# Patient Record
Sex: FEMALE | Race: WHITE | NOT HISPANIC OR LATINO | Employment: PART TIME | URBAN - METROPOLITAN AREA
[De-identification: names, ages, dates, MRNs, and addresses within clinical notes are randomized per-mention and may not be internally consistent; named-entity substitution may affect disease eponyms.]

---

## 2017-01-08 ENCOUNTER — MYC MEDICAL ADVICE (OUTPATIENT)
Dept: NEUROLOGY | Facility: CLINIC | Age: 52
End: 2017-01-08

## 2017-01-08 DIAGNOSIS — M53.3 SI (SACROILIAC) JOINT DYSFUNCTION: ICD-10-CM

## 2017-01-08 DIAGNOSIS — F07.81 POST CONCUSSION SYNDROME: Primary | ICD-10-CM

## 2017-01-10 RX ORDER — HYDROMORPHONE HYDROCHLORIDE 2 MG/1
1-2 TABLET ORAL
Qty: 20 TABLET | Refills: 0 | Status: SHIPPED | OUTPATIENT
Start: 2017-01-10 | End: 2017-04-07

## 2017-01-10 RX ORDER — DEXTROAMPHETAMINE SACCHARATE, AMPHETAMINE ASPARTATE, DEXTROAMPHETAMINE SULFATE AND AMPHETAMINE SULFATE 5; 5; 5; 5 MG/1; MG/1; MG/1; MG/1
20 TABLET ORAL 2 TIMES DAILY
Qty: 180 TABLET | Refills: 0 | Status: SHIPPED | OUTPATIENT
Start: 2017-01-10 | End: 2017-02-09

## 2017-01-26 ENCOUNTER — TELEPHONE (OUTPATIENT)
Dept: NEUROLOGY | Facility: CLINIC | Age: 52
End: 2017-01-26

## 2017-01-26 NOTE — TELEPHONE ENCOUNTER
Faxed PA request from [facility]   Medication: Amphetamine-Dextroamphetamine 20mg tabs- take 1 tab bu mouth twice daily  Insurance/third party: [facility]   Pt ID:[id number]  Phone:[phone]    Requesting a 3 month supply, but her insurance is only allowing a 1 month supply, please submit PA for 3 month supply.    Tiesha Baltazar RN

## 2017-01-26 NOTE — TELEPHONE ENCOUNTER
[facility] Call Center    Phone Message    Name of Caller: papa    Phone Number: Other phone number:  [phone]*    Best time to return call: ANY    May a detailed message be left on voicemail: yes    Relation to patient: Pharmacy    Reason for Call: Other: Needs prior auth fo amphetamine-dextroamphetamine (ADDERALL) 20 MG per tablet [01641] (Order [id number]), sending fax  to [phone]     Action Taken: Message routed to:  Adult Clinics: Neurology p [id number]

## 2017-02-01 NOTE — TELEPHONE ENCOUNTER
Request has been submitted via [facility]. Waiting for questions to generate before completing PA.

## 2017-02-02 NOTE — TELEPHONE ENCOUNTER
[facility] Prior Authorization Team   Phone: [phone]  Fax: [phone]    PA Initiation    Medication: ADDERALL 20MG  Insurance Company: [employer] - Phone [phone] Fax [phone]  Pharmacy Filling the Rx: [id number] - [place], [place] - [address]  Filling Pharmacy Phone: [phone]  Filling Pharmacy Fax:    Start Date: 2/1/2017

## 2017-02-07 ENCOUNTER — MYC MEDICAL ADVICE (OUTPATIENT)
Dept: NURSING | Facility: CLINIC | Age: 52
End: 2017-02-07

## 2017-02-07 DIAGNOSIS — F07.81 POST CONCUSSION SYNDROME: Primary | ICD-10-CM

## 2017-02-07 NOTE — TELEPHONE ENCOUNTER
PRIOR AUTHORIZATION DENIED    Medication: ADDERALL 20MG - Denied     Denial Date: 2/7/2017    Denial Rational:   PA was denied for diagnosis criteria, additional quantities are not covered when the dx is not ADD, ADHD or narcolepsy confirmed by sleep study. An appeal is available and will require a letter of medical necessity.           Appeal Information:

## 2017-02-09 RX ORDER — DEXTROAMPHETAMINE SACCHARATE, AMPHETAMINE ASPARTATE, DEXTROAMPHETAMINE SULFATE AND AMPHETAMINE SULFATE 5; 5; 5; 5 MG/1; MG/1; MG/1; MG/1
20 TABLET ORAL 2 TIMES DAILY
Qty: 60 TABLET | Refills: 0 | COMMUNITY
Start: 2017-01-10 | End: 2017-02-14

## 2017-02-09 NOTE — TELEPHONE ENCOUNTER
[facility] Call Center    Phone Message    Name of Caller: Darlin    Phone Number: [phone]      Best time to return call: any    May a detailed message be left on voicemail: yes    Relation to patient: Other Name: [employer]  Relationship: [employer]over[employer]Emily  Is there legal documentation in chart to discuss information with this person: Yes. Legal Documentation is verified by [facility].    Reason for Call: [facility] Pharmacy needs to know status of RX: Adderall    Action Taken: Tiesha Baltazar

## 2017-02-09 NOTE — TELEPHONE ENCOUNTER
Writer called the pharmacy to let them know that she is unable to get 90 day supply. They changed the current prescription in her chart at the pharmacy to state 60 tabs were given and not 180. Medication list was updated. Once we have the ok from the provider we will mail her the prescriptions.   Tiesha Baltazar RN

## 2017-02-14 DIAGNOSIS — F07.81 POST CONCUSSION SYNDROME: ICD-10-CM

## 2017-02-14 RX ORDER — DEXTROAMPHETAMINE SACCHARATE, AMPHETAMINE ASPARTATE, DEXTROAMPHETAMINE SULFATE AND AMPHETAMINE SULFATE 5; 5; 5; 5 MG/1; MG/1; MG/1; MG/1
20 TABLET ORAL 2 TIMES DAILY
Qty: 60 TABLET | Refills: 0 | Status: SHIPPED | OUTPATIENT
Start: 2017-04-14 | End: 2017-04-07

## 2017-02-14 RX ORDER — DEXTROAMPHETAMINE SACCHARATE, AMPHETAMINE ASPARTATE, DEXTROAMPHETAMINE SULFATE AND AMPHETAMINE SULFATE 5; 5; 5; 5 MG/1; MG/1; MG/1; MG/1
20 TABLET ORAL 2 TIMES DAILY
Qty: 60 TABLET | Refills: 0 | Status: SHIPPED | OUTPATIENT
Start: 2017-03-14 | End: 2017-04-07

## 2017-02-14 RX ORDER — DEXTROAMPHETAMINE SACCHARATE, AMPHETAMINE ASPARTATE, DEXTROAMPHETAMINE SULFATE AND AMPHETAMINE SULFATE 5; 5; 5; 5 MG/1; MG/1; MG/1; MG/1
20 TABLET ORAL 2 TIMES DAILY
Qty: 60 TABLET | Refills: 0 | Status: SHIPPED | OUTPATIENT
Start: 2017-02-14 | End: 2017-04-07

## 2017-02-14 NOTE — TELEPHONE ENCOUNTER
The pt would like 3-30 day prescriptions on the Adderall. Routed to Dr Johnston to complete and sign.  Tiesha Baltazar RN

## 2017-02-23 ENCOUNTER — OFFICE VISIT (OUTPATIENT)
Dept: PHYSICAL MEDICINE AND REHAB | Facility: CLINIC | Age: 52
End: 2017-02-23

## 2017-02-23 VITALS
DIASTOLIC BLOOD PRESSURE: 75 MMHG | RESPIRATION RATE: 20 BRPM | OXYGEN SATURATION: 98 % | HEART RATE: 89 BPM | SYSTOLIC BLOOD PRESSURE: 147 MMHG | HEIGHT: 67 IN | WEIGHT: 171 LBS | BODY MASS INDEX: 26.84 KG/M2

## 2017-02-23 DIAGNOSIS — G43.719 INTRACTABLE CHRONIC MIGRAINE WITHOUT AURA AND WITHOUT STATUS MIGRAINOSUS: Primary | ICD-10-CM

## 2017-02-23 ASSESSMENT — PAIN SCALES - GENERAL: PAINLEVEL: EXTREME PAIN (8)

## 2017-02-23 NOTE — LETTER
"2/23/2017       RE: Valerie Sim  [address]  [place] [address]     Dear Colleague,    Thank you for referring your patient, Valerie Sim, to the [facility] PHYSICAL MEDICINE AND REHABILITATION at [facility]. Please see a copy of my visit note below.    BOTULINUM TOXIN PROCEDURE - HEADACHE - NOTE    Chief Complaint   Patient presents with     Headache     UMP- BOTOX-MIGRAINE       /75 (BP Location: Left arm, Patient Position: Chair, Cuff Size: Adult Large)  Pulse 89  Resp 20  Ht 1.702 m (5' 7\")  Wt 77.6 kg (171 lb)  SpO2 98%  Breastfeeding? No  BMI 26.78 kg/m2       Current Outpatient Prescriptions:      amphetamine-dextroamphetamine (ADDERALL) 20 MG per tablet, Take 1 tablet (20 mg) by mouth 2 times daily, Disp: 60 tablet, Rfl: 0     [START ON 3/14/2017] amphetamine-dextroamphetamine (ADDERALL) 20 MG per tablet, Take 1 tablet (20 mg) by mouth 2 times daily, Disp: 60 tablet, Rfl: 0     [START ON 4/14/2017] amphetamine-dextroamphetamine (ADDERALL) 20 MG per tablet, Take 1 tablet (20 mg) by mouth 2 times daily, Disp: 60 tablet, Rfl: 0     HYDROmorphone (DILAUDID) 2 MG tablet, Take 0.5-1 tablets (1-2 mg) by mouth every 3 hours as needed 20 tablets = 3 month supply., Disp: 20 tablet, Rfl: 0     METOPROLOL SUCCINATE ER PO, Take 25 mg by mouth daily, Disp: , Rfl:      OnabotulinumtoxinA (BOTOX IJ), Inject 175 Units as directed once Lot# /C3, Exp 08/2019, Disp: , Rfl:      eletriptan (RELPAX) 40 MG tablet, Take one tablet by mouth at onset of migraine. May repeat once after 2hrs. Max of 2 tabs in 24hrs and 3 days per week., Disp: 12 tablet, Rfl: 5     divalproex (DEPAKOTE) 500 MG EC tablet, Take 1 tablet (500 mg) by mouth 2 times daily as needed, Disp: 60 tablet, Rfl: 5     divalproex (DEPAKOTE ER) 250 MG 24 hr tablet, Take 1 tablet (250 mg) by mouth daily as needed, Disp: 30 tablet, Rfl: 3     OnabotulinumtoxinA (BOTOX IJ), Inject 175 Units into the muscle Lot " # /C3  Exp: 6/2019, Disp: , Rfl:      OnabotulinumtoxinA (BOTOX IJ), Inject 175 Units into the muscle Lot # /C3  Exp: 4/2019, Disp: , Rfl:      OnabotulinumtoxinA (BOTOX IJ), Inject 175 Units into the muscle Lot # /c3  Exp: 2/2019, Disp: , Rfl:      ondansetron (ZOFRAN) 4 MG tablet, Take 1 tablet (4 mg) by mouth every 8 hours as needed, Disp: 90 tablet, Rfl: 1     Azithromycin (ZITHROMAX Z-ARNULFO PO), Take 250 mg by mouth, Disp: , Rfl:      CycloSPORINE (RESTASIS OP), Apply to eye 2 times daily, Disp: , Rfl:      UNABLE TO FIND, MEDICATION NAME: voltaren gel, Disp: , Rfl:      Cholecalciferol (VITAMIN D3) 5000 UNITS TABS, Take 1 tablet by mouth daily., Disp: , Rfl:      biotin 1000 MCG TABS tablet, Take 1,000 mcg by mouth every other day., Disp: , Rfl:      Lutein 20 MG TABS, Take 1 tablet by mouth every other day., Disp: , Rfl:      aMILoride (MIDAMOR) 2.5 MG tablet, Take 2.5 mg by mouth daily., Disp: , Rfl:      LORazepam (ATIVAN) 1 MG tablet, Take 1 mg by mouth 2 times daily as needed., Disp: , Rfl:      QUEtiapine (SEROQUEL) 50 MG tablet, Take 1 tablet by mouth daily., Disp: , Rfl:      amLODIPine (NORVASC) 10 MG tablet, Take 10 mg by mouth daily., Disp: , Rfl:      budesonide (RINOCORT AQUA) 32 MCG/ACT nasal spray, Spray 1 spray into both nostrils daily., Disp: , Rfl:      Calcium Carbonate-Vit D-Min (CALCIUM 1200 PO), Take 1 tablet by mouth daily., Disp: , Rfl:      fexofenadine (ALLEGRA) 180 MG tablet, Take  by mouth daily., Disp: , Rfl:      ibuprofen (ADVIL,MOTRIN) 800 MG tablet, Take 800 mg by mouth every 8 hours as needed., Disp: , Rfl:      KRILL OIL PO, Take  by mouth., Disp: , Rfl:      estrogens, conjugated, (PREMARIN) 0.625 MG tablet, Take by mouth daily .30 mg. , Disp: , Rfl:      valACYclovir (VALTREX) 1000 mg tablet, Take 1,000 mg by mouth as needed., Disp: , Rfl:      B Complex Vitamins (VITAMIN  B COMPLEX) tablet, Take 1 tablet by mouth daily.  , Disp: , Rfl:      AMILORIDE HCL PO,  "Take 5 mg by mouth daily., Disp: , Rfl:      Allergies   Allergen Reactions     Amitriptyline Hcl Other (See Comments)     Migraine       Cymbalta Other (See Comments)     Migraine       Cyproheptadine Hcl      headaches     Desvenlafaxine      Migraine       Fluoxetine Other (See Comments)     Migraine       Imipramine Other (See Comments)     Migraine       Medroxyprogesterone Hives     Morphine Other (See Comments)     Patient reports seizure      Nortriptyline Other (See Comments)     Migraine       Phenergan Dm [Promethazine-Dm] Other (See Comments)     seizures     Venlafaxine Other (See Comments)     Migraine       Wellbutrin [Bupropion Hydrobromide] Other (See Comments)     Migraine       Compazine Anxiety     Dihydroergotamine Anxiety and Other (See Comments)     Cardiac symptoms     Droperidol Anxiety        PHYSICAL EXAM:    Pt states headache today rates 8/10 started yesterday where she rates it at a 10/10.  Did not go to ER. Took Relpax yesterday but has only taken 4 during the last 2 months.    HPI:    Patient reports the following new medical problems since last visit: minor cold, resolved.    We reviewed the recommended safety guidelines for  Botox from any vaccine injection, such as the seasonal flu vaccine, by a minimum of 10-14 days with Valerie Sim. She acknowledged understanding.    RESPONSE TO PREVIOUS TREATMENT:  Change in headache pattern following last series of injections with 175 units of  Botox on 12/14/2016.    No problems reported  positional \"band\" of pressure around head.  Lasted approx one month then resolved.    1.  Headache frequency during this injection cycle:  5 days post injections, total 20-25 headache days per month but states intensity is much reduced..  This is compared to her baseline headache frequency of daily headache days per month.     2.  Headache duration during this injection cycle: Continuous migraine during the this injection cycle with lowered " intensity.  She did not require hospitalization during this episode of sustained migraine. No ER visits.    3.  Headache intensity during this injection cycle:    A.  5/10  =  Typical pain level.  B.  10/10  =  Worst pain level.  C.  4/10  =  Lowest pain level.    4.  Change in headache medication usage during this injection cycle:  (For Example:  Able to decrease use of oral pain medications.) Used less oral pain meds, used less Relpax.    5.  ER Visits During This Injection Cycle:  0    6.  Functional Performance:  Change in ADL's, social interaction, days lost from work, etc. Patient reports 3 days lost from work in addition to the 5 days post injection that she plans, due to headache during this injection cycle    BOTULINUM NEUROTOXIN INJECTION PROCEDURES:      VERIFICATION OF PATIENT IDENTIFICATION AND PROCEDURE     Initials   Patient Name lmd   Patient  lmd   Procedure Verified by: pj     Prior to the start of the procedure and with procedural staff participation, I verbally confirmed the patient s identity using two indicators, relevant allergies, that the procedure was appropriate and matched the consent or emergent situation, and that the correct equipment/implants were available. Immediately prior to starting the procedure I conducted the Time Out with the procedural staff and re-confirmed the patient s name, procedure, and site/side. (The Joint Commission universal protocol was followed.)  Yes    Sedation (Moderate or Deep): None    Above assessments performed by:  Estela Khalil RN Care Coordinator    Juan Jose Jerry MD      INDICATIONS FOR PROCEDURES:  Valerie Sim is a 51 year old patient with chronic migraine headaches associated with cervicogenic components..      Her baseline symptoms have been recalcitrant to oral medications and conservative therapy. She is here today for an injection of Botox.      GOAL OF PROCEDURE:  The goal of this procedure is to decrease pain and enhance functional  independence.    TOTAL DOSE ADMINISTERED:  Dose Administered:  175 units  Botox (Botulinum Toxin Type A)       2:1 Dilution   Diluent Used:  Preservative Free Normal Saline  Total Volume of Diluent Used:  3.50 ml  Lot # /C3 with Expiration Date:  9/2019  NDC #: Botox 100u ([address]-04)    Medication guide was offered to patient and was declined.    CONSENT:  The risks, benefits, and treatment options were discussed with Valerie YEE Roney and she agreed to proceed.    Written consent was obtained by lmd.     EQUIPMENT USED:  Needle-37mm stimulating/recording  Needle-30 gauge  EMG/NCS Machine     SKIN PREPARATION:  Skin preparation was performed using an alcohol wipe.     AREA/MUSCLE INJECTED:  175 Units of Botox  1. FACE & SCALP: 100 units of Botox = Total dose, 2:1 Dilution  Right temporalis - 15 units of Botox in 4 site/s.  Left temporalis - 15 units of Botox in 4 site/s.     Right frontalis - 12.5 units of Botox in 4 site/s.  Left frontalis - 12.5 units of Botox in 4 site/s.     Right procerus - 3.5 units of Botox in 1 site/s.  Left procerus - 3.5 units of Botox in 1 site/s.     Right  - 4 units of Botox in 1 site/s.  Left  - 4 units of Botox in 1 site/s.     Right Occipitalis - 12.5 units of Botox at 4 site/s.   Left Occipitalis - 12.5 units of Botox at 4 site/s.     Left and Right Nasalis - 2.5 units per side, total of 5 units.     2. JAW MUSCLES: 50 units of Botox = Total Dose, 1:1 Dilution  Right Masseter - 20 units of Botox at 1 site/s.   Left Masseter - 20 units of Botox at 1 site/s.     Right temporalis - 5 units of Botox at 1 site/s.  Left temporalis - 5 units of Botox at 1 site/s.      3. SHOULDER & NECK MUSCLES: Total dose 25 units of Botox diluted 2:1     Right levator muscle - 8 units of Botox at 1 site.  Left levator muscle - 7 units of Botox at 1 site.     Right mid trap - 5 units of Botox at 1 site.  Left mid trap - 5 units of Botox at 1 site.       RESPONSE TO PROCEDURE:   Valerie Sim tolerated the procedure well and there were no immediate complications.   She was allowed to recover for an appropriate period of time and was discharged home in stable condition.    FOLLOW UP:  Valerie Sim was asked to follow up by phone in 7-14 days with Nely Jean Baptiste PT, Care Coordinator or Estela Magaña RN, Care Coordinator, to report her response to this series of injections.  Based on the patient's previous response to this therapy, Valerie Sim was rescheduled for the next series of injections in 9 weeks.    PLAN (Medication Changes, Therapy Orders, Work or Disability Issues, etc.): Patient will continue to monitor response to today's injections.     There were 25 units of Botox as unavoidable waste for this patient.      Again, thank you for allowing me to participate in the care of your patient.      Sincerely,    Juan Jose Jerry MD

## 2017-02-23 NOTE — MR AVS SNAPSHOT
After Visit Summary   2/23/2017    Valerie Sim    MRN: [id number]           Patient Information     Date Of Birth          1965        Visit Information        Provider Department      2/23/2017 2:30 PM Juan Jose Jerry MD [facility] Physical Medicine and Rehabilitation         Follow-ups after your visit        Follow-up notes from your care team     Return in about 9 weeks (around 4/27/2017) for Headache.      Your next 10 appointments already scheduled     Apr 26, 2017 10:00 AM CDT   (Arrive by 9:45 AM)   Return Botox with Juan Jose Jerry MD   [facility] Physical Medicine and Rehabilitation ([facility])    [address] [place] [id number] [phone]            May 09, 2017  9:00 AM CDT   Return Visit with Boyd Johnston MD   [facility] ([facility])    [address] [place] [id number]   [phone]            Jun 28, 2017  2:20 PM CDT   (Arrive by 2:05 PM)   Return Botox with Juan Jose Jerry MD   [facility] Physical Medicine and Rehabilitation ([facility])    [address] [place] [id number] [phone]              Who to contact     Please call your clinic at [phone] to:    Ask questions about your health    Make or cancel appointments    Discuss your medicines    Learn about your test results    Speak to your doctor   If you have compliments or concerns about an experience at your clinic, or if you wish to file a complaint, please contact [facility] Patient Relations at [phone] or email us at [email or web address]         Additional Information About Your Visit        MyChart Information     [employer] gives you secure access to your electronic health record. If you see a primary care provider, you can also send messages to your care team and make appointments. If you have  "questions, please call your primary care clinic.  If you do not have a primary care provider, please call [phone] and they will assist you.      [employer] is an electronic gateway that provides easy, online access to your medical records. With [employer], you can request a clinic appointment, read your test results, renew a prescription or communicate with your care team.     To access your existing account, please contact your [facility] or call [phone] for assistance.        Care EveryWhere ID     This is your Care EveryWhere ID. This could be used by other organizations to access your [place] medical records  [id number]        Your Vitals Were     Pulse Respirations Height Pulse Oximetry Breastfeeding? BMI (Body Mass Index)    89 20 1.702 m (5' 7\") 98% No 26.78 kg/m2       Blood Pressure from Last 3 Encounters:   02/23/17 147/75   12/14/16 126/73   11/16/16 121/84    Weight from Last 3 Encounters:   02/23/17 77.6 kg (171 lb)   12/14/16 74.1 kg (163 lb 4.8 oz)   11/16/16 70.3 kg (155 lb)              Today, you had the following     No orders found for display       Primary Care Provider Office Phone # Fax #    Meek Leary [phone]       [place] [phone]        Thank you!     Thank you for choosing [facility] PHYSICAL MEDICINE AND REHABILITATION  for your care. Our goal is always to provide you with excellent care. Hearing back from our patients is one way we can continue to improve our services. Please take a few minutes to complete the written survey that you may receive in the mail after your visit with us. Thank you!             Your Updated Medication List - Protect others around you: Learn how to safely use, store and throw away your medicines at www.disposemymeds.org.          This list is accurate as of: 2/23/17  2:57 PM.  Always use your most recent med list.                   Brand Name Dispense " Instructions for use    * AMILORIDE HCL PO      Take 5 mg by mouth daily.       * aMILoride 2.5 MG half-tab    MIDAMOR     Take 2.5 mg by mouth daily.       * amphetamine-dextroamphetamine 20 MG per tablet    ADDERALL    60 tablet    Take 1 tablet (20 mg) by mouth 2 times daily       * amphetamine-dextroamphetamine 20 MG per tablet   Start taking on:  3/14/2017    ADDERALL    60 tablet    Take 1 tablet (20 mg) by mouth 2 times daily       * amphetamine-dextroamphetamine 20 MG per tablet   Start taking on:  4/14/2017    ADDERALL    60 tablet    Take 1 tablet (20 mg) by mouth 2 times daily       ATIVAN 1 MG tablet   Generic drug:  LORazepam      Take 1 mg by mouth 2 times daily as needed.       biotin 1000 MCG Tabs tablet      Take 1,000 mcg by mouth every other day.       * BOTOX IJ      Inject 175 Units into the muscle Lot # /c3  Exp: 2/2019       * BOTOX IJ      Inject 175 Units into the muscle Lot # /C3  Exp: 4/2019       * BOTOX IJ      Inject 175 Units into the muscle Lot # /C3  Exp: 6/2019       * BOTOX IJ      Inject 175 Units as directed once Lot# /C3, Exp 08/2019       * BOTOX IJ      Inject 175 Units into the muscle Lot # /C3  Exp: 9/2019       budesonide 32 MCG/ACT spray    RINOCORT AQUA     Spray 1 spray into both nostrils daily.       CALCIUM 1200 PO      Take 1 tablet by mouth daily.       cholecalciferol 5000 UNITS Tabs tablet    vitamin D3     Take 1 tablet by mouth daily.       * divalproex 500 MG EC tablet    DEPAKOTE    60 tablet    Take 1 tablet (500 mg) by mouth 2 times daily as needed       * divalproex 250 MG 24 hr tablet    DEPAKOTE ER    30 tablet    Take 1 tablet (250 mg) by mouth daily as needed       eletriptan 40 MG tablet    RELPAX    12 tablet    Take one tablet by mouth at onset of migraine. May repeat once after 2hrs. Max of 2 tabs in 24hrs and 3 days per week.       fexofenadine 180 MG tablet    ALLEGRA     Take  by mouth daily.       HYDROmorphone 2 MG  tablet    DILAUDID    20 tablet    Take 0.5-1 tablets (1-2 mg) by mouth every 3 hours as needed 20 tablets = 3 month supply.       ibuprofen 800 MG tablet    ADVIL/MOTRIN     Take 800 mg by mouth every 8 hours as needed.       KRILL OIL PO      Take  by mouth.       Lutein 20 MG Tabs      Take 1 tablet by mouth every other day.       METOPROLOL SUCCINATE ER PO      Take 25 mg by mouth daily       NORVASC 10 MG tablet   Generic drug:  amLODIPine      Take 10 mg by mouth daily.       ondansetron 4 MG tablet    ZOFRAN    90 tablet    Take 1 tablet (4 mg) by mouth every 8 hours as needed       PREMARIN 0.625 MG tablet   Generic drug:  estrogens (conjugated)      Take by mouth daily .30 mg  .       RESTASIS OP      Apply to eye 2 times daily       SEROQUEL 50 MG tablet   Generic drug:  QUEtiapine      Take 1 tablet by mouth daily.       UNABLE TO FIND      MEDICATION NAME: voltaren gel       VALTREX 1000 mg tablet   Generic drug:  valACYclovir      Take 1,000 mg by mouth as needed.       vitamin B complex with vitamin C Tabs tablet    STRESS TAB     Take 1 tablet by mouth daily.       ZITHROMAX Z-ARNULFO PO      Take 250 mg by mouth       * Notice:  This list has 12 medication(s) that are the same as other medications prescribed for you. Read the directions carefully, and ask your doctor or other care provider to review them with you.

## 2017-02-23 NOTE — PROGRESS NOTES
"BOTULINUM TOXIN PROCEDURE - HEADACHE - NOTE    Chief Complaint   Patient presents with     Headache     [facility]- BOTOX-MIGRAINE       /75 (BP Location: Left arm, Patient Position: Chair, Cuff Size: Adult Large)  Pulse 89  Resp 20  Ht 1.702 m (5' 7\")  Wt 77.6 kg (171 lb)  SpO2 98%  Breastfeeding? No  BMI 26.78 kg/m2       Current Outpatient Prescriptions:      amphetamine-dextroamphetamine (ADDERALL) 20 MG per tablet, Take 1 tablet (20 mg) by mouth 2 times daily, Disp: 60 tablet, Rfl: 0     [START ON 3/14/2017] amphetamine-dextroamphetamine (ADDERALL) 20 MG per tablet, Take 1 tablet (20 mg) by mouth 2 times daily, Disp: 60 tablet, Rfl: 0     [START ON 4/14/2017] amphetamine-dextroamphetamine (ADDERALL) 20 MG per tablet, Take 1 tablet (20 mg) by mouth 2 times daily, Disp: 60 tablet, Rfl: 0     HYDROmorphone (DILAUDID) 2 MG tablet, Take 0.5-1 tablets (1-2 mg) by mouth every 3 hours as needed 20 tablets = 3 month supply., Disp: 20 tablet, Rfl: 0     METOPROLOL SUCCINATE ER PO, Take 25 mg by mouth daily, Disp: , Rfl:      OnabotulinumtoxinA (BOTOX IJ), Inject 175 Units as directed once Lot# /C3, Exp 08/2019, Disp: , Rfl:      eletriptan (RELPAX) 40 MG tablet, Take one tablet by mouth at onset of migraine. May repeat once after 2hrs. Max of 2 tabs in 24hrs and 3 days per week., Disp: 12 tablet, Rfl: 5     divalproex (DEPAKOTE) 500 MG EC tablet, Take 1 tablet (500 mg) by mouth 2 times daily as needed, Disp: 60 tablet, Rfl: 5     divalproex (DEPAKOTE ER) 250 MG 24 hr tablet, Take 1 tablet (250 mg) by mouth daily as needed, Disp: 30 tablet, Rfl: 3     OnabotulinumtoxinA (BOTOX IJ), Inject 175 Units into the muscle Lot # /C3  Exp: 6/2019, Disp: , Rfl:      OnabotulinumtoxinA (BOTOX IJ), Inject 175 Units into the muscle Lot # /C3  Exp: 4/2019, Disp: , Rfl:      OnabotulinumtoxinA (BOTOX IJ), Inject 175 Units into the muscle Lot # /c3  Exp: 2/2019, Disp: , Rfl:      ondansetron (ZOFRAN) 4 MG tablet, Take 1 " tablet (4 mg) by mouth every 8 hours as needed, Disp: 90 tablet, Rfl: 1     Azithromycin (ZITHROMAX Z-ARNULFO PO), Take 250 mg by mouth, Disp: , Rfl:      CycloSPORINE (RESTASIS OP), Apply to eye 2 times daily, Disp: , Rfl:      UNABLE TO FIND, MEDICATION NAME: voltaren gel, Disp: , Rfl:      Cholecalciferol (VITAMIN D3) 5000 UNITS TABS, Take 1 tablet by mouth daily., Disp: , Rfl:      biotin 1000 MCG TABS tablet, Take 1,000 mcg by mouth every other day., Disp: , Rfl:      Lutein 20 MG TABS, Take 1 tablet by mouth every other day., Disp: , Rfl:      aMILoride (MIDAMOR) 2.5 MG tablet, Take 2.5 mg by mouth daily., Disp: , Rfl:      LORazepam (ATIVAN) 1 MG tablet, Take 1 mg by mouth 2 times daily as needed., Disp: , Rfl:      QUEtiapine (SEROQUEL) 50 MG tablet, Take 1 tablet by mouth daily., Disp: , Rfl:      amLODIPine (NORVASC) 10 MG tablet, Take 10 mg by mouth daily., Disp: , Rfl:      budesonide (RINOCORT AQUA) 32 MCG/ACT nasal spray, Spray 1 spray into both nostrils daily., Disp: , Rfl:      Calcium Carbonate-Vit D-Min (CALCIUM 1200 PO), Take 1 tablet by mouth daily., Disp: , Rfl:      fexofenadine (ALLEGRA) 180 MG tablet, Take  by mouth daily., Disp: , Rfl:      ibuprofen (ADVIL,MOTRIN) 800 MG tablet, Take 800 mg by mouth every 8 hours as needed., Disp: , Rfl:      KRILL OIL PO, Take  by mouth., Disp: , Rfl:      estrogens, conjugated, (PREMARIN) 0.625 MG tablet, Take by mouth daily .30 mg. , Disp: , Rfl:      valACYclovir (VALTREX) 1000 mg tablet, Take 1,000 mg by mouth as needed., Disp: , Rfl:      B Complex Vitamins (VITAMIN  B COMPLEX) tablet, Take 1 tablet by mouth daily.  , Disp: , Rfl:      AMILORIDE HCL PO, Take 5 mg by mouth daily., Disp: , Rfl:      Allergies   Allergen Reactions     Amitriptyline Hcl Other (See Comments)     Migraine       Cymbalta Other (See Comments)     Migraine       Cyproheptadine Hcl      headaches     Desvenlafaxine      Migraine       Fluoxetine Other (See Comments)     Migraine    "    Imipramine Other (See Comments)     Migraine       Medroxyprogesterone Hives     Morphine Other (See Comments)     Patient reports seizure      Nortriptyline Other (See Comments)     Migraine       Phenergan Dm [Promethazine-Dm] Other (See Comments)     seizures     Venlafaxine Other (See Comments)     Migraine       Wellbutrin [Bupropion Hydrobromide] Other (See Comments)     Migraine       Compazine Anxiety     Dihydroergotamine Anxiety and Other (See Comments)     Cardiac symptoms     Droperidol Anxiety        PHYSICAL EXAM:    Pt states headache today rates 8/10 started yesterday where she rates it at a 10/10.  Did not go to ER. Took Relpax yesterday but has only taken 4 during the last 2 months.    HPI:    Patient reports the following new medical problems since last visit: minor cold, resolved.    We reviewed the recommended safety guidelines for  Botox from any vaccine injection, such as the seasonal flu vaccine, by a minimum of 10-14 days with Valerie Sim. She acknowledged understanding.    RESPONSE TO PREVIOUS TREATMENT:  Change in headache pattern following last series of injections with 175 units of  Botox on 12/14/2016.    No problems reported  positional \"band\" of pressure around head.  Lasted approx one month then resolved.    1.  Headache frequency during this injection cycle:  5 days post injections, total 20-25 headache days per month but states intensity is much reduced..  This is compared to her baseline headache frequency of daily headache days per month.     2.  Headache duration during this injection cycle: Continuous migraine during the this injection cycle with lowered intensity.  She did not require hospitalization during this episode of sustained migraine. No ER visits.    3.  Headache intensity during this injection cycle:    A.  5/10  =  Typical pain level.  B.  10/10  =  Worst pain level.  C.  4/10  =  Lowest pain level.    4.  Change in headache medication usage during " this injection cycle:  (For Example:  Able to decrease use of oral pain medications.) Used less oral pain meds, used less Relpax.    5.  ER Visits During This Injection Cycle:  0    6.  Functional Performance:  Change in ADL's, social interaction, days lost from work, etc. Patient reports 3 days lost from work in addition to the 5 days post injection that she plans, due to headache during this injection cycle    BOTULINUM NEUROTOXIN INJECTION PROCEDURES:      VERIFICATION OF PATIENT IDENTIFICATION AND PROCEDURE     Initials   Patient Name lmd   Patient  lmd   Procedure Verified by: pj     Prior to the start of the procedure and with procedural staff participation, I verbally confirmed the patient s identity using two indicators, relevant allergies, that the procedure was appropriate and matched the consent or emergent situation, and that the correct equipment/implants were available. Immediately prior to starting the procedure I conducted the Time Out with the procedural staff and re-confirmed the patient s name, procedure, and site/side. (The Joint Commission universal protocol was followed.)  Yes    Sedation (Moderate or Deep): None    Above assessments performed by:  Estela Khalil RN Care Coordinator    Juan Jose Jerry MD      INDICATIONS FOR PROCEDURES:  Valerie Sim is a 51 year old patient with chronic migraine headaches associated with cervicogenic components..      Her baseline symptoms have been recalcitrant to oral medications and conservative therapy. She is here today for an injection of Botox.      GOAL OF PROCEDURE:  The goal of this procedure is to decrease pain and enhance functional independence.    TOTAL DOSE ADMINISTERED:  Dose Administered:  175 units  Botox (Botulinum Toxin Type A)       2:1 Dilution   Diluent Used:  Preservative Free Normal Saline  Total Volume of Diluent Used:  3.50 ml  Lot # /C3 with Expiration Date:  2019  NDC #: Botox 100u ([address]-83)    Medication guide  was offered to patient and was declined.    CONSENT:  The risks, benefits, and treatment options were discussed with Valerie Sim and she agreed to proceed.    Written consent was obtained by lmd.     EQUIPMENT USED:  Needle-37mm stimulating/recording  Needle-30 gauge  EMG/NCS Machine     SKIN PREPARATION:  Skin preparation was performed using an alcohol wipe.     AREA/MUSCLE INJECTED:  175 Units of Botox  1. FACE & SCALP: 100 units of Botox = Total dose, 2:1 Dilution  Right temporalis - 15 units of Botox in 4 site/s.  Left temporalis - 15 units of Botox in 4 site/s.     Right frontalis - 12.5 units of Botox in 4 site/s.  Left frontalis - 12.5 units of Botox in 4 site/s.     Right procerus - 3.5 units of Botox in 1 site/s.  Left procerus - 3.5 units of Botox in 1 site/s.     Right  - 4 units of Botox in 1 site/s.  Left  - 4 units of Botox in 1 site/s.     Right Occipitalis - 12.5 units of Botox at 4 site/s.   Left Occipitalis - 12.5 units of Botox at 4 site/s.     Left and Right Nasalis - 2.5 units per side, total of 5 units.     2. JAW MUSCLES: 50 units of Botox = Total Dose, 1:1 Dilution  Right Masseter - 20 units of Botox at 1 site/s.   Left Masseter - 20 units of Botox at 1 site/s.     Right temporalis - 5 units of Botox at 1 site/s.  Left temporalis - 5 units of Botox at 1 site/s.      3. SHOULDER & NECK MUSCLES: Total dose 25 units of Botox diluted 2:1     Right levator muscle - 8 units of Botox at 1 site.  Left levator muscle - 7 units of Botox at 1 site.     Right mid trap - 5 units of Botox at 1 site.  Left mid trap - 5 units of Botox at 1 site.       RESPONSE TO PROCEDURE:  Valerie Sim tolerated the procedure well and there were no immediate complications.   She was allowed to recover for an appropriate period of time and was discharged home in stable condition.    FOLLOW UP:  Valerie Sim was asked to follow up by phone in 7-14 days with Nely Jean Baptiste PT, Care Coordinator or  Estela Magaña RN, Care Coordinator, to report her response to this series of injections.  Based on the patient's previous response to this therapy, Valerie Sim was rescheduled for the next series of injections in 9 weeks.    PLAN (Medication Changes, Therapy Orders, Work or Disability Issues, etc.): Patient will continue to monitor response to today's injections.     There were 25 units of Botox as unavoidable waste for this patient.

## 2017-04-06 ENCOUNTER — MYC MEDICAL ADVICE (OUTPATIENT)
Dept: NEUROLOGY | Facility: CLINIC | Age: 52
End: 2017-04-06

## 2017-04-06 DIAGNOSIS — F07.81 POST CONCUSSION SYNDROME: ICD-10-CM

## 2017-04-06 DIAGNOSIS — M53.3 SI (SACROILIAC) JOINT DYSFUNCTION: ICD-10-CM

## 2017-04-10 RX ORDER — DEXTROAMPHETAMINE SACCHARATE, AMPHETAMINE ASPARTATE, DEXTROAMPHETAMINE SULFATE AND AMPHETAMINE SULFATE 5; 5; 5; 5 MG/1; MG/1; MG/1; MG/1
20 TABLET ORAL 2 TIMES DAILY
Qty: 60 TABLET | Refills: 0 | Status: SHIPPED | OUTPATIENT
Start: 2017-06-07 | End: 2017-06-27

## 2017-04-10 RX ORDER — DEXTROAMPHETAMINE SACCHARATE, AMPHETAMINE ASPARTATE, DEXTROAMPHETAMINE SULFATE AND AMPHETAMINE SULFATE 5; 5; 5; 5 MG/1; MG/1; MG/1; MG/1
20 TABLET ORAL 2 TIMES DAILY
Qty: 60 TABLET | Refills: 0 | Status: SHIPPED | OUTPATIENT
Start: 2017-04-10 | End: 2017-05-09

## 2017-04-10 RX ORDER — HYDROMORPHONE HYDROCHLORIDE 2 MG/1
1-2 TABLET ORAL
Qty: 20 TABLET | Refills: 0 | Status: SHIPPED | OUTPATIENT
Start: 2017-04-10 | End: 2017-06-27

## 2017-04-10 RX ORDER — DEXTROAMPHETAMINE SACCHARATE, AMPHETAMINE ASPARTATE, DEXTROAMPHETAMINE SULFATE AND AMPHETAMINE SULFATE 5; 5; 5; 5 MG/1; MG/1; MG/1; MG/1
20 TABLET ORAL 2 TIMES DAILY
Qty: 60 TABLET | Refills: 0 | Status: SHIPPED | OUTPATIENT
Start: 2017-05-07 | End: 2017-05-09

## 2017-04-26 ENCOUNTER — OFFICE VISIT (OUTPATIENT)
Dept: PHYSICAL MEDICINE AND REHAB | Facility: CLINIC | Age: 52
End: 2017-04-26

## 2017-04-26 VITALS
WEIGHT: 174 LBS | HEART RATE: 98 BPM | TEMPERATURE: 99 F | BODY MASS INDEX: 27.31 KG/M2 | HEIGHT: 67 IN | DIASTOLIC BLOOD PRESSURE: 65 MMHG | SYSTOLIC BLOOD PRESSURE: 125 MMHG

## 2017-04-26 DIAGNOSIS — G43.719 INTRACTABLE CHRONIC MIGRAINE WITHOUT AURA AND WITHOUT STATUS MIGRAINOSUS: Primary | ICD-10-CM

## 2017-04-26 ASSESSMENT — PAIN SCALES - GENERAL: PAINLEVEL: SEVERE PAIN (7)

## 2017-04-26 NOTE — PROGRESS NOTES
"BOTULINUM TOXIN PROCEDURE - HEADACHE - NOTE    Chief Complaint   Patient presents with     RECHECK     [facility] RETURN - BOTOX       /65 (BP Location: Left arm, Patient Position: Chair, Cuff Size: Adult Regular)  Pulse 98  Temp 99  F (37.2  C) (Oral)  Ht 1.702 m (5' 7\")  Wt 78.9 kg (174 lb)  BMI 27.25 kg/m2       Current Outpatient Prescriptions:      amphetamine-dextroamphetamine (ADDERALL) 20 MG per tablet, Take 1 tablet (20 mg) by mouth 2 times daily, Disp: 60 tablet, Rfl: 0     [START ON 5/7/2017] amphetamine-dextroamphetamine (ADDERALL) 20 MG per tablet, Take 1 tablet (20 mg) by mouth 2 times daily, Disp: 60 tablet, Rfl: 0     [START ON 6/7/2017] amphetamine-dextroamphetamine (ADDERALL) 20 MG per tablet, Take 1 tablet (20 mg) by mouth 2 times daily, Disp: 60 tablet, Rfl: 0     HYDROmorphone (DILAUDID) 2 MG tablet, Take 0.5-1 tablets (1-2 mg) by mouth every 3 hours as needed 20 tablets = 3 month supply., Disp: 20 tablet, Rfl: 0     OnabotulinumtoxinA (BOTOX IJ), Inject 175 Units into the muscle Lot # /C3  Exp: 9/2019, Disp: , Rfl:      METOPROLOL SUCCINATE ER PO, Take 25 mg by mouth daily, Disp: , Rfl:      OnabotulinumtoxinA (BOTOX IJ), Inject 175 Units as directed once Lot# /C3, Exp 08/2019, Disp: , Rfl:      eletriptan (RELPAX) 40 MG tablet, Take one tablet by mouth at onset of migraine. May repeat once after 2hrs. Max of 2 tabs in 24hrs and 3 days per week., Disp: 12 tablet, Rfl: 5     divalproex (DEPAKOTE) 500 MG EC tablet, Take 1 tablet (500 mg) by mouth 2 times daily as needed, Disp: 60 tablet, Rfl: 5     divalproex (DEPAKOTE ER) 250 MG 24 hr tablet, Take 1 tablet (250 mg) by mouth daily as needed, Disp: 30 tablet, Rfl: 3     OnabotulinumtoxinA (BOTOX IJ), Inject 175 Units into the muscle Lot # /C3  Exp: 6/2019, Disp: , Rfl:      OnabotulinumtoxinA (BOTOX IJ), Inject 175 Units into the muscle Lot # /C3  Exp: 4/2019, Disp: , Rfl:      OnabotulinumtoxinA (BOTOX IJ), Inject 175 " Units into the muscle Lot # /c3  Exp: 2/2019, Disp: , Rfl:      ondansetron (ZOFRAN) 4 MG tablet, Take 1 tablet (4 mg) by mouth every 8 hours as needed, Disp: 90 tablet, Rfl: 1     Azithromycin (ZITHROMAX Z-ARNULFO PO), Take 250 mg by mouth, Disp: , Rfl:      CycloSPORINE (RESTASIS OP), Apply to eye 2 times daily, Disp: , Rfl:      UNABLE TO FIND, MEDICATION NAME: voltaren gel, Disp: , Rfl:      Cholecalciferol (VITAMIN D3) 5000 UNITS TABS, Take 1 tablet by mouth daily., Disp: , Rfl:      biotin 1000 MCG TABS tablet, Take 1,000 mcg by mouth every other day., Disp: , Rfl:      Lutein 20 MG TABS, Take 1 tablet by mouth every other day., Disp: , Rfl:      aMILoride (MIDAMOR) 2.5 MG tablet, Take 2.5 mg by mouth daily., Disp: , Rfl:      LORazepam (ATIVAN) 1 MG tablet, Take 1 mg by mouth 2 times daily as needed., Disp: , Rfl:      QUEtiapine (SEROQUEL) 50 MG tablet, Take 1 tablet by mouth daily., Disp: , Rfl:      amLODIPine (NORVASC) 10 MG tablet, Take 10 mg by mouth daily., Disp: , Rfl:      budesonide (RINOCORT AQUA) 32 MCG/ACT nasal spray, Spray 1 spray into both nostrils daily., Disp: , Rfl:      Calcium Carbonate-Vit D-Min (CALCIUM 1200 PO), Take 1 tablet by mouth daily., Disp: , Rfl:      fexofenadine (ALLEGRA) 180 MG tablet, Take  by mouth daily., Disp: , Rfl:      ibuprofen (ADVIL,MOTRIN) 800 MG tablet, Take 800 mg by mouth every 8 hours as needed., Disp: , Rfl:      KRILL OIL PO, Take  by mouth., Disp: , Rfl:      estrogens, conjugated, (PREMARIN) 0.625 MG tablet, Take by mouth daily .30 mg. , Disp: , Rfl:      valACYclovir (VALTREX) 1000 mg tablet, Take 1,000 mg by mouth as needed., Disp: , Rfl:      B Complex Vitamins (VITAMIN  B COMPLEX) tablet, Take 1 tablet by mouth daily.  , Disp: , Rfl:      AMILORIDE HCL PO, Take 5 mg by mouth daily., Disp: , Rfl:      Allergies   Allergen Reactions     Amitriptyline Hcl Other (See Comments)     Migraine       Cymbalta Other (See Comments)     Migraine        "Cyproheptadine Hcl      headaches     Desvenlafaxine      Migraine       Fluoxetine Other (See Comments)     Migraine       Imipramine Other (See Comments)     Migraine       Medroxyprogesterone Hives     Morphine Other (See Comments)     Patient reports seizure      Nortriptyline Other (See Comments)     Migraine       Phenergan Dm [Promethazine-Dm] Other (See Comments)     seizures     Venlafaxine Other (See Comments)     Migraine       Wellbutrin [Bupropion Hydrobromide] Other (See Comments)     Migraine       Compazine Anxiety     Dihydroergotamine Anxiety and Other (See Comments)     Cardiac symptoms     Droperidol Anxiety        PHYSICAL EXAM:    7/10 headache with \"hangover feeling\" given pain medications she received for the nephrolithiasis.     HPI:    Patient reports the following new medical problems since last visit: she recently had a nephrolithiasis, she was started on a phenazopyridine.    We reviewed the recommended safety guidelines for  Botox from any vaccine injection, such as the seasonal flu vaccine, by a minimum of 10-14 days with Valerie Sim. She acknowledged understanding.    RESPONSE TO PREVIOUS TREATMENT:  Change in headache pattern following last series of injections with 175 units of  Botox on 02/23/2017.    No problems reported  she commonly gets 5 days of malaise post injections.    1.  Headache frequency during this injection cycle:  15 headache days per month of more prominent headache that she needs to treat, she does have a mild baseline HA that is almost constant.  This is compared to her baseline headache frequency of daily headache.     2.  Headache duration during this injection cycle:  Mild baseline HA that can wax and wane in severity, the more prominent headaches last about 2 days each.    3.  Headache intensity during this injection cycle:    A.  4/10  =  Typical pain level.  B.  8/10  =  Worst pain level.  C.  4/10  =  Lowest pain level.    4.  Change in " headache medication usage during this injection cycle: she was using the Relpax less     5.  ER Visits During This Injection Cycle:  0    6.  Functional Performance:  Change in ADL's, social interaction, days lost from work, etc. she reports she missed about 3 social events over the last 9 weeks.    BOTULINUM NEUROTOXIN INJECTION PROCEDURES:      VERIFICATION OF PATIENT IDENTIFICATION AND PROCEDURE     Initials   Patient Name RRF   Patient  RRF   Procedure Verified by: RRF     Prior to the start of the procedure and with procedural staff participation, I verbally confirmed the patient s identity using two indicators, relevant allergies, that the procedure was appropriate and matched the consent or emergent situation, and that the correct equipment/implants were available. Immediately prior to starting the procedure I conducted the Time Out with the procedural staff and re-confirmed the patient s name, procedure, and site/side. (The Joint Commission universal protocol was followed.)  Yes    Sedation (Moderate or Deep): None    Above assessments performed by:  Resident/Fellow         Radha Howe MD          The attending provider was present for the entire procedure documented below.    Juan Jose Jerry MD    INDICATIONS FOR PROCEDURES:  Valerie Sim is a 52 year old patient with chronic migraine headaches associated with cervicogenic components..       Her baseline symptoms have been recalcitrant to oral medications and conservative therapy. She is here today for an injection of Botox.       GOAL OF PROCEDURE:  The goal of this procedure is to decrease pain and enhance functional independence.     TOTAL DOSE ADMINISTERED:  Dose Administered: 175 units Botox (Botulinum Toxin Type A)  2:1 Dilution   Diluent Used: Preservative Free Normal Saline  Total Volume of Diluent Used: 3.50 ml  Lot # /C3 with Expiration Date: 10/2019  NDC #: Botox 100u ([address]-85)     Medication guide was offered to patient and was  declined.     CONSENT:  The risks, benefits, and treatment options were discussed with Valerie Sim and she agreed to proceed.     Written consent was obtained by RRF.      EQUIPMENT USED:  Needle-37mm stimulating/recording  Needle-30 gauge  EMG/NCS Machine      SKIN PREPARATION:  Skin preparation was performed using an alcohol wipe.      AREA/MUSCLE INJECTED: 175 Units of Botox  1. FACE & SCALP: 100 units of Botox = Total dose, 2:1 Dilution  Right temporalis - 15 units of Botox in 4 site/s.  Left temporalis - 15 units of Botox in 4 site/s.      Right frontalis - 12.5 units of Botox in 4 site/s.  Left frontalis - 12.5 units of Botox in 4 site/s.      Right procerus - 3.5 units of Botox in 1 site/s.  Left procerus - 3.5 units of Botox in 1 site/s.      Right  - 4 units of Botox in 1 site/s.  Left  - 4 units of Botox in 1 site/s.      Right Occipitalis - 12.5 units of Botox at 4 site/s.   Left Occipitalis - 12.5 units of Botox at 4 site/s.      Left and Right Nasalis - 2.5 units per side, total of 5 units.      2. JAW MUSCLES: 50 units of Botox = Total Dose, 1:1 Dilution  Right Masseter - 20 units of Botox at 1 site/s.   Left Masseter - 20 units of Botox at 1 site/s.      Right temporalis - 5 units of Botox at 1 site/s.  Left temporalis - 5 units of Botox at 1 site/s.       3. SHOULDER & NECK MUSCLES: Total dose 25 units of Botox diluted 2:1      Right levator muscle - 8 units of Botox at 1 site.  Left levator muscle - 7 units of Botox at 1 site.      Right mid trap - 5 units of Botox at 1 site.  Left mid trap - 5 units of Botox at 1 site.        RESPONSE TO PROCEDURE: Valerie Sim tolerated the procedure well and there were no immediate complications. She was allowed to recover for an appropriate period of time and was discharged home in stable condition.     FOLLOW UP:  Valerie Sim was asked to follow up by phone in 7-14 days with Nely Jean Baptiste PT, Care Coordinator or Estela Magaña,  RN, Care Coordinator, to report her response to this series of injections. Based on the patient's previous response to this therapy, Valerie YEE Sadiekory was rescheduled for the next series of injections in 9 weeks.     PLAN (Medication Changes, Therapy Orders, Work or Disability Issues, etc.): Patient will continue to monitor response to today's injections.

## 2017-04-26 NOTE — LETTER
"4/26/2017       RE: Valerie Sim  [address]  [place] [address]     Dear Colleague,    Thank you for referring your patient, Valerie Sim, to the [facility] PHYSICAL MEDICINE AND REHABILITATION at [facility]. Please see a copy of my visit note below.    BOTULINUM TOXIN PROCEDURE - HEADACHE - NOTE    Chief Complaint   Patient presents with     RECHECK     UMP RETURN - BOTOX       /65 (BP Location: Left arm, Patient Position: Chair, Cuff Size: Adult Regular)  Pulse 98  Temp 99  F (37.2  C) (Oral)  Ht 1.702 m (5' 7\")  Wt 78.9 kg (174 lb)  BMI 27.25 kg/m2       Current Outpatient Prescriptions:      amphetamine-dextroamphetamine (ADDERALL) 20 MG per tablet, Take 1 tablet (20 mg) by mouth 2 times daily, Disp: 60 tablet, Rfl: 0     [START ON 5/7/2017] amphetamine-dextroamphetamine (ADDERALL) 20 MG per tablet, Take 1 tablet (20 mg) by mouth 2 times daily, Disp: 60 tablet, Rfl: 0     [START ON 6/7/2017] amphetamine-dextroamphetamine (ADDERALL) 20 MG per tablet, Take 1 tablet (20 mg) by mouth 2 times daily, Disp: 60 tablet, Rfl: 0     HYDROmorphone (DILAUDID) 2 MG tablet, Take 0.5-1 tablets (1-2 mg) by mouth every 3 hours as needed 20 tablets = 3 month supply., Disp: 20 tablet, Rfl: 0     OnabotulinumtoxinA (BOTOX IJ), Inject 175 Units into the muscle Lot # /C3  Exp: 9/2019, Disp: , Rfl:      METOPROLOL SUCCINATE ER PO, Take 25 mg by mouth daily, Disp: , Rfl:      OnabotulinumtoxinA (BOTOX IJ), Inject 175 Units as directed once Lot# /C3, Exp 08/2019, Disp: , Rfl:      eletriptan (RELPAX) 40 MG tablet, Take one tablet by mouth at onset of migraine. May repeat once after 2hrs. Max of 2 tabs in 24hrs and 3 days per week., Disp: 12 tablet, Rfl: 5     divalproex (DEPAKOTE) 500 MG EC tablet, Take 1 tablet (500 mg) by mouth 2 times daily as needed, Disp: 60 tablet, Rfl: 5     divalproex (DEPAKOTE ER) 250 MG 24 hr tablet, Take 1 tablet (250 mg) by mouth daily " as needed, Disp: 30 tablet, Rfl: 3     OnabotulinumtoxinA (BOTOX IJ), Inject 175 Units into the muscle Lot # /C3  Exp: 6/2019, Disp: , Rfl:      OnabotulinumtoxinA (BOTOX IJ), Inject 175 Units into the muscle Lot # /C3  Exp: 4/2019, Disp: , Rfl:      OnabotulinumtoxinA (BOTOX IJ), Inject 175 Units into the muscle Lot # /c3  Exp: 2/2019, Disp: , Rfl:      ondansetron (ZOFRAN) 4 MG tablet, Take 1 tablet (4 mg) by mouth every 8 hours as needed, Disp: 90 tablet, Rfl: 1     Azithromycin (ZITHROMAX Z-ARNULFO PO), Take 250 mg by mouth, Disp: , Rfl:      CycloSPORINE (RESTASIS OP), Apply to eye 2 times daily, Disp: , Rfl:      UNABLE TO FIND, MEDICATION NAME: voltaren gel, Disp: , Rfl:      Cholecalciferol (VITAMIN D3) 5000 UNITS TABS, Take 1 tablet by mouth daily., Disp: , Rfl:      biotin 1000 MCG TABS tablet, Take 1,000 mcg by mouth every other day., Disp: , Rfl:      Lutein 20 MG TABS, Take 1 tablet by mouth every other day., Disp: , Rfl:      aMILoride (MIDAMOR) 2.5 MG tablet, Take 2.5 mg by mouth daily., Disp: , Rfl:      LORazepam (ATIVAN) 1 MG tablet, Take 1 mg by mouth 2 times daily as needed., Disp: , Rfl:      QUEtiapine (SEROQUEL) 50 MG tablet, Take 1 tablet by mouth daily., Disp: , Rfl:      amLODIPine (NORVASC) 10 MG tablet, Take 10 mg by mouth daily., Disp: , Rfl:      budesonide (RINOCORT AQUA) 32 MCG/ACT nasal spray, Spray 1 spray into both nostrils daily., Disp: , Rfl:      Calcium Carbonate-Vit D-Min (CALCIUM 1200 PO), Take 1 tablet by mouth daily., Disp: , Rfl:      fexofenadine (ALLEGRA) 180 MG tablet, Take  by mouth daily., Disp: , Rfl:      ibuprofen (ADVIL,MOTRIN) 800 MG tablet, Take 800 mg by mouth every 8 hours as needed., Disp: , Rfl:      KRILL OIL PO, Take  by mouth., Disp: , Rfl:      estrogens, conjugated, (PREMARIN) 0.625 MG tablet, Take by mouth daily .30 mg. , Disp: , Rfl:      valACYclovir (VALTREX) 1000 mg tablet, Take 1,000 mg by mouth as needed., Disp: , Rfl:      B Complex  "Vitamins (VITAMIN  B COMPLEX) tablet, Take 1 tablet by mouth daily.  , Disp: , Rfl:      AMILORIDE HCL PO, Take 5 mg by mouth daily., Disp: , Rfl:      Allergies   Allergen Reactions     Amitriptyline Hcl Other (See Comments)     Migraine       Cymbalta Other (See Comments)     Migraine       Cyproheptadine Hcl      headaches     Desvenlafaxine      Migraine       Fluoxetine Other (See Comments)     Migraine       Imipramine Other (See Comments)     Migraine       Medroxyprogesterone Hives     Morphine Other (See Comments)     Patient reports seizure      Nortriptyline Other (See Comments)     Migraine       Phenergan Dm [Promethazine-Dm] Other (See Comments)     seizures     Venlafaxine Other (See Comments)     Migraine       Wellbutrin [Bupropion Hydrobromide] Other (See Comments)     Migraine       Compazine Anxiety     Dihydroergotamine Anxiety and Other (See Comments)     Cardiac symptoms     Droperidol Anxiety        PHYSICAL EXAM:    7/10 headache with \"hangover feeling\" given pain medications she received for the nephrolithiasis.     HPI:    Patient reports the following new medical problems since last visit: she recently had a nephrolithiasis, she was started on a phenazopyridine.    We reviewed the recommended safety guidelines for  Botox from any vaccine injection, such as the seasonal flu vaccine, by a minimum of 10-14 days with Valerie Sim. She acknowledged understanding.    RESPONSE TO PREVIOUS TREATMENT:  Change in headache pattern following last series of injections with 175 units of  Botox on 02/23/2017.    No problems reported  she commonly gets 5 days of malaise post injections.    1.  Headache frequency during this injection cycle:  15 headache days per month of more prominent headache that she needs to treat, she does have a mild baseline HA that is almost constant.  This is compared to her baseline headache frequency of daily headache.     2.  Headache duration during this injection " cycle:  Mild baseline HA that can wax and wane in severity, the more prominent headaches last about 2 days each.    3.  Headache intensity during this injection cycle:    A.  4/10  =  Typical pain level.  B.  8/10  =  Worst pain level.  C.  4/10  =  Lowest pain level.    4.  Change in headache medication usage during this injection cycle: she was using the Relpax less     5.  ER Visits During This Injection Cycle:  0    6.  Functional Performance:  Change in ADL's, social interaction, days lost from work, etc. she reports she missed about 3 social events over the last 9 weeks.    BOTULINUM NEUROTOXIN INJECTION PROCEDURES:      VERIFICATION OF PATIENT IDENTIFICATION AND PROCEDURE     Initials   Patient Name RRF   Patient  RRF   Procedure Verified by: RRF     Prior to the start of the procedure and with procedural staff participation, I verbally confirmed the patient s identity using two indicators, relevant allergies, that the procedure was appropriate and matched the consent or emergent situation, and that the correct equipment/implants were available. Immediately prior to starting the procedure I conducted the Time Out with the procedural staff and re-confirmed the patient s name, procedure, and site/side. (The Joint Commission universal protocol was followed.)  Yes    Sedation (Moderate or Deep): None    Above assessments performed by:  Resident/Fellow         Radha Howe MD          The attending provider was present for the entire procedure documented below.    Juan Jose Jerry MD    INDICATIONS FOR PROCEDURES:  Valerie Sim is a 52 year old patient with chronic migraine headaches associated with cervicogenic components..       Her baseline symptoms have been recalcitrant to oral medications and conservative therapy. She is here today for an injection of Botox.       GOAL OF PROCEDURE:  The goal of this procedure is to decrease pain and enhance functional independence.     TOTAL DOSE ADMINISTERED:  Dose  Administered: 175 units Botox (Botulinum Toxin Type A)  2:1 Dilution   Diluent Used: Preservative Free Normal Saline  Total Volume of Diluent Used: 3.50 ml  Lot # /C3 with Expiration Date: 10/2019  NDC #: Botox 100u ([address]-67)     Medication guide was offered to patient and was declined.     CONSENT:  The risks, benefits, and treatment options were discussed with Valerie Sim and she agreed to proceed.     Written consent was obtained by RRF.      EQUIPMENT USED:  Needle-37mm stimulating/recording  Needle-30 gauge  EMG/NCS Machine      SKIN PREPARATION:  Skin preparation was performed using an alcohol wipe.      AREA/MUSCLE INJECTED: 175 Units of Botox  1. FACE & SCALP: 100 units of Botox = Total dose, 2:1 Dilution  Right temporalis - 15 units of Botox in 4 site/s.  Left temporalis - 15 units of Botox in 4 site/s.      Right frontalis - 12.5 units of Botox in 4 site/s.  Left frontalis - 12.5 units of Botox in 4 site/s.      Right procerus - 3.5 units of Botox in 1 site/s.  Left procerus - 3.5 units of Botox in 1 site/s.      Right  - 4 units of Botox in 1 site/s.  Left  - 4 units of Botox in 1 site/s.      Right Occipitalis - 12.5 units of Botox at 4 site/s.   Left Occipitalis - 12.5 units of Botox at 4 site/s.      Left and Right Nasalis - 2.5 units per side, total of 5 units.      2. JAW MUSCLES: 50 units of Botox = Total Dose, 1:1 Dilution  Right Masseter - 20 units of Botox at 1 site/s.   Left Masseter - 20 units of Botox at 1 site/s.      Right temporalis - 5 units of Botox at 1 site/s.  Left temporalis - 5 units of Botox at 1 site/s.       3. SHOULDER & NECK MUSCLES: Total dose 25 units of Botox diluted 2:1      Right levator muscle - 8 units of Botox at 1 site.  Left levator muscle - 7 units of Botox at 1 site.      Right mid trap - 5 units of Botox at 1 site.  Left mid trap - 5 units of Botox at 1 site.        RESPONSE TO PROCEDURE: Valerie Sim tolerated the procedure well  and there were no immediate complications. She was allowed to recover for an appropriate period of time and was discharged home in stable condition.     FOLLOW UP:  Valerie Sim was asked to follow up by phone in 7-14 days with Nely Jean Baptiste PT, Care Coordinator or Estela Magaña RN, Care Coordinator, to report her response to this series of injections. Based on the patient's previous response to this therapy, Valerie Sim was rescheduled for the next series of injections in 9 weeks.     PLAN (Medication Changes, Therapy Orders, Work or Disability Issues, etc.): Patient will continue to monitor response to today's injections.     Again, thank you for allowing me to participate in the care of your patient.      Sincerely,    Juan Jose Jerry MD

## 2017-04-26 NOTE — MR AVS SNAPSHOT
After Visit Summary   4/26/2017    Valerie Sim    MRN: [id number]           Patient Information     Date Of Birth          1965        Visit Information        Provider Department      4/26/2017 10:00 AM Juan Jose Jerry MD [facility] Physical Medicine and Rehabilitation         Follow-ups after your visit        Follow-up notes from your care team     Return in about 9 weeks (around 6/28/2017) for Headache.      Your next 10 appointments already scheduled     May 09, 2017  9:00 AM CDT   Return Visit with Boyd Johnston MD   [facility] ([facility])    [address] [place] [id number]   [phone]            Jun 28, 2017  2:20 PM CDT   (Arrive by 2:05 PM)   Return Botox with Jua nJose Jerry MD   [facility] ([facility])    [address] [place] [id number] [phone]            Aug 30, 2017  1:40 PM CDT   (Arrive by 1:25 PM)   Return Botox with Juan Jose Jerry MD   [facility] ([facility])    [address] [place] [id number] [phone]              Who to contact     Please call your clinic at [phone] to:    Ask questions about your health    Make or cancel appointments    Discuss your medicines    Learn about your test results    Speak to your doctor   If you have compliments or concerns about an experience at your clinic, or if you wish to file a complaint, please contact [facility] Patient Relations at [phone] or email us at [email or web address]         Additional Information About Your Visit        MyChart Information     MyChart gives you secure access to your electronic health record. If you see a primary care provider, you can also send messages to your care team and make appointments. If you have  "questions, please call your primary care clinic.  If you do not have a primary care provider, please call [phone] and they will assist you.      [employer] is an electronic gateway that provides easy, online access to your medical records. With [employer], you can request a clinic appointment, read your test results, renew a prescription or communicate with your care team.     To access your existing account, please contact your [facility] or call [phone] for assistance.        Care EveryWhere ID     This is your Care EveryWhere ID. This could be used by other organizations to access your [facility] medical records  [id number]        Your Vitals Were     Pulse Temperature Height BMI (Body Mass Index)          98 99  F (37.2  C) (Oral) 1.702 m (5' 7\") 27.25 kg/m2         Blood Pressure from Last 3 Encounters:   04/26/17 125/65   02/23/17 147/75   12/14/16 126/73    Weight from Last 3 Encounters:   04/26/17 78.9 kg (174 lb)   02/23/17 77.6 kg (171 lb)   12/14/16 74.1 kg (163 lb 4.8 oz)              Today, you had the following     No orders found for display       Primary Care Provider Office Phone # Fax #    Meek Leary [phone]       [place] [phone]        Thank you!     Thank you for choosing [facility] PHYSICAL MEDICINE AND REHABILITATION  for your care. Our goal is always to provide you with excellent care. Hearing back from our patients is one way we can continue to improve our services. Please take a few minutes to complete the written survey that you may receive in the mail after your visit with us. Thank you!             Your Updated Medication List - Protect others around you: Learn how to safely use, store and throw away your medicines at www.disposemymeds.org.          This list is accurate as of: 4/26/17 10:34 AM.  Always use your most recent med list.                   Brand Name Dispense " Instructions for use    * AMILORIDE HCL PO      Take 5 mg by mouth daily.       * aMILoride 2.5 mg half-tab    MIDAMOR     Take 2.5 mg by mouth daily.       * amphetamine-dextroamphetamine 20 MG per tablet    ADDERALL    60 tablet    Take 1 tablet (20 mg) by mouth 2 times daily       * amphetamine-dextroamphetamine 20 MG per tablet   Start taking on:  5/7/2017    ADDERALL    60 tablet    Take 1 tablet (20 mg) by mouth 2 times daily       * amphetamine-dextroamphetamine 20 MG per tablet   Start taking on:  6/7/2017    ADDERALL    60 tablet    Take 1 tablet (20 mg) by mouth 2 times daily       ATIVAN 1 MG tablet   Generic drug:  LORazepam      Take 1 mg by mouth 2 times daily as needed.       biotin 1000 MCG Tabs tablet      Take 1,000 mcg by mouth every other day.       * BOTOX IJ      Inject 175 Units as directed once Lot # /C3 with Expiration Date: 10/2019       * BOTOX IJ      Inject 175 Units into the muscle Lot # /c3  Exp: 2/2019       * BOTOX IJ      Inject 175 Units into the muscle Lot # /C3  Exp: 4/2019       * BOTOX IJ      Inject 175 Units into the muscle Lot # /C3  Exp: 6/2019       * BOTOX IJ      Inject 175 Units as directed once Lot# /C3, Exp 08/2019       * BOTOX IJ      Inject 175 Units into the muscle Lot # /C3  Exp: 9/2019       budesonide 32 MCG/ACT spray    RINOCORT AQUA     Spray 1 spray into both nostrils daily.       CALCIUM 1200 PO      Take 1 tablet by mouth daily.       cholecalciferol 5000 UNITS Tabs tablet    vitamin D3     Take 1 tablet by mouth daily.       * divalproex 500 MG EC tablet    DEPAKOTE    60 tablet    Take 1 tablet (500 mg) by mouth 2 times daily as needed       * divalproex 250 MG 24 hr tablet    DEPAKOTE ER    30 tablet    Take 1 tablet (250 mg) by mouth daily as needed       eletriptan 40 MG tablet    RELPAX    12 tablet    Take one tablet by mouth at onset of migraine. May repeat once after 2hrs. Max of 2 tabs in 24hrs and 3 days per week.        fexofenadine 180 MG tablet    ALLEGRA     Take  by mouth daily.       HYDROmorphone 2 MG tablet    DILAUDID    20 tablet    Take 0.5-1 tablets (1-2 mg) by mouth every 3 hours as needed 20 tablets = 3 month supply.       ibuprofen 800 MG tablet    ADVIL/MOTRIN     Take 800 mg by mouth every 8 hours as needed.       KRILL OIL PO      Take  by mouth.       Lutein 20 MG Tabs      Take 1 tablet by mouth every other day.       METOPROLOL SUCCINATE ER PO      Take 25 mg by mouth daily       NORVASC 10 MG tablet   Generic drug:  amLODIPine      Take 10 mg by mouth daily.       ondansetron 4 MG tablet    ZOFRAN    90 tablet    Take 1 tablet (4 mg) by mouth every 8 hours as needed       PREMARIN 0.625 MG tablet   Generic drug:  estrogens (conjugated)      Take by mouth daily .30 mg  .       RESTASIS OP      Apply to eye 2 times daily       SEROQUEL 50 MG tablet   Generic drug:  QUEtiapine      Take 1 tablet by mouth daily.       UNABLE TO FIND      MEDICATION NAME: voltaren gel       VALTREX 1000 mg tablet   Generic drug:  valACYclovir      Take 1,000 mg by mouth as needed.       vitamin B complex with vitamin C Tabs tablet    STRESS TAB     Take 1 tablet by mouth daily.       ZITHROMAX Z-ARNULFO PO      Take 250 mg by mouth       * Notice:  This list has 13 medication(s) that are the same as other medications prescribed for you. Read the directions carefully, and ask your doctor or other care provider to review them with you.

## 2017-04-27 ENCOUNTER — TRANSFERRED RECORDS (OUTPATIENT)
Dept: HEALTH INFORMATION MANAGEMENT | Facility: CLINIC | Age: 52
End: 2017-04-27

## 2017-04-27 ENCOUNTER — CARE COORDINATION (OUTPATIENT)
Dept: PHYSICAL MEDICINE AND REHAB | Facility: CLINIC | Age: 52
End: 2017-04-27

## 2017-04-27 NOTE — PROGRESS NOTES
Sent [employer] message and called Pt to follow up on blood draw scheduled at 10am today.  Spoke directly to Pt to reconfirm her appointment today at [facility].  Pt understands she will be meeting with Kerry at 10am to  lab slips for blood draw.  Kerry's office is on the main floor in [address]  Lab is on main floor across from the pharmacy.

## 2017-05-09 ENCOUNTER — OFFICE VISIT (OUTPATIENT)
Dept: NEUROLOGY | Facility: CLINIC | Age: 52
End: 2017-05-09
Payer: COMMERCIAL

## 2017-05-09 VITALS — DIASTOLIC BLOOD PRESSURE: 84 MMHG | WEIGHT: 168 LBS | SYSTOLIC BLOOD PRESSURE: 131 MMHG | BODY MASS INDEX: 26.31 KG/M2

## 2017-05-09 DIAGNOSIS — G43.009 MIGRAINE WITHOUT AURA AND WITHOUT STATUS MIGRAINOSUS, NOT INTRACTABLE: Primary | ICD-10-CM

## 2017-05-09 PROCEDURE — 99214 OFFICE O/P EST MOD 30 MIN: CPT | Performed by: PSYCHIATRY & NEUROLOGY

## 2017-05-09 NOTE — NURSING NOTE
"Valerie Sim's goals for this visit include: No chief complaint on file.      She requests these members of her care team be copied on today's visit information: yes     PCP: Meek Leary    Referring Provider:  No referring provider defined for this encounter.    No chief complaint on file.      Initial /84  Wt 76.2 kg (168 lb)  BMI 26.31 kg/m2 Estimated body mass index is 26.31 kg/(m^2) as calculated from the following:    Height as of 4/26/17: 1.702 m (5' 7\").    Weight as of this encounter: 76.2 kg (168 lb).  Medication Reconciliation: complete    Do you need any medication refills at today's visit? Not sure       "

## 2017-05-09 NOTE — PROGRESS NOTES
HISTORY OF PRESENT ILLNESS:  Dear Meek I am writing to you in followup on Valerie Sim whom I see for complex headache disorder.  I last saw the patient a year ago.  Neurologically, she is doing fairly well.  She had been on a regimen of Depakote, but really has not been using it very much.  She does get Botox every 9 weeks.  In the last year she has had 2 visits to the ED.  She does get a cocktail of IV fluids, Zofran 4 mg, ketorolac 30 mg, lorazepam 1 mg, and Hydromorphone 2-4 mg.  It has been the inclination the emergency room to give only 0.5 mg or so of hydromorphone and that typically is not enough and she ends up requiring a higher dose.  She is on Adderall 20 mg twice a day for symptoms related to closed head injury with postconcussion syndrome.  It helps her concentration.  I have been giving her a prescription for hydromorphone 20 tablets that lasts for 3 months.  She has developed problems with abdominal pain and now has been using the hydromorphone for that on a daily or every other day basis.  She is concerned because she is going to run out of her hydromorphone too early.  She has been to the pain clinic in the past and has not found it helpful.  She is not interested in going to [facility].  The issues with the abdominal pain has to do with fatty liver.  She has gained 15 pounds.  She also has gone through a course of physical therapy for pelvic floor strengthening.      PHYSICAL EXAMINATION:   GENERAL:  The patient is cooperative and in no distress.   VITAL SIGNS:  Her blood pressure is 131/84.     NEUORLOGIC:  Visual acuity is 20/20 bilaterally.  Funduscopic exam shows sharp discs bilaterally.      ASSESSMENT:   1.  Complex headache disorder, including migraine, migraine with intractable features, post-coital headache.   2.  History of minor closed head injury with postconcussion syndrome.      DISCUSSION:  The patient is seen with the above problem list.  She is doing well from a neurologic point of  view.  I told her I would continue to give her refills on the Adderall and the hydromorphone as we have agreed.  I advised her against using the hydromorphone for bouts of abdominal pain.  This could put her at risk for medication overuse headache. She is going to be seeing you for other recommendations in that regard.  I did not issue any prescriptions today but will do so as necessary.  If you have questions about this, please contact me.      TOTAL TIME SPENT:  This was a 25-minute appointment, more than half of which was spent in counseling and coordination of care.         STEPH CRUZ MD             D: 2017 09:55   T: 2017 15:23   MT: [id number]      Name:     LAKESHA IVY   MRN:      [id number]        Account:      [id number]   :      1965           Visit Date:   2017      Document: [id number]       cc: Meek Leary MD

## 2017-05-09 NOTE — LETTER
5/9/2017      RE: Valerie Sim  [address]  [place] [address]       HISTORY OF PRESENT ILLNESS:  Dear Meek I am writing to you in followup on Valerie Sim whom I see for complex headache disorder.  I last saw the patient a year ago.  Neurologically, she is doing fairly well.  She had been on a regimen of Depakote, but really has not been using it very much.  She does get Botox every 9 weeks.  In the last year she has had 2 visits to the ED.  She does get a cocktail of IV fluids, Zofran 4 mg, ketorolac 30 mg, lorazepam 1 mg, and Hydromorphone 2-4 mg.  It has been the inclination the emergency room to give only 0.5 mg or so of hydromorphone and that typically is not enough and she ends up requiring a higher dose.  She is on Adderall 20 mg twice a day for symptoms related to closed head injury with postconcussion syndrome.  It helps her concentration.  I have been giving her a prescription for hydromorphone 20 tablets that lasts for 3 months.  She has developed problems with abdominal pain and now has been using the hydromorphone for that on a daily or every other day basis.  She is concerned because she is going to run out of her hydromorphone too early.  She has been to the pain clinic in the past and has not found it helpful.  She is not interested in going to [facility].  The issues with the abdominal pain has to do with fatty liver.  She has gained 15 pounds.  She also has gone through a course of physical therapy for pelvic floor strengthening.      PHYSICAL EXAMINATION:   GENERAL:  The patient is cooperative and in no distress.   VITAL SIGNS:  Her blood pressure is 131/84.     NEUORLOGIC:  Visual acuity is 20/20 bilaterally.  Funduscopic exam shows sharp discs bilaterally.      ASSESSMENT:   1.  Complex headache disorder, including migraine, migraine with intractable features, post-coital headache.   2.  History of minor closed head injury with postconcussion syndrome.      DISCUSSION:  The patient is  seen with the above problem list.  She is doing well from a neurologic point of view.  I told her I would continue to give her refills on the Adderall and the hydromorphone as we have agreed.  I advised her against using the hydromorphone for bouts of abdominal pain.  This could put her at risk for medication overuse headache. She is going to be seeing you for other recommendations in that regard.  I did not issue any prescriptions today but will do so as necessary.  If you have questions about this, please contact me.      TOTAL TIME SPENT:  This was a 25-minute appointment, more than half of which was spent in counseling and coordination of care.         STEPH CRUZ MD             D: 2017 09:55   T: 2017 15:23   MT: #150      Name:     LAKESHA IVY   MRN:      [id number]        Account:      [id number]   :      1965           Visit Date:   2017      Document: [id number]       cc: Meek Cruz MD

## 2017-05-09 NOTE — MR AVS SNAPSHOT
After Visit Summary   5/9/2017    Valerie Sim    MRN: [id number]           Patient Information     Date Of Birth          1965        Visit Information        Provider Department      5/9/2017 9:00 AM Boyd Johnston MD [facility]        Today's Diagnoses     Migraine without aura and without status migrainosus, not intractable    -  1       Follow-ups after your visit        Follow-up notes from your care team     Return in about 1 year (around 5/9/2018).      Your next 10 appointments already scheduled     Jun 28, 2017  2:20 PM CDT   (Arrive by 2:05 PM)   Return Botox with Juan Jose Jerry MD   [facility] ([facility])    [address] [place] [address]   [phone]            Aug 30, 2017  1:40 PM CDT   (Arrive by 1:25 PM)   Return Botox with Juan Jose Jerry MD   [facility] ([facility])    [address] [place] [id number] [phone]            May 08, 2018  8:00 AM CDT   Return Visit with Boyd Johnston MD   [facility]    [id number] [place] [id number] [phone]              Who to contact     If you have questions or need follow up information about today's clinic visit or your schedule please contact [facility] directly at [phone].  Normal or non-critical lab and imaging results will be communicated to you by MyChart, letter or phone within 4 business days after the clinic has received the results. If you do not hear from us within 7 days, please contact the clinic through MyChart or phone. If you have a critical or abnormal lab result, we will notify you by phone as soon as possible.  Submit refill requests through [employer] or call your pharmacy and they will forward the refill request  to us. Please allow 3 business days for your refill to be completed.          Additional Information About Your Visit        [employer] Information     [employer] gives you secure access to your electronic health record. If you see a primary care provider, you can also send messages to your care team and make appointments. If you have questions, please call your primary care clinic.  If you do not have a primary care provider, please call [phone] and they will assist you.      [employer] is an electronic gateway that provides easy, online access to your medical records. With [employer], you can request a clinic appointment, read your test results, renew a prescription or communicate with your care team.     To access your existing account, please contact your [facility] or call [phone] for assistance.        Care EveryWhere ID     This is your Care EveryWhere ID. This could be used by other organizations to access your [place] medical records  [id number]        Your Vitals Were     BMI (Body Mass Index)                   26.31 kg/m2            Blood Pressure from Last 3 Encounters:   05/09/17 131/84   04/26/17 125/65   02/23/17 147/75    Weight from Last 3 Encounters:   05/09/17 76.2 kg (168 lb)   04/26/17 78.9 kg (174 lb)   02/23/17 77.6 kg (171 lb)              Today, you had the following     No orders found for display       Primary Care Provider Office Phone # Fax #    Meek Leary [phone]       [address] [address]        Thank you!     Thank you for choosing [facility]  for your care. Our goal is always to provide you with excellent care. Hearing back from our patients is one way we can continue to improve our services. Please take a few minutes to complete the written survey that you may receive in the mail after your visit with us. Thank you!             Your Updated Medication List -  Protect others around you: Learn how to safely use, store and throw away your medicines at www.disposemymeds.org.          This list is accurate as of: 5/9/17 11:59 PM.  Always use your most recent med list.                   Brand Name Dispense Instructions for use    * AMILORIDE HCL PO      Take 5 mg by mouth daily Reported on 5/9/2017       * aMILoride 2.5 mg half-tab    MIDAMOR     Take 2.5 mg by mouth daily.       amphetamine-dextroamphetamine 20 MG per tablet   Start taking on:  6/7/2017    ADDERALL    60 tablet    Take 1 tablet (20 mg) by mouth 2 times daily       ATIVAN 1 MG tablet   Generic drug:  LORazepam      Take 1 mg by mouth 2 times daily as needed.       biotin 1000 MCG Tabs tablet      Take 1,000 mcg by mouth every other day Patient reports taking twice a week.       * BOTOX IJ      Inject 175 Units as directed once Lot # /C3 with Expiration Date: 10/2019       * BOTOX IJ      Inject 175 Units into the muscle Lot # /c3  Exp: 2/2019       * BOTOX IJ      Inject 175 Units into the muscle Lot # /C3  Exp: 4/2019       * BOTOX IJ      Inject 175 Units into the muscle Lot # /C3  Exp: 6/2019       * BOTOX IJ      Inject 175 Units as directed once Lot# /C3, Exp 08/2019       * BOTOX IJ      Inject 175 Units into the muscle Lot # /C3  Exp: 9/2019       budesonide 32 MCG/ACT spray    RINOCORT AQUA     Spray 1 spray into both nostrils daily.       * divalproex 500 MG EC tablet    DEPAKOTE    60 tablet    Take 1 tablet (500 mg) by mouth 2 times daily as needed       * divalproex 250 MG 24 hr tablet    DEPAKOTE ER    30 tablet    Take 1 tablet (250 mg) by mouth daily as needed       eletriptan 40 MG tablet    RELPAX    12 tablet    Take one tablet by mouth at onset of migraine. May repeat once after 2hrs. Max of 2 tabs in 24hrs and 3 days per week.       fexofenadine 180 MG tablet    ALLEGRA     Take  by mouth daily.       HYDROmorphone 2 MG tablet    DILAUDID    20 tablet    Take  0.5-1 tablets (1-2 mg) by mouth every 3 hours as needed 20 tablets = 3 month supply.       ibuprofen 800 MG tablet    ADVIL/MOTRIN     Take 800 mg by mouth every 8 hours as needed Reported on 5/9/2017       KRILL OIL PO      Take  by mouth.       Lutein 20 MG Tabs      Take 1 tablet by mouth every other day Reported on 5/9/2017       METOPROLOL SUCCINATE ER PO      Take 25 mg by mouth daily Reported on 5/9/2017       NORVASC 10 MG tablet   Generic drug:  amLODIPine      Take 10 mg by mouth daily Reported on 5/9/2017       ondansetron 4 MG tablet    ZOFRAN    90 tablet    Take 1 tablet (4 mg) by mouth every 8 hours as needed       PREMARIN 0.625 MG tablet   Generic drug:  estrogens (conjugated)      Take by mouth daily .30 mg  .       RESTASIS OP      Apply to eye 2 times daily       SEROQUEL 50 MG tablet   Generic drug:  QUEtiapine      Take 1 tablet by mouth daily.       UNABLE TO FIND      Reported on 5/9/2017       VALTREX 1000 mg tablet   Generic drug:  valACYclovir      Take 1,000 mg by mouth as needed.       vitamin B complex with vitamin C Tabs tablet    STRESS TAB     Take 1 tablet by mouth daily Reported on 5/9/2017       * Notice:  This list has 10 medication(s) that are the same as other medications prescribed for you. Read the directions carefully, and ask your doctor or other care provider to review them with you.

## 2017-05-15 ENCOUNTER — OFFICE VISIT (OUTPATIENT)
Dept: PHYSICAL MEDICINE AND REHAB | Facility: CLINIC | Age: 52
End: 2017-05-15

## 2017-05-15 VITALS — HEIGHT: 67 IN

## 2017-05-15 DIAGNOSIS — G43.719 INTRACTABLE CHRONIC MIGRAINE WITHOUT AURA AND WITHOUT STATUS MIGRAINOSUS: Primary | ICD-10-CM

## 2017-05-15 NOTE — MR AVS SNAPSHOT
After Visit Summary   5/15/2017    Valerie Sim    MRN: [id number]           Patient Information     Date Of Birth          1965        Visit Information        Provider Department      5/15/2017 1:40 PM Juan Jose Jerry MD [facility] Medicine [facility] Rehabilitation        Today's Diagnoses     Intractable chronic migraine without aura and without status migrainosus    -  1       Follow-ups after your visit        Your next 10 appointments already scheduled     Jun 28, 2017  2:20 PM CDT   (Arrive by 2:05 PM)   Return Botox with Juan Jose Jerry MD   [facility] Rehabilitation ([facility])    [address] [place] [id number]   [phone]            Aug 30, 2017  1:40 PM CDT   (Arrive by 1:25 PM)   Return Botox with Juan Jose Jerry MD   [facility] ([facility])    [address] [place] [address]   [phone]            May 08, 2018  8:00 AM CDT   Return Visit with Boyd Johnston MD   [facility] ([facility])    [address] [place] [address] [phone]              Who to contact     Please call your clinic at [phone] to:    Ask questions about your health    Make or cancel appointments    Discuss your medicines    Learn about your test results    Speak to your doctor   If you have compliments or concerns about an experience at your clinic, or if you wish to file a complaint, please contact [facility] at [phone] or email us at [email or web address]         Additional Information About Your Visit        MyChart Information     [employer] gives you secure access to your electronic health record. If you see a primary care provider, you can also send messages to your care team and make appointments. If you  "have questions, please call your primary care clinic.  If you do not have a primary care provider, please call [phone] and they will assist you.      [employer] is an electronic gateway that provides easy, online access to your medical records. With [employer], you can request a clinic appointment, read your test results, renew a prescription or communicate with your care team.     To access your existing account, please contact your [facility] or call [phone] for assistance.        Care EveryWhere ID     This is your Care EveryWhere ID. This could be used by other organizations to access your [place] medical records  [id number]        Your Vitals Were     Height                   1.702 m (5' 7\")            Blood Pressure from Last 3 Encounters:   05/09/17 131/84   04/26/17 125/65   02/23/17 147/75    Weight from Last 3 Encounters:   05/09/17 76.2 kg (168 lb)   04/26/17 78.9 kg (174 lb)   02/23/17 77.6 kg (171 lb)              We Performed the Following     Needle EMG Guide w/[employer] ([id number])        Primary Care Provider Office Phone # Fax #    Meek Leary [phone]       [place] [address]        Thank you!     Thank you for choosing [facility]IC[facility]  for your care. Our goal is always to provide you with excellent care. Hearing back from our patients is one way we can continue to improve our services. Please take a few minutes to complete the written survey that you may receive in the mail after your visit with us. Thank you!             Your Updated Medication List - Protect others around you: Learn how to safely use, store and throw away your medicines at www.disposemymeds.org.          This list is accurate as of: 5/15/17  4:15 PM.  Always use your most recent med list.                   Brand Name Dispense Instructions for use    * AMILORIDE HCL PO      Take 5 mg by " mouth daily Reported on 5/9/2017       * aMILoride 2.5 mg half-tab    MIDAMOR     Take 2.5 mg by mouth daily.       amphetamine-dextroamphetamine 20 MG per tablet   Start taking on:  6/7/2017    ADDERALL    60 tablet    Take 1 tablet (20 mg) by mouth 2 times daily       ATIVAN 1 MG tablet   Generic drug:  LORazepam      Take 1 mg by mouth 2 times daily as needed.       biotin 1000 MCG Tabs tablet      Take 1,000 mcg by mouth every other day Patient reports taking twice a week.       * BOTOX IJ      Inject 175 Units as directed once Lot # /C3 with Expiration Date: 10/2019       * BOTOX IJ      Inject 175 Units into the muscle Lot # /c3  Exp: 2/2019       * BOTOX IJ      Inject 175 Units into the muscle Lot # /C3  Exp: 4/2019       * BOTOX IJ      Inject 175 Units into the muscle Lot # /C3  Exp: 6/2019       * BOTOX IJ      Inject 175 Units as directed once Lot# /C3, Exp 08/2019       * BOTOX IJ      Inject 175 Units into the muscle Lot # /C3  Exp: 9/2019       budesonide 32 MCG/ACT spray    RINOCORT AQUA     Spray 1 spray into both nostrils daily.       * divalproex 500 MG EC tablet    DEPAKOTE    60 tablet    Take 1 tablet (500 mg) by mouth 2 times daily as needed       * divalproex 250 MG 24 hr tablet    DEPAKOTE ER    30 tablet    Take 1 tablet (250 mg) by mouth daily as needed       eletriptan 40 MG tablet    RELPAX    12 tablet    Take one tablet by mouth at onset of migraine. May repeat once after 2hrs. Max of 2 tabs in 24hrs and 3 days per week.       fexofenadine 180 MG tablet    ALLEGRA     Take  by mouth daily.       HYDROmorphone 2 MG tablet    DILAUDID    20 tablet    Take 0.5-1 tablets (1-2 mg) by mouth every 3 hours as needed 20 tablets = 3 month supply.       ibuprofen 800 MG tablet    ADVIL/MOTRIN     Take 800 mg by mouth every 8 hours as needed Reported on 5/9/2017       KRILL OIL PO      Take  by mouth.       Lutein 20 MG Tabs      Take 1 tablet by mouth every other day  Reported on 5/9/2017       METOPROLOL SUCCINATE ER PO      Take 25 mg by mouth daily Reported on 5/9/2017       NORVASC 10 MG tablet   Generic drug:  amLODIPine      Take 10 mg by mouth daily Reported on 5/9/2017       ondansetron 4 MG tablet    ZOFRAN    90 tablet    Take 1 tablet (4 mg) by mouth every 8 hours as needed       PREMARIN 0.625 MG tablet   Generic drug:  estrogens (conjugated)      Take by mouth daily .30 mg  .       RESTASIS OP      Apply to eye 2 times daily       SEROQUEL 50 MG tablet   Generic drug:  QUEtiapine      Take 1 tablet by mouth daily.       UNABLE TO FIND      Reported on 5/9/2017       VALTREX 1000 mg tablet   Generic drug:  valACYclovir      Take 1,000 mg by mouth as needed.       vitamin B complex with vitamin C Tabs tablet    STRESS TAB     Take 1 tablet by mouth daily Reported on 5/9/2017       * Notice:  This list has 10 medication(s) that are the same as other medications prescribed for you. Read the directions carefully, and ask your doctor or other care provider to review them with you.

## 2017-05-15 NOTE — LETTER
5/15/2017       RE: Valerie Sim  [address]  [place] [address]     Dear Colleague,    Thank you for referring your patient, Valerie Sim, to the [facility] PHYSICAL MEDICINE AND REHABILITATION at [facility]. Please see a copy of my visit note below.    BOTULINUM TOXIN PROCEDURE - HEADACHE - NOTE    CC: Persistent headaches, requesting bump of botox    There were no vitals taken for this visit.       Current Outpatient Prescriptions:      OnabotulinumtoxinA (BOTOX IJ), Inject 175 Units as directed once Lot # /C3 with Expiration Date: 10/2019, Disp: , Rfl:      [START ON 6/7/2017] amphetamine-dextroamphetamine (ADDERALL) 20 MG per tablet, Take 1 tablet (20 mg) by mouth 2 times daily, Disp: 60 tablet, Rfl: 0     HYDROmorphone (DILAUDID) 2 MG tablet, Take 0.5-1 tablets (1-2 mg) by mouth every 3 hours as needed 20 tablets = 3 month supply., Disp: 20 tablet, Rfl: 0     OnabotulinumtoxinA (BOTOX IJ), Inject 175 Units into the muscle Lot # /C3  Exp: 9/2019, Disp: , Rfl:      METOPROLOL SUCCINATE ER PO, Take 25 mg by mouth daily Reported on 5/9/2017, Disp: , Rfl:      OnabotulinumtoxinA (BOTOX IJ), Inject 175 Units as directed once Lot# /C3, Exp 08/2019, Disp: , Rfl:      eletriptan (RELPAX) 40 MG tablet, Take one tablet by mouth at onset of migraine. May repeat once after 2hrs. Max of 2 tabs in 24hrs and 3 days per week., Disp: 12 tablet, Rfl: 5     divalproex (DEPAKOTE) 500 MG EC tablet, Take 1 tablet (500 mg) by mouth 2 times daily as needed, Disp: 60 tablet, Rfl: 5     divalproex (DEPAKOTE ER) 250 MG 24 hr tablet, Take 1 tablet (250 mg) by mouth daily as needed, Disp: 30 tablet, Rfl: 3     OnabotulinumtoxinA (BOTOX IJ), Inject 175 Units into the muscle Lot # /C3  Exp: 6/2019, Disp: , Rfl:      OnabotulinumtoxinA (BOTOX IJ), Inject 175 Units into the muscle Lot # /C3  Exp: 4/2019, Disp: , Rfl:      OnabotulinumtoxinA (BOTOX IJ), Inject 175 Units  into the muscle Lot # /c3  Exp: 2/2019, Disp: , Rfl:      ondansetron (ZOFRAN) 4 MG tablet, Take 1 tablet (4 mg) by mouth every 8 hours as needed, Disp: 90 tablet, Rfl: 1     CycloSPORINE (RESTASIS OP), Apply to eye 2 times daily, Disp: , Rfl:      UNABLE TO FIND, Reported on 5/9/2017, Disp: , Rfl:      biotin 1000 MCG TABS tablet, Take 1,000 mcg by mouth every other day Patient reports taking twice a week., Disp: , Rfl:      Lutein 20 MG TABS, Take 1 tablet by mouth every other day Reported on 5/9/2017, Disp: , Rfl:      aMILoride (MIDAMOR) 2.5 MG tablet, Take 2.5 mg by mouth daily., Disp: , Rfl:      LORazepam (ATIVAN) 1 MG tablet, Take 1 mg by mouth 2 times daily as needed., Disp: , Rfl:      QUEtiapine (SEROQUEL) 50 MG tablet, Take 1 tablet by mouth daily., Disp: , Rfl:      amLODIPine (NORVASC) 10 MG tablet, Take 10 mg by mouth daily Reported on 5/9/2017, Disp: , Rfl:      budesonide (RINOCORT AQUA) 32 MCG/ACT nasal spray, Spray 1 spray into both nostrils daily., Disp: , Rfl:      fexofenadine (ALLEGRA) 180 MG tablet, Take  by mouth daily., Disp: , Rfl:      ibuprofen (ADVIL,MOTRIN) 800 MG tablet, Take 800 mg by mouth every 8 hours as needed Reported on 5/9/2017, Disp: , Rfl:      KRILL OIL PO, Take  by mouth., Disp: , Rfl:      estrogens, conjugated, (PREMARIN) 0.625 MG tablet, Take by mouth daily .30 mg. , Disp: , Rfl:      valACYclovir (VALTREX) 1000 mg tablet, Take 1,000 mg by mouth as needed., Disp: , Rfl:      B Complex Vitamins (VITAMIN  B COMPLEX) tablet, Take 1 tablet by mouth daily Reported on 5/9/2017, Disp: , Rfl:      AMILORIDE HCL PO, Take 5 mg by mouth daily Reported on 5/9/2017, Disp: , Rfl:      Allergies   Allergen Reactions     Amitriptyline Hcl Other (See Comments)     Migraine       Cymbalta Other (See Comments)     Migraine       Cyproheptadine Hcl      headaches     Desvenlafaxine      Migraine       Fluoxetine Other (See Comments)     Migraine       Imipramine Other (See Comments)      Migraine       Medroxyprogesterone Hives     Morphine Other (See Comments)     Patient reports seizure      Nortriptyline Other (See Comments)     Migraine       Phenergan Dm [Promethazine-Dm] Other (See Comments)     seizures     Venlafaxine Other (See Comments)     Migraine       Wellbutrin [Bupropion Hydrobromide] Other (See Comments)     Migraine       Compazine Anxiety     Dihydroergotamine Anxiety and Other (See Comments)     Cardiac symptoms     Droperidol Anxiety        PHYSICAL EXAM:    Has been having headaches since injections on . This is atypical for her as the botox usually starts working by now.  Gen: NAD, cooperative.  HEENT: NC/AT. No facial droop.  Skin: No rashes or lesions on face or neck.    HPI:  She has continued having headaches since the last series of injections on . She has pain in the left upper neck at the cranial/cervical junction and the pain radiates to her left forehead/eye area. She is hoping to have this area re-injected as she feels that it wasn't in quite the right spot last time.    Patient reports the following new medical problems since last visit: she recently had a nephrolithiasis, she was started on a phenazopyridine.    We reviewed the recommended safety guidelines for  Botox from any vaccine injection, such as the seasonal flu vaccine, by a minimum of 10-14 days with Valerie Sim. She acknowledged understanding.    RESPONSE TO PREVIOUS TREATMENT:  Change in headache pattern following last series of injections with 175 units of  Botox on 2017.    No problems reported  she commonly gets 5 days of malaise post injections.    She has had continued nearly daily headaches since the injections on . Pain in the left upper neck radiating to her forehead/eye region.      BOTULINUM NEUROTOXIN INJECTION PROCEDURES:      VERIFICATION OF PATIENT IDENTIFICATION AND PROCEDURE     Initials   Patient Name RRN   Patient  RRN   Procedure Verified by: MARLY      Prior to the start of the procedure and with procedural staff participation, I verbally confirmed the patient s identity using two indicators, relevant allergies, that the procedure was appropriate and matched the consent or emergent situation, and that the correct equipment/implants were available. Immediately prior to starting the procedure I conducted the Time Out with the procedural staff and re-confirmed the patient s name, procedure, and site/side. (The Joint Commission universal protocol was followed.)  Yes    Sedation (Moderate or Deep): None    Above assessments performed by:  Resident/Fellow         Clifford Shin,           The attending provider was present for the entire procedure documented below.    Juan Jose Jerry MD    INDICATIONS FOR PROCEDURES:  Valerie Sim is a 52 year old patient with chronic migraine headaches associated with cervicogenic components..       Her baseline symptoms have been recalcitrant to oral medications and conservative therapy. She is here today for an injection of Botox.       GOAL OF PROCEDURE:  The goal of this procedure is to decrease pain and enhance functional independence.     TOTAL DOSE ADMINISTERED:  Dose Administered: 10 units Botox (Botulinum Toxin Type A)  2:1 Dilution   Diluent Used: Preservative Free Normal Saline  Total Volume of Diluent Used: 0.1 ml  Lot # /C3 with Expiration Date: 12/2019  NDC #: Botox 100u ([address]-33)     Medication guide was offered to patient and was declined.     CONSENT:  The risks, benefits, and treatment options were discussed with Valerie Sim and she agreed to proceed.     Written consent was obtained by RRN.      EQUIPMENT USED:  Needle-37mm stimulating/recording  EMG/NCS Machine      SKIN PREPARATION:  Skin preparation was performed using an alcohol wipe.      AREA/MUSCLE INJECTED: 175 Units of Botox  1. FACE & SCALP: 10 units of Botox = Total dose, 2:1 Dilution    Left Splenius - 10 units of Botox at 1  site/s.       RESPONSE TO PROCEDURE: Valerie Sim tolerated the procedure well and there were no immediate complications. She was allowed to recover for an appropriate period of time and was discharged home in stable condition.     FOLLOW UP:  Valerie Sim was asked to follow up by phone in 7-14 days with Nely Jean Baptiste PT, Care Coordinator or Estela Magaña RN, Care Coordinator, to report her response to this series of injections. She was previously scheduled for repeat injections on 6/28 and will keep this appointment.     PLAN (Medication Changes, Therapy Orders, Work or Disability Issues, etc.): Patient will continue to monitor response to today's injections.     Again, thank you for allowing me to participate in the care of your patient.      Sincerely,    Juan Jose Jerry MD

## 2017-05-15 NOTE — PROGRESS NOTES
BOTULINUM TOXIN PROCEDURE - HEADACHE - NOTE    CC: Persistent headaches, requesting bump of botox    There were no vitals taken for this visit.       Current Outpatient Prescriptions:      OnabotulinumtoxinA (BOTOX IJ), Inject 175 Units as directed once Lot # /C3 with Expiration Date: 10/2019, Disp: , Rfl:      [START ON 6/7/2017] amphetamine-dextroamphetamine (ADDERALL) 20 MG per tablet, Take 1 tablet (20 mg) by mouth 2 times daily, Disp: 60 tablet, Rfl: 0     HYDROmorphone (DILAUDID) 2 MG tablet, Take 0.5-1 tablets (1-2 mg) by mouth every 3 hours as needed 20 tablets = 3 month supply., Disp: 20 tablet, Rfl: 0     OnabotulinumtoxinA (BOTOX IJ), Inject 175 Units into the muscle Lot # /C3  Exp: 9/2019, Disp: , Rfl:      METOPROLOL SUCCINATE ER PO, Take 25 mg by mouth daily Reported on 5/9/2017, Disp: , Rfl:      OnabotulinumtoxinA (BOTOX IJ), Inject 175 Units as directed once Lot# /C3, Exp 08/2019, Disp: , Rfl:      eletriptan (RELPAX) 40 MG tablet, Take one tablet by mouth at onset of migraine. May repeat once after 2hrs. Max of 2 tabs in 24hrs and 3 days per week., Disp: 12 tablet, Rfl: 5     divalproex (DEPAKOTE) 500 MG EC tablet, Take 1 tablet (500 mg) by mouth 2 times daily as needed, Disp: 60 tablet, Rfl: 5     divalproex (DEPAKOTE ER) 250 MG 24 hr tablet, Take 1 tablet (250 mg) by mouth daily as needed, Disp: 30 tablet, Rfl: 3     OnabotulinumtoxinA (BOTOX IJ), Inject 175 Units into the muscle Lot # /C3  Exp: 6/2019, Disp: , Rfl:      OnabotulinumtoxinA (BOTOX IJ), Inject 175 Units into the muscle Lot # /C3  Exp: 4/2019, Disp: , Rfl:      OnabotulinumtoxinA (BOTOX IJ), Inject 175 Units into the muscle Lot # /c3  Exp: 2/2019, Disp: , Rfl:      ondansetron (ZOFRAN) 4 MG tablet, Take 1 tablet (4 mg) by mouth every 8 hours as needed, Disp: 90 tablet, Rfl: 1     CycloSPORINE (RESTASIS OP), Apply to eye 2 times daily, Disp: , Rfl:      UNABLE TO FIND, Reported on 5/9/2017, Disp: , Rfl:       biotin 1000 MCG TABS tablet, Take 1,000 mcg by mouth every other day Patient reports taking twice a week., Disp: , Rfl:      Lutein 20 MG TABS, Take 1 tablet by mouth every other day Reported on 5/9/2017, Disp: , Rfl:      aMILoride (MIDAMOR) 2.5 MG tablet, Take 2.5 mg by mouth daily., Disp: , Rfl:      LORazepam (ATIVAN) 1 MG tablet, Take 1 mg by mouth 2 times daily as needed., Disp: , Rfl:      QUEtiapine (SEROQUEL) 50 MG tablet, Take 1 tablet by mouth daily., Disp: , Rfl:      amLODIPine (NORVASC) 10 MG tablet, Take 10 mg by mouth daily Reported on 5/9/2017, Disp: , Rfl:      budesonide (RINOCORT AQUA) 32 MCG/ACT nasal spray, Spray 1 spray into both nostrils daily., Disp: , Rfl:      fexofenadine (ALLEGRA) 180 MG tablet, Take  by mouth daily., Disp: , Rfl:      ibuprofen (ADVIL,MOTRIN) 800 MG tablet, Take 800 mg by mouth every 8 hours as needed Reported on 5/9/2017, Disp: , Rfl:      KRILL OIL PO, Take  by mouth., Disp: , Rfl:      estrogens, conjugated, (PREMARIN) 0.625 MG tablet, Take by mouth daily .30 mg. , Disp: , Rfl:      valACYclovir (VALTREX) 1000 mg tablet, Take 1,000 mg by mouth as needed., Disp: , Rfl:      B Complex Vitamins (VITAMIN  B COMPLEX) tablet, Take 1 tablet by mouth daily Reported on 5/9/2017, Disp: , Rfl:      AMILORIDE HCL PO, Take 5 mg by mouth daily Reported on 5/9/2017, Disp: , Rfl:      Allergies   Allergen Reactions     Amitriptyline Hcl Other (See Comments)     Migraine       Cymbalta Other (See Comments)     Migraine       Cyproheptadine Hcl      headaches     Desvenlafaxine      Migraine       Fluoxetine Other (See Comments)     Migraine       Imipramine Other (See Comments)     Migraine       Medroxyprogesterone Hives     Morphine Other (See Comments)     Patient reports seizure      Nortriptyline Other (See Comments)     Migraine       Phenergan Dm [Promethazine-Dm] Other (See Comments)     seizures     Venlafaxine Other (See Comments)     Migraine       Wellbutrin  [Bupropion Hydrobromide] Other (See Comments)     Migraine       Compazine Anxiety     Dihydroergotamine Anxiety and Other (See Comments)     Cardiac symptoms     Droperidol Anxiety        PHYSICAL EXAM:    Has been having headaches since injections on . This is atypical for her as the botox usually starts working by now.  Gen: NAD, cooperative.  HEENT: NC/AT. No facial droop.  Skin: No rashes or lesions on face or neck.    HPI:  She has continued having headaches since the last series of injections on . She has pain in the left upper neck at the cranial/cervical junction and the pain radiates to her left forehead/eye area. She is hoping to have this area re-injected as she feels that it wasn't in quite the right spot last time.    Patient reports the following new medical problems since last visit: she recently had a nephrolithiasis, she was started on a phenazopyridine.    We reviewed the recommended safety guidelines for  Botox from any vaccine injection, such as the seasonal flu vaccine, by a minimum of 10-14 days with Valerie Sim. She acknowledged understanding.    RESPONSE TO PREVIOUS TREATMENT:  Change in headache pattern following last series of injections with 175 units of  Botox on 2017.    No problems reported  she commonly gets 5 days of malaise post injections.    She has had continued nearly daily headaches since the injections on . Pain in the left upper neck radiating to her forehead/eye region.      BOTULINUM NEUROTOXIN INJECTION PROCEDURES:      VERIFICATION OF PATIENT IDENTIFICATION AND PROCEDURE     Initials   Patient Name RRN   Patient  RRN   Procedure Verified by: MARLY     Prior to the start of the procedure and with procedural staff participation, I verbally confirmed the patient s identity using two indicators, relevant allergies, that the procedure was appropriate and matched the consent or emergent situation, and that the correct equipment/implants were  available. Immediately prior to starting the procedure I conducted the Time Out with the procedural staff and re-confirmed the patient s name, procedure, and site/side. (The Joint Commission universal protocol was followed.)  Yes    Sedation (Moderate or Deep): None    Above assessments performed by:  Resident/Fellow         Clifford Shin DO          The attending provider was present for the entire procedure documented below.    Juan Jose Jerry MD    INDICATIONS FOR PROCEDURES:  Valerie Sim is a 52 year old patient with chronic migraine headaches associated with cervicogenic components..       Her baseline symptoms have been recalcitrant to oral medications and conservative therapy. She is here today for an injection of Botox.       GOAL OF PROCEDURE:  The goal of this procedure is to decrease pain and enhance functional independence.     TOTAL DOSE ADMINISTERED:  Dose Administered: 10 units Botox (Botulinum Toxin Type A)  2:1 Dilution   Diluent Used: Preservative Free Normal Saline  Total Volume of Diluent Used: 0.1 ml  Lot # /C3 with Expiration Date: 12/2019  NDC #: Botox 100u ([address]-87)     Medication guide was offered to patient and was declined.     CONSENT:  The risks, benefits, and treatment options were discussed with Valerie Sim and she agreed to proceed.     Written consent was obtained by RRN.      EQUIPMENT USED:  Needle-37mm stimulating/recording  EMG/NCS Machine      SKIN PREPARATION:  Skin preparation was performed using an alcohol wipe.      AREA/MUSCLE INJECTED: 175 Units of Botox  1. FACE & SCALP: 10 units of Botox = Total dose, 2:1 Dilution    Left Splenius - 10 units of Botox at 1 site/s.       RESPONSE TO PROCEDURE: Valerie Sim tolerated the procedure well and there were no immediate complications. She was allowed to recover for an appropriate period of time and was discharged home in stable condition.     FOLLOW UP:  Valerie Sim was asked to follow up by phone in 7-14  days with Nely Jean Baptiste, PT, Care Coordinator or Estela Magaña RN, Care Coordinator, to report her response to this series of injections. She was previously scheduled for repeat injections on 6/28 and will keep this appointment.     PLAN (Medication Changes, Therapy Orders, Work or Disability Issues, etc.): Patient will continue to monitor response to today's injections.

## 2017-05-30 ENCOUNTER — TELEPHONE (OUTPATIENT)
Dept: NEUROLOGY | Facility: CLINIC | Age: 52
End: 2017-05-30

## 2017-05-30 ENCOUNTER — HOSPITAL ENCOUNTER (EMERGENCY)
Facility: CLINIC | Age: 52
Discharge: HOME | End: 2017-05-30
Attending: EMERGENCY MEDICINE | Admitting: EMERGENCY MEDICINE
Payer: COMMERCIAL

## 2017-05-30 ENCOUNTER — APPOINTMENT (OUTPATIENT)
Dept: CT IMAGING | Facility: CLINIC | Age: 52
End: 2017-05-30
Attending: EMERGENCY MEDICINE
Payer: COMMERCIAL

## 2017-05-30 VITALS
OXYGEN SATURATION: 94 % | SYSTOLIC BLOOD PRESSURE: 154 MMHG | WEIGHT: 169.7 LBS | RESPIRATION RATE: 18 BRPM | HEIGHT: 67 IN | BODY MASS INDEX: 26.63 KG/M2 | HEART RATE: 97 BPM | DIASTOLIC BLOOD PRESSURE: 75 MMHG | TEMPERATURE: 97.5 F

## 2017-05-30 DIAGNOSIS — G44.40 DRUG-INDUCED HEADACHE, NOT ELSEWHERE CLASSIFIED, NOT INTRACTABLE: ICD-10-CM

## 2017-05-30 LAB
ANION GAP SERPL CALCULATED.3IONS-SCNC: 5 MMOL/L (ref 3–14)
BASOPHILS # BLD AUTO: 0 10E9/L (ref 0–0.2)
BASOPHILS NFR BLD AUTO: 0.5 %
BUN SERPL-MCNC: 20 MG/DL (ref 7–30)
CALCIUM SERPL-MCNC: 9.7 MG/DL (ref 8.5–10.1)
CHLORIDE SERPL-SCNC: 106 MMOL/L (ref 94–109)
CO2 SERPL-SCNC: 32 MMOL/L (ref 20–32)
CREAT BLD-MCNC: 0.7 MG/DL (ref 0.52–1.04)
CREAT SERPL-MCNC: 0.67 MG/DL (ref 0.52–1.04)
DIFFERENTIAL METHOD BLD: NORMAL
EOSINOPHIL # BLD AUTO: 0.2 10E9/L (ref 0–0.7)
EOSINOPHIL NFR BLD AUTO: 2.7 %
ERYTHROCYTE [DISTWIDTH] IN BLOOD BY AUTOMATED COUNT: 14.7 % (ref 10–15)
GFR SERPL CREATININE-BSD FRML MDRD: 88 ML/MIN/1.7M2
GFR SERPL CREATININE-BSD FRML MDRD: NORMAL ML/MIN/1.7M2
GLUCOSE SERPL-MCNC: 93 MG/DL (ref 70–99)
HCT VFR BLD AUTO: 42.6 % (ref 35–47)
HGB BLD-MCNC: 13.6 G/DL (ref 11.7–15.7)
IMM GRANULOCYTES # BLD: 0 10E9/L (ref 0–0.4)
IMM GRANULOCYTES NFR BLD: 0.2 %
INR PPP: 1.03 (ref 0.86–1.14)
LYMPHOCYTES # BLD AUTO: 2.1 10E9/L (ref 0.8–5.3)
LYMPHOCYTES NFR BLD AUTO: 33 %
MCH RBC QN AUTO: 29.4 PG (ref 26.5–33)
MCHC RBC AUTO-ENTMCNC: 31.9 G/DL (ref 31.5–36.5)
MCV RBC AUTO: 92 FL (ref 78–100)
MONOCYTES # BLD AUTO: 0.3 10E9/L (ref 0–1.3)
MONOCYTES NFR BLD AUTO: 4.5 %
NEUTROPHILS # BLD AUTO: 3.7 10E9/L (ref 1.6–8.3)
NEUTROPHILS NFR BLD AUTO: 59.1 %
NRBC # BLD AUTO: 0 10*3/UL
NRBC BLD AUTO-RTO: 0 /100
PLATELET # BLD AUTO: 256 10E9/L (ref 150–450)
POTASSIUM SERPL-SCNC: 3.8 MMOL/L (ref 3.4–5.3)
RBC # BLD AUTO: 4.62 10E12/L (ref 3.8–5.2)
SODIUM SERPL-SCNC: 143 MMOL/L (ref 133–144)
WBC # BLD AUTO: 6.3 10E9/L (ref 4–11)

## 2017-05-30 PROCEDURE — 96376 TX/PRO/DX INJ SAME DRUG ADON: CPT

## 2017-05-30 PROCEDURE — 25000128 H RX IP 250 OP 636: Performed by: RADIOLOGY

## 2017-05-30 PROCEDURE — 96374 THER/PROPH/DIAG INJ IV PUSH: CPT

## 2017-05-30 PROCEDURE — 80048 BASIC METABOLIC PNL TOTAL CA: CPT | Performed by: EMERGENCY MEDICINE

## 2017-05-30 PROCEDURE — 96361 HYDRATE IV INFUSION ADD-ON: CPT | Mod: 59

## 2017-05-30 PROCEDURE — 25000128 H RX IP 250 OP 636: Performed by: EMERGENCY MEDICINE

## 2017-05-30 PROCEDURE — 82565 ASSAY OF CREATININE: CPT

## 2017-05-30 PROCEDURE — 85025 COMPLETE CBC W/AUTO DIFF WBC: CPT | Performed by: EMERGENCY MEDICINE

## 2017-05-30 PROCEDURE — 96375 TX/PRO/DX INJ NEW DRUG ADDON: CPT

## 2017-05-30 PROCEDURE — 99285 EMERGENCY DEPT VISIT HI MDM: CPT | Mod: 25

## 2017-05-30 PROCEDURE — 85610 PROTHROMBIN TIME: CPT | Performed by: EMERGENCY MEDICINE

## 2017-05-30 PROCEDURE — 25000132 ZZH RX MED GY IP 250 OP 250 PS 637: Performed by: EMERGENCY MEDICINE

## 2017-05-30 PROCEDURE — 70498 CT ANGIOGRAPHY NECK: CPT

## 2017-05-30 PROCEDURE — 99285 EMERGENCY DEPT VISIT HI MDM: CPT | Mod: Z6 | Performed by: EMERGENCY MEDICINE

## 2017-05-30 RX ORDER — IOPAMIDOL 755 MG/ML
75 INJECTION, SOLUTION INTRAVASCULAR ONCE
Status: COMPLETED | OUTPATIENT
Start: 2017-05-30 | End: 2017-05-30

## 2017-05-30 RX ORDER — ONDANSETRON 2 MG/ML
4 INJECTION INTRAMUSCULAR; INTRAVENOUS EVERY 30 MIN PRN
Status: DISCONTINUED | OUTPATIENT
Start: 2017-05-30 | End: 2017-05-30 | Stop reason: HOSPADM

## 2017-05-30 RX ORDER — INDOMETHACIN 50 MG/1
50 CAPSULE ORAL ONCE
Status: COMPLETED | OUTPATIENT
Start: 2017-05-30 | End: 2017-05-30

## 2017-05-30 RX ORDER — INDOMETHACIN 25 MG/1
25 CAPSULE ORAL
Qty: 30 CAPSULE | Refills: 0 | Status: SHIPPED | OUTPATIENT
Start: 2017-05-30 | End: 2017-06-27

## 2017-05-30 RX ORDER — KETOROLAC TROMETHAMINE 30 MG/ML
30 INJECTION, SOLUTION INTRAMUSCULAR; INTRAVENOUS ONCE
Status: COMPLETED | OUTPATIENT
Start: 2017-05-30 | End: 2017-05-30

## 2017-05-30 RX ORDER — DIPHENHYDRAMINE HCL 50 MG
50 CAPSULE ORAL ONCE
Status: COMPLETED | OUTPATIENT
Start: 2017-05-30 | End: 2017-05-30

## 2017-05-30 RX ADMIN — DIPHENHYDRAMINE HYDROCHLORIDE 50 MG: 50 CAPSULE ORAL at 16:44

## 2017-05-30 RX ADMIN — SODIUM CHLORIDE 500 ML: 9 INJECTION, SOLUTION INTRAVENOUS at 16:12

## 2017-05-30 RX ADMIN — HYDROMORPHONE HYDROCHLORIDE 1 MG: 1 INJECTION, SOLUTION INTRAMUSCULAR; INTRAVENOUS; SUBCUTANEOUS at 18:21

## 2017-05-30 RX ADMIN — INDOMETHACIN 50 MG: 50 CAPSULE ORAL at 16:44

## 2017-05-30 RX ADMIN — HYDROMORPHONE HYDROCHLORIDE 1 MG: 1 INJECTION, SOLUTION INTRAMUSCULAR; INTRAVENOUS; SUBCUTANEOUS at 16:13

## 2017-05-30 RX ADMIN — KETOROLAC TROMETHAMINE 30 MG: 30 INJECTION, SOLUTION INTRAMUSCULAR at 18:53

## 2017-05-30 RX ADMIN — IOPAMIDOL 75 ML: 755 INJECTION, SOLUTION INTRAVENOUS at 16:59

## 2017-05-30 RX ADMIN — ONDANSETRON 4 MG: 2 INJECTION INTRAMUSCULAR; INTRAVENOUS at 16:13

## 2017-05-30 ASSESSMENT — ENCOUNTER SYMPTOMS
NUMBNESS: 0
BRUISES/BLEEDS EASILY: 0
CHILLS: 0
NECK STIFFNESS: 0
LIGHT-HEADEDNESS: 0
POLYDIPSIA: 0
FEVER: 0
NAUSEA: 1
FACIAL ASYMMETRY: 0
VOMITING: 0
WEAKNESS: 0
HEADACHES: 1
ABDOMINAL PAIN: 0
SHORTNESS OF BREATH: 0
SPEECH DIFFICULTY: 0
NECK PAIN: 0
DIFFICULTY URINATING: 0
COLOR CHANGE: 0
AGITATION: 0
DIZZINESS: 0
ADENOPATHY: 0
BACK PAIN: 0
PHOTOPHOBIA: 0

## 2017-05-30 NOTE — ED NOTES
PT reports severe stabbing pain to back of head intermitent since Friday. Pt spoke w/ her neurologist and told to come to ED for further work up. Pain not like her typical migraines. + visual disturbances w/ worst waves of pain . + nausea

## 2017-05-30 NOTE — ED AVS SNAPSHOT
[facility], [place], Emergency Department    [address] [place] [id number]    Phone:  [phone]                                       Valerie Sim   MRN: [id number]    Department:  [facility], Emergency Department   Date of Visit:  5/30/2017           After Visit Summary Signature Page     I have received my discharge instructions, and my questions have been answered. I have discussed any challenges I see with this plan with the nurse or doctor.    ..........................................................................................................................................  Patient/Patient Representative Signature      ..........................................................................................................................................  Patient Representative Print Name and Relationship to Patient    ..................................................               ................................................  Date                                            Time    ..........................................................................................................................................  Reviewed by Signature/Title    ...................................................              ..............................................  Date                                                            Time
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
## 2017-05-30 NOTE — DISCHARGE INSTRUCTIONS
Please make an appointment to follow up with Your Primary Care Provider and Neurology Clinic (phone: [phone]) in 2-3 days for further evaluation and recommendations. Please take your pain medication that you have at home, if needed. If your symptoms significantly worsen please seek immediate medical attention.           *HEADACHE [unspecified]       The cause of your headache today is not clear, but it does not appear to be the sign of any serious illness.  Under stress, some people tense the muscles of their shoulder, neck and scalp without knowing it. If this condition lasts long enough, a TENSION HEADACHE can occur.  A MIGRAINE HEADACHE is caused by changes in blood flow to the brain. It can be mild or severe. A migraine attack may be triggered by emotional stress, hormone changes during the menstrual cycle, oral contraceptives, alcohol use, certain foods containing tyramine, eye strain, weather changes, missing meals, lack of sleep or oversleeping.  Other causes of headache include a viral illness, sinus, ear or throat infection, dental pain and TMJ (jaw joint) pain.  HOME CARE:  1. If you were given pain medicine for this headache, do not drive yourself home. Arrange for a ride, instead. When you get home, try to sleep. You should feel much better when you wake up.  2. If you are having nausea or vomiting, follow a light diet until your headache is relieved.  3. If you have a migraine type headache, use sunglasses when in the daylight or around bright indoor lighting until symptoms improve. Bright glaring light can worsen this kind of headache.  FOLLOW UP with your doctor if the headache is not better within the next 24 hours. If you have frequent headaches you should discuss a treatment plan with your primary care doctor. By being aware of the earliest signs of headache, and starting treatment right away, you may be able to stop the pain yourself.  GET PROMPT MEDICAL ATTENTION if any of the following  occur:    Worsening of your head pain or no improvement within 24 hours    Repeated vomiting (unable to keep liquids down)    Fever over 101 F (38.3 C)    Stiff neck    Extreme drowsiness, confusion or fainting    Weakness of an arm or leg or one side of the face    Difficulty with speech or vision    4565-0142 [employer], [address] [place] [address], [place] [address]. All rights reserved. This information is not intended as a substitute for professional medical care. Always follow your healthcare professional's instructions.
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
## 2017-05-30 NOTE — TELEPHONE ENCOUNTER
Patient called and stated that she was returning call from Dr. Johnston. She can be reached at [phone].    Darla Severin-Brown, LPN

## 2017-05-30 NOTE — ED AVS SNAPSHOT
[facility], Emergency Department    [address] [address]    Phone:  [phone]                                       Valerie Sim   MRN: [id number]    Department:  [facility], Emergency Department   Date of Visit:  5/30/2017           Patient Information     Date Of Birth          1965        Your diagnoses for this visit were:     Drug-induced headache, not elsewhere classified, not intractable        You were seen by Tonia Mendoza MD.        Discharge Instructions       Please make an appointment to follow up with Your Primary Care Provider and Neurology Clinic (phone: [phone]) in 2-3 days for further evaluation and recommendations. Please take your pain medication that you have at home, if needed. If your symptoms significantly worsen please seek immediate medical attention.           *HEADACHE [unspecified]       The cause of your headache today is not clear, but it does not appear to be the sign of any serious illness.  Under stress, some people tense the muscles of their shoulder, neck and scalp without knowing it. If this condition lasts long enough, a TENSION HEADACHE can occur.  A MIGRAINE HEADACHE is caused by changes in blood flow to the brain. It can be mild or severe. A migraine attack may be triggered by emotional stress, hormone changes during the menstrual cycle, oral contraceptives, alcohol use, certain foods containing tyramine, eye strain, weather changes, missing meals, lack of sleep or oversleeping.  Other causes of headache include a viral illness, sinus, ear or throat infection, dental pain and TMJ (jaw joint) pain.  HOME CARE:  1. If you were given pain medicine for this headache, do not drive yourself home. Arrange for a ride, instead. When you get home, try to sleep. You should feel much better when you wake up.  2. If you are having nausea or vomiting, follow a light diet until your headache is relieved.  3. If you have a migraine type headache, use  sunglasses when in the daylight or around bright indoor lighting until symptoms improve. Bright glaring light can worsen this kind of headache.  FOLLOW UP with your doctor if the headache is not better within the next 24 hours. If you have frequent headaches you should discuss a treatment plan with your primary care doctor. By being aware of the earliest signs of headache, and starting treatment right away, you may be able to stop the pain yourself.  GET PROMPT MEDICAL ATTENTION if any of the following occur:    Worsening of your head pain or no improvement within 24 hours    Repeated vomiting (unable to keep liquids down)    Fever over 101 F (38.3 C)    Stiff neck    Extreme drowsiness, confusion or fainting    Weakness of an arm or leg or one side of the face    Difficulty with speech or vision    8675-3365 [employer], [address] [place] [address]. All rights reserved. This information is not intended as a substitute for professional medical care. Always follow your healthcare professional's instructions.          Future Appointments        Provider Department Dept Phone Center    6/28/2017 2:20 PM Juan Jose Jerry MD [facility] Physical Medicine and Rehabilitation [phone] [facility]    8/30/2017 1:40 PM Juan Jose Jerry MD [facility] Physical Medicine and Rehabilitation [phone] [facility]    5/8/2018 8:00 AM Boyd Johnston MD [facility] [phone] [place]      24 Hour Appointment Hotline       To make an appointment at any [facility], call [phone] ([phone]). If you don't have a family doctor or clinic, we will help you find one. [facility] are conveniently located to serve the needs of you and your family.             Review of your medicines      START taking        Dose / Directions Last dose taken    indomethacin 25 MG capsule   Commonly known as:  INDOCIN   Dose:  25 mg   Quantity:  30 capsule        Take 1 capsule (25 mg) by  mouth 3 times daily (with meals) for 10 days   Refills:  0          Our records show that you are taking the medicines listed below. If these are incorrect, please call your family doctor or clinic.        Dose / Directions Last dose taken    * AMILORIDE HCL PO   Dose:  5 mg        Take 5 mg by mouth daily Reported on 5/9/2017   Refills:  0        * aMILoride 2.5 mg half-tab   Commonly known as:  MIDAMOR   Dose:  2.5 mg        Take 2.5 mg by mouth daily.   Refills:  0        amphetamine-dextroamphetamine 20 MG per tablet   Commonly known as:  ADDERALL   Dose:  20 mg   Quantity:  60 tablet   Start taking on:  6/7/2017        Take 1 tablet (20 mg) by mouth 2 times daily   Refills:  0        ATIVAN 1 MG tablet   Dose:  1 mg   Generic drug:  LORazepam        Take 1 mg by mouth 2 times daily as needed.   Refills:  0        biotin 1000 MCG Tabs tablet   Dose:  1000 mcg        Take 1,000 mcg by mouth every other day Patient reports taking twice a week.   Refills:  0        * BOTOX IJ   Dose:  175 Units        Inject 175 Units as directed once Lot # /C3 with Expiration Date: 10/2019   Refills:  0        * BOTOX IJ   Dose:  175 Units        Inject 175 Units into the muscle Lot # /c3  Exp: 2/2019   Refills:  0        * BOTOX IJ   Dose:  175 Units        Inject 175 Units into the muscle Lot # /C3  Exp: 4/2019   Refills:  0        * BOTOX IJ   Dose:  175 Units        Inject 175 Units into the muscle Lot # /C3  Exp: 6/2019   Refills:  0        * BOTOX IJ   Dose:  175 Units        Inject 175 Units as directed once Lot# /C3, Exp 08/2019   Refills:  0        * BOTOX IJ   Dose:  175 Units        Inject 175 Units into the muscle Lot # /C3  Exp: 9/2019   Refills:  0        budesonide 32 MCG/ACT spray   Commonly known as:  RINOCORT AQUA   Dose:  1 spray        Spray 1 spray into both nostrils daily.   Refills:  0        * divalproex 500 MG EC tablet   Commonly known as:  DEPAKOTE   Dose:  500 mg    Quantity:  60 tablet        Take 1 tablet (500 mg) by mouth 2 times daily as needed   Refills:  5        * divalproex 250 MG 24 hr tablet   Commonly known as:  DEPAKOTE ER   Dose:  250 mg   Quantity:  30 tablet        Take 1 tablet (250 mg) by mouth daily as needed   Refills:  3        eletriptan 40 MG tablet   Commonly known as:  RELPAX   Quantity:  12 tablet        Take one tablet by mouth at onset of migraine. May repeat once after 2hrs. Max of 2 tabs in 24hrs and 3 days per week.   Refills:  5        fexofenadine 180 MG tablet   Commonly known as:  ALLEGRA        Take  by mouth daily.   Refills:  0        HYDROmorphone 2 MG tablet   Commonly known as:  DILAUDID   Dose:  1-2 mg   Quantity:  20 tablet        Take 0.5-1 tablets (1-2 mg) by mouth every 3 hours as needed 20 tablets = 3 month supply.   Refills:  0        ibuprofen 800 MG tablet   Commonly known as:  ADVIL/MOTRIN   Dose:  800 mg        Take 800 mg by mouth every 8 hours as needed Reported on 5/9/2017   Refills:  0        KRILL OIL PO        Take  by mouth.   Refills:  0        Lutein 20 MG Tabs   Dose:  1 tablet        Take 1 tablet by mouth every other day Reported on 5/9/2017   Refills:  0        METOPROLOL SUCCINATE ER PO   Dose:  25 mg        Take 25 mg by mouth daily Reported on 5/9/2017   Refills:  0        NORVASC 10 MG tablet   Dose:  10 mg   Generic drug:  amLODIPine        Take 10 mg by mouth daily Reported on 5/9/2017   Refills:  0        ondansetron 4 MG tablet   Commonly known as:  ZOFRAN   Dose:  4 mg   Quantity:  90 tablet        Take 1 tablet (4 mg) by mouth every 8 hours as needed   Refills:  1        PREMARIN 0.625 MG tablet   Generic drug:  estrogens (conjugated)        Take by mouth daily .30 mg  .   Refills:  0        RESTASIS OP        Apply to eye 2 times daily   Refills:  0        SEROQUEL 50 MG tablet   Dose:  1 tablet   Generic drug:  QUEtiapine        Take 1 tablet by mouth daily.   Refills:  0        UNABLE TO FIND         Reported on 5/9/2017   Refills:  0        VALTREX 1000 mg tablet   Dose:  1000 mg   Generic drug:  valACYclovir        Take 1,000 mg by mouth as needed.   Refills:  0        vitamin B complex with vitamin C Tabs tablet   Commonly known as:  STRESS TAB   Dose:  1 tablet        Take 1 tablet by mouth daily Reported on 5/9/2017   Refills:  0        * Notice:  This list has 10 medication(s) that are the same as other medications prescribed for you. Read the directions carefully, and ask your doctor or other care provider to review them with you.            Prescriptions were sent or printed at these locations (1 Prescription)                   Other Prescriptions                Printed at Department/Unit printer (1 of 1)         indomethacin (INDOCIN) 25 MG capsule                Procedures and tests performed during your visit     Basic metabolic panel    CBC with platelets differential    CT Head Neck Angio w/o & w Contrast    Creatinine POCT    INR      Orders Needing Specimen Collection     None      Pending Results     Date and Time Order Name Status Description    5/30/2017 1552 CT Head Neck Angio w/o & w Contrast Preliminary             Pending Culture Results     No orders found from 5/28/2017 to 5/31/2017.            Pending Results Instructions     If you had any lab results that were not finalized at the time of your Discharge, you can call the ED Lab Result RN at [phone]. You will be contacted by this team for any positive Lab results or changes in treatment. The nurses are available 7 days a week from 10A to 6:30P.  You can leave a message 24 hours per day and they will return your call.        Thank you for choosing [facility]       Thank you for choosing [facility] for your care. Our goal is always to provide you with excellent care. Hearing back from our patients is one way we can continue to improve our services. Please take a few minutes to complete the written survey that you may receive in the mail  after you visit with us. Thank you!        [employer] Information     [employer] gives you secure access to your electronic health record. If you see a primary care provider, you can also send messages to your care team and make appointments. If you have questions, please call your primary care clinic.  If you do not have a primary care provider, please call [phone] and they will assist you.        Care EveryWhere ID     This is your Care EveryWhere ID. This could be used by other organizations to access your [facility] medical records  [id number]        After Visit Summary       This is your record. Keep this with you and show to your community pharmacist(s) and doctor(s) at your next visit.

## 2017-05-30 NOTE — ED NOTES
"Patient noticed intermittant occipital pain shooting up to back of left eye and bounces back to ear drum, lasted 10 seconds, intermittent since Friday. On Sunday, patient was working in her yard, bent over and vision felt \"snow globe\" with pain. Patient was taken to [facility] ED, told it was occipital neuralgia and received lidocaine injection to base of head and also received dilaudid/ Toradol and zofran. Patient felt relief and sent home. Yesterday, pain felt little better lasted around 2-4 minutes long. Today, pain increased in severity since noon time.  Patient reports called neurologist today to update him, patient reports told to come to ED for further evaluation. Patient has taken home Dilaudid PO at home around 1130 AM and 1300 today and Motrin.  "

## 2017-06-01 DIAGNOSIS — G44.039 PAROXYSMAL HEMICRANIA: Primary | ICD-10-CM

## 2017-06-01 RX ORDER — MECOBALAMIN 5000 MCG
15 TABLET,DISINTEGRATING ORAL DAILY
Qty: 30 CAPSULE | Refills: 1 | Status: SHIPPED | OUTPATIENT
Start: 2017-06-01 | End: 2017-07-20

## 2017-06-17 ENCOUNTER — HEALTH MAINTENANCE LETTER (OUTPATIENT)
Age: 52
End: 2017-06-17

## 2017-06-27 ENCOUNTER — OFFICE VISIT (OUTPATIENT)
Dept: NEUROLOGY | Facility: CLINIC | Age: 52
End: 2017-06-27
Payer: COMMERCIAL

## 2017-06-27 VITALS
BODY MASS INDEX: 26.23 KG/M2 | HEART RATE: 93 BPM | SYSTOLIC BLOOD PRESSURE: 187 MMHG | WEIGHT: 167.5 LBS | DIASTOLIC BLOOD PRESSURE: 105 MMHG

## 2017-06-27 DIAGNOSIS — M53.3 SI (SACROILIAC) JOINT DYSFUNCTION: ICD-10-CM

## 2017-06-27 DIAGNOSIS — G44.039 PAROXYSMAL HEMICRANIA: Primary | ICD-10-CM

## 2017-06-27 DIAGNOSIS — F07.81 POST CONCUSSION SYNDROME: ICD-10-CM

## 2017-06-27 PROCEDURE — 99213 OFFICE O/P EST LOW 20 MIN: CPT | Performed by: PSYCHIATRY & NEUROLOGY

## 2017-06-27 RX ORDER — INDOMETHACIN 50 MG/1
50 CAPSULE ORAL 2 TIMES DAILY WITH MEALS
Qty: 90 CAPSULE | Refills: 1 | Status: SHIPPED | OUTPATIENT
Start: 2017-06-27 | End: 2017-11-02

## 2017-06-27 RX ORDER — DEXTROAMPHETAMINE SACCHARATE, AMPHETAMINE ASPARTATE, DEXTROAMPHETAMINE SULFATE AND AMPHETAMINE SULFATE 5; 5; 5; 5 MG/1; MG/1; MG/1; MG/1
20 TABLET ORAL 2 TIMES DAILY
Qty: 60 TABLET | Refills: 0 | Status: SHIPPED | OUTPATIENT
Start: 2017-06-27 | End: 2017-09-05

## 2017-06-27 RX ORDER — HYDROMORPHONE HYDROCHLORIDE 2 MG/1
1-2 TABLET ORAL
Qty: 20 TABLET | Refills: 0 | Status: SHIPPED | OUTPATIENT
Start: 2017-06-27 | End: 2017-09-18

## 2017-06-27 NOTE — MR AVS SNAPSHOT
After Visit Summary   6/27/2017    Valerie Sim    MRN: [id number]           Patient Information     Date Of Birth          1965        Visit Information        Provider Department      6/27/2017 1:00 PM Boyd Johnston MD [facility]        Today's Diagnoses     Paroxysmal hemicrania    -  1    SI (sacroiliac) joint dysfunction        Post concussion syndrome           Follow-ups after your visit        Follow-up notes from your care team     Return in about 6 months (around 12/27/2017).      Your next 10 appointments already scheduled     Aug 30, 2017  1:40 PM CDT   (Arrive by 1:25 PM)   Return Botox with Juan Jose Jerry MD   [facility] Physical Medicine and Rehabilitation ([facility])    [address] [place] [address]   [phone]            Nov 01, 2017  2:20 PM CDT   (Arrive by 2:05 PM)   Return Botox with Juan Jose Jerry MD   [facility] Physical Medicine and Rehabilitation ([facility])    [address] [place] [id number] [phone]            Dec 19, 2017  2:00 PM CST   Return Visit with Boyd Johnston MD   [facility] ([facility])    [address] [place] [id number] [phone]            May 08, 2018  8:00 AM CDT   Return Visit with Boyd Johnston MD   [facility] ([facility])    [address] [place] [address] [phone]              Who to contact     If you have questions or need follow up information about today's clinic visit or your schedule please contact [facility] directly at [phone].  Normal or non-critical lab and imaging results will be communicated to you by MyChart, letter or phone within 4 business days after the clinic has received the results. If you do not hear from us within 7 days, please  contact the clinic through [employer] or phone. If you have a critical or abnormal lab result, we will notify you by phone as soon as possible.  Submit refill requests through Discourse or call your pharmacy and they will forward the refill request to us. Please allow 3 business days for your refill to be completed.          Additional Information About Your Visit        [employer] Information     [employer] gives you secure access to your electronic health record. If you see a primary care provider, you can also send messages to your care team and make appointments. If you have questions, please call your primary care clinic.  If you do not have a primary care provider, please call [phone] and they will assist you.      Discourse is an electronic gateway that provides easy, online access to your medical records. With Discourse, you can request a clinic appointment, read your test results, renew a prescription or communicate with your care team.     To access your existing account, please contact your [facility] or call [phone] for assistance.        Care EveryWhere ID     This is your Care EveryWhere ID. This could be used by other organizations to access your [facility] medical records  [id number]        Your Vitals Were     Pulse BMI (Body Mass Index)                93 26.23 kg/m2           Blood Pressure from Last 3 Encounters:   06/28/17 161/86   06/27/17 (!) 187/105   05/30/17 154/75    Weight from Last 3 Encounters:   06/28/17 76.2 kg (168 lb)   06/27/17 76 kg (167 lb 8 oz)   05/30/17 77 kg (169 lb 11.2 oz)              Today, you had the following     No orders found for display         Today's Medication Changes          These changes are accurate as of: 6/27/17 11:59 PM.  If you have any questions, ask your nurse or doctor.               These medicines have changed or have updated prescriptions.        Dose/Directions    indomethacin 50 MG capsule   Commonly known as:  INDOCIN    This may have changed:    - medication strength  - how much to take  - when to take this  - additional instructions   Used for:  Paroxysmal hemicrania   Changed by:  Boyd Johnston MD        Dose:  50 mg   Take 1 capsule (50 mg) by mouth 2 times daily (with meals) After one week, may increase to one po tid.   Quantity:  90 capsule   Refills:  1            Where to get your medicines      These medications were sent to [id number] - [place], [place] - [address]  [address] , [facility] [address]     Phone:  [phone]     indomethacin 50 MG capsule         Some of these will need a paper prescription and others can be bought over the counter.  Ask your nurse if you have questions.     Bring a paper prescription for each of these medications     amphetamine-dextroamphetamine 20 MG per tablet    HYDROmorphone 2 MG tablet                Primary Care Provider Office Phone # Fax #    Meek Leary [phone]       [facility] [address] [address]        Equal Access to Services     [facility]: Hadii aad ku hadasho Soomaali, waaxda luqadaha, qaybta kaalmada adeegyada, waxay idiin hayaan jossy santana . So [facility] [phone].    ATENCIÓN: Si habla español, tiene a daniels disposición servicios gratuitos de asistencia lingüística. [facility] [phone].    We comply with applicable federal civil rights laws and [place] laws. We do not discriminate on the basis of race, color, national origin, age, disability sex, sexual orientation or gender identity.            Thank you!     Thank you for choosing [facility]  for your care. Our goal is always to provide you with excellent care. Hearing back from our patients is one way we can continue to improve our services. Please take a few minutes to complete the written survey that you may receive in the mail after your visit with us. Thank you!             Your Updated  Medication List - Protect others around you: Learn how to safely use, store and throw away your medicines at www.disposemymeds.org.          This list is accurate as of: 6/27/17 11:59 PM.  Always use your most recent med list.                   Brand Name Dispense Instructions for use Diagnosis    amphetamine-dextroamphetamine 20 MG per tablet    ADDERALL    60 tablet    Take 1 tablet (20 mg) by mouth 2 times daily    Post concussion syndrome       ATIVAN 1 MG tablet   Generic drug:  LORazepam      Take 1 mg by mouth 2 times daily as needed.        * BOTOX IJ      Inject 175 Units as directed once Lot # /C3 with Expiration Date: 10/2019        * BOTOX IJ      Inject 175 Units into the muscle Lot # /c3  Exp: 2/2019        * BOTOX IJ      Inject 175 Units into the muscle Lot # /C3  Exp: 4/2019        * BOTOX IJ      Inject 175 Units into the muscle Lot # /C3  Exp: 6/2019        * BOTOX IJ      Inject 175 Units as directed once Lot# /C3, Exp 08/2019        * BOTOX IJ      Inject 175 Units into the muscle Lot # /C3  Exp: 9/2019        budesonide 32 MCG/ACT spray    RINOCORT AQUA     Spray 1 spray into both nostrils daily.        * divalproex 500 MG EC tablet    DEPAKOTE    60 tablet    Take 1 tablet (500 mg) by mouth 2 times daily as needed    Migraine without aura and without status migrainosus, not intractable       * divalproex 250 MG 24 hr tablet    DEPAKOTE ER    30 tablet    Take 1 tablet (250 mg) by mouth daily as needed    Migraine without aura and without status migrainosus, not intractable       eletriptan 40 MG tablet    RELPAX    12 tablet    Take one tablet by mouth at onset of migraine. May repeat once after 2hrs. Max of 2 tabs in 24hrs and 3 days per week.    Migraine without aura and without status migrainosus, not intractable       fexofenadine 180 MG tablet    ALLEGRA     Take  by mouth daily.        HYDROmorphone 2 MG tablet    DILAUDID    20 tablet    Take 0.5-1 tablets  (1-2 mg) by mouth every 3 hours as needed 20 tablets = 3 month supply.    SI (sacroiliac) joint dysfunction       ibuprofen 800 MG tablet    ADVIL/MOTRIN     Take 800 mg by mouth every 8 hours as needed Reported on 5/9/2017        indomethacin 50 MG capsule    INDOCIN    90 capsule    Take 1 capsule (50 mg) by mouth 2 times daily (with meals) After one week, may increase to one po tid.    Paroxysmal hemicrania       KRILL OIL PO      Take  by mouth.        LANsoprazole 15 MG CR capsule    PREVACID    30 capsule    Take 1 capsule (15 mg) by mouth daily Take 30-60 minutes before a meal.    Paroxysmal hemicrania       Lutein 20 MG Tabs      Take 1 tablet by mouth every other day Reported on 5/9/2017        METOPROLOL SUCCINATE ER PO      Take 25 mg by mouth daily Reported on 5/9/2017        ondansetron 4 MG tablet    ZOFRAN    90 tablet    Take 1 tablet (4 mg) by mouth every 8 hours as needed    Migraine without aura       PREMARIN 0.625 MG tablet   Generic drug:  estrogens (conjugated)      Take by mouth daily .30 mg  .        RESTASIS OP      Apply to eye 2 times daily        SEROQUEL 50 MG tablet   Generic drug:  QUEtiapine      Take 1 tablet by mouth daily.        UNABLE TO FIND      Reported on 5/9/2017        VALTREX 1000 mg tablet   Generic drug:  valACYclovir      Take 1,000 mg by mouth as needed.        * Notice:  This list has 8 medication(s) that are the same as other medications prescribed for you. Read the directions carefully, and ask your doctor or other care provider to review them with you.

## 2017-06-27 NOTE — NURSING NOTE
"Valerie Sim's goals for this visit include:   Chief Complaint   Patient presents with     [facility] follow up        She requests these members of her care team be copied on today's visit information: yes     PCP: Meek Leary    Referring Provider:  No referring provider defined for this encounter.    Chief Complaint   Patient presents with     [facility]     Hospital follow up        Initial BP (!) 187/105  Pulse 93  Wt 76 kg (167 lb 8 oz)  BMI 26.23 kg/m2 Estimated body mass index is 26.23 kg/(m^2) as calculated from the following:    Height as of 5/30/17: 1.702 m (5' 7\").    Weight as of this encounter: 76 kg (167 lb 8 oz).  Medication Reconciliation: complete    Do you need any medication refills at today's visit?       "

## 2017-06-27 NOTE — LETTER
6/27/2017      RE: Valerie Sim  [address]  [place] [address]       This patient is seen in follow-up with headache. I last saw her in the emergency room in late May for a new type of headache that was felt to be consistent with paroxysmal hemicrania. She did have CT imaging of the brain and CT angiogram for aneurysm evaluation. She was started on indomethacin and takes 25 mg 3 times a day. It has helped somewhat. Indocin does not bother her stomach. She has had 5 episodes of the paroxysmal headache.    Her other medicines include Depakote, which she takes sporadically. She is on Adderall she takes Relpax occasionally, about 6 per month. She is on Dilaudid, 20 tablets last her 3 months.    Her blood pressure is 179/101. Visual acuity is 20/30 in the right eye and 20 over 20 in the left eye. Funduscopic exam shows sharp disks bilaterally. Her headaches tend to be left occipital radiating into the left eye and left ear.    Assessment #1 complex headache disorder including new diagnosis of paroxysmal hemicrania, migraine, history of closed head injury with postconcussion syndrome    Discussion:  this patient is seen in follow-up with the above issues. With regard to the paroxysmal hemicrania I am going to increase her dose of indomethacin so she can take up to 50 mg 3 times per day. Refills were given her hydromorphone. I will see her in follow-up in 6 months, or sooner if there are problems.          Boyd Johnston MD

## 2017-06-28 ENCOUNTER — OFFICE VISIT (OUTPATIENT)
Dept: PHYSICAL MEDICINE AND REHAB | Facility: CLINIC | Age: 52
End: 2017-06-28

## 2017-06-28 VITALS
WEIGHT: 168 LBS | BODY MASS INDEX: 26.37 KG/M2 | DIASTOLIC BLOOD PRESSURE: 86 MMHG | HEIGHT: 67 IN | TEMPERATURE: 98.1 F | HEART RATE: 113 BPM | SYSTOLIC BLOOD PRESSURE: 161 MMHG

## 2017-06-28 DIAGNOSIS — G43.719 INTRACTABLE CHRONIC MIGRAINE WITHOUT AURA AND WITHOUT STATUS MIGRAINOSUS: Primary | ICD-10-CM

## 2017-06-28 ASSESSMENT — PAIN SCALES - GENERAL: PAINLEVEL: EXTREME PAIN (8)

## 2017-06-28 NOTE — LETTER
"6/28/2017       RE: Valerie Sim  [address]  [place] [address]     Dear Colleague,    Thank you for referring your patient, Valerie Sim, to the [facility] PHYSICAL MEDICINE AND REHABILITATION at [facility]. Please see a copy of my visit note below.    BOTULINUM TOXIN PROCEDURE - HEADACHE - NOTE    Chief Complaint   Patient presents with     RECHECK     [facility] RETURN - BOTOX       /86 (BP Location: Left arm, Patient Position: Sitting, Cuff Size: Adult Regular)  Pulse 113  Temp 98.1  F (36.7  C) (Oral)  Ht 1.702 m (5' 7\")  Wt 76.2 kg (168 lb)  BMI 26.31 kg/m2       Current Outpatient Prescriptions:      HYDROmorphone (DILAUDID) 2 MG tablet, Take 0.5-1 tablets (1-2 mg) by mouth every 3 hours as needed 20 tablets = 3 month supply., Disp: 20 tablet, Rfl: 0     amphetamine-dextroamphetamine (ADDERALL) 20 MG per tablet, Take 1 tablet (20 mg) by mouth 2 times daily, Disp: 60 tablet, Rfl: 0     indomethacin (INDOCIN) 50 MG capsule, Take 1 capsule (50 mg) by mouth 2 times daily (with meals) After one week, may increase to one po tid., Disp: 90 capsule, Rfl: 1     LANsoprazole (PREVACID) 15 MG CR capsule, Take 1 capsule (15 mg) by mouth daily Take 30-60 minutes before a meal., Disp: 30 capsule, Rfl: 1     OnabotulinumtoxinA (BOTOX IJ), Inject 175 Units as directed once Lot # /C3 with Expiration Date: 10/2019, Disp: , Rfl:      OnabotulinumtoxinA (BOTOX IJ), Inject 175 Units into the muscle Lot # /C3  Exp: 9/2019, Disp: , Rfl:      METOPROLOL SUCCINATE ER PO, Take 25 mg by mouth daily Reported on 5/9/2017, Disp: , Rfl:      OnabotulinumtoxinA (BOTOX IJ), Inject 175 Units as directed once Lot# /C3, Exp 08/2019, Disp: , Rfl:      eletriptan (RELPAX) 40 MG tablet, Take one tablet by mouth at onset of migraine. May repeat once after 2hrs. Max of 2 tabs in 24hrs and 3 days per week., Disp: 12 tablet, Rfl: 5     divalproex (DEPAKOTE) 500 MG EC tablet, " Take 1 tablet (500 mg) by mouth 2 times daily as needed, Disp: 60 tablet, Rfl: 5     divalproex (DEPAKOTE ER) 250 MG 24 hr tablet, Take 1 tablet (250 mg) by mouth daily as needed, Disp: 30 tablet, Rfl: 3     OnabotulinumtoxinA (BOTOX IJ), Inject 175 Units into the muscle Lot # /C3  Exp: 6/2019, Disp: , Rfl:      OnabotulinumtoxinA (BOTOX IJ), Inject 175 Units into the muscle Lot # /C3  Exp: 4/2019, Disp: , Rfl:      OnabotulinumtoxinA (BOTOX IJ), Inject 175 Units into the muscle Lot # /c3  Exp: 2/2019, Disp: , Rfl:      ondansetron (ZOFRAN) 4 MG tablet, Take 1 tablet (4 mg) by mouth every 8 hours as needed, Disp: 90 tablet, Rfl: 1     CycloSPORINE (RESTASIS OP), Apply to eye 2 times daily, Disp: , Rfl:      UNABLE TO FIND, Reported on 5/9/2017, Disp: , Rfl:      Lutein 20 MG TABS, Take 1 tablet by mouth every other day Reported on 5/9/2017, Disp: , Rfl:      LORazepam (ATIVAN) 1 MG tablet, Take 1 mg by mouth 2 times daily as needed., Disp: , Rfl:      QUEtiapine (SEROQUEL) 50 MG tablet, Take 1 tablet by mouth daily., Disp: , Rfl:      budesonide (RINOCORT AQUA) 32 MCG/ACT nasal spray, Spray 1 spray into both nostrils daily., Disp: , Rfl:      fexofenadine (ALLEGRA) 180 MG tablet, Take  by mouth daily., Disp: , Rfl:      ibuprofen (ADVIL,MOTRIN) 800 MG tablet, Take 800 mg by mouth every 8 hours as needed Reported on 5/9/2017, Disp: , Rfl:      KRILL OIL PO, Take  by mouth., Disp: , Rfl:      estrogens, conjugated, (PREMARIN) 0.625 MG tablet, Take by mouth daily .30 mg. , Disp: , Rfl:      valACYclovir (VALTREX) 1000 mg tablet, Take 1,000 mg by mouth as needed., Disp: , Rfl:      Allergies   Allergen Reactions     Amitriptyline Hcl Other (See Comments)     Migraine       Cymbalta Other (See Comments)     Migraine       Cyproheptadine Hcl      headaches     Desvenlafaxine      Migraine       Fluoxetine Other (See Comments)     Migraine       Imipramine Other (See Comments)     Migraine        "Medroxyprogesterone Hives     Morphine Other (See Comments)     Patient reports seizure      Nortriptyline Other (See Comments)     Migraine       Phenergan Dm [Promethazine-Dm] Other (See Comments)     seizures     Venlafaxine Other (See Comments)     Migraine       Wellbutrin [Bupropion Hydrobromide] Other (See Comments)     Migraine       Compazine Anxiety     Dihydroergotamine Anxiety and Other (See Comments)     Cardiac symptoms     Droperidol Anxiety        PHYSICAL EXAM:  7/10 headache with \"hangover feeling\" given pain medications she received for the nephrolithiasis.     HPI:  Patient reports the following new medical problems since last visit: Had different type of headache attacks at the end of May. Severe intractable painfrom the left posterior part of her head to behind her left eye. Assoc with severe ear pain, felt like the pain was shooting out of her ear drum. Initially seen at [facility] in the [place] system, 2 days later at [facility]. She was then started on indomethacin in the ER and the dose was increased to 50mg BID. She had several headaches at the end of may that were similar to this. She has had an additional 5 throughout the month with assoc dizziness but the pain is not as severe. She states that Dr. Johnston thinks this might be related to trigeminal neuralgia or paroxysmal hemicrania.    We reviewed the recommended safety guidelines for  Botox from any vaccine injection, such as the seasonal flu vaccine, by a minimum of 10-14 days with Valerie Sim. She acknowledged understanding.    RESPONSE TO PREVIOUS TREATMENT:  Change in headache pattern following last series of injections with 175 units of  Botox on 04/26/2017 followed by a \"bump\" of 10 units at 1 muscle on 5/15/2017 as this site was trigger her headaches.    No problems reported  she commonly gets 5 days of malaise post injections.    1.  Headache frequency during this injection cycle:  15 headache days per month of more " prominent headache that she needs to treat, she does have a mild baseline HA that is almost constant.  This is compared to her baseline headache frequency of daily headache.     2.  Headache duration during this injection cycle:  Mild baseline HA that can wax and wane in severity, the more prominent headaches last about 2 days each.    3.  Headache intensity during this injection cycle:    A.  4/10  =  Typical pain level.  B.  8/10  =  Worst pain level.  C.  4/10  =  Lowest pain level.    4.  Change in headache medication usage during this injection cycle: she was using the Relpax less     5.  ER Visits During This Injection Cycle:  0    6.  Functional Performance:  Change in ADL's, social interaction, days lost from work, etc. she reports she missed about 3 social events over the last 9 weeks.    BOTULINUM NEUROTOXIN INJECTION PROCEDURES:      VERIFICATION OF PATIENT IDENTIFICATION AND PROCEDURE     Initials   Patient Name RRN   Patient  RRN   Procedure Verified by: MARLY     Prior to the start of the procedure and with procedural staff participation, I verbally confirmed the patient s identity using two indicators, relevant allergies, that the procedure was appropriate and matched the consent or emergent situation, and that the correct equipment/implants were available. Immediately prior to starting the procedure I conducted the Time Out with the procedural staff and re-confirmed the patient s name, procedure, and site/side. (The Joint Commission universal protocol was followed.)  Yes    Sedation (Moderate or Deep): None    Above assessments performed by:  Resident/Fellow         Clifford Bennett DO          The attending provider was present for the entire procedure documented below.    Juan Jose Jerry MD    INDICATIONS FOR PROCEDURES:  Valerie Sim is a 52 year old patient with chronic migraine headaches associated with cervicogenic components..       Her baseline symptoms have been recalcitrant to oral  medications and conservative therapy. She is here today for an injection of Botox.       GOAL OF PROCEDURE:  The goal of this procedure is to decrease pain and enhance functional independence.     TOTAL DOSE ADMINISTERED:  Dose Administered: 175 units Botox (Botulinum Toxin Type A)  2:1 Dilution   Diluent Used: Preservative Free Normal Saline  Total Volume of Diluent Used: 3.50 ml  Lot # /C3 with Expiration Date: 01/2020  NDC #: Botox 100u ([address]-76)     Medication guide was offered to patient and was declined.     CONSENT:  The risks, benefits, and treatment options were discussed with Valerie J Roney and she agreed to proceed.     Written consent was obtained by RRN.      EQUIPMENT USED:  Needle-37mm stimulating/recording  Needle-30 gauge  EMG/NCS Machine      SKIN PREPARATION:  Skin preparation was performed using an alcohol wipe.      AREA/MUSCLE INJECTED: 175 Units of Botox  1. FACE & SCALP: 100 units of Botox = Total dose, 2:1 Dilution  Right temporalis - 15 units of Botox in 4 site/s.  Left temporalis - 15 units of Botox in 4 site/s.      Right frontalis - 12.5 units of Botox in 4 site/s.  Left frontalis - 12.5 units of Botox in 4 site/s.      Right procerus - 3.5 units of Botox in 1 site/s.  Left procerus - 3.5 units of Botox in 1 site/s.      Right  - 4 units of Botox in 1 site/s.  Left  - 4 units of Botox in 1 site/s.      Right Occipitalis - 12.5 units of Botox at 4 site/s.   Left Occipitalis - 12.5 units of Botox at 4 site/s.      Left and Right Nasalis - 2.5 units per side, total of 5 units.      2. JAW MUSCLES: 50 units of Botox = Total Dose, 1:1 Dilution  Right Masseter - 20 units of Botox at 1 site/s.   Left Masseter - 20 units of Botox at 1 site/s.      Right temporalis - 5 units of Botox at 1 site/s.  Left temporalis - 5 units of Botox at 1 site/s.       3. SHOULDER & NECK MUSCLES: Total dose 25 units of Botox diluted 2:1      Right levator muscle - 7.5 units of Botox at  1 site.  Left levator muscle - 7.5 units of Botox at 1 site.      Right mid trap - 5 units of Botox at 1 site.  Left mid trap - 5 units of Botox at 1 site.        RESPONSE TO PROCEDURE: Valerie Sim tolerated the procedure well and there were no immediate complications. She was allowed to recover for an appropriate period of time and was discharged home in stable condition.     FOLLOW UP:  Valerie Sim was asked to follow up by phone in 7-14 days with Nely Jean Baptiste PT, Care Coordinator or Estela Magaña RN, Care Coordinator, to report her response to this series of injections. Based on the patient's previous response to this therapy, Valerie Sim was rescheduled for the next series of injections in 9 weeks.     PLAN (Medication Changes, Therapy Orders, Work or Disability Issues, etc.): Patient will continue to monitor response to today's injections.     There were 25 units of Botox as unavoidable waste for this patient.    Again, thank you for allowing me to participate in the care of your patient.      Sincerely,    Juan Jose Jerry MD

## 2017-06-28 NOTE — PROGRESS NOTES
"BOTULINUM TOXIN PROCEDURE - HEADACHE - NOTE    Chief Complaint   Patient presents with     RECHECK     [facility] RETURN - BOTOX       /86 (BP Location: Left arm, Patient Position: Sitting, Cuff Size: Adult Regular)  Pulse 113  Temp 98.1  F (36.7  C) (Oral)  Ht 1.702 m (5' 7\")  Wt 76.2 kg (168 lb)  BMI 26.31 kg/m2       Current Outpatient Prescriptions:      HYDROmorphone (DILAUDID) 2 MG tablet, Take 0.5-1 tablets (1-2 mg) by mouth every 3 hours as needed 20 tablets = 3 month supply., Disp: 20 tablet, Rfl: 0     amphetamine-dextroamphetamine (ADDERALL) 20 MG per tablet, Take 1 tablet (20 mg) by mouth 2 times daily, Disp: 60 tablet, Rfl: 0     indomethacin (INDOCIN) 50 MG capsule, Take 1 capsule (50 mg) by mouth 2 times daily (with meals) After one week, may increase to one po tid., Disp: 90 capsule, Rfl: 1     LANsoprazole (PREVACID) 15 MG CR capsule, Take 1 capsule (15 mg) by mouth daily Take 30-60 minutes before a meal., Disp: 30 capsule, Rfl: 1     OnabotulinumtoxinA (BOTOX IJ), Inject 175 Units as directed once Lot # /C3 with Expiration Date: 10/2019, Disp: , Rfl:      OnabotulinumtoxinA (BOTOX IJ), Inject 175 Units into the muscle Lot # /C3  Exp: 9/2019, Disp: , Rfl:      METOPROLOL SUCCINATE ER PO, Take 25 mg by mouth daily Reported on 5/9/2017, Disp: , Rfl:      OnabotulinumtoxinA (BOTOX IJ), Inject 175 Units as directed once Lot# /C3, Exp 08/2019, Disp: , Rfl:      eletriptan (RELPAX) 40 MG tablet, Take one tablet by mouth at onset of migraine. May repeat once after 2hrs. Max of 2 tabs in 24hrs and 3 days per week., Disp: 12 tablet, Rfl: 5     divalproex (DEPAKOTE) 500 MG EC tablet, Take 1 tablet (500 mg) by mouth 2 times daily as needed, Disp: 60 tablet, Rfl: 5     divalproex (DEPAKOTE ER) 250 MG 24 hr tablet, Take 1 tablet (250 mg) by mouth daily as needed, Disp: 30 tablet, Rfl: 3     OnabotulinumtoxinA (BOTOX IJ), Inject 175 Units into the muscle Lot # /C3  Exp: 6/2019, Disp: , " Rfl:      OnabotulinumtoxinA (BOTOX IJ), Inject 175 Units into the muscle Lot # /C3  Exp: 4/2019, Disp: , Rfl:      OnabotulinumtoxinA (BOTOX IJ), Inject 175 Units into the muscle Lot # /c3  Exp: 2/2019, Disp: , Rfl:      ondansetron (ZOFRAN) 4 MG tablet, Take 1 tablet (4 mg) by mouth every 8 hours as needed, Disp: 90 tablet, Rfl: 1     CycloSPORINE (RESTASIS OP), Apply to eye 2 times daily, Disp: , Rfl:      UNABLE TO FIND, Reported on 5/9/2017, Disp: , Rfl:      Lutein 20 MG TABS, Take 1 tablet by mouth every other day Reported on 5/9/2017, Disp: , Rfl:      LORazepam (ATIVAN) 1 MG tablet, Take 1 mg by mouth 2 times daily as needed., Disp: , Rfl:      QUEtiapine (SEROQUEL) 50 MG tablet, Take 1 tablet by mouth daily., Disp: , Rfl:      budesonide (RINOCORT AQUA) 32 MCG/ACT nasal spray, Spray 1 spray into both nostrils daily., Disp: , Rfl:      fexofenadine (ALLEGRA) 180 MG tablet, Take  by mouth daily., Disp: , Rfl:      ibuprofen (ADVIL,MOTRIN) 800 MG tablet, Take 800 mg by mouth every 8 hours as needed Reported on 5/9/2017, Disp: , Rfl:      KRILL OIL PO, Take  by mouth., Disp: , Rfl:      estrogens, conjugated, (PREMARIN) 0.625 MG tablet, Take by mouth daily .30 mg. , Disp: , Rfl:      valACYclovir (VALTREX) 1000 mg tablet, Take 1,000 mg by mouth as needed., Disp: , Rfl:      Allergies   Allergen Reactions     Amitriptyline Hcl Other (See Comments)     Migraine       Cymbalta Other (See Comments)     Migraine       Cyproheptadine Hcl      headaches     Desvenlafaxine      Migraine       Fluoxetine Other (See Comments)     Migraine       Imipramine Other (See Comments)     Migraine       Medroxyprogesterone Hives     Morphine Other (See Comments)     Patient reports seizure      Nortriptyline Other (See Comments)     Migraine       Phenergan Dm [Promethazine-Dm] Other (See Comments)     seizures     Venlafaxine Other (See Comments)     Migraine       Wellbutrin [Bupropion Hydrobromide] Other (See  "Comments)     Migraine       Compazine Anxiety     Dihydroergotamine Anxiety and Other (See Comments)     Cardiac symptoms     Droperidol Anxiety        PHYSICAL EXAM:    7/10 headache with \"hangover feeling\" given pain medications she received for the nephrolithiasis.     HPI:    Patient reports the following new medical problems since last visit: Had different type of headache attacks at the end of May. Severe intractable painfrom the left posterior part of her head to behind her left eye. Assoc with severe ear pain, felt like the pain was shooting out of her ear drum. Initially seen at [facility] in the [place] system, 2 days later at [facility]. She was then started on indomethacin in the ER and the dose was increased to 50mg BID. She had several headaches at the end of may that were similar to this. She has had an additional 5 throughout the month with assoc dizziness but the pain is not as severe. She states that Dr. Johnston thinks this might be related to trigeminal neuralgia or paroxysmal hemicrania.    We reviewed the recommended safety guidelines for  Botox from any vaccine injection, such as the seasonal flu vaccine, by a minimum of 10-14 days with Valerie Sim. She acknowledged understanding.    RESPONSE TO PREVIOUS TREATMENT:  Change in headache pattern following last series of injections with 175 units of  Botox on 04/26/2017 followed by a \"bump\" of 10 units at 1 muscle on 5/15/2017 as this site was trigger her headaches.    No problems reported  she commonly gets 5 days of malaise post injections.    1.  Headache frequency during this injection cycle:  15 headache days per month of more prominent headache that she needs to treat, she does have a mild baseline HA that is almost constant.  This is compared to her baseline headache frequency of daily headache.     2.  Headache duration during this injection cycle:  Mild baseline HA that can wax and wane in severity, the more prominent headaches last " about 2 days each.    3.  Headache intensity during this injection cycle:    A.  4/10  =  Typical pain level.  B.  8/10  =  Worst pain level.  C.  4/10  =  Lowest pain level.    4.  Change in headache medication usage during this injection cycle: she was using the Relpax less     5.  ER Visits During This Injection Cycle:  0    6.  Functional Performance:  Change in ADL's, social interaction, days lost from work, etc. she reports she missed about 3 social events over the last 9 weeks.    BOTULINUM NEUROTOXIN INJECTION PROCEDURES:      VERIFICATION OF PATIENT IDENTIFICATION AND PROCEDURE     Initials   Patient Name RRN   Patient  RRN   Procedure Verified by: MARLY     Prior to the start of the procedure and with procedural staff participation, I verbally confirmed the patient s identity using two indicators, relevant allergies, that the procedure was appropriate and matched the consent or emergent situation, and that the correct equipment/implants were available. Immediately prior to starting the procedure I conducted the Time Out with the procedural staff and re-confirmed the patient s name, procedure, and site/side. (The Joint Commission universal protocol was followed.)  Yes    Sedation (Moderate or Deep): None    Above assessments performed by:  Resident/Fellow         Clifford Bennett DO          The attending provider was present for the entire procedure documented below.    Juan Jose Jerry MD    INDICATIONS FOR PROCEDURES:  Valerie Sim is a 52 year old patient with chronic migraine headaches associated with cervicogenic components..       Her baseline symptoms have been recalcitrant to oral medications and conservative therapy. She is here today for an injection of Botox.       GOAL OF PROCEDURE:  The goal of this procedure is to decrease pain and enhance functional independence.     TOTAL DOSE ADMINISTERED:  Dose Administered: 175 units Botox (Botulinum Toxin Type A)  2:1 Dilution   Diluent Used:  Preservative Free Normal Saline  Total Volume of Diluent Used: 3.50 ml  Lot # /C3 with Expiration Date: 01/2020  NDC #: Botox 100u ([address]-85)     Medication guide was offered to patient and was declined.     CONSENT:  The risks, benefits, and treatment options were discussed with Valerie Sim and she agreed to proceed.     Written consent was obtained by RRN.      EQUIPMENT USED:  Needle-37mm stimulating/recording  Needle-30 gauge  EMG/NCS Machine      SKIN PREPARATION:  Skin preparation was performed using an alcohol wipe.      AREA/MUSCLE INJECTED: 175 Units of Botox  1. FACE & SCALP: 100 units of Botox = Total dose, 2:1 Dilution  Right temporalis - 15 units of Botox in 4 site/s.  Left temporalis - 15 units of Botox in 4 site/s.      Right frontalis - 12.5 units of Botox in 4 site/s.  Left frontalis - 12.5 units of Botox in 4 site/s.      Right procerus - 3.5 units of Botox in 1 site/s.  Left procerus - 3.5 units of Botox in 1 site/s.      Right  - 4 units of Botox in 1 site/s.  Left  - 4 units of Botox in 1 site/s.      Right Occipitalis - 12.5 units of Botox at 4 site/s.   Left Occipitalis - 12.5 units of Botox at 4 site/s.      Left and Right Nasalis - 2.5 units per side, total of 5 units.      2. JAW MUSCLES: 50 units of Botox = Total Dose, 1:1 Dilution  Right Masseter - 20 units of Botox at 1 site/s.   Left Masseter - 20 units of Botox at 1 site/s.      Right temporalis - 5 units of Botox at 1 site/s.  Left temporalis - 5 units of Botox at 1 site/s.       3. SHOULDER & NECK MUSCLES: Total dose 25 units of Botox diluted 2:1      Right levator muscle - 7.5 units of Botox at 1 site.  Left levator muscle - 7.5 units of Botox at 1 site.      Right mid trap - 5 units of Botox at 1 site.  Left mid trap - 5 units of Botox at 1 site.        RESPONSE TO PROCEDURE: Valerie Sim tolerated the procedure well and there were no immediate complications. She was allowed to recover for an  appropriate period of time and was discharged home in stable condition.     FOLLOW UP:  Valerie Sim was asked to follow up by phone in 7-14 days with Nely Jean Baptiste PT, Care Coordinator or Estela Magaña RN, Care Coordinator, to report her response to this series of injections. Based on the patient's previous response to this therapy, Valerie Sim was rescheduled for the next series of injections in 9 weeks.     PLAN (Medication Changes, Therapy Orders, Work or Disability Issues, etc.): Patient will continue to monitor response to today's injections.     There were 25 units of Botox as unavoidable waste for this patient.

## 2017-06-28 NOTE — MR AVS SNAPSHOT
After Visit Summary   6/28/2017    Valerie Sim    MRN: [id number]           Patient Information     Date Of Birth          1965        Visit Information        Provider Department      6/28/2017 2:20 PM Juan Jose Jerry MD [facility]         Follow-ups after your visit        Follow-up notes from your care team     Return in about 9 weeks (around 8/30/2017).      Your next 10 appointments already scheduled     Aug 30, 2017  1:40 PM CDT   (Arrive by 1:25 PM)   Return Botox with Juan Jose Jerry MD   [facility] ([facility])    [address] [place] [id number]   [phone]            Nov 01, 2017  2:20 PM CDT   (Arrive by 2:05 PM)   Return Botox with Juan Jose Jerry MD   [facility] ([facility])    [address] [place] [id number] [phone]            Dec 19, 2017  2:00 PM CST   Return Visit with Boyd Johnston MD   [facility] ([facility]    [address] [place] [id number] [phone]            May 08, 2018  8:00 AM CDT   Return Visit with Boyd Johnston MD   [facility] ([facility])    [address] [place] [id number] [phone]              Who to contact     Please call your clinic at [phone] to:    Ask questions about your health    Make or cancel appointments    Discuss your medicines    Learn about your test results    Speak to your doctor   If you have compliments or concerns about an experience at your clinic, or if you wish to file a complaint, please contact [facility] Patient Relations at [phone] or email us at [email or web address]         Additional Information About Your Visit        Steven  "Information     emaze gives you secure access to your electronic health record. If you see a primary care provider, you can also send messages to your care team and make appointments. If you have questions, please call your primary care clinic.  If you do not have a primary care provider, please call [phone] and they will assist you.      emaze is an electronic gateway that provides easy, online access to your medical records. With emaze, you can request a clinic appointment, read your test results, renew a prescription or communicate with your care team.     To access your existing account, please contact your [facility] or call [phone] for assistance.        Care EveryWhere ID     This is your Care EveryWhere ID. This could be used by other organizations to access your [facility] medical records  [id number]        Your Vitals Were     Pulse Temperature Height BMI (Body Mass Index)          113 98.1  F (36.7  C) (Oral) 1.702 m (5' 7\") 26.31 kg/m2         Blood Pressure from Last 3 Encounters:   06/28/17 161/86   06/27/17 (!) 187/105   05/30/17 154/75    Weight from Last 3 Encounters:   06/28/17 76.2 kg (168 lb)   06/27/17 76 kg (167 lb 8 oz)   05/30/17 77 kg (169 lb 11.2 oz)              Today, you had the following     No orders found for display       Primary Care Provider Office Phone # Fax #    Meek Leary [phone]       [facility] [address] [place], [place] [id number]        Equal Access to Services     JAYLEN MONTES : Hadii jae ku hadasho Soomaali, waaxda luqadaha, qaybta kaalmada adeegyada, kimberly helton. So [facility] [phone].    ATENCIÓN: Si habla español, tiene a daniels disposición servicios gratuitos de asistencia lingüística. Llame al [phone].    We comply with applicable federal civil rights laws and [place] laws. We do not discriminate on the basis of race, color, national origin, age, " disability sex, sexual orientation or gender identity.            Thank you!     Thank you for choosing [facility]  for your care. Our goal is always to provide you with excellent care. Hearing back from our patients is one way we can continue to improve our services. Please take a few minutes to complete the written survey that you may receive in the mail after your visit with us. Thank you!             Your Updated Medication List - Protect others around you: Learn how to safely use, store and throw away your medicines at www.disposemymeds.org.          This list is accurate as of: 6/28/17  3:06 PM.  Always use your most recent med list.                   Brand Name Dispense Instructions for use Diagnosis    amphetamine-dextroamphetamine 20 MG per tablet    ADDERALL    60 tablet    Take 1 tablet (20 mg) by mouth 2 times daily    Post concussion syndrome       ATIVAN 1 MG tablet   Generic drug:  LORazepam      Take 1 mg by mouth 2 times daily as needed.        * BOTOX IJ      Inject 175 Units as directed once Lot # /C3 with Expiration Date: 10/2019        * BOTOX IJ      Inject 175 Units into the muscle Lot # /c3  Exp: 2/2019        * BOTOX IJ      Inject 175 Units into the muscle Lot # /C3  Exp: 4/2019        * BOTOX IJ      Inject 175 Units into the muscle Lot # /C3  Exp: 6/2019        * BOTOX IJ      Inject 175 Units as directed once Lot# /C3, Exp 08/2019        * BOTOX IJ      Inject 175 Units into the muscle Lot # /C3  Exp: 9/2019        * BOTOX IJ      Inject 175 Units as directed once Lot#: /C3 Exp: 01/2020        budesonide 32 MCG/ACT spray    RINOCORT AQUA     Spray 1 spray into both nostrils daily.        * divalproex 500 MG EC tablet    DEPAKOTE    60 tablet    Take 1 tablet (500 mg) by mouth 2 times daily as needed    Migraine without aura and without status migrainosus, not intractable       * divalproex 250 MG 24 hr tablet     DEPAKOTE ER    30 tablet    Take 1 tablet (250 mg) by mouth daily as needed    Migraine without aura and without status migrainosus, not intractable       eletriptan 40 MG tablet    RELPAX    12 tablet    Take one tablet by mouth at onset of migraine. May repeat once after 2hrs. Max of 2 tabs in 24hrs and 3 days per week.    Migraine without aura and without status migrainosus, not intractable       fexofenadine 180 MG tablet    ALLEGRA     Take  by mouth daily.        HYDROmorphone 2 MG tablet    DILAUDID    20 tablet    Take 0.5-1 tablets (1-2 mg) by mouth every 3 hours as needed 20 tablets = 3 month supply.    SI (sacroiliac) joint dysfunction       ibuprofen 800 MG tablet    ADVIL/MOTRIN     Take 800 mg by mouth every 8 hours as needed Reported on 5/9/2017        indomethacin 50 MG capsule    INDOCIN    90 capsule    Take 1 capsule (50 mg) by mouth 2 times daily (with meals) After one week, may increase to one po tid.    Paroxysmal hemicrania       KRILL OIL PO      Take  by mouth.        LANsoprazole 15 MG CR capsule    PREVACID    30 capsule    Take 1 capsule (15 mg) by mouth daily Take 30-60 minutes before a meal.    Paroxysmal hemicrania       Lutein 20 MG Tabs      Take 1 tablet by mouth every other day Reported on 5/9/2017        METOPROLOL SUCCINATE ER PO      Take 25 mg by mouth daily Reported on 5/9/2017        ondansetron 4 MG tablet    ZOFRAN    90 tablet    Take 1 tablet (4 mg) by mouth every 8 hours as needed    Migraine without aura       PREMARIN 0.625 MG tablet   Generic drug:  estrogens (conjugated)      Take by mouth daily .30 mg  .        RESTASIS OP      Apply to eye 2 times daily        SEROQUEL 50 MG tablet   Generic drug:  QUEtiapine      Take 1 tablet by mouth daily.        UNABLE TO FIND      Reported on 5/9/2017        VALTREX 1000 mg tablet   Generic drug:  valACYclovir      Take 1,000 mg by mouth as needed.        * Notice:  This list has 9 medication(s) that are the same as other  medications prescribed for you. Read the directions carefully, and ask your doctor or other care provider to review them with you.

## 2017-07-03 NOTE — PROGRESS NOTES
This patient is seen in follow-up with headache. I last saw her in the emergency room in late May for a new type of headache that was felt to be consistent with paroxysmal hemicrania. She did have CT imaging of the brain and CT angiogram for aneurysm evaluation. She was started on indomethacin and takes 25 mg 3 times a day. It has helped somewhat. Indocin does not bother her stomach. She has had 5 episodes of the paroxysmal headache.    Her other medicines include Depakote, which she takes sporadically. She is on Adderall she takes Relpax occasionally, about 6 per month. She is on Dilaudid, 20 tablets last her 3 months.    Her blood pressure is 179/101. Visual acuity is 20/30 in the right eye and 20 over 20 in the left eye. Funduscopic exam shows sharp disks bilaterally. Her headaches tend to be left occipital radiating into the left eye and left ear.    Assessment #1 complex headache disorder including new diagnosis of paroxysmal hemicrania, migraine, history of closed head injury with postconcussion syndrome    Discussion:  this patient is seen in follow-up with the above issues. With regard to the paroxysmal hemicrania I am going to increase her dose of indomethacin so she can take up to 50 mg 3 times per day. Refills were given her hydromorphone. I will see her in follow-up in 6 months, or sooner if there are problems.

## 2017-07-20 DIAGNOSIS — G44.039 PAROXYSMAL HEMICRANIA: ICD-10-CM

## 2017-07-20 RX ORDER — MECOBALAMIN 5000 MCG
TABLET,DISINTEGRATING ORAL
Qty: 30 CAPSULE | Refills: 3 | Status: SHIPPED | OUTPATIENT
Start: 2017-07-20 | End: 2017-08-17

## 2017-07-20 NOTE — TELEPHONE ENCOUNTER
Refill request for LANsoprazole (PREVACID) 15 MG CR capsule, last filled 6/1/17. Routed to Dr Johnston to review and sign pending RX in .  Tiesha Baltazar RN

## 2017-07-31 NOTE — PROGRESS NOTES
Provider:  Dr. Boyd Johnston  Patient:  Valerie Sim  MRN:  [id number]  :  1965    WORK TYPE:  Neurology consultation     DATE OF SERVICE:  2017    [facility]  [facility]     HISTORY OF PRESENT ILLNESS:  This patient is seen in followup with headache.  I last saw the patient in the emergency room at [facility].  At that time, she was having severe left-sided head pains.  Her presentation suggested paroxysmal hemicrania.  She was started on Indocin.  She is taking 25 mg 3 times a day.  It has been helpful but not curative.  It does not bother her stomach.  She still has the headaches.  She has had 5 episodes since I saw her in the emergency room where the pain was quite severe.  It begins in the left occipital region and radiates into the left eye and sometimes comes out the left ear.  Her other medications are pretty much unchanged.  She has a prescription for Depakote but uses it only sporadically.  She is on Relpax 40 mg and has used 3 in the last couple of days, but her monthly usage is 6.  She gets 20 tablets of hydromorphone 2 mg that last her for 3 months.  She is o Adderall 20 mg twice a day.  She is miserable overall.  She had been doing quite well with regard to her headache but now has slipped back.  She is tired.  Also, she has been having issues with her blood pressure.  Adjustments have been made in her medicines, and she has had some swelling in her legs.      PHYSICAL EXAMINATION:  Today her blood pressure is 179/101, and when it was retested it was 187/105.  Visual acuity is 20/30 in the right eye and 20/25 in the left eye.  Funduscopic exam shows sharp discs bilaterally.  There is nothing to add otherwise on her neurologic exam.      ASSESSMENT:   1.  New problems with headache, probable paroxysmal hemicrania.    2.  History of complex headache disorder with migraine and postcoital headache.    3.  History of minor closed head injury with post-concussion  syndrome.    DISCUSSION:  The patient is seen with the above problem list.  Her main issue seems to be the new headache that she has been experiencing in the last month or so.  It suggests paroxysmal hemicrania.  It is better with the Indocin.  The dose is modest, 25 mg 3 times a day.  I am going to give her a prescription for 50 mg to take 2 a day for a week, with the option of going to 3 a day.  She should call me if she finds this ineffective.  Refills were given on Adderall and hydromorphone.  I made an appointment to see her in followup in 6 months but encouraged her to call if the headache problem does not get resolved.      She needs to follow up with her primary care as far as the issue of her blood pressure is concerned.  This could be a contributing factor.

## 2017-08-17 DIAGNOSIS — G44.039 PAROXYSMAL HEMICRANIA: ICD-10-CM

## 2017-08-18 RX ORDER — MECOBALAMIN 5000 MCG
TABLET,DISINTEGRATING ORAL
Qty: 30 CAPSULE | Refills: 3 | Status: SHIPPED | OUTPATIENT
Start: 2017-08-18 | End: 2017-11-02

## 2017-08-18 NOTE — TELEPHONE ENCOUNTER
RX refill request for Lansoprazole routed to Dr Johnston to review and sign.  Tiesha Baltazar RN

## 2017-08-30 ENCOUNTER — OFFICE VISIT (OUTPATIENT)
Dept: PHYSICAL MEDICINE AND REHAB | Facility: CLINIC | Age: 52
End: 2017-08-30

## 2017-08-30 VITALS
HEART RATE: 95 BPM | WEIGHT: 167 LBS | SYSTOLIC BLOOD PRESSURE: 141 MMHG | HEIGHT: 67 IN | DIASTOLIC BLOOD PRESSURE: 75 MMHG | BODY MASS INDEX: 26.21 KG/M2

## 2017-08-30 DIAGNOSIS — G43.719 INTRACTABLE CHRONIC MIGRAINE WITHOUT AURA AND WITHOUT STATUS MIGRAINOSUS: Primary | ICD-10-CM

## 2017-08-30 ASSESSMENT — PAIN SCALES - GENERAL: PAINLEVEL: SEVERE PAIN (7)

## 2017-08-30 NOTE — PROGRESS NOTES
"BOTULINUM TOXIN PROCEDURE - HEADACHE - NOTE    Chief Complaint   Patient presents with     RECHECK     UMP RETURN - BOTOX       /75 (BP Location: Left arm, Patient Position: Sitting, Cuff Size: Adult Regular)  Pulse 95  Ht 1.702 m (5' 7\")  Wt 75.8 kg (167 lb)  BMI 26.16 kg/m2       Current Outpatient Prescriptions:      LANsoprazole (PREVACID) 15 MG CR capsule, take 1 capsule by mouth every day 30 to 60 minutes before a meal, Disp: 30 capsule, Rfl: 3     OnabotulinumtoxinA (BOTOX IJ), Inject 175 Units as directed once Lot#: /C3 Exp: 01/2020, Disp: , Rfl:      HYDROmorphone (DILAUDID) 2 MG tablet, Take 0.5-1 tablets (1-2 mg) by mouth every 3 hours as needed 20 tablets = 3 month supply., Disp: 20 tablet, Rfl: 0     amphetamine-dextroamphetamine (ADDERALL) 20 MG per tablet, Take 1 tablet (20 mg) by mouth 2 times daily, Disp: 60 tablet, Rfl: 0     indomethacin (INDOCIN) 50 MG capsule, Take 1 capsule (50 mg) by mouth 2 times daily (with meals) After one week, may increase to one po tid., Disp: 90 capsule, Rfl: 1     OnabotulinumtoxinA (BOTOX IJ), Inject 175 Units as directed once Lot # /C3 with Expiration Date: 10/2019, Disp: , Rfl:      OnabotulinumtoxinA (BOTOX IJ), Inject 175 Units into the muscle Lot # /C3  Exp: 9/2019, Disp: , Rfl:      METOPROLOL SUCCINATE ER PO, Take 25 mg by mouth daily Reported on 5/9/2017, Disp: , Rfl:      OnabotulinumtoxinA (BOTOX IJ), Inject 175 Units as directed once Lot# /C3, Exp 08/2019, Disp: , Rfl:      eletriptan (RELPAX) 40 MG tablet, Take one tablet by mouth at onset of migraine. May repeat once after 2hrs. Max of 2 tabs in 24hrs and 3 days per week., Disp: 12 tablet, Rfl: 5     divalproex (DEPAKOTE) 500 MG EC tablet, Take 1 tablet (500 mg) by mouth 2 times daily as needed, Disp: 60 tablet, Rfl: 5     divalproex (DEPAKOTE ER) 250 MG 24 hr tablet, Take 1 tablet (250 mg) by mouth daily as needed, Disp: 30 tablet, Rfl: 3     OnabotulinumtoxinA (BOTOX IJ), " Inject 175 Units into the muscle Lot # /C3  Exp: 6/2019, Disp: , Rfl:      OnabotulinumtoxinA (BOTOX IJ), Inject 175 Units into the muscle Lot # /C3  Exp: 4/2019, Disp: , Rfl:      OnabotulinumtoxinA (BOTOX IJ), Inject 175 Units into the muscle Lot # /c3  Exp: 2/2019, Disp: , Rfl:      ondansetron (ZOFRAN) 4 MG tablet, Take 1 tablet (4 mg) by mouth every 8 hours as needed, Disp: 90 tablet, Rfl: 1     CycloSPORINE (RESTASIS OP), Apply to eye 2 times daily, Disp: , Rfl:      UNABLE TO FIND, Reported on 5/9/2017, Disp: , Rfl:      Lutein 20 MG TABS, Take 1 tablet by mouth every other day Reported on 5/9/2017, Disp: , Rfl:      LORazepam (ATIVAN) 1 MG tablet, Take 1 mg by mouth 2 times daily as needed., Disp: , Rfl:      QUEtiapine (SEROQUEL) 50 MG tablet, Take 1 tablet by mouth daily., Disp: , Rfl:      budesonide (RINOCORT AQUA) 32 MCG/ACT nasal spray, Spray 1 spray into both nostrils daily., Disp: , Rfl:      fexofenadine (ALLEGRA) 180 MG tablet, Take  by mouth daily., Disp: , Rfl:      ibuprofen (ADVIL,MOTRIN) 800 MG tablet, Take 800 mg by mouth every 8 hours as needed Reported on 5/9/2017, Disp: , Rfl:      KRILL OIL PO, Take  by mouth., Disp: , Rfl:      estrogens, conjugated, (PREMARIN) 0.625 MG tablet, Take by mouth daily .30 mg. , Disp: , Rfl:      valACYclovir (VALTREX) 1000 mg tablet, Take 1,000 mg by mouth as needed., Disp: , Rfl:      Allergies   Allergen Reactions     Amitriptyline Hcl Other (See Comments)     Migraine       Cymbalta Other (See Comments)     Migraine       Cyproheptadine Hcl      headaches     Desvenlafaxine      Migraine       Fluoxetine Other (See Comments)     Migraine       Imipramine Other (See Comments)     Migraine       Medroxyprogesterone Hives     Morphine Other (See Comments)     Patient reports seizure      Nortriptyline Other (See Comments)     Migraine       Phenergan Dm [Promethazine-Dm] Other (See Comments)     seizures     Venlafaxine Other (See Comments)      Migraine       Wellbutrin [Bupropion Hydrobromide] Other (See Comments)     Migraine       Compazine Anxiety     Dihydroergotamine Anxiety and Other (See Comments)     Cardiac symptoms     Droperidol Anxiety        PHYSICAL EXAM:    Well appearing, not acute distress, reports current headache with sharp pain between her temples and posterior neck pain.     HPI:    Patient reports the following new medical problems since last visit: none.  She has had a new type of occipital headache since May 2017 (prior to previous botox appointment) which is being worked up/managed by Neurologist Dr. Johnston.     We reviewed the recommended safety guidelines for  Botox from any vaccine injection, such as the seasonal flu vaccine, by a minimum of 10-14 days with Valerie Sim. She acknowledged understanding.      RESPONSE TO PREVIOUS TREATMENT:  Change in headache pattern following last series of injections with 175 units of  Botox on 06/28/2017.    No problems reported: She had 5 days of malaise post injections and a rebound headache per usual for her.     1.  Headache frequency during this injection cycle:  She had daily headaches and 6 migraine days per month.   This is compared to her baseline headache frequency of daily headache.     2.  Headache duration during this injection cycle:  Her mild baseline headache varies in length throughout the day but is always present, her migraines last an few hours to up 1.5 days.    3.  Headache intensity during this injection cycle:    A.  7/10  =  Typical pain level.  B.  9/10  =  Worst pain level.  C.  5/10  =  Lowest pain level.    4.  Change in headache medication usage during this injection cycle: She used Relpax a bit more.     5.  ER Visits During This Injection Cycle:  0    6.  Functional Performance:  Change in ADL's, social interaction, days lost from work, etc. She reports she missed about 6 social events over the last cycle weeks.    BOTULINUM NEUROTOXIN INJECTION  PROCEDURES:      VERIFICATION OF PATIENT IDENTIFICATION AND PROCEDURE     Initials   Patient Name SCKL   Patient  SCKL   Procedure Verified by: MARCUS     Prior to the start of the procedure and with procedural staff participation, I verbally confirmed the patient s identity using two indicators, relevant allergies, that the procedure was appropriate and matched the consent or emergent situation, and that the correct equipment/implants were available. Immediately prior to starting the procedure I conducted the Time Out with the procedural staff and re-confirmed the patient s name, procedure, and site/side. (The Joint Commission universal protocol was followed.)  Yes    Sedation (Moderate or Deep): None    Above assessments performed by:  Resident/Fellow         RANDI Nunez MD (PGY-4 Resident)            The attending provider was present for the entire procedure documented below.    Juan Jose Jerry MD    INDICATIONS FOR PROCEDURES:  Valerie Sim is a 52 year old patient with chronic migraine headaches associated with cervicogenic components..       Her baseline symptoms have been recalcitrant to oral medications and conservative therapy. She is here today for an injection of Botox.       GOAL OF PROCEDURE:  The goal of this procedure is to decrease pain and enhance functional independence.     TOTAL DOSE ADMINISTERED:  Dose Administered: 175 units Botox (Botulinum Toxin Type A)  2:1 Dilution   Diluent Used: Preservative Free Normal Saline  Total Volume of Diluent Used: 3.5 mL  Lot # [id number] with Expiration Date: 2020  NDC #: Botox 100u ([address]-75)     Medication guide was offered to patient and was declined.     CONSENT:  The risks, benefits, and treatment options were discussed with Valerie Sim and she agreed to proceed.     Written consent was obtained by PG.      EQUIPMENT USED:  Needle-37mm stimulating/recording  Needle-30 gauge  EMG/NCS Machine      SKIN PREPARATION:  Skin preparation was  performed using an alcohol wipe.      AREA/MUSCLE INJECTED: 175 Units of Botox  1. FACE & SCALP: 100 units of Botox = Total dose, 2:1 Dilution  Right temporalis - 15 units of Botox in 4 site/s.  Left temporalis - 15 units of Botox in 4 site/s.      Right frontalis - 12.5 units of Botox in 4 site/s.  Left frontalis - 12.5 units of Botox in 4 site/s.      Right procerus - 3.5 units of Botox in 1 site/s.  Left procerus - 3.5 units of Botox in 1 site/s.      Right  - 4 units of Botox in 1 site/s.  Left  - 4 units of Botox in 1 site/s.      Right Occipitalis - 12.5 units of Botox at 4 site/s.   Left Occipitalis - 12.5 units of Botox at 4 site/s.      Left and Right Nasalis - 2.5 units per side, total of 5 units.      2. JAW MUSCLES: 50 units of Botox = Total Dose, 1:1 Dilution  Right Masseter - 20 units of Botox at 1 site/s.   Left Masseter - 20 units of Botox at 1 site/s.      Right temporalis - 5 units of Botox at 1 site/s.  Left temporalis - 5 units of Botox at 1 site/s.       3. SHOULDER & NECK MUSCLES: Total dose 25 units of Botox diluted 2:1      Right levator muscle - 7.5 units of Botox at 1 site.  Left levator muscle - 7.5 units of Botox at 1 site.      Right mid trap - 5 units of Botox at 1 site.  Left mid trap - 5 units of Botox at 1 site.        RESPONSE TO PROCEDURE: Valerie Sim tolerated the procedure well and there were no immediate complications. She was allowed to recover for an appropriate period of time and was discharged home in stable condition.     FOLLOW UP:  Valerie Sim was asked to follow up by phone in 7-14 days with Nely Jean Baptiste PT, Care Coordinator or Estela Magaña RN, Care Coordinator, to report her response to this series of injections. Based on the patient's previous response to this therapy, Valerie Sim was rescheduled for the next series of injections in 9 weeks.     PLAN (Medication Changes, Therapy Orders, Work or Disability Issues, etc.): Patient will  continue to monitor response to today's injections.

## 2017-08-30 NOTE — LETTER
"8/30/2017       RE: Valerie Sim  [address]  [place] [address]     Dear Colleague,    Thank you for referring your patient, Valerie Sim, to the [facility] PHYSICAL MEDICINE AND REHABILITATION at [facility]. Please see a copy of my visit note below.    BOTULINUM TOXIN PROCEDURE - HEADACHE - NOTE  Chief Complaint   Patient presents with     RECHECK     UMP RETURN - BOTOX     /75 (BP Location: Left arm, Patient Position: Sitting, Cuff Size: Adult Regular)  Pulse 95  Ht 1.702 m (5' 7\")  Wt 75.8 kg (167 lb)  BMI 26.16 kg/m2     Current Outpatient Prescriptions:      LANsoprazole (PREVACID) 15 MG CR capsule, take 1 capsule by mouth every day 30 to 60 minutes before a meal, Disp: 30 capsule, Rfl: 3     OnabotulinumtoxinA (BOTOX IJ), Inject 175 Units as directed once Lot#: /C3 Exp: 01/2020, Disp: , Rfl:      HYDROmorphone (DILAUDID) 2 MG tablet, Take 0.5-1 tablets (1-2 mg) by mouth every 3 hours as needed 20 tablets = 3 month supply., Disp: 20 tablet, Rfl: 0     amphetamine-dextroamphetamine (ADDERALL) 20 MG per tablet, Take 1 tablet (20 mg) by mouth 2 times daily, Disp: 60 tablet, Rfl: 0     indomethacin (INDOCIN) 50 MG capsule, Take 1 capsule (50 mg) by mouth 2 times daily (with meals) After one week, may increase to one po tid., Disp: 90 capsule, Rfl: 1     OnabotulinumtoxinA (BOTOX IJ), Inject 175 Units as directed once Lot # /C3 with Expiration Date: 10/2019, Disp: , Rfl:      OnabotulinumtoxinA (BOTOX IJ), Inject 175 Units into the muscle Lot # /C3  Exp: 9/2019, Disp: , Rfl:      METOPROLOL SUCCINATE ER PO, Take 25 mg by mouth daily Reported on 5/9/2017, Disp: , Rfl:      OnabotulinumtoxinA (BOTOX IJ), Inject 175 Units as directed once Lot# /C3, Exp 08/2019, Disp: , Rfl:      eletriptan (RELPAX) 40 MG tablet, Take one tablet by mouth at onset of migraine. May repeat once after 2hrs. Max of 2 tabs in 24hrs and 3 days per week., " Disp: 12 tablet, Rfl: 5     divalproex (DEPAKOTE) 500 MG EC tablet, Take 1 tablet (500 mg) by mouth 2 times daily as needed, Disp: 60 tablet, Rfl: 5     divalproex (DEPAKOTE ER) 250 MG 24 hr tablet, Take 1 tablet (250 mg) by mouth daily as needed, Disp: 30 tablet, Rfl: 3     OnabotulinumtoxinA (BOTOX IJ), Inject 175 Units into the muscle Lot # /C3  Exp: 6/2019, Disp: , Rfl:      OnabotulinumtoxinA (BOTOX IJ), Inject 175 Units into the muscle Lot # /C3  Exp: 4/2019, Disp: , Rfl:      OnabotulinumtoxinA (BOTOX IJ), Inject 175 Units into the muscle Lot # /c3  Exp: 2/2019, Disp: , Rfl:      ondansetron (ZOFRAN) 4 MG tablet, Take 1 tablet (4 mg) by mouth every 8 hours as needed, Disp: 90 tablet, Rfl: 1     CycloSPORINE (RESTASIS OP), Apply to eye 2 times daily, Disp: , Rfl:      UNABLE TO FIND, Reported on 5/9/2017, Disp: , Rfl:      Lutein 20 MG TABS, Take 1 tablet by mouth every other day Reported on 5/9/2017, Disp: , Rfl:      LORazepam (ATIVAN) 1 MG tablet, Take 1 mg by mouth 2 times daily as needed., Disp: , Rfl:      QUEtiapine (SEROQUEL) 50 MG tablet, Take 1 tablet by mouth daily., Disp: , Rfl:      budesonide (RINOCORT AQUA) 32 MCG/ACT nasal spray, Spray 1 spray into both nostrils daily., Disp: , Rfl:      fexofenadine (ALLEGRA) 180 MG tablet, Take  by mouth daily., Disp: , Rfl:      ibuprofen (ADVIL,MOTRIN) 800 MG tablet, Take 800 mg by mouth every 8 hours as needed Reported on 5/9/2017, Disp: , Rfl:      KRILL OIL PO, Take  by mouth., Disp: , Rfl:      estrogens, conjugated, (PREMARIN) 0.625 MG tablet, Take by mouth daily .30 mg. , Disp: , Rfl:      valACYclovir (VALTREX) 1000 mg tablet, Take 1,000 mg by mouth as needed., Disp: , Rfl:      Allergies   Allergen Reactions     Amitriptyline Hcl Other (See Comments)     Migraine       Cymbalta Other (See Comments)     Migraine       Cyproheptadine Hcl      headaches     Desvenlafaxine      Migraine       Fluoxetine Other (See Comments)     Migraine        Imipramine Other (See Comments)     Migraine       Medroxyprogesterone Hives     Morphine Other (See Comments)     Patient reports seizure      Nortriptyline Other (See Comments)     Migraine       Phenergan Dm [Promethazine-Dm] Other (See Comments)     seizures     Venlafaxine Other (See Comments)     Migraine       Wellbutrin [Bupropion Hydrobromide] Other (See Comments)     Migraine       Compazine Anxiety     Dihydroergotamine Anxiety and Other (See Comments)     Cardiac symptoms     Droperidol Anxiety      PHYSICAL EXAM:  Well appearing, not acute distress, reports current headache with sharp pain between her temples and posterior neck pain.     HPI:  Patient reports the following new medical problems since last visit: none.  She has had a new type of occipital headache since May 2017 (prior to previous botox appointment) which is being worked up/managed by Neurologist Dr. Johnston.     We reviewed the recommended safety guidelines for  Botox from any vaccine injection, such as the seasonal flu vaccine, by a minimum of 10-14 days with Valerie Sim. She acknowledged understanding.    RESPONSE TO PREVIOUS TREATMENT:  Change in headache pattern following last series of injections with 175 units of  Botox on 06/28/2017.    No problems reported: She had 5 days of malaise post injections and a rebound headache per usual for her.     1.  Headache frequency during this injection cycle:  She had daily headaches and 6 migraine days per month.   This is compared to her baseline headache frequency of daily headache.     2.  Headache duration during this injection cycle:  Her mild baseline headache varies in length throughout the day but is always present, her migraines last an few hours to up 1.5 days.    3.  Headache intensity during this injection cycle:    A.  7/10  =  Typical pain level.  B.  9/10  =  Worst pain level.  C.  5/10  =  Lowest pain level.    4.  Change in headache medication usage during this  injection cycle: She used Relpax a bit more.     5.  ER Visits During This Injection Cycle:  0    6.  Functional Performance:  Change in ADL's, social interaction, days lost from work, etc. She reports she missed about 6 social events over the last cycle weeks.    BOTULINUM NEUROTOXIN INJECTION PROCEDURES:    VERIFICATION OF PATIENT IDENTIFICATION AND PROCEDURE   Initials   Patient Name MARCUS   Patient  SCKL   Procedure Verified by: MARCUS     Prior to the start of the procedure and with procedural staff participation, I verbally confirmed the patient s identity using two indicators, relevant allergies, that the procedure was appropriate and matched the consent or emergent situation, and that the correct equipment/implants were available. Immediately prior to starting the procedure I conducted the Time Out with the procedural staff and re-confirmed the patient s name, procedure, and site/side. (The Joint Commission universal protocol was followed.)  Yes    Sedation (Moderate or Deep): None    Above assessments performed by:  Resident/Fellow         RANDI Nunez MD (PGY-4 Resident)            The attending provider was present for the entire procedure documented below.    Juan Jose Jerry MD    INDICATIONS FOR PROCEDURES:  Valerie Sim is a 52 year old patient with chronic migraine headaches associated with cervicogenic components..       Her baseline symptoms have been recalcitrant to oral medications and conservative therapy. She is here today for an injection of Botox.       GOAL OF PROCEDURE:  The goal of this procedure is to decrease pain and enhance functional independence.     TOTAL DOSE ADMINISTERED:  Dose Administered: 175 units Botox (Botulinum Toxin Type A)  2:1 Dilution   Diluent Used: Preservative Free Normal Saline  Total Volume of Diluent Used: 3.5 mL  Lot # [id number] with Expiration Date: 2020  NDC #: Botox 100u ([address]-36)     Medication guide was offered to patient and was  declined.     CONSENT:  The risks, benefits, and treatment options were discussed with Valerie Sim and she agreed to proceed.     Written consent was obtained by PG.      EQUIPMENT USED:  Needle-37mm stimulating/recording  Needle-30 gauge  EMG/NCS Machine      SKIN PREPARATION:  Skin preparation was performed using an alcohol wipe.      AREA/MUSCLE INJECTED: 175 Units of Botox  1. FACE & SCALP: 100 units of Botox = Total dose, 2:1 Dilution  Right temporalis - 15 units of Botox in 4 site/s.  Left temporalis - 15 units of Botox in 4 site/s.      Right frontalis - 12.5 units of Botox in 4 site/s.  Left frontalis - 12.5 units of Botox in 4 site/s.      Right procerus - 3.5 units of Botox in 1 site/s.  Left procerus - 3.5 units of Botox in 1 site/s.      Right  - 4 units of Botox in 1 site/s.  Left  - 4 units of Botox in 1 site/s.      Right Occipitalis - 12.5 units of Botox at 4 site/s.   Left Occipitalis - 12.5 units of Botox at 4 site/s.      Left and Right Nasalis - 2.5 units per side, total of 5 units.      2. JAW MUSCLES: 50 units of Botox = Total Dose, 1:1 Dilution  Right Masseter - 20 units of Botox at 1 site/s.   Left Masseter - 20 units of Botox at 1 site/s.      Right temporalis - 5 units of Botox at 1 site/s.  Left temporalis - 5 units of Botox at 1 site/s.       3. SHOULDER & NECK MUSCLES: Total dose 25 units of Botox diluted 2:1      Right levator muscle - 7.5 units of Botox at 1 site.  Left levator muscle - 7.5 units of Botox at 1 site.      Right mid trap - 5 units of Botox at 1 site.  Left mid trap - 5 units of Botox at 1 site.        RESPONSE TO PROCEDURE: Valerie Sim tolerated the procedure well and there were no immediate complications. She was allowed to recover for an appropriate period of time and was discharged home in stable condition.     FOLLOW UP:  Valerie Sim was asked to follow up by phone in 7-14 days with Nely Jean Baptiste PT, Care Coordinator or Estela Magaña  RN, Care Coordinator, to report her response to this series of injections. Based on the patient's previous response to this therapy, Valerie Sim was rescheduled for the next series of injections in 9 weeks.     PLAN (Medication Changes, Therapy Orders, Work or Disability Issues, etc.): Patient will continue to monitor response to today's injections.       Again, thank you for allowing me to participate in the care of your patient.      Sincerely,    Juan Jose Jerry MD

## 2017-09-04 ENCOUNTER — MYC MEDICAL ADVICE (OUTPATIENT)
Dept: NEUROLOGY | Facility: CLINIC | Age: 52
End: 2017-09-04

## 2017-09-05 DIAGNOSIS — F07.81 POST CONCUSSION SYNDROME: ICD-10-CM

## 2017-09-05 RX ORDER — DEXTROAMPHETAMINE SACCHARATE, AMPHETAMINE ASPARTATE, DEXTROAMPHETAMINE SULFATE AND AMPHETAMINE SULFATE 5; 5; 5; 5 MG/1; MG/1; MG/1; MG/1
20 TABLET ORAL 2 TIMES DAILY
Qty: 60 TABLET | Refills: 0 | Status: SHIPPED | OUTPATIENT
Start: 2017-11-05 | End: 2017-12-11

## 2017-09-05 RX ORDER — DEXTROAMPHETAMINE SACCHARATE, AMPHETAMINE ASPARTATE, DEXTROAMPHETAMINE SULFATE AND AMPHETAMINE SULFATE 5; 5; 5; 5 MG/1; MG/1; MG/1; MG/1
20 TABLET ORAL 2 TIMES DAILY
Qty: 60 TABLET | Refills: 0 | Status: SHIPPED | OUTPATIENT
Start: 2017-10-05 | End: 2017-12-11

## 2017-09-05 RX ORDER — DEXTROAMPHETAMINE SACCHARATE, AMPHETAMINE ASPARTATE, DEXTROAMPHETAMINE SULFATE AND AMPHETAMINE SULFATE 5; 5; 5; 5 MG/1; MG/1; MG/1; MG/1
20 TABLET ORAL 2 TIMES DAILY
Qty: 60 TABLET | Refills: 0 | Status: SHIPPED | OUTPATIENT
Start: 2017-09-05 | End: 2017-12-11

## 2017-09-15 ENCOUNTER — MYC MEDICAL ADVICE (OUTPATIENT)
Dept: NEUROLOGY | Facility: CLINIC | Age: 52
End: 2017-09-15

## 2017-09-15 DIAGNOSIS — M53.3 SI (SACROILIAC) JOINT DYSFUNCTION: ICD-10-CM

## 2017-09-18 NOTE — TELEPHONE ENCOUNTER
Dr Johnston, I pended the Hydromorphone RX in  with a start date of 9/27. Please sign the RX and we will get it mailed out to her.  Tiesha Baltazar RN

## 2017-09-19 RX ORDER — HYDROMORPHONE HYDROCHLORIDE 2 MG/1
1-2 TABLET ORAL
Qty: 20 TABLET | Refills: 0 | Status: SHIPPED | OUTPATIENT
Start: 2017-09-27 | End: 2017-12-19

## 2017-10-02 ENCOUNTER — TELEPHONE (OUTPATIENT)
Dept: NEUROLOGY | Facility: CLINIC | Age: 52
End: 2017-10-02

## 2017-10-02 NOTE — TELEPHONE ENCOUNTER
Attempted to call pt back around 2:30. Did see Steven note that stated she would be available around 3 pm. Gave her our clinic number to call back. Mercedez Gresham RN

## 2017-10-02 NOTE — TELEPHONE ENCOUNTER
Called and spoke with Valerie. 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 See MyChart encounter/My Chart message for details. She does state that she feels like her symptoms, especially fever, stiff neck, diarrhea and headache have been getting a lot worse lately. I did let her know I would send a message to Dr. Johnston detailing our conversation but that I recommended going in to an Urgent Care. I also advised pt contact her PCP's office and could talk to a nurse line there but that our recommendations are to go in to an Urgent Care to be evaluated. Pt in agreement with plan. Mercedez Gresham RN

## 2017-10-02 NOTE — TELEPHONE ENCOUNTER
[facility] Call Center    Phone Message    Name of Caller: LAKESHA IVY    Phone Number: Home number on file [phone] (home)    Best time to return call: TODAY.      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  She has another doctor appt now and she asked to please call her back around 2:30.  Thanks.      Action Taken: Message routed to:  Adult Clinics: Neurology p [id number]

## 2017-10-04 NOTE — TELEPHONE ENCOUNTER
Dr. Johnston spoke with pt this morning. He did educate pt that the next step to diagnosis meningitis would be a LP. Pt would like to wait until she sees PCP. The plan is for her to see her PCP on Friday and she will get back to us with any further questions or concerns. Mercedez Gresham RN

## 2017-11-02 ENCOUNTER — TELEPHONE (OUTPATIENT)
Dept: PEDIATRICS | Facility: CLINIC | Age: 52
End: 2017-11-02

## 2017-11-02 ENCOUNTER — HOSPITAL ENCOUNTER (EMERGENCY)
Facility: CLINIC | Age: 52
Discharge: HOME | End: 2017-11-02
Attending: EMERGENCY MEDICINE | Admitting: EMERGENCY MEDICINE
Payer: COMMERCIAL

## 2017-11-02 ENCOUNTER — APPOINTMENT (OUTPATIENT)
Dept: CT IMAGING | Facility: CLINIC | Age: 52
End: 2017-11-02
Attending: EMERGENCY MEDICINE
Payer: COMMERCIAL

## 2017-11-02 ENCOUNTER — NURSE TRIAGE (OUTPATIENT)
Dept: NURSING | Facility: CLINIC | Age: 52
End: 2017-11-02

## 2017-11-02 VITALS
DIASTOLIC BLOOD PRESSURE: 97 MMHG | OXYGEN SATURATION: 99 % | HEART RATE: 114 BPM | WEIGHT: 169.09 LBS | BODY MASS INDEX: 26.54 KG/M2 | TEMPERATURE: 97.7 F | RESPIRATION RATE: 16 BRPM | HEIGHT: 67 IN | SYSTOLIC BLOOD PRESSURE: 156 MMHG

## 2017-11-02 DIAGNOSIS — R51.9 NONINTRACTABLE EPISODIC HEADACHE, UNSPECIFIED HEADACHE TYPE: ICD-10-CM

## 2017-11-02 DIAGNOSIS — S00.10XA CONTUSION OF PERIOCULAR REGION, UNSPECIFIED LATERALITY, INITIAL ENCOUNTER: ICD-10-CM

## 2017-11-02 PROCEDURE — 70450 CT HEAD/BRAIN W/O DYE: CPT

## 2017-11-02 PROCEDURE — 99284 EMERGENCY DEPT VISIT MOD MDM: CPT | Mod: 25 | Performed by: EMERGENCY MEDICINE

## 2017-11-02 PROCEDURE — 99284 EMERGENCY DEPT VISIT MOD MDM: CPT | Mod: Z6 | Performed by: EMERGENCY MEDICINE

## 2017-11-02 ASSESSMENT — ENCOUNTER SYMPTOMS
NAUSEA: 1
HEADACHES: 1

## 2017-11-02 NOTE — TELEPHONE ENCOUNTER
Reason for Disposition    Watery or blood-tinged fluid dripping from the NOSE or EARS now  (Exception: tears from crying or nosebleed from nasal trauma)    Additional Information    Negative: [1] ACUTE NEURO SYMPTOM AND [2] present now  (DEFINITION: difficult to awaken OR confused thinking and talking OR slurred speech OR weakness of arms OR unsteady walking)    Negative: Knocked out (unconscious) > 1 minute    Negative: Seizure (convulsion) occurred  (Exception: prior history of seizures and now alert and without Acute Neuro Symptoms)    Negative: Penetrating head injury (e.g., knife, gun shot wound, metal object)    Negative: [1] Major bleeding (e.g., actively dripping or spurting) AND [2] can't be stopped    Negative: [1] Dangerous mechanism of injury (e.g., MVA, diving, trampoline, contact sports, fall > 10 feet or 3 meters) AND [2] NECK pain AND [3] began < 1 hour after injury    Negative: Sounds like a life-threatening emergency to the triager    Negative: [1] Recently examined and diagnosed with a concussion by a healthcare provider AND [2] questions about concussion symptoms    Negative: Can't remember what happened (amnesia)    Negative: Vomiting once or more    Negative: [1] Loss of vision or double vision AND [2] present now    Protocols used: HEAD INJURY-ADULT-    She stated she slipped on some Halloween decorations and hit her head. She was unconscious for about 2 minutes, per her . She doesn't remember what happened. Her  told her what happened. She states she did have vision problems that evening and had fluid coming from her nose and ears. She is nauseated.  She plans on driving herself to the ER.  Chelsea Niño RN-[facility]

## 2017-11-02 NOTE — ED AVS SNAPSHOT
[facility], [place], Emergency Department    [address] [place] [address]    Phone:  [phone]                                       Valerie Sim   MRN: [id number]    Department:  [facility], Emergency Department   Date of Visit:  11/2/2017           After Visit Summary Signature Page     I have received my discharge instructions, and my questions have been answered. I have discussed any challenges I see with this plan with the nurse or doctor.    ..........................................................................................................................................  Patient/Patient Representative Signature      ..........................................................................................................................................  Patient Representative Print Name and Relationship to Patient    ..................................................               ................................................  Date                                            Time    ..........................................................................................................................................  Reviewed by Signature/Title    ...................................................              ..............................................  Date                                                            Time

## 2017-11-02 NOTE — TELEPHONE ENCOUNTER
"Boyd Kamara MD   You 28 minutes ago (1:04 PM)                 Probably better if someone else did the driving. jf (Routing comment)                        You   Boyd Johnston MD 30 minutes ago (1:02 PM)                   ROMINA (Routing comment)                  Called pt's  Bill. He stated pt is currently driving herself to the ER. I did recommend pt not drive for safety reasons. Pt's  stated \"well she drove last night and this morning. If they tell me she's not safe to drive I will go pick her up.\" Mercedez Gresham RN    "

## 2017-11-02 NOTE — ED AVS SNAPSHOT
[facility], Emergency Department    [address] [address]    Phone:  [phone]                                       Valerie Sim   MRN: [id number]    Department:  [facility], Emergency Department   Date of Visit:  11/2/2017           Patient Information     Date Of Birth          1965        Your diagnoses for this visit were:     Nonintractable episodic headache, unspecified headache type     Contusion of periocular region, unspecified laterality, initial encounter        You were seen by Laura Alvarado MD and Tono Mclean MD.        Discharge Instructions       Please make an appointment to follow up with Dr. Johnston as soon as possible.     Return if worse headache or unable to keep anything down.      Future Appointments        Provider Department Dept Phone Center    11/8/2017 8:00 AM Juan Jose Jerry MD [facility] Physical Medicine and Rehabilitation [phone] [facility]    12/12/2017 2:00 PM Boyd Johnston MD [facility] [phone] [place]    1/17/2018 1:40 PM Juan Jose Jerry MD [facility] Physical Medicine and Rehabilitation [phone] [facility]    5/8/2018 8:00 AM Boyd Johnston MD [facility] [phone] [place]      24 Hour Appointment Hotline       To make an appointment at any [facility], call [phone] ([phone]). If you don't have a family doctor or clinic, we will help you find one. [facility] are conveniently located to serve the needs of you and your family.             Review of your medicines      Our records show that you are taking the medicines listed below. If these are incorrect, please call your family doctor or clinic.        Dose / Directions Last dose taken    * amphetamine-dextroamphetamine 20 MG per tablet   Commonly known as:  ADDERALL   Dose:  20 mg   Quantity:  60 tablet        Take 1 tablet (20 mg) by mouth 2 times daily   Refills:  0        *  amphetamine-dextroamphetamine 20 MG per tablet   Commonly known as:  ADDERALL   Dose:  20 mg   Quantity:  60 tablet        Take 1 tablet (20 mg) by mouth 2 times daily   Refills:  0        * amphetamine-dextroamphetamine 20 MG per tablet   Commonly known as:  ADDERALL   Dose:  20 mg   Quantity:  60 tablet   Start taking on:  11/5/2017        Take 1 tablet (20 mg) by mouth 2 times daily   Refills:  0        ATIVAN 1 MG tablet   Dose:  1 mg   Generic drug:  LORazepam        Take 1 mg by mouth 2 times daily as needed.   Refills:  0        * BOTOX IJ   Dose:  175 Units        Inject 175 Units as directed once Lot # /C3 with Expiration Date: 10/2019   Refills:  0        * BOTOX IJ   Dose:  175 Units        Inject 175 Units into the muscle Lot # /c3  Exp: 2/2019   Refills:  0        * BOTOX IJ   Dose:  175 Units        Inject 175 Units into the muscle Lot # /C3  Exp: 4/2019   Refills:  0        * BOTOX IJ   Dose:  175 Units        Inject 175 Units into the muscle Lot # /C3  Exp: 6/2019   Refills:  0        * BOTOX IJ   Dose:  175 Units        Inject 175 Units as directed once Lot# /C3, Exp 08/2019   Refills:  0        * BOTOX IJ   Dose:  175 Units        Inject 175 Units into the muscle Lot # /C3  Exp: 9/2019   Refills:  0        * BOTOX IJ   Dose:  175 Units        Inject 175 Units as directed once Lot#: /C3 Exp: 01/2020   Refills:  0        * BOTOX IJ   Dose:  175 Units        Inject 175 Units into the muscle once Lot: [id number] Exp: 04/2020   Refills:  0        budesonide 32 MCG/ACT spray   Commonly known as:  RINOCORT AQUA   Dose:  1 spray        Spray 1 spray into both nostrils daily.   Refills:  0        eletriptan 40 MG tablet   Commonly known as:  RELPAX   Quantity:  12 tablet        Take one tablet by mouth at onset of migraine. May repeat once after 2hrs. Max of 2 tabs in 24hrs and 3 days per week.   Refills:  5        fexofenadine 180 MG tablet   Commonly known as:  ALLEGRA         Take  by mouth daily.   Refills:  0        HYDROmorphone 2 MG tablet   Commonly known as:  DILAUDID   Dose:  1-2 mg   Quantity:  20 tablet        Take 0.5-1 tablets (1-2 mg) by mouth every 3 hours as needed 20 tablets = 3 month supply.   Refills:  0        ibuprofen 800 MG tablet   Commonly known as:  ADVIL/MOTRIN   Dose:  800 mg        Take 800 mg by mouth every 8 hours as needed Reported on 5/9/2017   Refills:  0        KRILL OIL PO        Take  by mouth.   Refills:  0        Lutein 20 MG Tabs   Dose:  1 tablet        Take 1 tablet by mouth every other day Reported on 5/9/2017   Refills:  0        METOPROLOL SUCCINATE ER PO   Dose:  25 mg        Take 25 mg by mouth daily Reported on 5/9/2017   Refills:  0        ondansetron 4 MG tablet   Commonly known as:  ZOFRAN   Dose:  4 mg   Quantity:  90 tablet        Take 1 tablet (4 mg) by mouth every 8 hours as needed   Refills:  1        PREMARIN 0.625 MG tablet   Generic drug:  estrogens (conjugated)        Take by mouth daily .30 mg  .   Refills:  0        RESTASIS OP        Apply to eye 2 times daily   Refills:  0        SEROQUEL 50 MG tablet   Dose:  1 tablet   Generic drug:  QUEtiapine        Take 1 tablet by mouth daily.   Refills:  0        UNABLE TO FIND        Reported on 5/9/2017   Refills:  0        VALTREX 1000 mg tablet   Dose:  1000 mg   Generic drug:  valACYclovir        Take 1,000 mg by mouth as needed.   Refills:  0        * Notice:  This list has 11 medication(s) that are the same as other medications prescribed for you. Read the directions carefully, and ask your doctor or other care provider to review them with you.            Procedures and tests performed during your visit     CT Head w/o Contrast      Orders Needing Specimen Collection     None      Pending Results     No orders found from 10/31/2017 to 11/3/2017.            Pending Culture Results     No orders found from 10/31/2017 to 11/3/2017.            Pending Results Instructions     If  you had any lab results that were not finalized at the time of your Discharge, you can call the ED Lab Result RN at [phone]. You will be contacted by this team for any positive Lab results or changes in treatment. The nurses are available 7 days a week from 10A to 6:30P.  You can leave a message 24 hours per day and they will return your call.        Thank you for choosing [facility]       Thank you for choosing [facility] for your care. Our goal is always to provide you with excellent care. Hearing back from our patients is one way we can continue to improve our services. Please take a few minutes to complete the written survey that you may receive in the mail after you visit with us. Thank you!        [employer] Information     [employer] gives you secure access to your electronic health record. If you see a primary care provider, you can also send messages to your care team and make appointments. If you have questions, please call your primary care clinic.  If you do not have a primary care provider, please call [phone] and they will assist you.        Care EveryWhere ID     This is your Care EveryWhere ID. This could be used by other organizations to access your [facility] medical records  [id number]        Equal Access to Services     JAYLEN MONTES : Emerita New, mateus dozier, daisy solitario, kimberly helton. So [facility] [phone].    ATENCIÓN: Si habla español, tiene a daniels disposición servicios gratuitos de asistencia lingüística. Shireename al [phone].    We comply with applicable federal civil rights laws and [place] laws. We do not discriminate on the basis of race, color, national origin, age, disability, sex, sexual orientation, or gender identity.            After Visit Summary       This is your record. Keep this with you and show to your community pharmacist(s) and doctor(s) at your next visit.
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
## 2017-11-02 NOTE — DISCHARGE INSTRUCTIONS
Please make an appointment to follow up with Dr. Johnston as soon as possible.     Return if worse headache or unable to keep anything down.

## 2017-11-02 NOTE — TELEPHONE ENCOUNTER
She stated she slipped on some Halloween decorations on October 31st and hit her head. She was unconscious for about 2 minutes, per her . She doesn't remember what happened. Her  told her what happened. She states she did have vision problems that evening and had fluid coming from her nose and ears. She is nauseated.  She plans on driving herself to the ER. She wanted to give Dr Johnston an heads up about this.    Chelsea Niño RN-[facility]

## 2017-11-02 NOTE — ED NOTES
Pt presents ambulatory to triage from home. Pt drove here herself despite triage line advising otherwise. Pt states on halloween she slipped on decoration hitting head on post and states  said she was unconscious for 2 minutes. Pt presently A and O x 4. Has nausea. Has no emesis.

## 2017-11-08 ENCOUNTER — OFFICE VISIT (OUTPATIENT)
Dept: PHYSICAL MEDICINE AND REHAB | Facility: CLINIC | Age: 52
End: 2017-11-08

## 2017-11-08 VITALS
TEMPERATURE: 98.4 F | SYSTOLIC BLOOD PRESSURE: 134 MMHG | DIASTOLIC BLOOD PRESSURE: 90 MMHG | OXYGEN SATURATION: 98 % | HEART RATE: 95 BPM | WEIGHT: 165 LBS | BODY MASS INDEX: 25.9 KG/M2 | HEIGHT: 67 IN

## 2017-11-08 DIAGNOSIS — G43.719 INTRACTABLE CHRONIC MIGRAINE WITHOUT AURA AND WITHOUT STATUS MIGRAINOSUS: Primary | ICD-10-CM

## 2017-11-08 ASSESSMENT — PAIN SCALES - GENERAL: PAINLEVEL: EXTREME PAIN (9)

## 2017-11-08 NOTE — PROGRESS NOTES
"BOTULINUM TOXIN PROCEDURE - HEADACHE - NOTE    Chief Complaint   Patient presents with     Headache     [facility] RETURN - BOTOX Migraine Headache       /90 (BP Location: Right arm, Patient Position: Sitting, Cuff Size: Adult Regular)  Pulse 95  Temp 98.4  F (36.9  C) (Oral)  Ht 1.702 m (5' 7\")  Wt 74.8 kg (165 lb)  SpO2 98%  BMI 25.84 kg/m2       Current Outpatient Prescriptions:      HYDROmorphone (DILAUDID) 2 MG tablet, Take 0.5-1 tablets (1-2 mg) by mouth every 3 hours as needed 20 tablets = 3 month supply., Disp: 20 tablet, Rfl: 0     amphetamine-dextroamphetamine (ADDERALL) 20 MG per tablet, Take 1 tablet (20 mg) by mouth 2 times daily, Disp: 60 tablet, Rfl: 0     amphetamine-dextroamphetamine (ADDERALL) 20 MG per tablet, Take 1 tablet (20 mg) by mouth 2 times daily, Disp: 60 tablet, Rfl: 0     amphetamine-dextroamphetamine (ADDERALL) 20 MG per tablet, Take 1 tablet (20 mg) by mouth 2 times daily, Disp: 60 tablet, Rfl: 0     OnabotulinumtoxinA (BOTOX IJ), Inject 175 Units into the muscle once Lot: [id number] Exp: 04/2020, Disp: , Rfl:      OnabotulinumtoxinA (BOTOX IJ), Inject 175 Units as directed once Lot#: /C3 Exp: 01/2020, Disp: , Rfl:      OnabotulinumtoxinA (BOTOX IJ), Inject 175 Units as directed once Lot # /C3 with Expiration Date: 10/2019, Disp: , Rfl:      OnabotulinumtoxinA (BOTOX IJ), Inject 175 Units into the muscle Lot # /C3  Exp: 9/2019, Disp: , Rfl:      METOPROLOL SUCCINATE ER PO, Take 25 mg by mouth daily Reported on 5/9/2017, Disp: , Rfl:      OnabotulinumtoxinA (BOTOX IJ), Inject 175 Units as directed once Lot# /C3, Exp 08/2019, Disp: , Rfl:      eletriptan (RELPAX) 40 MG tablet, Take one tablet by mouth at onset of migraine. May repeat once after 2hrs. Max of 2 tabs in 24hrs and 3 days per week., Disp: 12 tablet, Rfl: 5     OnabotulinumtoxinA (BOTOX IJ), Inject 175 Units into the muscle Lot # /C3  Exp: 6/2019, Disp: , Rfl:      OnabotulinumtoxinA (BOTOX IJ), " Inject 175 Units into the muscle Lot # /C3  Exp: 4/2019, Disp: , Rfl:      OnabotulinumtoxinA (BOTOX IJ), Inject 175 Units into the muscle Lot # /c3  Exp: 2/2019, Disp: , Rfl:      ondansetron (ZOFRAN) 4 MG tablet, Take 1 tablet (4 mg) by mouth every 8 hours as needed, Disp: 90 tablet, Rfl: 1     CycloSPORINE (RESTASIS OP), Apply to eye 2 times daily, Disp: , Rfl:      UNABLE TO FIND, Reported on 5/9/2017, Disp: , Rfl:      Lutein 20 MG TABS, Take 1 tablet by mouth every other day Reported on 5/9/2017, Disp: , Rfl:      LORazepam (ATIVAN) 1 MG tablet, Take 1 mg by mouth 2 times daily as needed., Disp: , Rfl:      QUEtiapine (SEROQUEL) 50 MG tablet, Take 1 tablet by mouth daily., Disp: , Rfl:      budesonide (RINOCORT AQUA) 32 MCG/ACT nasal spray, Spray 1 spray into both nostrils daily., Disp: , Rfl:      fexofenadine (ALLEGRA) 180 MG tablet, Take  by mouth daily., Disp: , Rfl:      ibuprofen (ADVIL,MOTRIN) 800 MG tablet, Take 800 mg by mouth every 8 hours as needed Reported on 5/9/2017, Disp: , Rfl:      KRILL OIL PO, Take  by mouth., Disp: , Rfl:      estrogens, conjugated, (PREMARIN) 0.625 MG tablet, Take by mouth daily .30 mg. , Disp: , Rfl:      valACYclovir (VALTREX) 1000 mg tablet, Take 1,000 mg by mouth as needed., Disp: , Rfl:      Allergies   Allergen Reactions     Amitriptyline Hcl Other (See Comments)     Migraine       Candesartan Cilexetil-Hctz Muscle Pain (Myalgia)     Cymbalta Other (See Comments)     Migraine       Cyproheptadine Hcl      headaches     Desvenlafaxine      Migraine       Fluoxetine Other (See Comments)     Migraine       Imipramine Other (See Comments)     Migraine       Lyrica Muscle Pain (Myalgia)     Medroxyprogesterone Hives     Morphine Other (See Comments)     Patient reports seizure      Nortriptyline Other (See Comments)     Migraine       Phenergan Dm [Promethazine-Dm] Other (See Comments)     seizures     Venlafaxine Other (See Comments)     Migraine        Wellbutrin [Bupropion Hydrobromide] Other (See Comments)     Migraine       Compazine Anxiety     Dihydroergotamine Anxiety and Other (See Comments)     Cardiac symptoms     Droperidol Anxiety        PHYSICAL EXAM:    Well appearing, not acute distress, reports current headache with sharp pain between her temples and posterior neck pain.     HPI:    Patient reports the following new medical problems since last visit: She had a fall on 10/31/17, resulting in a blow to the head, leaving her unconscious for 2 minutes. Was evaluated in the ED and no acute pathology was seen in her head.     We reviewed the recommended safety guidelines for  Botox from any vaccine injection, such as the seasonal flu vaccine, by a minimum of 10-14 days with Valerie Sim. She acknowledged understanding.      RESPONSE TO PREVIOUS TREATMENT:  Change in headache pattern following last series of injections with 175 units of  Botox on 08/28/2017.    No problems reported: She had 3-5 days of malaise post injections and a rebound headache per usual for her.     1.  Headache frequency during this injection cycle:  She had daily headaches and 9 migraine days per month.   This is compared to her baseline headache frequency of daily headache.     2.  Headache duration during this injection cycle:  Her mild baseline headache varies in length throughout the day but is always present, her migraines last an few hours to up 1.5 days.    3.  Headache intensity during this injection cycle:    A.  7/10  =  Typical pain level.  B.  10/10  =  Worst pain level.  C.  5/10  =  Lowest pain level.    4.  Change in headache medication usage during this injection cycle: She used Relpax a bit more.     5.  ER Visits During This Injection Cycle:  0    6.  Functional Performance:  Change in ADL's, social interaction, days lost from work, etc. She reports she missed about 6 social events over the last cycle weeks.    BOTULINUM NEUROTOXIN INJECTION  PROCEDURES:      VERIFICATION OF PATIENT IDENTIFICATION AND PROCEDURE     Initials   Patient Name SES   Patient  SES   Procedure Verified by: SES     Prior to the start of the procedure and with procedural staff participation, I verbally confirmed the patient s identity using two indicators, relevant allergies, that the procedure was appropriate and matched the consent or emergent situation, and that the correct equipment/implants were available. Immediately prior to starting the procedure I conducted the Time Out with the procedural staff and re-confirmed the patient s name, procedure, and site/side. (The Joint Commission universal protocol was followed.)  Yes    Sedation (Moderate or Deep): None    Above assessments performed by:  Addie Jerry MD    INDICATIONS FOR PROCEDURES:  Valerie Sim is a 52-year-old patient with chronic migraine headaches associated with cervicogenic components..       Her baseline symptoms have been recalcitrant to oral medications and conservative therapy. She is here today for an injection of Botox.       GOAL OF PROCEDURE:  The goal of this procedure is to decrease pain and enhance functional independence.     TOTAL DOSE ADMINISTERED:  Dose Administered: 200 units Botox (Botulinum Toxin Type A)  2:1 Dilution   Diluent Used: Preservative Free Normal Saline  Total Volume of Diluent Used: 4 mL  Lot # /C3 with Expiration Date: 2020  NDC #: Botox 100u ([address]-11)     Medication guide was offered to patient and was declined.     CONSENT:  The risks, benefits, and treatment options were discussed with Valerie Sim and she agreed to proceed.     Written consent was obtained by PG.      EQUIPMENT USED:  Needle-37mm stimulating/recording  Needle-30 gauge  EMG/NCS Machine      SKIN PREPARATION:  Skin preparation was performed using an alcohol wipe.      AREA/MUSCLE INJECTED: 200 Units of Botox  1. FACE & SCALP: 125 units of Botox = Total dose, 2:1  Dilution  Right temporalis - 15 units of Botox in 4 site/s.  Left temporalis - 15 units of Botox in 4 site/s.      Right frontalis - 12.5 units of Botox in 4 site/s.  Left frontalis - 12.5 units of Botox in 4 site/s.      Right procerus - 3.5 units of Botox in 1 site/s.  Left procerus - 3.5 units of Botox in 1 site/s.      Right  - 4 units of Botox in 1 site/s.  Left  - 4 units of Botox in 1 site/s.      Right Occipitalis - 25 units of Botox at 4 site/s.   Left Occipitalis - 25 units of Botox at 4 site/s.      Left and Right Nasalis - 2.5 units per side, total of 5 units.      2. JAW MUSCLES: 50 units of Botox = Total Dose, 1:1 Dilution  Right Masseter - 20 units of Botox at 1 site/s.   Left Mass[place] - 20 units of Botox at 1 site/s.      Right temporalis - 5 units of Botox at 1 site/s.  Left temporalis - 5 units of Botox at 1 site/s.       3. SHOULDER & NECK MUSCLES: Total dose 25 units of Botox diluted 2:1      Right levator muscle - 7.5 units of Botox at 1 site.  Left levator muscle - 7.5 units of Botox at 1 site.      Right mid trap - 5 units of Botox at 1 site.  Left mid trap - 5 units of Botox at 1 site.        RESPONSE TO PROCEDURE: Valerie Sim tolerated the procedure well and there were no immediate complications. She was allowed to recover for an appropriate period of time and was discharged home in stable condition.     FOLLOW UP:  Valerie Sim was asked to follow up by phone in 7-14 days with Nely Jean Baptiste PT, Care Coordinator or Estela Magaña RN, Care Coordinator, to report her response to this series of injections. Based on the patient's previous response to this therapy, Valerie Sim was rescheduled for the next series of injections in 10 weeks.     PLAN (Medication Changes, Therapy Orders, Work or Disability Issues, etc.): Patient will continue to monitor response to today's injections.

## 2017-11-08 NOTE — PROGRESS NOTES
"BOTULINUM TOXIN PROCEDURE - HEADACHE - NOTE    Chief Complaint   Patient presents with     RECHECK     UMP RETURN - BOTOX       /90 (BP Location: Right arm, Patient Position: Sitting, Cuff Size: Adult Regular)  Pulse 95  Temp 98.4  F (36.9  C) (Oral)  Ht 1.702 m (5' 7\")  Wt 74.8 kg (165 lb)  SpO2 98%  BMI 25.84 kg/m2       Current Outpatient Prescriptions:      HYDROmorphone (DILAUDID) 2 MG tablet, Take 0.5-1 tablets (1-2 mg) by mouth every 3 hours as needed 20 tablets = 3 month supply., Disp: 20 tablet, Rfl: 0     amphetamine-dextroamphetamine (ADDERALL) 20 MG per tablet, Take 1 tablet (20 mg) by mouth 2 times daily, Disp: 60 tablet, Rfl: 0     amphetamine-dextroamphetamine (ADDERALL) 20 MG per tablet, Take 1 tablet (20 mg) by mouth 2 times daily, Disp: 60 tablet, Rfl: 0     amphetamine-dextroamphetamine (ADDERALL) 20 MG per tablet, Take 1 tablet (20 mg) by mouth 2 times daily, Disp: 60 tablet, Rfl: 0     OnabotulinumtoxinA (BOTOX IJ), Inject 175 Units into the muscle once Lot: [id number] Exp: 04/2020, Disp: , Rfl:      OnabotulinumtoxinA (BOTOX IJ), Inject 175 Units as directed once Lot#: /C3 Exp: 01/2020, Disp: , Rfl:      OnabotulinumtoxinA (BOTOX IJ), Inject 175 Units as directed once Lot # /C3 with Expiration Date: 10/2019, Disp: , Rfl:      OnabotulinumtoxinA (BOTOX IJ), Inject 175 Units into the muscle Lot # /C3  Exp: 9/2019, Disp: , Rfl:      METOPROLOL SUCCINATE ER PO, Take 25 mg by mouth daily Reported on 5/9/2017, Disp: , Rfl:      OnabotulinumtoxinA (BOTOX IJ), Inject 175 Units as directed once Lot# /C3, Exp 08/2019, Disp: , Rfl:      eletriptan (RELPAX) 40 MG tablet, Take one tablet by mouth at onset of migraine. May repeat once after 2hrs. Max of 2 tabs in 24hrs and 3 days per week., Disp: 12 tablet, Rfl: 5     OnabotulinumtoxinA (BOTOX IJ), Inject 175 Units into the muscle Lot # /C3  Exp: 6/2019, Disp: , Rfl:      OnabotulinumtoxinA (BOTOX IJ), Inject 175 Units into " the muscle Lot # /C3  Exp: 4/2019, Disp: , Rfl:      OnabotulinumtoxinA (BOTOX IJ), Inject 175 Units into the muscle Lot # /c3  Exp: 2/2019, Disp: , Rfl:      ondansetron (ZOFRAN) 4 MG tablet, Take 1 tablet (4 mg) by mouth every 8 hours as needed, Disp: 90 tablet, Rfl: 1     CycloSPORINE (RESTASIS OP), Apply to eye 2 times daily, Disp: , Rfl:      UNABLE TO FIND, Reported on 5/9/2017, Disp: , Rfl:      Lutein 20 MG TABS, Take 1 tablet by mouth every other day Reported on 5/9/2017, Disp: , Rfl:      LORazepam (ATIVAN) 1 MG tablet, Take 1 mg by mouth 2 times daily as needed., Disp: , Rfl:      QUEtiapine (SEROQUEL) 50 MG tablet, Take 1 tablet by mouth daily., Disp: , Rfl:      budesonide (RINOCORT AQUA) 32 MCG/ACT nasal spray, Spray 1 spray into both nostrils daily., Disp: , Rfl:      fexofenadine (ALLEGRA) 180 MG tablet, Take  by mouth daily., Disp: , Rfl:      ibuprofen (ADVIL,MOTRIN) 800 MG tablet, Take 800 mg by mouth every 8 hours as needed Reported on 5/9/2017, Disp: , Rfl:      KRILL OIL PO, Take  by mouth., Disp: , Rfl:      estrogens, conjugated, (PREMARIN) 0.625 MG tablet, Take by mouth daily .30 mg. , Disp: , Rfl:      valACYclovir (VALTREX) 1000 mg tablet, Take 1,000 mg by mouth as needed., Disp: , Rfl:      Allergies   Allergen Reactions     Amitriptyline Hcl Other (See Comments)     Migraine       Candesartan Cilexetil-Hctz Muscle Pain (Myalgia)     Cymbalta Other (See Comments)     Migraine       Cyproheptadine Hcl      headaches     Desvenlafaxine      Migraine       Fluoxetine Other (See Comments)     Migraine       Imipramine Other (See Comments)     Migraine       Lyrica Muscle Pain (Myalgia)     Medroxyprogesterone Hives     Morphine Other (See Comments)     Patient reports seizure      Nortriptyline Other (See Comments)     Migraine       Phenergan Dm [Promethazine-Dm] Other (See Comments)     seizures     Venlafaxine Other (See Comments)     Migraine       Wellbutrin [Bupropion  "Hydrobromide] Other (See Comments)     Migraine       Compazine Anxiety     Dihydroergotamine Anxiety and Other (See Comments)     Cardiac symptoms     Droperidol Anxiety        PHYSICAL EXAM:    ***    HPI:    {BOTOX HPI:561177}    We reviewed the recommended safety guidelines for  {BOTOX/MYOBLOC:451687::\"Botox\"} from any vaccine injection, such as the seasonal flu vaccine, by a minimum of 10-14 days with Valerie Sim. She acknowledged understanding.    RESPONSE TO PREVIOUS TREATMENT:  Change in headache pattern following last series of injections with 175 units of  Botox on 2017.    {BOTOX PROBLEMS:857433::\"No problems reported\"}    1.  Headache frequency during this injection cycle:  *** headache days per month.  This is compared to {his / her:151753} baseline headache frequency of *** headache days per month.     2.  Headache duration during this injection cycle:  Headache duration ranged from *** {Hours days:905876} to *** {Hours days:608565}. Patient reports *** episodes of multiple day headaches during this injection cycle.     3.  Headache intensity during this injection cycle:    A.  ***/10  =  Typical pain level.  B.  ***/10  =  Worst pain level.  C.  ***/10  =  Lowest pain level.    4.  Change in headache medication usage during this injection cycle:  (For Example:  Able to decrease use of oral pain medications.) ***    5.  ER Visits During This Injection Cycle:  ***    6.  Functional Performance:  Change in ADL's, social interaction, days lost from work, etc. {Functional Performance:697022}      BOTULINUM NEUROTOXIN INJECTION PROCEDURES:      VERIFICATION OF PATIENT IDENTIFICATION AND PROCEDURE     Initials   Patient Name ***   Patient  ***   Procedure Verified by: ***     Prior to the start of the procedure and with procedural staff participation, I verbally confirmed the patient s identity using two indicators, relevant allergies, that the procedure was appropriate and matched the " "consent or emergent situation, and that the correct equipment/implants were available. Immediately prior to starting the procedure I conducted the Time Out with the procedural staff and re-confirmed the patient s name, procedure, and site/side. (The Joint Commission universal protocol was followed.)  {YES/NO DEFAULT YES:838900::\"Yes\"}    Sedation (Moderate or Deep): {None/Document:571233::\"None\"}    Above assessments performed by:  { PMR PROCEDURE PERFORMING PROVIDER:246281}    { PMR PROCEDURE AUTH PROVIDER:115243}      INDICATIONS FOR PROCEDURES:  Valerie Sim is a 52 year old patient with chronic migraine headaches {Headache Types:196889776} components.     Her baseline symptoms have been recalcitrant to oral medications and conservative therapy.  She is here today for reinjection with Botox.    GOAL OF PROCEDURE:  The goal of this procedure is to decrease pain  and enhance functional independence.    TOTAL DOSE ADMINISTERED:  Dose Administered:  175 units  Botox (Botulinum Toxin Type A)       2:1 Dilution     Diluent Used:  Preservative Free Normal Saline  Total Volume of Diluent Used:  3.5 ml  Lot # *** with Expiration Date:  ***  NDC #: Botox 100u ([address]-64)    Medication guide was offered to patient and was declined.    CONSENT:  The risks, benefits, and treatment options were discussed with Valerie Sim and she agreed to proceed.    Written consent was obtained by {***initials}.     EQUIPMENT USED:  {BOTOX EQUIPMENT:253685}    SKIN PREPARATION:  Skin preparation was performed using an alcohol wipe.    GUIDANCE DESCRIPTION:  {GUIDANCE:511927}     AREA/MUSCLE INJECTED:  ***  1. FACE & SCALP: 100 units of Botox = Total dose, *** Dilution  Right temporalis - 15 units of Botox in 4 site/s.  Left temporalis - 15 units of Botox in 4 site/s.      Right frontalis - 12.5 units of Botox in 4 site/s.  Left frontalis - 12.5 units of Botox in 4 site/s.      Right procerus - 3.5 units of Botox in 1 site/s.  Left " procerus - 3.5 units of Botox in 1 site/s.      Right  - 4 units of Botox in 1 site/s.  Left  - 4 units of Botox in 1 site/s.      Right Occipitalis - 12.5 units of Botox at 4 site/s.   Left Occipitalis - 12.5 units of Botox at 4 site/s.      Left and Right Nasalis - 2.5 units per side, total of 5 units    2. JAW MUSCLES: 50 units of Botox = Total Dose, 1:1 Dilution  Right Masseter - 20 units of Botox at 1 site/s.   Left Masseter - 20 units of Botox at 1 site/s.      Right temporalis - 5 units of Botox at 1 site/s.  Left temporalis - 5 units of Botox at 1 site/s.       3. SHOULDER & NECK MUSCLES: Total dose 25 units of Botox diluted 2:1      Right levator muscle - 7.5 units of Botox at 1 site.  Left levator muscle - 7.5 units of Botox at 1 site.      Right mid trap - 5 units of Botox at 1 site.  Left mid trap - 5 units of Botox at 1 site.      RESPONSE TO PROCEDURE:  Valerie Sim tolerated the procedure well and there were no immediate complications.   She was allowed to recover for an appropriate period of time and was discharged home in stable condition.    FOLLOW UP:  Valerie Sim was asked to follow up by phone in 7-14 days with {Nurse:[id number]}, to report her response to this series of injections.  Based on the patient's previous response to this therapy, Valerie Sim was rescheduled for the next series of injections in 9 weeks.    PLAN (Medication Changes, Therapy Orders, Work or Disability Issues, etc.): Patient will continue to monitor response to today's injections.

## 2017-11-08 NOTE — MR AVS SNAPSHOT
After Visit Summary   11/8/2017    Valerie Sim    MRN: [id number]           Patient Information     Date Of Birth          1965        Visit Information        Provider Department      11/8/2017 8:00 AM Juan Jose Jerry MD [facility] Medicine [facility] Rehabilitation        Today's Diagnoses     Intractable chronic migraine without aura and without status migrainosus    -  1       Follow-ups after your visit        Your next 10 appointments already scheduled     Dec 12, 2017  2:00 PM CST   Return Visit with Boyd Johnston MD   [facility])    [address] [place] [id number] [phone]            Jan 17, 2018  1:40 PM CST   (Arrive by 1:25 PM)   Return Botox with Juan Jose Jerry MD   [facility] Medicine [facility] Rehabilitation ([facility])    [address] [place] [id number] [phone]            Mar 22, 2018  3:10 PM CDT   (Arrive by 2:55 PM)   Return Botox with Juan Jose Jerry MD   [facility] ([facility])    [address] [place] [id number] [phone]            May 08, 2018  8:00 AM CDT   Return Visit with Boyd Johnston MD   [facility])    [address] [place] [id number] [phone]              Who to contact     Please call your clinic at [phone] to:    Ask questions about your health    Make or cancel appointments    Discuss your medicines    Learn about your test results    Speak to your doctor   If you have compliments or concerns about an experience at your clinic, or if you wish to file a complaint, please contact [facility] Patient Relations at [phone] or email us at [email or web address]         Additional Information About Your Visit    "     MyChart Information     [email or web address] gives you secure access to your electronic health record. If you see a primary care provider, you can also send messages to your care team and make appointments. If you have questions, please call your primary care clinic.  If you do not have a primary care provider, please call [phone] and they will assist you.      [email or web address] is an electronic gateway that provides easy, online access to your medical records. With [email or web address], you can request a clinic appointment, read your test results, renew a prescription or communicate with your care team.     To access your existing account, please contact your [facility] or call [phone] for assistance.        Care EveryWhere ID     This is your Care EveryWhere ID. This could be used by other organizations to access your [facility] medical records  [id number]        Your Vitals Were     Pulse Temperature Height Pulse Oximetry BMI (Body Mass Index)       95 98.4  F (36.9  C) (Oral) 1.702 m (5' 7\") 98% 25.84 kg/m2        Blood Pressure from Last 3 Encounters:   11/08/17 134/90   11/02/17 (!) 156/97   08/30/17 141/75    Weight from Last 3 Encounters:   11/08/17 74.8 kg (165 lb)   11/02/17 76.7 kg (169 lb 1.5 oz)   08/30/17 75.8 kg (167 lb)              We Performed the Following     HC CHEMODENERVATE FACIAL,TRIGERM,NERV MIGRAINE     Needle EMG Guide w/Chemodenervation (71038)        Primary Care Provider Office Phone # Fax #    Meek Leary [phone]       [address] [address]        Equal Access to Services     [facility] : Hadii jae [place], waaxda luqadaha, qaybta ramiroalkimberly lind. So [facility] [phone].    ATENCIÓN: Si habla español, tiene a daniels disposición servicios gratuitos de asistencia lingüística. Llame al [phone].    We comply with applicable federal civil rights laws and " [place] laws. We do not discriminate on the basis of race, color, national origin, age, disability, sex, sexual orientation, or gender identity.            Thank you!     Thank you for choosing [facility]  for your care. Our goal is always to provide you with excellent care. Hearing back from our patients is one way we can continue to improve our services. Please take a few minutes to complete the written survey that you may receive in the mail after your visit with us. Thank you!             Your Updated Medication List - Protect others around you: Learn how to safely use, store and throw away your medicines at www.disposemymeds.org.          This list is accurate as of: 11/8/17  4:27 PM.  Always use your most recent med list.                   Brand Name Dispense Instructions for use Diagnosis    * amphetamine-dextroamphetamine 20 MG per tablet    ADDERALL    60 tablet    Take 1 tablet (20 mg) by mouth 2 times daily    Post concussion syndrome       * amphetamine-dextroamphetamine 20 MG per tablet    ADDERALL    60 tablet    Take 1 tablet (20 mg) by mouth 2 times daily    Post concussion syndrome       * amphetamine-dextroamphetamine 20 MG per tablet    ADDERALL    60 tablet    Take 1 tablet (20 mg) by mouth 2 times daily    Post concussion syndrome       ATIVAN 1 MG tablet   Generic drug:  LORazepam      Take 1 mg by mouth 2 times daily as needed.        * BOTOX IJ      Inject 175 Units as directed once Lot # /C3 with Expiration Date: 10/2019        * BOTOX IJ      Inject 175 Units into the muscle Lot # /c3  Exp: 2/2019        * BOTOX IJ      Inject 175 Units into the muscle Lot # /C3  Exp: 4/2019        * BOTOX IJ      Inject 175 Units into the muscle Lot # /C3  Exp: 6/2019        * BOTOX IJ      Inject 175 Units as directed once Lot# /C3, Exp 08/2019        * BOTOX IJ      Inject 175 Units into the muscle Lot # /C3  Exp: 9/2019        * BOTOX IJ       Inject 175 Units as directed once Lot#: /C3 Exp: 01/2020        * BOTOX IJ      Inject 175 Units into the muscle once Lot: [id number] Exp: 04/2020        budesonide 32 MCG/ACT spray    RINOCORT AQUA     Spray 1 spray into both nostrils daily.        eletriptan 40 MG tablet    RELPAX    12 tablet    Take one tablet by mouth at onset of migraine. May repeat once after 2hrs. Max of 2 tabs in 24hrs and 3 days per week.    Migraine without aura and without status migrainosus, not intractable       fexofenadine 180 MG tablet    ALLEGRA     Take  by mouth daily.        HYDROmorphone 2 MG tablet    DILAUDID    20 tablet    Take 0.5-1 tablets (1-2 mg) by mouth every 3 hours as needed 20 tablets = 3 month supply.    SI (sacroiliac) joint dysfunction       ibuprofen 800 MG tablet    ADVIL/MOTRIN     Take 800 mg by mouth every 8 hours as needed Reported on 5/9/2017        KRILL OIL PO      Take  by mouth.        Lutein 20 MG Tabs      Take 1 tablet by mouth every other day Reported on 5/9/2017        METOPROLOL SUCCINATE ER PO      Take 25 mg by mouth daily Reported on 5/9/2017        ondansetron 4 MG tablet    ZOFRAN    90 tablet    Take 1 tablet (4 mg) by mouth every 8 hours as needed    Migraine without aura       PREMARIN 0.625 MG tablet   Generic drug:  estrogens (conjugated)      Take by mouth daily .30 mg  .        RESTASIS OP      Apply to eye 2 times daily        SEROQUEL 50 MG tablet   Generic drug:  QUEtiapine      Take 1 tablet by mouth daily.        UNABLE TO FIND      Reported on 5/9/2017        VALTREX 1000 mg tablet   Generic drug:  valACYclovir      Take 1,000 mg by mouth as needed.        * Notice:  This list has 11 medication(s) that are the same as other medications prescribed for you. Read the directions carefully, and ask your doctor or other care provider to review them with you.

## 2017-11-08 NOTE — LETTER
"11/8/2017       RE: Valerie Sim  [address]  [place] [facility] [address]     Dear Colleague,    Thank you for referring your patient, Valerie Sim, to the [facility] PHYSICAL MEDICINE AND REHABILITATION at [facility]. Please see a copy of my visit note below.    BOTULINUM TOXIN PROCEDURE - HEADACHE - NOTE    Chief Complaint   Patient presents with     Headache     [facility] RETURN - BOTOX Migraine Headache       /90 (BP Location: Right arm, Patient Position: Sitting, Cuff Size: Adult Regular)  Pulse 95  Temp 98.4  F (36.9  C) (Oral)  Ht 1.702 m (5' 7\")  Wt 74.8 kg (165 lb)  SpO2 98%  BMI 25.84 kg/m2       Current Outpatient Prescriptions:      HYDROmorphone (DILAUDID) 2 MG tablet, Take 0.5-1 tablets (1-2 mg) by mouth every 3 hours as needed 20 tablets = 3 month supply., Disp: 20 tablet, Rfl: 0     amphetamine-dextroamphetamine (ADDERALL) 20 MG per tablet, Take 1 tablet (20 mg) by mouth 2 times daily, Disp: 60 tablet, Rfl: 0     amphetamine-dextroamphetamine (ADDERALL) 20 MG per tablet, Take 1 tablet (20 mg) by mouth 2 times daily, Disp: 60 tablet, Rfl: 0     amphetamine-dextroamphetamine (ADDERALL) 20 MG per tablet, Take 1 tablet (20 mg) by mouth 2 times daily, Disp: 60 tablet, Rfl: 0     OnabotulinumtoxinA (BOTOX IJ), Inject 175 Units into the muscle once Lot: [id number] Exp: 04/2020, Disp: , Rfl:      OnabotulinumtoxinA (BOTOX IJ), Inject 175 Units as directed once Lot#: /C3 Exp: 01/2020, Disp: , Rfl:      OnabotulinumtoxinA (BOTOX IJ), Inject 175 Units as directed once Lot # /C3 with Expiration Date: 10/2019, Disp: , Rfl:      OnabotulinumtoxinA (BOTOX IJ), Inject 175 Units into the muscle Lot # /C3  Exp: 9/2019, Disp: , Rfl:      METOPROLOL SUCCINATE ER PO, Take 25 mg by mouth daily Reported on 5/9/2017, Disp: , Rfl:      OnabotulinumtoxinA (BOTOX IJ), Inject 175 Units as directed once Lot# /C3, Exp 08/2019, Disp: , Rfl:      " eletriptan (RELPAX) 40 MG tablet, Take one tablet by mouth at onset of migraine. May repeat once after 2hrs. Max of 2 tabs in 24hrs and 3 days per week., Disp: 12 tablet, Rfl: 5     OnabotulinumtoxinA (BOTOX IJ), Inject 175 Units into the muscle Lot # /C3  Exp: 6/2019, Disp: , Rfl:      OnabotulinumtoxinA (BOTOX IJ), Inject 175 Units into the muscle Lot # /C3  Exp: 4/2019, Disp: , Rfl:      OnabotulinumtoxinA (BOTOX IJ), Inject 175 Units into the muscle Lot # /c3  Exp: 2/2019, Disp: , Rfl:      ondansetron (ZOFRAN) 4 MG tablet, Take 1 tablet (4 mg) by mouth every 8 hours as needed, Disp: 90 tablet, Rfl: 1     CycloSPORINE (RESTASIS OP), Apply to eye 2 times daily, Disp: , Rfl:      UNABLE TO FIND, Reported on 5/9/2017, Disp: , Rfl:      Lutein 20 MG TABS, Take 1 tablet by mouth every other day Reported on 5/9/2017, Disp: , Rfl:      LORazepam (ATIVAN) 1 MG tablet, Take 1 mg by mouth 2 times daily as needed., Disp: , Rfl:      QUEtiapine (SEROQUEL) 50 MG tablet, Take 1 tablet by mouth daily., Disp: , Rfl:      budesonide (RINOCORT AQUA) 32 MCG/ACT nasal spray, Spray 1 spray into both nostrils daily., Disp: , Rfl:      fexofenadine (ALLEGRA) 180 MG tablet, Take  by mouth daily., Disp: , Rfl:      ibuprofen (ADVIL,MOTRIN) 800 MG tablet, Take 800 mg by mouth every 8 hours as needed Reported on 5/9/2017, Disp: , Rfl:      KRILL OIL PO, Take  by mouth., Disp: , Rfl:      estrogens, conjugated, (PREMARIN) 0.625 MG tablet, Take by mouth daily .30 mg. , Disp: , Rfl:      valACYclovir (VALTREX) 1000 mg tablet, Take 1,000 mg by mouth as needed., Disp: , Rfl:      Allergies   Allergen Reactions     Amitriptyline Hcl Other (See Comments)     Migraine       Candesartan Cilexetil-Hctz Muscle Pain (Myalgia)     Cymbalta Other (See Comments)     Migraine       Cyproheptadine Hcl      headaches     Desvenlafaxine      Migraine       Fluoxetine Other (See Comments)     Migraine       Imipramine Other (See Comments)      Migraine       Lyrica Muscle Pain (Myalgia)     Medroxyprogesterone Hives     Morphine Other (See Comments)     Patient reports seizure      Nortriptyline Other (See Comments)     Migraine       Phenergan Dm [Promethazine-Dm] Other (See Comments)     seizures     Venlafaxine Other (See Comments)     Migraine       Wellbutrin [Bupropion Hydrobromide] Other (See Comments)     Migraine       Compazine Anxiety     Dihydroergotamine Anxiety and Other (See Comments)     Cardiac symptoms     Droperidol Anxiety        PHYSICAL EXAM:    Well appearing, not acute distress, reports current headache with sharp pain between her temples and posterior neck pain.     HPI:    Patient reports the following new medical problems since last visit: She had a fall on 10/31/17, resulting in a blow to the head, leaving her unconscious for 2 minutes. Was evaluated in the ED and no acute pathology was seen in her head.     We reviewed the recommended safety guidelines for  Botox from any vaccine injection, such as the seasonal flu vaccine, by a minimum of 10-14 days with Valerie Sim. She acknowledged understanding.      RESPONSE TO PREVIOUS TREATMENT:  Change in headache pattern following last series of injections with 175 units of  Botox on 08/28/2017.    No problems reported: She had 3-5 days of malaise post injections and a rebound headache per usual for her.     1.  Headache frequency during this injection cycle:  She had daily headaches and 9 migraine days per month.   This is compared to her baseline headache frequency of daily headache.     2.  Headache duration during this injection cycle:  Her mild baseline headache varies in length throughout the day but is always present, her migraines last an few hours to up 1.5 days.    3.  Headache intensity during this injection cycle:    A.  7/10  =  Typical pain level.  B.  10/10  =  Worst pain level.  C.  5/10  =  Lowest pain level.    4.  Change in headache medication usage  during this injection cycle: She used Relpax a bit more.     5.  ER Visits During This Injection Cycle:  0    6.  Functional Performance:  Change in ADL's, social interaction, days lost from work, etc. She reports she missed about 6 social events over the last cycle weeks.    BOTULINUM NEUROTOXIN INJECTION PROCEDURES:      VERIFICATION OF PATIENT IDENTIFICATION AND PROCEDURE     Initials   Patient Name SES   Patient  SES   Procedure Verified by: SES     Prior to the start of the procedure and with procedural staff participation, I verbally confirmed the patient s identity using two indicators, relevant allergies, that the procedure was appropriate and matched the consent or emergent situation, and that the correct equipment/implants were available. Immediately prior to starting the procedure I conducted the Time Out with the procedural staff and re-confirmed the patient s name, procedure, and site/side. (The Joint Commission universal protocol was followed.)  Yes    Sedation (Moderate or Deep): None    Above assessments performed by:  Addie Jerry MD    INDICATIONS FOR PROCEDURES:  Valerie Sim is a 52-year-old patient with chronic migraine headaches associated with cervicogenic components..       Her baseline symptoms have been recalcitrant to oral medications and conservative therapy. She is here today for an injection of Botox.       GOAL OF PROCEDURE:  The goal of this procedure is to decrease pain and enhance functional independence.     TOTAL DOSE ADMINISTERED:  Dose Administered: 200 units Botox (Botulinum Toxin Type A)  2:1 Dilution   Diluent Used: Preservative Free Normal Saline  Total Volume of Diluent Used: 4 mL  Lot # /C3 with Expiration Date: 2020  NDC #: Botox 100u ([address]-84)     Medication guide was offered to patient and was declined.     CONSENT:  The risks, benefits, and treatment options were discussed with Valerie Sim and she agreed to  proceed.     Written consent was obtained by PG.      EQUIPMENT USED:  Needle-37mm stimulating/recording  Needle-30 gauge  EMG/NCS Machine      SKIN PREPARATION:  Skin preparation was performed using an alcohol wipe.      AREA/MUSCLE INJECTED: 200 Units of Botox  1. FACE & SCALP: 125 units of Botox = Total dose, 2:1 Dilution  Right temporalis - 15 units of Botox in 4 site/s.  Left temporalis - 15 units of Botox in 4 site/s.      Right frontalis - 12.5 units of Botox in 4 site/s.  Left frontalis - 12.5 units of Botox in 4 site/s.      Right procerus - 3.5 units of Botox in 1 site/s.  Left procerus - 3.5 units of Botox in 1 site/s.      Right  - 4 units of Botox in 1 site/s.  Left  - 4 units of Botox in 1 site/s.      Right Occipitalis - 25 units of Botox at 4 site/s.   Left Occipitalis - 25 units of Botox at 4 site/s.      Left and Right Nasalis - 2.5 units per side, total of 5 units.      2. JAW MUSCLES: 50 units of Botox = Total Dose, 1:1 Dilution  Right Masseter - 20 units of Botox at 1 site/s.   Left Masseter - 20 units of Botox at 1 site/s.      Right temporalis - 5 units of Botox at 1 site/s.  Left temporalis - 5 units of Botox at 1 site/s.       3. SHOULDER & NECK MUSCLES: Total dose 25 units of Botox diluted 2:1      Right levator muscle - 7.5 units of Botox at 1 site.  Left levator muscle - 7.5 units of Botox at 1 site.      Right mid trap - 5 units of Botox at 1 site.  Left mid trap - 5 units of Botox at 1 site.        RESPONSE TO PROCEDURE: Valerie Sim tolerated the procedure well and there were no immediate complications. She was allowed to recover for an appropriate period of time and was discharged home in stable condition.     FOLLOW UP:  Valerie Sim was asked to follow up by phone in 7-14 days with Nely Jean Baptiste PT, Care Coordinator or Estela Magaña RN, Care Coordinator, to report her response to this series of injections. Based on the patient's previous response to  this therapy, Valerie YEE Sadiekory was rescheduled for the next series of injections in 10 weeks.     PLAN (Medication Changes, Therapy Orders, Work or Disability Issues, etc.): Patient will continue to monitor response to today's injections.       Again, thank you for allowing me to participate in the care of your patient.      Sincerely,    Juan Jose Jerry MD

## 2017-11-26 ENCOUNTER — TRANSFERRED RECORDS (OUTPATIENT)
Dept: HEALTH INFORMATION MANAGEMENT | Facility: CLINIC | Age: 52
End: 2017-11-26

## 2017-12-10 ENCOUNTER — MYC MEDICAL ADVICE (OUTPATIENT)
Dept: NEUROLOGY | Facility: CLINIC | Age: 52
End: 2017-12-10

## 2017-12-10 DIAGNOSIS — F07.81 POST CONCUSSION SYNDROME: ICD-10-CM

## 2017-12-12 RX ORDER — DEXTROAMPHETAMINE SACCHARATE, AMPHETAMINE ASPARTATE, DEXTROAMPHETAMINE SULFATE AND AMPHETAMINE SULFATE 5; 5; 5; 5 MG/1; MG/1; MG/1; MG/1
20 TABLET ORAL 2 TIMES DAILY
Qty: 60 TABLET | Refills: 0 | Status: SHIPPED | OUTPATIENT
Start: 2018-02-11 | End: 2018-02-20

## 2017-12-12 RX ORDER — DEXTROAMPHETAMINE SACCHARATE, AMPHETAMINE ASPARTATE, DEXTROAMPHETAMINE SULFATE AND AMPHETAMINE SULFATE 5; 5; 5; 5 MG/1; MG/1; MG/1; MG/1
20 TABLET ORAL 2 TIMES DAILY
Qty: 60 TABLET | Refills: 0 | Status: SHIPPED | OUTPATIENT
Start: 2018-01-11 | End: 2018-02-20

## 2017-12-12 RX ORDER — DEXTROAMPHETAMINE SACCHARATE, AMPHETAMINE ASPARTATE, DEXTROAMPHETAMINE SULFATE AND AMPHETAMINE SULFATE 5; 5; 5; 5 MG/1; MG/1; MG/1; MG/1
20 TABLET ORAL 2 TIMES DAILY
Qty: 60 TABLET | Refills: 0 | Status: SHIPPED | OUTPATIENT
Start: 2017-12-12 | End: 2018-02-20

## 2017-12-18 ENCOUNTER — MYC MEDICAL ADVICE (OUTPATIENT)
Dept: NEUROLOGY | Facility: CLINIC | Age: 52
End: 2017-12-18

## 2018-01-09 DIAGNOSIS — G43.009 MIGRAINE WITHOUT AURA AND WITHOUT STATUS MIGRAINOSUS, NOT INTRACTABLE: ICD-10-CM

## 2018-01-10 RX ORDER — ELETRIPTAN HYDROBROMIDE 40 MG/1
TABLET, FILM COATED ORAL
Qty: 12 TABLET | Refills: 4 | Status: SHIPPED | OUTPATIENT
Start: 2018-01-10 | End: 2018-07-26

## 2018-01-12 ENCOUNTER — TRANSFERRED RECORDS (OUTPATIENT)
Dept: HEALTH INFORMATION MANAGEMENT | Facility: CLINIC | Age: 53
End: 2018-01-12

## 2018-01-15 ENCOUNTER — CARE COORDINATION (OUTPATIENT)
Dept: PHYSICAL MEDICINE AND REHAB | Facility: CLINIC | Age: 53
End: 2018-01-15

## 2018-01-15 NOTE — PROGRESS NOTES
Ms. Valerie Sim called to discuss her new diagnosis of occipital migraine headaches.  She reports that she received bilateral occipital nerve blocks approximately 4 weeks ago.  The occipital nerve blocks provided some benefit, however, she notes that this initial benefit has started to decline.  Ms. Sim would like to discuss placement and dosing of her Botox injections so that they might provide improved response for her occipital symptoms in addition to her baseline chronic migraine symptoms.    Dr. Jerry and Dr. Malhotra were copied on this message.

## 2018-01-17 ENCOUNTER — OFFICE VISIT (OUTPATIENT)
Dept: PHYSICAL MEDICINE AND REHAB | Facility: CLINIC | Age: 53
End: 2018-01-17
Payer: COMMERCIAL

## 2018-01-17 VITALS
HEIGHT: 67 IN | TEMPERATURE: 98 F | BODY MASS INDEX: 26.43 KG/M2 | SYSTOLIC BLOOD PRESSURE: 158 MMHG | WEIGHT: 168.4 LBS | HEART RATE: 98 BPM | DIASTOLIC BLOOD PRESSURE: 85 MMHG

## 2018-01-17 DIAGNOSIS — G43.719 INTRACTABLE CHRONIC MIGRAINE WITHOUT AURA AND WITHOUT STATUS MIGRAINOSUS: Primary | ICD-10-CM

## 2018-01-17 PROBLEM — S06.0XAA CONCUSSION: Status: ACTIVE | Noted: 2017-10-31

## 2018-01-17 PROBLEM — F32.5 DEPRESSION, MAJOR, IN REMISSION (H): Status: ACTIVE | Noted: 2017-02-14

## 2018-01-17 PROBLEM — K76.0 NAFL (NONALCOHOLIC FATTY LIVER): Status: ACTIVE | Noted: 2017-05-03

## 2018-01-17 ASSESSMENT — PAIN SCALES - GENERAL: PAINLEVEL: EXTREME PAIN (9)

## 2018-01-17 NOTE — PROGRESS NOTES
"BOTULINUM TOXIN PROCEDURE - HEADACHE - NOTE    Chief Complaint   Patient presents with     RECHECK     [facility] RETURN - BOTOX       /85 (BP Location: Left arm, Patient Position: Sitting, Cuff Size: Adult Regular)  Pulse 98  Temp 98  F (36.7  C) (Oral)  Ht 1.702 m (5' 7\")  Wt 76.4 kg (168 lb 6.4 oz)  BMI 26.38 kg/m2       Current Outpatient Prescriptions:      eletriptan (RELPAX) 40 MG tablet, take 1 tablet at onset of migraine. may repeat once after 2 hours. max of 2 tabs/24 hours and 3 days/week, Disp: 12 tablet, Rfl: 4     HYDROmorphone (DILAUDID) 2 MG tablet, Take 0.5-1 tablets (1-2 mg) by mouth every 3 hours as needed 20 tablets = 3 month supply., Disp: 20 tablet, Rfl: 0     [START ON 2/11/2018] amphetamine-dextroamphetamine (ADDERALL) 20 MG per tablet, Take 1 tablet (20 mg) by mouth 2 times daily, Disp: 60 tablet, Rfl: 0     amphetamine-dextroamphetamine (ADDERALL) 20 MG per tablet, Take 1 tablet (20 mg) by mouth 2 times daily, Disp: 60 tablet, Rfl: 0     amphetamine-dextroamphetamine (ADDERALL) 20 MG per tablet, Take 1 tablet (20 mg) by mouth 2 times daily, Disp: 60 tablet, Rfl: 0     OnabotulinumtoxinA (BOTOX IJ), Inject 175 Units into the muscle once Lot: [id number] Exp: 04/2020, Disp: , Rfl:      OnabotulinumtoxinA (BOTOX IJ), Inject 175 Units as directed once Lot#: /C3 Exp: 01/2020, Disp: , Rfl:      OnabotulinumtoxinA (BOTOX IJ), Inject 175 Units as directed once Lot # /C3 with Expiration Date: 10/2019, Disp: , Rfl:      OnabotulinumtoxinA (BOTOX IJ), Inject 175 Units into the muscle Lot # /C3  Exp: 9/2019, Disp: , Rfl:      METOPROLOL SUCCINATE ER PO, Take 25 mg by mouth daily Reported on 5/9/2017, Disp: , Rfl:      OnabotulinumtoxinA (BOTOX IJ), Inject 175 Units as directed once Lot# /C3, Exp 08/2019, Disp: , Rfl:      OnabotulinumtoxinA (BOTOX IJ), Inject 175 Units into the muscle Lot # /C3  Exp: 6/2019, Disp: , Rfl:      OnabotulinumtoxinA (BOTOX IJ), Inject 175 Units into " the muscle Lot # /C3  Exp: 4/2019, Disp: , Rfl:      OnabotulinumtoxinA (BOTOX IJ), Inject 175 Units into the muscle Lot # /c3  Exp: 2/2019, Disp: , Rfl:      ondansetron (ZOFRAN) 4 MG tablet, Take 1 tablet (4 mg) by mouth every 8 hours as needed, Disp: 90 tablet, Rfl: 1     CycloSPORINE (RESTASIS OP), Apply to eye 2 times daily, Disp: , Rfl:      UNABLE TO FIND, Reported on 5/9/2017, Disp: , Rfl:      Lutein 20 MG TABS, Take 1 tablet by mouth every other day Reported on 5/9/2017, Disp: , Rfl:      LORazepam (ATIVAN) 1 MG tablet, Take 1 mg by mouth 2 times daily as needed., Disp: , Rfl:      QUEtiapine (SEROQUEL) 50 MG tablet, Take 1 tablet by mouth daily., Disp: , Rfl:      budesonide (RINOCORT AQUA) 32 MCG/ACT nasal spray, Spray 1 spray into both nostrils daily., Disp: , Rfl:      fexofenadine (ALLEGRA) 180 MG tablet, Take  by mouth daily., Disp: , Rfl:      ibuprofen (ADVIL,MOTRIN) 800 MG tablet, Take 800 mg by mouth every 8 hours as needed Reported on 5/9/2017, Disp: , Rfl:      KRILL OIL PO, Take  by mouth., Disp: , Rfl:      estrogens, conjugated, (PREMARIN) 0.625 MG tablet, Take by mouth daily .30 mg. , Disp: , Rfl:      valACYclovir (VALTREX) 1000 mg tablet, Take 1,000 mg by mouth as needed., Disp: , Rfl:      Allergies   Allergen Reactions     Amitriptyline Hcl Other (See Comments)     Migraine       Candesartan Cilexetil-Hctz Muscle Pain (Myalgia)     Cymbalta Other (See Comments)     Migraine       Cyproheptadine Hcl      headaches     Desvenlafaxine      Migraine       Fluoxetine Other (See Comments)     Migraine       Imipramine Other (See Comments)     Migraine       Lyrica Muscle Pain (Myalgia)     Medroxyprogesterone Hives     Morphine Other (See Comments)     Patient reports seizure      Nortriptyline Other (See Comments)     Migraine       Phenergan Dm [Promethazine-Dm] Other (See Comments)     seizures     Venlafaxine Other (See Comments)     Migraine       Wellbutrin [Bupropion  Hydrobromide] Other (See Comments)     Migraine       Compazine Anxiety     Dihydroergotamine Anxiety and Other (See Comments)     Cardiac symptoms     Droperidol Anxiety        PHYSICAL EXAM:    Pleasant and cooperative  In no acute distress  Has a headache today - rated 9/10  No facial asymmetry    HPI:    Patient received occipital nerve blocks approximately one month ago. These occipital headaches are new as of 05/2017, and have been added to her usual baseline headaches, which are usually located between her eyes or in her temples.     We reviewed the recommended safety guidelines for  Botox from any vaccine injection, such as the seasonal flu vaccine, by a minimum of 10-14 days with Valerie Sim. She acknowledged understanding.      RESPONSE TO PREVIOUS TREATMENT:  Change in headache pattern following last series of injections with 200 units of  Botox on 11/08/2017.    No problems reported: She had 3-5 days of malaise post injections and a rebound headache per usual for her. She feels that this was less severe compared to previously.     1.  Headache frequency during this injection cycle:  She has daily headaches and 15 migraine days per month. This is compared to her baseline headache frequency of daily headaches.     2.  Headache duration during this injection cycle:  Her mild baseline headache varies in length throughout the day but is always present, her migraines last an few hours to up 1.5 days.    3.  Headache intensity during this injection cycle:    A.  8-10/10  =  Typical pain level.  B.  10/10  =  Worst pain level.  C.  6/10  =  Lowest pain level.    4.  Change in headache medication usage during this injection cycle: She used her max dose of Relpax, which is about 8 times per month. She was also recently prescribed IM toradol, which has been helpful.     5.  ER Visits During This Injection Cycle:  0     6.  Functional Performance:  Change in ADL's, social interaction, days lost from  work, etc. She reports she missed about 6 social events over  the last cycle weeks.    BOTULINUM NEUROTOXIN INJECTION PROCEDURES:      VERIFICATION OF PATIENT IDENTIFICATION AND PROCEDURE     Initials   Patient Name SES   Patient  SES   Procedure Verified by: DILAN     Prior to the start of the procedure and with procedural staff participation, I verbally confirmed the patient s identity using two indicators, relevant allergies, that the procedure was appropriate and matched the consent or emergent situation, and that the correct equipment/implants were available. Immediately prior to starting the procedure I conducted the Time Out with the procedural staff and re-confirmed the patient s name, procedure, and site/side. (The Joint Commission universal protocol was followed.)  Yes    Sedation (Moderate or Deep): None    Above assessments performed by:  Addie Jerry MD    INDICATIONS FOR PROCEDURES:  Valerie Sim is a 52-year-old patient with chronic migraine headaches associated with cervicogenic components..       Her baseline symptoms have been recalcitrant to oral medications and conservative therapy. She is here today for an injection of Botox.       GOAL OF PROCEDURE:  The goal of this procedure is to decrease pain and enhance functional independence.     TOTAL DOSE ADMINISTERED:  Dose Administered: 200 units Botox (Botulinum Toxin Type A)  2:1 and 1:1 Dilution   Diluent Used: Preservative Free Normal Saline  Total Volume of Diluent Used: 3.5 mL  Lot # /C3 with Expiration Date: 2020  NDC #: Botox 100u ([address]-68)     Medication guide was offered to patient and was declined.     CONSENT:  The risks, benefits, and treatment options were discussed with Valerie Sim and she agreed to proceed.     Written consent was obtained by DILAN.      EQUIPMENT USED:  Needle-37mm stimulating/recording  Needle-30 gauge  EMG/NCS Machine      SKIN PREPARATION:  Skin preparation was  performed using an alcohol wipe.        AREA/MUSCLE INJECTED: 200 Units of Botox  1. FACE & SCALP: 115 units of Botox = Total dose, 2:1 Dilution  Right temporalis - 12.5 units of Botox in 4 site/s.  Left temporalis - 12.5 units of Botox in 4 site/s.      Right frontalis - 10 units of Botox in 4 site/s.  Left frontalis - 10 units of Botox in 4 site/s.      Right procerus - 3.5 units of Botox in 1 site/s.  Left procerus - 3.5 units of Botox in 1 site/s.      Right  - 4 units of Botox in 1 site/s.  Left  - 4 units of Botox in 1 site/s.      Right Occipitalis - 25 units of Botox at 4 site/s.   Left Occipitalis - 25 units of Botox at 4 site/s.      Right Nasalis - 2.5 units of Botox at 1 site/s.   Left Nasalis - 2.5 units of Botox at 1 site/s.       2. JAW MUSCLES: 60 units of Botox = Total Dose, 1:1 Dilution  Right Masseter - 25 units of Botox at 1 site/s.   Left Masseter - 25 units of Botox at 1 site/s.      Right temporalis - 5 units of Botox at 1 site/s.  Left temporalis - 5 units of Botox at 1 site/s.       3. SHOULDER & NECK MUSCLES: Total dose 25 units of Botox, 2:1 Dilution      Right levator muscle - 7.5 units of Botox at 1 site.  Left levator muscle - 7.5 units of Botox at 1 site.      Right mid trap - 5 units of Botox at 1 site.  Left mid trap - 5 units of Botox at 1 site.        RESPONSE TO PROCEDURE: Valerie Sim tolerated the procedure well and there were no immediate complications. She was allowed to recover for an appropriate period of time and was discharged home in stable condition.     FOLLOW UP:  Valerie Sim was asked to follow up by phone in 7-14 days with Nely Jean Baptiste PT, Care Coordinator or Estela Magaña RN, Care Coordinator, to report her response to this series of injections. Based on the patient's previous response to this therapy, Valerie Sim was rescheduled for the next series of injections in 9 weeks. Due to a limited duration of Botox effectiveness  (approximately 8-9 weeks), we will continue to schedule her Botox every 9 weeks. Additionally, we would like to increase the dose of her Botox to 225 units, as this may provide better response for longer duration - will request insurance approval for the proposed 9 week interval and increased dose.         PLAN (Medication Changes, Therapy Orders, Work or Disability Issues, etc.): Patient will continue to monitor response to today's injections.

## 2018-01-17 NOTE — MR AVS SNAPSHOT
After Visit Summary   1/17/2018    Valerie Sim    MRN: [id number]           Patient Information     Date Of Birth          1965        Visit Information        Provider Department      1/17/2018 1:40 PM Juan Jose Jerry MD [facility]        Today's Diagnoses     Intractable chronic migraine without aura and without status migrainosus    -  1       Follow-ups after your visit        Your next 10 appointments already scheduled     Mar 22, 2018  3:10 PM CDT   (Arrive by 2:55 PM)   Return Botox with Juan Jose Jerry MD   [facility] ([facility])    [address] [address] [phone]            May 08, 2018  8:00 AM CDT   Return Visit with Boyd Johnston MD   [facility] ([facility]    [address] [place] [id number]   [phone]            May 23, 2018  1:40 PM CDT   (Arrive by 1:25 PM)   Return Botox with Juan Jose Jerry MD   [facility] ([facility])    [address] [place] [address] [phone]              Who to contact     Please call your clinic at [phone] to:    Ask questions about your health    Make or cancel appointments    Discuss your medicines    Learn about your test results    Speak to your doctor   If you have compliments or concerns about an experience at your clinic, or if you wish to file a complaint, please contact [facility] Patient Relations at [phone] or email us at [email or web address]         Additional Information About Your Visit        MyChart Information     [employer] gives you secure access to your electronic health record. If you see a primary care provider, you can also send messages to your care team and make appointments. If you  "have questions, please call your primary care clinic.  If you do not have a primary care provider, please call [phone] and they will assist you.      ReviewZAP is an electronic gateway that provides easy, online access to your medical records. With ReviewZAP, you can request a clinic appointment, read your test results, renew a prescription or communicate with your care team.     To access your existing account, please contact your [facility] or call [phone] for assistance.        Care EveryWhere ID     This is your Care EveryWhere ID. This could be used by other organizations to access your [facility] medical records  [id number]        Your Vitals Were     Pulse Temperature Height BMI (Body Mass Index)          98 98  F (36.7  C) (Oral) 1.702 m (5' 7\") 26.38 kg/m2         Blood Pressure from Last 3 Encounters:   01/17/18 158/85   11/08/17 134/90   11/02/17 (!) 156/97    Weight from Last 3 Encounters:   01/17/18 76.4 kg (168 lb 6.4 oz)   11/08/17 74.8 kg (165 lb)   11/02/17 76.7 kg (169 lb 1.5 oz)              We Performed the Following     HC CHEMODENERVATE FACIAL,TRIGERM,NERV MIGRAINE     Needle EMG Guide w/Chemodenervation ([id number])        Primary Care Provider Office Phone # Fax #    Meek Leary [phone]       [facility] [address] [address]        Equal Access to Services     JAYLEN MONTES AH: Hadii aad ku hadasho Soomaali, waaxda luqadaha, qaybta kaalmada adeegyada, kimberly helton. So [facility] [phone].    ATENCIÓN: Si habla joyceañol, tiene a daniels disposición servicios gratuitos de asistencia lingüística. Llame al [phone].    We comply with applicable federal civil rights laws and [place] laws. We do not discriminate on the basis of race, color, national origin, age, disability, sex, sexual orientation, or gender identity.            Thank you!     Thank you for choosing [facility]  for your care. Our goal is always to provide you with excellent care. Hearing back from our patients is one way we can continue to improve our services. Please take a few minutes to complete the written survey that you may receive in the mail after your visit with us. Thank you!             Your Updated Medication List - Protect others around you: Learn how to safely use, store and throw away your medicines at www.disposemymeds.org.          This list is accurate as of: 1/17/18  5:28 PM.  Always use your most recent med list.                   Brand Name Dispense Instructions for use Diagnosis    * amphetamine-dextroamphetamine 20 MG per tablet    ADDERALL    60 tablet    Take 1 tablet (20 mg) by mouth 2 times daily    Post concussion syndrome       * amphetamine-dextroamphetamine 20 MG per tablet    ADDERALL    60 tablet    Take 1 tablet (20 mg) by mouth 2 times daily    Post concussion syndrome       * amphetamine-dextroamphetamine 20 MG per tablet   Start taking on:  2/11/2018    ADDERALL    60 tablet    Take 1 tablet (20 mg) by mouth 2 times daily    Post concussion syndrome       ATIVAN 1 MG tablet   Generic drug:  LORazepam      Take 1 mg by mouth 2 times daily as needed.        * BOTOX IJ      Inject 175 Units as directed once Lot # /C3 with Expiration Date: 10/2019        * BOTOX IJ      Inject 175 Units into the muscle Lot # /c3  Exp: 2/2019        * BOTOX IJ      Inject 175 Units into the muscle Lot # /C3  Exp: 4/2019        * BOTOX IJ      Inject 175 Units into the muscle Lot # /C3  Exp: 6/2019        * BOTOX IJ      Inject 175 Units as directed once Lot# /C3, Exp 08/2019        * BOTOX IJ      Inject 175 Units into the muscle Lot # /C3  Exp: 9/2019        * BOTOX IJ      Inject 175 Units as directed once Lot#: /C3 Exp: 01/2020        * BOTOX IJ      Inject 175 Units into the muscle once Lot: [id number] Exp: 04/2020        * BOTOX IJ      Inject  200 Units into the muscle once Lot # /C3 with Expiration Date: 08/2020        budesonide 32 MCG/ACT spray    RINOCORT AQUA     Spray 1 spray into both nostrils daily.        eletriptan 40 MG tablet    RELPAX    12 tablet    take 1 tablet at onset of migraine. may repeat once after 2 hours. max of 2 tabs/24 hours and 3 days/week    Migraine without aura and without status migrainosus, not intractable       fexofenadine 180 MG tablet    ALLEGRA     Take  by mouth daily.        HYDROmorphone 2 MG tablet    DILAUDID    20 tablet    Take 0.5-1 tablets (1-2 mg) by mouth every 3 hours as needed 20 tablets = 3 month supply.    SI (sacroiliac) joint dysfunction       ibuprofen 800 MG tablet    ADVIL/MOTRIN     Take 800 mg by mouth every 8 hours as needed Reported on 5/9/2017        KRILL OIL PO      Take  by mouth.        Lutein 20 MG Tabs      Take 1 tablet by mouth every other day Reported on 5/9/2017        METOPROLOL SUCCINATE ER PO      Take 25 mg by mouth daily Reported on 5/9/2017        ondansetron 4 MG tablet    ZOFRAN    90 tablet    Take 1 tablet (4 mg) by mouth every 8 hours as needed    Migraine without aura       PREMARIN 0.625 MG tablet   Generic drug:  estrogens (conjugated)      Take by mouth daily .30 mg  .        RESTASIS OP      Apply to eye 2 times daily        SEROQUEL 50 MG tablet   Generic drug:  QUEtiapine      Take 1 tablet by mouth daily.        UNABLE TO FIND      Reported on 5/9/2017        VALTREX 1000 mg tablet   Generic drug:  valACYclovir      Take 1,000 mg by mouth as needed.        * Notice:  This list has 12 medication(s) that are the same as other medications prescribed for you. Read the directions carefully, and ask your doctor or other care provider to review them with you.

## 2018-01-17 NOTE — LETTER
"1/17/2018       RE: Valerie Sim  [address]  [place] [facility] [address]     Dear Colleague,    Thank you for referring your patient, Valerie Sim, to the [place] [facility] at [facility]. Please see a copy of my visit note below.    BOTULINUM TOXIN PROCEDURE - HEADACHE - NOTE    Chief Complaint   Patient presents with     RECHECK     UMP RETURN - BOTOX       /85 (BP Location: Left arm, Patient Position: Sitting, Cuff Size: Adult Regular)  Pulse 98  Temp 98  F (36.7  C) (Oral)  Ht 1.702 m (5' 7\")  Wt 76.4 kg (168 lb 6.4 oz)  BMI 26.38 kg/m2       Current Outpatient Prescriptions:      eletriptan (RELPAX) 40 MG tablet, take 1 tablet at onset of migraine. may repeat once after 2 hours. max of 2 tabs/24 hours and 3 days/week, Disp: 12 tablet, Rfl: 4     HYDROmorphone (DILAUDID) 2 MG tablet, Take 0.5-1 tablets (1-2 mg) by mouth every 3 hours as needed 20 tablets = 3 month supply., Disp: 20 tablet, Rfl: 0     [START ON 2/11/2018] amphetamine-dextroamphetamine (ADDERALL) 20 MG per tablet, Take 1 tablet (20 mg) by mouth 2 times daily, Disp: 60 tablet, Rfl: 0     amphetamine-dextroamphetamine (ADDERALL) 20 MG per tablet, Take 1 tablet (20 mg) by mouth 2 times daily, Disp: 60 tablet, Rfl: 0     amphetamine-dextroamphetamine (ADDERALL) 20 MG per tablet, Take 1 tablet (20 mg) by mouth 2 times daily, Disp: 60 tablet, Rfl: 0     OnabotulinumtoxinA (BOTOX IJ), Inject 175 Units into the muscle once Lot: [id number] Exp: 04/2020, Disp: , Rfl:      OnabotulinumtoxinA (BOTOX IJ), Inject 175 Units as directed once Lot#: /C3 Exp: 01/2020, Disp: , Rfl:      OnabotulinumtoxinA (BOTOX IJ), Inject 175 Units as directed once Lot # /C3 with Expiration Date: 10/2019, Disp: , Rfl:      OnabotulinumtoxinA (BOTOX IJ), Inject 175 Units into the muscle Lot # /C3  Exp: 9/2019, Disp: , Rfl:      METOPROLOL SUCCINATE ER PO, Take 25 mg by mouth " daily Reported on 5/9/2017, Disp: , Rfl:      OnabotulinumtoxinA (BOTOX IJ), Inject 175 Units as directed once Lot# /C3, Exp 08/2019, Disp: , Rfl:      OnabotulinumtoxinA (BOTOX IJ), Inject 175 Units into the muscle Lot # /C3  Exp: 6/2019, Disp: , Rfl:      OnabotulinumtoxinA (BOTOX IJ), Inject 175 Units into the muscle Lot # /C3  Exp: 4/2019, Disp: , Rfl:      OnabotulinumtoxinA (BOTOX IJ), Inject 175 Units into the muscle Lot # /c3  Exp: 2/2019, Disp: , Rfl:      ondansetron (ZOFRAN) 4 MG tablet, Take 1 tablet (4 mg) by mouth every 8 hours as needed, Disp: 90 tablet, Rfl: 1     CycloSPORINE (RESTASIS OP), Apply to eye 2 times daily, Disp: , Rfl:      UNABLE TO FIND, Reported on 5/9/2017, Disp: , Rfl:      Lutein 20 MG TABS, Take 1 tablet by mouth every other day Reported on 5/9/2017, Disp: , Rfl:      LORazepam (ATIVAN) 1 MG tablet, Take 1 mg by mouth 2 times daily as needed., Disp: , Rfl:      QUEtiapine (SEROQUEL) 50 MG tablet, Take 1 tablet by mouth daily., Disp: , Rfl:      budesonide (RINOCORT AQUA) 32 MCG/ACT nasal spray, Spray 1 spray into both nostrils daily., Disp: , Rfl:      fexofenadine (ALLEGRA) 180 MG tablet, Take  by mouth daily., Disp: , Rfl:      ibuprofen (ADVIL,MOTRIN) 800 MG tablet, Take 800 mg by mouth every 8 hours as needed Reported on 5/9/2017, Disp: , Rfl:      KRILL OIL PO, Take  by mouth., Disp: , Rfl:      estrogens, conjugated, (PREMARIN) 0.625 MG tablet, Take by mouth daily .30 mg. , Disp: , Rfl:      valACYclovir (VALTREX) 1000 mg tablet, Take 1,000 mg by mouth as needed., Disp: , Rfl:      Allergies   Allergen Reactions     Amitriptyline Hcl Other (See Comments)     Migraine       Candesartan Cilexetil-Hctz Muscle Pain (Myalgia)     Cymbalta Other (See Comments)     Migraine       Cyproheptadine Hcl      headaches     Desvenlafaxine      Migraine       Fluoxetine Other (See Comments)     Migraine       Imipramine Other (See Comments)     Migraine       Lyrica  Muscle Pain (Myalgia)     Medroxyprogesterone Hives     Morphine Other (See Comments)     Patient reports seizure      Nortriptyline Other (See Comments)     Migraine       Phenergan Dm [Promethazine-Dm] Other (See Comments)     seizures     Venlafaxine Other (See Comments)     Migraine       Wellbutrin [Bupropion Hydrobromide] Other (See Comments)     Migraine       Compazine Anxiety     Dihydroergotamine Anxiety and Other (See Comments)     Cardiac symptoms     Droperidol Anxiety        PHYSICAL EXAM:    Pleasant and cooperative  In no acute distress  Has a headache today - rated 9/10  No facial asymmetry    HPI:    Patient received occipital nerve blocks approximately one month ago. These occipital headaches are new as of 05/2017, and have been added to her usual baseline headaches, which are usually located between her eyes or in her temples.     We reviewed the recommended safety guidelines for  Botox from any vaccine injection, such as the seasonal flu vaccine, by a minimum of 10-14 days with Valerie Sim. She acknowledged understanding.      RESPONSE TO PREVIOUS TREATMENT:  Change in headache pattern following last series of injections with 200 units of  Botox on 11/08/2017.    No problems reported: She had 3-5 days of malaise post injections and a rebound headache per usual for her. She feels that this was less severe compared to previously.     1.  Headache frequency during this injection cycle:  She has daily headaches and 15 migraine days per month. This is compared to her baseline headache frequency of daily headaches.     2.  Headache duration during this injection cycle:  Her mild baseline headache varies in length throughout the day but is always present, her migraines last an few hours to up 1.5 days.    3.  Headache intensity during this injection cycle:    A.  8-10/10  =  Typical pain level.  B.  10/10  =  Worst pain level.  C.  6/10  =  Lowest pain level.    4.  Change in headache  medication usage during this injection cycle: She used her max dose of Relpax, which is about 8 times per month. She was also recently prescribed IM toradol, which has been helpful.     5.  ER Visits During This Injection Cycle:  0     6.  Functional Performance:  Change in ADL's, social interaction, days lost from work, etc. She reports she missed about 6 social events over  the last cycle weeks.    BOTULINUM NEUROTOXIN INJECTION PROCEDURES:      VERIFICATION OF PATIENT IDENTIFICATION AND PROCEDURE     Initials   Patient Name SES   Patient  SES   Procedure Verified by: SES     Prior to the start of the procedure and with procedural staff participation, I verbally confirmed the patient s identity using two indicators, relevant allergies, that the procedure was appropriate and matched the consent or emergent situation, and that the correct equipment/implants were available. Immediately prior to starting the procedure I conducted the Time Out with the procedural staff and re-confirmed the patient s name, procedure, and site/side. (The Joint Commission universal protocol was followed.)  Yes    Sedation (Moderate or Deep): None    Above assessments performed by:  Addie Jerry MD    INDICATIONS FOR PROCEDURES:  Valerie Sim is a 52-year-old patient with chronic migraine headaches associated with cervicogenic components..       Her baseline symptoms have been recalcitrant to oral medications and conservative therapy. She is here today for an injection of Botox.       GOAL OF PROCEDURE:  The goal of this procedure is to decrease pain and enhance functional independence.     TOTAL DOSE ADMINISTERED:  Dose Administered: 200 units Botox (Botulinum Toxin Type A)  2:1 and 1:1 Dilution   Diluent Used: Preservative Free Normal Saline  Total Volume of Diluent Used: 3.5 mL  Lot # /C3 with Expiration Date: 2020  NDC #: Botox 100u ([address]-56)     Medication guide was offered to patient and  was declined.     CONSENT:  The risks, benefits, and treatment options were discussed with Valerie Sim and she agreed to proceed.     Written consent was obtained by [facility].      EQUIPMENT USED:  Needle-37mm stimulating/recording  Needle-30 gauge  EMG/NCS Machine      SKIN PREPARATION:  Skin preparation was performed using an alcohol wipe.        AREA/MUSCLE INJECTED: 200 Units of Botox  1. FACE & SCALP: 115 units of Botox = Total dose, 2:1 Dilution  Right temporalis - 12.5 units of Botox in 4 site/s.  Left temporalis - 12.5 units of Botox in 4 site/s.      Right frontalis - 10 units of Botox in 4 site/s.  Left frontalis - 10 units of Botox in 4 site/s.      Right procerus - 3.5 units of Botox in 1 site/s.  Left procerus - 3.5 units of Botox in 1 site/s.      Right  - 4 units of Botox in 1 site/s.  Left  - 4 units of Botox in 1 site/s.      Right Occipitalis - 25 units of Botox at 4 site/s.   Left Occipitalis - 25 units of Botox at 4 site/s.      Right Nasalis - 2.5 units of Botox at 1 site/s.   Left Nasalis - 2.5 units of Botox at 1 site/s.       2. JAW MUSCLES: 60 units of Botox = Total Dose, 1:1 Dilution  Right Masseter - 25 units of Botox at 1 site/s.   Left Masseter - 25 units of Botox at 1 site/s.      Right temporalis - 5 units of Botox at 1 site/s.  Left temporalis - 5 units of Botox at 1 site/s.       3. SHOULDER & NECK MUSCLES: Total dose 25 units of Botox, 2:1 Dilution      Right levator muscle - 7.5 units of Botox at 1 site.  Left levator muscle - 7.5 units of Botox at 1 site.      Right mid trap - 5 units of Botox at 1 site.  Left mid trap - 5 units of Botox at 1 site.        RESPONSE TO PROCEDURE: Valerie Sim tolerated the procedure well and there were no immediate complications. She was allowed to recover for an appropriate period of time and was discharged home in stable condition.     FOLLOW UP:  Valerie iSm was asked to follow up by phone in 7-14 days with Nely Jean aBptiste,  PT, Care Coordinator or Estela Magaña RN, Care Coordinator, to report her response to this series of injections. Based on the patient's previous response to this therapy, Valerie Sim was rescheduled for the next series of injections in 9 weeks. Due to a limited duration of Botox effectiveness (approximately 8-9 weeks), we will continue to schedule her Botox every 9 weeks. Additionally, we would like to increase the dose of her Botox to 225 units, as this may provide better response for longer duration - will request insurance approval for the proposed 9 week interval and increased dose.         PLAN (Medication Changes, Therapy Orders, Work or Disability Issues, etc.): Patient will continue to monitor response to today's injections.     Again, thank you for allowing me to participate in the care of your patient.      Sincerely,    Juan Jose Jerry MD

## 2018-02-18 ENCOUNTER — MYC MEDICAL ADVICE (OUTPATIENT)
Dept: NEUROLOGY | Facility: CLINIC | Age: 53
End: 2018-02-18

## 2018-02-18 DIAGNOSIS — F07.81 POST CONCUSSION SYNDROME: ICD-10-CM

## 2018-02-20 ENCOUNTER — MYC MEDICAL ADVICE (OUTPATIENT)
Dept: NEUROLOGY | Facility: CLINIC | Age: 53
End: 2018-02-20

## 2018-02-20 RX ORDER — DEXTROAMPHETAMINE SACCHARATE, AMPHETAMINE ASPARTATE, DEXTROAMPHETAMINE SULFATE AND AMPHETAMINE SULFATE 5; 5; 5; 5 MG/1; MG/1; MG/1; MG/1
20 TABLET ORAL 2 TIMES DAILY
Qty: 60 TABLET | Refills: 0 | Status: SHIPPED | OUTPATIENT
Start: 2018-04-20 | End: 2018-06-11

## 2018-02-20 RX ORDER — DEXTROAMPHETAMINE SACCHARATE, AMPHETAMINE ASPARTATE, DEXTROAMPHETAMINE SULFATE AND AMPHETAMINE SULFATE 5; 5; 5; 5 MG/1; MG/1; MG/1; MG/1
20 TABLET ORAL 2 TIMES DAILY
Qty: 60 TABLET | Refills: 0 | Status: SHIPPED | OUTPATIENT
Start: 2018-03-20 | End: 2018-06-11

## 2018-02-20 RX ORDER — DEXTROAMPHETAMINE SACCHARATE, AMPHETAMINE ASPARTATE, DEXTROAMPHETAMINE SULFATE AND AMPHETAMINE SULFATE 5; 5; 5; 5 MG/1; MG/1; MG/1; MG/1
20 TABLET ORAL 2 TIMES DAILY
Qty: 60 TABLET | Refills: 0 | Status: SHIPPED | OUTPATIENT
Start: 2018-05-20 | End: 2018-06-11

## 2018-02-21 DIAGNOSIS — G43.009 MIGRAINE WITHOUT AURA AND WITHOUT STATUS MIGRAINOSUS, NOT INTRACTABLE: Primary | ICD-10-CM

## 2018-02-21 PROCEDURE — 99207 ZZC NO CHARGE LOS: CPT | Performed by: PSYCHIATRY & NEUROLOGY

## 2018-02-21 NOTE — LETTER
My Migraine Action Plan      Date: 2/21/2018     My Name: Valerie Sim   YOB: 1965  My Pharmacy: [id number] - [place], [place] - [address]       My (Preventative) Control Medicine: Botox        My Rescue Medicine: relpax   My Doctor: Vickie    My Clinic: [place] [id number] [id number] [phone]        GREEN ZONE = Good Control    My headache plan is working.   I can do what I need to do.           I WILL:     ? Keep managing my triggers.  ? Write in my migraine diary each time I have a headache.  ? Keep taking my preventive (controller) medicine daily.  ? Take my relief and rescue medicine as needed.             YELLOW ZONE = Not Enough Control    My headache plan isn t always working.   My headaches keep me from doing   some of the things I need to do.       I WILL:     ? Set goals to control my triggers and act on them.  ? Write in my migraine diary each time I have a headache and review it for                      patterns or new triggers.  ? Keep taking my preventive (controller) medicine daily.  ? Take my relief and rescue medicine as needed.  ? Call my doctor or clinic at if I stay in the Yellow Zone.             RED ZONE = Poor or No Control    My headache plan has  failed. I can t do anything  when I have one. My  medicines aren t working.           I WILL:   ? Set goals to control my triggers and act on them.  ? Write in my migraine diary each time I have a headache and review it for                      patterns or new triggers.  ? Keep taking my preventive (controller) medicine daily.  ? Take my relief and rescue medicine as needed.  ? Call my doctor or clinic or go to urgent care or an ER if I m having the worst                  headache of my life.  ? Call my doctor or clinic or go to urgent care or an ER if my medicine doesn t work.  ? Let my doctor or clinic know within 2 weeks if I have gone to an urgent care or              emergency department.          Provider specific instructions:  Adequate iv hydration; Zofran 4 mg, Ketorolac 30mg, lorazepam 1 mg, hydromorphone 2-4 mg (doses less than this will not be effective).

## 2018-03-14 ENCOUNTER — MYC MEDICAL ADVICE (OUTPATIENT)
Dept: NEUROLOGY | Facility: CLINIC | Age: 53
End: 2018-03-14

## 2018-03-14 ENCOUNTER — TRANSFERRED RECORDS (OUTPATIENT)
Dept: HEALTH INFORMATION MANAGEMENT | Facility: CLINIC | Age: 53
End: 2018-03-14

## 2018-03-14 DIAGNOSIS — M53.3 SI (SACROILIAC) JOINT DYSFUNCTION: ICD-10-CM

## 2018-03-23 NOTE — TELEPHONE ENCOUNTER
[facility] Call Center    Phone Message    May a detailed message be left on voicemail: no    Reason for Call: Other: patient is calling back per Shoto message.  please review all Shoto messages and then respond to patient.      Action Taken: Message routed to:  Adult Clinics: Neurology p [id number]

## 2018-03-23 NOTE — TELEPHONE ENCOUNTER
Spoke to the pt and informed her that Dr Johnston and Dr Ferguson are not in [place] today. She is fine waiting until Tuesday but would like to know if Dr Johnston would be willing to give her 3 month supply of 24 tablets instead of 20 of Hydromorphone. She is having a hard time getting her Botox approved, 2 appeals have been denied. She stated that she knows that without the Botox she is going to get a bad migraine which could potentially put her in the ED. She would like to avoid this so she is asking for 4 more tablets. This will be routed to Dr Johnston on Tuesday.  Tiesha Baltazar RN

## 2018-03-26 ENCOUNTER — DOCUMENTATION ONLY (OUTPATIENT)
Dept: PHYSICAL MEDICINE AND REHAB | Facility: CLINIC | Age: 53
End: 2018-03-26

## 2018-03-26 NOTE — PROGRESS NOTES
Had peer to peer with insurance MD today (Dr Wm John). Said it was OK to increase the dose to 225 units of Botox and keep injections at every 9 weeks.

## 2018-03-27 ENCOUNTER — DOCUMENTATION ONLY (OUTPATIENT)
Dept: NEUROLOGY | Facility: CLINIC | Age: 53
End: 2018-03-27

## 2018-03-27 RX ORDER — HYDROMORPHONE HYDROCHLORIDE 2 MG/1
1-2 TABLET ORAL
Qty: 24 TABLET | Refills: 0 | Status: SHIPPED | OUTPATIENT
Start: 2018-03-27 | End: 2018-06-25

## 2018-03-27 NOTE — TELEPHONE ENCOUNTER
The pt was informed on her VM that her RX will be at check in desk D here in [place].  Tiesha Baltazar RN

## 2018-03-27 NOTE — PROGRESS NOTES
Patient picked up prescription today, verified by YANETH GARCIA [facility]Specialty/Med Surg   [phone]

## 2018-04-03 ENCOUNTER — OFFICE VISIT (OUTPATIENT)
Dept: NEUROLOGY | Facility: CLINIC | Age: 53
End: 2018-04-03
Payer: COMMERCIAL

## 2018-04-03 VITALS
SYSTOLIC BLOOD PRESSURE: 161 MMHG | DIASTOLIC BLOOD PRESSURE: 94 MMHG | HEART RATE: 110 BPM | WEIGHT: 169.9 LBS | BODY MASS INDEX: 26.61 KG/M2 | OXYGEN SATURATION: 99 %

## 2018-04-03 DIAGNOSIS — G43.711 INTRACTABLE CHRONIC MIGRAINE WITHOUT AURA AND WITH STATUS MIGRAINOSUS: Primary | ICD-10-CM

## 2018-04-03 PROCEDURE — 99213 OFFICE O/P EST LOW 20 MIN: CPT | Performed by: PSYCHIATRY & NEUROLOGY

## 2018-04-03 ASSESSMENT — PAIN SCALES - GENERAL: PAINLEVEL: WORST PAIN (10)

## 2018-04-03 NOTE — NURSING NOTE
"Valerie Sim's goals for this visit include: return  She requests these members of her care team be copied on today's visit information:     PCP: Meek Leary    Referring Provider:  No referring provider defined for this encounter.    Chief Complaint   Patient presents with     RECHECK       Initial BP (!) 161/94  Pulse 110  Wt 77.1 kg (169 lb 14.4 oz)  SpO2 99%  BMI 26.61 kg/m2 Estimated body mass index is 26.61 kg/(m^2) as calculated from the following:    Height as of 1/17/18: 1.702 m (5' 7\").    Weight as of this encounter: 77.1 kg (169 lb 14.4 oz).  Medication Reconciliation: complete    Do you need any medication refills at today's visit? ?  "

## 2018-04-03 NOTE — PROGRESS NOTES
"Visit Date:   04/03/2018      SUBJECTIVE:  This patient is seen in followup with intractable headache disorder.  I last saw this patient in June.  Things were not going well in June and she has continued to have a difficult year.  She has a diagnosis of paroxysmal hemicrania, migraine, history of closed head injury, postconcussion syndrome.  When I saw her last year, she had had a sudden onset of a disabling headache and so had CT imaging of the brain with CT angiogram for aneurysm or dissection evaluation.  The studies were unremarkable.  She has been struggling with management of her headache.  She has been getting Botox from Dr. Jerry at the [facility].  He has been trying to get her on an every 9-week schedule because that seems to work the best for her.  That was finally approved and she is due to get an injection tomorrow.        She did go down to the [facility] for a pelvic floor evaluation.  She did have dual pudendal nerve blocks for low back issues and pelvic floor issues.  That was helpful.  She also saw one of the headache experts, Dr. Stock.  He did occipital nerve blocks.  The first course was successful, but then they were repeated a couple of months later and it was not so successful.  She had a lot of pain as a consequence.  She has posterior cervical and trapezius muscle spasm.  These muscles have loosened up somewhat.        She also has painful twitching of the right eye.  This has been a more recent problem over the last few weeks.  Because of this symptom, she has gone to a regimen of Depakote 250 mg every other day and on alternate days takes Indocin 50 mg.  The Indocin had been quite successful last year, but then it began to upset her stomach so she had to stop it.  Now she is tolerating it on a 50 mg every other day basis.  She also has a \"very precise pain\" over the left shoulder that has a migraine quality and that it seems to be present with the migraine.      She is getting " lorazepam 90 tablets.  Some weeks she will use 2 and some weeks she will use 6.  She has a prescription for 24 tablets of hydromorphone to tide her over this difficult time she has had in the last couple of months.  She is on Adderall 20 mg twice a day and also uses Relpax.  Relpax does continue to be effective, although she has a tendency to use it excessively.  She is aware of the limitations on its use, namely no more than 4 tablets per week.      PHYSICAL EXAMINATION:   GENERAL:  The patient is cooperative and in no distress.   VITAL SIGNS:  Her blood pressure is 161/94.     EYES:  Visual acuity is 20/20 bilaterally.  Funduscopic exam shows sharp discs bilaterally.      ASSESSMENT:  Complex headache disorder including paroxysmal hemicrania, migraine, postconcussion syndrome, occipital headache.      DISCUSSION:  The patient is seen with the above issue.  She is on a complex regimen of medication.  Things have worsened in the last few months, but now she has finally gotten approval for Botox every 9 weeks which hopefully is going to be beneficial to her.  She begins that regimen tomorrow.  Hopefully, after a couple of weeks she will be able to stop the Depakote and the Indocin.  I am cautiously optimistic in that regard.        I have not given her any other medicines today.  She says she is up-to-date on her refills.  I will see her in followup in 6 months, but she knows to call if there are questions or problems before then.         STEPH CRUZ MD             D: 2018   T: 2018   MT: NTS      Name:     LAKESHA IVY   MRN:      [id number]        Account:      [id number]   :      1965           Visit Date:   2018      Document: [id number]       cc: Meek Leary MD

## 2018-04-03 NOTE — LETTER
4/3/2018         RE: Valerie Sim  [address]  [place] [facility] [address]        Dear Colleague,    Thank you for referring your patient, Valerie Sim, to the [facility]. Please see a copy of my visit note below.    Visit Date:   04/03/2018      SUBJECTIVE:  This patient is seen in followup with intractable headache disorder.  I last saw this patient in June.  Things were not going well in June and she has continued to have a difficult year.  She has a diagnosis of paroxysmal hemicrania, migraine, history of closed head injury, postconcussion syndrome.  When I saw her last year, she had had a sudden onset of a disabling headache and so had CT imaging of the brain with CT angiogram for aneurysm or dissection evaluation.  The studies were unremarkable.  She has been struggling with management of her headache.  She has been getting Botox from Dr. Jerry at the [facility].  He has been trying to get her on an every 9-week schedule because that seems to work the best for her.  That was finally approved and she is due to get an injection tomorrow.        She did go down to the [facility] for a pelvic floor evaluation.  She did have dual pudendal nerve blocks for low back issues and pelvic floor issues.  That was helpful.  She also saw one of the headache experts, Dr. Stock.  He did occipital nerve blocks.  The first course was successful, but then they were repeated a couple of months later and it was not so successful.  She had a lot of pain as a consequence.  She has posterior cervical and trapezius muscle spasm.  These muscles have loosened up somewhat.        She also has painful twitching of the right eye.  This has been a more recent problem over the last few weeks.  Because of this symptom, she has gone to a regimen of Depakote 250 mg every other day and on alternate days takes Indocin 50 mg.  The Indocin had been quite successful last year, but then it began to upset  "her stomach so she had to stop it.  Now she is tolerating it on a 50 mg every other day basis.  She also has a \"very precise pain\" over the left shoulder that has a migraine quality and that it seems to be present with the migraine.      She is getting lorazepam 90 tablets.  Some weeks she will use 2 and some weeks she will use 6.  She has a prescription for 24 tablets of hydromorphone to tide her over this difficult time she has had in the last couple of months.  She is on Adderall 20 mg twice a day and also uses Relpax.  Relpax does continue to be effective, although she has a tendency to use it excessively.  She is aware of the limitations on its use, namely no more than 4 tablets per week.      PHYSICAL EXAMINATION:   GENERAL:  The patient is cooperative and in no distress.   VITAL SIGNS:  Her blood pressure is 161/94.     EYES:  Visual acuity is 20/20 bilaterally.  Funduscopic exam shows sharp discs bilaterally.      ASSESSMENT:  Complex headache disorder including paroxysmal hemicrania, migraine, postconcussion syndrome, occipital headache.      DISCUSSION:  The patient is seen with the above issue.  She is on a complex regimen of medication.  Things have worsened in the last few months, but now she has finally gotten approval for Botox every 9 weeks which hopefully is going to be beneficial to her.  She begins that regimen tomorrow.  Hopefully, after a couple of weeks she will be able to stop the Depakote and the Indocin.  I am cautiously optimistic in that regard.        I have not given her any other medicines today.  She says she is up-to-date on her refills.  I will see her in followup in 6 months, but she knows to call if there are questions or problems before then.         STEPH CRUZ MD             D: 2018   T: 2018   MT: MAKSIM      Name:     LAKESHA IVY   MRN:      [id number]        Account:      [id number]   :      1965           Visit Date:   2018      Document: " [id number]       cc: Meek Leary MD       Again, thank you for allowing me to participate in the care of your patient.        Sincerely,        Boyd Johnston MD

## 2018-04-03 NOTE — MR AVS SNAPSHOT
After Visit Summary   4/3/2018    Valerie Sim    MRN: [id number]           Patient Information     Date Of Birth          1965        Visit Information        Provider Department      4/3/2018 3:00 PM Boyd Johnston MD [facility]        Today's Diagnoses     Intractable chronic migraine without aura and with status migrainosus    -  1       Follow-ups after your visit        Follow-up notes from your care team     Return in about 6 months (around 10/3/2018).      Your next 10 appointments already scheduled     Apr 04, 2018 10:00 AM CDT   (Arrive by 9:45 AM)   Return Botox with Addie Malhotra MD   [facility] ([facility])    [address] [place] [id number] [phone]            May 23, 2018  1:40 PM CDT   (Arrive by 1:25 PM)   Return Botox with Juan Jose Jerry MD   [facility] ([facility])    [address] [place] [address] [id number] [phone]            Oct 02, 2018  2:00 PM CDT   Return Visit with Boyd Johnston MD   [facility] ([facility])    [address] [place] [address] [phone]              Who to contact     If you have questions or need follow up information about today's clinic visit or your schedule please contact [facility] directly at [phone].  Normal or non-critical lab and imaging results will be communicated to you by MyChart, letter or phone within 4 business days after the clinic has received the results. If you do not hear from us within 7 days, please contact the clinic through MyChart or phone. If you have a critical or abnormal lab result, we will notify you by phone as soon as possible.  Submit refill requests through [employer] or call your pharmacy and they will forward the refill  request to us. Please allow 3 business days for your refill to be completed.          Additional Information About Your Visit        [employer] Information     [employer] gives you secure access to your electronic health record. If you see a primary care provider, you can also send messages to your care team and make appointments. If you have questions, please call your primary care clinic.  If you do not have a primary care provider, please call [phone] and they will assist you.      [employer] is an electronic gateway that provides easy, online access to your medical records. With [employer], you can request a clinic appointment, read your test results, renew a prescription or communicate with your care team.     To access your existing account, please contact your [facility] or call [phone] for assistance.        Care EveryWhere ID     This is your Care EveryWhere ID. This could be used by other organizations to access your [place] medical records  [id number]        Your Vitals Were     Pulse Pulse Oximetry BMI (Body Mass Index)             110 99% 26.61 kg/m2          Blood Pressure from Last 3 Encounters:   04/03/18 (!) 161/94   01/17/18 158/85   11/08/17 134/90    Weight from Last 3 Encounters:   04/03/18 77.1 kg (169 lb 14.4 oz)   01/17/18 76.4 kg (168 lb 6.4 oz)   11/08/17 74.8 kg (165 lb)              Today, you had the following     No orders found for display         Today's Medication Changes          These changes are accurate as of 4/3/18 11:59 PM.  If you have any questions, ask your nurse or doctor.               Stop taking these medicines if you haven't already. Please contact your care team if you have questions.     METOPROLOL SUCCINATE ER PO   Stopped by:  Boyd Johnston MD                    Primary Care Provider Office Phone # Fax #    Meek Leary [phone]       [place] [id number]         Equal Access to Services     [facility] : Hadii jae calvillo vietyasmani Sohamali, wazenada luqadaha, qaybta kaubaldoregan solitario, kimberly helton. So [facility] [phone].    ATENCIÓN: Si habla español, tiene a daniels disposición servicios gratuitos de asistencia lingüística. Llame al [phone].    We comply with applicable federal civil rights laws and [place] laws. We do not discriminate on the basis of race, color, national origin, age, disability, sex, sexual orientation, or gender identity.            Thank you!     Thank you for choosing [facility]  for your care. Our goal is always to provide you with excellent care. Hearing back from our patients is one way we can continue to improve our services. Please take a few minutes to complete the written survey that you may receive in the mail after your visit with us. Thank you!             Your Updated Medication List - Protect others around you: Learn how to safely use, store and throw away your medicines at www.disposemymeds.org.          This list is accurate as of 4/3/18 11:59 PM.  Always use your most recent med list.                   Brand Name Dispense Instructions for use Diagnosis    * amphetamine-dextroamphetamine 20 MG per tablet    ADDERALL    60 tablet    Take 1 tablet (20 mg) by mouth 2 times daily    Post concussion syndrome       * amphetamine-dextroamphetamine 20 MG per tablet   Start taking on:  4/20/2018    ADDERALL    60 tablet    Take 1 tablet (20 mg) by mouth 2 times daily    Post concussion syndrome       * amphetamine-dextroamphetamine 20 MG per tablet   Start taking on:  5/20/2018    ADDERALL    60 tablet    Take 1 tablet (20 mg) by mouth 2 times daily    Post concussion syndrome       ATIVAN 1 MG tablet   Generic drug:  LORazepam      Take 1 mg by mouth 2 times daily as needed.        * BOTOX IJ      Inject 175 Units as directed once Lot # /C3 with Expiration Date: 10/2019        * BOTOX IJ       Inject 175 Units into the muscle Lot # /c3  Exp: 2/2019        * BOTOX IJ      Inject 175 Units into the muscle Lot # /C3  Exp: 4/2019        * BOTOX IJ      Inject 175 Units into the muscle Lot # /C3  Exp: 6/2019        * BOTOX IJ      Inject 175 Units as directed once Lot# /C3, Exp 08/2019        * BOTOX IJ      Inject 175 Units into the muscle Lot # /C3  Exp: 9/2019        * BOTOX IJ      Inject 175 Units as directed once Lot#: /C3 Exp: 01/2020        * BOTOX IJ      Inject 175 Units into the muscle once Lot: [id number] Exp: 04/2020        * BOTOX IJ      Inject 200 Units into the muscle once Lot # /C3 with Expiration Date: 08/2020        budesonide 32 MCG/ACT spray    RINOCORT AQUA     Spray 1 spray into both nostrils daily.        DEPAKOTE PO      Take 250 mg by mouth every other day        eletriptan 40 MG tablet    RELPAX    12 tablet    take 1 tablet at onset of migraine. may repeat once after 2 hours. max of 2 tabs/24 hours and 3 days/week    Migraine without aura and without status migrainosus, not intractable       fexofenadine 180 MG tablet    ALLEGRA     Take  by mouth daily.        HYDROmorphone 2 MG tablet    DILAUDID    24 tablet    Take 0.5-1 tablets (1-2 mg) by mouth every 3 hours as needed 20 tablets = 3 month supply.    SI (sacroiliac) joint dysfunction       INDOMETHACIN PO      Take 50 mg by mouth every other day        Lutein 20 MG Tabs      Take 1 tablet by mouth every other day Reported on 5/9/2017        ondansetron 4 MG tablet    ZOFRAN    90 tablet    Take 1 tablet (4 mg) by mouth every 8 hours as needed    Migraine without aura       PREMARIN 0.625 MG tablet   Generic drug:  estrogens (conjugated)      Take by mouth daily .30 mg  .        RESTASIS OP      Apply to eye 2 times daily        SEROQUEL 50 MG tablet   Generic drug:  QUEtiapine      Take 1 tablet by mouth daily.        VALTREX 1000 mg tablet   Generic drug:  valACYclovir      Take 1,000 mg by  mouth as needed.        * Notice:  This list has 12 medication(s) that are the same as other medications prescribed for you. Read the directions carefully, and ask your doctor or other care provider to review them with you.

## 2018-04-04 ENCOUNTER — OFFICE VISIT (OUTPATIENT)
Dept: PHYSICAL MEDICINE AND REHAB | Facility: CLINIC | Age: 53
End: 2018-04-04
Payer: COMMERCIAL

## 2018-04-04 VITALS
WEIGHT: 169.97 LBS | TEMPERATURE: 98.2 F | DIASTOLIC BLOOD PRESSURE: 88 MMHG | HEART RATE: 105 BPM | BODY MASS INDEX: 26.68 KG/M2 | OXYGEN SATURATION: 98 % | SYSTOLIC BLOOD PRESSURE: 161 MMHG | HEIGHT: 67 IN | RESPIRATION RATE: 24 BRPM

## 2018-04-04 DIAGNOSIS — G43.719 INTRACTABLE CHRONIC MIGRAINE WITHOUT AURA AND WITHOUT STATUS MIGRAINOSUS: Primary | ICD-10-CM

## 2018-04-04 ASSESSMENT — PAIN SCALES - GENERAL: PAINLEVEL: WORST PAIN (10)

## 2018-04-04 NOTE — MR AVS SNAPSHOT
After Visit Summary   4/4/2018    Valerie Sim    MRN: [id number]           Patient Information     Date Of Birth          1965        Visit Information        Provider Department      4/4/2018 10:00 AM Addie Malhotra MD [facility]        Today's Diagnoses     Intractable chronic migraine without aura and without status migrainosus    -  1       Follow-ups after your visit        Follow-up notes from your care team     Return in about 18 weeks (around 8/8/2018).      Your next 10 appointments already scheduled     Jun 06, 2018 10:00 AM CDT   (Arrive by 9:45 AM)   Return Botox with Addie Malhotra MD   [facility] ([facility])    [address] [place] [phone]   [phone]            Aug 13, 2018 11:00 AM CDT   (Arrive by 10:45 AM)   Return Botox with Juan Jose Jerry MD   [facility] ([facility])    [address] [place] [phone]   [phone]            Oct 02, 2018  2:00 PM CDT   Return Visit with Boyd Johnston MD   [facility] ([facility])    [id number] [place] [address] [phone]              Who to contact     Please call your clinic at [phone] to:    Ask questions about your health    Make or cancel appointments    Discuss your medicines    Learn about your test results    Speak to your doctor            Additional Information About Your Visit        MyChart Information     [employer] gives you secure access to your electronic health record. If you see a primary care provider, you can also send messages to your care team and make appointments. If you have questions, please call your primary care clinic.  If you do not have a primary care provider, please call [phone] and they will assist  "you.      [employer] is an electronic gateway that provides easy, online access to your medical records. With [employer], you can request a clinic appointment, read your test results, renew a prescription or communicate with your care team.     To access your existing account, please contact your [facility] or call [phone] for assistance.        Care EveryWhere ID     This is your Care EveryWhere ID. This could be used by other organizations to access your [place] medical records  [id number]        Your Vitals Were     Pulse Temperature Respirations Height Pulse Oximetry Breastfeeding?    105 98.2  F (36.8  C) (Oral) 24 1.702 m (5' 7\") 98% No    BMI (Body Mass Index)                   26.62 kg/m2            Blood Pressure from Last 3 Encounters:   04/04/18 161/88   04/03/18 (!) 161/94   01/17/18 158/85    Weight from Last 3 Encounters:   04/04/18 77.1 kg (169 lb 15.6 oz)   04/03/18 77.1 kg (169 lb 14.4 oz)   01/17/18 76.4 kg (168 lb 6.4 oz)              We Performed the Following     HC CHEMODENERVATE FACIAL,TRIGERM,NERV MIGRAINE     NEEDLE EMG GUIDE W CHEMODENERVATION        Primary Care Provider Office Phone # Fax #    Meek Leary [phone]       [facility] [address] [place] [address]        Equal Access to Services     [facility] : Hadii aad ku hadasho Soomaali, waaxda luqadaha, qaybta kaalmada adeegyada, kimberly santana . So [facility] [phone].    ATENCIÓN: Si habla español, tiene a daniels disposición servicios gratuitos de asistencia lingüística. Llame al [phone].    We comply with applicable federal civil rights laws and [place] laws. We do not discriminate on the basis of race, color, national origin, age, disability, sex, sexual orientation, or gender identity.            Thank you!     Thank you for choosing [facility]  for your care. Our goal is always to provide " you with excellent care. Hearing back from our patients is one way we can continue to improve our services. Please take a few minutes to complete the written survey that you may receive in the mail after your visit with us. Thank you!             Your Updated Medication List - Protect others around you: Learn how to safely use, store and throw away your medicines at www.disposemymeds.org.          This list is accurate as of 4/4/18  2:22 PM.  Always use your most recent med list.                   Brand Name Dispense Instructions for use Diagnosis    * amphetamine-dextroamphetamine 20 MG per tablet    ADDERALL    60 tablet    Take 1 tablet (20 mg) by mouth 2 times daily    Post concussion syndrome       * amphetamine-dextroamphetamine 20 MG per tablet   Start taking on:  4/20/2018    ADDERALL    60 tablet    Take 1 tablet (20 mg) by mouth 2 times daily    Post concussion syndrome       * amphetamine-dextroamphetamine 20 MG per tablet   Start taking on:  5/20/2018    ADDERALL    60 tablet    Take 1 tablet (20 mg) by mouth 2 times daily    Post concussion syndrome       ATIVAN 1 MG tablet   Generic drug:  LORazepam      Take 1 mg by mouth 2 times daily as needed.        * BOTOX IJ      Inject 200 Units into the muscle once Lot # /C3 with Expiration Date: 08/2020        * BOTOX IJ      Inject 225 Units as directed once Lot /C3 Exp 10/2020        budesonide 32 MCG/ACT spray    RINOCORT AQUA     Spray 1 spray into both nostrils daily.        DEPAKOTE PO      Take 250 mg by mouth every other day        eletriptan 40 MG tablet    RELPAX    12 tablet    take 1 tablet at onset of migraine. may repeat once after 2 hours. max of 2 tabs/24 hours and 3 days/week    Migraine without aura and without status migrainosus, not intractable       fexofenadine 180 MG tablet    ALLEGRA     Take  by mouth daily.        HYDROmorphone 2 MG tablet    DILAUDID    24 tablet    Take 0.5-1 tablets (1-2 mg) by mouth every 3 hours as  needed 20 tablets = 3 month supply.    SI (sacroiliac) joint dysfunction       INDOMETHACIN PO      Take 50 mg by mouth every other day        Lutein 20 MG Tabs      Take 1 tablet by mouth every other day Reported on 5/9/2017        ondansetron 4 MG tablet    ZOFRAN    90 tablet    Take 1 tablet (4 mg) by mouth every 8 hours as needed    Migraine without aura       PREMARIN 0.625 MG tablet   Generic drug:  estrogens (conjugated)      Take by mouth daily .30 mg  .        RESTASIS OP      Apply to eye 2 times daily        SEROQUEL 50 MG tablet   Generic drug:  QUEtiapine      Take 1 tablet by mouth daily.        VALTREX 1000 mg tablet   Generic drug:  valACYclovir      Take 1,000 mg by mouth as needed.        * Notice:  This list has 5 medication(s) that are the same as other medications prescribed for you. Read the directions carefully, and ask your doctor or other care provider to review them with you.

## 2018-04-04 NOTE — NURSING NOTE
Depression Response    Patient completed the PHQ-9 assessment for depression and scored >9? Yes  Question 9 on the PHQ-9 was positive for suicidality? Yes  Is the patient already receiving treatment for depression? Yes  Patient would like to speak with behavioral health team ([facility] only)? No    I personally notified the following: clinic nurse    Behavioral Health/Social Work Contact Information     [facility]  Teddy Marquez MA, LMFT  Lead Behavioral Health Clinician  Phone: [phone]  [facility] Pager: [phone]    Non-[facility]  [facility] On-Call   Pager: [phone]

## 2018-04-04 NOTE — NURSING NOTE
Chief Complaint   Patient presents with     RECHECK     [facility]- BOTOX- MIGRAINE     Mynor Zuluaga, CMA

## 2018-04-04 NOTE — PROGRESS NOTES
"BOTULINUM TOXIN PROCEDURE - HEADACHE - NOTE    Chief Complaint   Patient presents with     RECHECK     UMP- BOTOX- MIGRAINE     /88  Pulse 105  Temp 98.2  F (36.8  C) (Oral)  Resp 24  Ht 1.702 m (5' 7\")  Wt 77.1 kg (169 lb 15.6 oz)  SpO2 98%  Breastfeeding? No  BMI 26.62 kg/m2       Current Outpatient Prescriptions:      INDOMETHACIN PO, Take 50 mg by mouth every other day, Disp: , Rfl:      Divalproex Sodium (DEPAKOTE PO), Take 250 mg by mouth every other day, Disp: , Rfl:      HYDROmorphone (DILAUDID) 2 MG tablet, Take 0.5-1 tablets (1-2 mg) by mouth every 3 hours as needed 20 tablets = 3 month supply., Disp: 24 tablet, Rfl: 0     amphetamine-dextroamphetamine (ADDERALL) 20 MG per tablet, Take 1 tablet (20 mg) by mouth 2 times daily, Disp: 60 tablet, Rfl: 0     [START ON 4/20/2018] amphetamine-dextroamphetamine (ADDERALL) 20 MG per tablet, Take 1 tablet (20 mg) by mouth 2 times daily, Disp: 60 tablet, Rfl: 0     [START ON 5/20/2018] amphetamine-dextroamphetamine (ADDERALL) 20 MG per tablet, Take 1 tablet (20 mg) by mouth 2 times daily, Disp: 60 tablet, Rfl: 0     OnabotulinumtoxinA (BOTOX IJ), Inject 200 Units into the muscle once Lot # /C3 with Expiration Date: 08/2020, Disp: , Rfl:      eletriptan (RELPAX) 40 MG tablet, take 1 tablet at onset of migraine. may repeat once after 2 hours. max of 2 tabs/24 hours and 3 days/week, Disp: 12 tablet, Rfl: 4     ondansetron (ZOFRAN) 4 MG tablet, Take 1 tablet (4 mg) by mouth every 8 hours as needed, Disp: 90 tablet, Rfl: 1     CycloSPORINE (RESTASIS OP), Apply to eye 2 times daily, Disp: , Rfl:      Lutein 20 MG TABS, Take 1 tablet by mouth every other day Reported on 5/9/2017, Disp: , Rfl:      LORazepam (ATIVAN) 1 MG tablet, Take 1 mg by mouth 2 times daily as needed., Disp: , Rfl:      QUEtiapine (SEROQUEL) 50 MG tablet, Take 1 tablet by mouth daily., Disp: , Rfl:      budesonide (RINOCORT AQUA) 32 MCG/ACT nasal spray, Spray 1 spray into both " nostrils daily., Disp: , Rfl:      fexofenadine (ALLEGRA) 180 MG tablet, Take  by mouth daily., Disp: , Rfl:      estrogens, conjugated, (PREMARIN) 0.625 MG tablet, Take by mouth daily .30 mg. , Disp: , Rfl:      valACYclovir (VALTREX) 1000 mg tablet, Take 1,000 mg by mouth as needed., Disp: , Rfl:      [DISCONTINUED] OnabotulinumtoxinA (BOTOX IJ), Inject 175 Units into the muscle once Lot: [id number] Exp: 04/2020, Disp: , Rfl:      [DISCONTINUED] OnabotulinumtoxinA (BOTOX IJ), Inject 175 Units as directed once Lot#: /C3 Exp: 01/2020, Disp: , Rfl:      [DISCONTINUED] OnabotulinumtoxinA (BOTOX IJ), Inject 175 Units as directed once Lot # /C3 with Expiration Date: 10/2019, Disp: , Rfl:      [DISCONTINUED] OnabotulinumtoxinA (BOTOX IJ), Inject 175 Units into the muscle Lot # /C3  Exp: 9/2019, Disp: , Rfl:      [DISCONTINUED] OnabotulinumtoxinA (BOTOX IJ), Inject 175 Units as directed once Lot# /C3, Exp 08/2019, Disp: , Rfl:      [DISCONTINUED] OnabotulinumtoxinA (BOTOX IJ), Inject 175 Units into the muscle Lot # /C3  Exp: 6/2019, Disp: , Rfl:      [DISCONTINUED] OnabotulinumtoxinA (BOTOX IJ), Inject 175 Units into the muscle Lot # /C3  Exp: 4/2019, Disp: , Rfl:      [DISCONTINUED] OnabotulinumtoxinA (BOTOX IJ), Inject 175 Units into the muscle Lot # /c3  Exp: 2/2019, Disp: , Rfl:      Allergies   Allergen Reactions     Trazodone      Other reaction(s): Headache     Amitriptyline Hcl Other (See Comments)     Migraine       Candesartan Cilexetil-Hctz Muscle Pain (Myalgia)     Cymbalta Other (See Comments)     Migraine       Cyproheptadine Hcl      headaches     Desvenlafaxine      Migraine       Fluoxetine Other (See Comments)     Migraine       Imipramine Other (See Comments)     Migraine       Lyrica Muscle Pain (Myalgia)     Metoprolol Other (See Comments)     headache     Morphine Other (See Comments)     Patient reports seizure      Nortriptyline Other (See Comments)     Migraine        Phenergan Dm [Promethazine-Dm] Other (See Comments)     seizures     Venlafaxine Other (See Comments)     Migraine       Wellbutrin [Bupropion Hydrobromide] Other (See Comments)     Migraine       Compazine Anxiety     Dihydroergotamine Anxiety and Other (See Comments)     Cardiac symptoms     Droperidol Anxiety     Medroxyprogesterone Hives     Prochlorperazine Anxiety        PHYSICAL EXAM:    Pleasant and cooperative  In no acute distress  Has a headache today - rated 10/10  No facial asymmetry    HPI:    Patient denies new medical diagnoses, illnesses, hospitalizations, emergency room visits, and injuries since the previous injection with botulinum neurotoxin.  She had occipital nerve blocks at [facility] on 3/14/18 - this was minimally effective.      We reviewed the recommended safety guidelines for  Botox from any vaccine injection, such as the seasonal flu vaccine, by a minimum of 10-14 days with Valerie Sim. She acknowledged understanding.      RESPONSE TO PREVIOUS TREATMENT:  Change in headache pattern following last series of injections with 200 units of  Botox on 01/17/2018.    No problems reported: She had 3-5 days of malaise post injections and a rebound headache per usual for her.     1.  Headache frequency during this injection cycle:  She has daily headaches and 15 migraine days per month. This is compared to her baseline headache frequency of daily headaches.     2.  Headache duration during this injection cycle:  Her mild baseline headache varies in length throughout the day but is always present, her migraines last an few hours to up 1.5 days.    3.  Headache intensity during this injection cycle:    A.  8-9/10  =  Typical pain level.  B.  10/10  =  Worst pain level.  C.  7/10  =  Lowest pain level.    4.  Change in headache medication usage during this injection cycle: She used her max dose of Relpax, which is about 8 times per month. She was also recently prescribed IM toradol, which  has been helpful.     5.  ER Visits During This Injection Cycle:  0     6.  Functional Performance:  Change in ADL's, social interaction, days lost from work, etc. She reports missing multiple social and work obligations due to headaches, most days of the month. She is also on disability for her headaches.     BOTULINUM NEUROTOXIN INJECTION PROCEDURES:      VERIFICATION OF PATIENT IDENTIFICATION AND PROCEDURE     Initials   Patient Name SES   Patient  SES   Procedure Verified by: SES     Prior to the start of the procedure and with procedural staff participation, I verbally confirmed the patient s identity using two indicators, relevant allergies, that the procedure was appropriate and matched the consent or emergent situation, and that the correct equipment/implants were available. Immediately prior to starting the procedure I conducted the Time Out with the procedural staff and re-confirmed the patient s name, procedure, and site/side. (The Joint Commission universal protocol was followed.)  Yes    Sedation (Moderate or Deep): None    Above assessments performed by:  Addie Jerry MD    INDICATIONS FOR PROCEDURES:  Valerie Sim is a 53-year-old patient with chronic migraine headaches associated with cervicogenic components.      Her baseline symptoms have been recalcitrant to oral medications and conservative therapy. She is here today for an injection of Botox. We have received insurance approval to increase dose up to 225 units in hopes of increasing effectiveness and duration of effect. Decision was also made to change diluent to bupivacaine in attempt to decrease post-injection headache associated with this procedure.       GOAL OF PROCEDURE:  The goal of this procedure is to decrease pain and enhance functional independence.     TOTAL DOSE ADMINISTERED:  Dose Administered: 200 units Botox (Botulinum Toxin Type A)  2:1 and 1:1 Dilution   Diluent Used: 0.25% Bupivacaine   Total  Volume of Diluent Used: 3.5 mL  Lot # /C3 with Expiration Date: 10/2020  NDC #: Botox 100u ([address]-20)     Medication guide was offered to patient and was declined.     CONSENT:  The risks, benefits, and treatment options were discussed with Valerie Sim and she agreed to proceed.     Written consent was obtained by [facility].      EQUIPMENT USED:  Needle-37mm stimulating/recording  Needle-30 gauge  EMG/NCS Machine      SKIN PREPARATION:  Skin preparation was performed using an alcohol wipe.        AREA/MUSCLE INJECTED: 225 Units of Botox  1. FACE & SCALP: 115 units of Botox = Total dose, 2:1 Dilution  Right temporalis - 12.5 units of Botox in 4 site/s.  Left temporalis - 12.5 units of Botox in 4 site/s.      Right frontalis - 10 units of Botox in 4 site/s.  Left frontalis - 10 units of Botox in 4 site/s.      Right procerus - 3.5 units of Botox in 1 site/s.  Left procerus - 3.5 units of Botox in 1 site/s.      Right  - 4 units of Botox in 1 site/s.  Left  - 4 units of Botox in 1 site/s.      Right Occipitalis - 25 units of Botox at 4 site/s.   Left Occipitalis - 25 units of Botox at 4 site/s.      Right Nasalis - 2.5 units of Botox at 1 site/s.   Left Nasalis - 2.5 units of Botox at 1 site/s.       2. JAW MUSCLES: 60 units of Botox = Total Dose, 1:1 Dilution  Right Masseter - 25 units of Botox at 1 site/s.   Left Masseter - 25 units of Botox at 1 site/s.      Right temporalis - 5 units of Botox at 1 site/s.  Left temporalis - 5 units of Botox at 1 site/s.       3. SHOULDER & NECK MUSCLES: Total dose 50 units of Botox, 2:1 Dilution      Right levator muscle - 10 units of Botox at 2 site/s (neck and shoulder).  Left levator muscle - 10 units of Botox at 2 site/s (neck and shoulder).      Right mid trapezius - 5 units of Botox at 1 site.  Left mid trapezius - 5 units of Botox at 1 site.    Right lateral trapezius - 10 units of Botox at 2 site/s  Left lateral trapezuis - 10 units of Botox at 2  site/s.          RESPONSE TO PROCEDURE: Valerie Sim tolerated the procedure well and there were no immediate complications. She was allowed to recover for an appropriate period of time and was discharged home in stable condition.     FOLLOW UP:  Valerie Sim was asked to follow up by phone in 7-14 days with Nely Jean Baptiste PT, Care Coordinator or Estela Magaña RN, Care Coordinator, to report her response to this series of injections. Based on the patient's previous response to this therapy, Valerie Sim was rescheduled for the next series of injections in 9 weeks. Due to a limited duration of Botox effectiveness (approximately 8-9 weeks), we will continue to schedule her Botox every 9 weeks.      PLAN (Medication Changes, Therapy Orders, Work or Disability Issues, etc.): Diluent changed from normal saline to Bupivacaine and dose of Botox increased from 200 units to 225 units in hopes of achieving increased effectiveness and duration. Patient will continue to monitor response to today's injections.     There was 75 units of unavoidable waste for this patient.

## 2018-04-05 ASSESSMENT — PATIENT HEALTH QUESTIONNAIRE - PHQ9: SUM OF ALL RESPONSES TO PHQ QUESTIONS 1-9: 17

## 2018-04-12 ENCOUNTER — HOSPITAL ENCOUNTER (EMERGENCY)
Facility: CLINIC | Age: 53
Discharge: HOME | End: 2018-04-12
Attending: EMERGENCY MEDICINE | Admitting: EMERGENCY MEDICINE
Payer: COMMERCIAL

## 2018-04-12 VITALS
HEART RATE: 116 BPM | DIASTOLIC BLOOD PRESSURE: 81 MMHG | WEIGHT: 169.97 LBS | TEMPERATURE: 96.9 F | RESPIRATION RATE: 15 BRPM | SYSTOLIC BLOOD PRESSURE: 143 MMHG | BODY MASS INDEX: 26.62 KG/M2 | OXYGEN SATURATION: 100 %

## 2018-04-12 DIAGNOSIS — G43.511 INTRACTABLE PERSISTENT MIGRAINE AURA WITHOUT CEREBRAL INFARCTION AND WITH STATUS MIGRAINOSUS: ICD-10-CM

## 2018-04-12 LAB
ANION GAP SERPL CALCULATED.3IONS-SCNC: 9 MMOL/L (ref 3–14)
BASOPHILS # BLD AUTO: 0 10E9/L (ref 0–0.2)
BASOPHILS NFR BLD AUTO: 0.3 %
BUN SERPL-MCNC: 23 MG/DL (ref 7–30)
CALCIUM SERPL-MCNC: 9.5 MG/DL (ref 8.5–10.1)
CHLORIDE SERPL-SCNC: 106 MMOL/L (ref 94–109)
CO2 SERPL-SCNC: 27 MMOL/L (ref 20–32)
CREAT SERPL-MCNC: 0.72 MG/DL (ref 0.52–1.04)
DIFFERENTIAL METHOD BLD: NORMAL
EOSINOPHIL # BLD AUTO: 0.1 10E9/L (ref 0–0.7)
EOSINOPHIL NFR BLD AUTO: 0.8 %
ERYTHROCYTE [DISTWIDTH] IN BLOOD BY AUTOMATED COUNT: 13.6 % (ref 10–15)
GFR SERPL CREATININE-BSD FRML MDRD: 84 ML/MIN/1.7M2
GLUCOSE SERPL-MCNC: 97 MG/DL (ref 70–99)
HCT VFR BLD AUTO: 44.3 % (ref 35–47)
HGB BLD-MCNC: 14.4 G/DL (ref 11.7–15.7)
IMM GRANULOCYTES # BLD: 0 10E9/L (ref 0–0.4)
IMM GRANULOCYTES NFR BLD: 0.2 %
LYMPHOCYTES # BLD AUTO: 1.8 10E9/L (ref 0.8–5.3)
LYMPHOCYTES NFR BLD AUTO: 29.4 %
MCH RBC QN AUTO: 30.1 PG (ref 26.5–33)
MCHC RBC AUTO-ENTMCNC: 32.5 G/DL (ref 31.5–36.5)
MCV RBC AUTO: 93 FL (ref 78–100)
MONOCYTES # BLD AUTO: 0.3 10E9/L (ref 0–1.3)
MONOCYTES NFR BLD AUTO: 5 %
NEUTROPHILS # BLD AUTO: 4 10E9/L (ref 1.6–8.3)
NEUTROPHILS NFR BLD AUTO: 64.3 %
NRBC # BLD AUTO: 0 10*3/UL
NRBC BLD AUTO-RTO: 0 /100
PLATELET # BLD AUTO: 272 10E9/L (ref 150–450)
POTASSIUM SERPL-SCNC: 3.6 MMOL/L (ref 3.4–5.3)
RBC # BLD AUTO: 4.78 10E12/L (ref 3.8–5.2)
SODIUM SERPL-SCNC: 142 MMOL/L (ref 133–144)
WBC # BLD AUTO: 6.2 10E9/L (ref 4–11)

## 2018-04-12 PROCEDURE — 80048 BASIC METABOLIC PNL TOTAL CA: CPT | Performed by: EMERGENCY MEDICINE

## 2018-04-12 PROCEDURE — 96374 THER/PROPH/DIAG INJ IV PUSH: CPT | Performed by: EMERGENCY MEDICINE

## 2018-04-12 PROCEDURE — 96375 TX/PRO/DX INJ NEW DRUG ADDON: CPT | Performed by: EMERGENCY MEDICINE

## 2018-04-12 PROCEDURE — 96376 TX/PRO/DX INJ SAME DRUG ADON: CPT | Performed by: EMERGENCY MEDICINE

## 2018-04-12 PROCEDURE — 25000128 H RX IP 250 OP 636: Performed by: EMERGENCY MEDICINE

## 2018-04-12 PROCEDURE — 99285 EMERGENCY DEPT VISIT HI MDM: CPT | Mod: Z6 | Performed by: EMERGENCY MEDICINE

## 2018-04-12 PROCEDURE — 85025 COMPLETE CBC W/AUTO DIFF WBC: CPT | Performed by: EMERGENCY MEDICINE

## 2018-04-12 PROCEDURE — 99285 EMERGENCY DEPT VISIT HI MDM: CPT | Mod: 25 | Performed by: EMERGENCY MEDICINE

## 2018-04-12 PROCEDURE — 96361 HYDRATE IV INFUSION ADD-ON: CPT | Performed by: EMERGENCY MEDICINE

## 2018-04-12 RX ORDER — LORAZEPAM 2 MG/ML
1 INJECTION INTRAMUSCULAR ONCE
Status: COMPLETED | OUTPATIENT
Start: 2018-04-12 | End: 2018-04-12

## 2018-04-12 RX ORDER — HYDROMORPHONE HYDROCHLORIDE 2 MG/ML
2 INJECTION, SOLUTION INTRAMUSCULAR; INTRAVENOUS; SUBCUTANEOUS ONCE
Status: COMPLETED | OUTPATIENT
Start: 2018-04-12 | End: 2018-04-12

## 2018-04-12 RX ORDER — ONDANSETRON 2 MG/ML
4 INJECTION INTRAMUSCULAR; INTRAVENOUS ONCE
Status: COMPLETED | OUTPATIENT
Start: 2018-04-12 | End: 2018-04-12

## 2018-04-12 RX ORDER — KETOROLAC TROMETHAMINE 30 MG/ML
30 INJECTION, SOLUTION INTRAMUSCULAR; INTRAVENOUS ONCE
Status: COMPLETED | OUTPATIENT
Start: 2018-04-12 | End: 2018-04-12

## 2018-04-12 RX ADMIN — HYDROMORPHONE HYDROCHLORIDE 2 MG: 2 INJECTION, SOLUTION INTRAMUSCULAR; INTRAVENOUS; SUBCUTANEOUS at 15:38

## 2018-04-12 RX ADMIN — ONDANSETRON 4 MG: 2 INJECTION INTRAMUSCULAR; INTRAVENOUS at 14:17

## 2018-04-12 RX ADMIN — LORAZEPAM 1 MG: 2 INJECTION INTRAMUSCULAR; INTRAVENOUS at 14:28

## 2018-04-12 RX ADMIN — SODIUM CHLORIDE, POTASSIUM CHLORIDE, SODIUM LACTATE AND CALCIUM CHLORIDE 1000 ML: 600; 310; 30; 20 INJECTION, SOLUTION INTRAVENOUS at 14:28

## 2018-04-12 RX ADMIN — KETOROLAC TROMETHAMINE 30 MG: 30 INJECTION, SOLUTION INTRAMUSCULAR at 14:24

## 2018-04-12 RX ADMIN — ONDANSETRON 4 MG: 2 INJECTION INTRAMUSCULAR; INTRAVENOUS at 16:34

## 2018-04-12 RX ADMIN — HYDROMORPHONE HYDROCHLORIDE 2 MG: 2 INJECTION, SOLUTION INTRAMUSCULAR; INTRAVENOUS; SUBCUTANEOUS at 14:20

## 2018-04-12 RX ADMIN — LORAZEPAM 1 MG: 2 INJECTION INTRAMUSCULAR; INTRAVENOUS at 15:35

## 2018-04-12 ASSESSMENT — ENCOUNTER SYMPTOMS
HEADACHES: 1
VOMITING: 1
PHOTOPHOBIA: 1
NAUSEA: 1

## 2018-04-12 NOTE — ED AVS SNAPSHOT
[facility], [place], Emergency Department    [address] [place] [id number]    Phone:  [phone]                                       Valerie Sim   MRN: [id number]    Department:  [facility], Emergency Department   Date of Visit:  4/12/2018           After Visit Summary Signature Page     I have received my discharge instructions, and my questions have been answered. I have discussed any challenges I see with this plan with the nurse or doctor.    ..........................................................................................................................................  Patient/Patient Representative Signature      ..........................................................................................................................................  Patient Representative Print Name and Relationship to Patient    ..................................................               ................................................  Date                                            Time    ..........................................................................................................................................  Reviewed by Signature/Title    ...................................................              ..............................................  Date                                                            Time

## 2018-04-12 NOTE — ED AVS SNAPSHOT
[facility], Emergency Department    [address] [address]    Phone:  [phone]                                       Valerie Sim   MRN: [id number]    Department:  [facility], Emergency Department   Date of Visit:  4/12/2018           Patient Information     Date Of Birth          1965        Your diagnoses for this visit were:     Intractable persistent migraine aura without cerebral infarction and with status migrainosus        You were seen by Laura Alvarado MD and Tonia Mendoza MD.        Discharge Instructions       Thank you for coming to the [facility] Emergency Department.       Please continue to follow your Migraine Action Plan.    Follow up with Dr. Johnston as scheduled. Please call the neurology clinic if headaches continue to be severe.       Monitor for fevers or other infectious symptoms.     Return for arm or leg weakness, confusion, inability to walk or stand.     Your next 10 appointments already scheduled     Jun 06, 2018 10:00 AM CDT   (Arrive by 9:45 AM)   Return Botox with Addie Malhotra MD   [facility] ([facility])    [address] [place] [id number]   [phone]            Aug 13, 2018 11:00 AM CDT   (Arrive by 10:45 AM)   Return Botox with Juan Jose Jerry MD   [facility] ([facility])    [address] [place] [address]   [phone]            Oct 02, 2018  2:00 PM CDT   Return Visit with Boyd Johnston MD   [facility] ([facility])    [address] [place] [id number] [phone]              24 Hour Appointment Hotline       To make an appointment at any [facility], call [phone] ([phone]). If you don't have a family doctor or clinic, we will help you find one.  [facility] clinics are conveniently located to serve the needs of you and your family.             Review of your medicines      Our records show that you are taking the medicines listed below. If these are incorrect, please call your family doctor or clinic.        Dose / Directions Last dose taken    * amphetamine-dextroamphetamine 20 MG per tablet   Commonly known as:  ADDERALL   Dose:  20 mg   Quantity:  60 tablet        Take 1 tablet (20 mg) by mouth 2 times daily   Refills:  0        * amphetamine-dextroamphetamine 20 MG per tablet   Commonly known as:  ADDERALL   Dose:  20 mg   Quantity:  60 tablet   Start taking on:  4/20/2018        Take 1 tablet (20 mg) by mouth 2 times daily   Refills:  0        * amphetamine-dextroamphetamine 20 MG per tablet   Commonly known as:  ADDERALL   Dose:  20 mg   Quantity:  60 tablet   Start taking on:  5/20/2018        Take 1 tablet (20 mg) by mouth 2 times daily   Refills:  0        ATIVAN 1 MG tablet   Dose:  1 mg   Generic drug:  LORazepam        Take 1 mg by mouth 2 times daily as needed.   Refills:  0        * BOTOX IJ   Dose:  200 Units        Inject 200 Units into the muscle once Lot # /C3 with Expiration Date: 08/2020   Refills:  0        * BOTOX IJ   Dose:  225 Units        Inject 225 Units as directed once Lot /C3 Exp 10/2020   Refills:  0        budesonide 32 MCG/ACT spray   Commonly known as:  RINOCORT AQUA   Dose:  1 spray        Spray 1 spray into both nostrils daily.   Refills:  0        DEPAKOTE PO   Dose:  250 mg        Take 250 mg by mouth every other day   Refills:  0        eletriptan 40 MG tablet   Commonly known as:  RELPAX   Quantity:  12 tablet        take 1 tablet at onset of migraine. may repeat once after 2 hours. max of 2 tabs/24 hours and 3 days/week   Refills:  4        fexofenadine 180 MG tablet   Commonly known as:  ALLEGRA        Take  by mouth daily.   Refills:  0        HYDROmorphone 2 MG tablet   Commonly known as:  DILAUDID   Dose:   1-2 mg   Quantity:  24 tablet        Take 0.5-1 tablets (1-2 mg) by mouth every 3 hours as needed 20 tablets = 3 month supply.   Refills:  0        INDOMETHACIN PO   Dose:  50 mg        Take 50 mg by mouth every other day   Refills:  0        Lutein 20 MG Tabs   Dose:  1 tablet        Take 1 tablet by mouth every other day Reported on 5/9/2017   Refills:  0        ondansetron 4 MG tablet   Commonly known as:  ZOFRAN   Dose:  4 mg   Quantity:  90 tablet        Take 1 tablet (4 mg) by mouth every 8 hours as needed   Refills:  1        PREMARIN 0.625 MG tablet   Generic drug:  estrogens (conjugated)        Take by mouth daily .30 mg  .   Refills:  0        RESTASIS OP        Apply to eye 2 times daily   Refills:  0        SEROQUEL 50 MG tablet   Dose:  1 tablet   Generic drug:  QUEtiapine        Take 1 tablet by mouth daily.   Refills:  0        VALTREX 1000 mg tablet   Dose:  1000 mg   Generic drug:  valACYclovir        Take 1,000 mg by mouth as needed.   Refills:  0        * Notice:  This list has 5 medication(s) that are the same as other medications prescribed for you. Read the directions carefully, and ask your doctor or other care provider to review them with you.            Procedures and tests performed during your visit     Basic metabolic panel    CBC with platelets differential    Peripheral IV catheter      Orders Needing Specimen Collection     None      Pending Results     No orders found from 4/10/2018 to 4/13/2018.            Pending Culture Results     No orders found from 4/10/2018 to 4/13/2018.            Pending Results Instructions     If you had any lab results that were not finalized at the time of your Discharge, you can call the ED Lab Result RN at [phone]. You will be contacted by this team for any positive Lab results or changes in treatment. The nurses are available 7 days a week from 10A to 6:30P.  You can leave a message 24 hours per day and they will return your call.        Thank  you for choosing [facility]       Thank you for choosing [facility] for your care. Our goal is always to provide you with excellent care. Hearing back from our patients is one way we can continue to improve our services. Please take a few minutes to complete the written survey that you may receive in the mail after you visit with us. Thank you!        [facility] Information     [facility] gives you secure access to your electronic health record. If you see a primary care provider, you can also send messages to your care team and make appointments. If you have questions, please call your primary care clinic.  If you do not have a primary care provider, please call [phone] and they will assist you.        Care EveryWhere ID     This is your Care EveryWhere ID. This could be used by other organizations to access your [facility] medical records  [id number]        Equal Access to Services     JAYLEN MONTES : Emerita [place], mateus dozier, daisy solitario, kimberly helton. So [facility] [phone].    ATENCIÓN: Si habla español, tiene a daniels disposición servicios gratuitos de asistencia lingüística. Llame al [phone].    We comply with applicable federal civil rights laws and [place] laws. We do not discriminate on the basis of race, color, national origin, age, disability, sex, sexual orientation, or gender identity.            After Visit Summary       This is your record. Keep this with you and show to your community pharmacist(s) and doctor(s) at your next visit.

## 2018-04-12 NOTE — ED NOTES
Patient presents ambulatory from home with complaints of migraine headache and vomiting. Hx of migraine headaches.

## 2018-04-12 NOTE — DISCHARGE INSTRUCTIONS
Thank you for coming to the [facility] Emergency Department.       Please continue to follow your Migraine Action Plan.    Follow up with Dr. Johnston as scheduled. Please call the neurology clinic if headaches continue to be severe.       Monitor for fevers or other infectious symptoms.     Return for arm or leg weakness, confusion, inability to walk or stand.

## 2018-04-13 NOTE — ED NOTES
Patient signed out to me by my partner.  Patient came in with severe migraine headache.  My partner treated the patient per her migraine action plan design by her neurologist, Dr. Johnston.  When I evaluated the patient she was quite sedated.  She received 4 mg of intravenous Dilaudid and 2 mg of intravenous lorazepam prior to my arrival.  She was observed in the emergency department on continuous cardiac and pulse oximetry monitoring without any adverse events.  She has since sobered up at this point she will be discharged with her  who will drive her home.  She will contact her neurologist tomorrow for follow-up.     Tonia Mendoza MD  04/12/18 0883

## 2018-06-06 ENCOUNTER — OFFICE VISIT (OUTPATIENT)
Dept: PHYSICAL MEDICINE AND REHAB | Facility: CLINIC | Age: 53
End: 2018-06-06
Payer: COMMERCIAL

## 2018-06-06 ENCOUNTER — MYC MEDICAL ADVICE (OUTPATIENT)
Dept: NEUROLOGY | Facility: CLINIC | Age: 53
End: 2018-06-06

## 2018-06-06 VITALS
DIASTOLIC BLOOD PRESSURE: 91 MMHG | SYSTOLIC BLOOD PRESSURE: 155 MMHG | HEIGHT: 67 IN | TEMPERATURE: 98 F | WEIGHT: 171.7 LBS | BODY MASS INDEX: 26.95 KG/M2 | HEART RATE: 96 BPM

## 2018-06-06 DIAGNOSIS — G43.719 INTRACTABLE CHRONIC MIGRAINE WITHOUT AURA AND WITHOUT STATUS MIGRAINOSUS: Primary | ICD-10-CM

## 2018-06-06 ASSESSMENT — PAIN SCALES - GENERAL: PAINLEVEL: EXTREME PAIN (8)

## 2018-06-06 NOTE — PROGRESS NOTES
"BOTULINUM TOXIN PROCEDURE - HEADACHE - NOTE    Chief Complaint   Patient presents with     RECHECK     [facility] RETURN - BOTOX     BP (!) 155/91 (BP Location: Right arm, Patient Position: Sitting, Cuff Size: Adult Regular)  Pulse 96  Temp 98  F (36.7  C) (Oral)  Ht 1.702 m (5' 7\")  Wt 77.9 kg (171 lb 11.2 oz)  BMI 26.89 kg/m2     Current Outpatient Prescriptions:      amphetamine-dextroamphetamine (ADDERALL) 20 MG per tablet, Take 1 tablet (20 mg) by mouth 2 times daily, Disp: 60 tablet, Rfl: 0     amphetamine-dextroamphetamine (ADDERALL) 20 MG per tablet, Take 1 tablet (20 mg) by mouth 2 times daily, Disp: 60 tablet, Rfl: 0     amphetamine-dextroamphetamine (ADDERALL) 20 MG per tablet, Take 1 tablet (20 mg) by mouth 2 times daily, Disp: 60 tablet, Rfl: 0     budesonide (RINOCORT AQUA) 32 MCG/ACT nasal spray, Spray 1 spray into both nostrils daily., Disp: , Rfl:      CycloSPORINE (RESTASIS OP), Apply to eye 2 times daily, Disp: , Rfl:      Divalproex Sodium (DEPAKOTE PO), Take 250 mg by mouth every other day, Disp: , Rfl:      eletriptan (RELPAX) 40 MG tablet, take 1 tablet at onset of migraine. may repeat once after 2 hours. max of 2 tabs/24 hours and 3 days/week, Disp: 12 tablet, Rfl: 4     estrogens, conjugated, (PREMARIN) 0.625 MG tablet, Take by mouth daily .30 mg. , Disp: , Rfl:      fexofenadine (ALLEGRA) 180 MG tablet, Take  by mouth daily., Disp: , Rfl:      HYDROmorphone (DILAUDID) 2 MG tablet, Take 0.5-1 tablets (1-2 mg) by mouth every 3 hours as needed 20 tablets = 3 month supply., Disp: 24 tablet, Rfl: 0     INDOMETHACIN PO, Take 50 mg by mouth every other day, Disp: , Rfl:      LORazepam (ATIVAN) 1 MG tablet, Take 1 mg by mouth 2 times daily as needed., Disp: , Rfl:      Lutein 20 MG TABS, Take 1 tablet by mouth every other day Reported on 5/9/2017, Disp: , Rfl:      OnabotulinumtoxinA (BOTOX IJ), Inject 225 Units as directed once Lot /C3 Exp 10/2020, Disp: , Rfl:      OnabotulinumtoxinA (BOTOX " JACINDA), Inject 200 Units into the muscle once Lot # /C3 with Expiration Date: 08/2020, Disp: , Rfl:      ondansetron (ZOFRAN) 4 MG tablet, Take 1 tablet (4 mg) by mouth every 8 hours as needed, Disp: 90 tablet, Rfl: 1     QUEtiapine (SEROQUEL) 50 MG tablet, Take 1 tablet by mouth daily., Disp: , Rfl:      valACYclovir (VALTREX) 1000 mg tablet, Take 1,000 mg by mouth as needed., Disp: , Rfl:      Allergies   Allergen Reactions     Trazodone      Other reaction(s): Headache     Amitriptyline Hcl Other (See Comments)     Migraine       Candesartan Cilexetil-Hctz Muscle Pain (Myalgia)     Cymbalta Other (See Comments)     Migraine       Cyproheptadine Hcl      headaches     Desvenlafaxine      Migraine       Fluoxetine Other (See Comments)     Migraine       Imipramine Other (See Comments)     Migraine       Lyrica Muscle Pain (Myalgia)     Metoprolol Other (See Comments)     headache     Morphine Other (See Comments)     Patient reports seizure      Nortriptyline Other (See Comments)     Migraine       Phenergan Dm [Promethazine-Dm] Other (See Comments)     seizures     Venlafaxine Other (See Comments)     Migraine       Wellbutrin [Bupropion Hydrobromide] Other (See Comments)     Migraine       Compazine Anxiety     Dihydroergotamine Anxiety and Other (See Comments)     Cardiac symptoms     Droperidol Anxiety     Medroxyprogesterone Hives     Prochlorperazine Anxiety        PHYSICAL EXAM:    Pleasant and cooperative  In no acute distress  Has a headache today - rated 7-8/10  No facial asymmetry    HPI:    Patient denies new medical diagnoses, illnesses, hospitalizations, emergency room visits, and injuries since the previous injection with botulinum neurotoxin.      We reviewed the recommended safety guidelines for  Botox from any vaccine injection, such as the seasonal flu vaccine, by a minimum of 10-14 days with Valerie Sim. She acknowledged understanding.      RESPONSE TO PREVIOUS TREATMENT:  Change  in headache pattern following last series of injections with 200 units of  Botox on 2018.    No problems reported: She had 3-4 days of malaise post injections and a rebound headache per usual for her. She feels like adding the bupivacaine has really helped her.    1.  Headache frequency during this injection cycle:  She has daily headaches and 12-13 migraine days per month. This is compared to her baseline headache frequency of daily headaches. Her first 4 weeks were the worst, as she was a little behind with her last schedule at 12 weeks, normally she comes in at 9 weeks.    2.  Headache duration during this injection cycle:  Her mild baseline headache varies in length throughout the day but is always present, her migraines last an few hours to up 1.5 days, and this pattern remains the same.    3.  Headache intensity during this injection cycle:    A.  8-9/10  =  Typical pain level.  B.  10/10  =  Worst pain level.  C.  6-7/10  =  Lowest pain level.    4.  Change in headache medication usage during this injection cycle: She used her max dose of Relpax, which is about 8 times per month. She was also recently prescribed IM toradol, which has been helpful.     5.  ER Visits During This Injection Cycle:  0     6.  Functional Performance:  Change in ADL's, social interaction, days lost from work, etc. She reports missing  multiple social and work obligations due to headaches, more at the beginning of the cycle (about 4 or so weeks).  She is also on disability for her headaches.     BOTULINUM NEUROTOXIN INJECTION PROCEDURES:      VERIFICATION OF PATIENT IDENTIFICATION AND PROCEDURE     Initials   Patient Name PAG   Patient  PAG   Procedure Verified by: PAG     Prior to the start of the procedure and with procedural staff participation, I verbally confirmed the patient s identity using two indicators, relevant allergies, that the procedure was appropriate and matched the consent or emergent situation, and that  the correct equipment/implants were available. Immediately prior to starting the procedure I conducted the Time Out with the procedural staff and re-confirmed the patient s name, procedure, and site/side. (The Joint Commission universal protocol was followed.)  Yes    Sedation (Moderate or Deep): None    Above assessments performed by:    ALFONSO Ferrera, Care Coordinator    Addie Malhotra MD      INDICATIONS FOR PROCEDURES:  Valerie Sim is a 53-year-old patient with chronic migraine headaches associated with cervicogenic components.      Her baseline symptoms have been recalcitrant to oral medications and conservative therapy. She is here today for an injection of Botox. We have received insurance approval to increase dose up to 225 units in hopes of increasing effectiveness and duration of effect. Decision was also made to change diluent to bupivacaine in attempt to decrease post-injection headache associated with this procedure.       GOAL OF PROCEDURE:  The goal of this procedure is to decrease pain and enhance functional independence.     TOTAL DOSE ADMINISTERED:  Dose Administered: 225 units Botox (Botulinum Toxin Type A)  2:1 and 1:1 Dilution   Diluent Used: 0.25% Bupivacaine (Batch: [id number], Exp: 12/2019)  Total Volume of Diluent Used: See below  Lot # /C3 with Expiration Date: 11/2020  NDC #: Botox 100u ([address]-88)     Medication guide was offered to patient and was declined.     CONSENT:  The risks, benefits, and treatment options were discussed with Valerie Sim and she agreed to proceed.     Written consent was obtained by [facility].      EQUIPMENT USED:  Needle-37mm stimulating/recording  Needle-30 gauge  EMG/NCS Machine      SKIN PREPARATION:  Skin preparation was performed using an alcohol wipe.        AREA/MUSCLE INJECTED: 225 Units of Botox  1. FACE & SCALP: 115 units of Botox = Total dose, 2:1 Dilution  Right temporalis - 12.5 units of Botox in 4 site/s.  Left temporalis - 12.5  units of Botox in 4 site/s.      Right frontalis - 10 units of Botox in 4 site/s.  Left frontalis - 10 units of Botox in 4 site/s.      Right procerus - 3.5 units of Botox in 1 site/s.  Left procerus - 3.5 units of Botox in 1 site/s.      Right  - 4 units of Botox in 1 site/s.  Left  - 4 units of Botox in 1 site/s.      Right Occipitalis - 25 units of Botox at 4 site/s.   Left Occipitalis - 25 units of Botox at 4 site/s.      Right Nasalis - 2.5 units of Botox at 1 site/s.   Left Nasalis - 2.5 units of Botox at 1 site/s.       2. JAW MUSCLES: 60 units of Botox = Total Dose, 1:1 Dilution  Right Masseter - 25 units of Botox at 1 site/s.   Left Masseter - 25 units of Botox at 1 site/s.      Right temporalis - 5 units of Botox at 1 site/s.  Left temporalis - 5 units of Botox at 1 site/s.       3. SHOULDER & NECK MUSCLES: Total dose 50 units of Botox, 2:1 Dilution      Right levator muscle - 10 units of Botox at 2 site/s (neck and shoulder).  Left levator muscle - 10 units of Botox at 2 site/s (neck and shoulder).      Right mid trapezius - 5 units of Botox at 1 site.  Left mid trapezius - 5 units of Botox at 1 site.    Right lateral trapezius - 10 units of Botox at 2 site/s  Left lateral trapezuis - 10 units of Botox at 2 site/s.          RESPONSE TO PROCEDURE: Valerie Sim tolerated the procedure well and there were no immediate complications. She was allowed to recover for an appropriate period of time and was discharged home in stable condition.     FOLLOW UP:  Valerie Sim was asked to follow up by phone in 7-14 days with Nely Jean Baptiste PT, Care Coordinator or Estela Magaña RN, Care Coordinator, to report her response to this series of injections. Based on the patient's previous response to this therapy, Valerie Sim was rescheduled for the next series of injections in 9 weeks. Due to a limited duration of Botox effectiveness (approximately 8-9 weeks), we will continue to schedule her  Botox every 9 weeks.      PLAN (Medication Changes, Therapy Orders, Work or Disability Issues, etc.): Diluent changed from normal saline to Bupivacaine and dose of Botox increased from 200 units to 225 units in hopes of achieving increased effectiveness and duration. Patient will continue to monitor response to today's injections.     There were 75 units of unavoidable Botox waste for this patient.

## 2018-06-06 NOTE — LETTER
"6/6/2018       RE: Valerie Sim  [address]  [place] [address]     Dear Colleague,    Thank you for referring your patient, Valerie Sim, to the [facility]. Please see a copy of my visit note below.    BOTULINUM TOXIN PROCEDURE - HEADACHE - NOTE    Chief Complaint   Patient presents with     RECHECK     [facility] RETURN - BOTOX     BP (!) 155/91 (BP Location: Right arm, Patient Position: Sitting, Cuff Size: Adult Regular)  Pulse 96  Temp 98  F (36.7  C) (Oral)  Ht 1.702 m (5' 7\")  Wt 77.9 kg (171 lb 11.2 oz)  BMI 26.89 kg/m2     Current Outpatient Prescriptions:      amphetamine-dextroamphetamine (ADDERALL) 20 MG per tablet, Take 1 tablet (20 mg) by mouth 2 times daily, Disp: 60 tablet, Rfl: 0     amphetamine-dextroamphetamine (ADDERALL) 20 MG per tablet, Take 1 tablet (20 mg) by mouth 2 times daily, Disp: 60 tablet, Rfl: 0     amphetamine-dextroamphetamine (ADDERALL) 20 MG per tablet, Take 1 tablet (20 mg) by mouth 2 times daily, Disp: 60 tablet, Rfl: 0     budesonide (RINOCORT AQUA) 32 MCG/ACT nasal spray, Spray 1 spray into both nostrils daily., Disp: , Rfl:      CycloSPORINE (RESTASIS OP), Apply to eye 2 times daily, Disp: , Rfl:      Divalproex Sodium (DEPAKOTE PO), Take 250 mg by mouth every other day, Disp: , Rfl:      eletriptan (RELPAX) 40 MG tablet, take 1 tablet at onset of migraine. may repeat once after 2 hours. max of 2 tabs/24 hours and 3 days/week, Disp: 12 tablet, Rfl: 4     estrogens, conjugated, (PREMARIN) 0.625 MG tablet, Take by mouth daily .30 mg. , Disp: , Rfl:      fexofenadine (ALLEGRA) 180 MG tablet, Take  by mouth daily., Disp: , Rfl:      HYDROmorphone (DILAUDID) 2 MG tablet, Take 0.5-1 tablets (1-2 mg) by mouth every 3 hours as needed 20 tablets = 3 month supply., Disp: 24 tablet, Rfl: 0     INDOMETHACIN PO, Take 50 mg by mouth every other day, Disp: , Rfl:      LORazepam " (ATIVAN) 1 MG tablet, Take 1 mg by mouth 2 times daily as needed., Disp: , Rfl:      Lutein 20 MG TABS, Take 1 tablet by mouth every other day Reported on 5/9/2017, Disp: , Rfl:      OnabotulinumtoxinA (BOTOX IJ), Inject 225 Units as directed once Lot /C3 Exp 10/2020, Disp: , Rfl:      OnabotulinumtoxinA (BOTOX IJ), Inject 200 Units into the muscle once Lot # /C3 with Expiration Date: 08/2020, Disp: , Rfl:      ondansetron (ZOFRAN) 4 MG tablet, Take 1 tablet (4 mg) by mouth every 8 hours as needed, Disp: 90 tablet, Rfl: 1     QUEtiapine (SEROQUEL) 50 MG tablet, Take 1 tablet by mouth daily., Disp: , Rfl:      valACYclovir (VALTREX) 1000 mg tablet, Take 1,000 mg by mouth as needed., Disp: , Rfl:      Allergies   Allergen Reactions     Trazodone      Other reaction(s): Headache     Amitriptyline Hcl Other (See Comments)     Migraine       Candesartan Cilexetil-Hctz Muscle Pain (Myalgia)     Cymbalta Other (See Comments)     Migraine       Cyproheptadine Hcl      headaches     Desvenlafaxine      Migraine       Fluoxetine Other (See Comments)     Migraine       Imipramine Other (See Comments)     Migraine       Lyrica Muscle Pain (Myalgia)     Metoprolol Other (See Comments)     headache     Morphine Other (See Comments)     Patient reports seizure      Nortriptyline Other (See Comments)     Migraine       Phenergan Dm [Promethazine-Dm] Other (See Comments)     seizures     Venlafaxine Other (See Comments)     Migraine       Wellbutrin [Bupropion Hydrobromide] Other (See Comments)     Migraine       Compazine Anxiety     Dihydroergotamine Anxiety and Other (See Comments)     Cardiac symptoms     Droperidol Anxiety     Medroxyprogesterone Hives     Prochlorperazine Anxiety        PHYSICAL EXAM:    Pleasant and cooperative  In no acute distress  Has a headache today - rated 7-8/10  No facial asymmetry    HPI:    Patient denies new medical diagnoses, illnesses, hospitalizations, emergency room visits, and  injuries since the previous injection with botulinum neurotoxin.      We reviewed the recommended safety guidelines for  Botox from any vaccine injection, such as the seasonal flu vaccine, by a minimum of 10-14 days with Valerie Sim. She acknowledged understanding.      RESPONSE TO PREVIOUS TREATMENT:  Change in headache pattern following last series of injections with 200 units of  Botox on 04/04/2018.    No problems reported: She had 3-4 days of malaise post injections and a rebound headache per usual for her. She feels like adding the bupivacaine has really helped her.    1.  Headache frequency during this injection cycle:  She has daily headaches and 12-13 migraine days per month. This is compared to her baseline headache frequency of daily headaches. Her first 4 weeks were the worst, as she was a little behind with her last schedule at 12 weeks, normally she comes in at 9 weeks.    2.  Headache duration during this injection cycle:  Her mild baseline headache varies in length throughout the day but is always present, her migraines last an few hours to up 1.5 days, and this pattern remains the same.    3.  Headache intensity during this injection cycle:    A.  8-9/10  =  Typical pain level.  B.  10/10  =  Worst pain level.  C.  6-7/10  =  Lowest pain level.    4.  Change in headache medication usage during this injection cycle: She used her max dose of Relpax, which is about 8 times per month. She was also recently prescribed IM toradol, which has been helpful.     5.  ER Visits During This Injection Cycle:  0     6.  Functional Performance:  Change in ADL's, social interaction, days lost from work, etc. She reports missing  multiple social and work obligations due to headaches, more at the beginning of the cycle (about 4 or so weeks).  She is also on disability for her headaches.     BOTULINUM NEUROTOXIN INJECTION PROCEDURES:      VERIFICATION OF PATIENT IDENTIFICATION AND PROCEDURE     Initials    Patient Name PAG   Patient  PAG   Procedure Verified by: PAG     Prior to the start of the procedure and with procedural staff participation, I verbally confirmed the patient s identity using two indicators, relevant allergies, that the procedure was appropriate and matched the consent or emergent situation, and that the correct equipment/implants were available. Immediately prior to starting the procedure I conducted the Time Out with the procedural staff and re-confirmed the patient s name, procedure, and site/side. (The Joint Commission universal protocol was followed.)  Yes    Sedation (Moderate or Deep): None    Above assessments performed by:    ALFONSO Ferrera, Care Coordinator    Addie Malhotra MD      INDICATIONS FOR PROCEDURES:  Valerie Sim is a 53-year-old patient with chronic migraine headaches associated with cervicogenic components.      Her baseline symptoms have been recalcitrant to oral medications and conservative therapy. She is here today for an injection of Botox. We have received insurance approval to increase dose up to 225 units in hopes of increasing effectiveness and duration of effect. Decision was also made to change diluent to bupivacaine in attempt to decrease post-injection headache associated with this procedure.       GOAL OF PROCEDURE:  The goal of this procedure is to decrease pain and enhance functional independence.     TOTAL DOSE ADMINISTERED:  Dose Administered: 225 units Botox (Botulinum Toxin Type A)  2:1 and 1:1 Dilution   Diluent Used: 0.25% Bupivacaine (Batch: [id number], Exp: 2019)  Total Volume of Diluent Used: See below  Lot # /C3 with Expiration Date: 2020  NDC #: Botox 100u ([address]-68)     Medication guide was offered to patient and was declined.     CONSENT:  The risks, benefits, and treatment options were discussed with Valerie Sim and she agreed to proceed.     Written consent was obtained by [facility].      EQUIPMENT USED:  Needle-37mm  stimulating/recording  Needle-30 gauge  EMG/NCS Machine      SKIN PREPARATION:  Skin preparation was performed using an alcohol wipe.        AREA/MUSCLE INJECTED: 225 Units of Botox  1. FACE & SCALP: 115 units of Botox = Total dose, 2:1 Dilution  Right temporalis - 12.5 units of Botox in 4 site/s.  Left temporalis - 12.5 units of Botox in 4 site/s.      Right frontalis - 10 units of Botox in 4 site/s.  Left frontalis - 10 units of Botox in 4 site/s.      Right procerus - 3.5 units of Botox in 1 site/s.  Left procerus - 3.5 units of Botox in 1 site/s.      Right  - 4 units of Botox in 1 site/s.  Left  - 4 units of Botox in 1 site/s.      Right Occipitalis - 25 units of Botox at 4 site/s.   Left Occipitalis - 25 units of Botox at 4 site/s.      Right Nasalis - 2.5 units of Botox at 1 site/s.   Left Nasalis - 2.5 units of Botox at 1 site/s.       2. JAW MUSCLES: 60 units of Botox = Total Dose, 1:1 Dilution  Right Masseter - 25 units of Botox at 1 site/s.   Left Masseter - 25 units of Botox at 1 site/s.      Right temporalis - 5 units of Botox at 1 site/s.  Left temporalis - 5 units of Botox at 1 site/s.       3. SHOULDER & NECK MUSCLES: Total dose 50 units of Botox, 2:1 Dilution      Right levator muscle - 10 units of Botox at 2 site/s (neck and shoulder).  Left levator muscle - 10 units of Botox at 2 site/s (neck and shoulder).      Right mid trapezius - 5 units of Botox at 1 site.  Left mid trapezius - 5 units of Botox at 1 site.    Right lateral trapezius - 10 units of Botox at 2 site/s  Left lateral trapezuis - 10 units of Botox at 2 site/s.          RESPONSE TO PROCEDURE: Valerie Sim tolerated the procedure well and there were no immediate complications. She was allowed to recover for an appropriate period of time and was discharged home in stable condition.     FOLLOW UP:  Valerie Sim was asked to follow up by phone in 7-14 days with Nely Jean Baptiste PT, Care Coordinator or Estela  Gómez RN, Care Coordinator, to report her response to this series of injections. Based on the patient's previous response to this therapy, Valerie Smi was rescheduled for the next series of injections in 9 weeks. Due to a limited duration of Botox effectiveness (approximately 8-9 weeks), we will continue to schedule her Botox every 9 weeks.      PLAN (Medication Changes, Therapy Orders, Work or Disability Issues, etc.): Diluent changed from normal saline to Bupivacaine and dose of Botox increased from 200 units to 225 units in hopes of achieving increased effectiveness and duration. Patient will continue to monitor response to today's injections.     There were 75 units of unavoidable Botox waste for this patient.     Again, thank you for allowing me to participate in the care of your patient.      Sincerely,    Addie Malhotra MD

## 2018-06-06 NOTE — MR AVS SNAPSHOT
After Visit Summary   6/6/2018    Valerie Sim    MRN: [id number]           Patient Information     Date Of Birth          1965        Visit Information        Provider Department      6/6/2018 10:00 AM Addie Malhotra MD [facility] Physical Medicine and [facility]        Today's Diagnoses     Intractable chronic migraine without aura and without status migrainosus    -  1       Follow-ups after your visit        Your next 10 appointments already scheduled     Jun 27, 2018  8:40 AM CDT   (Arrive by 8:25 AM)   Return Botox with Addie Malhotra MD   [facility] Physical Medicine and Rehabilitation ([facility])    [address] [place] [id number] [phone]            Aug 14, 2018  3:40 PM CDT   (Arrive by 3:25 PM)   Return Botox with Addie Malhotra MD   [facility] Physical Medicine and Rehabilitation ([facility])    [address] [place] [id number] [phone]            Oct 02, 2018  2:00 PM CDT   [facility]    [address] [place] [id number]   [phone]            Oct 17, 2018  9:20 AM CDT   (Arrive by 9:05 AM)   Return Botox with Addie Malhotra MD   [facility] Physical Medicine and Rehabilitation ([facility])    [address] [place] [id number] [phone]              Who to contact     Please call your clinic at [phone] to:    Ask questions about your health    Make or cancel appointments    Discuss your medicines    Learn about your test results    Speak to your doctor            Additional Information About Your Visit        MyChart Information     [facility] gives you secure access to your electronic health record. If you see a primary care provider, you can also send messages to your care  "team and make appointments. If you have questions, please call your primary care clinic.  If you do not have a primary care provider, please call [phone] and they will assist you.      [facility] is an electronic gateway that provides easy, online access to your medical records. With [facility], you can request a clinic appointment, read your test results, renew a prescription or communicate with your care team.     To access your existing account, please contact your [facility] or call [phone] for assistance.        Care EveryWhere ID     This is your Care EveryWhere ID. This could be used by other organizations to access your [facility] medical records  [id number]        Your Vitals Were     Pulse Temperature Height BMI (Body Mass Index)          96 98  F (36.7  C) (Oral) 1.702 m (5' 7\") 26.89 kg/m2         Blood Pressure from Last 3 Encounters:   06/06/18 (!) 155/91   04/12/18 143/81   04/04/18 161/88    Weight from Last 3 Encounters:   06/06/18 77.9 kg (171 lb 11.2 oz)   04/12/18 77.1 kg (169 lb 15.6 oz)   04/04/18 77.1 kg (169 lb 15.6 oz)              We Performed the Following     HC CHEMODENERVATE FACIAL,TRIGERM,NERV MIGRAINE     Needle EMG Guide w/Chemodenervation ([id number])        Primary Care Provider Office Phone # Fax #    Meek Leary [phone]       [facility] [address] [place], [place] [id number]        Equal Access to Services     JAYLEN MONTES : Hadii jae calvillo hadasho Soomaali, waaxda luqadaha, qaybta kaalmada adeegyaregan, kimberly helton. So [facility] [phone].    ATENCIÓN: Si habla español, tiene a daniels disposición servicios gratuitos de asistencia lingüística. Llame al [phone].    We comply with applicable federal civil rights laws and [place] laws. We do not discriminate on the basis of race, color, national origin, age, disability, sex, sexual orientation, or gender identity.            Thank you!     " Thank you for choosing [facility] PH[facility]ICAL [facility]ICINE [facility]IL[facility]ATION  for your care. Our goal is always to provide you with excellent care. Hearing back from our patients is one way we can continue to improve our services. Please take a few minutes to complete the written survey that you may receive in the mail after your visit with us. Thank you!             Your Updated Medication List - Protect others around you: Learn how to safely use, store and throw away your medicines at www.disposemymeds.org.          This list is accurate as of 6/6/18  1:42 PM.  Always use your most recent med list.                   Brand Name Dispense Instructions for use Diagnosis    * amphetamine-dextroamphetamine 20 MG per tablet    ADDERALL    60 tablet    Take 1 tablet (20 mg) by mouth 2 times daily    Post concussion syndrome       * amphetamine-dextroamphetamine 20 MG per tablet    ADDERALL    60 tablet    Take 1 tablet (20 mg) by mouth 2 times daily    Post concussion syndrome       * amphetamine-dextroamphetamine 20 MG per tablet    ADDERALL    60 tablet    Take 1 tablet (20 mg) by mouth 2 times daily    Post concussion syndrome       ATIVAN 1 MG tablet   Generic drug:  LORazepam      Take 1 mg by mouth 2 times daily as needed.        * BOTOX IJ      Inject 200 Units into the muscle once Lot # /C3 with Expiration Date: 08/2020        * BOTOX IJ      Inject 225 Units as directed once Lot /C3 Exp 10/2020        * BOTOX IJ      Inject 225 Units into the muscle once Lot # /C3 with Expiration Date: 11/2020        budesonide 32 MCG/ACT spray    RINOCORT AQUA     Spray 1 spray into both nostrils daily.        DEPAKOTE PO      Take 250 mg by mouth every other day        eletriptan 40 MG tablet    RELPAX    12 tablet    take 1 tablet at onset of migraine. may repeat once after 2 hours. max of 2 tabs/24 hours and 3 days/week    Migraine without aura and without status migrainosus, not intractable       fexofenadine 180 MG  tablet    ALLEGRA     Take  by mouth daily.        HYDROmorphone 2 MG tablet    DILAUDID    24 tablet    Take 0.5-1 tablets (1-2 mg) by mouth every 3 hours as needed 20 tablets = 3 month supply.    SI (sacroiliac) joint dysfunction       INDOMETHACIN PO      Take 50 mg by mouth every other day        Lutein 20 MG Tabs      Take 1 tablet by mouth every other day Reported on 5/9/2017        ondansetron 4 MG tablet    ZOFRAN    90 tablet    Take 1 tablet (4 mg) by mouth every 8 hours as needed    Migraine without aura       PREMARIN 0.625 MG tablet   Generic drug:  estrogens (conjugated)      Take by mouth daily .30 mg  .        RESTASIS OP      Apply to eye 2 times daily        SEROQUEL 50 MG tablet   Generic drug:  QUEtiapine      Take 1 tablet by mouth daily.        VALTREX 1000 mg tablet   Generic drug:  valACYclovir      Take 1,000 mg by mouth as needed.        * Notice:  This list has 6 medication(s) that are the same as other medications prescribed for you. Read the directions carefully, and ask your doctor or other care provider to review them with you.

## 2018-06-11 DIAGNOSIS — F07.81 POST CONCUSSION SYNDROME: ICD-10-CM

## 2018-06-11 NOTE — TELEPHONE ENCOUNTER
----- Message from Boyd Johnston MD sent at 6/11/2018 12:30 PM CDT -----  Regarding: RE: medication request  Mercedez: Please put through these refills and I will sign tomorrow at [facility]. Thanks. ian  ----- Message -----     From: Iliana Faustin LPN     Sent: 6/11/2018  12:11 PM       To: Boyd Johnston MD  Subject: medication request                                Valerie Johnston MD 5 days ago                        Dr. Johnston,     Patient is requesting 3 separate written RX's for Adderall. If you write these, I will print and mail them.    Thank you.    Iliana CARRASQUILLO LPN      ==============================================  Please mail three separate written prescriptions for refill of 20MG of Amphetamine-Dextroamphetamine, 1 Tablet taken twice daily, dated for the months of June, July and August.     Thank you,   Valerie Sim   [address]   [place] [facility] [address]     [phone]

## 2018-06-12 RX ORDER — DEXTROAMPHETAMINE SACCHARATE, AMPHETAMINE ASPARTATE, DEXTROAMPHETAMINE SULFATE AND AMPHETAMINE SULFATE 5; 5; 5; 5 MG/1; MG/1; MG/1; MG/1
20 TABLET ORAL 2 TIMES DAILY
Qty: 60 TABLET | Refills: 0 | Status: SHIPPED | OUTPATIENT
Start: 2018-08-11 | End: 2018-10-02

## 2018-06-12 RX ORDER — DEXTROAMPHETAMINE SACCHARATE, AMPHETAMINE ASPARTATE, DEXTROAMPHETAMINE SULFATE AND AMPHETAMINE SULFATE 5; 5; 5; 5 MG/1; MG/1; MG/1; MG/1
20 TABLET ORAL 2 TIMES DAILY
Qty: 60 TABLET | Refills: 0 | Status: SHIPPED | OUTPATIENT
Start: 2018-06-12 | End: 2018-10-02

## 2018-06-12 RX ORDER — DEXTROAMPHETAMINE SACCHARATE, AMPHETAMINE ASPARTATE, DEXTROAMPHETAMINE SULFATE AND AMPHETAMINE SULFATE 5; 5; 5; 5 MG/1; MG/1; MG/1; MG/1
20 TABLET ORAL 2 TIMES DAILY
Qty: 60 TABLET | Refills: 0 | Status: SHIPPED | OUTPATIENT
Start: 2018-07-11 | End: 2018-12-31

## 2018-06-17 DIAGNOSIS — G44.039 PAROXYSMAL HEMICRANIA: ICD-10-CM

## 2018-06-18 RX ORDER — INDOMETHACIN 50 MG/1
CAPSULE ORAL
Qty: 90 CAPSULE | Refills: 0 | Status: SHIPPED | OUTPATIENT
Start: 2018-06-18 | End: 2019-08-09

## 2018-06-18 NOTE — TELEPHONE ENCOUNTER
Gout Agents Protocol Failed6/17 12:15 PM  x ALT on file in past 12 months   x Has Uric Acid on file in past 12 months and value is less than 6     Pt failed refill protocol due to above reasons. Dr. Johnston hasn't prescribed this medication for her. Will route to Dr. Johnston for review. Mercedez Gresham RN

## 2018-06-19 ENCOUNTER — OFFICE VISIT (OUTPATIENT)
Dept: PHYSICAL MEDICINE AND REHAB | Facility: CLINIC | Age: 53
End: 2018-06-19
Payer: COMMERCIAL

## 2018-06-19 VITALS
DIASTOLIC BLOOD PRESSURE: 90 MMHG | WEIGHT: 171 LBS | SYSTOLIC BLOOD PRESSURE: 152 MMHG | BODY MASS INDEX: 26.84 KG/M2 | HEIGHT: 67 IN | HEART RATE: 105 BPM

## 2018-06-19 DIAGNOSIS — G43.719 INTRACTABLE CHRONIC MIGRAINE WITHOUT AURA AND WITHOUT STATUS MIGRAINOSUS: Primary | ICD-10-CM

## 2018-06-19 NOTE — PROGRESS NOTES
"OCCIPITAL NERVE BLOCK PROCEDURE NOTE    Chief Complaint   Patient presents with     RECHECK     Bilateral Occipital Nerve Blocks - Every 3 mos (Pt. will bring notes from [place])     /90  Pulse 105  Ht 1.702 m (5' 7\")  Wt 77.6 kg (171 lb)  BMI 26.78 kg/m2    Current Outpatient Prescriptions:      amphetamine-dextroamphetamine (ADDERALL) 20 MG per tablet, Take 1 tablet (20 mg) by mouth 2 times daily, Disp: 60 tablet, Rfl: 0     [START ON 7/11/2018] amphetamine-dextroamphetamine (ADDERALL) 20 MG per tablet, Take 1 tablet (20 mg) by mouth 2 times daily, Disp: 60 tablet, Rfl: 0     [START ON 8/11/2018] amphetamine-dextroamphetamine (ADDERALL) 20 MG per tablet, Take 1 tablet (20 mg) by mouth 2 times daily, Disp: 60 tablet, Rfl: 0     budesonide (RINOCORT AQUA) 32 MCG/ACT nasal spray, Spray 1 spray into both nostrils daily., Disp: , Rfl:      CycloSPORINE (RESTASIS OP), Apply to eye 2 times daily, Disp: , Rfl:      Divalproex Sodium (DEPAKOTE PO), Take 250 mg by mouth every other day, Disp: , Rfl:      eletriptan (RELPAX) 40 MG tablet, take 1 tablet at onset of migraine. may repeat once after 2 hours. max of 2 tabs/24 hours and 3 days/week, Disp: 12 tablet, Rfl: 4     estrogens, conjugated, (PREMARIN) 0.625 MG tablet, Take by mouth daily .30 mg. , Disp: , Rfl:      fexofenadine (ALLEGRA) 180 MG tablet, Take  by mouth daily., Disp: , Rfl:      HYDROmorphone (DILAUDID) 2 MG tablet, Take 0.5-1 tablets (1-2 mg) by mouth every 3 hours as needed 20 tablets = 3 month supply., Disp: 24 tablet, Rfl: 0     indomethacin (INDOCIN) 50 MG capsule, TAKE 1 CAPSULE BY MOUTH TWICE DAILY WITH MEALS. AFTER ONE WEEK, MAY INCREASE TO 1 CAPSULE THREE TIMES DAILY., Disp: 90 capsule, Rfl: 0     INDOMETHACIN PO, Take 50 mg by mouth every other day, Disp: , Rfl:      LORazepam (ATIVAN) 1 MG tablet, Take 1 mg by mouth 2 times daily as needed., Disp: , Rfl:      Lutein 20 MG TABS, Take 1 tablet by mouth every other day Reported on 5/9/2017, " Disp: , Rfl:      OnabotulinumtoxinA (BOTOX IJ), Inject 225 Units into the muscle once Lot # /C3 with Expiration Date: 11/2020, Disp: , Rfl:      OnabotulinumtoxinA (BOTOX IJ), Inject 225 Units as directed once Lot /C3 Exp 10/2020, Disp: , Rfl:      OnabotulinumtoxinA (BOTOX IJ), Inject 200 Units into the muscle once Lot # /C3 with Expiration Date: 08/2020, Disp: , Rfl:      ondansetron (ZOFRAN) 4 MG tablet, Take 1 tablet (4 mg) by mouth every 8 hours as needed, Disp: 90 tablet, Rfl: 1     QUEtiapine (SEROQUEL) 50 MG tablet, Take 1 tablet by mouth daily., Disp: , Rfl:      valACYclovir (VALTREX) 1000 mg tablet, Take 1,000 mg by mouth as needed., Disp: , Rfl:      Allergies   Allergen Reactions     Trazodone      Other reaction(s): Headache     Amitriptyline Hcl Other (See Comments)     Migraine       Candesartan Cilexetil-Hctz Muscle Pain (Myalgia)     Cymbalta Other (See Comments)     Migraine       Cyproheptadine Hcl      headaches     Desvenlafaxine      Migraine       Fluoxetine Other (See Comments)     Migraine       Imipramine Other (See Comments)     Migraine       Lyrica Muscle Pain (Myalgia)     Metoprolol Other (See Comments)     headache     Morphine Other (See Comments)     Patient reports seizure      Nortriptyline Other (See Comments)     Migraine       Phenergan Dm [Promethazine-Dm] Other (See Comments)     seizures     Venlafaxine Other (See Comments)     Migraine       Wellbutrin [Bupropion Hydrobromide] Other (See Comments)     Migraine       Compazine Anxiety     Dihydroergotamine Anxiety and Other (See Comments)     Cardiac symptoms     Droperidol Anxiety     Medroxyprogesterone Hives     Prochlorperazine Anxiety        PHYSICAL EXAM:    No facial asymmetry  In no acute distress  She has a bilateral occipital headache today, L>R      HPI:    Valerie Sim received Botox injections on 04/02/2018 for migraine headaches. The Botox has provided a noticeable improvement with regards to  her headaches to date. Unfortunately, on 18, she developed a worsening of her typical occipital migraines. Her most recent occipital nerve blocks were on 2017 at [place]. She would like to have repeat occipital nerve blocks today.     OCCIPITAL NERVE BLOCK PROCEDURE:    VERIFICATION OF PATIENT IDENTIFICATION AND PROCEDURE     Initials   Patient Name ses   Patient  ses   Procedure Verified by: michelle     Prior to the start of the procedure and with procedural staff participation, I verbally confirmed the patient s identity using two indicators, relevant allergies, that the procedure was appropriate and matched the consent or emergent situation, and that the correct equipment/implants were available. Immediately prior to starting the procedure I conducted the Time Out with the procedural staff and re-confirmed the patient s name, procedure, and site/side. (The Joint Commission universal protocol was followed.)  Yes    Sedation (Moderate or Deep): None      Above assessments performed by:  Addie Malhotra MD      INDICATION/S FOR PROCEDURE/S:  Valerie Sim is a 53-year-old patient with chronic migraine and occipital headaches. Her baseline symptoms have been recalcitrant to oral medications and conservative therapy.  She is here today for bilateral occipital nerve blocks.      GOAL OF PROCEDURE:  The goal of this procedure is to decrease pain  associated with chronic migraines and occipital headaches.      RIGHT AND LEFT GREATER OCCIPITAL NERVE BLOCKS.     Prior to the start of the procedure and with procedural staff participation, I verbally confirmed the patient s identity using two indicators, relevant allergies, that the procedure was appropriate and matched the consent or emergent situation, and that the correct equipment/implants were available. Immediately prior to starting the procedure I conducted the Time Out with the procedural staff and re-confirmed the patient s name, procedure, and site/side.  (The Joint Commission universal protocol was followed.)  Yes    Sedation (Moderate or Deep): None      Area just inferior to insertion of the right and left superior trapezius insertion onto skull was cleansed with chloraprep. Needle was advanced anteriorly to base of skull then slightly withdrawn and injectate was injected in a fan-like distribution at different depths. Total injection of 0.5 cc of 40 mg triamcinolone acetonide plus 2.5 cc of 0.25 % bupivicaine per side. Valerie Sim tolerated the procedure well without any immediate complications.    She was allowed to recover for an appropriate period of time and was discharged home in stable condition.        MEDICATION:  Triamcinolone acetonide:  Lot: [id number]  Exp: 01/2020    Bupivacaine 0.25%:  Lot: [id number]  Exp: 04/01/2020    RESPONSE TO PROCEDURE:  Valerie Sim tolerated the procedure well and there were no immediate complications.  She was allowed to recover for an appropriate period of time and was discharged home in stable condition.    FOLLOW UP:  Valerie Sim was asked to follow up by phone in 7-14 days with Estela Magaña RN, Care Coordinator, to report her response to this series of injections.  Based on the patient's previous response to this therapy, Valerie Sim was rescheduled for the next series of injections in 12 weeks.    PLAN (Medication Changes, Therapy Orders, Work or Disability Issues, etc.): Patient will follow-up regarding response to this procedure.

## 2018-06-19 NOTE — NURSING NOTE
Chief Complaint   Patient presents with     RECHECK     Bilateral Occipital Nerve Blocks - Every 3 mos (Pt. will bring notes from [place])     Larissa BUCKNER  Preventive Care:    Breast Cancer Screening: During our visit today, we discussed that it is recommended you receive breast cancer screening. Please call or make an appointment with your primary care provider to discuss this with them. You may also call the [facility] scheduling line ([phone]) to set up a mammography appointment at the Breast Center within the [facility].

## 2018-06-19 NOTE — MR AVS SNAPSHOT
After Visit Summary   6/19/2018    Valerie Sim    MRN: [id number]           Patient Information     Date Of Birth          1965        Visit Information        Provider Department      6/19/2018 1:00 PM Addie Malhotra MD [facility] Physical Medicine [facility]        Today's Diagnoses     Intractable chronic migraine without aura and without status migrainosus    -  1       Follow-ups after your visit        Additional Services     NEUROLOGY ADULT REFERRAL       Your provider has referred you for the following:   Consult at [facility]: Neurology Clinic - [place] [phone]   [email or web address]  Headache    Please be aware that coverage of these services is subject to the terms and limitations of your health insurance plan.  Call member services at your health plan with any benefit or coverage questions.      Please bring the following with you to your appointment:    (1) Any X-Rays, CTs or MRIs which have been performed.  Contact the facility where they were done to arrange for  prior to your scheduled appointment.    (2) List of current medications  (3) This referral request   (4) Any documents/labs given to you for this referral                  Your next 10 appointments already scheduled     Aug 14, 2018  3:40 PM CDT   (Arrive by 3:25 PM)   Return Botox with Addie Malhotra MD   [facility] ([facility] [place])    [address] [place] [id number] [phone]            Oct 02, 2018  2:00 PM CDT   [facility] Visit with Boyd Johnston MD   [facility]    [address] [place] [id number] [phone]            Oct 17, 2018  9:20 AM CDT   (Arrive by 9:05 AM)   Return Botox with Addie Malhotra MD   [facility] Rehabilitation ([facility]  "[place])    [address]  [address] [employer] [address] [phone]              Who to contact     Please call your clinic at [phone] to:    Ask questions about your health    Make or cancel appointments    Discuss your medicines    Learn about your test results    Speak to your doctor            Additional Information About Your Visit        MyChart Information     [employer] gives you secure access to your electronic health record. If you see a primary care provider, you can also send messages to your care team and make appointments. If you have questions, please call your primary care clinic.  If you do not have a primary care provider, please call [phone] and they will assist you.      [email or web address] is an electronic gateway that provides easy, online access to your medical records. With [email or web address], you can request a clinic appointment, read your test results, renew a prescription or communicate with your care team.     To access your existing account, please contact your [facility] or call [phone] for assistance.        Care EveryWhere ID     This is your Care EveryWhere ID. This could be used by other organizations to access your [place] medical records  [id number]        Your Vitals Were     Pulse Height BMI (Body Mass Index)             105 1.702 m (5' 7\") 26.78 kg/m2          Blood Pressure from Last 3 Encounters:   06/19/18 152/90   06/06/18 (!) 155/91   04/12/18 143/81    Weight from Last 3 Encounters:   06/19/18 77.6 kg (171 lb)   06/06/18 77.9 kg (171 lb 11.2 oz)   04/12/18 77.1 kg (169 lb 15.6 oz)              We Performed the Following     NEUROLOGY ADULT REFERRAL        Primary Care Provider Office Phone # Fax #    Meek Leary [phone]       [address] [address]        Equal Access to Services     JAYLEN MONTES AH: Emerita New, mateus dozier, daisy mendosa " kimberly solitarioedinson harris'aan ah. So [facility] [phone].    ATENCIÓN: Si preethila maureen, tiene a daniels disposición servicios gratuitos de asistencia lingüística. Fransisco al [phone].    We comply with applicable federal civil rights laws and [place] laws. We do not discriminate on the basis of race, color, national origin, age, disability, sex, sexual orientation, or gender identity.            Thank you!     Thank you for choosing [facility] PHYSICAL MEDICINE AND REHABILITATION  for your care. Our goal is always to provide you with excellent care. Hearing back from our patients is one way we can continue to improve our services. Please take a few minutes to complete the written survey that you may receive in the mail after your visit with us. Thank you!             Your Updated Medication List - Protect others around you: Learn how to safely use, store and throw away your medicines at www.disposemymeds.org.          This list is accurate as of 6/19/18  1:37 PM.  Always use your most recent med list.                   Brand Name Dispense Instructions for use Diagnosis    * amphetamine-dextroamphetamine 20 MG per tablet    ADDERALL    60 tablet    Take 1 tablet (20 mg) by mouth 2 times daily    Post concussion syndrome       * amphetamine-dextroamphetamine 20 MG per tablet   Start taking on:  7/11/2018    ADDERALL    60 tablet    Take 1 tablet (20 mg) by mouth 2 times daily    Post concussion syndrome       * amphetamine-dextroamphetamine 20 MG per tablet   Start taking on:  8/11/2018    ADDERALL    60 tablet    Take 1 tablet (20 mg) by mouth 2 times daily    Post concussion syndrome       ATIVAN 1 MG tablet   Generic drug:  LORazepam      Take 1 mg by mouth 2 times daily as needed.        * BOTOX IJ      Inject 200 Units into the muscle once Lot # /C3 with Expiration Date: 08/2020        * BOTOX IJ      Inject 225 Units as directed once Lot /C3 Exp 10/2020        * BOTOX IJ      Inject 225  Units into the muscle once Lot # /C3 with Expiration Date: 11/2020        budesonide 32 MCG/ACT spray    RINOCORT AQUA     Spray 1 spray into both nostrils daily.        DEPAKOTE PO      Take 250 mg by mouth every other day        eletriptan 40 MG tablet    RELPAX    12 tablet    take 1 tablet at onset of migraine. may repeat once after 2 hours. max of 2 tabs/24 hours and 3 days/week    Migraine without aura and without status migrainosus, not intractable       fexofenadine 180 MG tablet    ALLEGRA     Take  by mouth daily.        HYDROmorphone 2 MG tablet    DILAUDID    24 tablet    Take 0.5-1 tablets (1-2 mg) by mouth every 3 hours as needed 20 tablets = 3 month supply.    SI (sacroiliac) joint dysfunction       * INDOMETHACIN PO      Take 50 mg by mouth every other day        * indomethacin 50 MG capsule    INDOCIN    90 capsule    TAKE 1 CAPSULE BY MOUTH TWICE DAILY WITH MEALS. AFTER ONE WEEK, MAY INCREASE TO 1 CAPSULE THREE TIMES DAILY.    Paroxysmal hemicrania       Lutein 20 MG Tabs      Take 1 tablet by mouth every other day Reported on 5/9/2017        ondansetron 4 MG tablet    ZOFRAN    90 tablet    Take 1 tablet (4 mg) by mouth every 8 hours as needed    Migraine without aura       PREMARIN 0.625 MG tablet   Generic drug:  estrogens (conjugated)      Take by mouth daily .30 mg  .        RESTASIS OP      Apply to eye 2 times daily        SEROQUEL 50 MG tablet   Generic drug:  QUEtiapine      Take 1 tablet by mouth daily.        VALTREX 1000 mg tablet   Generic drug:  valACYclovir      Take 1,000 mg by mouth as needed.        * Notice:  This list has 8 medication(s) that are the same as other medications prescribed for you. Read the directions carefully, and ask your doctor or other care provider to review them with you.

## 2018-06-19 NOTE — LETTER
"6/19/2018       RE: Valerie Sim  [address] [facility] [address]     Dear Colleague,    Thank you for referring your patient, Valerie Sim, to the [facility] PHYSICAL MEDICINE AND REHABILITATION at [facility]. Please see a copy of my visit note below.    OCCIPITAL NERVE BLOCK PROCEDURE NOTE    Chief Complaint   Patient presents with     RECHECK     Bilateral Occipital Nerve Blocks - Every 3 mos (Pt. will bring notes from [place])     /90  Pulse 105  Ht 1.702 m (5' 7\")  Wt 77.6 kg (171 lb)  BMI 26.78 kg/m2    Current Outpatient Prescriptions:      amphetamine-dextroamphetamine (ADDERALL) 20 MG per tablet, Take 1 tablet (20 mg) by mouth 2 times daily, Disp: 60 tablet, Rfl: 0     [START ON 7/11/2018] amphetamine-dextroamphetamine (ADDERALL) 20 MG per tablet, Take 1 tablet (20 mg) by mouth 2 times daily, Disp: 60 tablet, Rfl: 0     [START ON 8/11/2018] amphetamine-dextroamphetamine (ADDERALL) 20 MG per tablet, Take 1 tablet (20 mg) by mouth 2 times daily, Disp: 60 tablet, Rfl: 0     budesonide (RINOCORT AQUA) 32 MCG/ACT nasal spray, Spray 1 spray into both nostrils daily., Disp: , Rfl:      CycloSPORINE (RESTASIS OP), Apply to eye 2 times daily, Disp: , Rfl:      Divalproex Sodium (DEPAKOTE PO), Take 250 mg by mouth every other day, Disp: , Rfl:      eletriptan (RELPAX) 40 MG tablet, take 1 tablet at onset of migraine. may repeat once after 2 hours. max of 2 tabs/24 hours and 3 days/week, Disp: 12 tablet, Rfl: 4     estrogens, conjugated, (PREMARIN) 0.625 MG tablet, Take by mouth daily .30 mg. , Disp: , Rfl:      fexofenadine (ALLEGRA) 180 MG tablet, Take  by mouth daily., Disp: , Rfl:      HYDROmorphone (DILAUDID) 2 MG tablet, Take 0.5-1 tablets (1-2 mg) by mouth every 3 hours as needed 20 tablets = 3 month supply., Disp: 24 tablet, Rfl: 0     indomethacin (INDOCIN) 50 MG capsule, TAKE 1 CAPSULE BY MOUTH TWICE DAILY WITH MEALS. AFTER ONE WEEK, MAY " INCREASE TO 1 CAPSULE THREE TIMES DAILY., Disp: 90 capsule, Rfl: 0     INDOMETHACIN PO, Take 50 mg by mouth every other day, Disp: , Rfl:      LORazepam (ATIVAN) 1 MG tablet, Take 1 mg by mouth 2 times daily as needed., Disp: , Rfl:      Lutein 20 MG TABS, Take 1 tablet by mouth every other day Reported on 5/9/2017, Disp: , Rfl:      OnabotulinumtoxinA (BOTOX IJ), Inject 225 Units into the muscle once Lot # /C3 with Expiration Date: 11/2020, Disp: , Rfl:      OnabotulinumtoxinA (BOTOX IJ), Inject 225 Units as directed once Lot /C3 Exp 10/2020, Disp: , Rfl:      OnabotulinumtoxinA (BOTOX IJ), Inject 200 Units into the muscle once Lot # /C3 with Expiration Date: 08/2020, Disp: , Rfl:      ondansetron (ZOFRAN) 4 MG tablet, Take 1 tablet (4 mg) by mouth every 8 hours as needed, Disp: 90 tablet, Rfl: 1     QUEtiapine (SEROQUEL) 50 MG tablet, Take 1 tablet by mouth daily., Disp: , Rfl:      valACYclovir (VALTREX) 1000 mg tablet, Take 1,000 mg by mouth as needed., Disp: , Rfl:      Allergies   Allergen Reactions     Trazodone      Other reaction(s): Headache     Amitriptyline Hcl Other (See Comments)     Migraine       Candesartan Cilexetil-Hctz Muscle Pain (Myalgia)     Cymbalta Other (See Comments)     Migraine       Cyproheptadine Hcl      headaches     Desvenlafaxine      Migraine       Fluoxetine Other (See Comments)     Migraine       Imipramine Other (See Comments)     Migraine       Lyrica Muscle Pain (Myalgia)     Metoprolol Other (See Comments)     headache     Morphine Other (See Comments)     Patient reports seizure      Nortriptyline Other (See Comments)     Migraine       Phenergan Dm [Promethazine-Dm] Other (See Comments)     seizures     Venlafaxine Other (See Comments)     Migraine       Wellbutrin [Bupropion Hydrobromide] Other (See Comments)     Migraine       Compazine Anxiety     Dihydroergotamine Anxiety and Other (See Comments)     Cardiac symptoms     Droperidol Anxiety      Medroxyprogesterone Hives     Prochlorperazine Anxiety        PHYSICAL EXAM:    No facial asymmetry  In no acute distress  She has a bilateral occipital headache today, L>R      HPI:    Valerie Sim received Botox injections on 2018 for migraine headaches. The Botox has provided a noticeable improvement with regards to her headaches to date. Unfortunately, on 18, she developed a worsening of her typical occipital migraines. Her most recent occipital nerve blocks were on 2017 at [place]. She would like to have repeat occipital nerve blocks today.     OCCIPITAL NERVE BLOCK PROCEDURE:    VERIFICATION OF PATIENT IDENTIFICATION AND PROCEDURE     Initials   Patient Name ses   Patient  ses   Procedure Verified by: michelle     Prior to the start of the procedure and with procedural staff participation, I verbally confirmed the patient s identity using two indicators, relevant allergies, that the procedure was appropriate and matched the consent or emergent situation, and that the correct equipment/implants were available. Immediately prior to starting the procedure I conducted the Time Out with the procedural staff and re-confirmed the patient s name, procedure, and site/side. (The Joint Commission universal protocol was followed.)  Yes    Sedation (Moderate or Deep): None      Above assessments performed by:  Addie Malhotra MD      INDICATION/S FOR PROCEDURE/S:  Valerie Sim is a 53-year-old patient with chronic migraine and occipital headaches. Her baseline symptoms have been recalcitrant to oral medications and conservative therapy.  She is here today for bilateral occipital nerve blocks.      GOAL OF PROCEDURE:  The goal of this procedure is to decrease pain  associated with chronic migraines and occipital headaches.      RIGHT AND LEFT GREATER OCCIPITAL NERVE BLOCKS.     Prior to the start of the procedure and with procedural staff participation, I verbally confirmed the patient s identity using two  indicators, relevant allergies, that the procedure was appropriate and matched the consent or emergent situation, and that the correct equipment/implants were available. Immediately prior to starting the procedure I conducted the Time Out with the procedural staff and re-confirmed the patient s name, procedure, and site/side. (The Joint Commission universal protocol was followed.)  Yes    Sedation (Moderate or Deep): None      Area just inferior to insertion of the right and left superior trapezius insertion onto skull was cleansed with chloraprep. Needle was advanced anteriorly to base of skull then slightly withdrawn and injectate was injected in a fan-like distribution at different depths. Total injection of 0.5 cc of 40 mg triamcinolone acetonide plus 2.5 cc of 0.25 % bupivicaine per side. Valerie Sim tolerated the procedure well without any immediate complications.    She was allowed to recover for an appropriate period of time and was discharged home in stable condition.        MEDICATION:  Triamcinolone acetonide:  Lot: [id number]  Exp: 01/2020    Bupivacaine 0.25%:  Lot: [id number]  Exp: 04/01/2020    RESPONSE TO PROCEDURE:  Valerie Sim tolerated the procedure well and there were no immediate complications.  She was allowed to recover for an appropriate period of time and was discharged home in stable condition.    FOLLOW UP:  Valerie Sim was asked to follow up by phone in 7-14 days with Estela Magaña RN, Care Coordinator, to report her response to this series of injections.  Based on the patient's previous response to this therapy, Valerie Sim was rescheduled for the next series of injections in 12 weeks.    PLAN (Medication Changes, Therapy Orders, Work or Disability Issues, etc.): Patient will follow-up regarding response to this procedure.        Again, thank you for allowing me to participate in the care of your patient.      Sincerely,    Addie Malhotra MD

## 2018-06-20 LAB — PAP SMEAR - HIM PATIENT REPORTED: NEGATIVE

## 2018-06-23 ENCOUNTER — MYC REFILL (OUTPATIENT)
Dept: NEUROLOGY | Facility: CLINIC | Age: 53
End: 2018-06-23

## 2018-06-23 DIAGNOSIS — M53.3 SI (SACROILIAC) JOINT DYSFUNCTION: ICD-10-CM

## 2018-06-23 RX ORDER — HYDROMORPHONE HYDROCHLORIDE 2 MG/1
1-2 TABLET ORAL
Qty: 24 TABLET | Refills: 0 | Status: CANCELLED | OUTPATIENT
Start: 2018-06-23

## 2018-06-25 DIAGNOSIS — M53.3 SI (SACROILIAC) JOINT DYSFUNCTION: ICD-10-CM

## 2018-06-26 RX ORDER — HYDROMORPHONE HYDROCHLORIDE 2 MG/1
1-2 TABLET ORAL
Qty: 24 TABLET | Refills: 0 | Status: SHIPPED | OUTPATIENT
Start: 2018-06-26 | End: 2018-10-02

## 2018-07-26 DIAGNOSIS — G43.009 MIGRAINE WITHOUT AURA AND WITHOUT STATUS MIGRAINOSUS, NOT INTRACTABLE: ICD-10-CM

## 2018-07-27 RX ORDER — ELETRIPTAN HYDROBROMIDE 40 MG/1
TABLET, FILM COATED ORAL
Qty: 12 TABLET | Refills: 3 | Status: SHIPPED | OUTPATIENT
Start: 2018-07-27 | End: 2018-10-02

## 2018-07-27 NOTE — TELEPHONE ENCOUNTER
Serotonin Agonists Failed7/26 3:36 PM  x Blood pressure under 140/90 in past 12 months   x Serotonin Agonist request needs review.     Rx routed to Dr. Johnston for review and signature. Mercedez Gresham RN

## 2018-08-14 ENCOUNTER — CARE COORDINATION (OUTPATIENT)
Dept: CARE COORDINATION | Facility: CLINIC | Age: 53
End: 2018-08-14

## 2018-08-14 ENCOUNTER — OFFICE VISIT (OUTPATIENT)
Dept: PHYSICAL MEDICINE AND REHAB | Facility: CLINIC | Age: 53
End: 2018-08-14
Payer: COMMERCIAL

## 2018-08-14 VITALS
WEIGHT: 169.3 LBS | HEIGHT: 67 IN | SYSTOLIC BLOOD PRESSURE: 162 MMHG | BODY MASS INDEX: 26.57 KG/M2 | HEART RATE: 86 BPM | DIASTOLIC BLOOD PRESSURE: 91 MMHG

## 2018-08-14 DIAGNOSIS — G43.719 INTRACTABLE CHRONIC MIGRAINE WITHOUT AURA AND WITHOUT STATUS MIGRAINOSUS: Primary | ICD-10-CM

## 2018-08-14 ASSESSMENT — PAIN SCALES - GENERAL: PAINLEVEL: SEVERE PAIN (7)

## 2018-08-14 NOTE — PROGRESS NOTES
"BOTULINUM TOXIN PROCEDURE - HEADACHE - NOTE    Chief Complaint   Patient presents with     RECHECK     [facility]- BOTOX INJECTION     BP (!) 162/91 (BP Location: Right arm, Patient Position: Chair, Cuff Size: Adult Regular)  Pulse 86  Ht 1.702 m (5' 7\")  Wt 76.8 kg (169 lb 4.8 oz)  BMI 26.52 kg/m2     Current Outpatient Prescriptions:      amphetamine-dextroamphetamine (ADDERALL) 20 MG per tablet, Take 1 tablet (20 mg) by mouth 2 times daily, Disp: 60 tablet, Rfl: 0     amphetamine-dextroamphetamine (ADDERALL) 20 MG per tablet, Take 1 tablet (20 mg) by mouth 2 times daily, Disp: 60 tablet, Rfl: 0     amphetamine-dextroamphetamine (ADDERALL) 20 MG per tablet, Take 1 tablet (20 mg) by mouth 2 times daily, Disp: 60 tablet, Rfl: 0     budesonide (RINOCORT AQUA) 32 MCG/ACT nasal spray, Spray 1 spray into both nostrils daily., Disp: , Rfl:      CycloSPORINE (RESTASIS OP), Apply to eye 2 times daily, Disp: , Rfl:      Divalproex Sodium (DEPAKOTE PO), Take 250 mg by mouth every other day, Disp: , Rfl:      eletriptan (RELPAX) 40 MG tablet, TAKE 1 TABLET AT ONSET OF MIGRAINE. MAY REPEAT ONCE AFTER 2 HRS. MAX OF 2 TABS/24HRS AND 3 DAYS/WEEK., Disp: 12 tablet, Rfl: 3     estrogens, conjugated, (PREMARIN) 0.625 MG tablet, Take by mouth daily .30 mg . , Disp: , Rfl:      fexofenadine (ALLEGRA) 180 MG tablet, Take  by mouth daily., Disp: , Rfl:      HYDROmorphone (DILAUDID) 2 MG tablet, Take 0.5-1 tablets (1-2 mg) by mouth every 3 hours as needed 20 tablets = 3 month supply., Disp: 24 tablet, Rfl: 0     indomethacin (INDOCIN) 50 MG capsule, TAKE 1 CAPSULE BY MOUTH TWICE DAILY WITH MEALS. AFTER ONE WEEK, MAY INCREASE TO 1 CAPSULE THREE TIMES DAILY., Disp: 90 capsule, Rfl: 0     INDOMETHACIN PO, Take 50 mg by mouth every other day, Disp: , Rfl:      LORazepam (ATIVAN) 1 MG tablet, Take 1 mg by mouth 2 times daily as needed., Disp: , Rfl:      Lutein 20 MG TABS, Take 1 tablet by mouth every other day Reported on 5/9/2017, Disp: , Rfl: "      OnabotulinumtoxinA (BOTOX IJ), Inject 225 Units into the muscle once Lot # /C3 with Expiration Date: 11/2020, Disp: , Rfl:      OnabotulinumtoxinA (BOTOX IJ), Inject 225 Units as directed once Lot /C3 Exp 10/2020, Disp: , Rfl:      OnabotulinumtoxinA (BOTOX IJ), Inject 200 Units into the muscle once Lot # /C3 with Expiration Date: 08/2020, Disp: , Rfl:      ondansetron (ZOFRAN) 4 MG tablet, Take 1 tablet (4 mg) by mouth every 8 hours as needed, Disp: 90 tablet, Rfl: 1     QUEtiapine (SEROQUEL) 50 MG tablet, Take 1 tablet by mouth daily., Disp: , Rfl:      valACYclovir (VALTREX) 1000 mg tablet, Take 1,000 mg by mouth as needed., Disp: , Rfl:      Allergies   Allergen Reactions     Trazodone Other (See Comments) and Unknown     Other reaction(s): Headache  Other reaction(s): Headache  Other reaction(s): Headache     Amitriptyline Hcl Other (See Comments)     Migraine       Candesartan Cilexetil-Hctz Muscle Pain (Myalgia)     Cymbalta Other (See Comments)     Migraine       Cyproheptadine Hcl      headaches     Desvenlafaxine      Migraine       Fluoxetine Other (See Comments)     Migraine       Imipramine Other (See Comments)     Migraine       Lyrica Muscle Pain (Myalgia)     Metoprolol Other (See Comments) and Unknown     headache  headache  headache     Morphine Other (See Comments)     Patient reports seizure      Nortriptyline Other (See Comments)     Migraine       Phenergan Dm [Promethazine-Dm] Other (See Comments)     seizures     Venlafaxine Other (See Comments)     Migraine       Wellbutrin [Bupropion Hydrobromide] Other (See Comments)     Migraine       Compazine Anxiety     Dihydroergotamine Anxiety and Other (See Comments)     Cardiac symptoms     Droperidol Anxiety     Medroxyprogesterone Hives     Prochlorperazine Anxiety        PHYSICAL EXAM:    Pleasant and cooperative.  Independent with ambulation and transfers.    Has a headache today which she rates at 7/10.    HPI:    Patient  denies new medical diagnoses, illnesses, hospitalizations, emergency room visits, and injuries since the previous injection with botulinum neurotoxin.      We reviewed the recommended safety guidelines for  Botox from any vaccine injection, such as the seasonal flu vaccine, by a minimum of 10-14 days with Valerie Sim. She acknowledged understanding.      RESPONSE TO PREVIOUS TREATMENT:  Change in headache pattern following last series of injections with 200 units of  Botox on 06/06/2018.    She had 1-2 days of malaise & sweating post procedure.  This started 3 days post-procedure but lasted only 1-2 days which is shorter than previously. She feels that adding the bupivacaine has really helped to  Minimize her post-procedural side-effects.    1.  Headache frequency during this injection cycle:  She has daily headaches and 12-13 migraine headache days per month. This is compared to her baseline headache frequency of daily headaches.     2.  Headache duration during this injection cycle:  Her mild baseline headache varies in length throughout the day but is always present, her migraines last a few hours to up 3 days, and this pattern remains consistent.    3.  Headache intensity during this injection cycle:    A.  6-7/10  =  Typical pain level.  This is an improvement from her previous report of 8-9/10.  B.  9/10  =  Worst pain level.  Fewer of the higher intensity migraines.  She feels she has improved control of her migraine headaches.  C.  4-5/10  =  Lowest pain level.  This is an improvement from her previous report of 6-7/10.    4.  Change in headache medication usage during this injection cycle: She used her max dose of Relpax, which is about 6-8 times per month. She was also recently prescribed IM toradol, which has also been helpful. Last used approximately 4 weeks ago.      5.  ER Visits During This Injection Cycle:  None    6.  Functional Performance:  Change in ADL's, social interaction, days  lost from work, etc. She reports missing social and work obligations about 3-4 times per month due to headaches.  She is also on disability for her headaches.     BOTULINUM NEUROTOXIN INJECTION PROCEDURES:      VERIFICATION OF PATIENT IDENTIFICATION AND PROCEDURE     Initials   Patient Name tlb   Patient  tlb   Procedure Verified by: [email or web address]     Prior to the start of the procedure and with procedural staff participation, I verbally confirmed the patient s identity using two indicators, relevant allergies, that the procedure was appropriate and matched the consent or emergent situation, and that the correct equipment/implants were available. Immediately prior to starting the procedure I conducted the Time Out with the procedural staff and re-confirmed the patient s name, procedure, and site/side. (The Joint Commission universal protocol was followed.)  Yes    Sedation (Moderate or Deep): None    Above assessments performed by:    Nely Jean Baptiste, PT, Care Coordinator    Addie Malhotra MD      INDICATIONS FOR PROCEDURES:  Valerie Sim is a 53-year-old patient with chronic migraine headaches associated with cervicogenic components.      Her baseline symptoms have been recalcitrant to oral medications and conservative therapy. She is here today for an injection of Botox. We have received insurance approval to increase dose up to 225 units in hopes of increasing effectiveness and duration of effect. Decision was also made to change diluent to bupivacaine in attempt to decrease post-injection headache associated with this procedure.       GOAL OF PROCEDURE:  The goal of this procedure is to decrease pain and enhance functional independence.     TOTAL DOSE ADMINISTERED:  Dose Administered: 225 units Botox (Botulinum Toxin Type A)  2:1 and 1:1 Dilution   Diluent Used: 0.25% Bupivacaine (Lot: [id number], Exp: 2022)(NDC:27404-222-39)  Total Volume of Diluent Used: See below  Lot # /C3 with Expiration Date:  03/2021  NDC #: Botox 100u ([address]-70)     Medication guide was offered to patient and was declined.     CONSENT:  The risks, benefits, and treatment options were discussed with Valerie YEE Roney and she agreed to proceed.     Written consent was obtained by TLB.      EQUIPMENT USED:  Needle-37mm stimulating/recording  Needle-30 gauge  EMG/NCS Machine      SKIN PREPARATION:  Skin preparation was performed using an alcohol wipe.        AREA/MUSCLE INJECTED: 225 UNITS BOTOX = TOTAL DOSE  1. FACE & SCALP: 115 units of Botox = Total dose, 2:1 Dilution  Right temporalis - 12.5 units of Botox in 4 site/s.  Left temporalis - 12.5 units of Botox in 4 site/s.      Right frontalis - 10 units of Botox in 4 site/s.  Left frontalis - 10 units of Botox in 4 site/s.      Right procerus - 3.5 units of Botox in 1 site/s.  Left procerus - 3.5 units of Botox in 1 site/s.      Right  - 4 units of Botox in 1 site/s.  Left  - 4 units of Botox in 1 site/s.      Right Occipitalis - 25 units of Botox at 4 site/s.   Left Occipitalis - 25 units of Botox at 4 site/s.      Right Nasalis - 2.5 units of Botox at 1 site/s.   Left Nasalis - 2.5 units of Botox at 1 site/s.       2. JAW MUSCLES: 60 units of Botox = Total Dose, 1:1 Dilution  Right Masseter - 25 units of Botox at 1 site/s.   Left Masseter - 25 units of Botox at 1 site/s.      Right temporalis - 5 units of Botox at 1 site/s.  Left temporalis - 5 units of Botox at 1 site/s.       3. SHOULDER & NECK MUSCLES: Total dose 50 units of Botox, 2:1 Dilution      Right levator muscle - 10 units of Botox at 2 site/s (neck and shoulder).  Left levator muscle - 10 units of Botox at 2 site/s (neck and shoulder).      Right mid trapezius - 5 units of Botox at 1 site.  Left mid trapezius - 5 units of Botox at 1 site.    Right lateral trapezius - 10 units of Botox at 2 site/s  Left lateral trapezuis - 10 units of Botox at 2 site/s.          RESPONSE TO PROCEDURE: Valerie Sim  tolerated the procedure well and there were no immediate complications. She was allowed to recover for an appropriate period of time and was discharged home in stable condition.     FOLLOW UP:  Valerie Sim was asked to follow up by phone in 7-14 days with Nely Jean Baptiste PT, Care Coordinator or Estela Magaña RN, Care Coordinator, to report her response to this series of injections. Based on the patient's previous response to this therapy, Valerie Sim was rescheduled for the next series of injections in 9 weeks. Due to a limited duration of Botox effectiveness (approximately 8-9 weeks), we will continue to schedule her Botox every 9 weeks.      PLAN (Medication Changes, Therapy Orders, Work or Disability Issues, etc.): Patient will monitor her response to today's injections and report.

## 2018-08-14 NOTE — NURSING NOTE
Chief Complaint   Patient presents with     RECHECK     [facility]- BOTOX INJECTION     Livia Pelaez MA

## 2018-08-14 NOTE — LETTER
"8/14/2018     RE: Valerie Sim  [address] [place] [facility] [address]     Dear Colleague,    Thank you for referring your patient, Valerie Sim, to the [facility] PHYSICAL MEDICINE AND REHABILITATION at [facility]. Please see a copy of my visit note below.    BOTULINUM TOXIN PROCEDURE - HEADACHE - NOTE    Chief Complaint   Patient presents with     RECHECK     [facility]- BOTOX INJECTION     BP (!) 162/91 (BP Location: Right arm, Patient Position: Chair, Cuff Size: Adult Regular)  Pulse 86  Ht 1.702 m (5' 7\")  Wt 76.8 kg (169 lb 4.8 oz)  BMI 26.52 kg/m2     Current Outpatient Prescriptions:      amphetamine-dextroamphetamine (ADDERALL) 20 MG per tablet, Take 1 tablet (20 mg) by mouth 2 times daily, Disp: 60 tablet, Rfl: 0     amphetamine-dextroamphetamine (ADDERALL) 20 MG per tablet, Take 1 tablet (20 mg) by mouth 2 times daily, Disp: 60 tablet, Rfl: 0     amphetamine-dextroamphetamine (ADDERALL) 20 MG per tablet, Take 1 tablet (20 mg) by mouth 2 times daily, Disp: 60 tablet, Rfl: 0     budesonide (RINOCORT AQUA) 32 MCG/ACT nasal spray, Spray 1 spray into both nostrils daily., Disp: , Rfl:      CycloSPORINE (RESTASIS OP), Apply to eye 2 times daily, Disp: , Rfl:      Divalproex Sodium (DEPAKOTE PO), Take 250 mg by mouth every other day, Disp: , Rfl:      eletriptan (RELPAX) 40 MG tablet, TAKE 1 TABLET AT ONSET OF MIGRAINE. MAY REPEAT ONCE AFTER 2 HRS. MAX OF 2 TABS/24HRS AND 3 DAYS/WEEK., Disp: 12 tablet, Rfl: 3     estrogens, conjugated, (PREMARIN) 0.625 MG tablet, Take by mouth daily .30 mg . , Disp: , Rfl:      fexofenadine (ALLEGRA) 180 MG tablet, Take  by mouth daily., Disp: , Rfl:      HYDROmorphone (DILAUDID) 2 MG tablet, Take 0.5-1 tablets (1-2 mg) by mouth every 3 hours as needed 20 tablets = 3 month supply., Disp: 24 tablet, Rfl: 0     indomethacin (INDOCIN) 50 MG capsule, TAKE 1 CAPSULE BY MOUTH TWICE DAILY WITH MEALS. AFTER ONE WEEK, MAY INCREASE TO " 1 CAPSULE THREE TIMES DAILY., Disp: 90 capsule, Rfl: 0     INDOMETHACIN PO, Take 50 mg by mouth every other day, Disp: , Rfl:      LORazepam (ATIVAN) 1 MG tablet, Take 1 mg by mouth 2 times daily as needed., Disp: , Rfl:      Lutein 20 MG TABS, Take 1 tablet by mouth every other day Reported on 5/9/2017, Disp: , Rfl:      OnabotulinumtoxinA (BOTOX IJ), Inject 225 Units into the muscle once Lot # /C3 with Expiration Date: 11/2020, Disp: , Rfl:      OnabotulinumtoxinA (BOTOX IJ), Inject 225 Units as directed once Lot /C3 Exp 10/2020, Disp: , Rfl:      OnabotulinumtoxinA (BOTOX IJ), Inject 200 Units into the muscle once Lot # /C3 with Expiration Date: 08/2020, Disp: , Rfl:      ondansetron (ZOFRAN) 4 MG tablet, Take 1 tablet (4 mg) by mouth every 8 hours as needed, Disp: 90 tablet, Rfl: 1     QUEtiapine (SEROQUEL) 50 MG tablet, Take 1 tablet by mouth daily., Disp: , Rfl:      valACYclovir (VALTREX) 1000 mg tablet, Take 1,000 mg by mouth as needed., Disp: , Rfl:      Allergies   Allergen Reactions     Trazodone Other (See Comments) and Unknown     Other reaction(s): Headache  Other reaction(s): Headache  Other reaction(s): Headache     Amitriptyline Hcl Other (See Comments)     Migraine       Candesartan Cilexetil-Hctz Muscle Pain (Myalgia)     Cymbalta Other (See Comments)     Migraine       Cyproheptadine Hcl      headaches     Desvenlafaxine      Migraine       Fluoxetine Other (See Comments)     Migraine       Imipramine Other (See Comments)     Migraine       Lyrica Muscle Pain (Myalgia)     Metoprolol Other (See Comments) and Unknown     headache  headache  headache     Morphine Other (See Comments)     Patient reports seizure      Nortriptyline Other (See Comments)     Migraine       Phenergan Dm [Promethazine-Dm] Other (See Comments)     seizures     Venlafaxine Other (See Comments)     Migraine       Wellbutrin [Bupropion Hydrobromide] Other (See Comments)     Migraine       Compazine Anxiety      Dihydroergotamine Anxiety and Other (See Comments)     Cardiac symptoms     Droperidol Anxiety     Medroxyprogesterone Hives     Prochlorperazine Anxiety        PHYSICAL EXAM:    Pleasant and cooperative.  Independent with ambulation and transfers.    Has a headache today which she rates at 7/10.    HPI:    Patient denies new medical diagnoses, illnesses, hospitalizations, emergency room visits, and injuries since the previous injection with botulinum neurotoxin.      We reviewed the recommended safety guidelines for  Botox from any vaccine injection, such as the seasonal flu vaccine, by a minimum of 10-14 days with Valerie Sim. She acknowledged understanding.      RESPONSE TO PREVIOUS TREATMENT:  Change in headache pattern following last series of injections with 200 units of  Botox on 06/06/2018.    She had 1-2 days of malaise & sweating post procedure.  This started 3 days post-procedure but lasted only 1-2 days which is shorter than previously. She feels that adding the bupivacaine has really helped to  Minimize her post-procedural side-effects.    1.  Headache frequency during this injection cycle:  She has daily headaches and 12-13 migraine headache days per month. This is compared to her baseline headache frequency of daily headaches.     2.  Headache duration during this injection cycle:  Her mild baseline headache varies in length throughout the day but is always present, her migraines last a few hours to up 3 days, and this pattern remains consistent.    3.  Headache intensity during this injection cycle:    A.  6-7/10  =  Typical pain level.  This is an improvement from her previous report of 8-9/10.  B.  9/10  =  Worst pain level.  Fewer of the higher intensity migraines.  She feels she has improved control of her migraine headaches.  C.  4-5/10  =  Lowest pain level.  This is an improvement from her previous report of 6-7/10.    4.  Change in headache medication usage during this injection  cycle: She used her max dose of Relpax, which is about 6-8 times per month. She was also recently prescribed IM toradol, which has also been helpful. Last used approximately 4 weeks ago.      5.  ER Visits During This Injection Cycle:  None    6.  Functional Performance:  Change in ADL's, social interaction, days lost from work, etc. She reports missing social and work obligations about 3-4 times per month due to headaches.  She is also on disability for her headaches.     BOTULINUM NEUROTOXIN INJECTION PROCEDURES:      VERIFICATION OF PATIENT IDENTIFICATION AND PROCEDURE     Initials   Patient Name tlb   Patient  tlb   Procedure Verified by: tlb     Prior to the start of the procedure and with procedural staff participation, I verbally confirmed the patient s identity using two indicators, relevant allergies, that the procedure was appropriate and matched the consent or emergent situation, and that the correct equipment/implants were available. Immediately prior to starting the procedure I conducted the Time Out with the procedural staff and re-confirmed the patient s name, procedure, and site/side. (The Joint Commission universal protocol was followed.)  Yes    Sedation (Moderate or Deep): None    Above assessments performed by:    Nely Jean Baptiste, PT, Care Coordinator    Addie Malhotra MD      INDICATIONS FOR PROCEDURES:  Valerie Sim is a 53-year-old patient with chronic migraine headaches associated with cervicogenic components.      Her baseline symptoms have been recalcitrant to oral medications and conservative therapy. She is here today for an injection of Botox. We have received insurance approval to increase dose up to 225 units in hopes of increasing effectiveness and duration of effect. Decision was also made to change diluent to bupivacaine in attempt to decrease post-injection headache associated with this procedure.       GOAL OF PROCEDURE:  The goal of this procedure is to decrease pain  and enhance functional independence.     TOTAL DOSE ADMINISTERED:  Dose Administered: 225 units Botox (Botulinum Toxin Type A)  2:1 and 1:1 Dilution   Diluent Used: 0.25% Bupivacaine (Lot: [id number], Exp: 04/2022)(NDC:64311-432-03)  Total Volume of Diluent Used: See below  Lot # /C3 with Expiration Date: 03/2021  NDC #: Botox 100u ([address]-63)     Medication guide was offered to patient and was declined.     CONSENT:  The risks, benefits, and treatment options were discussed with Valerie Sim and she agreed to proceed.     Written consent was obtained by TLB.      EQUIPMENT USED:  Needle-37mm stimulating/recording  Needle-30 gauge  EMG/NCS Machine      SKIN PREPARATION:  Skin preparation was performed using an alcohol wipe.        AREA/MUSCLE INJECTED: 225 UNITS BOTOX = TOTAL DOSE  1. FACE & SCALP: 115 units of Botox = Total dose, 2:1 Dilution  Right temporalis - 12.5 units of Botox in 4 site/s.  Left temporalis - 12.5 units of Botox in 4 site/s.      Right frontalis - 10 units of Botox in 4 site/s.  Left frontalis - 10 units of Botox in 4 site/s.      Right procerus - 3.5 units of Botox in 1 site/s.  Left procerus - 3.5 units of Botox in 1 site/s.      Right  - 4 units of Botox in 1 site/s.  Left  - 4 units of Botox in 1 site/s.      Right Occipitalis - 25 units of Botox at 4 site/s.   Left Occipitalis - 25 units of Botox at 4 site/s.      Right Nasalis - 2.5 units of Botox at 1 site/s.   Left Nasalis - 2.5 units of Botox at 1 site/s.       2. JAW MUSCLES: 60 units of Botox = Total Dose, 1:1 Dilution  Right Masseter - 25 units of Botox at 1 site/s.   Left Masseter - 25 units of Botox at 1 site/s.      Right temporalis - 5 units of Botox at 1 site/s.  Left temporalis - 5 units of Botox at 1 site/s.       3. SHOULDER & NECK MUSCLES: Total dose 50 units of Botox, 2:1 Dilution      Right levator muscle - 10 units of Botox at 2 site/s (neck and shoulder).  Left levator muscle - 10 units of  Botox at 2 site/s (neck and shoulder).      Right mid trapezius - 5 units of Botox at 1 site.  Left mid trapezius - 5 units of Botox at 1 site.    Right lateral trapezius - 10 units of Botox at 2 site/s  Left lateral trapezuis - 10 units of Botox at 2 site/s.          RESPONSE TO PROCEDURE: Valerie Sim tolerated the procedure well and there were no immediate complications. She was allowed to recover for an appropriate period of time and was discharged home in stable condition.     FOLLOW UP:  Valerie Sim was asked to follow up by phone in 7-14 days with Nely Jean Baptiste PT, Care Coordinator or Estela Magaña RN, Care Coordinator, to report her response to this series of injections. Based on the patient's previous response to this therapy, Valerie Sim was rescheduled for the next series of injections in 9 weeks. Due to a limited duration of Botox effectiveness (approximately 8-9 weeks), we will continue to schedule her Botox every 9 weeks.      PLAN (Medication Changes, Therapy Orders, Work or Disability Issues, etc.): Patient will monitor her response to today's injections and report.         Again, thank you for allowing me to participate in the care of your patient.      Sincerely,    Addie Malhotra MD

## 2018-08-14 NOTE — MR AVS SNAPSHOT
After Visit Summary   8/14/2018    Valerie Sim    MRN: [id number]           Patient Information     Date Of Birth          1965        Visit Information        Provider Department      8/14/2018 3:40 PM Addie Malhotra MD [facility]         Follow-ups after your visit        Follow-up notes from your care team     Return in about 9 weeks (around 10/16/2018) for Chronic Migraine.      Your next 10 appointments already scheduled     Oct 02, 2018  2:00 PM CDT   Return Visit with Boyd Johnston MD   [facility] ([facility])    [address] [place] [id number]   [phone]            Oct 17, 2018  9:20 AM CDT   (Arrive by 9:05 AM)   Return Botox with Addie Malhotra MD   [facility] ([facility])    [address] [place] [id number] [phone]            Dec 18, 2018  1:40 PM CST   (Arrive by 1:25 PM)   Return Botox with Addie Malhotra MD   [facility] ([facility])    [address] [place] [id number] [phone]              Who to contact     Please call your clinic at [phone] to:    Ask questions about your health    Make or cancel appointments    Discuss your medicines    Learn about your test results    Speak to your doctor            Additional Information About Your Visit        [employer] Information     [employer] gives you secure access to your electronic health record. If you see a primary care provider, you can also send messages to your care team and make appointments. If you have questions, please call your primary care clinic.  If you do not have a primary care provider, please call [phone] and they will assist you.      [employer] is an electronic gateway that provides easy, online access to your  "medical records. With [email or web address], you can request a clinic appointment, read your test results, renew a prescription or communicate with your care team.     To access your existing account, please contact your [facility] or call [phone] for assistance.        Care EveryWhere ID     This is your Care EveryWhere ID. This could be used by other organizations to access your [place] medical records  [id number]        Your Vitals Were     Pulse Height BMI (Body Mass Index)             86 1.702 m (5' 7\") 26.52 kg/m2          Blood Pressure from Last 3 Encounters:   08/14/18 (!) 162/91   06/19/18 152/90   06/06/18 (!) 155/91    Weight from Last 3 Encounters:   08/14/18 76.8 kg (169 lb 4.8 oz)   06/19/18 77.6 kg (171 lb)   06/06/18 77.9 kg (171 lb 11.2 oz)              Today, you had the following     No orders found for display       Primary Care Provider Office Phone # Fax #    Meek Leary [phone]       [facility] [address] [address]        Equal Access to Services     BETZAIDA MONTES : Hadii aad ku hadasho Sopaulinaali, waaxda luqadaha, qaybta kaalmada adeegyada, kimberly georgen jossy helton. So [facility] [phone].    ATENCIÓN: Si habla español, tiene a daniels disposición servicios gratuitos de asistencia lingüística. LlUC Health [phone].    We comply with applicable federal civil rights laws and [place] laws. We do not discriminate on the basis of race, color, national origin, age, disability, sex, sexual orientation, or gender identity.            Thank you!     Thank you for choosing [facility]  for your care. Our goal is always to provide you with excellent care. Hearing back from our patients is one way we can continue to improve our services. Please take a few minutes to complete the written survey that you may receive in the mail after your visit with us. Thank you!             Your " Updated Medication List - Protect others around you: Learn how to safely use, store and throw away your medicines at www.disposemymeds.org.          This list is accurate as of 8/14/18  4:27 PM.  Always use your most recent med list.                   Brand Name Dispense Instructions for use Diagnosis    * amphetamine-dextroamphetamine 20 MG per tablet    ADDERALL    60 tablet    Take 1 tablet (20 mg) by mouth 2 times daily    Post concussion syndrome       * amphetamine-dextroamphetamine 20 MG per tablet    ADDERALL    60 tablet    Take 1 tablet (20 mg) by mouth 2 times daily    Post concussion syndrome       * amphetamine-dextroamphetamine 20 MG per tablet    ADDERALL    60 tablet    Take 1 tablet (20 mg) by mouth 2 times daily    Post concussion syndrome       ATIVAN 1 MG tablet   Generic drug:  LORazepam      Take 1 mg by mouth 2 times daily as needed.        * BOTOX IJ      Inject 200 Units into the muscle once Lot # /C3 with Expiration Date: 08/2020        * BOTOX IJ      Inject 225 Units as directed once Lot /C3 Exp 10/2020        * BOTOX IJ      Inject 225 Units into the muscle once Lot # /C3 with Expiration Date: 11/2020        * BOTOX IJ      Inject 225 Units as directed once Lot# /C3 Exp 03/2021        budesonide 32 MCG/ACT spray    RINOCORT AQUA     Spray 1 spray into both nostrils daily.        DEPAKOTE PO      Take 250 mg by mouth every other day        eletriptan 40 MG tablet    RELPAX    12 tablet    TAKE 1 TABLET AT ONSET OF MIGRAINE. MAY REPEAT ONCE AFTER 2 HRS. MAX OF 2 TABS/24HRS AND 3 DAYS/WEEK.    Migraine without aura and without status migrainosus, not intractable       fexofenadine 180 MG tablet    ALLEGRA     Take  by mouth daily.        HYDROmorphone 2 MG tablet    DILAUDID    24 tablet    Take 0.5-1 tablets (1-2 mg) by mouth every 3 hours as needed 20 tablets = 3 month supply.    SI (sacroiliac) joint dysfunction       * INDOMETHACIN PO      Take 50 mg by mouth every  other day        * indomethacin 50 MG capsule    INDOCIN    90 capsule    TAKE 1 CAPSULE BY MOUTH TWICE DAILY WITH MEALS. AFTER ONE WEEK, MAY INCREASE TO 1 CAPSULE THREE TIMES DAILY.    Paroxysmal hemicrania       Lutein 20 MG Tabs      Take 1 tablet by mouth every other day Reported on 5/9/2017        ondansetron 4 MG tablet    ZOFRAN    90 tablet    Take 1 tablet (4 mg) by mouth every 8 hours as needed    Migraine without aura       PREMARIN 0.625 MG tablet   Generic drug:  estrogens (conjugated)      Take by mouth daily .30 mg  .        RESTASIS OP      Apply to eye 2 times daily        SEROQUEL 50 MG tablet   Generic drug:  QUEtiapine      Take 1 tablet by mouth daily.        VALTREX 1000 mg tablet   Generic drug:  valACYclovir      Take 1,000 mg by mouth as needed.        * Notice:  This list has 9 medication(s) that are the same as other medications prescribed for you. Read the directions carefully, and ask your doctor or other care provider to review them with you.

## 2018-10-02 ENCOUNTER — OFFICE VISIT (OUTPATIENT)
Dept: NEUROLOGY | Facility: CLINIC | Age: 53
End: 2018-10-02
Payer: COMMERCIAL

## 2018-10-02 VITALS
WEIGHT: 170.1 LBS | DIASTOLIC BLOOD PRESSURE: 86 MMHG | OXYGEN SATURATION: 99 % | BODY MASS INDEX: 26.64 KG/M2 | SYSTOLIC BLOOD PRESSURE: 141 MMHG | HEART RATE: 96 BPM

## 2018-10-02 DIAGNOSIS — F07.81 POST CONCUSSION SYNDROME: ICD-10-CM

## 2018-10-02 DIAGNOSIS — Z23 NEED FOR PROPHYLACTIC VACCINATION AND INOCULATION AGAINST INFLUENZA: Primary | ICD-10-CM

## 2018-10-02 DIAGNOSIS — G43.009 MIGRAINE WITHOUT AURA AND WITHOUT STATUS MIGRAINOSUS, NOT INTRACTABLE: ICD-10-CM

## 2018-10-02 DIAGNOSIS — M53.3 SI (SACROILIAC) JOINT DYSFUNCTION: ICD-10-CM

## 2018-10-02 PROCEDURE — 90471 IMMUNIZATION ADMIN: CPT | Performed by: PSYCHIATRY & NEUROLOGY

## 2018-10-02 PROCEDURE — 99214 OFFICE O/P EST MOD 30 MIN: CPT | Mod: 25 | Performed by: PSYCHIATRY & NEUROLOGY

## 2018-10-02 PROCEDURE — 90686 IIV4 VACC NO PRSV 0.5 ML IM: CPT | Performed by: PSYCHIATRY & NEUROLOGY

## 2018-10-02 RX ORDER — DEXTROAMPHETAMINE SACCHARATE, AMPHETAMINE ASPARTATE, DEXTROAMPHETAMINE SULFATE AND AMPHETAMINE SULFATE 5; 5; 5; 5 MG/1; MG/1; MG/1; MG/1
20 TABLET ORAL 2 TIMES DAILY
Qty: 60 TABLET | Refills: 0 | Status: SHIPPED | OUTPATIENT
Start: 2018-12-03 | End: 2019-03-20

## 2018-10-02 RX ORDER — KETOROLAC TROMETHAMINE 30 MG/ML
30 INJECTION, SOLUTION INTRAMUSCULAR; INTRAVENOUS ONCE
Qty: 10 ML | Refills: 0 | OUTPATIENT
Start: 2018-10-02 | End: 2018-10-02

## 2018-10-02 RX ORDER — HYDROMORPHONE HYDROCHLORIDE 2 MG/1
1-2 TABLET ORAL
Qty: 24 TABLET | Refills: 0 | Status: SHIPPED | OUTPATIENT
Start: 2018-10-02 | End: 2018-12-31

## 2018-10-02 RX ORDER — DEXTROAMPHETAMINE SACCHARATE, AMPHETAMINE ASPARTATE, DEXTROAMPHETAMINE SULFATE AND AMPHETAMINE SULFATE 5; 5; 5; 5 MG/1; MG/1; MG/1; MG/1
20 TABLET ORAL 2 TIMES DAILY
Qty: 60 TABLET | Refills: 0 | Status: SHIPPED | OUTPATIENT
Start: 2018-11-02 | End: 2019-01-02

## 2018-10-02 RX ORDER — ELETRIPTAN HYDROBROMIDE 40 MG/1
TABLET, FILM COATED ORAL
Qty: 12 TABLET | Refills: 3 | Status: SHIPPED | OUTPATIENT
Start: 2018-10-02 | End: 2020-04-03

## 2018-10-02 RX ORDER — LAMOTRIGINE 200 MG/1
1 TABLET ORAL DAILY
COMMUNITY
Start: 2018-09-11 | End: 2022-02-16

## 2018-10-02 RX ORDER — DEXTROAMPHETAMINE SACCHARATE, AMPHETAMINE ASPARTATE, DEXTROAMPHETAMINE SULFATE AND AMPHETAMINE SULFATE 5; 5; 5; 5 MG/1; MG/1; MG/1; MG/1
20 TABLET ORAL 2 TIMES DAILY
Qty: 60 TABLET | Refills: 0 | Status: SHIPPED | OUTPATIENT
Start: 2018-10-02 | End: 2018-10-02

## 2018-10-02 ASSESSMENT — PAIN SCALES - GENERAL: PAINLEVEL: SEVERE PAIN (7)

## 2018-10-02 NOTE — MR AVS SNAPSHOT
After Visit Summary   10/2/2018    Vlaerie Sim    MRN: [id number]           Patient Information     Date Of Birth          1965        Visit Information        Provider Department      10/2/2018 2:00 PM Boyd Johnston MD [facility]        Today's Diagnoses     Need for prophylactic vaccination and inoculation against influenza    -  1    Migraine without aura and without status migrainosus, not intractable        SI (sacroiliac) joint dysfunction        Post concussion syndrome           Follow-ups after your visit        Additional Services     NEUROLOGY ADULT REFERRAL       Your provider has referred you for the following:   Consult at [facility]: Neurology Clinic - [place] [phone]   [email or web address]  General Neurology/Christian    Please be aware that coverage of these services is subject to the terms and limitations of your health insurance plan.  Call member services at your health plan with any benefit or coverage questions.      Please bring the following with you to your appointment:    (1) Any X-Rays, CTs or MRIs which have been performed.  Contact the facility where they were done to arrange for  prior to your scheduled appointment.    (2) List of current medications  (3) This referral request   (4) Any documents/labs given to you for this referral                  Follow-up notes from your care team     Return if symptoms worsen or fail to improve.      Your next 10 appointments already scheduled     Oct 18, 2018  1:50 PM CDT   (Arrive by 1:35 PM)   Return Botox with Addie Malhotra MD   [facility] ([facility])    [address] [place] [phone] [phone]            Oct 18, 2018  3:30 PM CDT   (Arrive by 3:15 PM)   New Patient Visit with Jamila Gonzales MD   [facility] ([facility])    [address] [place] [id number] [phone]            Dec 20, 2018  1:50 PM CST   (Arrive by 1:35 PM)   Return Botox with Addie Malhotra MD   [facility] ([facility])    [address] [place] [id number] [phone]              Who to contact     If you have questions or need follow up information about today's clinic visit or your schedule please contact [facility] directly at [phone].  Normal or non-critical lab and imaging results will be communicated to you by [employer], letter or phone within 4 business days after the clinic has received the results. If you do not hear from us within 7 days, please contact the clinic through [employer] or phone. If you have a critical or abnormal lab result, we will notify you by phone as soon as possible.  Submit refill requests through [employer] or call your pharmacy and they will forward the refill request to us. Please allow 3 business days for your refill to be completed.          Additional Information About Your Visit        [employer] Information     [employer] gives you secure access to your electronic health record. If you see a primary care provider, you can also send messages to your care team and make appointments. If you have questions, please call your primary care clinic.  If you do not have a primary care provider, please call [phone] and they will assist you.      [employer] is an electronic gateway that provides easy, online access to your medical records. With [employer], you can request a clinic appointment, read your test results, renew a prescription or communicate with your care team.     To access your existing account, please contact your [facility] or call [phone] for assistance.        Care EveryWhere ID     This is your Care EveryWhere ID. This could be used by other  organizations to access your [place] medical records  [id number]        Your Vitals Were     Pulse Pulse Oximetry BMI (Body Mass Index)             96 99% 26.64 kg/m2          Blood Pressure from Last 3 Encounters:   10/02/18 141/86   08/14/18 (!) 162/91   06/19/18 152/90    Weight from Last 3 Encounters:   10/02/18 77.2 kg (170 lb 1.6 oz)   08/14/18 76.8 kg (169 lb 4.8 oz)   06/19/18 77.6 kg (171 lb)              We Performed the Following     FLU VACCINE, SPLIT VIRUS, IM (QUADRIVALENT) [44619]- >3 YRS     NEUROLOGY ADULT REFERRAL          Today's Medication Changes          These changes are accurate as of 10/2/18 11:59 PM.  If you have any questions, ask your nurse or doctor.               Start taking these medicines.        Dose/Directions    ketorolac 30 MG/ML injection   Commonly known as:  TORADOL   Used for:  Migraine without aura and without status migrainosus, not intractable   Started by:  Boyd Johnston MD        Dose:  30 mg   Inject 1 mL (30 mg) into the muscle once for 1 dose Give one time dose for headache not responsive to eletriptan.  Max of 5 per month.   Quantity:  10 mL   Refills:  0         These medicines have changed or have updated prescriptions.        Dose/Directions    * amphetamine-dextroamphetamine 20 MG per tablet   Commonly known as:  ADDERALL   This may have changed:    - Another medication with the same name was added. Make sure you understand how and when to take each.  - Another medication with the same name was changed. Make sure you understand how and when to take each.   Used for:  Post concussion syndrome   Changed by:  Boyd Johnston MD        Dose:  20 mg   Take 1 tablet (20 mg) by mouth 2 times daily   Quantity:  60 tablet   Refills:  0       * amphetamine-dextroamphetamine 20 MG per tablet   Commonly known as:  ADDERALL   This may have changed:  These instructions start on 11/2/2018. If you are unsure what to do until then, ask your doctor or other care  provider.   Used for:  Post concussion syndrome   Changed by:  Boyd Johnston MD        Dose:  20 mg   Start taking on:  11/2/2018   Take 1 tablet (20 mg) by mouth 2 times daily   Quantity:  60 tablet   Refills:  0       * amphetamine-dextroamphetamine 20 MG per tablet   Commonly known as:  ADDERALL   This may have changed:  You were already taking a medication with the same name, and this prescription was added. Make sure you understand how and when to take each.   Used for:  Post concussion syndrome   Changed by:  Boyd Johnston MD        Dose:  20 mg   Start taking on:  12/3/2018   Take 1 tablet (20 mg) by mouth 2 times daily   Quantity:  60 tablet   Refills:  0       HYDROmorphone 2 MG tablet   Commonly known as:  DILAUDID   This may have changed:  reasons to take this   Used for:  SI (sacroiliac) joint dysfunction   Changed by:  Boyd Johnston MD        Dose:  1-2 mg   Take 0.5-1 tablets (1-2 mg) by mouth every 3 hours as needed (headache) 20 tablets = 3 month supply.   Quantity:  24 tablet   Refills:  0       * Notice:  This list has 3 medication(s) that are the same as other medications prescribed for you. Read the directions carefully, and ask your doctor or other care provider to review them with you.         Where to get your medicines      These medications were sent to [id number] - [place], [place] - [address]  [address] [facility] [address]     Phone:  [phone]     eletriptan 40 MG tablet         Some of these will need a paper prescription and others can be bought over the counter.  Ask your nurse if you have questions.     Bring a paper prescription for each of these medications     amphetamine-dextroamphetamine 20 MG per tablet    amphetamine-dextroamphetamine 20 MG per tablet    HYDROmorphone 2 MG tablet       You don't need a prescription for these medications     ketorolac 30 MG/ML injection               Information about OPIOIDS     PRESCRIPTION  OPIOIDS: WHAT YOU NEED TO KNOW   We gave you an opioid (narcotic) pain medicine. It is important to manage your pain, but opioids are not always the best choice. You should first try all the other options your care team gave you. Take this medicine for as short a time (and as few doses) as possible.    Some activities can increase your pain, such as bandage changes or therapy sessions. It may help to take your pain medicine 30 to 60 minutes before these activities. Reduce your stress by getting enough sleep, working on hobbies you enjoy and practicing relaxation or meditation. Talk to your care team about ways to manage your pain beyond prescription opioids.    These medicines have risks:    DO NOT drive when on new or higher doses of pain medicine. These medicines can affect your alertness and reaction times, and you could be arrested for driving under the influence (DUI). If you need to use opioids long-term, talk to your care team about driving.    DO NOT operate heavy machinery    DO NOT do any other dangerous activities while taking these medicines.    DO NOT drink any alcohol while taking these medicines.     If the opioid prescribed includes acetaminophen, DO NOT take with any other medicines that contain acetaminophen. Read all labels carefully. Look for the word  acetaminophen  or  Tylenol.  Ask your pharmacist if you have questions or are unsure.    You can get addicted to pain medicines, especially if you have a history of addiction (chemical, alcohol or substance dependence). Talk to your care team about ways to reduce this risk.    All opioids tend to cause constipation. Drink plenty of water and eat foods that have a lot of fiber, such as fruits, vegetables, prune juice, apple juice and high-fiber cereal. Take a laxative (Miralax, milk of magnesia, Colace, Senna) if you don t move your bowels at least every other day. Other side effects include upset stomach, sleepiness, dizziness, throwing up,  tolerance (needing more of the medicine to have the same effect), physical dependence and slowed breathing.    Store your pills in a secure place, locked if possible. We will not replace any lost or stolen medicine. If you don t finish your medicine, please throw away (dispose) as directed by your pharmacist. The [place] Pollution [employer] Agency has more information about safe disposal: [email or web address]-medications         Primary Care Provider Office Phone # Fax #    Meek Leary [phone]       [facility] [address] [address]        Equal Access to Services     JAYLEN MONTES : Hadii jae ku hadasho Sopaulinaali, waaxda luqadaha, qaybta kaalmada adeedinsonyada, kimberly helton. So [facility] [phone].    ATENCIÓN: Si habla español, tiene a daniels disposición servicios gratuitos de asistencia lingüística. Ll[facility] [phone].    We comply with applicable federal civil rights laws and [place] laws. We do not discriminate on the basis of race, color, national origin, age, disability, sex, sexual orientation, or gender identity.            Thank you!     Thank you for choosing [facility]  for your care. Our goal is always to provide you with excellent care. Hearing back from our patients is one way we can continue to improve our services. Please take a few minutes to complete the written survey that you may receive in the mail after your visit with us. Thank you!             Your Updated Medication List - Protect others around you: Learn how to safely use, store and throw away your medicines at www.disposemymeds.org.          This list is accurate as of 10/2/18 11:59 PM.  Always use your most recent med list.                   Brand Name Dispense Instructions for use Diagnosis    * amphetamine-dextroamphetamine 20 MG per tablet    ADDERALL    60 tablet    Take 1 tablet (20 mg) by mouth 2  times daily    Post concussion syndrome       * amphetamine-dextroamphetamine 20 MG per tablet   Start taking on:  11/2/2018    ADDERALL    60 tablet    Take 1 tablet (20 mg) by mouth 2 times daily    Post concussion syndrome       * amphetamine-dextroamphetamine 20 MG per tablet   Start taking on:  12/3/2018    ADDERALL    60 tablet    Take 1 tablet (20 mg) by mouth 2 times daily    Post concussion syndrome       ATIVAN 1 MG tablet   Generic drug:  LORazepam      Take 1 mg by mouth 2 times daily as needed.        * BOTOX IJ      Inject 200 Units into the muscle once Lot # /C3 with Expiration Date: 08/2020        * BOTOX IJ      Inject 225 Units as directed once Lot /C3 Exp 10/2020        * BOTOX IJ      Inject 225 Units into the muscle once Lot # /C3 with Expiration Date: 11/2020        * BOTOX IJ      Inject 225 Units as directed once Lot# /C3 Exp 03/2021        budesonide 32 MCG/ACT spray    RINOCORT AQUA     Spray 1 spray into both nostrils daily.        DEPAKOTE PO      Take 250 mg by mouth every 48 hours as needed        eletriptan 40 MG tablet    RELPAX    12 tablet    TAKE 1 TABLET AT ONSET OF MIGRAINE. MAY REPEAT ONCE AFTER 2 HRS. MAX OF 2 TABS/24HRS AND 3 DAYS/WEEK.    Migraine without aura and without status migrainosus, not intractable       fexofenadine 180 MG tablet    ALLEGRA     Take  by mouth daily.        HYDROmorphone 2 MG tablet    DILAUDID    24 tablet    Take 0.5-1 tablets (1-2 mg) by mouth every 3 hours as needed (headache) 20 tablets = 3 month supply.    SI (sacroiliac) joint dysfunction       indomethacin 50 MG capsule    INDOCIN    90 capsule    TAKE 1 CAPSULE BY MOUTH TWICE DAILY WITH MEALS. AFTER ONE WEEK, MAY INCREASE TO 1 CAPSULE THREE TIMES DAILY.    Paroxysmal hemicrania       ketorolac 30 MG/ML injection    TORADOL    10 mL    Inject 1 mL (30 mg) into the muscle once for 1 dose Give one time dose for headache not responsive to eletriptan.  Max of 5 per month.     Migraine without aura and without status migrainosus, not intractable       lamoTRIgine 200 MG tablet    LaMICtal     Take 1 tablet by mouth daily        Lutein 20 MG Tabs      Take 1 tablet by mouth every other day Reported on 5/9/2017        ondansetron 4 MG tablet    ZOFRAN    90 tablet    Take 1 tablet (4 mg) by mouth every 8 hours as needed    Migraine without aura       PREMARIN 0.625 MG tablet   Generic drug:  estrogens (conjugated)      Take by mouth daily .30 mg  .        RESTASIS OP      Apply to eye 2 times daily        SEROQUEL 50 MG tablet   Generic drug:  QUEtiapine      Take 1 tablet by mouth daily.        VALTREX 1000 mg tablet   Generic drug:  valACYclovir      Take 1,000 mg by mouth as needed.        * Notice:  This list has 7 medication(s) that are the same as other medications prescribed for you. Read the directions carefully, and ask your doctor or other care provider to review them with you.

## 2018-10-02 NOTE — LETTER
"    10/2/2018         RE: Valerie Sim  [address] [facility] [address]        Dear Colleague,    Thank you for referring your patient, Valerie Sim, to the [facility]. Please see a copy of my visit note below.      Injectable Influenza Immunization Documentation    1.  Is the person to be vaccinated sick today?   No    2. Does the person to be vaccinated have an allergy to a component   of the vaccine?   No  Egg Allergy Algorithm Link    3. Has the person to be vaccinated ever had a serious reaction   to influenza vaccine in the past?   No    4. Has the person to be vaccinated ever had Guillain-Barré syndrome?   No    Form completed by Cassandra Cadet LPN           Visit Date:   10/02/2018      This patient is seen in followup with complex headache disorder.  I last saw her 6 months ago.  She reports that since that time she did have a left hip dislocation but it was reduced spontaneously.  I have been seeing her for headache.  At the time of her last visit, she was not doing all that well, but things have improved since then.  She does get occipital nerve blocks from Dr. Malhotra who is taking over for Dr. Jerry.  She also is due to get Botox.  She is getting the bupivacaine with Botox and is getting Botox every 9 weeks now.  They have increased the number of units of Botox.  She says overall her headaches are improving.  She does get an occasional nocturnal headache.  She was wondering if that could be because she increased her use of hydromorphone during the period of her hip dislocation about a month ago.  She was using it up to every day.  Now she is back to using a half of a 2 mg tablet about 3 times a week.  She does get a different kind of headache now on her forehead, either on the right or the left side.  The \"vein stands out\".  It lasts about 30-60 minutes and happens 2 times a week.  This type of headache has been present for the last 6 months or so.  " These headaches come on rather abruptly.  She also had seen Dr. Alfaro at the [facility] and he prescribed IM ketoralac 30 mg in instances of eletriptan failure.  She does use about 9 eletriptan per month.  She is no longer on the Indocin.      PHYSICAL EXAMINATION:   GENERAL:  The patient is cooperative and in no distress.   VITAL SIGNS:  Her blood pressure is 141/86.  Visual acuity 20/25 in the right eye, 20/20 in the left eye.  Funduscopic exam shows sharp discs bilaterally.      ASSESSMENT:  Complex headache disorder, including paroxysmal hemicrania, migraine, postconcussion syndrome, occipital headache, and coital headache.      DISCUSSION:  The patient is seen with the above problem list.  I have given her refills on hydromorphone 24 tablets, which should last her for 3 months.  I have also given her a refill on the Adderall, which she takes for cognitive symptoms related to her head injury.  I refilled the Relpax.  I also gave her a prescription for IM ketorolac 30 mg.  She uses it up to 5 times per month.  I have no further recommendations for her at this time.  She was wondering about getting another opinion about treating her headaches and for ongoing management.  To this end, I have referred her to Dr. Gonzales.  I will see her in followup on an as-needed basis.  She knows to call if there are questions or problems before then.      This was a 25-minute appointment, more than half of which was spent in counseling and coordination of care.         STEPH CRUZ MD             D: 10/02/2018   T: 10/02/2018   MT: NTS      Name:     LAKESHA IVY   MRN:      [id number]        Account:      [id number]   :      1965           Visit Date:   10/02/2018      Document: [id number]       Again, thank you for allowing me to participate in the care of your patient.        Sincerely,        Steph Cruz MD

## 2018-10-02 NOTE — PROGRESS NOTES

## 2018-10-02 NOTE — NURSING NOTE
Valerie Sim's goals for this visit include: return  She requests these members of her care team be copied on today's visit information:     PCP: Meek Leary    Referring Provider:  No referring provider defined for this encounter.    /86  Pulse 96  Wt 77.2 kg (170 lb 1.6 oz)  SpO2 99%  BMI 26.64 kg/m2    Do you need any medication refills at today's visit? y

## 2018-10-03 NOTE — PROGRESS NOTES
"Visit Date:   10/02/2018      This patient is seen in followup with complex headache disorder.  I last saw her 6 months ago.  She reports that since that time she did have a left hip dislocation but it was reduced spontaneously.  I have been seeing her for headache.  At the time of her last visit, she was not doing all that well, but things have improved since then.  She does get occipital nerve blocks from Dr. Malhotra who is taking over for Dr. Jerry.  She also is due to get Botox.  She is getting the bupivacaine with Botox and is getting Botox every 9 weeks now.  They have increased the number of units of Botox.  She says overall her headaches are improving.  She does get an occasional nocturnal headache.  She was wondering if that could be because she increased her use of hydromorphone during the period of her hip dislocation about a month ago.  She was using it up to every day.  Now she is back to using a half of a 2 mg tablet about 3 times a week.  She does get a different kind of headache now on her forehead, either on the right or the left side.  The \"vein stands out\".  It lasts about 30-60 minutes and happens 2 times a week.  This type of headache has been present for the last 6 months or so.  These headaches come on rather abruptly.  She also had seen Dr. Alfaro at the [facility] and he prescribed IM ketoralac 30 mg in instances of eletriptan failure.  She does use about 9 eletriptan per month.  She is no longer on the Indocin.      PHYSICAL EXAMINATION:   GENERAL:  The patient is cooperative and in no distress.   VITAL SIGNS:  Her blood pressure is 141/86.  Visual acuity 20/25 in the right eye, 20/20 in the left eye.  Funduscopic exam shows sharp discs bilaterally.      ASSESSMENT:  Complex headache disorder, including paroxysmal hemicrania, migraine, postconcussion syndrome, occipital headache, and coital headache.      DISCUSSION:  The patient is seen with the above problem list.  I have given her " refills on hydromorphone 24 tablets, which should last her for 3 months.  I have also given her a refill on the Adderall, which she takes for cognitive symptoms related to her head injury.  I refilled the Relpax.  I also gave her a prescription for IM ketorolac 30 mg.  She uses it up to 5 times per month.  I have no further recommendations for her at this time.  She was wondering about getting another opinion about treating her headaches and for ongoing management.  To this end, I have referred her to Dr. Gonzales.  I will see her in followup on an as-needed basis.  She knows to call if there are questions or problems before then.      This was a 25-minute appointment, more than half of which was spent in counseling and coordination of care.         STEPH CRUZ MD             D: 10/02/2018   T: 10/02/2018   MT: MAKSIM      Name:     LAKESHA IVY   MRN:      [id number]        Account:      [id number]   :      1965           Visit Date:   10/02/2018      Document: [id number]

## 2018-10-18 ENCOUNTER — OFFICE VISIT (OUTPATIENT)
Dept: PHYSICAL MEDICINE AND REHAB | Facility: CLINIC | Age: 53
End: 2018-10-18
Payer: COMMERCIAL

## 2018-10-18 VITALS
DIASTOLIC BLOOD PRESSURE: 64 MMHG | SYSTOLIC BLOOD PRESSURE: 149 MMHG | BODY MASS INDEX: 26.68 KG/M2 | WEIGHT: 170 LBS | TEMPERATURE: 98.4 F | HEIGHT: 67 IN | HEART RATE: 106 BPM

## 2018-10-18 DIAGNOSIS — G43.719 INTRACTABLE CHRONIC MIGRAINE WITHOUT AURA AND WITHOUT STATUS MIGRAINOSUS: ICD-10-CM

## 2018-10-18 DIAGNOSIS — Z87.820 HISTORY OF TRAUMATIC BRAIN INJURY: Primary | ICD-10-CM

## 2018-10-18 RX ORDER — AMILORIDE HYDROCHLORIDE 5 MG/1
7.5 TABLET ORAL DAILY
COMMUNITY
Start: 2018-10-08

## 2018-10-18 RX ORDER — AMLODIPINE BESYLATE 10 MG/1
10 TABLET ORAL DAILY
COMMUNITY
Start: 2018-10-05

## 2018-10-18 ASSESSMENT — PAIN SCALES - GENERAL: PAINLEVEL: SEVERE PAIN (6)

## 2018-10-18 NOTE — MR AVS SNAPSHOT
After Visit Summary   10/18/2018    Valerie Sim    MRN: [id number]           Patient Information     Date Of Birth          1965        Visit Information        Provider Department      10/18/2018 1:50 PM Addie Malhotra MD [facility] Physical Medicine and Rehabilitation        Today's Diagnoses     History of traumatic brain injury    -  1    Intractable chronic migraine without aura and without status migrainosus           Follow-ups after your visit        Additional Services     Neuropsych Testing Referral - [facility]       Please schedule a visit to complete Neuropsychological Testing at [facility].                  Your next 10 appointments already scheduled     Nov 05, 2018  9:00 AM CST   (Arrive by 8:45 AM)   New Patient Visit with Jamila Gonzales MD   [facility] Neurology ([facility])    [address] [place] [address] [id number] [phone]            Dec 12, 2018 11:20 AM CST   (Arrive by 11:05 AM)   Return Botox with Addie Malhotra MD   [facility] Physical Medicine and Rehabilitation ([facility]    [address] [place] [address] [id number] [phone]            Dec 20, 2018  1:50 PM CST   (Arrive by 1:35 PM)   Return Botox with Addie Malhotra MD   [facility] Physical Medicine and Rehabilitation ([facility])    [address] [place] [address] [id number] [phone]            Feb 19, 2019  1:00 PM CST   (Arrive by 12:45 PM)   Return Botox with Addie Malhotra MD   [facility] Physical Medicine and Rehabilitation ([facility])    [address] [place] [id number] [phone]              Who to contact     Please call your clinic at [phone] to:    Ask questions about your health    Make or cancel appointments    Discuss your medicines    Learn about your test results    Speak  "to your doctor            Additional Information About Your Visit        [employer] Information     [employer] gives you secure access to your electronic health record. If you see a primary care provider, you can also send messages to your care team and make appointments. If you have questions, please call your primary care clinic.  If you do not have a primary care provider, please call [phone] and they will assist you.      [employer] is an electronic gateway that provides easy, online access to your medical records. With [employer], you can request a clinic appointment, read your test results, renew a prescription or communicate with your care team.     To access your existing account, please contact your [facility] or call [phone] for assistance.        Care EveryWhere ID     This is your Care EveryWhere ID. This could be used by other organizations to access your [place] medical records  [id number]        Your Vitals Were     Pulse Temperature Height BMI (Body Mass Index)          106 98.4  F (36.9  C) (Oral) 1.702 m (5' 7\") 26.63 kg/m2         Blood Pressure from Last 3 Encounters:   10/18/18 149/64   10/02/18 141/86   08/14/18 (!) 162/91    Weight from Last 3 Encounters:   10/18/18 77.1 kg (170 lb)   10/02/18 77.2 kg (170 lb 1.6 oz)   08/14/18 76.8 kg (169 lb 4.8 oz)              We Performed the Following     HC CHEMODENERVATE FACIAL,TRIGERM,NERV MIGRAINE     Needle EMG Guide w/Chemodenervation (77667)     Neuropsych Testing Referral - [facility]        Primary Care Provider Office Phone # Fax #    Meek Leary [phone]       [facility] [address] [address]        Equal Access to Services     JAYLEN MONTES : Hadii jae New, wazenada luqadaha, qaybta kaalmada jossyyada, kimberly helton. So [facility] [phone].    ATENCIÓN: Si habla español, tiene a daniels disposición servicios gratuitos de " urbanoa lingüística. Fransisco al [phone].    We comply with applicable federal civil rights laws and [place] laws. We do not discriminate on the basis of race, color, national origin, age, disability, sex, sexual orientation, or gender identity.            Thank you!     Thank you for choosing [facility] PHYSICAL MEDICINE AND REHABILITATION  for your care. Our goal is always to provide you with excellent care. Hearing back from our patients is one way we can continue to improve our services. Please take a few minutes to complete the written survey that you may receive in the mail after your visit with us. Thank you!             Your Updated Medication List - Protect others around you: Learn how to safely use, store and throw away your medicines at www.disposemymeds.org.          This list is accurate as of 10/18/18  3:53 PM.  Always use your most recent med list.                   Brand Name Dispense Instructions for use Diagnosis    aMILoride 5 MG tablet    MIDAMOR          amLODIPine 10 MG tablet    NORVASC          * amphetamine-dextroamphetamine 20 MG per tablet    ADDERALL    60 tablet    Take 1 tablet (20 mg) by mouth 2 times daily    Post concussion syndrome       * amphetamine-dextroamphetamine 20 MG per tablet   Start taking on:  11/2/2018    ADDERALL    60 tablet    Take 1 tablet (20 mg) by mouth 2 times daily    Post concussion syndrome       * amphetamine-dextroamphetamine 20 MG per tablet   Start taking on:  12/3/2018    ADDERALL    60 tablet    Take 1 tablet (20 mg) by mouth 2 times daily    Post concussion syndrome       ATIVAN 1 MG tablet   Generic drug:  LORazepam      Take 1 mg by mouth 2 times daily as needed.        * BOTOX IJ      Inject 200 Units into the muscle once Lot # /C3 with Expiration Date: 08/2020        * BOTOX IJ      Inject 225 Units as directed once Lot /C3 Exp 10/2020        * BOTOX IJ      Inject 225 Units into the muscle once Lot # /C3 with Expiration Date:  11/2020        * BOTOX IJ      Inject 225 Units as directed once Lot# /C3 Exp 03/2021        * BOTOX IJ      Inject 225 Units as directed once Lot # /C3 with Expiration Date: 04/2021        budesonide 32 MCG/ACT spray    RINOCORT AQUA     Spray 1 spray into both nostrils daily.        DEPAKOTE PO      Take 250 mg by mouth every 48 hours as needed        eletriptan 40 MG tablet    RELPAX    12 tablet    TAKE 1 TABLET AT ONSET OF MIGRAINE. MAY REPEAT ONCE AFTER 2 HRS. MAX OF 2 TABS/24HRS AND 3 DAYS/WEEK.    Migraine without aura and without status migrainosus, not intractable       fexofenadine 180 MG tablet    ALLEGRA     Take  by mouth daily.        HYDROmorphone 2 MG tablet    DILAUDID    24 tablet    Take 0.5-1 tablets (1-2 mg) by mouth every 3 hours as needed (headache) 20 tablets = 3 month supply.    SI (sacroiliac) joint dysfunction       indomethacin 50 MG capsule    INDOCIN    90 capsule    TAKE 1 CAPSULE BY MOUTH TWICE DAILY WITH MEALS. AFTER ONE WEEK, MAY INCREASE TO 1 CAPSULE THREE TIMES DAILY.    Paroxysmal hemicrania       lamoTRIgine 200 MG tablet    LaMICtal     Take 1 tablet by mouth daily        Lutein 20 MG Tabs      Take 1 tablet by mouth every other day Reported on 5/9/2017        ondansetron 4 MG tablet    ZOFRAN    90 tablet    Take 1 tablet (4 mg) by mouth every 8 hours as needed    Migraine without aura       PREMARIN 0.625 MG tablet   Generic drug:  estrogens (conjugated)      Take by mouth daily .30 mg  .        RESTASIS OP      Apply to eye 2 times daily        SEROQUEL 50 MG tablet   Generic drug:  QUEtiapine      Take 1 tablet by mouth daily.        VALTREX 1000 mg tablet   Generic drug:  valACYclovir      Take 1,000 mg by mouth as needed.        * Notice:  This list has 8 medication(s) that are the same as other medications prescribed for you. Read the directions carefully, and ask your doctor or other care provider to review them with you.

## 2018-10-18 NOTE — LETTER
10/18/2018       RE: Valerie Sim  [address] [facility] [id number]     Dear Colleague,    Thank you for referring your patient, Valerie Sim, to the [facility] PHYSICAL MEDICINE AND REHABILITATION at [facility]. Please see a copy of my visit note below.    BOTULINUM TOXIN PROCEDURE - HEADACHE - NOTE    Chief Complaint   Patient presents with     RECHECK     BOTOX     There were no vitals taken for this visit.     Current Outpatient Prescriptions:      aMILoride (MIDAMOR) 5 MG tablet, , Disp: , Rfl:      amLODIPine (NORVASC) 10 MG tablet, , Disp: , Rfl:      [START ON 11/2/2018] amphetamine-dextroamphetamine (ADDERALL) 20 MG per tablet, Take 1 tablet (20 mg) by mouth 2 times daily, Disp: 60 tablet, Rfl: 0     [START ON 12/3/2018] amphetamine-dextroamphetamine (ADDERALL) 20 MG per tablet, Take 1 tablet (20 mg) by mouth 2 times daily, Disp: 60 tablet, Rfl: 0     amphetamine-dextroamphetamine (ADDERALL) 20 MG per tablet, Take 1 tablet (20 mg) by mouth 2 times daily, Disp: 60 tablet, Rfl: 0     budesonide (RINOCORT AQUA) 32 MCG/ACT nasal spray, Spray 1 spray into both nostrils daily., Disp: , Rfl:      CycloSPORINE (RESTASIS OP), Apply to eye 2 times daily, Disp: , Rfl:      Divalproex Sodium (DEPAKOTE PO), Take 250 mg by mouth every 48 hours as needed , Disp: , Rfl:      eletriptan (RELPAX) 40 MG tablet, TAKE 1 TABLET AT ONSET OF MIGRAINE. MAY REPEAT ONCE AFTER 2 HRS. MAX OF 2 TABS/24HRS AND 3 DAYS/WEEK., Disp: 12 tablet, Rfl: 3     estrogens, conjugated, (PREMARIN) 0.625 MG tablet, Take by mouth daily .30 mg . , Disp: , Rfl:      fexofenadine (ALLEGRA) 180 MG tablet, Take  by mouth daily., Disp: , Rfl:      HYDROmorphone (DILAUDID) 2 MG tablet, Take 0.5-1 tablets (1-2 mg) by mouth every 3 hours as needed (headache) 20 tablets = 3 month supply., Disp: 24 tablet, Rfl: 0     indomethacin (INDOCIN) 50 MG capsule, TAKE 1 CAPSULE BY MOUTH TWICE DAILY WITH MEALS.  AFTER ONE WEEK, MAY INCREASE TO 1 CAPSULE THREE TIMES DAILY. (Patient not taking: Reported on 10/2/2018), Disp: 90 capsule, Rfl: 0     lamoTRIgine (LAMICTAL) 200 MG tablet, Take 1 tablet by mouth daily, Disp: , Rfl:      LORazepam (ATIVAN) 1 MG tablet, Take 1 mg by mouth 2 times daily as needed., Disp: , Rfl:      Lutein 20 MG TABS, Take 1 tablet by mouth every other day Reported on 5/9/2017, Disp: , Rfl:      OnabotulinumtoxinA (BOTOX IJ), Inject 225 Units as directed once Lot# /C3 Exp 03/2021, Disp: , Rfl:      OnabotulinumtoxinA (BOTOX IJ), Inject 225 Units into the muscle once Lot # /C3 with Expiration Date: 11/2020, Disp: , Rfl:      OnabotulinumtoxinA (BOTOX IJ), Inject 225 Units as directed once Lot /C3 Exp 10/2020, Disp: , Rfl:      OnabotulinumtoxinA (BOTOX IJ), Inject 200 Units into the muscle once Lot # /C3 with Expiration Date: 08/2020, Disp: , Rfl:      ondansetron (ZOFRAN) 4 MG tablet, Take 1 tablet (4 mg) by mouth every 8 hours as needed, Disp: 90 tablet, Rfl: 1     QUEtiapine (SEROQUEL) 50 MG tablet, Take 1 tablet by mouth daily., Disp: , Rfl:      valACYclovir (VALTREX) 1000 mg tablet, Take 1,000 mg by mouth as needed., Disp: , Rfl:      Allergies   Allergen Reactions     Trazodone Other (See Comments) and Unknown     Other reaction(s): Headache  Other reaction(s): Headache  Other reaction(s): Headache     Amitriptyline Hcl Other (See Comments)     Migraine       Candesartan Cilexetil-Hctz Muscle Pain (Myalgia)     Cymbalta Other (See Comments)     Migraine       Cyproheptadine Hcl      headaches     Desvenlafaxine      Migraine       Fluoxetine Other (See Comments)     Migraine       Imipramine Other (See Comments)     Migraine       Lyrica Muscle Pain (Myalgia)     Metoprolol Other (See Comments) and Unknown     headache  headache  headache     Morphine Other (See Comments)     Patient reports seizure      Nortriptyline Other (See Comments)     Migraine       Phenergan Dm  "[Promethazine-Dm] Other (See Comments)     seizures     Venlafaxine Other (See Comments)     Migraine       Wellbutrin [Bupropion Hydrobromide] Other (See Comments)     Migraine       Compazine Anxiety     Dihydroergotamine Anxiety and Other (See Comments)     Cardiac symptoms     Droperidol Anxiety     Medroxyprogesterone Hives     Prochlorperazine Anxiety        PHYSICAL EXAM:    Pleasant and cooperative.  No facial asymmetry.   Has a headache today which she rates at 6/10. She has had nausea and \"blinking lights\" over the last few days.     HPI:    Patient denies new medical diagnoses, illnesses, hospitalizations, emergency room visits, and injuries since the previous injection with botulinum neurotoxin.      We reviewed the recommended safety guidelines for  Botox from any vaccine injection, such as the seasonal flu vaccine, by a minimum of 10-14 days with Valerie Sim. She acknowledged understanding.      RESPONSE TO PREVIOUS TREATMENT:  Change in headache pattern following last series of injections with 225 units of  Botox on 08/14/2018.    Side effects:     1.  Headache frequency during this injection cycle:  She has daily headaches and 12 migraine headache days per month when she dislocated her hip. She experienced approximately 5 headache days per month otherwise. This is compared to her baseline headache frequency of daily headaches.     2.  Headache duration during this injection cycle:  Her mild baseline headache varies in length throughout the day but is always present, her migraines last a few hours to up 4 days, and this pattern remains consistent. The severity of these headaches waxes and wanes.     3.  Headache intensity during this injection cycle:    A.  6-7/10  =  Typical pain level.  This is an improvement from her previous report of 8-9/10.  B.  9/10  =  Worst pain level.  Fewer of the higher intensity migraines.  She feels she has improved control of her migraine headaches.  C.  " 4-5/10  =  Lowest pain level.  This is an improvement from her previous report of 6-7/10.    4.  Change in headache medication usage during this injection cycle: She has been using Relpax, which works well for her migraines. She was also recently prescribed IM toradol, which has also been helpful at keeping her out of the ED. Last used approximately 4-5 weeks ago.      5.  ER Visits During This Injection Cycle:  None for headaches - she did go to the ER once for a left hip dislocation.     6.  Functional Performance:  Change in ADL's, social interaction, days lost from work, etc. She reports missing social and work obligations about 3-4 times per month due to headaches.  She is also on disability for her headaches.     BOTULINUM NEUROTOXIN INJECTION PROCEDURES:      VERIFICATION OF PATIENT IDENTIFICATION AND PROCEDURE     Initials   Patient Name ses   Patient  ses   Procedure Verified by: ses     Prior to the start of the procedure and with procedural staff participation, I verbally confirmed the patient s identity using two indicators, relevant allergies, that the procedure was appropriate and matched the consent or emergent situation, and that the correct equipment/implants were available. Immediately prior to starting the procedure I conducted the Time Out with the procedural staff and re-confirmed the patient s name, procedure, and site/side. (The Joint Commission universal protocol was followed.)  Yes    Sedation (Moderate or Deep): None    Above assessments performed by:    Addie Malhotra MD      INDICATIONS FOR PROCEDURES:  Valerie Sim is a 53-year-old patient with chronic migraine headaches associated with cervicogenic components.      Her baseline symptoms have been recalcitrant to oral medications and conservative therapy. She is here today for an injection of Botox. We have received insurance approval to increase dose up to 225 units in hopes of increasing effectiveness and duration of effect.  Decision was also made to change diluent to bupivacaine in attempt to decrease post-injection headache associated with this procedure.       GOAL OF PROCEDURE:  The goal of this procedure is to decrease pain and enhance functional independence.     TOTAL DOSE ADMINISTERED:  Dose Administered: 225 units Botox (Botulinum Toxin Type A)  2:1 and 1:1 Dilution   Diluent Used: 0.25% Bupivacaine (Lot: [id number], Exp: 08/22)(NDC:529280I)  Total Volume of Diluent Used: See below  Lot # /C3 with Expiration Date: 04/2021  NDC #: Botox 100u ([address]-59)     Medication guide was offered to patient and was declined.     CONSENT:  The risks, benefits, and treatment options were discussed with Valerie YEE Roney and she agreed to proceed.     Written consent was obtained by [facility].      EQUIPMENT USED:  Needle-37mm stimulating/recording  Needle-30 gauge  EMG/NCS Machine      SKIN PREPARATION:  Skin preparation was performed using an alcohol wipe.        AREA/MUSCLE INJECTED: 225 UNITS BOTOX = TOTAL DOSE  1. FACE & SCALP: 115 units of Botox = Total dose, 2:1 Dilution  Right temporalis - 12.5 units of Botox in 4 site/s.  Left temporalis - 12.5 units of Botox in 4 site/s.      Right frontalis - 10 units of Botox in 4 site/s.  Left frontalis - 10 units of Botox in 4 site/s.      Right procerus - 3.5 units of Botox in 1 site/s.  Left procerus - 3.5 units of Botox in 1 site/s.      Right  - 4 units of Botox in 1 site/s.  Left  - 4 units of Botox in 1 site/s.      Right Occipitalis - 25 units of Botox at 4 site/s.   Left Occipitalis - 25 units of Botox at 4 site/s.      Right Nasalis - 2.5 units of Botox at 1 site/s.   Left Nasalis - 2.5 units of Botox at 1 site/s.       2. JAW MUSCLES: 60 units of Botox = Total Dose, 1:1 Dilution  Right Masseter - 25 units of Botox at 1 site/s.   Left Masseter - 25 units of Botox at 1 site/s.      Right temporalis - 5 units of Botox at 1 site/s.  Left temporalis - 5 units of Botox at 1  site/s.       3. SHOULDER & NECK MUSCLES: Total dose 50 units of Botox, 2:1 Dilution      Right levator muscle - 10 units of Botox at 2 site/s (neck and shoulder).  Left levator muscle - 10 units of Botox at 2 site/s (neck and shoulder).      Right mid trapezius - 5 units of Botox at 1 site.  Left mid trapezius - 5 units of Botox at 1 site.    Right lateral trapezius - 10 units of Botox at 2 site/s  Left lateral trapezuis - 10 units of Botox at 2 site/s.          RESPONSE TO PROCEDURE: Valerie Sim tolerated the procedure well and there were no immediate complications. She was allowed to recover for an appropriate period of time and was discharged home in stable condition.     FOLLOW UP:  Valerie Sim was asked to follow up by phone in 7-14 days with Nely Jean Baptiste PT, Care Coordinator or Estela Magaña RN, Care Coordinator, to report her response to this series of injections. Based on the patient's previous response to this therapy, Valerei Sim was rescheduled for the next series of injections in 9 weeks. Due to a limited duration of Botox effectiveness (approximately 8-9 weeks), we will continue to schedule her Botox every 9 weeks.      PLAN (Medication Changes, Therapy Orders, Work or Disability Issues, etc.): Patient will monitor her response to today's injections and report. Referral placed for Neuropsych testing given her history of traumatic brain injury. She is due for repeat Neuropsych testing and would like to have it done at [facility].       Again, thank you for allowing me to participate in the care of your patient.      Sincerely,    Addie Malhotra MD

## 2018-10-18 NOTE — PROGRESS NOTES
BOTULINUM TOXIN PROCEDURE - HEADACHE - NOTE    Chief Complaint   Patient presents with     RECHECK     BOTOX     There were no vitals taken for this visit.     Current Outpatient Prescriptions:      aMILoride (MIDAMOR) 5 MG tablet, , Disp: , Rfl:      amLODIPine (NORVASC) 10 MG tablet, , Disp: , Rfl:      [START ON 11/2/2018] amphetamine-dextroamphetamine (ADDERALL) 20 MG per tablet, Take 1 tablet (20 mg) by mouth 2 times daily, Disp: 60 tablet, Rfl: 0     [START ON 12/3/2018] amphetamine-dextroamphetamine (ADDERALL) 20 MG per tablet, Take 1 tablet (20 mg) by mouth 2 times daily, Disp: 60 tablet, Rfl: 0     amphetamine-dextroamphetamine (ADDERALL) 20 MG per tablet, Take 1 tablet (20 mg) by mouth 2 times daily, Disp: 60 tablet, Rfl: 0     budesonide (RINOCORT AQUA) 32 MCG/ACT nasal spray, Spray 1 spray into both nostrils daily., Disp: , Rfl:      CycloSPORINE (RESTASIS OP), Apply to eye 2 times daily, Disp: , Rfl:      Divalproex Sodium (DEPAKOTE PO), Take 250 mg by mouth every 48 hours as needed , Disp: , Rfl:      eletriptan (RELPAX) 40 MG tablet, TAKE 1 TABLET AT ONSET OF MIGRAINE. MAY REPEAT ONCE AFTER 2 HRS. MAX OF 2 TABS/24HRS AND 3 DAYS/WEEK., Disp: 12 tablet, Rfl: 3     estrogens, conjugated, (PREMARIN) 0.625 MG tablet, Take by mouth daily .30 mg . , Disp: , Rfl:      fexofenadine (ALLEGRA) 180 MG tablet, Take  by mouth daily., Disp: , Rfl:      HYDROmorphone (DILAUDID) 2 MG tablet, Take 0.5-1 tablets (1-2 mg) by mouth every 3 hours as needed (headache) 20 tablets = 3 month supply., Disp: 24 tablet, Rfl: 0     indomethacin (INDOCIN) 50 MG capsule, TAKE 1 CAPSULE BY MOUTH TWICE DAILY WITH MEALS. AFTER ONE WEEK, MAY INCREASE TO 1 CAPSULE THREE TIMES DAILY. (Patient not taking: Reported on 10/2/2018), Disp: 90 capsule, Rfl: 0     lamoTRIgine (LAMICTAL) 200 MG tablet, Take 1 tablet by mouth daily, Disp: , Rfl:      LORazepam (ATIVAN) 1 MG tablet, Take 1 mg by mouth 2 times daily as needed., Disp: , Rfl:       Lutein 20 MG TABS, Take 1 tablet by mouth every other day Reported on 5/9/2017, Disp: , Rfl:      OnabotulinumtoxinA (BOTOX IJ), Inject 225 Units as directed once Lot# /C3 Exp 03/2021, Disp: , Rfl:      OnabotulinumtoxinA (BOTOX IJ), Inject 225 Units into the muscle once Lot # /C3 with Expiration Date: 11/2020, Disp: , Rfl:      OnabotulinumtoxinA (BOTOX IJ), Inject 225 Units as directed once Lot /C3 Exp 10/2020, Disp: , Rfl:      OnabotulinumtoxinA (BOTOX IJ), Inject 200 Units into the muscle once Lot # /C3 with Expiration Date: 08/2020, Disp: , Rfl:      ondansetron (ZOFRAN) 4 MG tablet, Take 1 tablet (4 mg) by mouth every 8 hours as needed, Disp: 90 tablet, Rfl: 1     QUEtiapine (SEROQUEL) 50 MG tablet, Take 1 tablet by mouth daily., Disp: , Rfl:      valACYclovir (VALTREX) 1000 mg tablet, Take 1,000 mg by mouth as needed., Disp: , Rfl:      Allergies   Allergen Reactions     Trazodone Other (See Comments) and Unknown     Other reaction(s): Headache  Other reaction(s): Headache  Other reaction(s): Headache     Amitriptyline Hcl Other (See Comments)     Migraine       Candesartan Cilexetil-Hctz Muscle Pain (Myalgia)     Cymbalta Other (See Comments)     Migraine       Cyproheptadine Hcl      headaches     Desvenlafaxine      Migraine       Fluoxetine Other (See Comments)     Migraine       Imipramine Other (See Comments)     Migraine       Lyrica Muscle Pain (Myalgia)     Metoprolol Other (See Comments) and Unknown     headache  headache  headache     Morphine Other (See Comments)     Patient reports seizure      Nortriptyline Other (See Comments)     Migraine       Phenergan Dm [Promethazine-Dm] Other (See Comments)     seizures     Venlafaxine Other (See Comments)     Migraine       Wellbutrin [Bupropion Hydrobromide] Other (See Comments)     Migraine       Compazine Anxiety     Dihydroergotamine Anxiety and Other (See Comments)     Cardiac symptoms     Droperidol Anxiety      "Medroxyprogesterone Hives     Prochlorperazine Anxiety        PHYSICAL EXAM:    Pleasant and cooperative.  No facial asymmetry.   Has a headache today which she rates at 6/10. She has had nausea and \"blinking lights\" over the last few days.     HPI:    Patient denies new medical diagnoses, illnesses, hospitalizations, emergency room visits, and injuries since the previous injection with botulinum neurotoxin.      We reviewed the recommended safety guidelines for  Botox from any vaccine injection, such as the seasonal flu vaccine, by a minimum of 10-14 days with Valerie Sim. She acknowledged understanding.      RESPONSE TO PREVIOUS TREATMENT:  Change in headache pattern following last series of injections with 225 units of  Botox on 08/14/2018.    Side effects:     1.  Headache frequency during this injection cycle:  She has daily headaches and 12 migraine headache days per month when she dislocated her hip. She experienced approximately 5 headache days per month otherwise. This is compared to her baseline headache frequency of daily headaches.     2.  Headache duration during this injection cycle:  Her mild baseline headache varies in length throughout the day but is always present, her migraines last a few hours to up 4 days, and this pattern remains consistent. The severity of these headaches waxes and wanes.     3.  Headache intensity during this injection cycle:    A.  6-7/10  =  Typical pain level.  This is an improvement from her previous report of 8-9/10.  B.  9/10  =  Worst pain level.  Fewer of the higher intensity migraines.  She feels she has improved control of her migraine headaches.  C.  4-5/10  =  Lowest pain level.  This is an improvement from her previous report of 6-7/10.    4.  Change in headache medication usage during this injection cycle: She has been using Relpax, which works well for her migraines. She was also recently prescribed IM toradol, which has also been helpful at keeping " her out of the ED. Last used approximately 4-5 weeks ago.      5.  ER Visits During This Injection Cycle:  None for headaches - she did go to the ER once for a left hip dislocation.     6.  Functional Performance:  Change in ADL's, social interaction, days lost from work, etc. She reports missing social and work obligations about 3-4 times per month due to headaches.  She is also on disability for her headaches.     BOTULINUM NEUROTOXIN INJECTION PROCEDURES:      VERIFICATION OF PATIENT IDENTIFICATION AND PROCEDURE     Initials   Patient Name ses   Patient  ses   Procedure Verified by: michelle     Prior to the start of the procedure and with procedural staff participation, I verbally confirmed the patient s identity using two indicators, relevant allergies, that the procedure was appropriate and matched the consent or emergent situation, and that the correct equipment/implants were available. Immediately prior to starting the procedure I conducted the Time Out with the procedural staff and re-confirmed the patient s name, procedure, and site/side. (The Joint Commission universal protocol was followed.)  Yes    Sedation (Moderate or Deep): None    Above assessments performed by:    Addie Malhotra MD      INDICATIONS FOR PROCEDURES:  Valerie Sim is a 53-year-old patient with chronic migraine headaches associated with cervicogenic components.      Her baseline symptoms have been recalcitrant to oral medications and conservative therapy. She is here today for an injection of Botox. We have received insurance approval to increase dose up to 225 units in hopes of increasing effectiveness and duration of effect. Decision was also made to change diluent to bupivacaine in attempt to decrease post-injection headache associated with this procedure.       GOAL OF PROCEDURE:  The goal of this procedure is to decrease pain and enhance functional independence.     TOTAL DOSE ADMINISTERED:  Dose Administered: 225 units Botox  (Botulinum Toxin Type A)  2:1 and 1:1 Dilution   Diluent Used: 0.25% Bupivacaine (Lot: [id number], Exp: 08/22)(NDC:285046D)  Total Volume of Diluent Used: See below  Lot # /C3 with Expiration Date: 04/2021  NDC #: Botox 100u ([address]-43)     Medication guide was offered to patient and was declined.     CONSENT:  The risks, benefits, and treatment options were discussed with Valerie YEE Roney and she agreed to proceed.     Written consent was obtained by [facility].      EQUIPMENT USED:  Needle-37mm stimulating/recording  Needle-30 gauge  EMG/NCS Machine      SKIN PREPARATION:  Skin preparation was performed using an alcohol wipe.        AREA/MUSCLE INJECTED: 225 UNITS BOTOX = TOTAL DOSE  1. FACE & SCALP: 115 units of Botox = Total dose, 2:1 Dilution  Right temporalis - 12.5 units of Botox in 4 site/s.  Left temporalis - 12.5 units of Botox in 4 site/s.      Right frontalis - 10 units of Botox in 4 site/s.  Left frontalis - 10 units of Botox in 4 site/s.      Right procerus - 3.5 units of Botox in 1 site/s.  Left procerus - 3.5 units of Botox in 1 site/s.      Right  - 4 units of Botox in 1 site/s.  Left  - 4 units of Botox in 1 site/s.      Right Occipitalis - 25 units of Botox at 4 site/s.   Left Occipitalis - 25 units of Botox at 4 site/s.      Right Nasalis - 2.5 units of Botox at 1 site/s.   Left Nasalis - 2.5 units of Botox at 1 site/s.       2. JAW MUSCLES: 60 units of Botox = Total Dose, 1:1 Dilution  Right Masseter - 25 units of Botox at 1 site/s.   Left Masseter - 25 units of Botox at 1 site/s.      Right temporalis - 5 units of Botox at 1 site/s.  Left temporalis - 5 units of Botox at 1 site/s.       3. SHOULDER & NECK MUSCLES: Total dose 50 units of Botox, 2:1 Dilution      Right levator muscle - 10 units of Botox at 2 site/s (neck and shoulder).  Left levator muscle - 10 units of Botox at 2 site/s (neck and shoulder).      Right mid trapezius - 5 units of Botox at 1 site.  Left mid  trapezius - 5 units of Botox at 1 site.    Right lateral trapezius - 10 units of Botox at 2 site/s  Left lateral trapezuis - 10 units of Botox at 2 site/s.          RESPONSE TO PROCEDURE: Valerie Sim tolerated the procedure well and there were no immediate complications. She was allowed to recover for an appropriate period of time and was discharged home in stable condition.     FOLLOW UP:  Valerie Sim was asked to follow up by phone in 7-14 days with Nely Jean Baptiste PT, Care Coordinator or Estela Magaña RN, Care Coordinator, to report her response to this series of injections. Based on the patient's previous response to this therapy, Valerie Sim was rescheduled for the next series of injections in 9 weeks. Due to a limited duration of Botox effectiveness (approximately 8-9 weeks), we will continue to schedule her Botox every 9 weeks.      PLAN (Medication Changes, Therapy Orders, Work or Disability Issues, etc.): Patient will monitor her response to today's injections and report. Referral placed for Neuropsych testing given her history of traumatic brain injury. She is due for repeat Neuropsych testing and would like to have it done at [facility].

## 2018-10-25 ENCOUNTER — TRANSFERRED RECORDS (OUTPATIENT)
Dept: HEALTH INFORMATION MANAGEMENT | Facility: CLINIC | Age: 53
End: 2018-10-25

## 2018-10-30 ENCOUNTER — TRANSFERRED RECORDS (OUTPATIENT)
Dept: HEALTH INFORMATION MANAGEMENT | Facility: CLINIC | Age: 53
End: 2018-10-30

## 2018-11-01 ASSESSMENT — ENCOUNTER SYMPTOMS
INCREASED ENERGY: 1
DISTURBANCES IN COORDINATION: 0
CONSTIPATION: 1
DECREASED CONCENTRATION: 1
HEARTBURN: 0
BACK PAIN: 1
NAIL CHANGES: 0
LOSS OF CONSCIOUSNESS: 1
TINGLING: 1
POLYPHAGIA: 0
SMELL DISTURBANCE: 1
HOARSE VOICE: 1
BOWEL INCONTINENCE: 0
EYE IRRITATION: 1
NECK MASS: 0
MUSCLE CRAMPS: 1
TASTE DISTURBANCE: 1
MEMORY LOSS: 1
CHILLS: 0
TREMORS: 1
PALPITATIONS: 0
DEPRESSION: 1
SKIN CHANGES: 0
ALTERED TEMPERATURE REGULATION: 1
FEVER: 0
NUMBNESS: 1
NIGHT SWEATS: 1
POLYDIPSIA: 0
EYE WATERING: 0
HALLUCINATIONS: 0
ORTHOPNEA: 0
JOINT SWELLING: 1
POOR WOUND HEALING: 0
PANIC: 0
EYE PAIN: 1
PARALYSIS: 0
STIFFNESS: 1
SLEEP DISTURBANCES DUE TO BREATHING: 0
HEADACHES: 1
TROUBLE SWALLOWING: 0
ABDOMINAL PAIN: 1
SINUS PAIN: 0
VOMITING: 0
BLOATING: 1
EXERCISE INTOLERANCE: 0
WEIGHT LOSS: 0
DECREASED APPETITE: 1
NERVOUS/ANXIOUS: 1
WEIGHT GAIN: 0
MYALGIAS: 1
DIARRHEA: 0
WEAKNESS: 1
HYPOTENSION: 0
ARTHRALGIAS: 1
SEIZURES: 0
NECK PAIN: 1
INSOMNIA: 1
DIZZINESS: 1
LIGHT-HEADEDNESS: 1
MUSCLE WEAKNESS: 1
DOUBLE VISION: 0
JAUNDICE: 0
EYE REDNESS: 1
BLOOD IN STOOL: 0
NAUSEA: 0
FATIGUE: 1
HYPERTENSION: 1
SPEECH CHANGE: 0
SINUS CONGESTION: 0
LEG PAIN: 1
SORE THROAT: 0
RECTAL PAIN: 1
SYNCOPE: 0

## 2018-11-05 ENCOUNTER — OFFICE VISIT (OUTPATIENT)
Dept: NEUROLOGY | Facility: CLINIC | Age: 53
End: 2018-11-05
Payer: COMMERCIAL

## 2018-11-05 ENCOUNTER — TELEPHONE (OUTPATIENT)
Dept: NEUROLOGY | Facility: CLINIC | Age: 53
End: 2018-11-05

## 2018-11-05 VITALS — HEART RATE: 102 BPM | OXYGEN SATURATION: 97 % | DIASTOLIC BLOOD PRESSURE: 87 MMHG | SYSTOLIC BLOOD PRESSURE: 149 MMHG

## 2018-11-05 DIAGNOSIS — M25.571 PAIN IN JOINT, ANKLE AND FOOT, RIGHT: ICD-10-CM

## 2018-11-05 DIAGNOSIS — G44.86 CERVICOGENIC HEADACHE: ICD-10-CM

## 2018-11-05 PROBLEM — R76.8 POSITIVE ANA (ANTINUCLEAR ANTIBODY): Status: ACTIVE | Noted: 2018-10-26

## 2018-11-05 PROBLEM — R10.2 PAIN IN FEMALE PELVIS: Status: ACTIVE | Noted: 2017-12-05

## 2018-11-05 PROBLEM — Z91.81 HISTORY OF FALLING: Status: ACTIVE | Noted: 2018-06-20

## 2018-11-05 ASSESSMENT — PAIN SCALES - GENERAL: PAINLEVEL: EXTREME PAIN (9)

## 2018-11-05 ASSESSMENT — PATIENT HEALTH QUESTIONNAIRE - PHQ9: SUM OF ALL RESPONSES TO PHQ QUESTIONS 1-9: 26

## 2018-11-05 NOTE — MR AVS SNAPSHOT
After Visit Summary   11/5/2018    Valerie Sim    MRN: [id number]           Patient Information     Date Of Birth          1965        Visit Information        Provider Department      11/5/2018 9:00 AM Jamila Gonzales MD [facility] Neurology        Today's Diagnoses     Chronic migraine    -  1    Pain in joint, ankle and foot, right           Follow-ups after your visit        Additional Services     INTERNAL MEDICINE REFERRAL       Your provider has referred you to:   [facility]: Primary Care Clinic - [facility] [phone]   [email or web address]    Please be aware that coverage of these services is subject to the terms and limitations of your health insurance plan.  Call member services at your health plan with any benefit or coverage questions.      Please bring the following to your appointment:  >>   Any x-rays, CTs or MRIs which have been performed.  Contact the facility where they were done to arrange for  prior to your scheduled appointment.   >>   List of current medications   >>   This referral request   >>   Any documents/labs given to you for this referral                  Follow-up notes from your care team     Return in about 3 months (around 2/5/2019).      Your next 10 appointments already scheduled     Nov 15, 2018  7:00 AM CST   (Arrive by 6:45 AM)   New Patient Visit with Carol Rogers MD   [facility] ([facility])    [address] [place] [id number] [phone]            Dec 12, 2018 11:20 AM CST   (Arrive by 11:05 AM)   Return Botox with Addie Malhotra MD   [facility] ([facility])    [address] [place] [id number] [phone]            Dec 20, 2018  1:50 PM CST   (Arrive by 1:35 PM)   Return Botox with Addie Saeed  MD [place]   [facility] ([facility])    [address] [id number] [phone]            Feb 05, 2019  2:00 PM CST   (Arrive by 1:45 PM)   Return Visit with Jamila Gonzales MD   [facility] Neurology ([facility])    [address] [place] [id number] [phone]            Feb 19, 2019  1:00 PM CST   (Arrive by 12:45 PM)   Return Botox with Addie Malhotra MD   [facility] ([facility])    [address] [place] [id number] [phone]              Who to contact     Please call your clinic at [phone] to:    Ask questions about your health    Make or cancel appointments    Discuss your medicines    Learn about your test results    Speak to your doctor            Additional Information About Your Visit        [employer] Information     [employer] gives you secure access to your electronic health record. If you see a primary care provider, you can also send messages to your care team and make appointments. If you have questions, please call your primary care clinic.  If you do not have a primary care provider, please call [phone] and they will assist you.      [employer] is an electronic gateway that provides easy, online access to your medical records. With [employer], you can request a clinic appointment, read your test results, renew a prescription or communicate with your care team.     To access your existing account, please contact your [facility] or call [phone] for assistance.        Care EveryWhere ID     This is your Care EveryWhere ID. This could be used by other organizations to access your [facility] medical records  [id number]        Your Vitals Were     Pulse Pulse Oximetry                102 97%           Blood Pressure from Last  3 Encounters:   11/05/18 149/87   10/18/18 149/64   10/02/18 141/86    Weight from Last 3 Encounters:   10/18/18 77.1 kg (170 lb)   10/02/18 77.2 kg (170 lb 1.6 oz)   08/14/18 76.8 kg (169 lb 4.8 oz)              We Performed the Following     INTERNAL MEDICINE REFERRAL          Today's Medication Changes          These changes are accurate as of 11/5/18 10:29 AM.  If you have any questions, ask your nurse or doctor.               Start taking these medicines.        Dose/Directions    erenumab-aooe 70 MG/ML injection   Commonly known as:  AIMOVIG   Used for:  Chronic migraine   Started by:  Jamila Gonzales MD        Dose:  70 mg   Inject 1 mL (70 mg) Subcutaneous every 30 days   Quantity:  1 pen   Refills:  11            Where to get your medicines      These medications were sent to [address] [place] [address] [address] [place] [id number]    Hours:  TRANSPLANT PHONE NUMBER [phone] Phone:  [phone]     erenumab-aooe 70 MG/ML injection                Primary Care Provider Office Phone # Fax #    Meek Leary [phone]       [facility] [address] [id number]        Equal Access to Services     AJYLEN MONTES AH: Hadii jae ku hadasho Sojess, waaxda luqadaha, qaybta kaalmada adeegyada, kimberly helton. So [place] [phone].    ATENCIÓN: Si habla español, tiene a daniels disposición servicios gratuitos de asistencia lingüística. Fransisco al [phone].    We comply with applicable federal civil rights laws and [place] laws. We do not discriminate on the basis of race, color, national origin, age, disability, sex, sexual orientation, or gender identity.            Thank you!     Thank you for choosing [facility] NEUROLOGY  for your care. Our goal is always to provide you with excellent care. Hearing back from our patients is one way we can continue to improve our  services. Please take a few minutes to complete the written survey that you may receive in the mail after your visit with us. Thank you!             Your Updated Medication List - Protect others around you: Learn how to safely use, store and throw away your medicines at www.disposemymeds.org.          This list is accurate as of 11/5/18 10:29 AM.  Always use your most recent med list.                   Brand Name Dispense Instructions for use Diagnosis    aMILoride 5 MG tablet    MIDAMOR          amLODIPine 10 MG tablet    NORVASC          * amphetamine-dextroamphetamine 20 MG per tablet    ADDERALL    60 tablet    Take 1 tablet (20 mg) by mouth 2 times daily    Post concussion syndrome       * amphetamine-dextroamphetamine 20 MG per tablet    ADDERALL    60 tablet    Take 1 tablet (20 mg) by mouth 2 times daily    Post concussion syndrome       * amphetamine-dextroamphetamine 20 MG per tablet   Start taking on:  12/3/2018    ADDERALL    60 tablet    Take 1 tablet (20 mg) by mouth 2 times daily    Post concussion syndrome       ATIVAN 1 MG tablet   Generic drug:  LORazepam      Take 1 mg by mouth 2 times daily as needed.        * BOTOX IJ      Inject 200 Units into the muscle once Lot # /C3 with Expiration Date: 08/2020        * BOTOX IJ      Inject 225 Units as directed once Lot /C3 Exp 10/2020        * BOTOX IJ      Inject 225 Units into the muscle once Lot # /C3 with Expiration Date: 11/2020        * BOTOX IJ      Inject 225 Units as directed once Lot# /C3 Exp 03/2021        * BOTOX IJ      Inject 225 Units as directed once Lot # /C3 with Expiration Date: 04/2021        budesonide 32 MCG/ACT spray    RINOCORT AQUA     Spray 1 spray into both nostrils daily.        DEPAKOTE PO      Take 250 mg by mouth every 48 hours as needed        eletriptan 40 MG tablet    RELPAX    12 tablet    TAKE 1 TABLET AT ONSET OF MIGRAINE. MAY REPEAT ONCE AFTER 2 HRS. MAX OF 2 TABS/24HRS AND 3 DAYS/WEEK.     Migraine without aura and without status migrainosus, not intractable       erenumab-aooe 70 MG/ML injection    AIMOVIG    1 pen    Inject 1 mL (70 mg) Subcutaneous every 30 days    Chronic migraine       fexofenadine 180 MG tablet    ALLEGRA     Take  by mouth daily.        HYDROmorphone 2 MG tablet    DILAUDID    24 tablet    Take 0.5-1 tablets (1-2 mg) by mouth every 3 hours as needed (headache) 20 tablets = 3 month supply.    SI (sacroiliac) joint dysfunction       indomethacin 50 MG capsule    INDOCIN    90 capsule    TAKE 1 CAPSULE BY MOUTH TWICE DAILY WITH MEALS. AFTER ONE WEEK, MAY INCREASE TO 1 CAPSULE THREE TIMES DAILY.    Paroxysmal hemicrania       lamoTRIgine 200 MG tablet    LaMICtal     Take 1 tablet by mouth daily        Lutein 20 MG Tabs      Take 1 tablet by mouth every other day Reported on 5/9/2017        ondansetron 4 MG tablet    ZOFRAN    90 tablet    Take 1 tablet (4 mg) by mouth every 8 hours as needed    Migraine without aura       PREMARIN 0.625 MG tablet   Generic drug:  estrogens (conjugated)      Take by mouth daily .30 mg  .        RESTASIS OP      Apply to eye 2 times daily        SEROQUEL 50 MG tablet   Generic drug:  QUEtiapine      Take 1 tablet by mouth daily.        VALTREX 1000 mg tablet   Generic drug:  valACYclovir      Take 1,000 mg by mouth as needed.        * Notice:  This list has 8 medication(s) that are the same as other medications prescribed for you. Read the directions carefully, and ask your doctor or other care provider to review them with you.
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
## 2018-11-05 NOTE — TELEPHONE ENCOUNTER
[facility] Prior Authorization Team Request    Medication: Aimovig 70mg/ml autojector  Dosing: Inject the contents of one syringe under the skin once every 30 days.  NDC (required for Medicaid members):     Insurance   BIN:[id number]  PCN:[id number]  Grp:[id number]  ID:[id number]    CoverMyMeds Key (if applicable):     Additional documentation:     Filling Pharmacy: [facility]   Phone Number:[phone]  Contact: [employer] [facility] ([id number]) please send all responses to this pool.  Pharmacy NPI (required for Medicaid members):.
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
## 2018-11-05 NOTE — NURSING NOTE
Chief Complaint   Patient presents with     Consult     [facility] NEW - MIGRAINES     Depression Response    Patient completed the PHQ-9 assessment for depression and scored >9? Yes  Question 9 on the PHQ-9 was positive for suicidality? No  Is the patient already receiving treatment for depression? Yes  Patient would like to speak with behavioral health team ([facility] clinics only)? No    I personally notified the following: visit provider    Behavioral Health/Social Work Contact Information     [facility]  Teddy Marquez MA, LMFT  Lead Behavioral Health Clinician  Phone: [phone]  [facility] Pager: [phone]    Non-[facility] Clinics  [facility] On-Call   Pager: [phone]         Topher Pope, EMT

## 2018-11-06 NOTE — TELEPHONE ENCOUNTER
[facility] Prior Authorization Team   Phone: [phone]  Fax: [phone]  PA Initiation    Medication: Aimovig 70mg/ml autojector-PA PENDING  Insurance Company: [employer] - [employer] [phone] Fax [phone]  Pharmacy Filling the Rx:    Filling Pharmacy Phone:    Filling Pharmacy Fax:    Start Date: 11/6/2018

## 2018-11-06 NOTE — TELEPHONE ENCOUNTER
[facility] Prior Authorization Team   Phone: [phone]  Fax: [phone]  PRIOR AUTHORIZATION DENIED    Medication: Aimovig 70mg/ml autojector-PA DENIED    Denial Date: 11/6/2018    Denial Rational: CRITERIA WILL COVER DRUG WHEN PT HAS NOT USED BOTOX IN THE PAST 4 MONTHS.     Appeal Information: IF YOU WOULD LIKE TO APPEAL THIS DECISION, PLEASE PROVIDE LETTER OF MEDICAL NECESSITY FOR ME TO SUBMIT TO APPEALS DEPARTMENT.

## 2018-11-08 NOTE — TELEPHONE ENCOUNTER
[facility] Prior Authorization Team   Phone: [phone]  Fax: [phone]  PRIOR AUTHORIZATION DENIED    Medication: Aimovig 70mg/ml autojector-PA DENIED    Denial Date: 11/6/2018    Denial Rational: CRITERIA WILL COVER DRUG WHEN PT HAS NOT USED BOTOX IN THE PAST 4 MONTHS.     Appeal Information: IF YOU WOULD LIKE TO APPEAL THIS DECISION, PLEASE PROVIDE LETTER OF MEDICAL NECESSITY FOR ME TO SUBMIT TO APPEALS DEPARTMENT.

## 2018-11-13 ENCOUNTER — TRANSFERRED RECORDS (OUTPATIENT)
Dept: HEALTH INFORMATION MANAGEMENT | Facility: CLINIC | Age: 53
End: 2018-11-13

## 2018-11-15 ENCOUNTER — TRANSFERRED RECORDS (OUTPATIENT)
Dept: HEALTH INFORMATION MANAGEMENT | Facility: CLINIC | Age: 53
End: 2018-11-15

## 2018-11-16 ENCOUNTER — TRANSFERRED RECORDS (OUTPATIENT)
Dept: HEALTH INFORMATION MANAGEMENT | Facility: CLINIC | Age: 53
End: 2018-11-16

## 2018-11-26 ENCOUNTER — MYC MEDICAL ADVICE (OUTPATIENT)
Dept: NEUROLOGY | Facility: CLINIC | Age: 53
End: 2018-11-26

## 2018-11-28 ENCOUNTER — TRANSFERRED RECORDS (OUTPATIENT)
Dept: HEALTH INFORMATION MANAGEMENT | Facility: CLINIC | Age: 53
End: 2018-11-28

## 2018-11-30 ENCOUNTER — TELEPHONE (OUTPATIENT)
Dept: NEUROLOGY | Facility: CLINIC | Age: 53
End: 2018-11-30

## 2018-11-30 ENCOUNTER — OFFICE VISIT (OUTPATIENT)
Dept: NEUROLOGY | Facility: CLINIC | Age: 53
End: 2018-11-30
Payer: COMMERCIAL

## 2018-11-30 VITALS
WEIGHT: 165 LBS | SYSTOLIC BLOOD PRESSURE: 160 MMHG | HEART RATE: 111 BPM | OXYGEN SATURATION: 96 % | HEIGHT: 67 IN | BODY MASS INDEX: 25.9 KG/M2 | RESPIRATION RATE: 18 BRPM | DIASTOLIC BLOOD PRESSURE: 89 MMHG | TEMPERATURE: 98.2 F

## 2018-11-30 DIAGNOSIS — G44.019 EPISODIC CLUSTER HEADACHE, NOT INTRACTABLE: Primary | ICD-10-CM

## 2018-11-30 DIAGNOSIS — G43.719 INTRACTABLE CHRONIC MIGRAINE WITHOUT AURA AND WITHOUT STATUS MIGRAINOSUS: ICD-10-CM

## 2018-11-30 RX ORDER — CEFUROXIME AXETIL 250 MG/1
6 TABLET ORAL
Qty: 1 KIT | Refills: 0 | Status: SHIPPED | OUTPATIENT
Start: 2018-11-30 | End: 2019-02-21

## 2018-11-30 RX ORDER — PREDNISONE 20 MG/1
TABLET ORAL
Qty: 50 TABLET | Refills: 0 | Status: SHIPPED | OUTPATIENT
Start: 2018-11-30 | End: 2019-08-09

## 2018-11-30 RX ORDER — SUMATRIPTAN 6 MG/.5ML
6 INJECTION, SOLUTION SUBCUTANEOUS EVERY 12 HOURS PRN
Qty: 18 VIAL | Refills: 3 | Status: SHIPPED | OUTPATIENT
Start: 2018-11-30 | End: 2019-08-09

## 2018-11-30 RX ORDER — PREDNISONE 10 MG/1
TABLET ORAL
COMMUNITY
Start: 2018-10-25 | End: 2019-08-09

## 2018-11-30 RX ORDER — CYCLOBENZAPRINE HCL 10 MG
TABLET ORAL
COMMUNITY
Start: 2018-10-28 | End: 2023-01-12

## 2018-11-30 RX ORDER — CELECOXIB 200 MG/1
CAPSULE ORAL
COMMUNITY
Start: 2018-10-25 | End: 2019-08-09

## 2018-11-30 ASSESSMENT — PAIN SCALES - GENERAL: PAINLEVEL: EXTREME PAIN (8)

## 2018-11-30 NOTE — PROGRESS NOTES
"[facility]    Neurology Progress Note    Subjective:    Ms. Sim returns to clinic after a recent change in her headaches.  At her last visit, she had headaches that met criteria for chronic migraine with visual aura, but was starting to have more autonomic features.  Since her last visit, she describes worsening of this right-sided headache, that has evolved to occur twice a day every day lasting 15-30 minutes at a time and associated with right-sided autonomic features, including right sided tenderness and swelling, possibly right-sided ptosis.  These very severe, shorter headaches cause her to be agitated and pace.    She currently has one severe headache between 11 AM and 1 PM every day that is her most severe.  She has another, not quite as strong headache, between 7 and 8 PM each evening.    Objective:    Vitals: /89  Pulse 111  Temp 98.2  F (36.8  C) (Oral)  Resp 18  Ht 1.702 m (5' 7\")  Wt 74.8 kg (165 lb)  SpO2 96%  Breastfeeding? No  BMI 25.84 kg/m2  General: Cooperative, NAD  Head: Atraumatic  Neck: Normal range of motion  Cardiac: no lower extremity edema  Neurologic:  Mental Status: Fully alert, attentive and oriented. Speech clear and fluent.   Cranial Nerves: Facial movements symmetric.   Motor: No abnormal movements.  Moving all extremities.    Station/Gait: Normal casual gait.     Pertinent Investigations:    MRI of the brain with and without contrast (November 12, 2018): No acute pathology, unremarkable MRI of the brain    Assessment/Plan:   Valerie Sim is a 53-year-old woman with a history of headaches since age 31, previous head trauma, and chronic migraine with visual aura.  She has continued to have a new headache type, that is more severe and shorter lasting with prominent autonomic features affecting the right side of her head.  These have evolved since her last visit and are now occurring twice a day, once around 11 AM and another around 7 PM. "  Her new headaches meet criteria for cluster headache, and I recommend starting treatment for this.    -For acute treatment of cluster headache, I recommend sumatriptan 6 mg subcutaneous injection taken at the onset of headache.  This can be repeated in 1 hour, but not used more than twice in a 24-hour period.  This cannot be taken with eletriptan, which she has previously used for migraines.  -For acute treatment of cluster headache, I also recommend high flow oxygen, 10-15 L/min, via a nonrebreather mask.  -If additional treatment is needed, we briefly discussed gamma core, a noninvasive vagal nerve stimulator that could be tried.    -For transitional treatment of cluster headache, I recommend prednisone starting at 80 mg daily for 5 days, and decreasing by 20 mg every 5 days until off.  She has been recently prescribed prednisone 10 mg daily for arthritis, and I recommend she resume this after her taper.    -If preventive treatment is needed for cluster headaches, I recommend verapamil.  She would like to hold off on this for now, and will look into the medication.  If we decide to trial verapamil, I would recommend starting at 80 mg daily and titrating up by 80 mg/week to a goal dose of 240 mg daily.  An EKG would need to be checked periodically while taking high doses of verapamil.    I will plan to see her back at her previously scheduled appointment in the beginning of February.    Jamila Gonzales MD  Neurology   Pager [phone]

## 2018-11-30 NOTE — LETTER
"11/30/2018       RE: Valerie Sim  [address] [facility] [address]     Dear Colleague,    Thank you for referring your patient, Valerie Sim, to the [facility] NEUROLOGY at [facility]. Please see a copy of my visit note below.    [facility]    Neurology Progress Note    Subjective:    Ms. Sim returns to clinic after a recent change in her headaches.  At her last visit, she had headaches that met criteria for chronic migraine with visual aura, but was starting to have more autonomic features.  Since her last visit, she describes worsening of this right-sided headache, that has evolved to occur twice a day every day lasting 15-30 minutes at a time and associated with right-sided autonomic features, including right sided tenderness and swelling, possibly right-sided ptosis.  These very severe, shorter headaches cause her to be agitated and pace.    She currently has one severe headache between 11 AM and 1 PM every day that is her most severe.  She has another, not quite as strong headache, between 7 and 8 PM each evening.    Objective:    Vitals: /89  Pulse 111  Temp 98.2  F (36.8  C) (Oral)  Resp 18  Ht 1.702 m (5' 7\")  Wt 74.8 kg (165 lb)  SpO2 96%  Breastfeeding? No  BMI 25.84 kg/m2  General: Cooperative, NAD  Head: Atraumatic  Neck: Normal range of motion  Cardiac: no lower extremity edema  Neurologic:  Mental Status: Fully alert, attentive and oriented. Speech clear and fluent.   Cranial Nerves: Facial movements symmetric.   Motor: No abnormal movements.  Moving all extremities.    Station/Gait: Normal casual gait.     Pertinent Investigations:    MRI of the brain with and without contrast (November 12, 2018): No acute pathology, unremarkable MRI of the brain    Assessment/Plan:   Valerie Sim is a 53-year-old woman with a history of headaches since age 31, previous head trauma, and chronic migraine " with visual aura.  She has continued to have a new headache type, that is more severe and shorter lasting with prominent autonomic features affecting the right side of her head.  These have evolved since her last visit and are now occurring twice a day, once around 11 AM and another around 7 PM.  Her new headaches meet criteria for cluster headache, and I recommend starting treatment for this.    -For acute treatment of cluster headache, I recommend sumatriptan 6 mg subcutaneous injection taken at the onset of headache.  This can be repeated in 1 hour, but not used more than twice in a 24-hour period.  This cannot be taken with eletriptan, which she has previously used for migraines.  -For acute treatment of cluster headache, I also recommend high flow oxygen, 10-15 L/min, via a nonrebreather mask.  -If additional treatment is needed, we briefly discussed gamma core, a noninvasive vagal nerve stimulator that could be tried.    -For transitional treatment of cluster headache, I recommend prednisone starting at 80 mg daily for 5 days, and decreasing by 20 mg every 5 days until off.  She has been recently prescribed prednisone 10 mg daily for arthritis, and I recommend she resume this after her taper.    -If preventive treatment is needed for cluster headaches, I recommend verapamil.  She would like to hold off on this for now, and will look into the medication.  If we decide to trial verapamil, I would recommend starting at 80 mg daily and titrating up by 80 mg/week to a goal dose of 240 mg daily.  An EKG would need to be checked periodically while taking high doses of verapamil.    I will plan to see her back at her previously scheduled appointment in the beginning of February.      Again, thank you for allowing me to participate in the care of your patient.      Sincerely,    Jamila Gonzales MD

## 2018-11-30 NOTE — MR AVS SNAPSHOT
After Visit Summary   11/30/2018    Valerie Sim    MRN: [id number]           Patient Information     Date Of Birth          1965        Visit Information        Provider Department      11/30/2018 2:30 PM Jamila Gonzales MD [facility] Neurology        Today's Diagnoses     Episodic cluster headache, not intractable    -  1    Intractable chronic migraine without aura and without status migrainosus           Follow-ups after your visit        Your next 10 appointments already scheduled     Dec 12, 2018 11:20 AM CST   (Arrive by 11:05 AM)   Return Botox with Addie Malhotra MD   [facility] ([facility])    [address] [place] [id number] [phone]            Dec 20, 2018  1:50 PM CST   (Arrive by 1:35 PM)   Return Botox with Addie Malhotra MD   [facility] ([facility])    [address] [place] [id number] [phone]            Feb 05, 2019  2:00 PM CST   (Arrive by 1:45 PM)   Return Visit with Jamila Gonzales MD   [facility] Neurology ([facility])    [address] [place] [id number] [phone]            Feb 19, 2019  1:00 PM CST   (Arrive by 12:45 PM)   Return Botox with Addie Malhotra MD   [facility] ([facility])    [address] [place] [id number] [phone]              Who to contact     Please call your clinic at [phone] to:    Ask questions about your health    Make or cancel appointments    Discuss your medicines    Learn about your test results    Speak to your doctor            Additional Information About Your Visit        MyChart Information     [employer] gives you secure access to your electronic health record. If you see a primary  "care provider, you can also send messages to your care team and make appointments. If you have questions, please call your primary care clinic.  If you do not have a primary care provider, please call [phone] and they will assist you.      [employer] is an electronic gateway that provides easy, online access to your medical records. With [employer], you can request a clinic appointment, read your test results, renew a prescription or communicate with your care team.     To access your existing account, please contact your [facility] or call [phone] for assistance.        Care EveryWhere ID     This is your Care EveryWhere ID. This could be used by other organizations to access your [facility] medical records  [id number]        Your Vitals Were     Pulse Temperature Respirations Height Pulse Oximetry Breastfeeding?    111 98.2  F (36.8  C) (Oral) 18 1.702 m (5' 7\") 96% No    BMI (Body Mass Index)                   25.84 kg/m2            Blood Pressure from Last 3 Encounters:   11/30/18 160/89   11/05/18 149/87   10/18/18 149/64    Weight from Last 3 Encounters:   11/30/18 74.8 kg (165 lb)   10/18/18 77.1 kg (170 lb)   10/02/18 77.2 kg (170 lb 1.6 oz)              Today, you had the following     No orders found for display         Today's Medication Changes          These changes are accurate as of 11/30/18  4:58 PM.  If you have any questions, ask your nurse or doctor.               Start taking these medicines.        Dose/Directions    order for DME   Used for:  Episodic cluster headache, not intractable   Started by:  Jamila Gonzales MD        Equipment being ordered: Oxygen and non-rebreather mask.   Use high flow oxygen (10-15 L/min) with non-rebreather mask, as needed for cluster headaches   Quantity:  1 Units   Refills:  11       * SUMAtriptan 6 MG/0.5ML pen injector kit   Commonly known as:  IMITREX STATDOSE   Used for:  Episodic cluster headache, not intractable "   Started by:  Jamila Gonzales MD        Dose:  6 mg   Inject 0.5 mLs (6 mg) Subcutaneous at onset of headache for migraine May repeat in 1 hour. Max 12 mg/24 hours.   Quantity:  1 kit   Refills:  0       * SUMAtriptan 6 MG/0.5ML injection   Commonly known as:  IMITREX   Used for:  Episodic cluster headache, not intractable   Started by:  Jamila Gonzales MD        Dose:  6 mg   Inject 0.5 mLs (6 mg) Subcutaneous every 12 hours as needed for other (cluster) May repeat dose in 1 hour. Max 12 mg/24 hours.   Quantity:  18 vial   Refills:  3       * Notice:  This list has 2 medication(s) that are the same as other medications prescribed for you. Read the directions carefully, and ask your doctor or other care provider to review them with you.      These medicines have changed or have updated prescriptions.        Dose/Directions    * predniSONE 10 MG tablet   Commonly known as:  DELTASONE   This may have changed:  Another medication with the same name was added. Make sure you understand how and when to take each.   Changed by:  Jamila Gonzales MD        Refills:  0       * predniSONE 20 MG tablet   Commonly known as:  DELTASONE   This may have changed:  You were already taking a medication with the same name, and this prescription was added. Make sure you understand how and when to take each.   Used for:  Episodic cluster headache, not intractable   Changed by:  Jamila Gonzales MD        Take 4 tabs (80 mg) by mouth daily x 5 days, 3 tabs (60 mg) daily x 5 days, 2 tab (40 mg) daily x 5 days, then 1 tab (20 mg) x 5 days.   Quantity:  50 tablet   Refills:  0       * Notice:  This list has 2 medication(s) that are the same as other medications prescribed for you. Read the directions carefully, and ask your doctor or other care provider to review them with you.         Where to get your medicines      These medications were sent to [id number] - [place], [place] - [address]  [address] [facility] [address]     Phone:  [phone]     predniSONE 20 MG tablet    SUMAtriptan 6 MG/0.5ML injection    SUMAtriptan 6 MG/0.5ML pen injector kit         Some of these will need a paper prescription and others can be bought over the counter.  Ask your nurse if you have questions.     Bring a paper prescription for each of these medications     order for DME                Primary Care Provider Office Phone # Fax #    Meek Leary [phone]       [facility] [address] [address]        Equal Access to Services     [facility] : Hadii aad ku hadasho Soomaali, waaxda luqadaha, qaybta kaalmada adeegyada, waxay idiin hayaan adeeg nohemy santana . So [facility] [phone].    ATENCIÓN: Si habla español, tiene a daniels disposición servicios gratuitos de asistencia lingüística. LlMount [facility]el [facility] System [phone].    We comply with applicable federal civil rights laws and [place] laws. We do not discriminate on the basis of race, color, national origin, age, disability, sex, sexual orientation, or gender identity.            Thank you!     Thank you for choosing [facility] NEUROLOGY  for your care. Our goal is always to provide you with excellent care. Hearing back from our patients is one way we can continue to improve our services. Please take a few minutes to complete the written survey that you may receive in the mail after your visit with us. Thank you!             Your Updated Medication List - Protect others around you: Learn how to safely use, store and throw away your medicines at www.disposemymeds.org.          This list is accurate as of 11/30/18  4:58 PM.  Always use your most recent med list.                   Brand Name Dispense Instructions for use Diagnosis    aMILoride 5 MG tablet    MIDAMOR          amLODIPine 10 MG tablet    NORVASC          * amphetamine-dextroamphetamine 20 MG tablet    ADDERALL    60 tablet    Take 1 tablet (20 mg) by mouth 2 times daily     Post concussion syndrome       * amphetamine-dextroamphetamine 20 MG tablet    ADDERALL    60 tablet    Take 1 tablet (20 mg) by mouth 2 times daily    Post concussion syndrome       * amphetamine-dextroamphetamine 20 MG tablet   Start taking on:  12/3/2018    ADDERALL    60 tablet    Take 1 tablet (20 mg) by mouth 2 times daily    Post concussion syndrome       ATIVAN 1 MG tablet   Generic drug:  LORazepam      Take 1 mg by mouth 2 times daily as needed.        * BOTOX IJ      Inject 200 Units into the muscle once Lot # /C3 with Expiration Date: 08/2020        * BOTOX IJ      Inject 225 Units as directed once Lot /C3 Exp 10/2020        * BOTOX IJ      Inject 225 Units into the muscle once Lot # /C3 with Expiration Date: 11/2020        * BOTOX IJ      Inject 225 Units as directed once Lot# /C3 Exp 03/2021        * BOTOX IJ      Inject 225 Units as directed once Lot # /C3 with Expiration Date: 04/2021        budesonide 32 MCG/ACT nasal spray    RINOCORT AQUA     Spray 1 spray into both nostrils daily.        celeBREX 200 MG capsule   Generic drug:  celecoxib           cyclobenzaprine 10 MG tablet    FLEXERIL          DEPAKOTE PO      Take 250 mg by mouth every 48 hours as needed        eletriptan 40 MG tablet    RELPAX    12 tablet    TAKE 1 TABLET AT ONSET OF MIGRAINE. MAY REPEAT ONCE AFTER 2 HRS. MAX OF 2 TABS/24HRS AND 3 DAYS/WEEK.    Migraine without aura and without status migrainosus, not intractable       erenumab-aooe 70 MG/ML injection    AIMOVIG    1 pen    Inject 1 mL (70 mg) Subcutaneous every 30 days    Chronic migraine       fexofenadine 180 MG tablet    ALLEGRA     Take  by mouth daily.        HYDROmorphone 2 MG tablet    DILAUDID    24 tablet    Take 0.5-1 tablets (1-2 mg) by mouth every 3 hours as needed (headache) 20 tablets = 3 month supply.    SI (sacroiliac) joint dysfunction       indomethacin 50 MG capsule    INDOCIN    90 capsule    TAKE 1 CAPSULE BY MOUTH TWICE  DAILY WITH MEALS. AFTER ONE WEEK, MAY INCREASE TO 1 CAPSULE THREE TIMES DAILY.    Paroxysmal hemicrania       lamoTRIgine 200 MG tablet    LaMICtal     Take 1 tablet by mouth daily        Lutein 20 MG Tabs      Take 1 tablet by mouth every other day Reported on 5/9/2017        ondansetron 4 MG tablet    ZOFRAN    90 tablet    Take 1 tablet (4 mg) by mouth every 8 hours as needed    Migraine without aura       order for DME     1 Units    Equipment being ordered: Oxygen and non-rebreather mask.   Use high flow oxygen (10-15 L/min) with non-rebreather mask, as needed for cluster headaches    Episodic cluster headache, not intractable       * predniSONE 10 MG tablet    DELTASONE          * predniSONE 20 MG tablet    DELTASONE    50 tablet    Take 4 tabs (80 mg) by mouth daily x 5 days, 3 tabs (60 mg) daily x 5 days, 2 tab (40 mg) daily x 5 days, then 1 tab (20 mg) x 5 days.    Episodic cluster headache, not intractable       PREMARIN 0.625 MG tablet   Generic drug:  estrogen conj      Take by mouth daily .30 mg  .        RESTASIS OP      Apply to eye 2 times daily        SEROQUEL 50 MG tablet   Generic drug:  QUEtiapine      Take 1 tablet by mouth daily.        * SUMAtriptan 6 MG/0.5ML pen injector kit    IMITREX STATDOSE    1 kit    Inject 0.5 mLs (6 mg) Subcutaneous at onset of headache for migraine May repeat in 1 hour. Max 12 mg/24 hours.    Episodic cluster headache, not intractable       * SUMAtriptan 6 MG/0.5ML injection    IMITREX    18 vial    Inject 0.5 mLs (6 mg) Subcutaneous every 12 hours as needed for other (cluster) May repeat dose in 1 hour. Max 12 mg/24 hours.    Episodic cluster headache, not intractable       VALTREX 1000 mg tablet   Generic drug:  valACYclovir      Take 1,000 mg by mouth as needed.        * Notice:  This list has 12 medication(s) that are the same as other medications prescribed for you. Read the directions carefully, and ask your doctor or other care provider to review them with  you.

## 2018-11-30 NOTE — TELEPHONE ENCOUNTER
Health Call Center    Phone Message    May a detailed message be left on voicemail: yes    Reason for Call: Medication Question or concern regarding medication   Prescription Clarification  Name of Medication: Imetrex  Prescribing Provider: Dr Gonzales   [facility]   What on the order needs clarification? Two Rx's were sent over for Imitrex.  Darlin is wanting to clarify that the pt is needing both Rx's.  Please call her back to discuss          Action Taken: Message routed to:  [facility] ([facility]

## 2018-11-30 NOTE — NURSING NOTE
Chief Complaint   Patient presents with     RECHECK     [facility] RETURN PATIENT VISIT FOR FOLLOW UP RELATED TO MIGRAINES        Crystal Agudelo MA

## 2018-12-01 NOTE — TELEPHONE ENCOUNTER
Sent a message to Dr. Gonzales inquiring about pharmacies questions asking for clarification.    1) This is just a vial, and they would have to draw out of it with their own syringe, and would need syringes ordered.    2.) The kit is autoinjector.     3.) Did you want both ordered for patient.?    Pharmacy was inquiring if Dr. Gonzales meant to order the vial due to it is just a vial with no needles and patients generally do not know how to draw up certain amounts and have their own needles. I told the pharmacy I would clarify with Dr. Gonzales and someone would get back to them because she knowshow experienced or inexperienced the patient is with this.    - I told the pharmacy someone would be able to call back on Monday or Tuesday to clarify.

## 2018-12-03 NOTE — TELEPHONE ENCOUNTER
Called and spoke with pharmacy and clarified with message below per Dr franki Martinez, [facility]

## 2018-12-03 NOTE — TELEPHONE ENCOUNTER
----- Message from Neema Wiley RN sent at 12/3/2018  7:45 AM CST -----  Regarding: FW: sumatriptan  Please call the pharmacy and let them know Dr. Gonzales's response below.    Thank you,  Neema  ----- Message -----     From: Jamila Gonzales MD     Sent: 12/3/2018   7:16 AM       To: Leslie Martinez CMA, Iliana Faustin LPN, #  Subject: FW: sumatriptan                                  Hi Neema,    Please clarify that the patient can have the auto-injector pens, up to 18 pens per month. I was under the impression that the pens required refills, but it sounds like this is not the case.     Thank you,    MD Christy    ----- Message -----     From: Iliana Faustin LPN     Sent: 11/30/2018   6:34 PM       To: Jamila Gonzales MD, Leslie Martinez CMA, #  Subject: sumatriptan                                      2 sumatriptan medication fills. Pharmacy called and wanted to ensure you wanted both of these.    ============================================  SUMAtriptan (IMITREX) 6 MG/0.5ML injection 18 vial 3 11/30/2018  --  ============================================      1) This is just a vial, and they would have to draw out of it with their own syringe, and would need syringes ordered.    2.) The kit is autoinjector.     3.) Did you want both ordered for patient.?    Pharmacy was inquiring if you meant to order the vial due to it is just a vial with no needles and patients generally do not know how to draw up certain amounts and have their own needles. I told the pharmacy I would clarify with you and someone would get back to them because you know how experienced or inexperienced the patient is with this.    - I told the pharmacy someone would be able to call back on Monday or Tuesday to clarify.    Thank you.    Iliana CARRASQUILLO LPN

## 2018-12-18 DIAGNOSIS — G43.719 INTRACTABLE CHRONIC MIGRAINE WITHOUT AURA AND WITHOUT STATUS MIGRAINOSUS: Primary | ICD-10-CM

## 2018-12-18 RX ORDER — KETOROLAC TROMETHAMINE 30 MG/ML
30 INJECTION, SOLUTION INTRAMUSCULAR; INTRAVENOUS EVERY 6 HOURS PRN
Qty: 1 ML | Refills: 8 | Status: SHIPPED | OUTPATIENT
Start: 2018-12-18 | End: 2019-01-17

## 2018-12-20 ENCOUNTER — OFFICE VISIT (OUTPATIENT)
Dept: PHYSICAL MEDICINE AND REHAB | Facility: CLINIC | Age: 53
End: 2018-12-20
Payer: COMMERCIAL

## 2018-12-20 VITALS — OXYGEN SATURATION: 97 % | DIASTOLIC BLOOD PRESSURE: 77 MMHG | HEART RATE: 114 BPM | SYSTOLIC BLOOD PRESSURE: 152 MMHG

## 2018-12-20 DIAGNOSIS — G43.719 INTRACTABLE CHRONIC MIGRAINE WITHOUT AURA AND WITHOUT STATUS MIGRAINOSUS: Primary | ICD-10-CM

## 2018-12-20 RX ADMIN — BUPIVACAINE HYDROCHLORIDE 5 MG: 2.5 INJECTION, SOLUTION EPIDURAL; INFILTRATION; INTRACAUDAL at 15:19

## 2018-12-20 ASSESSMENT — PAIN SCALES - GENERAL: PAINLEVEL: SEVERE PAIN (7)

## 2018-12-20 NOTE — LETTER
12/20/2018       RE: Valerie Sim  [address] [facility] [id number]     Dear Colleague,    Thank you for referring your patient, Valerie Sim, to the [facility] PHYSICAL MEDICINE AND REHABILITATION at [facility]. Please see a copy of my visit note below.    BOTULINUM TOXIN PROCEDURE - HEADACHE - NOTE    Chief Complaint   Patient presents with     RECHECK     botox     /77 (BP Location: Right leg)   Pulse 114   SpO2 97%      Current Outpatient Medications:      aMILoride (MIDAMOR) 5 MG tablet, , Disp: , Rfl:      amLODIPine (NORVASC) 10 MG tablet, , Disp: , Rfl:      amphetamine-dextroamphetamine (ADDERALL) 20 MG per tablet, Take 1 tablet (20 mg) by mouth 2 times daily, Disp: 60 tablet, Rfl: 0     amphetamine-dextroamphetamine (ADDERALL) 20 MG per tablet, Take 1 tablet (20 mg) by mouth 2 times daily, Disp: 60 tablet, Rfl: 0     amphetamine-dextroamphetamine (ADDERALL) 20 MG per tablet, Take 1 tablet (20 mg) by mouth 2 times daily, Disp: 60 tablet, Rfl: 0     budesonide (RINOCORT AQUA) 32 MCG/ACT nasal spray, Spray 1 spray into both nostrils daily., Disp: , Rfl:      celecoxib (CELEBREX) 200 MG capsule, , Disp: , Rfl:      cyclobenzaprine (FLEXERIL) 10 MG tablet, , Disp: , Rfl:      CycloSPORINE (RESTASIS OP), Apply to eye 2 times daily, Disp: , Rfl:      Divalproex Sodium (DEPAKOTE PO), Take 250 mg by mouth every 48 hours as needed , Disp: , Rfl:      eletriptan (RELPAX) 40 MG tablet, TAKE 1 TABLET AT ONSET OF MIGRAINE. MAY REPEAT ONCE AFTER 2 HRS. MAX OF 2 TABS/24HRS AND 3 DAYS/WEEK., Disp: 12 tablet, Rfl: 3     erenumab-aooe (AIMOVIG) 70 MG/ML injection, Inject 1 mL (70 mg) Subcutaneous every 30 days, Disp: 1 pen, Rfl: 11     estrogens, conjugated, (PREMARIN) 0.625 MG tablet, Take by mouth daily .30 mg . , Disp: , Rfl:      fexofenadine (ALLEGRA) 180 MG tablet, Take  by mouth daily., Disp: , Rfl:      HYDROmorphone (DILAUDID) 2 MG tablet, Take  0.5-1 tablets (1-2 mg) by mouth every 3 hours as needed (headache) 20 tablets = 3 month supply., Disp: 24 tablet, Rfl: 0     indomethacin (INDOCIN) 50 MG capsule, TAKE 1 CAPSULE BY MOUTH TWICE DAILY WITH MEALS. AFTER ONE WEEK, MAY INCREASE TO 1 CAPSULE THREE TIMES DAILY., Disp: 90 capsule, Rfl: 0     ketorolac (TORADOL) 30 MG/ML injection, Inject 1 mL (30 mg) into the vein every 6 hours as needed for moderate pain Not to exceed 9 days/month., Disp: 1 mL, Rfl: 8     lamoTRIgine (LAMICTAL) 200 MG tablet, Take 1 tablet by mouth daily, Disp: , Rfl:      LORazepam (ATIVAN) 1 MG tablet, Take 1 mg by mouth 2 times daily as needed., Disp: , Rfl:      Lutein 20 MG TABS, Take 1 tablet by mouth every other day Reported on 5/9/2017, Disp: , Rfl:      OnabotulinumtoxinA (BOTOX IJ), Inject 225 Units as directed once Lot # /C3 with Expiration Date: 04/2021, Disp: , Rfl:      OnabotulinumtoxinA (BOTOX IJ), Inject 225 Units as directed once Lot# /C3 Exp 03/2021, Disp: , Rfl:      OnabotulinumtoxinA (BOTOX IJ), Inject 225 Units into the muscle once Lot # /C3 with Expiration Date: 11/2020, Disp: , Rfl:      OnabotulinumtoxinA (BOTOX IJ), Inject 225 Units as directed once Lot /C3 Exp 10/2020, Disp: , Rfl:      OnabotulinumtoxinA (BOTOX IJ), Inject 200 Units into the muscle once Lot # /C3 with Expiration Date: 08/2020, Disp: , Rfl:      ondansetron (ZOFRAN) 4 MG tablet, Take 1 tablet (4 mg) by mouth every 8 hours as needed, Disp: 90 tablet, Rfl: 1     order for DME, Equipment being ordered: Oxygen and non-rebreather mask.   Use high flow oxygen (10-15 L/min) with non-rebreather mask, as needed for cluster headaches, Disp: 1 Units, Rfl: 11     predniSONE (DELTASONE) 10 MG tablet, , Disp: , Rfl:      predniSONE (DELTASONE) 20 MG tablet, Take 4 tabs (80 mg) by mouth daily x 5 days, 3 tabs (60 mg) daily x 5 days, 2 tab (40 mg) daily x 5 days, then 1 tab (20 mg) x 5 days., Disp: 50 tablet, Rfl: 0     QUEtiapine  (SEROQUEL) 50 MG tablet, Take 1 tablet by mouth daily., Disp: , Rfl:      SUMAtriptan (IMITREX STATDOSE) 6 MG/0.5ML pen injector kit, Inject 0.5 mLs (6 mg) Subcutaneous at onset of headache for migraine May repeat in 1 hour. Max 12 mg/24 hours., Disp: 1 kit, Rfl: 0     SUMAtriptan (IMITREX) 6 MG/0.5ML injection, Inject 0.5 mLs (6 mg) Subcutaneous every 12 hours as needed for other (cluster) May repeat dose in 1 hour. Max 12 mg/24 hours., Disp: 18 vial, Rfl: 3     valACYclovir (VALTREX) 1000 mg tablet, Take 1,000 mg by mouth as needed., Disp: , Rfl:      OnabotulinumtoxinA (BOTOX IJ), Inject 175 Units as directed once Lot # /C3 with Expiration Date:  4/2016, Disp: , Rfl:     Current Facility-Administered Medications:      botulinum toxin type A (BOTOX) 100 units injection 225 Units, 225 Units, Intramuscular, Once, Standal, Addie Saeed MD     Allergies   Allergen Reactions     Fluoxetine Other (See Comments)     PN: LW Reaction: headache  PN: LW Reaction: headache  Migraine       Trazodone Other (See Comments) and Unknown     Other reaction(s): Headache  Other reaction(s): Headache  Other reaction(s): Headache     Amitriptyline Hcl Other (See Comments)     Migraine       Candesartan Cilexetil-Hctz Muscle Pain (Myalgia)     Cymbalta Other (See Comments)     Migraine       Cyproheptadine Hcl      headaches     Desvenlafaxine      Migraine       Diatrizoate Unknown     PN: LW CM1: CONTRAST- iodine Reaction :     Diazepam      Other reaction(s): Vestibular Toxicity  PN: LW Reaction: vertigo  PN: LW Reaction: vertigo     Imipramine Other (See Comments)     Migraine       Lyrica Muscle Pain (Myalgia)     Metoprolol Other (See Comments) and Unknown     headache  headache  headache     Morphine Other (See Comments)     Patient reports seizure      No Clinical Screening - See Comments      PN: LW FI1: lactose intolerance LW FI2: shellfish  PN: LW CM1: CONTRAST- iodine Reaction :  PN: LW Other1: -nka     Nortriptyline  "Other (See Comments)     Migraine       Phenergan Dm [Promethazine-Dm] Other (See Comments)     seizures     Promethazine      PN: LW Reaction: minimal sizures     Venlafaxine Other (See Comments)     PN: LW Reaction: migraine  PN: LW Reaction: migraine  Migraine       Wellbutrin [Bupropion Hydrobromide] Other (See Comments)     Migraine       Compazine Anxiety     Dihydroergotamine Anxiety and Other (See Comments)     Cardiac symptoms     Droperidol Anxiety     PN: LW Reaction: agitation     Medroxyprogesterone Hives     PN: LW Reaction: nausea     Prochlorperazine Anxiety     PN: LW Reaction: \"can't sit still\"        PHYSICAL EXAM:    Pleasant and cooperative.  No facial asymmetry      HPI:    Patient denies new medical diagnoses, illnesses, hospitalizations, emergency room visits, and injuries since the previous injection with botulinum neurotoxin.      We reviewed the recommended safety guidelines for  Botox from any vaccine injection, such as the seasonal flu vaccine, by a minimum of 10-14 days with Valerie Sim. She acknowledged understanding.      RESPONSE TO PREVIOUS TREATMENT:  Change in headache pattern following last series of injections with 225 units of  Botox on 10/18/2018.    Side effects: None reported.    1.  Headache frequency during this injection cycle:  She has daily headaches and 6 migraine headache days per month. This is compared to her baseline headache frequency of daily headaches.     2.  Headache duration during this injection cycle: Her mild baseline headache varies in length throughout the day but is always present, her migraines last a few hours up to 24 hours, and this pattern remains consistent. Her migraines have been responsive to Relpax. The severity of these headaches waxes and wanes.     3.  Headache intensity during this injection cycle:    A.  6-7/10  =  Typical pain level.    B.  9/10  =  Worst pain level.    C.  4/10  =  Lowest pain level.     4.  Change in " headache medication usage during this injection cycle: She has been using Relpax, which works well for her migraines. She was also recently prescribed IM toradol, which has also been helpful at keeping her out of the ED.     5.  ER Visits During This Injection Cycle:  None for headaches - prior to receiving Botox, she had been going to the ED for migraines 13 times per year.     6.  Functional Performance:  Change in ADL's, social interaction, days lost from work, etc. She reports missing social and work obligations about 3-4 times per month due to headaches.  She is also on disability for her headaches.     BOTULINUM NEUROTOXIN INJECTION PROCEDURES:      VERIFICATION OF PATIENT IDENTIFICATION AND PROCEDURE     Initials   Patient Name ses   Patient  ses   Procedure Verified by: ses     Prior to the start of the procedure and with procedural staff participation, I verbally confirmed the patient s identity using two indicators, relevant allergies, that the procedure was appropriate and matched the consent or emergent situation, and that the correct equipment/implants were available. Immediately prior to starting the procedure I conducted the Time Out with the procedural staff and re-confirmed the patient s name, procedure, and site/side. (The Joint Commission universal protocol was followed.)  Yes    Sedation (Moderate or Deep): None    Above assessments performed by:    Addie Malhotra MD      INDICATIONS FOR PROCEDURES:  Valerie Sim is a 53-year-old patient with chronic migraine headaches associated with cervicogenic components.      Her baseline symptoms have been recalcitrant to oral medications and conservative therapy. She is here today for an injection of Botox.         GOAL OF PROCEDURE:  The goal of this procedure is to decrease pain and enhance functional independence.     TOTAL DOSE ADMINISTERED:  Dose Administered: 225 units Botox (Botulinum Toxin Type A)  2:1 and 1:1 Dilution   Diluent Used:  0.25% Bupivacaine and Preservative free normal saline  Total Volume of Diluent Used: See below  Lot # /C3 with Expiration Date: 06/2021  NDC #: Botox 100u ([address]-43)     Was there drug waste? Yes  Amount of drug waste (mL): 75 units Botox.  Reason for waste:  Single use vial  Multi-dose vial: No    Addie Malhotra MD  December 20, 2018      Medication guide was offered to patient and was declined.     CONSENT:  The risks, benefits, and treatment options were discussed with Valerie JOSELITO Roney and she agreed to proceed.     Written consent was obtained by [facility].      EQUIPMENT USED:  Needle-37mm stimulating/recording  Needle-30 gauge  EMG/NCS Machine      SKIN PREPARATION:  Skin preparation was performed using an alcohol wipe.        AREA/MUSCLE INJECTED: 225 UNITS BOTOX = TOTAL DOSE  1. FACE & SCALP: 115 units of Botox = Total dose, 2:1 Dilution  Right temporalis - 12.5 units of Botox in 4 site/s.  Left temporalis - 12.5 units of Botox in 4 site/s.      Right frontalis - 10 units of Botox in 4 site/s.  Left frontalis - 10 units of Botox in 4 site/s.      Right procerus - 3.5 units of Botox in 1 site/s.  Left procerus - 3.5 units of Botox in 1 site/s.      Right  - 4 units of Botox in 1 site/s.  Left  - 4 units of Botox in 1 site/s.      Right Occipitalis - 25 units of Botox at 4 site/s.   Left Occipitalis - 25 units of Botox at 4 site/s.      Right Nasalis - 2.5 units of Botox at 1 site/s.   Left Nasalis - 2.5 units of Botox at 1 site/s.       2. JAW MUSCLES: 60 units of Botox = Total Dose, 1:1 Dilution  Right Masseter - 25 units of Botox at 1 site/s.   Left [place] - 25 units of Botox at 1 site/s.      Right temporalis - 5 units of Botox at 1 site/s.  Left temporalis - 5 units of Botox at 1 site/s.       3. SHOULDER & NECK MUSCLES: Total dose 50 units of Botox, 2:1 Dilution      Right levator muscle - 10 units of Botox at 2 site/s (neck and shoulder).  Left levator muscle - 10 units of  Botox at 2 site/s (neck and shoulder).      Right mid trapezius - 5 units of Botox at 1 site.  Left mid trapezius - 5 units of Botox at 1 site.    Right lateral trapezius - 10 units of Botox at 2 site/s  Left lateral trapezuis - 10 units of Botox at 2 site/s.          RESPONSE TO PROCEDURE: Valerie Sim tolerated the procedure well and there were no immediate complications. She was allowed to recover for an appropriate period of time and was discharged home in stable condition.     FOLLOW UP:  Valerie Sim was asked to follow up by phone in 7-14 days with Nely Jean Baptiste PT, Care Coordinator or Estela Magaña RN, Care Coordinator, to report her response to this series of injections. Based on the patient's previous response to this therapy, Valerie Sim was rescheduled for the next series of injections in 9 weeks. Due to a limited duration of Botox effectiveness (approximately 8-9 weeks), we will continue to schedule her Botox every 9 weeks.      PLAN (Medication Changes, Therapy Orders, Work or Disability Issues, etc.): Patient will monitor her response to today's injections and report.    Again, thank you for allowing me to participate in the care of your patient.      Sincerely,    Addie Malhotra MD

## 2018-12-20 NOTE — PROGRESS NOTES
BOTULINUM TOXIN PROCEDURE - HEADACHE - NOTE    Chief Complaint   Patient presents with     RECHECK     botox     /77 (BP Location: Right leg)   Pulse 114   SpO2 97%      Current Outpatient Medications:      aMILoride (MIDAMOR) 5 MG tablet, , Disp: , Rfl:      amLODIPine (NORVASC) 10 MG tablet, , Disp: , Rfl:      amphetamine-dextroamphetamine (ADDERALL) 20 MG per tablet, Take 1 tablet (20 mg) by mouth 2 times daily, Disp: 60 tablet, Rfl: 0     amphetamine-dextroamphetamine (ADDERALL) 20 MG per tablet, Take 1 tablet (20 mg) by mouth 2 times daily, Disp: 60 tablet, Rfl: 0     amphetamine-dextroamphetamine (ADDERALL) 20 MG per tablet, Take 1 tablet (20 mg) by mouth 2 times daily, Disp: 60 tablet, Rfl: 0     budesonide (RINOCORT AQUA) 32 MCG/ACT nasal spray, Spray 1 spray into both nostrils daily., Disp: , Rfl:      celecoxib (CELEBREX) 200 MG capsule, , Disp: , Rfl:      cyclobenzaprine (FLEXERIL) 10 MG tablet, , Disp: , Rfl:      CycloSPORINE (RESTASIS OP), Apply to eye 2 times daily, Disp: , Rfl:      Divalproex Sodium (DEPAKOTE PO), Take 250 mg by mouth every 48 hours as needed , Disp: , Rfl:      eletriptan (RELPAX) 40 MG tablet, TAKE 1 TABLET AT ONSET OF MIGRAINE. MAY REPEAT ONCE AFTER 2 HRS. MAX OF 2 TABS/24HRS AND 3 DAYS/WEEK., Disp: 12 tablet, Rfl: 3     erenumab-aooe (AIMOVIG) 70 MG/ML injection, Inject 1 mL (70 mg) Subcutaneous every 30 days, Disp: 1 pen, Rfl: 11     estrogens, conjugated, (PREMARIN) 0.625 MG tablet, Take by mouth daily .30 mg . , Disp: , Rfl:      fexofenadine (ALLEGRA) 180 MG tablet, Take  by mouth daily., Disp: , Rfl:      HYDROmorphone (DILAUDID) 2 MG tablet, Take 0.5-1 tablets (1-2 mg) by mouth every 3 hours as needed (headache) 20 tablets = 3 month supply., Disp: 24 tablet, Rfl: 0     indomethacin (INDOCIN) 50 MG capsule, TAKE 1 CAPSULE BY MOUTH TWICE DAILY WITH MEALS. AFTER ONE WEEK, MAY INCREASE TO 1 CAPSULE THREE TIMES DAILY., Disp: 90 capsule, Rfl: 0     ketorolac (TORADOL)  30 MG/ML injection, Inject 1 mL (30 mg) into the vein every 6 hours as needed for moderate pain Not to exceed 9 days/month., Disp: 1 mL, Rfl: 8     lamoTRIgine (LAMICTAL) 200 MG tablet, Take 1 tablet by mouth daily, Disp: , Rfl:      LORazepam (ATIVAN) 1 MG tablet, Take 1 mg by mouth 2 times daily as needed., Disp: , Rfl:      Lutein 20 MG TABS, Take 1 tablet by mouth every other day Reported on 5/9/2017, Disp: , Rfl:      OnabotulinumtoxinA (BOTOX IJ), Inject 225 Units as directed once Lot # /C3 with Expiration Date: 04/2021, Disp: , Rfl:      OnabotulinumtoxinA (BOTOX IJ), Inject 225 Units as directed once Lot# /C3 Exp 03/2021, Disp: , Rfl:      OnabotulinumtoxinA (BOTOX IJ), Inject 225 Units into the muscle once Lot # /C3 with Expiration Date: 11/2020, Disp: , Rfl:      OnabotulinumtoxinA (BOTOX IJ), Inject 225 Units as directed once Lot /C3 Exp 10/2020, Disp: , Rfl:      OnabotulinumtoxinA (BOTOX IJ), Inject 200 Units into the muscle once Lot # /C3 with Expiration Date: 08/2020, Disp: , Rfl:      ondansetron (ZOFRAN) 4 MG tablet, Take 1 tablet (4 mg) by mouth every 8 hours as needed, Disp: 90 tablet, Rfl: 1     order for DME, Equipment being ordered: Oxygen and non-rebreather mask.   Use high flow oxygen (10-15 L/min) with non-rebreather mask, as needed for cluster headaches, Disp: 1 Units, Rfl: 11     predniSONE (DELTASONE) 10 MG tablet, , Disp: , Rfl:      predniSONE (DELTASONE) 20 MG tablet, Take 4 tabs (80 mg) by mouth daily x 5 days, 3 tabs (60 mg) daily x 5 days, 2 tab (40 mg) daily x 5 days, then 1 tab (20 mg) x 5 days., Disp: 50 tablet, Rfl: 0     QUEtiapine (SEROQUEL) 50 MG tablet, Take 1 tablet by mouth daily., Disp: , Rfl:      SUMAtriptan (IMITREX STATDOSE) 6 MG/0.5ML pen injector kit, Inject 0.5 mLs (6 mg) Subcutaneous at onset of headache for migraine May repeat in 1 hour. Max 12 mg/24 hours., Disp: 1 kit, Rfl: 0     SUMAtriptan (IMITREX) 6 MG/0.5ML injection, Inject 0.5  mLs (6 mg) Subcutaneous every 12 hours as needed for other (cluster) May repeat dose in 1 hour. Max 12 mg/24 hours., Disp: 18 vial, Rfl: 3     valACYclovir (VALTREX) 1000 mg tablet, Take 1,000 mg by mouth as needed., Disp: , Rfl:      OnabotulinumtoxinA (BOTOX IJ), Inject 175 Units as directed once Lot # /C3 with Expiration Date:  4/2016, Disp: , Rfl:     Current Facility-Administered Medications:      botulinum toxin type A (BOTOX) 100 units injection 225 Units, 225 Units, Intramuscular, Once, Standal, Addie Saeed MD     Allergies   Allergen Reactions     Fluoxetine Other (See Comments)     PN: LW Reaction: headache  PN: LW Reaction: headache  Migraine       Trazodone Other (See Comments) and Unknown     Other reaction(s): Headache  Other reaction(s): Headache  Other reaction(s): Headache     Amitriptyline Hcl Other (See Comments)     Migraine       Candesartan Cilexetil-Hctz Muscle Pain (Myalgia)     Cymbalta Other (See Comments)     Migraine       Cyproheptadine Hcl      headaches     Desvenlafaxine      Migraine       Diatrizoate Unknown     PN: LW CM1: CONTRAST- iodine Reaction :     Diazepam      Other reaction(s): Vestibular Toxicity  PN: LW Reaction: vertigo  PN: LW Reaction: vertigo     Imipramine Other (See Comments)     Migraine       Lyrica Muscle Pain (Myalgia)     Metoprolol Other (See Comments) and Unknown     headache  headache  headache     Morphine Other (See Comments)     Patient reports seizure      No Clinical Screening - See Comments      PN: LW FI1: lactose intolerance LW FI2: shellfish  PN: LW CM1: CONTRAST- iodine Reaction :  PN: LW Other1: -nka     Nortriptyline Other (See Comments)     Migraine       Phenergan Dm [Promethazine-Dm] Other (See Comments)     seizures     Promethazine      PN: LW Reaction: minimal sizures     Venlafaxine Other (See Comments)     PN: LW Reaction: migraine  PN: LW Reaction: migraine  Migraine       Wellbutrin [Bupropion Hydrobromide] Other (See  "Comments)     Migraine       Compazine Anxiety     Dihydroergotamine Anxiety and Other (See Comments)     Cardiac symptoms     Droperidol Anxiety     PN: LW Reaction: agitation     Medroxyprogesterone Hives     PN: LW Reaction: nausea     Prochlorperazine Anxiety     PN: LW Reaction: \"can't sit still\"        PHYSICAL EXAM:    Pleasant and cooperative.  No facial asymmetry      HPI:    Patient denies new medical diagnoses, illnesses, hospitalizations, emergency room visits, and injuries since the previous injection with botulinum neurotoxin.      We reviewed the recommended safety guidelines for  Botox from any vaccine injection, such as the seasonal flu vaccine, by a minimum of 10-14 days with Valerie Sim. She acknowledged understanding.      RESPONSE TO PREVIOUS TREATMENT:  Change in headache pattern following last series of injections with 225 units of  Botox on 10/18/2018.    Side effects: None reported.    1.  Headache frequency during this injection cycle:  She has daily headaches and 6 migraine headache days per month. This is compared to her baseline headache frequency of daily headaches.     2.  Headache duration during this injection cycle: Her mild baseline headache varies in length throughout the day but is always present, her migraines last a few hours up to 24 hours, and this pattern remains consistent. Her migraines have been responsive to Relpax. The severity of these headaches waxes and wanes.     3.  Headache intensity during this injection cycle:    A.  6-7/10  =  Typical pain level.    B.  9/10  =  Worst pain level.    C.  4/10  =  Lowest pain level.     4.  Change in headache medication usage during this injection cycle: She has been using Relpax, which works well for her migraines. She was also recently prescribed IM toradol, which has also been helpful at keeping her out of the ED.     5.  ER Visits During This Injection Cycle:  None for headaches - prior to receiving Botox, she had " been going to the ED for migraines 13 times per year.     6.  Functional Performance:  Change in ADL's, social interaction, days lost from work, etc. She reports missing social and work obligations about 3-4 times per month due to headaches.  She is also on disability for her headaches.     BOTULINUM NEUROTOXIN INJECTION PROCEDURES:      VERIFICATION OF PATIENT IDENTIFICATION AND PROCEDURE     Initials   Patient Name ses   Patient  ses   Procedure Verified by: ses     Prior to the start of the procedure and with procedural staff participation, I verbally confirmed the patient s identity using two indicators, relevant allergies, that the procedure was appropriate and matched the consent or emergent situation, and that the correct equipment/implants were available. Immediately prior to starting the procedure I conducted the Time Out with the procedural staff and re-confirmed the patient s name, procedure, and site/side. (The Joint Commission universal protocol was followed.)  Yes    Sedation (Moderate or Deep): None    Above assessments performed by:    Addie Malhotra MD      INDICATIONS FOR PROCEDURES:  Valerie Sim is a 53-year-old patient with chronic migraine headaches associated with cervicogenic components.      Her baseline symptoms have been recalcitrant to oral medications and conservative therapy. She is here today for an injection of Botox.         GOAL OF PROCEDURE:  The goal of this procedure is to decrease pain and enhance functional independence.     TOTAL DOSE ADMINISTERED:  Dose Administered: 225 units Botox (Botulinum Toxin Type A)  2:1 and 1:1 Dilution   Diluent Used: 0.25% Bupivacaine and Preservative free normal saline  Total Volume of Diluent Used: See below  Lot # /C3 with Expiration Date: 2021  NDC #: Botox 100u ([address]-49)     Was there drug waste? Yes  Amount of drug waste (mL): 75 units Botox.  Reason for waste:  Single use vial  Multi-dose vial: No    Addie FISCHER  MD [place]  December 20, 2018      Medication guide was offered to patient and was declined.     CONSENT:  The risks, benefits, and treatment options were discussed with Valerie JOSELITO Noelkory and she agreed to proceed.     Written consent was obtained by SES.      EQUIPMENT USED:  Needle-37mm stimulating/recording  Needle-30 gauge  EMG/NCS Machine      SKIN PREPARATION:  Skin preparation was performed using an alcohol wipe.        AREA/MUSCLE INJECTED: 225 UNITS BOTOX = TOTAL DOSE  1. FACE & SCALP: 115 units of Botox = Total dose, 2:1 Dilution  Right temporalis - 12.5 units of Botox in 4 site/s.  Left temporalis - 12.5 units of Botox in 4 site/s.      Right frontalis - 10 units of Botox in 4 site/s.  Left frontalis - 10 units of Botox in 4 site/s.      Right procerus - 3.5 units of Botox in 1 site/s.  Left procerus - 3.5 units of Botox in 1 site/s.      Right  - 4 units of Botox in 1 site/s.  Left  - 4 units of Botox in 1 site/s.      Right Occipitalis - 25 units of Botox at 4 site/s.   Left Occipitalis - 25 units of Botox at 4 site/s.      Right Nasalis - 2.5 units of Botox at 1 site/s.   Left Nasalis - 2.5 units of Botox at 1 site/s.       2. JAW MUSCLES: 60 units of Botox = Total Dose, 1:1 Dilution  Right Masseter - 25 units of Botox at 1 site/s.   Left Masseter - 25 units of Botox at 1 site/s.      Right temporalis - 5 units of Botox at 1 site/s.  Left temporalis - 5 units of Botox at 1 site/s.       3. SHOULDER & NECK MUSCLES: Total dose 50 units of Botox, 2:1 Dilution      Right levator muscle - 10 units of Botox at 2 site/s (neck and shoulder).  Left levator muscle - 10 units of Botox at 2 site/s (neck and shoulder).      Right mid trapezius - 5 units of Botox at 1 site.  Left mid trapezius - 5 units of Botox at 1 site.    Right lateral trapezius - 10 units of Botox at 2 site/s  Left lateral trapezuis - 10 units of Botox at 2 site/s.          RESPONSE TO PROCEDURE: Valerie Sim tolerated the  procedure well and there were no immediate complications. She was allowed to recover for an appropriate period of time and was discharged home in stable condition.     FOLLOW UP:  Valerie Sim was asked to follow up by phone in 7-14 days with Nely Jean Baptiste PT, Care Coordinator or Estela Magaña RN, Care Coordinator, to report her response to this series of injections. Based on the patient's previous response to this therapy, Valerie Sim was rescheduled for the next series of injections in 9 weeks. Due to a limited duration of Botox effectiveness (approximately 8-9 weeks), we will continue to schedule her Botox every 9 weeks.      PLAN (Medication Changes, Therapy Orders, Work or Disability Issues, etc.): Patient will monitor her response to today's injections and report.

## 2018-12-31 ENCOUNTER — CARE COORDINATION (OUTPATIENT)
Dept: NEUROLOGY | Facility: CLINIC | Age: 53
End: 2018-12-31

## 2018-12-31 DIAGNOSIS — F07.81 POST CONCUSSION SYNDROME: ICD-10-CM

## 2018-12-31 DIAGNOSIS — M53.3 SI (SACROILIAC) JOINT DYSFUNCTION: ICD-10-CM

## 2018-12-31 RX ORDER — DEXTROAMPHETAMINE SACCHARATE, AMPHETAMINE ASPARTATE, DEXTROAMPHETAMINE SULFATE AND AMPHETAMINE SULFATE 5; 5; 5; 5 MG/1; MG/1; MG/1; MG/1
20 TABLET ORAL 2 TIMES DAILY
Qty: 60 TABLET | Refills: 0 | OUTPATIENT
Start: 2018-12-31

## 2018-12-31 RX ORDER — DEXTROAMPHETAMINE SACCHARATE, AMPHETAMINE ASPARTATE, DEXTROAMPHETAMINE SULFATE AND AMPHETAMINE SULFATE 5; 5; 5; 5 MG/1; MG/1; MG/1; MG/1
20 TABLET ORAL 2 TIMES DAILY
Qty: 60 TABLET | Refills: 0 | Status: SHIPPED | OUTPATIENT
Start: 2018-12-31 | End: 2019-08-09

## 2018-12-31 RX ORDER — HYDROMORPHONE HYDROCHLORIDE 2 MG/1
1-2 TABLET ORAL
Qty: 24 TABLET | Refills: 0 | Status: SHIPPED | OUTPATIENT
Start: 2018-12-31 | End: 2019-01-02

## 2018-12-31 RX ORDER — HYDROMORPHONE HYDROCHLORIDE 2 MG/1
1-2 TABLET ORAL
Qty: 24 TABLET | Refills: 0 | OUTPATIENT
Start: 2018-12-31

## 2018-12-31 NOTE — PROGRESS NOTES
Rx for hydromorphone and Adderall (three months) sent to Dr. Johnston.  Will mail to the patient as requested once they are ready.

## 2019-01-02 ENCOUNTER — TELEPHONE (OUTPATIENT)
Dept: NEUROLOGY | Facility: CLINIC | Age: 54
End: 2019-01-02

## 2019-01-02 DIAGNOSIS — F07.81 POST CONCUSSION SYNDROME: ICD-10-CM

## 2019-01-02 DIAGNOSIS — M53.3 SI (SACROILIAC) JOINT DYSFUNCTION: ICD-10-CM

## 2019-01-02 RX ORDER — HYDROMORPHONE HYDROCHLORIDE 2 MG/1
1-2 TABLET ORAL
Qty: 24 TABLET | Refills: 0 | Status: SHIPPED | OUTPATIENT
Start: 2019-01-02 | End: 2019-03-20

## 2019-01-02 RX ORDER — DEXTROAMPHETAMINE SACCHARATE, AMPHETAMINE ASPARTATE, DEXTROAMPHETAMINE SULFATE AND AMPHETAMINE SULFATE 5; 5; 5; 5 MG/1; MG/1; MG/1; MG/1
20 TABLET ORAL 2 TIMES DAILY
Qty: 60 TABLET | Refills: 0 | Status: SHIPPED | OUTPATIENT
Start: 2019-01-02 | End: 2019-06-10

## 2019-01-02 NOTE — PROGRESS NOTES
I was asked to provide refills for Ms. Sim' medications, including Adderall and Dilaudid.  She currently takes Adderall for postconcussive syndrome and Dilaudid for severe headache, which she takes rarely.  We previously discussed the importance to limiting narcotics to no more than 1 day/week to avoid medication overuse headache.  I will plan to see her back next month to monitor her progress as well as discuss her medication use.

## 2019-01-02 NOTE — TELEPHONE ENCOUNTER
Put in the mail the 2 Rx's requested by patient. Dilaudid 2 mg tablets and adderall 20 mg tablet. Mailed them to the pharmacy of [facility].# [phone]    Patient requested I send to her pharmacy due to she will be out of town. Put in mail at [phone] on 01/02/2019

## 2019-01-16 ENCOUNTER — OFFICE VISIT (OUTPATIENT)
Dept: PHYSICAL MEDICINE AND REHAB | Facility: CLINIC | Age: 54
End: 2019-01-16
Payer: COMMERCIAL

## 2019-01-16 VITALS
WEIGHT: 175.2 LBS | HEIGHT: 67 IN | DIASTOLIC BLOOD PRESSURE: 88 MMHG | OXYGEN SATURATION: 98 % | SYSTOLIC BLOOD PRESSURE: 132 MMHG | HEART RATE: 72 BPM | RESPIRATION RATE: 16 BRPM | TEMPERATURE: 98.2 F | BODY MASS INDEX: 27.5 KG/M2

## 2019-01-16 DIAGNOSIS — G43.709 CHRONIC MIGRAINE W/O AURA, NOT INTRACTABLE, W/O STAT MIGR: Primary | ICD-10-CM

## 2019-01-16 ASSESSMENT — PAIN SCALES - GENERAL: PAINLEVEL: SEVERE PAIN (7)

## 2019-01-16 ASSESSMENT — MIFFLIN-ST. JEOR: SCORE: 1437.33

## 2019-01-16 NOTE — NURSING NOTE
Chief Complaint   Patient presents with     RECHECK     [facility] RETURN BOTOX     Medication Reconciliation     COMPLETE     Ileana Simon

## 2019-01-16 NOTE — LETTER
1/16/2019       RE: Valerie Sim  [address] [facility] [address]     Dear Colleague,    Thank you for referring your patient, Valerie Sim, to the [facility] PHYSICAL MEDICINE AND REHABILITATION at [facility]. Please see a copy of my visit note below.    OCCIPITAL NERVE BLOCK PROCEDURE NOTE    Chief Complaint   Patient presents with     RECHECK     [facility] RETURN BOTOX     Medication Reconciliation     Wt 79.5 kg (175 lb 3.2 oz)   BMI 27.44 kg/m       Current Outpatient Medications:      aMILoride (MIDAMOR) 5 MG tablet, , Disp: , Rfl:      amLODIPine (NORVASC) 10 MG tablet, , Disp: , Rfl:      amphetamine-dextroamphetamine (ADDERALL) 20 MG per tablet, Take 1 tablet (20 mg) by mouth 2 times daily, Disp: 60 tablet, Rfl: 0     amphetamine-dextroamphetamine (ADDERALL) 20 MG tablet, Take 1 tablet (20 mg) by mouth 2 times daily, Disp: 60 tablet, Rfl: 0     amphetamine-dextroamphetamine (ADDERALL) 20 MG tablet, Take 1 tablet (20 mg) by mouth 2 times daily, Disp: 60 tablet, Rfl: 0     budesonide (RINOCORT AQUA) 32 MCG/ACT nasal spray, Spray 1 spray into both nostrils daily., Disp: , Rfl:      celecoxib (CELEBREX) 200 MG capsule, , Disp: , Rfl:      cyclobenzaprine (FLEXERIL) 10 MG tablet, , Disp: , Rfl:      CycloSPORINE (RESTASIS OP), Apply to eye 2 times daily, Disp: , Rfl:      Divalproex Sodium (DEPAKOTE PO), Take 250 mg by mouth every 48 hours as needed , Disp: , Rfl:      eletriptan (RELPAX) 40 MG tablet, TAKE 1 TABLET AT ONSET OF MIGRAINE. MAY REPEAT ONCE AFTER 2 HRS. MAX OF 2 TABS/24HRS AND 3 DAYS/WEEK., Disp: 12 tablet, Rfl: 3     erenumab-aooe (AIMOVIG) 70 MG/ML injection, Inject 1 mL (70 mg) Subcutaneous every 30 days, Disp: 1 pen, Rfl: 11     estrogens, conjugated, (PREMARIN) 0.625 MG tablet, Take by mouth daily .30 mg . , Disp: , Rfl:      fexofenadine (ALLEGRA) 180 MG tablet, Take  by mouth daily., Disp: , Rfl:      HYDROmorphone (DILAUDID) 2 MG  tablet, Take 0.5-1 tablets (1-2 mg) by mouth every 3 hours as needed (headache) 20 tablets = 3 month supply., Disp: 24 tablet, Rfl: 0     indomethacin (INDOCIN) 50 MG capsule, TAKE 1 CAPSULE BY MOUTH TWICE DAILY WITH MEALS. AFTER ONE WEEK, MAY INCREASE TO 1 CAPSULE THREE TIMES DAILY., Disp: 90 capsule, Rfl: 0     ketorolac (TORADOL) 30 MG/ML injection, Inject 1 mL (30 mg) into the vein every 6 hours as needed for moderate pain Not to exceed 9 days/month., Disp: 1 mL, Rfl: 8     lamoTRIgine (LAMICTAL) 200 MG tablet, Take 1 tablet by mouth daily, Disp: , Rfl:      LORazepam (ATIVAN) 1 MG tablet, Take 1 mg by mouth 2 times daily as needed., Disp: , Rfl:      Lutein 20 MG TABS, Take 1 tablet by mouth every other day Reported on 5/9/2017, Disp: , Rfl:      OnabotulinumtoxinA (BOTOX IJ), Inject 225 Units as directed once Lot # /C3 with Expiration Date: 04/2021, Disp: , Rfl:      OnabotulinumtoxinA (BOTOX IJ), Inject 225 Units as directed once Lot# /C3 Exp 03/2021, Disp: , Rfl:      OnabotulinumtoxinA (BOTOX IJ), Inject 225 Units into the muscle once Lot # /C3 with Expiration Date: 11/2020, Disp: , Rfl:      OnabotulinumtoxinA (BOTOX IJ), Inject 225 Units as directed once Lot /C3 Exp 10/2020, Disp: , Rfl:      OnabotulinumtoxinA (BOTOX IJ), Inject 200 Units into the muscle once Lot # /C3 with Expiration Date: 08/2020, Disp: , Rfl:      OnabotulinumtoxinA (BOTOX IJ), Inject 175 Units as directed once Lot # /C3 with Expiration Date:  4/2016, Disp: , Rfl:      ondansetron (ZOFRAN) 4 MG tablet, Take 1 tablet (4 mg) by mouth every 8 hours as needed, Disp: 90 tablet, Rfl: 1     order for DME, Equipment being ordered: Oxygen and non-rebreather mask.   Use high flow oxygen (10-15 L/min) with non-rebreather mask, as needed for cluster headaches, Disp: 1 Units, Rfl: 11     predniSONE (DELTASONE) 10 MG tablet, , Disp: , Rfl:      predniSONE (DELTASONE) 20 MG tablet, Take 4 tabs (80 mg) by mouth daily x 5  days, 3 tabs (60 mg) daily x 5 days, 2 tab (40 mg) daily x 5 days, then 1 tab (20 mg) x 5 days., Disp: 50 tablet, Rfl: 0     QUEtiapine (SEROQUEL) 50 MG tablet, Take 1 tablet by mouth daily., Disp: , Rfl:      SUMAtriptan (IMITREX STATDOSE) 6 MG/0.5ML pen injector kit, Inject 0.5 mLs (6 mg) Subcutaneous at onset of headache for migraine May repeat in 1 hour. Max 12 mg/24 hours., Disp: 1 kit, Rfl: 0     SUMAtriptan (IMITREX) 6 MG/0.5ML injection, Inject 0.5 mLs (6 mg) Subcutaneous every 12 hours as needed for other (cluster) May repeat dose in 1 hour. Max 12 mg/24 hours., Disp: 18 vial, Rfl: 3     valACYclovir (VALTREX) 1000 mg tablet, Take 1,000 mg by mouth as needed., Disp: , Rfl:      Allergies   Allergen Reactions     Fluoxetine Other (See Comments)     PN: LW Reaction: headache  PN: LW Reaction: headache  Migraine       Trazodone Other (See Comments) and Unknown     Other reaction(s): Headache  Other reaction(s): Headache  Other reaction(s): Headache     Amitriptyline Hcl Other (See Comments)     Migraine       Candesartan Cilexetil-Hctz Muscle Pain (Myalgia)     Cymbalta Other (See Comments)     Migraine       Cyproheptadine Hcl      headaches     Desvenlafaxine      Migraine       Diatrizoate Unknown     PN: LW CM1: CONTRAST- iodine Reaction :     Diazepam      Other reaction(s): Vestibular Toxicity  PN: LW Reaction: vertigo  PN: LW Reaction: vertigo     Imipramine Other (See Comments)     Migraine       Lyrica Muscle Pain (Myalgia)     Metoprolol Other (See Comments) and Unknown     headache  headache  headache     Morphine Other (See Comments)     Patient reports seizure      No Clinical Screening - See Comments      PN: LW FI1: lactose intolerance LW FI2: shellfish  PN: LW CM1: CONTRAST- iodine Reaction :  PN: LW Other1: -nka     Nortriptyline Other (See Comments)     Migraine       Phenergan Dm [Promethazine-Dm] Other (See Comments)     seizures     Promethazine      PN: LW Reaction: minimal sizures      "Venlafaxine Other (See Comments)     PN: LW Reaction: migraine  PN: LW Reaction: migraine  Migraine       Wellbutrin [Bupropion Hydrobromide] Other (See Comments)     Migraine       Compazine Anxiety     Dihydroergotamine Anxiety and Other (See Comments)     Cardiac symptoms     Droperidol Anxiety     PN: LW Reaction: agitation     Medroxyprogesterone Hives     PN: LW Reaction: nausea     Prochlorperazine Anxiety     PN: LW Reaction: \"can't sit still\"        PHYSICAL EXAM:    No facial asymmetry  In no acute distress  She has a bilateral occipital headache today  Swelling noted at R base of skull - pt states this has been present for 10 days      HPI:    Valerie Sim received Botox injections on 2018 for migraine headaches. The Botox has provided a noticeable improvement with regards to her headaches to date. A few weeks ago, she experienced a recurrence of cluster headaches. These headaches have been primarily on the right side.       OCCIPITAL NERVE BLOCK PROCEDURE:    VERIFICATION OF PATIENT IDENTIFICATION AND PROCEDURE     Initials   Patient Name ses   Patient  ses   Procedure Verified by: ses     Prior to the start of the procedure and with procedural staff participation, I verbally confirmed the patient s identity using two indicators, relevant allergies, that the procedure was appropriate and matched the consent or emergent situation, and that the correct equipment/implants were available. Immediately prior to starting the procedure I conducted the Time Out with the procedural staff and re-confirmed the patient s name, procedure, and site/side. (The Joint Commission universal protocol was followed.)  Yes    Sedation (Moderate or Deep): None      Above assessments performed by:    Addie Malhotra MD      INDICATION/S FOR PROCEDURE/S:  Valerie Sim is a 53-year-old patient with chronic migraine and occipital headaches. Her baseline symptoms have been recalcitrant to oral medications and " conservative therapy.  She is here today for bilateral occipital nerve blocks.      GOAL OF PROCEDURE:  The goal of this procedure is to decrease pain  associated with chronic migraines and occipital headaches.      RIGHT AND LEFT GREATER OCCIPITAL NERVE BLOCKS.     Prior to the start of the procedure and with procedural staff participation, I verbally confirmed the patient s identity using two indicators, relevant allergies, that the procedure was appropriate and matched the consent or emergent situation, and that the correct equipment/implants were available. Immediately prior to starting the procedure I conducted the Time Out with the procedural staff and re-confirmed the patient s name, procedure, and site/side. (The Joint Commission universal protocol was followed.)  Yes    Sedation (Moderate or Deep): None      Area just inferior to insertion of the right and left superior trapezius insertion onto skull was cleansed with chloraprep. Needle was advanced anteriorly to base of skull then slightly withdrawn and injectate was injected in a fan-like distribution at different depths. Total injection of 0.5 cc of 40 mg triamcinolone acetonide plus 2.5 cc of 0.5 % bupivicaine per side. Valerie Sim tolerated the procedure well without any immediate complications.    She was allowed to recover for an appropriate period of time and was discharged home in stable condition.        MEDICATION:  Triamcinolone acetonide:  Lot: [id number]  Exp: 09/2020    Bupivacaine 0.5%:  Batch: [id number]   Exp: 09/2021      RESPONSE TO PROCEDURE:  Valerie Sim tolerated the procedure well and there were no immediate complications.  She was allowed to recover for an appropriate period of time and was discharged home in stable condition.    FOLLOW UP:  Valerie Sim was asked to follow up by phone in 7-14 days with Estela Magaña RN, Care Coordinator, to report her response to this series of injections.  Based on the patient's previous  response to this therapy, Valerie Sim was rescheduled for the next series of injections in 12 weeks.    PLAN (Medication Changes, Therapy Orders, Work or Disability Issues, etc.): Patient will follow-up regarding response to this procedure.      Again, thank you for allowing me to participate in the care of your patient.      Sincerely,    Addie Malhotra MD

## 2019-01-16 NOTE — PROGRESS NOTES
OCCIPITAL NERVE BLOCK PROCEDURE NOTE    Chief Complaint   Patient presents with     RECHECK     UMP RETURN BOTOX     Medication Reconciliation     Wt 79.5 kg (175 lb 3.2 oz)   BMI 27.44 kg/m      Current Outpatient Medications:      aMILoride (MIDAMOR) 5 MG tablet, , Disp: , Rfl:      amLODIPine (NORVASC) 10 MG tablet, , Disp: , Rfl:      amphetamine-dextroamphetamine (ADDERALL) 20 MG per tablet, Take 1 tablet (20 mg) by mouth 2 times daily, Disp: 60 tablet, Rfl: 0     amphetamine-dextroamphetamine (ADDERALL) 20 MG tablet, Take 1 tablet (20 mg) by mouth 2 times daily, Disp: 60 tablet, Rfl: 0     amphetamine-dextroamphetamine (ADDERALL) 20 MG tablet, Take 1 tablet (20 mg) by mouth 2 times daily, Disp: 60 tablet, Rfl: 0     budesonide (RINOCORT AQUA) 32 MCG/ACT nasal spray, Spray 1 spray into both nostrils daily., Disp: , Rfl:      celecoxib (CELEBREX) 200 MG capsule, , Disp: , Rfl:      cyclobenzaprine (FLEXERIL) 10 MG tablet, , Disp: , Rfl:      CycloSPORINE (RESTASIS OP), Apply to eye 2 times daily, Disp: , Rfl:      Divalproex Sodium (DEPAKOTE PO), Take 250 mg by mouth every 48 hours as needed , Disp: , Rfl:      eletriptan (RELPAX) 40 MG tablet, TAKE 1 TABLET AT ONSET OF MIGRAINE. MAY REPEAT ONCE AFTER 2 HRS. MAX OF 2 TABS/24HRS AND 3 DAYS/WEEK., Disp: 12 tablet, Rfl: 3     erenumab-aooe (AIMOVIG) 70 MG/ML injection, Inject 1 mL (70 mg) Subcutaneous every 30 days, Disp: 1 pen, Rfl: 11     estrogens, conjugated, (PREMARIN) 0.625 MG tablet, Take by mouth daily .30 mg . , Disp: , Rfl:      fexofenadine (ALLEGRA) 180 MG tablet, Take  by mouth daily., Disp: , Rfl:      HYDROmorphone (DILAUDID) 2 MG tablet, Take 0.5-1 tablets (1-2 mg) by mouth every 3 hours as needed (headache) 20 tablets = 3 month supply., Disp: 24 tablet, Rfl: 0     indomethacin (INDOCIN) 50 MG capsule, TAKE 1 CAPSULE BY MOUTH TWICE DAILY WITH MEALS. AFTER ONE WEEK, MAY INCREASE TO 1 CAPSULE THREE TIMES DAILY., Disp: 90 capsule, Rfl: 0     ketorolac  (TORADOL) 30 MG/ML injection, Inject 1 mL (30 mg) into the vein every 6 hours as needed for moderate pain Not to exceed 9 days/month., Disp: 1 mL, Rfl: 8     lamoTRIgine (LAMICTAL) 200 MG tablet, Take 1 tablet by mouth daily, Disp: , Rfl:      LORazepam (ATIVAN) 1 MG tablet, Take 1 mg by mouth 2 times daily as needed., Disp: , Rfl:      Lutein 20 MG TABS, Take 1 tablet by mouth every other day Reported on 5/9/2017, Disp: , Rfl:      OnabotulinumtoxinA (BOTOX IJ), Inject 225 Units as directed once Lot # /C3 with Expiration Date: 04/2021, Disp: , Rfl:      OnabotulinumtoxinA (BOTOX IJ), Inject 225 Units as directed once Lot# /C3 Exp 03/2021, Disp: , Rfl:      OnabotulinumtoxinA (BOTOX IJ), Inject 225 Units into the muscle once Lot # /C3 with Expiration Date: 11/2020, Disp: , Rfl:      OnabotulinumtoxinA (BOTOX IJ), Inject 225 Units as directed once Lot /C3 Exp 10/2020, Disp: , Rfl:      OnabotulinumtoxinA (BOTOX IJ), Inject 200 Units into the muscle once Lot # /C3 with Expiration Date: 08/2020, Disp: , Rfl:      OnabotulinumtoxinA (BOTOX IJ), Inject 175 Units as directed once Lot # /C3 with Expiration Date:  4/2016, Disp: , Rfl:      ondansetron (ZOFRAN) 4 MG tablet, Take 1 tablet (4 mg) by mouth every 8 hours as needed, Disp: 90 tablet, Rfl: 1     order for DME, Equipment being ordered: Oxygen and non-rebreather mask.   Use high flow oxygen (10-15 L/min) with non-rebreather mask, as needed for cluster headaches, Disp: 1 Units, Rfl: 11     predniSONE (DELTASONE) 10 MG tablet, , Disp: , Rfl:      predniSONE (DELTASONE) 20 MG tablet, Take 4 tabs (80 mg) by mouth daily x 5 days, 3 tabs (60 mg) daily x 5 days, 2 tab (40 mg) daily x 5 days, then 1 tab (20 mg) x 5 days., Disp: 50 tablet, Rfl: 0     QUEtiapine (SEROQUEL) 50 MG tablet, Take 1 tablet by mouth daily., Disp: , Rfl:      SUMAtriptan (IMITREX STATDOSE) 6 MG/0.5ML pen injector kit, Inject 0.5 mLs (6 mg) Subcutaneous at onset of  headache for migraine May repeat in 1 hour. Max 12 mg/24 hours., Disp: 1 kit, Rfl: 0     SUMAtriptan (IMITREX) 6 MG/0.5ML injection, Inject 0.5 mLs (6 mg) Subcutaneous every 12 hours as needed for other (cluster) May repeat dose in 1 hour. Max 12 mg/24 hours., Disp: 18 vial, Rfl: 3     valACYclovir (VALTREX) 1000 mg tablet, Take 1,000 mg by mouth as needed., Disp: , Rfl:      Allergies   Allergen Reactions     Fluoxetine Other (See Comments)     PN: LW Reaction: headache  PN: LW Reaction: headache  Migraine       Trazodone Other (See Comments) and Unknown     Other reaction(s): Headache  Other reaction(s): Headache  Other reaction(s): Headache     Amitriptyline Hcl Other (See Comments)     Migraine       Candesartan Cilexetil-Hctz Muscle Pain (Myalgia)     Cymbalta Other (See Comments)     Migraine       Cyproheptadine Hcl      headaches     Desvenlafaxine      Migraine       Diatrizoate Unknown     PN: LW CM1: CONTRAST- iodine Reaction :     Diazepam      Other reaction(s): Vestibular Toxicity  PN: LW Reaction: vertigo  PN: LW Reaction: vertigo     Imipramine Other (See Comments)     Migraine       Lyrica Muscle Pain (Myalgia)     Metoprolol Other (See Comments) and Unknown     headache  headache  headache     Morphine Other (See Comments)     Patient reports seizure      No Clinical Screening - See Comments      PN: LW FI1: lactose intolerance LW FI2: shellfish  PN: LW CM1: CONTRAST- iodine Reaction :  PN: LW Other1: -nka     Nortriptyline Other (See Comments)     Migraine       Phenergan Dm [Promethazine-Dm] Other (See Comments)     seizures     Promethazine      PN: LW Reaction: minimal sizures     Venlafaxine Other (See Comments)     PN: LW Reaction: migraine  PN: LW Reaction: migraine  Migraine       Wellbutrin [Bupropion Hydrobromide] Other (See Comments)     Migraine       Compazine Anxiety     Dihydroergotamine Anxiety and Other (See Comments)     Cardiac symptoms     Droperidol Anxiety     PN: LW Reaction:  "agitation     Medroxyprogesterone Hives     PN: LW Reaction: nausea     Prochlorperazine Anxiety     PN: LW Reaction: \"can't sit still\"        PHYSICAL EXAM:    No facial asymmetry  In no acute distress  She has a bilateral occipital headache today  Swelling noted at R base of skull - pt states this has been present for 10 days      HPI:    Valerie Sim received Botox injections on 2018 for migraine headaches. The Botox has provided a noticeable improvement with regards to her headaches to date. A few weeks ago, she experienced a recurrence of cluster headaches. These headaches have been primarily on the right side.       OCCIPITAL NERVE BLOCK PROCEDURE:    VERIFICATION OF PATIENT IDENTIFICATION AND PROCEDURE     Initials   Patient Name ses   Patient  ses   Procedure Verified by: michelle     Prior to the start of the procedure and with procedural staff participation, I verbally confirmed the patient s identity using two indicators, relevant allergies, that the procedure was appropriate and matched the consent or emergent situation, and that the correct equipment/implants were available. Immediately prior to starting the procedure I conducted the Time Out with the procedural staff and re-confirmed the patient s name, procedure, and site/side. (The Joint Commission universal protocol was followed.)  Yes    Sedation (Moderate or Deep): None      Above assessments performed by:    Addie Malhotra MD      INDICATION/S FOR PROCEDURE/S:  Valerie Sim is a 53-year-old patient with chronic migraine and occipital headaches. Her baseline symptoms have been recalcitrant to oral medications and conservative therapy.  She is here today for bilateral occipital nerve blocks.      GOAL OF PROCEDURE:  The goal of this procedure is to decrease pain  associated with chronic migraines and occipital headaches.      RIGHT AND LEFT GREATER OCCIPITAL NERVE BLOCKS.     Prior to the start of the procedure and with procedural staff " participation, I verbally confirmed the patient s identity using two indicators, relevant allergies, that the procedure was appropriate and matched the consent or emergent situation, and that the correct equipment/implants were available. Immediately prior to starting the procedure I conducted the Time Out with the procedural staff and re-confirmed the patient s name, procedure, and site/side. (The Joint Commission universal protocol was followed.)  Yes    Sedation (Moderate or Deep): None      Area just inferior to insertion of the right and left superior trapezius insertion onto skull was cleansed with chloraprep. Needle was advanced anteriorly to base of skull then slightly withdrawn and injectate was injected in a fan-like distribution at different depths. Total injection of 0.5 cc of 40 mg triamcinolone acetonide plus 2.5 cc of 0.5 % bupivicaine per side. Valerie Sim tolerated the procedure well without any immediate complications.    She was allowed to recover for an appropriate period of time and was discharged home in stable condition.        MEDICATION:  Triamcinolone acetonide:  NDC: [address]-9  Lot: [id number]  Exp: 09/2020    Bupivacaine 0.5%:  Batch: [id number]   Exp: 09/2021      RESPONSE TO PROCEDURE:  Valerie Sim tolerated the procedure well and there were no immediate complications.  She was allowed to recover for an appropriate period of time and was discharged home in stable condition.    FOLLOW UP:  Valerie Sim was asked to follow up by phone in 7-14 days with Estela Magaña RN, Care Coordinator, to report her response to this series of injections.  Based on the patient's previous response to this therapy, Valerie Sim was rescheduled for the next series of injections in 12 weeks.    PLAN (Medication Changes, Therapy Orders, Work or Disability Issues, etc.): Patient will follow-up regarding response to this procedure.

## 2019-01-17 RX ORDER — BUPIVACAINE HYDROCHLORIDE 2.5 MG/ML
2 INJECTION, SOLUTION EPIDURAL; INFILTRATION; INTRACAUDAL ONCE
Status: COMPLETED | OUTPATIENT
Start: 2018-12-20 | End: 2018-12-20

## 2019-01-18 NOTE — MR AVS SNAPSHOT
After Visit Summary   8/30/2017    Valerie Sim    MRN: [id number]           Patient Information     Date Of Birth          1965        Visit Information        Provider Department      8/30/2017 1:40 PM Juan Jose Jerry MD [facility]        Today's Diagnoses     Intractable chronic migraine without aura and without status migrainosus    -  1       Follow-ups after your visit        Your next 10 appointments already scheduled     Nov 01, 2017  2:20 PM CDT   (Arrive by 2:05 PM)   Return Botox with Juan Jose Jerry MD   [facility] ([facility])    [address] [place] [address] [id number] [phone]            Dec 19, 2017  2:00 PM CST   [facility] Visit with Boyd Johnston MD   [facility]    [address] [place] [id number] [phone]            Jan 03, 2018  1:40 PM CST   (Arrive by 1:25 PM)   Return Botox with Juan Jose Jerry MD   [facility] ([facility])    [address] [place] [id number] [phone]            May 08, 2018  8:00 AM CDT   Return Visit with Boyd Johnston MD   [facility] ([facility]    [address] [place] [id number] [phone]              Who to contact     Please call your clinic at [phone] to:    Ask questions about your health    Make or cancel appointments    Discuss your medicines    Learn about your test results    Speak to your doctor   If you have compliments or concerns about an experience at your clinic, or if you wish to file a complaint, please contact [facility] Patient Relations at [phone] or email us at [email or web address]         Additional Information About Your Visit    "     MyChart Information     [employer] gives you secure access to your electronic health record. If you see a primary care provider, you can also send messages to your care team and make appointments. If you have questions, please call your primary care clinic.  If you do not have a primary care provider, please call [phone] and they will assist you.      [employer] is an electronic gateway that provides easy, online access to your medical records. With [employer], you can request a clinic appointment, read your test results, renew a prescription or communicate with your care team.     To access your existing account, please contact your [facility] or call [phone] for assistance.        Care EveryWhere ID     This is your Care EveryWhere ID. This could be used by other organizations to access your [place] medical records  [id number]        Your Vitals Were     Pulse Height BMI (Body Mass Index)             95 1.702 m (5' 7\") 26.16 kg/m2          Blood Pressure from Last 3 Encounters:   08/30/17 141/75   06/28/17 161/86   06/27/17 (!) 187/105    Weight from Last 3 Encounters:   08/30/17 75.8 kg (167 lb)   06/28/17 76.2 kg (168 lb)   06/27/17 76 kg (167 lb 8 oz)              We Performed the Following     HC CHEMODENERVATE FACIAL,TRIGERM,NERV MIGRAINE     Needle EMG Guide w/Chemodenervation (25705)        Primary Care Provider Office Phone # Fax #    Meek Leary [phone]       [address] [address]        Equal Access to Services     : Hadii aad ku hadasho Soomaali, waaxda luqadaha, qaybta kaalmada adeegyaregan, kimberly santana . So [facility] [phone].    ATENCIÓN: Si habla español, tiene a daniels disposición servicios gratuitos de asistencia lingüística. Llame al [phone].    We comply with applicable federal civil rights laws and [place] laws. We do not discriminate on the basis " of race, color, national origin, age, disability sex, sexual orientation or gender identity.            Thank you!     Thank you for choosing [facility]  for your care. Our goal is always to provide you with excellent care. Hearing back from our patients is one way we can continue to improve our services. Please take a few minutes to complete the written survey that you may receive in the mail after your visit with us. Thank you!             Your Updated Medication List - Protect others around you: Learn how to safely use, store and throw away your medicines at www.disposemymeds.org.          This list is accurate as of: 8/30/17  4:18 PM.  Always use your most recent med list.                   Brand Name Dispense Instructions for use Diagnosis    amphetamine-dextroamphetamine 20 MG per tablet    ADDERALL    60 tablet    Take 1 tablet (20 mg) by mouth 2 times daily    Post concussion syndrome       ATIVAN 1 MG tablet   Generic drug:  LORazepam      Take 1 mg by mouth 2 times daily as needed.        * BOTOX IJ      Inject 175 Units as directed once Lot # /C3 with Expiration Date: 10/2019        * BOTOX IJ      Inject 175 Units into the muscle Lot # /c3  Exp: 2/2019        * BOTOX IJ      Inject 175 Units into the muscle Lot # /C3  Exp: 4/2019        * BOTOX IJ      Inject 175 Units into the muscle Lot # /C3  Exp: 6/2019        * BOTOX IJ      Inject 175 Units as directed once Lot# /C3, Exp 08/2019        * BOTOX IJ      Inject 175 Units into the muscle Lot # /C3  Exp: 9/2019        * BOTOX IJ      Inject 175 Units as directed once Lot#: /C3 Exp: 01/2020        * BOTOX IJ      Inject 175 Units into the muscle once Lot: [id number] Exp: 04/2020        budesonide 32 MCG/ACT spray    RINOCORT AQUA     Spray 1 spray into both nostrils daily.        * divalproex 500 MG EC tablet    DEPAKOTE    60 tablet    Take 1 tablet (500 mg) by mouth 2 times daily as  needed    Migraine without aura and without status migrainosus, not intractable       * divalproex 250 MG 24 hr tablet    DEPAKOTE ER    30 tablet    Take 1 tablet (250 mg) by mouth daily as needed    Migraine without aura and without status migrainosus, not intractable       eletriptan 40 MG tablet    RELPAX    12 tablet    Take one tablet by mouth at onset of migraine. May repeat once after 2hrs. Max of 2 tabs in 24hrs and 3 days per week.    Migraine without aura and without status migrainosus, not intractable       fexofenadine 180 MG tablet    ALLEGRA     Take  by mouth daily.        HYDROmorphone 2 MG tablet    DILAUDID    20 tablet    Take 0.5-1 tablets (1-2 mg) by mouth every 3 hours as needed 20 tablets = 3 month supply.    SI (sacroiliac) joint dysfunction       ibuprofen 800 MG tablet    ADVIL/MOTRIN     Take 800 mg by mouth every 8 hours as needed Reported on 5/9/2017        indomethacin 50 MG capsule    INDOCIN    90 capsule    Take 1 capsule (50 mg) by mouth 2 times daily (with meals) After one week, may increase to one po tid.    Paroxysmal hemicrania       KRILL OIL PO      Take  by mouth.        LANsoprazole 15 MG CR capsule    PREVACID    30 capsule    take 1 capsule by mouth every day 30 to 60 minutes before a meal    Paroxysmal hemicrania       Lutein 20 MG Tabs      Take 1 tablet by mouth every other day Reported on 5/9/2017        METOPROLOL SUCCINATE ER PO      Take 25 mg by mouth daily Reported on 5/9/2017        ondansetron 4 MG tablet    ZOFRAN    90 tablet    Take 1 tablet (4 mg) by mouth every 8 hours as needed    Migraine without aura       PREMARIN 0.625 MG tablet   Generic drug:  estrogens (conjugated)      Take by mouth daily .30 mg  .        RESTASIS OP      Apply to eye 2 times daily        SEROQUEL 50 MG tablet   Generic drug:  QUEtiapine      Take 1 tablet by mouth daily.        UNABLE TO FIND      Reported on 5/9/2017        VALTREX 1000 mg tablet   Generic drug:  valACYclovir       Take 1,000 mg by mouth as needed.        * Notice:  This list has 10 medication(s) that are the same as other medications prescribed for you. Read the directions carefully, and ask your doctor or other care provider to review them with you.       present

## 2019-01-22 ASSESSMENT — ENCOUNTER SYMPTOMS
MYALGIAS: 1
NECK MASS: 1
SWOLLEN GLANDS: 0
NECK PAIN: 1
ARTHRALGIAS: 1
BLOATING: 1
NUMBNESS: 1
EYE REDNESS: 1
DECREASED CONCENTRATION: 1
TREMORS: 1
SORE THROAT: 0
POOR WOUND HEALING: 0
DOUBLE VISION: 0
STIFFNESS: 1
JOINT SWELLING: 1
MUSCLE CRAMPS: 1
DEPRESSION: 1
WEAKNESS: 1
DIZZINESS: 1
DIARRHEA: 0
RECTAL PAIN: 0
PANIC: 0
ABDOMINAL PAIN: 1
SEIZURES: 0
SPEECH CHANGE: 0
LOSS OF CONSCIOUSNESS: 0
BOWEL INCONTINENCE: 0
INSOMNIA: 1
TINGLING: 1
BACK PAIN: 1
MUSCLE WEAKNESS: 1
HEADACHES: 1
SKIN CHANGES: 1
SINUS CONGESTION: 0
NAUSEA: 1
DISTURBANCES IN COORDINATION: 0
CONSTIPATION: 0
SMELL DISTURBANCE: 1
NAIL CHANGES: 1
SINUS PAIN: 1
BRUISES/BLEEDS EASILY: 1
VOMITING: 1
MEMORY LOSS: 0
HOARSE VOICE: 1
HEARTBURN: 1
BLOOD IN STOOL: 0
EYE WATERING: 0
PARALYSIS: 0
EYE PAIN: 1
NERVOUS/ANXIOUS: 1
JAUNDICE: 0
TROUBLE SWALLOWING: 0
TASTE DISTURBANCE: 1
EYE IRRITATION: 1

## 2019-01-23 RX ORDER — TRIAMCINOLONE ACETONIDE 40 MG/ML
40 INJECTION, SUSPENSION INTRA-ARTICULAR; INTRAMUSCULAR ONCE
Status: COMPLETED | OUTPATIENT
Start: 2019-01-23 | End: 2019-01-23

## 2019-01-23 RX ADMIN — TRIAMCINOLONE ACETONIDE 40 MG: 40 INJECTION, SUSPENSION INTRA-ARTICULAR; INTRAMUSCULAR at 10:07

## 2019-02-05 ENCOUNTER — OFFICE VISIT (OUTPATIENT)
Dept: NEUROLOGY | Facility: CLINIC | Age: 54
End: 2019-02-05
Payer: COMMERCIAL

## 2019-02-05 VITALS — HEART RATE: 98 BPM | OXYGEN SATURATION: 99 % | SYSTOLIC BLOOD PRESSURE: 169 MMHG | DIASTOLIC BLOOD PRESSURE: 91 MMHG

## 2019-02-05 DIAGNOSIS — S06.9X1S TRAUMATIC BRAIN INJURY, WITH LOSS OF CONSCIOUSNESS OF 30 MINUTES OR LESS, SEQUELA (H): Primary | ICD-10-CM

## 2019-02-05 DIAGNOSIS — G43.719 INTRACTABLE CHRONIC MIGRAINE WITHOUT AURA AND WITHOUT STATUS MIGRAINOSUS: ICD-10-CM

## 2019-02-05 DIAGNOSIS — G44.019 EPISODIC CLUSTER HEADACHE, NOT INTRACTABLE: ICD-10-CM

## 2019-02-05 RX ORDER — VERAPAMIL HYDROCHLORIDE 80 MG/1
80 TABLET ORAL 3 TIMES DAILY
Qty: 270 TABLET | Refills: 3 | Status: SHIPPED | OUTPATIENT
Start: 2019-02-05 | End: 2019-08-09

## 2019-02-05 ASSESSMENT — PAIN SCALES - GENERAL: PAINLEVEL: MODERATE PAIN (5)

## 2019-02-05 NOTE — LETTER
2/5/2019       RE: Valerie Sim  [address] [facility] [address]     Dear Colleague,    Thank you for referring your patient, Valerie Sim, to the [facility] NEUROLOGY at [facility]. Please see a copy of my visit note below.    [facility]    Neurology Progress Note    Subjective:    Ms. Sim returns to clinic for follow-up of her headaches.  Since her last visit, she has continued to have background daily headaches, with severe worsening daily around 4 or 5 PM for the last 4 or 5 days.  The severe headaches tend to be shorter than her typical migraines and associated with autonomic features.  She has been treating them with subcutaneous sumatriptan, which has been very helpful.  She has not been able to try oxygen, which was prescribed at her last visit.  She discussed the possibility of starting verapamil with her primary care physician, and it was determined that this would be safe, as long as she stops Norvasc to help reduce side effects.    Otherwise, she was able to complete the taper of prednisone, starting at 80 mg daily and titrating down every 5 days.  She did find relief of her headaches during the taper, however they returned following resolution of the taper.    Also since her last visit, she had occipital nerve blocks completed on January 16, and was happy with their administration.    Otherwise, she is planning to have a biopsy with rheumatology for consideration of Sjogren syndrome.  She also reports noting vasodilation of her veins, particularly in the face and side of her head during severe headaches, which can occasionally rupture and cause bruising.    Objective:    Vitals: BP (!) 169/91 (BP Location: Left arm, Patient Position: Chair, Cuff Size: Adult Regular)   Pulse 98   SpO2 99%   General: Cooperative, NAD  Head: Atraumatic  Neck: Normal range of motion  Cardiac: no lower extremity  edema  Neurologic:  Mental Status: Fully alert, attentive and oriented. Speech clear and fluent.   Cranial Nerves: Facial movements symmetric.   Motor: No abnormal movements.  Moving all extremities.    Station/Gait: Normal casual gait.     Assessment/Plan:   Valerie Sim is a 53-year-old woman with a history of headaches since age 31, previous head trauma, chronic migraine with visual aura, and episodic cluster headache.  She is continued to have a background daily headache and for the last 4 or 5 days has had severe cluster headaches as well.  She has found sumatriptan and subcutaneous injection to be helpful for treatment of both her migraines and her cluster headaches.  As for the vasodilation, I suspect that this is secondary to her headache disorder, as vasodilation is a known sequelae.    -For acute treatment of cluster headache or migraine, she will continue sumatriptan 6 mg subcutaneous injection taken at the onset of headache, this can be repeated in 1 hour if needed.  She currently needs a second dose 30% of the time.  -For acute treatment of cluster headache, I also recommend high flow oxygen, 10-15 L/min, via a nonrebreather mask.  A prescription was provided today.  -If additional treatment is needed, gamma core, a noninvasive vagal nerve stimulator, could be tried.    -For preventive treatment of cluster headache, I recommend verapamil starting at 80 mg daily and titrating up by 80 mg as needed and tolerated each week to a goal dose of 80 mg 2-3 times per day.  An EKG may need to be repeated in the future if higher doses of verapamil are needed.    -For her traumatic brain injury, I have ordered neuropsychological testing, for comparison to her testing 7 years ago.    She also continue occipital nerve blocks as needed in the future, completed with or without steroid.    I will plan to see her back in 6 months or sooner if needed.    Jamila Gonzales MD  Neurology   Pager [phone]

## 2019-02-05 NOTE — PROGRESS NOTES
[facility]    Neurology Progress Note    Subjective:    Ms. Sim returns to clinic for follow-up of her headaches.  Since her last visit, she has continued to have background daily headaches, with severe worsening daily around 4 or 5 PM for the last 4 or 5 days.  The severe headaches tend to be shorter than her typical migraines and associated with autonomic features.  She has been treating them with subcutaneous sumatriptan, which has been very helpful.  She has not been able to try oxygen, which was prescribed at her last visit.  She discussed the possibility of starting verapamil with her primary care physician, and it was determined that this would be safe, as long as she stops Norvasc to help reduce side effects.    Otherwise, she was able to complete the taper of prednisone, starting at 80 mg daily and titrating down every 5 days.  She did find relief of her headaches during the taper, however they returned following resolution of the taper.    Also since her last visit, she had occipital nerve blocks completed on January 16, and was happy with their administration.    Otherwise, she is planning to have a biopsy with rheumatology for consideration of Sjogren syndrome.  She also reports noting vasodilation of her veins, particularly in the face and side of her head during severe headaches, which can occasionally rupture and cause bruising.    Objective:    Vitals: BP (!) 169/91 (BP Location: Left arm, Patient Position: Chair, Cuff Size: Adult Regular)   Pulse 98   SpO2 99%   General: Cooperative, NAD  Head: Atraumatic  Neck: Normal range of motion  Cardiac: no lower extremity edema  Neurologic:  Mental Status: Fully alert, attentive and oriented. Speech clear and fluent.   Cranial Nerves: Facial movements symmetric.   Motor: No abnormal movements.  Moving all extremities.    Station/Gait: Normal casual gait.     Assessment/Plan:   Valerie Sim is a 53-year-old woman with a  history of headaches since age 31, previous head trauma, chronic migraine with visual aura, and episodic cluster headache.  She is continued to have a background daily headache and for the last 4 or 5 days has had severe cluster headaches as well.  She has found sumatriptan and subcutaneous injection to be helpful for treatment of both her migraines and her cluster headaches.  As for the vasodilation, I suspect that this is secondary to her headache disorder, as vasodilation is a known sequelae.    -For acute treatment of cluster headache or migraine, she will continue sumatriptan 6 mg subcutaneous injection taken at the onset of headache, this can be repeated in 1 hour if needed.  She currently needs a second dose 30% of the time.  -For acute treatment of cluster headache, I also recommend high flow oxygen, 10-15 L/min, via a nonrebreather mask.  A prescription was provided today.  -If additional treatment is needed, gamma core, a noninvasive vagal nerve stimulator, could be tried.    -For preventive treatment of cluster headache, I recommend verapamil starting at 80 mg daily and titrating up by 80 mg as needed and tolerated each week to a goal dose of 80 mg 2-3 times per day.  An EKG may need to be repeated in the future if higher doses of verapamil are needed.    -For her traumatic brain injury, I have ordered neuropsychological testing, for comparison to her testing 7 years ago.    She also continue occipital nerve blocks as needed in the future, completed with or without steroid.    I will plan to see her back in 6 months or sooner if needed.    Jamila Gonzales MD  Neurology   Pager [phone]

## 2019-02-05 NOTE — NURSING NOTE
Chief Complaint   Patient presents with     RECHECK     [facility] RETURN 3 MO F/U       Sarah Almanza, EMT

## 2019-02-19 ENCOUNTER — OFFICE VISIT (OUTPATIENT)
Dept: PHYSICAL MEDICINE AND REHAB | Facility: CLINIC | Age: 54
End: 2019-02-19
Payer: COMMERCIAL

## 2019-02-19 VITALS
WEIGHT: 173 LBS | TEMPERATURE: 98.5 F | BODY MASS INDEX: 27.15 KG/M2 | RESPIRATION RATE: 16 BRPM | SYSTOLIC BLOOD PRESSURE: 157 MMHG | OXYGEN SATURATION: 98 % | DIASTOLIC BLOOD PRESSURE: 89 MMHG | HEART RATE: 93 BPM | HEIGHT: 67 IN

## 2019-02-19 DIAGNOSIS — G43.719 INTRACTABLE CHRONIC MIGRAINE WITHOUT AURA AND WITHOUT STATUS MIGRAINOSUS: Primary | ICD-10-CM

## 2019-02-19 RX ORDER — BUPIVACAINE HYDROCHLORIDE 5 MG/ML
4.5 INJECTION, SOLUTION EPIDURAL; INTRACAUDAL ONCE
Status: COMPLETED | OUTPATIENT
Start: 2019-02-19 | End: 2019-02-19

## 2019-02-19 RX ADMIN — BUPIVACAINE HYDROCHLORIDE 22.5 MG: 5 INJECTION, SOLUTION EPIDURAL; INTRACAUDAL at 16:04

## 2019-02-19 ASSESSMENT — PAIN SCALES - GENERAL: PAINLEVEL: SEVERE PAIN (7)

## 2019-02-19 ASSESSMENT — MIFFLIN-ST. JEOR: SCORE: 1427.35

## 2019-02-19 NOTE — LETTER
"2/19/2019       RE: Valerie Sim  [address] [facility] [id number]     Dear Colleague,    Thank you for referring your patient, Valerie Sim, to the [facility] PHYSICAL MEDICINE AND REHABILITATION at [facility]. Please see a copy of my visit note below.    BOTULINUM TOXIN PROCEDURE - HEADACHE - NOTE    Chief Complaint   Patient presents with     Headache     [facility] RETURN BOTOX CHRONIC MIGRAINE     Botox     /89   Pulse 93   Temp 98.5  F (36.9  C) (Oral)   Resp 16   Ht 1.702 m (5' 7\")   Wt 78.5 kg (173 lb)   SpO2 98%   BMI 27.10 kg/m        Current Outpatient Medications:      aMILoride (MIDAMOR) 5 MG tablet, , Disp: , Rfl:      amphetamine-dextroamphetamine (ADDERALL) 20 MG per tablet, Take 1 tablet (20 mg) by mouth 2 times daily, Disp: 60 tablet, Rfl: 0     amphetamine-dextroamphetamine (ADDERALL) 20 MG tablet, Take 1 tablet (20 mg) by mouth 2 times daily, Disp: 60 tablet, Rfl: 0     amphetamine-dextroamphetamine (ADDERALL) 20 MG tablet, Take 1 tablet (20 mg) by mouth 2 times daily, Disp: 60 tablet, Rfl: 0     budesonide (RINOCORT AQUA) 32 MCG/ACT nasal spray, Spray 1 spray into both nostrils daily., Disp: , Rfl:      celecoxib (CELEBREX) 200 MG capsule, , Disp: , Rfl:      cyclobenzaprine (FLEXERIL) 10 MG tablet, , Disp: , Rfl:      CycloSPORINE (RESTASIS OP), Apply to eye 2 times daily, Disp: , Rfl:      Divalproex Sodium (DEPAKOTE PO), Take 250 mg by mouth every 48 hours as needed , Disp: , Rfl:      eletriptan (RELPAX) 40 MG tablet, TAKE 1 TABLET AT ONSET OF MIGRAINE. MAY REPEAT ONCE AFTER 2 HRS. MAX OF 2 TABS/24HRS AND 3 DAYS/WEEK., Disp: 12 tablet, Rfl: 3     erenumab-aooe (AIMOVIG) 70 MG/ML injection, Inject 1 mL (70 mg) Subcutaneous every 30 days, Disp: 1 pen, Rfl: 11     estrogens, conjugated, (PREMARIN) 0.625 MG tablet, Take by mouth daily .30 mg . , Disp: , Rfl:      fexofenadine (ALLEGRA) 180 MG tablet, Take  by mouth daily., " Disp: , Rfl:      HYDROmorphone (DILAUDID) 2 MG tablet, Take 0.5-1 tablets (1-2 mg) by mouth every 3 hours as needed (headache) 20 tablets = 3 month supply., Disp: 24 tablet, Rfl: 0     indomethacin (INDOCIN) 50 MG capsule, TAKE 1 CAPSULE BY MOUTH TWICE DAILY WITH MEALS. AFTER ONE WEEK, MAY INCREASE TO 1 CAPSULE THREE TIMES DAILY., Disp: 90 capsule, Rfl: 0     lamoTRIgine (LAMICTAL) 200 MG tablet, Take 1 tablet by mouth daily, Disp: , Rfl:      LORazepam (ATIVAN) 1 MG tablet, Take 1 mg by mouth 2 times daily as needed., Disp: , Rfl:      OnabotulinumtoxinA (BOTOX IJ), Inject 225 Units as directed once Lot # /C3 with Expiration Date: 04/2021, Disp: , Rfl:      OnabotulinumtoxinA (BOTOX IJ), Inject 225 Units as directed once Lot# /C3 Exp 03/2021, Disp: , Rfl:      OnabotulinumtoxinA (BOTOX IJ), Inject 225 Units into the muscle once Lot # /C3 with Expiration Date: 11/2020, Disp: , Rfl:      OnabotulinumtoxinA (BOTOX IJ), Inject 225 Units as directed once Lot /C3 Exp 10/2020, Disp: , Rfl:      OnabotulinumtoxinA (BOTOX IJ), Inject 200 Units into the muscle once Lot # /C3 with Expiration Date: 08/2020, Disp: , Rfl:      ondansetron (ZOFRAN) 4 MG tablet, Take 1 tablet (4 mg) by mouth every 8 hours as needed, Disp: 90 tablet, Rfl: 1     order for DME, Equipment being ordered: Oxygen and non-rebreather mask.   Use high flow oxygen (10-15 L/min) with non-rebreather mask, as needed for cluster headaches, Disp: 1 Units, Rfl: 11     QUEtiapine (SEROQUEL) 50 MG tablet, Take 1 tablet by mouth daily., Disp: , Rfl:      SUMAtriptan (IMITREX STATDOSE) 6 MG/0.5ML pen injector kit, Inject 0.5 mLs (6 mg) Subcutaneous at onset of headache for migraine May repeat in 1 hour. Max 12 mg/24 hours., Disp: 1 kit, Rfl: 0     SUMAtriptan (IMITREX) 6 MG/0.5ML injection, Inject 0.5 mLs (6 mg) Subcutaneous every 12 hours as needed for other (cluster) May repeat dose in 1 hour. Max 12 mg/24 hours., Disp: 18 vial, Rfl: 3      valACYclovir (VALTREX) 1000 mg tablet, Take 1,000 mg by mouth as needed., Disp: , Rfl:      verapamil (CALAN) 80 MG tablet, Take 1 tablet (80 mg) by mouth 3 times daily Take 80 mg daily for one week. Increase by 80 mg weekly to two to three times daily., Disp: 270 tablet, Rfl: 3     amLODIPine (NORVASC) 10 MG tablet, , Disp: , Rfl:      Lutein 20 MG TABS, Take 1 tablet by mouth every other day Reported on 5/9/2017, Disp: , Rfl:      OnabotulinumtoxinA (BOTOX IJ), Inject 175 Units as directed once Lot # /C3 with Expiration Date:  4/2016, Disp: , Rfl:      predniSONE (DELTASONE) 10 MG tablet, , Disp: , Rfl:      predniSONE (DELTASONE) 20 MG tablet, Take 4 tabs (80 mg) by mouth daily x 5 days, 3 tabs (60 mg) daily x 5 days, 2 tab (40 mg) daily x 5 days, then 1 tab (20 mg) x 5 days. (Patient not taking: Reported on 2/19/2019.), Disp: 50 tablet, Rfl: 0     Allergies   Allergen Reactions     Fluoxetine Other (See Comments)     PN: LW Reaction: headache  PN: LW Reaction: headache  Migraine       Trazodone Other (See Comments) and Unknown     Other reaction(s): Headache  Other reaction(s): Headache  Other reaction(s): Headache     Amitriptyline Hcl Other (See Comments)     Migraine       Candesartan Cilexetil-Hctz Muscle Pain (Myalgia)     Cymbalta Other (See Comments)     Migraine       Cyproheptadine Hcl      headaches     Desvenlafaxine      Migraine       Diatrizoate Unknown     PN: LW CM1: CONTRAST- iodine Reaction :     Diazepam      Other reaction(s): Vestibular Toxicity  PN: LW Reaction: vertigo  PN: LW Reaction: vertigo     Imipramine Other (See Comments)     Migraine       Lyrica Muscle Pain (Myalgia)     Metoprolol Other (See Comments) and Unknown     headache  headache  headache     Morphine Other (See Comments)     Patient reports seizure      No Clinical Screening - See Comments      PN: LW FI1: lactose intolerance LW FI2: shellfish  PN: LW CM1: CONTRAST- iodine Reaction :  PN: LW Other1: -nka      "Nortriptyline Other (See Comments)     Migraine       Phenergan Dm [Promethazine-Dm] Other (See Comments)     seizures     Promethazine      PN: LW Reaction: minimal sizures     Venlafaxine Other (See Comments)     PN: LW Reaction: migraine  PN: LW Reaction: migraine  Migraine       Wellbutrin [Bupropion Hydrobromide] Other (See Comments)     Migraine       Compazine Anxiety     Dihydroergotamine Anxiety and Other (See Comments)     Cardiac symptoms     Droperidol Anxiety     PN: LW Reaction: agitation     Medroxyprogesterone Hives     PN: LW Reaction: nausea     Prochlorperazine Anxiety     PN: LW Reaction: \"can't sit still\"        PHYSICAL EXAM:    Pleasant and cooperative  No facial asymmetry  She has a headache today, which is rated a 7/10.     HPI:    Patient denies new medical diagnoses, illnesses, hospitalizations, emergency room visits, and injuries since the previous injection with botulinum neurotoxin.      We reviewed the recommended safety guidelines for  Botox from any vaccine injection, such as the seasonal flu vaccine, by a minimum of 10-14 days with Valerie Sim. She acknowledged understanding.      RESPONSE TO PREVIOUS TREATMENT:  Change in headache pattern following last series of injections with 225 units of Botox on 12/20/2018.    Side effects: None reported.    1.  Headache frequency during this injection cycle:  She has daily mild headaches and 12 migraine headache days per month. This is compared to her baseline migraine frequency of daily.     2.  Headache duration during this injection cycle: Her mild baseline headache varies in length throughout the day but is always present, her migraines last 12-36 hours, and this pattern remains consistent. Her migraines have been responsive to injectable Imitrex.    3.  Headache intensity during this injection cycle:    A.  6-7/10  =  Typical pain level.    B.  9/10  =  Worst pain level.    C.  4/10  =  Lowest pain level - has had more lower " level headache days this cycle.     4.  Change in headache medication usage during this injection cycle: She has been using injectable Imitrex, which works well for her occipital migraines. Additionally, she has switched from Norvasc to verapamil to help manage her hypertension and cluster headaches, which has been working well. She also has IM toradol on hand, which has only been used twice in the last 9 weeks, but this keeps her out of the ED when she has severe migraines.     5.  ER Visits During This Injection Cycle:  None. Prior to receiving Botox, she had been going to the ED for migraines 13 times per year.     6.  Functional Performance:  Change in ADL's, social interaction, days lost from work, etc. She reports missing 5 social obligations and days of 3 work during this injection cycle due to headaches.  She is on disability for her headaches, and works 12 hours per week.     BOTULINUM NEUROTOXIN INJECTION PROCEDURES:      VERIFICATION OF PATIENT IDENTIFICATION AND PROCEDURE     Initials   Patient Name ses   Patient  ses   Procedure Verified by: ses     Prior to the start of the procedure and with procedural staff participation, I verbally confirmed the patient s identity using two indicators, relevant allergies, that the procedure was appropriate and matched the consent or emergent situation, and that the correct equipment/implants were available. Immediately prior to starting the procedure I conducted the Time Out with the procedural staff and re-confirmed the patient s name, procedure, and site/side. (The Joint Commission universal protocol was followed.)  Yes    Sedation (Moderate or Deep): None    Above assessments performed by:    Addie Malhotra MD      INDICATIONS FOR PROCEDURES:  Valerie Sim is a 53-year-old patient with chronic migraine headaches associated with cervicogenic components.      Her baseline symptoms have been recalcitrant to oral medications and conservative therapy. She is  here today for an injection of Botox.         GOAL OF PROCEDURE:  The goal of this procedure is to decrease pain and enhance functional independence.     TOTAL DOSE ADMINISTERED:  Dose Administered: 225 units Botox (Botulinum Toxin Type A)  2:1 and 1:1 Dilution   Diluent Used: 0.5% Bupivacaine (Batch: [id number], Exp: 09/2021, NDC: 55150-169-10) and Preservative free normal saline  Total Volume of Diluent Used: See below  Lot # /C3 with Expiration Date: 06/2021  NDC #: Botox 100u ([address]-71)     Was there drug waste? Yes  Amount of drug waste (mL): 75 units Botox.  Reason for waste:  Single use vial  Multi-dose vial: No    Addie Malhotra MD  February 19, 2019      Medication guide was offered to patient and was declined.     CONSENT:  The risks, benefits, and treatment options were discussed with Valerie YEE Roney and she agreed to proceed.     Written consent was obtained by [facility].      EQUIPMENT USED:  Needle-37mm stimulating/recording  Needle-30 gauge  EMG/NCS Machine      SKIN PREPARATION:  Skin preparation was performed using an alcohol wipe.        AREA/MUSCLE INJECTED: 225 UNITS BOTOX = TOTAL DOSE  1. FACE & SCALP: 115 units of Botox = Total dose, 2:1 Dilution  Right temporalis - 12.5 units of Botox in 4 site/s.  Left temporalis - 12.5 units of Botox in 4 site/s.      Right frontalis - 10 units of Botox in 4 site/s.  Left frontalis - 10 units of Botox in 4 site/s.      Right procerus - 3.5 units of Botox in 1 site/s.  Left procerus - 3.5 units of Botox in 1 site/s.      Right  - 4 units of Botox in 1 site/s.  Left  - 4 units of Botox in 1 site/s.      Right Occipitalis - 25 units of Botox at 4 site/s.   Left Occipitalis - 25 units of Botox at 4 site/s.      Right Nasalis - 2.5 units of Botox at 1 site/s.   Left Nasalis - 2.5 units of Botox at 1 site/s.       2. JAW MUSCLES: 60 units of Botox = Total Dose, 1:1 Dilution  Right Masseter - 25 units of Botox at 1 site/s.   Left  Masseter - 25 units of Botox at 1 site/s.      Right temporalis - 5 units of Botox at 1 site/s.  Left temporalis - 5 units of Botox at 1 site/s.       3. SHOULDER & NECK MUSCLES: Total dose 50 units of Botox, 2:1 Dilution      Right levator muscle - 10 units of Botox at 2 site/s (neck and shoulder).  Left levator muscle - 10 units of Botox at 2 site/s (neck and shoulder).      Right mid trapezius - 5 units of Botox at 1 site.  Left mid trapezius - 5 units of Botox at 1 site.    Right lateral trapezius - 10 units of Botox at 2 site/s  Left lateral trapezuis - 10 units of Botox at 2 site/s.          RESPONSE TO PROCEDURE: Valerie Sim tolerated the procedure well and there were no immediate complications. She was allowed to recover for an appropriate period of time and was discharged home in stable condition.     FOLLOW UP:  Valerie Sim was asked to follow up by phone in 7-14 days with Nely Jean Baptiste PT, Care Coordinator or Estela Magaña RN, Care Coordinator, to report her response to this series of injections. Based on the patient's previous response to this therapy, Valerie Sim was rescheduled for the next series of injections in 9 weeks. Due to a limited duration of Botox effectiveness (approximately 8-9 weeks), we will continue to schedule her Botox every 9 weeks.      PLAN (Medication Changes, Therapy Orders, Work or Disability Issues, etc.): Patient will monitor her response to today's injections and report.    Again, thank you for allowing me to participate in the care of your patient.      Sincerely,    Addie Malhotra MD

## 2019-02-19 NOTE — PROGRESS NOTES
"BOTULINUM TOXIN PROCEDURE - HEADACHE - NOTE    Chief Complaint   Patient presents with     Headache     [facility] RETURN BOTOX CHRONIC MIGRAINE     Botox     /89   Pulse 93   Temp 98.5  F (36.9  C) (Oral)   Resp 16   Ht 1.702 m (5' 7\")   Wt 78.5 kg (173 lb)   SpO2 98%   BMI 27.10 kg/m       Current Outpatient Medications:      aMILoride (MIDAMOR) 5 MG tablet, , Disp: , Rfl:      amphetamine-dextroamphetamine (ADDERALL) 20 MG per tablet, Take 1 tablet (20 mg) by mouth 2 times daily, Disp: 60 tablet, Rfl: 0     amphetamine-dextroamphetamine (ADDERALL) 20 MG tablet, Take 1 tablet (20 mg) by mouth 2 times daily, Disp: 60 tablet, Rfl: 0     amphetamine-dextroamphetamine (ADDERALL) 20 MG tablet, Take 1 tablet (20 mg) by mouth 2 times daily, Disp: 60 tablet, Rfl: 0     budesonide (RINOCORT AQUA) 32 MCG/ACT nasal spray, Spray 1 spray into both nostrils daily., Disp: , Rfl:      celecoxib (CELEBREX) 200 MG capsule, , Disp: , Rfl:      cyclobenzaprine (FLEXERIL) 10 MG tablet, , Disp: , Rfl:      CycloSPORINE (RESTASIS OP), Apply to eye 2 times daily, Disp: , Rfl:      Divalproex Sodium (DEPAKOTE PO), Take 250 mg by mouth every 48 hours as needed , Disp: , Rfl:      eletriptan (RELPAX) 40 MG tablet, TAKE 1 TABLET AT ONSET OF MIGRAINE. MAY REPEAT ONCE AFTER 2 HRS. MAX OF 2 TABS/24HRS AND 3 DAYS/WEEK., Disp: 12 tablet, Rfl: 3     erenumab-aooe (AIMOVIG) 70 MG/ML injection, Inject 1 mL (70 mg) Subcutaneous every 30 days, Disp: 1 pen, Rfl: 11     estrogens, conjugated, (PREMARIN) 0.625 MG tablet, Take by mouth daily .30 mg . , Disp: , Rfl:      fexofenadine (ALLEGRA) 180 MG tablet, Take  by mouth daily., Disp: , Rfl:      HYDROmorphone (DILAUDID) 2 MG tablet, Take 0.5-1 tablets (1-2 mg) by mouth every 3 hours as needed (headache) 20 tablets = 3 month supply., Disp: 24 tablet, Rfl: 0     indomethacin (INDOCIN) 50 MG capsule, TAKE 1 CAPSULE BY MOUTH TWICE DAILY WITH MEALS. AFTER ONE WEEK, MAY INCREASE TO 1 CAPSULE THREE TIMES " DAILY., Disp: 90 capsule, Rfl: 0     lamoTRIgine (LAMICTAL) 200 MG tablet, Take 1 tablet by mouth daily, Disp: , Rfl:      LORazepam (ATIVAN) 1 MG tablet, Take 1 mg by mouth 2 times daily as needed., Disp: , Rfl:      OnabotulinumtoxinA (BOTOX IJ), Inject 225 Units as directed once Lot # /C3 with Expiration Date: 04/2021, Disp: , Rfl:      OnabotulinumtoxinA (BOTOX IJ), Inject 225 Units as directed once Lot# /C3 Exp 03/2021, Disp: , Rfl:      OnabotulinumtoxinA (BOTOX IJ), Inject 225 Units into the muscle once Lot # /C3 with Expiration Date: 11/2020, Disp: , Rfl:      OnabotulinumtoxinA (BOTOX IJ), Inject 225 Units as directed once Lot /C3 Exp 10/2020, Disp: , Rfl:      OnabotulinumtoxinA (BOTOX IJ), Inject 200 Units into the muscle once Lot # /C3 with Expiration Date: 08/2020, Disp: , Rfl:      ondansetron (ZOFRAN) 4 MG tablet, Take 1 tablet (4 mg) by mouth every 8 hours as needed, Disp: 90 tablet, Rfl: 1     order for DME, Equipment being ordered: Oxygen and non-rebreather mask.   Use high flow oxygen (10-15 L/min) with non-rebreather mask, as needed for cluster headaches, Disp: 1 Units, Rfl: 11     QUEtiapine (SEROQUEL) 50 MG tablet, Take 1 tablet by mouth daily., Disp: , Rfl:      SUMAtriptan (IMITREX STATDOSE) 6 MG/0.5ML pen injector kit, Inject 0.5 mLs (6 mg) Subcutaneous at onset of headache for migraine May repeat in 1 hour. Max 12 mg/24 hours., Disp: 1 kit, Rfl: 0     SUMAtriptan (IMITREX) 6 MG/0.5ML injection, Inject 0.5 mLs (6 mg) Subcutaneous every 12 hours as needed for other (cluster) May repeat dose in 1 hour. Max 12 mg/24 hours., Disp: 18 vial, Rfl: 3     valACYclovir (VALTREX) 1000 mg tablet, Take 1,000 mg by mouth as needed., Disp: , Rfl:      verapamil (CALAN) 80 MG tablet, Take 1 tablet (80 mg) by mouth 3 times daily Take 80 mg daily for one week. Increase by 80 mg weekly to two to three times daily., Disp: 270 tablet, Rfl: 3     amLODIPine (NORVASC) 10 MG tablet, , Disp:  , Rfl:      Lutein 20 MG TABS, Take 1 tablet by mouth every other day Reported on 5/9/2017, Disp: , Rfl:      OnabotulinumtoxinA (BOTOX IJ), Inject 175 Units as directed once Lot # /C3 with Expiration Date:  4/2016, Disp: , Rfl:      predniSONE (DELTASONE) 10 MG tablet, , Disp: , Rfl:      predniSONE (DELTASONE) 20 MG tablet, Take 4 tabs (80 mg) by mouth daily x 5 days, 3 tabs (60 mg) daily x 5 days, 2 tab (40 mg) daily x 5 days, then 1 tab (20 mg) x 5 days. (Patient not taking: Reported on 2/19/2019.), Disp: 50 tablet, Rfl: 0     Allergies   Allergen Reactions     Fluoxetine Other (See Comments)     PN: LW Reaction: headache  PN: LW Reaction: headache  Migraine       Trazodone Other (See Comments) and Unknown     Other reaction(s): Headache  Other reaction(s): Headache  Other reaction(s): Headache     Amitriptyline Hcl Other (See Comments)     Migraine       Candesartan Cilexetil-Hctz Muscle Pain (Myalgia)     Cymbalta Other (See Comments)     Migraine       Cyproheptadine Hcl      headaches     Desvenlafaxine      Migraine       Diatrizoate Unknown     PN: LW CM1: CONTRAST- iodine Reaction :     Diazepam      Other reaction(s): Vestibular Toxicity  PN: LW Reaction: vertigo  PN: LW Reaction: vertigo     Imipramine Other (See Comments)     Migraine       Lyrica Muscle Pain (Myalgia)     Metoprolol Other (See Comments) and Unknown     headache  headache  headache     Morphine Other (See Comments)     Patient reports seizure      No Clinical Screening - See Comments      PN: LW FI1: lactose intolerance LW FI2: shellfish  PN: LW CM1: CONTRAST- iodine Reaction :  PN: LW Other1: -nka     Nortriptyline Other (See Comments)     Migraine       Phenergan Dm [Promethazine-Dm] Other (See Comments)     seizures     Promethazine      PN: LW Reaction: minimal sizures     Venlafaxine Other (See Comments)     PN: LW Reaction: migraine  PN: LW Reaction: migraine  Migraine       Wellbutrin [Bupropion Hydrobromide] Other (See  "Comments)     Migraine       Compazine Anxiety     Dihydroergotamine Anxiety and Other (See Comments)     Cardiac symptoms     Droperidol Anxiety     PN: LW Reaction: agitation     Medroxyprogesterone Hives     PN: LW Reaction: nausea     Prochlorperazine Anxiety     PN: LW Reaction: \"can't sit still\"        PHYSICAL EXAM:    Pleasant and cooperative  No facial asymmetry  She has a headache today, which is rated a 7/10.     HPI:    Patient denies new medical diagnoses, illnesses, hospitalizations, emergency room visits, and injuries since the previous injection with botulinum neurotoxin.      We reviewed the recommended safety guidelines for  Botox from any vaccine injection, such as the seasonal flu vaccine, by a minimum of 10-14 days with Valerie Sim. She acknowledged understanding.      RESPONSE TO PREVIOUS TREATMENT:  Change in headache pattern following last series of injections with 225 units of Botox on 12/20/2018.    Side effects: None reported.    1.  Headache frequency during this injection cycle:  She has daily mild headaches and 12 migraine headache days per month. This is compared to her baseline migraine frequency of daily.     2.  Headache duration during this injection cycle: Her mild baseline headache varies in length throughout the day but is always present, her migraines last 12-36 hours, and this pattern remains consistent. Her migraines have been responsive to injectable Imitrex.    3.  Headache intensity during this injection cycle:    A.  6-7/10  =  Typical pain level.    B.  9/10  =  Worst pain level.    C.  4/10  =  Lowest pain level - has had more lower level headache days this cycle.     4.  Change in headache medication usage during this injection cycle: She has been using injectable Imitrex, which works well for her occipital migraines. Additionally, she has switched from Norvasc to verapamil to help manage her hypertension and cluster headaches, which has been working well. " She also has IM toradol on hand, which has only been used twice in the last 9 weeks, but this keeps her out of the ED when she has severe migraines.     5.  ER Visits During This Injection Cycle:  None. Prior to receiving Botox, she had been going to the ED for migraines 13 times per year.     6.  Functional Performance:  Change in ADL's, social interaction, days lost from work, etc. She reports missing 5 social obligations and days of 3 work during this injection cycle due to headaches.  She is on disability for her headaches, and works 12 hours per week.     BOTULINUM NEUROTOXIN INJECTION PROCEDURES:      VERIFICATION OF PATIENT IDENTIFICATION AND PROCEDURE     Initials   Patient Name ses   Patient  ses   Procedure Verified by: ses     Prior to the start of the procedure and with procedural staff participation, I verbally confirmed the patient s identity using two indicators, relevant allergies, that the procedure was appropriate and matched the consent or emergent situation, and that the correct equipment/implants were available. Immediately prior to starting the procedure I conducted the Time Out with the procedural staff and re-confirmed the patient s name, procedure, and site/side. (The Joint Commission universal protocol was followed.)  Yes    Sedation (Moderate or Deep): None    Above assessments performed by:    Addie Malhotra MD      INDICATIONS FOR PROCEDURES:  Valerie Sim is a 53-year-old patient with chronic migraine headaches associated with cervicogenic components.      Her baseline symptoms have been recalcitrant to oral medications and conservative therapy. She is here today for an injection of Botox.         GOAL OF PROCEDURE:  The goal of this procedure is to decrease pain and enhance functional independence.     TOTAL DOSE ADMINISTERED:  Dose Administered: 225 units Botox (Botulinum Toxin Type A)  2:1 and 1:1 Dilution   Diluent Used: 0.5% Bupivacaine (Batch: [id number], Exp: 2021,  NDC: 55150-169-10) and Preservative free normal saline  Total Volume of Diluent Used: See below  Lot # /C3 with Expiration Date: 06/2021  NDC #: Botox 100u ([address]-30)     Was there drug waste? Yes  Amount of drug waste (mL): 75 units Botox.  Reason for waste:  Single use vial  Multi-dose vial: No    Addie Malhotra MD  February 19, 2019      Medication guide was offered to patient and was declined.     CONSENT:  The risks, benefits, and treatment options were discussed with Valerie Sim and she agreed to proceed.     Written consent was obtained by [facility].      EQUIPMENT USED:  Needle-37mm stimulating/recording  Needle-30 gauge  EMG/NCS Machine      SKIN PREPARATION:  Skin preparation was performed using an alcohol wipe.        AREA/MUSCLE INJECTED: 225 UNITS BOTOX = TOTAL DOSE  1. FACE & SCALP: 115 units of Botox = Total dose, 2:1 Dilution  Right temporalis - 12.5 units of Botox in 4 site/s.  Left temporalis - 12.5 units of Botox in 4 site/s.      Right frontalis - 10 units of Botox in 4 site/s.  Left frontalis - 10 units of Botox in 4 site/s.      Right procerus - 3.5 units of Botox in 1 site/s.  Left procerus - 3.5 units of Botox in 1 site/s.      Right  - 4 units of Botox in 1 site/s.  Left  - 4 units of Botox in 1 site/s.      Right Occipitalis - 25 units of Botox at 4 site/s.   Left Occipitalis - 25 units of Botox at 4 site/s.      Right Nasalis - 2.5 units of Botox at 1 site/s.   Left Nasalis - 2.5 units of Botox at 1 site/s.       2. JAW MUSCLES: 60 units of Botox = Total Dose, 1:1 Dilution  Right Masseter - 25 units of Botox at 1 site/s.   Left Masseter - 25 units of Botox at 1 site/s.      Right temporalis - 5 units of Botox at 1 site/s.  Left temporalis - 5 units of Botox at 1 site/s.       3. SHOULDER & NECK MUSCLES: Total dose 50 units of Botox, 2:1 Dilution      Right levator muscle - 10 units of Botox at 2 site/s (neck and shoulder).  Left levator muscle - 10 units of  Botox at 2 site/s (neck and shoulder).      Right mid trapezius - 5 units of Botox at 1 site.  Left mid trapezius - 5 units of Botox at 1 site.    Right lateral trapezius - 10 units of Botox at 2 site/s  Left lateral trapezuis - 10 units of Botox at 2 site/s.          RESPONSE TO PROCEDURE: Valerie Sim tolerated the procedure well and there were no immediate complications. She was allowed to recover for an appropriate period of time and was discharged home in stable condition.     FOLLOW UP:  Valerie Sim was asked to follow up by phone in 7-14 days with Nely Jean Baptiste PT, Care Coordinator or Estela Magaña RN, Care Coordinator, to report her response to this series of injections. Based on the patient's previous response to this therapy, Valerie Sim was rescheduled for the next series of injections in 9 weeks. Due to a limited duration of Botox effectiveness (approximately 8-9 weeks), we will continue to schedule her Botox every 9 weeks.      PLAN (Medication Changes, Therapy Orders, Work or Disability Issues, etc.): Patient will monitor her response to today's injections and report.

## 2019-02-21 DIAGNOSIS — G44.019 EPISODIC CLUSTER HEADACHE, NOT INTRACTABLE: ICD-10-CM

## 2019-02-21 RX ORDER — CEFUROXIME AXETIL 250 MG/1
6 TABLET ORAL
Qty: 1 KIT | Refills: 0 | Status: SHIPPED | OUTPATIENT
Start: 2019-02-21 | End: 2019-04-08

## 2019-03-20 ENCOUNTER — CARE COORDINATION (OUTPATIENT)
Dept: NEUROLOGY | Facility: CLINIC | Age: 54
End: 2019-03-20

## 2019-03-20 DIAGNOSIS — M53.3 SI (SACROILIAC) JOINT DYSFUNCTION: ICD-10-CM

## 2019-03-20 DIAGNOSIS — F07.81 POST CONCUSSION SYNDROME: ICD-10-CM

## 2019-03-20 RX ORDER — DEXTROAMPHETAMINE SACCHARATE, AMPHETAMINE ASPARTATE, DEXTROAMPHETAMINE SULFATE AND AMPHETAMINE SULFATE 5; 5; 5; 5 MG/1; MG/1; MG/1; MG/1
20 TABLET ORAL 2 TIMES DAILY
Qty: 60 TABLET | Refills: 0 | Status: SHIPPED | OUTPATIENT
Start: 2019-03-20 | End: 2019-04-09

## 2019-03-20 RX ORDER — HYDROMORPHONE HYDROCHLORIDE 2 MG/1
1-2 TABLET ORAL
Qty: 24 TABLET | Refills: 0 | Status: SHIPPED | OUTPATIENT
Start: 2019-03-20 | End: 2019-07-11

## 2019-03-20 NOTE — PROGRESS NOTES
Valerie came in to the clinic today to  her prescriptions.  She gave me a list of my chart exchanges between herself and other staff members.  Some of these employees were from the [facility] and dating back to 2017.  She has felt that some of the responses by staff have been inappropriate and rude.  I gave her our patient relations number and told her that I would give the list gave to my nurse superviser Ariel.  Moving forward I have asked that she only call or message myself or Dr. Gonzales.   She is happy and comfortable with this decision.

## 2019-04-08 DIAGNOSIS — G44.019 EPISODIC CLUSTER HEADACHE, NOT INTRACTABLE: ICD-10-CM

## 2019-04-08 RX ORDER — CEFUROXIME AXETIL 250 MG/1
6 TABLET ORAL
Qty: 1 KIT | Refills: 0 | Status: SHIPPED | OUTPATIENT
Start: 2019-04-08 | End: 2019-08-13

## 2019-04-09 ENCOUNTER — CARE COORDINATION (OUTPATIENT)
Dept: NEUROLOGY | Facility: CLINIC | Age: 54
End: 2019-04-09

## 2019-04-09 DIAGNOSIS — F07.81 POST CONCUSSION SYNDROME: ICD-10-CM

## 2019-04-09 RX ORDER — DEXTROAMPHETAMINE SACCHARATE, AMPHETAMINE ASPARTATE, DEXTROAMPHETAMINE SULFATE AND AMPHETAMINE SULFATE 5; 5; 5; 5 MG/1; MG/1; MG/1; MG/1
20 TABLET ORAL 2 TIMES DAILY
Qty: 60 TABLET | Refills: 0 | Status: SHIPPED | OUTPATIENT
Start: 2019-04-09 | End: 2019-05-03

## 2019-04-09 RX ORDER — DEXTROAMPHETAMINE SACCHARATE, AMPHETAMINE ASPARTATE, DEXTROAMPHETAMINE SULFATE AND AMPHETAMINE SULFATE 5; 5; 5; 5 MG/1; MG/1; MG/1; MG/1
20 TABLET ORAL 2 TIMES DAILY
Qty: 60 TABLET | Refills: 0 | Status: SHIPPED | OUTPATIENT
Start: 2019-04-09 | End: 2019-04-09

## 2019-04-09 NOTE — PROGRESS NOTES
Rx for Adderall mailed to [facility] per the pt's request.  Message sent to Valerie letting her know this was mailed.

## 2019-04-19 ENCOUNTER — OFFICE VISIT (OUTPATIENT)
Dept: NEUROPSYCHOLOGY | Facility: CLINIC | Age: 54
End: 2019-04-19
Attending: PSYCHIATRY & NEUROLOGY
Payer: COMMERCIAL

## 2019-04-19 DIAGNOSIS — F09 MENTAL DISORDER DUE TO GENERAL MEDICAL CONDITION: ICD-10-CM

## 2019-04-19 DIAGNOSIS — F41.9 ANXIETY: ICD-10-CM

## 2019-04-19 DIAGNOSIS — S06.0X0S CONCUSSION WITHOUT LOSS OF CONSCIOUSNESS, SEQUELA (H): Primary | ICD-10-CM

## 2019-04-19 DIAGNOSIS — F33.2 SEVERE RECURRENT MAJOR DEPRESSION WITHOUT PSYCHOTIC FEATURES (H): ICD-10-CM

## 2019-04-19 NOTE — NURSING NOTE
Pt was seen for neuropsychological evaluation at the request of Dr. Jamila Gonzales for the purposes of diagnostic clarification and treatment planning. 167 minutes of test administration and scoring were provided by this writer. Please see Dr. Aleksandr Tiwari's report for a full interpretation of the findings.    Jose Prado  Psychometrist
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
## 2019-04-23 NOTE — PROGRESS NOTES
__ Orientation            Time          __   -1 ____        Place        _____2____        Personal Info.     _____4____        Presidents           ____ 6_____    __WAIS-IV   FSIQ____VCI_____PRI_____ WMI__86__PSI_86__     Raw  Age SS  __Similarities  __31__               ___13___  __Vocabulary  _______ _______  __Information  _______          _______  __Comprehension _______ _______  __Block Design  __26___ ___7___  __Matrix Reas.  _______ _______  __Visual Puzzles _______ _______  __Picture Comp. _______ _______  __Figure Weights _______ _______  __Digit Span  __ 23___ ___8___  __Arithmetic  ___11__            ___7___  __L-N Sequencing _______ _______  __Symbol Search __24___ ___7___  __Coding  __54___ ___8___  __Cancellation  _______ _______    __AVLT  Trial   1         2          3           4          5         B          6            _7_    _8_     _10_     _8_     _9_     _3_     _8_      Raw  Zscore  Learning   _42___     Short Delay   __8__    -0.68  30 min. delayed recall  __7__    -0.91  Recognition hits   _13___     -0.64  Recognition false positives __2___            __Pinky/Jessica Complex Figure Test    Raw  T-score   %ile  Copy   _32__                            11-16  Imm. Recall _7.5_  ___23___ __<1__  30  Delay _8.5_  ___25___ __<1__  Recognition _22_       ___58___ __79_  Time               _278_       __BVRT  (form C)  Copy E-10 rating ____/4     Raw  Expected  Correct  __5____ ___7____  Incorrect ___6____ ___4____    __WRAT-4   Reading SS = 98   %ile = 45   GE=12.7        __COWAT   Raw__38___ Tscore__46___   __Semantic Fluency/Animals   Raw = 18     T-score = 42  __BNT   Raw = 59/60 Tscore = 62  __Complex Ideational Material   Raw = 11/12 T-Score = 42    __Trail Making Test   A =  22         Errors = 0    Tscore = 55   B =  56        Errors = 0    Tscore= 51  __Stroop                       Raw         Tscore        Word = _85_       _39_        Color =  _71_       _44_        C/W   =   __45_       _50_    __JoLO   Raw = 19    %ile = 11    __ Grooved Pegboard  RH   Raw=75    T=37   Drops=0  LH    Raw=70     T=51   Drops=0    __BDI-II   Raw__30__ Interp.__severe__   __BAI     Raw__18__ Interp. __Moderate___  __ MMPI-2RF    __WMS-III   LM1 =37  SS = 9   LM2 = 23  SS = 10   LM2R = 25 Zscore = 0.16    __TOMM   Trial 1 = 49 Trial 2 = 50  __ DCT     E-Score=12

## 2019-04-29 ENCOUNTER — OFFICE VISIT (OUTPATIENT)
Dept: PHYSICAL MEDICINE AND REHAB | Facility: CLINIC | Age: 54
End: 2019-04-29
Payer: COMMERCIAL

## 2019-04-29 VITALS
BODY MASS INDEX: 27.37 KG/M2 | HEART RATE: 115 BPM | SYSTOLIC BLOOD PRESSURE: 178 MMHG | DIASTOLIC BLOOD PRESSURE: 83 MMHG | WEIGHT: 174.4 LBS | HEIGHT: 67 IN | OXYGEN SATURATION: 98 % | TEMPERATURE: 98.1 F

## 2019-04-29 DIAGNOSIS — G43.719 CHRONIC MIGRAINE WITHOUT AURA, INTRACTABLE, WITHOUT STATUS MIGRAINOSUS: Primary | ICD-10-CM

## 2019-04-29 RX ORDER — BUPIVACAINE HYDROCHLORIDE 2.5 MG/ML
4.5 INJECTION, SOLUTION EPIDURAL; INFILTRATION; INTRACAUDAL ONCE
Status: COMPLETED | OUTPATIENT
Start: 2019-04-29 | End: 2019-04-29

## 2019-04-29 RX ADMIN — BUPIVACAINE HYDROCHLORIDE 11.25 MG: 2.5 INJECTION, SOLUTION EPIDURAL; INFILTRATION; INTRACAUDAL at 16:50

## 2019-04-29 ASSESSMENT — MIFFLIN-ST. JEOR: SCORE: 1428.7

## 2019-04-29 ASSESSMENT — PAIN SCALES - GENERAL: PAINLEVEL: NO PAIN (0)

## 2019-04-29 NOTE — LETTER
"4/29/2019       RE: Valerie Sim  [address] [facility] [address]     Dear Colleague,    Thank you for referring your patient, Valerie Sim, to the [facility] PHYSICAL MEDICINE AND REHABILITATION at [facility]. Please see a copy of my visit note below.    BOTULINUM TOXIN PROCEDURE - HEADACHE - NOTE    Chief Complaint   Patient presents with     Follow Up     botox     /83 (BP Location: Right arm, Patient Position: Chair, Cuff Size: Adult Regular)   Pulse 115   Temp 98.1  F (36.7  C)   Ht 1.702 m (5' 7\")   Wt 79.1 kg (174 lb 6.4 oz)   SpO2 98%   BMI 27.31 kg/m        Current Outpatient Medications:      aMILoride (MIDAMOR) 5 MG tablet, , Disp: , Rfl:      amLODIPine (NORVASC) 10 MG tablet, , Disp: , Rfl:      amphetamine-dextroamphetamine (ADDERALL) 20 MG tablet, Take 1 tablet (20 mg) by mouth 2 times daily, Disp: 60 tablet, Rfl: 0     amphetamine-dextroamphetamine (ADDERALL) 20 MG tablet, Take 1 tablet (20 mg) by mouth 2 times daily, Disp: 60 tablet, Rfl: 0     amphetamine-dextroamphetamine (ADDERALL) 20 MG tablet, Take 1 tablet (20 mg) by mouth 2 times daily, Disp: 60 tablet, Rfl: 0     budesonide (RINOCORT AQUA) 32 MCG/ACT nasal spray, Spray 1 spray into both nostrils daily., Disp: , Rfl:      celecoxib (CELEBREX) 200 MG capsule, , Disp: , Rfl:      cyclobenzaprine (FLEXERIL) 10 MG tablet, , Disp: , Rfl:      CycloSPORINE (RESTASIS OP), Apply to eye 2 times daily, Disp: , Rfl:      Divalproex Sodium (DEPAKOTE PO), Take 250 mg by mouth every 48 hours as needed , Disp: , Rfl:      eletriptan (RELPAX) 40 MG tablet, TAKE 1 TABLET AT ONSET OF MIGRAINE. MAY REPEAT ONCE AFTER 2 HRS. MAX OF 2 TABS/24HRS AND 3 DAYS/WEEK., Disp: 12 tablet, Rfl: 3     estrogens, conjugated, (PREMARIN) 0.625 MG tablet, Take by mouth daily .30 mg . , Disp: , Rfl:      fexofenadine (ALLEGRA) 180 MG tablet, Take  by mouth daily., Disp: , Rfl:      HYDROmorphone " (DILAUDID) 2 MG tablet, Take 0.5-1 tablets (1-2 mg) by mouth every 3 hours as needed (headache) 20 tablets = 3 month supply., Disp: 24 tablet, Rfl: 0     indomethacin (INDOCIN) 50 MG capsule, TAKE 1 CAPSULE BY MOUTH TWICE DAILY WITH MEALS. AFTER ONE WEEK, MAY INCREASE TO 1 CAPSULE THREE TIMES DAILY., Disp: 90 capsule, Rfl: 0     lamoTRIgine (LAMICTAL) 200 MG tablet, Take 1 tablet by mouth daily, Disp: , Rfl:      LORazepam (ATIVAN) 1 MG tablet, Take 1 mg by mouth 2 times daily as needed., Disp: , Rfl:      Lutein 20 MG TABS, Take 1 tablet by mouth every other day Reported on 5/9/2017, Disp: , Rfl:      OnabotulinumtoxinA (BOTOX IJ), Inject 225 Units as directed once Lot # /C3 with Expiration Date: 04/2021, Disp: , Rfl:      OnabotulinumtoxinA (BOTOX IJ), Inject 225 Units as directed once Lot# /C3 Exp 03/2021, Disp: , Rfl:      OnabotulinumtoxinA (BOTOX IJ), Inject 225 Units into the muscle once Lot # /C3 with Expiration Date: 11/2020, Disp: , Rfl:      OnabotulinumtoxinA (BOTOX IJ), Inject 225 Units as directed once Lot /C3 Exp 10/2020, Disp: , Rfl:      OnabotulinumtoxinA (BOTOX IJ), Inject 200 Units into the muscle once Lot # /C3 with Expiration Date: 08/2020, Disp: , Rfl:      OnabotulinumtoxinA (BOTOX IJ), Inject 175 Units as directed once Lot # /C3 with Expiration Date:  4/2016, Disp: , Rfl:      ondansetron (ZOFRAN) 4 MG tablet, Take 1 tablet (4 mg) by mouth every 8 hours as needed, Disp: 90 tablet, Rfl: 1     order for DME, Equipment being ordered: Oxygen and non-rebreather mask.   Use high flow oxygen (10-15 L/min) with non-rebreather mask, as needed for cluster headaches, Disp: 1 Units, Rfl: 11     predniSONE (DELTASONE) 10 MG tablet, , Disp: , Rfl:      predniSONE (DELTASONE) 20 MG tablet, Take 4 tabs (80 mg) by mouth daily x 5 days, 3 tabs (60 mg) daily x 5 days, 2 tab (40 mg) daily x 5 days, then 1 tab (20 mg) x 5 days. (Patient not taking: Reported on 2/19/2019.), Disp: 50  tablet, Rfl: 0     QUEtiapine (SEROQUEL) 50 MG tablet, Take 1 tablet by mouth daily., Disp: , Rfl:      SUMAtriptan (IMITREX STATDOSE) 6 MG/0.5ML pen injector kit, Inject 0.5 mLs (6 mg) Subcutaneous at onset of headache for migraine May repeat in 1 hour. Max 12 mg/24 hours., Disp: 1 kit, Rfl: 0     SUMAtriptan (IMITREX) 6 MG/0.5ML injection, Inject 0.5 mLs (6 mg) Subcutaneous every 12 hours as needed for other (cluster) May repeat dose in 1 hour. Max 12 mg/24 hours., Disp: 18 vial, Rfl: 3     valACYclovir (VALTREX) 1000 mg tablet, Take 1,000 mg by mouth as needed., Disp: , Rfl:      verapamil (CALAN) 80 MG tablet, Take 1 tablet (80 mg) by mouth 3 times daily Take 80 mg daily for one week. Increase by 80 mg weekly to two to three times daily., Disp: 270 tablet, Rfl: 3     Allergies   Allergen Reactions     Fluoxetine Other (See Comments)     PN: LW Reaction: headache  PN: LW Reaction: headache  Migraine       Trazodone Other (See Comments) and Unknown     Other reaction(s): Headache  Other reaction(s): Headache  Other reaction(s): Headache     Amitriptyline Hcl Other (See Comments)     Migraine       Candesartan Cilexetil-Hctz Muscle Pain (Myalgia)     Cymbalta Other (See Comments)     Migraine       Cyproheptadine Hcl      headaches     Desvenlafaxine      Migraine       Diatrizoate Unknown     PN: LW CM1: CONTRAST- iodine Reaction :     Diazepam      Other reaction(s): Vestibular Toxicity  PN: LW Reaction: vertigo  PN: LW Reaction: vertigo     Imipramine Other (See Comments)     Migraine       Lyrica Muscle Pain (Myalgia)     Metoprolol Other (See Comments) and Unknown     headache  headache  headache     Morphine Other (See Comments)     Patient reports seizure      No Clinical Screening - See Comments      PN: LW FI1: lactose intolerance LW FI2: shellfish  PN: LW CM1: CONTRAST- iodine Reaction :  PN: LW Other1: -nka     Nortriptyline Other (See Comments)     Migraine       Phenergan Dm [Promethazine-Dm] Other  "(See Comments)     seizures     Promethazine      PN: LW Reaction: minimal sizures     Venlafaxine Other (See Comments)     PN: LW Reaction: migraine  PN: LW Reaction: migraine  Migraine       Wellbutrin [Bupropion Hydrobromide] Other (See Comments)     Migraine       Compazine Anxiety     Dihydroergotamine Anxiety and Other (See Comments)     Cardiac symptoms     Droperidol Anxiety     PN: LW Reaction: agitation     Medroxyprogesterone Hives     PN: LW Reaction: nausea     Prochlorperazine Anxiety     PN: LW Reaction: \"can't sit still\"        PHYSICAL EXAM:    Pleasant and cooperative  No facial asymmetry  She has a headache today, which is rated a 6/10.     HPI:    Patient denies new medical diagnoses, illnesses, hospitalizations, emergency room visits, and injuries since the previous injection with botulinum neurotoxin.      We reviewed the recommended safety guidelines for  Botox from any vaccine injection, such as the seasonal flu vaccine, by a minimum of 10-14 days with Valerie Sim. She acknowledged understanding.      RESPONSE TO PREVIOUS TREATMENT:  Change in headache pattern following last series of injections with 225 units of Botox on 2/19/2019.    Side effects: Malaise and fatigue for 2 days following injections.    1.  Headache frequency during this injection cycle:  She has daily mild headaches and 12 migraine headache days per month. This is compared to her baseline migraine frequency of daily.     2.  Headache duration during this injection cycle: Her mild baseline headache varies in length throughout the day but is always present, her migraines last 1-3 days. Her migraines have been responsive to injectable Imitrex since starting Botox injections.    3.  Headache intensity during this injection cycle:    A.  6-7/10  =  Typical pain level.    B.  10/10  =  Worst pain level.    C.  4/10  =  Lowest pain level - has had more lower level headache days this cycle.     4.  Change in headache " medication usage during this injection cycle: She has been using injectable Imitrex, which works well for her occipital migraines. She used this 10 times in the last 9 weeks, and used Ralpax 3 times in the last 9 weeks.     5.  ER Visits During This Injection Cycle:  None. Prior to receiving Botox, she had been going to the ED for migraines 13 times per year.     6.  Functional Performance:  Change in ADL's, social interaction, days lost from work, etc. She reports missing 5 social obligations and days of 3 work during this injection cycle due to headaches.  She is on disability for her headaches, and works 12 hours per week.     BOTULINUM NEUROTOXIN INJECTION PROCEDURES:      VERIFICATION OF PATIENT IDENTIFICATION AND PROCEDURE     Initials   Patient Name ses   Patient  ses   Procedure Verified by: ses     Prior to the start of the procedure and with procedural staff participation, I verbally confirmed the patient s identity using two indicators, relevant allergies, that the procedure was appropriate and matched the consent or emergent situation, and that the correct equipment/implants were available. Immediately prior to starting the procedure I conducted the Time Out with the procedural staff and re-confirmed the patient s name, procedure, and site/side. (The Joint Commission universal protocol was followed.)  Yes    Sedation (Moderate or Deep): None    Above assessments performed by:    Addie Malhotra MD      INDICATIONS FOR PROCEDURES:  Valerie Sim is a 54-year-old patient with chronic migraine headaches associated with cervicogenic components.      Her baseline symptoms have been recalcitrant to oral medications and conservative therapy. She is here today for an injection of Botox.         GOAL OF PROCEDURE:  The goal of this procedure is to decrease pain and enhance functional independence.     TOTAL DOSE ADMINISTERED:  Dose Administered: 225 units Botox (Botulinum Toxin Type A)  2:1 and 1:1  Dilution   Diluent Used: 0.25% Bupivacaine (Batch: [id number], Exp: 11/2021, NDC: 55150-167-10)  Total Volume of Diluent Used: See below  Lot # /C3 with Expiration Date: 09/2021  NDC #: Botox 100u ([address]-59)     Was there drug waste? Yes  Amount of drug waste (mL): 75 units Botox.  Reason for waste:  Single use vial  Multi-dose vial: No    Medication guide was offered to patient and was declined.     CONSENT:  The risks, benefits, and treatment options were discussed with Valerie Sim and she agreed to proceed.     Written consent was obtained by [facility].      EQUIPMENT USED:  Needle-37mm stimulating/recording  Needle-30 gauge  EMG/NCS Machine      SKIN PREPARATION:  Skin preparation was performed using an alcohol wipe.        AREA/MUSCLE INJECTED: 225 UNITS BOTOX = TOTAL DOSE  1. FACE & SCALP: 115 units of Botox = Total dose, 2:1 Dilution  Right temporalis - 12.5 units of Botox in 4 site/s.  Left temporalis - 12.5 units of Botox in 4 site/s.      Right frontalis - 10 units of Botox in 4 site/s.  Left frontalis - 10 units of Botox in 4 site/s.      Right procerus - 3.5 units of Botox in 1 site/s.  Left procerus - 3.5 units of Botox in 1 site/s.      Right  - 4 units of Botox in 1 site/s.  Left  - 4 units of Botox in 1 site/s.      Right Upper Occipitalis - 25 units of Botox at 4 site/s.   Left Upper Occipitalis - 25 units of Botox at 4 site/s.      Right Nasalis - 2.5 units of Botox at 1 site/s.   Left Nasalis - 2.5 units of Botox at 1 site/s.       2. JAW MUSCLES: 60 units of Botox = Total Dose, 1:1 Dilution  Right [place] - 25 units of Botox at 1 site/s.   Left [place] - 25 units of Botox at 1 site/s.      Right temporalis - 5 units of Botox at 1 site/s.  Left temporalis - 5 units of Botox at 1 site/s.       3. SHOULDER & NECK MUSCLES: Total dose 50 units of Botox, 2:1 Dilution      Right levator muscle - 10 units of Botox at 2 site/s (neck and shoulder).  Left levator muscle - 10 units  of Botox at 2 site/s (neck and shoulder).      Right mid trapezius - 5 units of Botox at 1 site.  Left mid trapezius - 5 units of Botox at 1 site.    Right lateral trapezius - 10 units of Botox at 2 site/s  Left lateral trapezuis - 10 units of Botox at 2 site/s.          RESPONSE TO PROCEDURE: Valerie Sim tolerated the procedure well and there were no immediate complications. She was allowed to recover for an appropriate period of time and was discharged home in stable condition.     FOLLOW UP:  Valerie Sim was asked to follow up by phone in 7-14 days with Nely Jean Baptiste PT, Care Coordinator or Estela Magaña RN, Care Coordinator, to report her response to this series of injections. Based on the patient's previous response to this therapy, Valerie Sim was rescheduled for the next series of injections in 9 weeks. Due to a limited duration of Botox effectiveness (approximately 8-9 weeks), we will continue to schedule her Botox every 9 weeks.      PLAN (Medication Changes, Therapy Orders, Work or Disability Issues, etc.): Patient will monitor her response to today's injections and report.      Again, thank you for allowing me to participate in the care of your patient.      Sincerely,    Addie Malhotra MD

## 2019-04-29 NOTE — PROGRESS NOTES
"BOTULINUM TOXIN PROCEDURE - HEADACHE - NOTE    Chief Complaint   Patient presents with     Follow Up     botox     /83 (BP Location: Right arm, Patient Position: Chair, Cuff Size: Adult Regular)   Pulse 115   Temp 98.1  F (36.7  C)   Ht 1.702 m (5' 7\")   Wt 79.1 kg (174 lb 6.4 oz)   SpO2 98%   BMI 27.31 kg/m       Current Outpatient Medications:      aMILoride (MIDAMOR) 5 MG tablet, , Disp: , Rfl:      amLODIPine (NORVASC) 10 MG tablet, , Disp: , Rfl:      amphetamine-dextroamphetamine (ADDERALL) 20 MG tablet, Take 1 tablet (20 mg) by mouth 2 times daily, Disp: 60 tablet, Rfl: 0     amphetamine-dextroamphetamine (ADDERALL) 20 MG tablet, Take 1 tablet (20 mg) by mouth 2 times daily, Disp: 60 tablet, Rfl: 0     amphetamine-dextroamphetamine (ADDERALL) 20 MG tablet, Take 1 tablet (20 mg) by mouth 2 times daily, Disp: 60 tablet, Rfl: 0     budesonide (RINOCORT AQUA) 32 MCG/ACT nasal spray, Spray 1 spray into both nostrils daily., Disp: , Rfl:      celecoxib (CELEBREX) 200 MG capsule, , Disp: , Rfl:      cyclobenzaprine (FLEXERIL) 10 MG tablet, , Disp: , Rfl:      CycloSPORINE (RESTASIS OP), Apply to eye 2 times daily, Disp: , Rfl:      Divalproex Sodium (DEPAKOTE PO), Take 250 mg by mouth every 48 hours as needed , Disp: , Rfl:      eletriptan (RELPAX) 40 MG tablet, TAKE 1 TABLET AT ONSET OF MIGRAINE. MAY REPEAT ONCE AFTER 2 HRS. MAX OF 2 TABS/24HRS AND 3 DAYS/WEEK., Disp: 12 tablet, Rfl: 3     estrogens, conjugated, (PREMARIN) 0.625 MG tablet, Take by mouth daily .30 mg . , Disp: , Rfl:      fexofenadine (ALLEGRA) 180 MG tablet, Take  by mouth daily., Disp: , Rfl:      HYDROmorphone (DILAUDID) 2 MG tablet, Take 0.5-1 tablets (1-2 mg) by mouth every 3 hours as needed (headache) 20 tablets = 3 month supply., Disp: 24 tablet, Rfl: 0     indomethacin (INDOCIN) 50 MG capsule, TAKE 1 CAPSULE BY MOUTH TWICE DAILY WITH MEALS. AFTER ONE WEEK, MAY INCREASE TO 1 CAPSULE THREE TIMES DAILY., Disp: 90 capsule, Rfl: 0     " lamoTRIgine (LAMICTAL) 200 MG tablet, Take 1 tablet by mouth daily, Disp: , Rfl:      LORazepam (ATIVAN) 1 MG tablet, Take 1 mg by mouth 2 times daily as needed., Disp: , Rfl:      Lutein 20 MG TABS, Take 1 tablet by mouth every other day Reported on 5/9/2017, Disp: , Rfl:      OnabotulinumtoxinA (BOTOX IJ), Inject 225 Units as directed once Lot # /C3 with Expiration Date: 04/2021, Disp: , Rfl:      OnabotulinumtoxinA (BOTOX IJ), Inject 225 Units as directed once Lot# /C3 Exp 03/2021, Disp: , Rfl:      OnabotulinumtoxinA (BOTOX IJ), Inject 225 Units into the muscle once Lot # /C3 with Expiration Date: 11/2020, Disp: , Rfl:      OnabotulinumtoxinA (BOTOX IJ), Inject 225 Units as directed once Lot /C3 Exp 10/2020, Disp: , Rfl:      OnabotulinumtoxinA (BOTOX IJ), Inject 200 Units into the muscle once Lot # /C3 with Expiration Date: 08/2020, Disp: , Rfl:      OnabotulinumtoxinA (BOTOX IJ), Inject 175 Units as directed once Lot # /C3 with Expiration Date:  4/2016, Disp: , Rfl:      ondansetron (ZOFRAN) 4 MG tablet, Take 1 tablet (4 mg) by mouth every 8 hours as needed, Disp: 90 tablet, Rfl: 1     order for DME, Equipment being ordered: Oxygen and non-rebreather mask.   Use high flow oxygen (10-15 L/min) with non-rebreather mask, as needed for cluster headaches, Disp: 1 Units, Rfl: 11     predniSONE (DELTASONE) 10 MG tablet, , Disp: , Rfl:      predniSONE (DELTASONE) 20 MG tablet, Take 4 tabs (80 mg) by mouth daily x 5 days, 3 tabs (60 mg) daily x 5 days, 2 tab (40 mg) daily x 5 days, then 1 tab (20 mg) x 5 days. (Patient not taking: Reported on 2/19/2019.), Disp: 50 tablet, Rfl: 0     QUEtiapine (SEROQUEL) 50 MG tablet, Take 1 tablet by mouth daily., Disp: , Rfl:      SUMAtriptan (IMITREX STATDOSE) 6 MG/0.5ML pen injector kit, Inject 0.5 mLs (6 mg) Subcutaneous at onset of headache for migraine May repeat in 1 hour. Max 12 mg/24 hours., Disp: 1 kit, Rfl: 0     SUMAtriptan (IMITREX) 6  MG/0.5ML injection, Inject 0.5 mLs (6 mg) Subcutaneous every 12 hours as needed for other (cluster) May repeat dose in 1 hour. Max 12 mg/24 hours., Disp: 18 vial, Rfl: 3     valACYclovir (VALTREX) 1000 mg tablet, Take 1,000 mg by mouth as needed., Disp: , Rfl:      verapamil (CALAN) 80 MG tablet, Take 1 tablet (80 mg) by mouth 3 times daily Take 80 mg daily for one week. Increase by 80 mg weekly to two to three times daily., Disp: 270 tablet, Rfl: 3     Allergies   Allergen Reactions     Fluoxetine Other (See Comments)     PN: LW Reaction: headache  PN: LW Reaction: headache  Migraine       Trazodone Other (See Comments) and Unknown     Other reaction(s): Headache  Other reaction(s): Headache  Other reaction(s): Headache     Amitriptyline Hcl Other (See Comments)     Migraine       Candesartan Cilexetil-Hctz Muscle Pain (Myalgia)     Cymbalta Other (See Comments)     Migraine       Cyproheptadine Hcl      headaches     Desvenlafaxine      Migraine       Diatrizoate Unknown     PN: LW CM1: CONTRAST- iodine Reaction :     Diazepam      Other reaction(s): Vestibular Toxicity  PN: LW Reaction: vertigo  PN: LW Reaction: vertigo     Imipramine Other (See Comments)     Migraine       Lyrica Muscle Pain (Myalgia)     Metoprolol Other (See Comments) and Unknown     headache  headache  headache     Morphine Other (See Comments)     Patient reports seizure      No Clinical Screening - See Comments      PN: LW FI1: lactose intolerance LW FI2: shellfish  PN: LW CM1: CONTRAST- iodine Reaction :  PN: LW Other1: -nka     Nortriptyline Other (See Comments)     Migraine       Phenergan Dm [Promethazine-Dm] Other (See Comments)     seizures     Promethazine      PN: LW Reaction: minimal sizures     Venlafaxine Other (See Comments)     PN: LW Reaction: migraine  PN: LW Reaction: migraine  Migraine       Wellbutrin [Bupropion Hydrobromide] Other (See Comments)     Migraine       Compazine Anxiety     Dihydroergotamine Anxiety and Other  "(See Comments)     Cardiac symptoms     Droperidol Anxiety     PN: LW Reaction: agitation     Medroxyprogesterone Hives     PN: LW Reaction: nausea     Prochlorperazine Anxiety     PN: LW Reaction: \"can't sit still\"        PHYSICAL EXAM:    Pleasant and cooperative  No facial asymmetry  She has a headache today, which is rated a 6/10.     HPI:    Patient denies new medical diagnoses, illnesses, hospitalizations, emergency room visits, and injuries since the previous injection with botulinum neurotoxin.      We reviewed the recommended safety guidelines for  Botox from any vaccine injection, such as the seasonal flu vaccine, by a minimum of 10-14 days with Valerie Sim. She acknowledged understanding.      RESPONSE TO PREVIOUS TREATMENT:  Change in headache pattern following last series of injections with 225 units of Botox on 2/19/2019.    Side effects: Malaise and fatigue for 2 days following injections.    1.  Headache frequency during this injection cycle:  She has daily mild headaches and 12 migraine headache days per month. This is compared to her baseline migraine frequency of daily.     2.  Headache duration during this injection cycle: Her mild baseline headache varies in length throughout the day but is always present, her migraines last 1-3 days. Her migraines have been responsive to injectable Imitrex since starting Botox injections.    3.  Headache intensity during this injection cycle:    A.  6-7/10  =  Typical pain level.    B.  10/10  =  Worst pain level.    C.  4/10  =  Lowest pain level - has had more lower level headache days this cycle.     4.  Change in headache medication usage during this injection cycle: She has been using injectable Imitrex, which works well for her occipital migraines. She used this 10 times in the last 9 weeks, and used Ralpax 3 times in the last 9 weeks.     5.  ER Visits During This Injection Cycle:  None. Prior to receiving Botox, she had been going to the ED " for migraines 13 times per year.     6.  Functional Performance:  Change in ADL's, social interaction, days lost from work, etc. She reports missing 5 social obligations and days of 3 work during this injection cycle due to headaches.  She is on disability for her headaches, and works 12 hours per week.     BOTULINUM NEUROTOXIN INJECTION PROCEDURES:      VERIFICATION OF PATIENT IDENTIFICATION AND PROCEDURE     Initials   Patient Name ses   Patient  ses   Procedure Verified by: ses     Prior to the start of the procedure and with procedural staff participation, I verbally confirmed the patient s identity using two indicators, relevant allergies, that the procedure was appropriate and matched the consent or emergent situation, and that the correct equipment/implants were available. Immediately prior to starting the procedure I conducted the Time Out with the procedural staff and re-confirmed the patient s name, procedure, and site/side. (The Joint Commission universal protocol was followed.)  Yes    Sedation (Moderate or Deep): None    Above assessments performed by:    Addie Malhotra MD      INDICATIONS FOR PROCEDURES:  Valerie Sim is a 54-year-old patient with chronic migraine headaches associated with cervicogenic components.      Her baseline symptoms have been recalcitrant to oral medications and conservative therapy. She is here today for an injection of Botox.         GOAL OF PROCEDURE:  The goal of this procedure is to decrease pain and enhance functional independence.     TOTAL DOSE ADMINISTERED:  Dose Administered: 225 units Botox (Botulinum Toxin Type A)  2:1 and 1:1 Dilution   Diluent Used: 0.25% Bupivacaine (Batch: [id number], Exp: 2021, NDC: 55150-167-10)  Total Volume of Diluent Used: See below  Lot # /C3 with Expiration Date: 2021  NDC #: Botox 100u ([address]-51)     Was there drug waste? Yes  Amount of drug waste (mL): 75 units Botox.  Reason for waste:  Single use  vial  Multi-dose vial: No    Addie Malhotra MD  April 29, 2019      Medication guide was offered to patient and was declined.     CONSENT:  The risks, benefits, and treatment options were discussed with Valerie Sim and she agreed to proceed.     Written consent was obtained by [facility].      EQUIPMENT USED:  Needle-37mm stimulating/recording  Needle-30 gauge  EMG/NCS Machine      SKIN PREPARATION:  Skin preparation was performed using an alcohol wipe.        AREA/MUSCLE INJECTED: 225 UNITS BOTOX = TOTAL DOSE  1. FACE & SCALP: 115 units of Botox = Total dose, 2:1 Dilution  Right temporalis - 12.5 units of Botox in 4 site/s.  Left temporalis - 12.5 units of Botox in 4 site/s.      Right frontalis - 10 units of Botox in 4 site/s.  Left frontalis - 10 units of Botox in 4 site/s.      Right procerus - 3.5 units of Botox in 1 site/s.  Left procerus - 3.5 units of Botox in 1 site/s.      Right  - 4 units of Botox in 1 site/s.  Left  - 4 units of Botox in 1 site/s.      Right Upper Occipitalis - 25 units of Botox at 4 site/s.   Left Upper Occipitalis - 25 units of Botox at 4 site/s.      Right Nasalis - 2.5 units of Botox at 1 site/s.   Left Nasalis - 2.5 units of Botox at 1 site/s.       2. JAW MUSCLES: 60 units of Botox = Total Dose, 1:1 Dilution  Right Masseter - 25 units of Botox at 1 site/s.   Left [place] - 25 units of Botox at 1 site/s.      Right temporalis - 5 units of Botox at 1 site/s.  Left temporalis - 5 units of Botox at 1 site/s.       3. SHOULDER & NECK MUSCLES: Total dose 50 units of Botox, 2:1 Dilution      Right levator muscle - 10 units of Botox at 2 site/s (neck and shoulder).  Left levator muscle - 10 units of Botox at 2 site/s (neck and shoulder).      Right mid trapezius - 5 units of Botox at 1 site.  Left mid trapezius - 5 units of Botox at 1 site.    Right lateral trapezius - 10 units of Botox at 2 site/s  Left lateral trapezuis - 10 units of Botox at 2 site/s.           RESPONSE TO PROCEDURE: Valerie Sim tolerated the procedure well and there were no immediate complications. She was allowed to recover for an appropriate period of time and was discharged home in stable condition.     FOLLOW UP:  Valerie Sim was asked to follow up by phone in 7-14 days with Nely Jean Baptiste PT, Care Coordinator or Estela Magaña RN, Care Coordinator, to report her response to this series of injections. Based on the patient's previous response to this therapy, Valerie Sim was rescheduled for the next series of injections in 9 weeks. Due to a limited duration of Botox effectiveness (approximately 8-9 weeks), we will continue to schedule her Botox every 9 weeks.      PLAN (Medication Changes, Therapy Orders, Work or Disability Issues, etc.): Patient will monitor her response to today's injections and report.

## 2019-05-03 DIAGNOSIS — F07.81 POST CONCUSSION SYNDROME: ICD-10-CM

## 2019-05-03 RX ORDER — DEXTROAMPHETAMINE SACCHARATE, AMPHETAMINE ASPARTATE, DEXTROAMPHETAMINE SULFATE AND AMPHETAMINE SULFATE 5; 5; 5; 5 MG/1; MG/1; MG/1; MG/1
20 TABLET ORAL 2 TIMES DAILY
Qty: 60 TABLET | Refills: 0 | Status: SHIPPED | OUTPATIENT
Start: 2019-05-03 | End: 2019-05-08

## 2019-05-08 DIAGNOSIS — F07.81 POST CONCUSSION SYNDROME: ICD-10-CM

## 2019-05-08 RX ORDER — DEXTROAMPHETAMINE SACCHARATE, AMPHETAMINE ASPARTATE, DEXTROAMPHETAMINE SULFATE AND AMPHETAMINE SULFATE 5; 5; 5; 5 MG/1; MG/1; MG/1; MG/1
20 TABLET ORAL 2 TIMES DAILY
Qty: 60 TABLET | Refills: 0 | Status: SHIPPED | OUTPATIENT
Start: 2019-05-08 | End: 2019-08-09

## 2019-05-08 NOTE — TELEPHONE ENCOUNTER
Adderall  Prescription signed by Dr. Nicolas and dropped off at [facility] per pt request. Previous paper prescription by Dr. Gonzales on 5/3 not found.

## 2019-06-10 DIAGNOSIS — F07.81 POST CONCUSSION SYNDROME: ICD-10-CM

## 2019-06-10 RX ORDER — DEXTROAMPHETAMINE SACCHARATE, AMPHETAMINE ASPARTATE, DEXTROAMPHETAMINE SULFATE AND AMPHETAMINE SULFATE 5; 5; 5; 5 MG/1; MG/1; MG/1; MG/1
20 TABLET ORAL 2 TIMES DAILY
Qty: 60 TABLET | Refills: 0 | Status: SHIPPED | OUTPATIENT
Start: 2019-06-10 | End: 2019-07-11

## 2019-06-18 ENCOUNTER — OFFICE VISIT (OUTPATIENT)
Dept: PHYSICAL MEDICINE AND REHAB | Facility: CLINIC | Age: 54
End: 2019-06-18
Payer: COMMERCIAL

## 2019-06-18 VITALS
OXYGEN SATURATION: 97 % | BODY MASS INDEX: 27.2 KG/M2 | DIASTOLIC BLOOD PRESSURE: 85 MMHG | HEIGHT: 67 IN | SYSTOLIC BLOOD PRESSURE: 147 MMHG | HEART RATE: 98 BPM | TEMPERATURE: 98.2 F | WEIGHT: 173.3 LBS | RESPIRATION RATE: 16 BRPM

## 2019-06-18 DIAGNOSIS — R51.9 OCCIPITAL HEADACHE: Primary | ICD-10-CM

## 2019-06-18 DIAGNOSIS — G44.019 EPISODIC CLUSTER HEADACHE, NOT INTRACTABLE: ICD-10-CM

## 2019-06-18 RX ORDER — CEFUROXIME AXETIL 250 MG/1
6 TABLET ORAL
Qty: 1 KIT | Refills: 0 | Status: CANCELLED | OUTPATIENT
Start: 2019-06-18

## 2019-06-18 RX ORDER — TRIAMCINOLONE ACETONIDE 40 MG/ML
40 INJECTION, SUSPENSION INTRA-ARTICULAR; INTRAMUSCULAR ONCE
Status: COMPLETED | OUTPATIENT
Start: 2019-06-18 | End: 2019-06-18

## 2019-06-18 RX ORDER — CALCITRIOL 0.25 UG/1
0.25 CAPSULE, LIQUID FILLED ORAL DAILY
COMMUNITY
Start: 2019-05-20 | End: 2019-08-09

## 2019-06-18 RX ORDER — BUPIVACAINE HYDROCHLORIDE 5 MG/ML
5 INJECTION, SOLUTION EPIDURAL; INTRACAUDAL ONCE
Status: COMPLETED | OUTPATIENT
Start: 2019-06-18 | End: 2019-06-18

## 2019-06-18 RX ADMIN — BUPIVACAINE HYDROCHLORIDE 25 MG: 5 INJECTION, SOLUTION EPIDURAL; INTRACAUDAL at 11:50

## 2019-06-18 RX ADMIN — TRIAMCINOLONE ACETONIDE 40 MG: 40 INJECTION, SUSPENSION INTRA-ARTICULAR; INTRAMUSCULAR at 11:49

## 2019-06-18 ASSESSMENT — PATIENT HEALTH QUESTIONNAIRE - PHQ9: SUM OF ALL RESPONSES TO PHQ QUESTIONS 1-9: 6

## 2019-06-18 ASSESSMENT — MIFFLIN-ST. JEOR: SCORE: 1423.71

## 2019-06-18 ASSESSMENT — PAIN SCALES - GENERAL: PAINLEVEL: SEVERE PAIN (7)

## 2019-06-18 NOTE — LETTER
"6/18/2019       RE: Valerie Sim  [address] [facility] [id number]     Dear Colleague,    Thank you for referring your patient, Valerie Sim, to the [facility] PHYSICAL MEDICINE AND REHABILITATION at [facility] Please see a copy of my visit note below.    OCCIPITAL NERVE BLOCK PROCEDURE NOTE    Chief Complaint   Patient presents with     RECHECK     [facility] RETURN BOTOX     /85 (BP Location: Left arm, Patient Position: Chair, Cuff Size: Adult Regular)   Pulse 98   Temp 98.2  F (36.8  C) (Oral)   Resp 16   Ht 1.702 m (5' 7\")   Wt 78.6 kg (173 lb 4.8 oz)   SpO2 97%   BMI 27.14 kg/m       Current Outpatient Medications:      aMILoride (MIDAMOR) 5 MG tablet, , Disp: , Rfl:      amLODIPine (NORVASC) 10 MG tablet, , Disp: , Rfl:      amphetamine-dextroamphetamine (ADDERALL) 20 MG tablet, Take 1 tablet (20 mg) by mouth 2 times daily, Disp: 60 tablet, Rfl: 0     amphetamine-dextroamphetamine (ADDERALL) 20 MG tablet, Take 1 tablet (20 mg) by mouth 2 times daily, Disp: 60 tablet, Rfl: 0     amphetamine-dextroamphetamine (ADDERALL) 20 MG tablet, Take 1 tablet (20 mg) by mouth 2 times daily, Disp: 60 tablet, Rfl: 0     budesonide (RINOCORT AQUA) 32 MCG/ACT nasal spray, Spray 1 spray into both nostrils daily., Disp: , Rfl:      calcitRIOL (ROCALTROL) 0.25 MCG capsule, Take 0.25 mcg by mouth daily, Disp: , Rfl:      Calcium Citrate 200 MG TABS, Take 200 mg by mouth 2 times daily, Disp: , Rfl:      celecoxib (CELEBREX) 200 MG capsule, , Disp: , Rfl:      cyclobenzaprine (FLEXERIL) 10 MG tablet, , Disp: , Rfl:      CycloSPORINE (RESTASIS OP), Apply to eye 2 times daily, Disp: , Rfl:      Divalproex Sodium (DEPAKOTE PO), Take 250 mg by mouth every 48 hours as needed , Disp: , Rfl:      eletriptan (RELPAX) 40 MG tablet, TAKE 1 TABLET AT ONSET OF MIGRAINE. MAY REPEAT ONCE AFTER 2 HRS. MAX OF 2 TABS/24HRS AND 3 DAYS/WEEK., Disp: 12 tablet, Rfl: 3     estrogens, " conjugated, (PREMARIN) 0.625 MG tablet, Take by mouth daily .30 mg. , Disp: , Rfl:      fexofenadine (ALLEGRA) 180 MG tablet, Take  by mouth daily., Disp: , Rfl:      HYDROmorphone (DILAUDID) 2 MG tablet, Take 0.5-1 tablets (1-2 mg) by mouth every 3 hours as needed (headache) 20 tablets = 3 month supply., Disp: 24 tablet, Rfl: 0     indomethacin (INDOCIN) 50 MG capsule, TAKE 1 CAPSULE BY MOUTH TWICE DAILY WITH MEALS. AFTER ONE WEEK, MAY INCREASE TO 1 CAPSULE THREE TIMES DAILY., Disp: 90 capsule, Rfl: 0     lamoTRIgine (LAMICTAL) 200 MG tablet, Take 1 tablet by mouth daily, Disp: , Rfl:      LORazepam (ATIVAN) 1 MG tablet, Take 1 mg by mouth 2 times daily as needed., Disp: , Rfl:      Lutein 20 MG TABS, Take 1 tablet by mouth every other day Reported on 5/9/2017, Disp: , Rfl:      ondansetron (ZOFRAN) 4 MG tablet, Take 1 tablet (4 mg) by mouth every 8 hours as needed, Disp: 90 tablet, Rfl: 1     order for DME, Equipment being ordered: Oxygen and non-rebreather mask.   Use high flow oxygen (10-15 L/min) with non-rebreather mask, as needed for cluster headaches, Disp: 1 Units, Rfl: 11     predniSONE (DELTASONE) 10 MG tablet, , Disp: , Rfl:      predniSONE (DELTASONE) 20 MG tablet, Take 4 tabs (80 mg) by mouth daily x 5 days, 3 tabs (60 mg) daily x 5 days, 2 tab (40 mg) daily x 5 days, then 1 tab (20 mg) x 5 days., Disp: 50 tablet, Rfl: 0     QUEtiapine (SEROQUEL) 50 MG tablet, Take 1 tablet by mouth daily., Disp: , Rfl:      SUMAtriptan (IMITREX STATDOSE) 6 MG/0.5ML pen injector kit, Inject 0.5 mLs (6 mg) Subcutaneous at onset of headache for migraine May repeat in 1 hour. Max 12 mg/24 hours., Disp: 1 kit, Rfl: 0     SUMAtriptan (IMITREX) 6 MG/0.5ML injection, Inject 0.5 mLs (6 mg) Subcutaneous every 12 hours as needed for other (cluster) May repeat dose in 1 hour. Max 12 mg/24 hours., Disp: 18 vial, Rfl: 3     valACYclovir (VALTREX) 1000 mg tablet, Take 1,000 mg by mouth as needed., Disp: , Rfl:      verapamil (CALAN)  "80 MG tablet, Take 1 tablet (80 mg) by mouth 3 times daily Take 80 mg daily for one week. Increase by 80 mg weekly to two to three times daily., Disp: 270 tablet, Rfl: 3     Allergies   Allergen Reactions     Fluoxetine Other (See Comments)     PN: LW Reaction: headache  PN: LW Reaction: headache  Migraine       Trazodone Other (See Comments) and Unknown     Other reaction(s): Headache  Other reaction(s): Headache  Other reaction(s): Headache     Amitriptyline Hcl Other (See Comments)     Migraine       Candesartan Cilexetil-Hctz Muscle Pain (Myalgia)     Cymbalta Other (See Comments)     Migraine       Cyproheptadine Hcl      headaches     Desvenlafaxine      Migraine       Diatrizoate Unknown     PN: LW CM1: CONTRAST- iodine Reaction :     Diazepam      Other reaction(s): Vestibular Toxicity  PN: LW Reaction: vertigo  PN: LW Reaction: vertigo     Imipramine Other (See Comments)     Migraine       Lyrica Muscle Pain (Myalgia)     Metoprolol Other (See Comments) and Unknown     headache  headache  headache     Morphine Other (See Comments)     Patient reports seizure      No Clinical Screening - See Comments      PN: LW FI1: lactose intolerance LW FI2: shellfish  PN: LW CM1: CONTRAST- iodine Reaction :  PN: LW Other1: -nka     Nortriptyline Other (See Comments)     Migraine       Phenergan Dm [Promethazine-Dm] Other (See Comments)     seizures     Promethazine      PN: LW Reaction: minimal sizures     Venlafaxine Other (See Comments)     PN: LW Reaction: migraine  PN: LW Reaction: migraine  Migraine       Wellbutrin [Bupropion Hydrobromide] Other (See Comments)     Migraine       Compazine Anxiety     Dihydroergotamine Anxiety and Other (See Comments)     Cardiac symptoms     Droperidol Anxiety     PN: LW Reaction: agitation     Medroxyprogesterone Hives     PN: LW Reaction: nausea     Prochlorperazine Anxiety     PN: LW Reaction: \"can't sit still\"        PHYSICAL EXAM:    No facial asymmetry  In no acute " distress  She has a bilateral occipital headache today      HPI:    Valerie Sim received Botox injections on 2019 for migraine headaches. The Botox has provided a noticeable improvement with regards to her headaches to date. A few weeks ago, she experienced a recurrence of cluster headaches. These headaches have been primarily on the right side. Her last occipital nerve block procedure was on 2019.      OCCIPITAL NERVE BLOCK PROCEDURE:    VERIFICATION OF PATIENT IDENTIFICATION AND PROCEDURE     Initials   Patient Name ses   Patient  ses   Procedure Verified by: michelle     Prior to the start of the procedure and with procedural staff participation, I verbally confirmed the patient s identity using two indicators, relevant allergies, that the procedure was appropriate and matched the consent or emergent situation, and that the correct equipment/implants were available. Immediately prior to starting the procedure I conducted the Time Out with the procedural staff and re-confirmed the patient s name, procedure, and site/side. (The Joint Commission universal protocol was followed.)  Yes    Sedation (Moderate or Deep): None      Above assessments performed by:    Addie Malhotra MD      INDICATION/S FOR PROCEDURE/S:  Valerie Sim is a 54-year-old patient with chronic migraine and occipital headaches. Her baseline symptoms have been recalcitrant to oral medications and conservative therapy.  She is here today for bilateral occipital nerve blocks.      GOAL OF PROCEDURE:  The goal of this procedure is to decrease pain  associated with chronic migraines and occipital headaches.      RIGHT AND LEFT GREATER OCCIPITAL NERVE BLOCKS.     Prior to the start of the procedure and with procedural staff participation, I verbally confirmed the patient s identity using two indicators, relevant allergies, that the procedure was appropriate and matched the consent or emergent situation, and that the correct  equipment/implants were available. Immediately prior to starting the procedure I conducted the Time Out with the procedural staff and re-confirmed the patient s name, procedure, and site/side. (The Joint Commission universal protocol was followed.)  Yes    Sedation (Moderate or Deep): None  Guidance: Ultrasound    Area just inferior to insertion of the right and left superior trapezius insertion onto skull was cleansed with chloraprep. Needle was advanced anteriorly to base of skull then slightly withdrawn and injectate was injected in a fan-like distribution at different depths. Total injection of 0.5 cc of 40 mg triamcinolone acetonide plus 2.5 cc of 0.5 % bupivicaine per side. Valerie Sim tolerated the procedure well without any immediate complications.    She was allowed to recover for an appropriate period of time and was discharged home in stable condition.        MEDICATION:  Triamcinolone acetonide:  NDC: [address]-5  Lot: [id number]  Exp: 02/2021    Bupivacaine 0.5%:  NDC: 7775-1851-76  Lot: -[id number]  Exp: 09/2020      RESPONSE TO PROCEDURE:  Valerie Sim tolerated the procedure well and there were no immediate complications.  She was allowed to recover for an appropriate period of time and was discharged home in stable condition.    FOLLOW UP:  Valerie Sim was asked to follow up by phone in 7-14 days with Estela Magaña RN, Care Coordinator, to report her response to this series of injections.  Based on the patient's previous response to this therapy, Valerie Sim was rescheduled for the next series of injections in 12 weeks.    PLAN (Medication Changes, Therapy Orders, Work or Disability Issues, etc.): Patient will follow-up regarding response to this procedure.    Again, thank you for allowing me to participate in the care of your patient.      Sincerely,    Addie Malhotra MD

## 2019-06-18 NOTE — TELEPHONE ENCOUNTER
Last Written Prescription Date:  4/8/2019  Last Fill Quantity: 6MG  # refills:3   Last office visit: 2/25/2019  Future Office Visit:  8/9/2019

## 2019-06-18 NOTE — PROGRESS NOTES
"OCCIPITAL NERVE BLOCK PROCEDURE NOTE    Chief Complaint   Patient presents with     RECHECK     [facility] RETURN BOTOX     /85 (BP Location: Left arm, Patient Position: Chair, Cuff Size: Adult Regular)   Pulse 98   Temp 98.2  F (36.8  C) (Oral)   Resp 16   Ht 1.702 m (5' 7\")   Wt 78.6 kg (173 lb 4.8 oz)   SpO2 97%   BMI 27.14 kg/m      Current Outpatient Medications:      aMILoride (MIDAMOR) 5 MG tablet, , Disp: , Rfl:      amLODIPine (NORVASC) 10 MG tablet, , Disp: , Rfl:      amphetamine-dextroamphetamine (ADDERALL) 20 MG tablet, Take 1 tablet (20 mg) by mouth 2 times daily, Disp: 60 tablet, Rfl: 0     amphetamine-dextroamphetamine (ADDERALL) 20 MG tablet, Take 1 tablet (20 mg) by mouth 2 times daily, Disp: 60 tablet, Rfl: 0     amphetamine-dextroamphetamine (ADDERALL) 20 MG tablet, Take 1 tablet (20 mg) by mouth 2 times daily, Disp: 60 tablet, Rfl: 0     budesonide (RINOCORT AQUA) 32 MCG/ACT nasal spray, Spray 1 spray into both nostrils daily., Disp: , Rfl:      calcitRIOL (ROCALTROL) 0.25 MCG capsule, Take 0.25 mcg by mouth daily, Disp: , Rfl:      Calcium Citrate 200 MG TABS, Take 200 mg by mouth 2 times daily, Disp: , Rfl:      celecoxib (CELEBREX) 200 MG capsule, , Disp: , Rfl:      cyclobenzaprine (FLEXERIL) 10 MG tablet, , Disp: , Rfl:      CycloSPORINE (RESTASIS OP), Apply to eye 2 times daily, Disp: , Rfl:      Divalproex Sodium (DEPAKOTE PO), Take 250 mg by mouth every 48 hours as needed , Disp: , Rfl:      eletriptan (RELPAX) 40 MG tablet, TAKE 1 TABLET AT ONSET OF MIGRAINE. MAY REPEAT ONCE AFTER 2 HRS. MAX OF 2 TABS/24HRS AND 3 DAYS/WEEK., Disp: 12 tablet, Rfl: 3     estrogens, conjugated, (PREMARIN) 0.625 MG tablet, Take by mouth daily .30 mg. , Disp: , Rfl:      fexofenadine (ALLEGRA) 180 MG tablet, Take  by mouth daily., Disp: , Rfl:      HYDROmorphone (DILAUDID) 2 MG tablet, Take 0.5-1 tablets (1-2 mg) by mouth every 3 hours as needed (headache) 20 tablets = 3 month supply., Disp: 24 tablet, " Rfl: 0     indomethacin (INDOCIN) 50 MG capsule, TAKE 1 CAPSULE BY MOUTH TWICE DAILY WITH MEALS. AFTER ONE WEEK, MAY INCREASE TO 1 CAPSULE THREE TIMES DAILY., Disp: 90 capsule, Rfl: 0     lamoTRIgine (LAMICTAL) 200 MG tablet, Take 1 tablet by mouth daily, Disp: , Rfl:      LORazepam (ATIVAN) 1 MG tablet, Take 1 mg by mouth 2 times daily as needed., Disp: , Rfl:      Lutein 20 MG TABS, Take 1 tablet by mouth every other day Reported on 5/9/2017, Disp: , Rfl:      ondansetron (ZOFRAN) 4 MG tablet, Take 1 tablet (4 mg) by mouth every 8 hours as needed, Disp: 90 tablet, Rfl: 1     order for DME, Equipment being ordered: Oxygen and non-rebreather mask.   Use high flow oxygen (10-15 L/min) with non-rebreather mask, as needed for cluster headaches, Disp: 1 Units, Rfl: 11     predniSONE (DELTASONE) 10 MG tablet, , Disp: , Rfl:      predniSONE (DELTASONE) 20 MG tablet, Take 4 tabs (80 mg) by mouth daily x 5 days, 3 tabs (60 mg) daily x 5 days, 2 tab (40 mg) daily x 5 days, then 1 tab (20 mg) x 5 days., Disp: 50 tablet, Rfl: 0     QUEtiapine (SEROQUEL) 50 MG tablet, Take 1 tablet by mouth daily., Disp: , Rfl:      SUMAtriptan (IMITREX STATDOSE) 6 MG/0.5ML pen injector kit, Inject 0.5 mLs (6 mg) Subcutaneous at onset of headache for migraine May repeat in 1 hour. Max 12 mg/24 hours., Disp: 1 kit, Rfl: 0     SUMAtriptan (IMITREX) 6 MG/0.5ML injection, Inject 0.5 mLs (6 mg) Subcutaneous every 12 hours as needed for other (cluster) May repeat dose in 1 hour. Max 12 mg/24 hours., Disp: 18 vial, Rfl: 3     valACYclovir (VALTREX) 1000 mg tablet, Take 1,000 mg by mouth as needed., Disp: , Rfl:      verapamil (CALAN) 80 MG tablet, Take 1 tablet (80 mg) by mouth 3 times daily Take 80 mg daily for one week. Increase by 80 mg weekly to two to three times daily., Disp: 270 tablet, Rfl: 3     Allergies   Allergen Reactions     Fluoxetine Other (See Comments)     PN: LW Reaction: headache  PN: LW Reaction: headache  Migraine       Trazodone  "Other (See Comments) and Unknown     Other reaction(s): Headache  Other reaction(s): Headache  Other reaction(s): Headache     Amitriptyline Hcl Other (See Comments)     Migraine       Candesartan Cilexetil-Hctz Muscle Pain (Myalgia)     Cymbalta Other (See Comments)     Migraine       Cyproheptadine Hcl      headaches     Desvenlafaxine      Migraine       Diatrizoate Unknown     PN: LW CM1: CONTRAST- iodine Reaction :     Diazepam      Other reaction(s): Vestibular Toxicity  PN: LW Reaction: vertigo  PN: LW Reaction: vertigo     Imipramine Other (See Comments)     Migraine       Lyrica Muscle Pain (Myalgia)     Metoprolol Other (See Comments) and Unknown     headache  headache  headache     Morphine Other (See Comments)     Patient reports seizure      No Clinical Screening - See Comments      PN: LW FI1: lactose intolerance LW FI2: shellfish  PN: LW CM1: CONTRAST- iodine Reaction :  PN: LW Other1: -nka     Nortriptyline Other (See Comments)     Migraine       Phenergan Dm [Promethazine-Dm] Other (See Comments)     seizures     Promethazine      PN: LW Reaction: minimal sizures     Venlafaxine Other (See Comments)     PN: LW Reaction: migraine  PN: LW Reaction: migraine  Migraine       Wellbutrin [Bupropion Hydrobromide] Other (See Comments)     Migraine       Compazine Anxiety     Dihydroergotamine Anxiety and Other (See Comments)     Cardiac symptoms     Droperidol Anxiety     PN: LW Reaction: agitation     Medroxyprogesterone Hives     PN: LW Reaction: nausea     Prochlorperazine Anxiety     PN: LW Reaction: \"can't sit still\"        PHYSICAL EXAM:    No facial asymmetry  In no acute distress  She has a bilateral occipital headache today      HPI:    Valerie Sim received Botox injections on 4/29/2019 for migraine headaches. The Botox has provided a noticeable improvement with regards to her headaches to date. A few weeks ago, she experienced a recurrence of cluster headaches. These headaches have been " primarily on the right side. Her last occipital nerve block procedure was on 2019.      OCCIPITAL NERVE BLOCK PROCEDURE:    VERIFICATION OF PATIENT IDENTIFICATION AND PROCEDURE     Initials   Patient Name ses   Patient  ses   Procedure Verified by: michelle     Prior to the start of the procedure and with procedural staff participation, I verbally confirmed the patient s identity using two indicators, relevant allergies, that the procedure was appropriate and matched the consent or emergent situation, and that the correct equipment/implants were available. Immediately prior to starting the procedure I conducted the Time Out with the procedural staff and re-confirmed the patient s name, procedure, and site/side. (The [employer] universal protocol was followed.)  Yes    Sedation (Moderate or Deep): None      Above assessments performed by:    Addie Malhotra MD      INDICATION/S FOR PROCEDURE/S:  Valerie Sim is a 54-year-old patient with chronic migraine and occipital headaches. Her baseline symptoms have been recalcitrant to oral medications and conservative therapy.  She is here today for bilateral occipital nerve blocks.      GOAL OF PROCEDURE:  The goal of this procedure is to decrease pain  associated with chronic migraines and occipital headaches.      RIGHT AND LEFT GREATER OCCIPITAL NERVE BLOCKS.     Prior to the start of the procedure and with procedural staff participation, I verbally confirmed the patient s identity using two indicators, relevant allergies, that the procedure was appropriate and matched the consent or emergent situation, and that the correct equipment/implants were available. Immediately prior to starting the procedure I conducted the Time Out with the procedural staff and re-confirmed the patient s name, procedure, and site/side. (The [employer] universal protocol was followed.)  Yes    Sedation (Moderate or Deep): None  Guidance: Ultrasound    Area just inferior to  insertion of the right and left superior trapezius insertion onto skull was cleansed with chloraprep. Needle was advanced anteriorly to base of skull then slightly withdrawn and injectate was injected in a fan-like distribution at different depths. Total injection of 0.5 cc of 40 mg triamcinolone acetonide plus 2.5 cc of 0.5 % bupivicaine per side. Valerie Sim tolerated the procedure well without any immediate complications.    She was allowed to recover for an appropriate period of time and was discharged home in stable condition.        MEDICATION:  Triamcinolone acetonide:  NDC: [address]-8  Lot: [id number]  Exp: 02/2021    Bupivacaine 0.5%:  NDC: 6291-5760-75  Lot: -[id number]  Exp: 09/2020      RESPONSE TO PROCEDURE:  Valerie Sim tolerated the procedure well and there were no immediate complications.  She was allowed to recover for an appropriate period of time and was discharged home in stable condition.    FOLLOW UP:  Valerie Sim was asked to follow up by phone in 7-14 days with Estela Magaña RN, Care Coordinator, to report her response to this series of injections.  Based on the patient's previous response to this therapy, Valerie Sim was rescheduled for the next series of injections in 12 weeks.    PLAN (Medication Changes, Therapy Orders, Work or Disability Issues, etc.): Patient will follow-up regarding response to this procedure.

## 2019-07-03 ENCOUNTER — OFFICE VISIT (OUTPATIENT)
Dept: PHYSICAL MEDICINE AND REHAB | Facility: CLINIC | Age: 54
End: 2019-07-03
Payer: COMMERCIAL

## 2019-07-03 VITALS
BODY MASS INDEX: 26.44 KG/M2 | WEIGHT: 168.8 LBS | TEMPERATURE: 98.2 F | OXYGEN SATURATION: 99 % | DIASTOLIC BLOOD PRESSURE: 86 MMHG | SYSTOLIC BLOOD PRESSURE: 139 MMHG | HEART RATE: 92 BPM

## 2019-07-03 DIAGNOSIS — G43.719 CHRONIC MIGRAINE WITHOUT AURA, INTRACTABLE, WITHOUT STATUS MIGRAINOSUS: Primary | ICD-10-CM

## 2019-07-03 RX ORDER — BUPIVACAINE HYDROCHLORIDE 2.5 MG/ML
4.5 INJECTION, SOLUTION EPIDURAL; INFILTRATION; INTRACAUDAL ONCE
Status: COMPLETED | OUTPATIENT
Start: 2019-07-03 | End: 2019-07-03

## 2019-07-03 RX ADMIN — BUPIVACAINE HYDROCHLORIDE 11.25 MG: 2.5 INJECTION, SOLUTION EPIDURAL; INFILTRATION; INTRACAUDAL at 13:00

## 2019-07-03 SDOH — HEALTH STABILITY: MENTAL HEALTH: HOW OFTEN DO YOU HAVE A DRINK CONTAINING ALCOHOL?: 2-4 TIMES A MONTH

## 2019-07-03 SDOH — HEALTH STABILITY: MENTAL HEALTH: HOW MANY STANDARD DRINKS CONTAINING ALCOHOL DO YOU HAVE ON A TYPICAL DAY?: 1 OR 2

## 2019-07-03 ASSESSMENT — PAIN SCALES - GENERAL: PAINLEVEL: EXTREME PAIN (9)

## 2019-07-03 NOTE — PROGRESS NOTES
BOTULINUM TOXIN PROCEDURE - HEADACHE - NOTE    Chief Complaint   Patient presents with     Botox     [facility] RETURN - BOTOX     /86 (BP Location: Left arm, Patient Position: Sitting, Cuff Size: Adult Regular)   Pulse 92   Temp 98.2  F (36.8  C) (Oral)   Wt 76.6 kg (168 lb 12.8 oz)   SpO2 99%   BMI 26.44 kg/m       Current Outpatient Medications:      aMILoride (MIDAMOR) 5 MG tablet, , Disp: , Rfl:      amLODIPine (NORVASC) 10 MG tablet, , Disp: , Rfl:      amphetamine-dextroamphetamine (ADDERALL) 20 MG tablet, Take 1 tablet (20 mg) by mouth 2 times daily, Disp: 60 tablet, Rfl: 0     amphetamine-dextroamphetamine (ADDERALL) 20 MG tablet, Take 1 tablet (20 mg) by mouth 2 times daily, Disp: 60 tablet, Rfl: 0     amphetamine-dextroamphetamine (ADDERALL) 20 MG tablet, Take 1 tablet (20 mg) by mouth 2 times daily, Disp: 60 tablet, Rfl: 0     budesonide (RINOCORT AQUA) 32 MCG/ACT nasal spray, Spray 1 spray into both nostrils daily., Disp: , Rfl:      calcitRIOL (ROCALTROL) 0.25 MCG capsule, Take 0.25 mcg by mouth daily, Disp: , Rfl:      Calcium Citrate 200 MG TABS, Take 200 mg by mouth 2 times daily, Disp: , Rfl:      celecoxib (CELEBREX) 200 MG capsule, , Disp: , Rfl:      cyclobenzaprine (FLEXERIL) 10 MG tablet, , Disp: , Rfl:      CycloSPORINE (RESTASIS OP), Apply to eye 2 times daily, Disp: , Rfl:      Divalproex Sodium (DEPAKOTE PO), Take 250 mg by mouth every 48 hours as needed , Disp: , Rfl:      eletriptan (RELPAX) 40 MG tablet, TAKE 1 TABLET AT ONSET OF MIGRAINE. MAY REPEAT ONCE AFTER 2 HRS. MAX OF 2 TABS/24HRS AND 3 DAYS/WEEK., Disp: 12 tablet, Rfl: 3     estrogens, conjugated, (PREMARIN) 0.625 MG tablet, Take by mouth daily .30 mg . , Disp: , Rfl:      fexofenadine (ALLEGRA) 180 MG tablet, Take  by mouth daily., Disp: , Rfl:      HYDROmorphone (DILAUDID) 2 MG tablet, Take 0.5-1 tablets (1-2 mg) by mouth every 3 hours as needed (headache) 20 tablets = 3 month supply., Disp: 24 tablet, Rfl: 0      indomethacin (INDOCIN) 50 MG capsule, TAKE 1 CAPSULE BY MOUTH TWICE DAILY WITH MEALS. AFTER ONE WEEK, MAY INCREASE TO 1 CAPSULE THREE TIMES DAILY., Disp: 90 capsule, Rfl: 0     lamoTRIgine (LAMICTAL) 200 MG tablet, Take 1 tablet by mouth daily, Disp: , Rfl:      LORazepam (ATIVAN) 1 MG tablet, Take 1 mg by mouth 2 times daily as needed., Disp: , Rfl:      Lutein 20 MG TABS, Take 1 tablet by mouth every other day Reported on 5/9/2017, Disp: , Rfl:      ondansetron (ZOFRAN) 4 MG tablet, Take 1 tablet (4 mg) by mouth every 8 hours as needed, Disp: 90 tablet, Rfl: 1     order for DME, Equipment being ordered: Oxygen and non-rebreather mask.   Use high flow oxygen (10-15 L/min) with non-rebreather mask, as needed for cluster headaches, Disp: 1 Units, Rfl: 11     predniSONE (DELTASONE) 10 MG tablet, , Disp: , Rfl:      predniSONE (DELTASONE) 20 MG tablet, Take 4 tabs (80 mg) by mouth daily x 5 days, 3 tabs (60 mg) daily x 5 days, 2 tab (40 mg) daily x 5 days, then 1 tab (20 mg) x 5 days., Disp: 50 tablet, Rfl: 0     QUEtiapine (SEROQUEL) 50 MG tablet, Take 1 tablet by mouth daily., Disp: , Rfl:      SUMAtriptan (IMITREX STATDOSE) 6 MG/0.5ML pen injector kit, Inject 0.5 mLs (6 mg) Subcutaneous at onset of headache for migraine May repeat in 1 hour. Max 12 mg/24 hours., Disp: 1 kit, Rfl: 0     SUMAtriptan (IMITREX) 6 MG/0.5ML injection, Inject 0.5 mLs (6 mg) Subcutaneous every 12 hours as needed for other (cluster) May repeat dose in 1 hour. Max 12 mg/24 hours., Disp: 18 vial, Rfl: 3     valACYclovir (VALTREX) 1000 mg tablet, Take 1,000 mg by mouth as needed., Disp: , Rfl:      verapamil (CALAN) 80 MG tablet, Take 1 tablet (80 mg) by mouth 3 times daily Take 80 mg daily for one week. Increase by 80 mg weekly to two to three times daily., Disp: 270 tablet, Rfl: 3    Current Facility-Administered Medications:      botulinum toxin type A (BOTOX) 100 units injection 225 Units, 225 Units, Intramuscular, Once, Addie Malhotra  "MD Darlin     bupivacaine (MARCAINE) 0.25% preservative free injection, 4.5 mL, Intradermal, Once, Standal, Addie Saeed MD     Allergies   Allergen Reactions     Fluoxetine Other (See Comments)     PN: LW Reaction: headache  PN: LW Reaction: headache  Migraine       Trazodone Other (See Comments) and Unknown     Other reaction(s): Headache  Other reaction(s): Headache  Other reaction(s): Headache     Amitriptyline Hcl Other (See Comments)     Migraine       Candesartan Cilexetil-Hctz Muscle Pain (Myalgia)     Cymbalta Other (See Comments)     Migraine       Cyproheptadine Hcl      headaches     Desvenlafaxine      Migraine       Diatrizoate Unknown     PN: LW CM1: CONTRAST- iodine Reaction :     Diazepam      Other reaction(s): Vestibular Toxicity  PN: LW Reaction: vertigo  PN: LW Reaction: vertigo     Imipramine Other (See Comments)     Migraine       Lyrica Muscle Pain (Myalgia)     Metoprolol Other (See Comments) and Unknown     headache  headache  headache     Morphine Other (See Comments)     Patient reports seizure      No Clinical Screening - See Comments      PN: LW FI1: lactose intolerance LW FI2: shellfish  PN: LW CM1: CONTRAST- iodine Reaction :  PN: LW Other1: -nka     Nortriptyline Other (See Comments)     Migraine       Phenergan Dm [Promethazine-Dm] Other (See Comments)     seizures     Promethazine      PN: LW Reaction: minimal sizures     Venlafaxine Other (See Comments)     PN: LW Reaction: migraine  PN: LW Reaction: migraine  Migraine       Wellbutrin [Bupropion Hydrobromide] Other (See Comments)     Migraine       Compazine Anxiety     Dihydroergotamine Anxiety and Other (See Comments)     Cardiac symptoms     Droperidol Anxiety     PN: LW Reaction: agitation     Medroxyprogesterone Hives     PN: LW Reaction: nausea     Prochlorperazine Anxiety     PN: LW Reaction: \"can't sit still\"        PHYSICAL EXAM:    Pleasant and cooperative  No facial asymmetry  She has a headache today, which is rated " "a 6/10.     HPI:    Patient denies new medical diagnoses, illnesses, hospitalizations, emergency room visits, and injuries since the previous injection with botulinum neurotoxin.      We reviewed the recommended safety guidelines for  Botox from any vaccine injection, such as the seasonal flu vaccine, by a minimum of 10-14 days with Valerie Sim. She acknowledged understanding.      RESPONSE TO PREVIOUS TREATMENT:  Change in headache pattern following last series of injections with 225 units of Botox on 04/29/2019.    Side effects: Malaise and fatigue for 2 days following injections.    1.  Headache frequency during this injection cycle:  The patient has had \"wild fluctuation\" in headaches since her parathyroidectomy on 5/20/2019. Cluster headaches have subsided, but she continues to have her usual migraines. She reports 10 headache days per month the first month, and 8 headache days per month the second month. This is compared to her baseline migraine frequency of daily.     2.  Headache duration during this injection cycle: Her mild baseline headache varies in length throughout the day but is always present, her migraines last 1-3 days. She did have a flare up of her migraine after her occipital nerve block on 6/18/2019 that lasted until 7/2/2019. Her migraines have typically been responsive to injectable Imitrex since starting Botox injections.    3.  Headache intensity during this injection cycle:    A.  6-7/10  =  Typical pain level.    B.  10/10  =  Worst pain level.    C.  4/10  =  Lowest pain level - has had more lower level headache days this cycle.     4.  Change in headache medication usage during this injection cycle: She has been using injectable Imitrex, which works well for her occipital migraines. She used this 14 times in the last 9 weeks, and used Ralpax 2 times in the last 9 weeks, and Toradol 6 times.     5.  ER Visits During This Injection Cycle:  None. Prior to receiving Botox, she " had been going to the ED for migraines 13 times per year.     6.  Functional Performance:  Change in ADL's, social interaction, days lost from work, etc. She reports missing 5 social obligations and days of 3 work during this injection cycle due to headaches.  She is on disability for her headaches, and works 12 hours per week.     BOTULINUM NEUROTOXIN INJECTION PROCEDURES:      VERIFICATION OF PATIENT IDENTIFICATION AND PROCEDURE     Initials   Patient Name ses   Patient  ses   Procedure Verified by: ses     Prior to the start of the procedure and with procedural staff participation, I verbally confirmed the patient s identity using two indicators, relevant allergies, that the procedure was appropriate and matched the consent or emergent situation, and that the correct equipment/implants were available. Immediately prior to starting the procedure I conducted the Time Out with the procedural staff and re-confirmed the patient s name, procedure, and site/side. (The [employer] universal protocol was followed.)  Yes    Sedation (Moderate or Deep): None    Above assessments performed by:    Addie Malhotra MD      INDICATIONS FOR PROCEDURES:  Valerie Sim is a 54-year-old patient with chronic migraine headaches associated with cervicogenic components. Her baseline symptoms have been recalcitrant to oral medications and conservative therapy. She is here today for an injection of Botox.         GOAL OF PROCEDURE:  The goal of this procedure is to decrease pain and enhance functional independence.     TOTAL DOSE ADMINISTERED:  Dose Administered: 225 units Botox (Botulinum Toxin Type A)   Diluent Used: 0.25% Bupivacaine (Batch: [id number], Exp: 2021, NDC: 55150-167-10)  Total Volume of Diluent Used: See below  Lot # /[id number] with Expiration Date: 2021  NDC #: Botox 100u ([address]-15)     Was there drug waste? Yes  Amount of drug waste (mL): 75 units Botox.  Reason for waste:  Single use  vial  Multi-dose vial: No    Addie Malhotra MD  July 3, 2019    Medication guide was offered to patient and was declined.     CONSENT:  The risks, benefits, and treatment options were discussed with Valerie Sim and she agreed to proceed.     Written consent was obtained by [facility].      EQUIPMENT USED:  Needle-37mm stimulating/recording  Needle-30 gauge  EMG/NCS Machine      SKIN PREPARATION:  Skin preparation was performed using an alcohol wipe.        AREA/MUSCLE INJECTED: 225 UNITS BOTOX = TOTAL DOSE  1. FACE & SCALP: 115 units of Botox = Total dose, 2:1 Dilution  Right temporalis - 12.5 units of Botox in 4 site/s.  Left temporalis - 12.5 units of Botox in 4 site/s.      Right frontalis - 10 units of Botox in 4 site/s.  Left frontalis - 10 units of Botox in 4 site/s.      Right procerus - 3.5 units of Botox in 1 site/s.  Left procerus - 3.5 units of Botox in 1 site/s.      Right  - 4 units of Botox in 1 site/s.  Left  - 4 units of Botox in 1 site/s.      Right Upper Occipitalis - 25 units of Botox at 4 site/s.   Left Upper Occipitalis - 25 units of Botox at 4 site/s.      Right Nasalis - 2.5 units of Botox at 1 site/s.   Left Nasalis - 2.5 units of Botox at 1 site/s.       2. JAW MUSCLES: 60 units of Botox = Total Dose, 1:1 Dilution  Right Masseter - 25 units of Botox at 1 site/s.   Left [place] - 25 units of Botox at 1 site/s.      Right temporalis - 5 units of Botox at 1 site/s.  Left temporalis - 5 units of Botox at 1 site/s.       3. SHOULDER & NECK MUSCLES: Total dose 50 units of Botox, 2:1 Dilution      Right levator muscle - 10 units of Botox at 2 site/s (neck and shoulder).  Left levator muscle - 10 units of Botox at 2 site/s (neck and shoulder).      Right mid trapezius - 5 units of Botox at 1 site.  Left mid trapezius - 5 units of Botox at 1 site.    Right lateral trapezius - 10 units of Botox at 2 site/s  Left lateral trapezuis - 10 units of Botox at 2 site/s.           RESPONSE TO PROCEDURE: Valerie Sim tolerated the procedure well and there were no immediate complications. She was allowed to recover for an appropriate period of time and was discharged home in stable condition.     FOLLOW UP:  Valerie Sim was asked to follow up by phone in 7-14 days with Nely Jean Baptiste PT, Care Coordinator or Estela Magaña RN, Care Coordinator, to report her response to this series of injections. Based on the patient's previous response to this therapy, Valerie Sim was rescheduled for the next series of injections in 9 weeks. Due to a limited duration of Botox effectiveness (approximately 8-9 weeks), we will continue to schedule her Botox every 9 weeks.      PLAN (Medication Changes, Therapy Orders, Work or Disability Issues, etc.): Patient will monitor her response to today's injections and report.

## 2019-07-03 NOTE — LETTER
7/3/2019       RE: Valerie Sim  [address] [facility] [id number]     Dear Colleague,    Thank you for referring your patient, Valerie Sim, to the [facility] PHYSICAL MEDICINE AND REHABILITATION at [facility]. Please see a copy of my visit note below.    BOTULINUM TOXIN PROCEDURE - HEADACHE - NOTE    Chief Complaint   Patient presents with     Botox     UMP RETURN - BOTOX     /86 (BP Location: Left arm, Patient Position: Sitting, Cuff Size: Adult Regular)   Pulse 92   Temp 98.2  F (36.8  C) (Oral)   Wt 76.6 kg (168 lb 12.8 oz)   SpO2 99%   BMI 26.44 kg/m        Current Outpatient Medications:      aMILoride (MIDAMOR) 5 MG tablet, , Disp: , Rfl:      amLODIPine (NORVASC) 10 MG tablet, , Disp: , Rfl:      amphetamine-dextroamphetamine (ADDERALL) 20 MG tablet, Take 1 tablet (20 mg) by mouth 2 times daily, Disp: 60 tablet, Rfl: 0     amphetamine-dextroamphetamine (ADDERALL) 20 MG tablet, Take 1 tablet (20 mg) by mouth 2 times daily, Disp: 60 tablet, Rfl: 0     amphetamine-dextroamphetamine (ADDERALL) 20 MG tablet, Take 1 tablet (20 mg) by mouth 2 times daily, Disp: 60 tablet, Rfl: 0     budesonide (RINOCORT AQUA) 32 MCG/ACT nasal spray, Spray 1 spray into both nostrils daily., Disp: , Rfl:      calcitRIOL (ROCALTROL) 0.25 MCG capsule, Take 0.25 mcg by mouth daily, Disp: , Rfl:      Calcium Citrate 200 MG TABS, Take 200 mg by mouth 2 times daily, Disp: , Rfl:      celecoxib (CELEBREX) 200 MG capsule, , Disp: , Rfl:      cyclobenzaprine (FLEXERIL) 10 MG tablet, , Disp: , Rfl:      CycloSPORINE (RESTASIS OP), Apply to eye 2 times daily, Disp: , Rfl:      Divalproex Sodium (DEPAKOTE PO), Take 250 mg by mouth every 48 hours as needed , Disp: , Rfl:      eletriptan (RELPAX) 40 MG tablet, TAKE 1 TABLET AT ONSET OF MIGRAINE. MAY REPEAT ONCE AFTER 2 HRS. MAX OF 2 TABS/24HRS AND 3 DAYS/WEEK., Disp: 12 tablet, Rfl: 3     estrogens, conjugated, (PREMARIN)  0.625 MG tablet, Take by mouth daily .30 mg . , Disp: , Rfl:      fexofenadine (ALLEGRA) 180 MG tablet, Take  by mouth daily., Disp: , Rfl:      HYDROmorphone (DILAUDID) 2 MG tablet, Take 0.5-1 tablets (1-2 mg) by mouth every 3 hours as needed (headache) 20 tablets = 3 month supply., Disp: 24 tablet, Rfl: 0     indomethacin (INDOCIN) 50 MG capsule, TAKE 1 CAPSULE BY MOUTH TWICE DAILY WITH MEALS. AFTER ONE WEEK, MAY INCREASE TO 1 CAPSULE THREE TIMES DAILY., Disp: 90 capsule, Rfl: 0     lamoTRIgine (LAMICTAL) 200 MG tablet, Take 1 tablet by mouth daily, Disp: , Rfl:      LORazepam (ATIVAN) 1 MG tablet, Take 1 mg by mouth 2 times daily as needed., Disp: , Rfl:      Lutein 20 MG TABS, Take 1 tablet by mouth every other day Reported on 5/9/2017, Disp: , Rfl:      ondansetron (ZOFRAN) 4 MG tablet, Take 1 tablet (4 mg) by mouth every 8 hours as needed, Disp: 90 tablet, Rfl: 1     order for DME, Equipment being ordered: Oxygen and non-rebreather mask.   Use high flow oxygen (10-15 L/min) with non-rebreather mask, as needed for cluster headaches, Disp: 1 Units, Rfl: 11     predniSONE (DELTASONE) 10 MG tablet, , Disp: , Rfl:      predniSONE (DELTASONE) 20 MG tablet, Take 4 tabs (80 mg) by mouth daily x 5 days, 3 tabs (60 mg) daily x 5 days, 2 tab (40 mg) daily x 5 days, then 1 tab (20 mg) x 5 days., Disp: 50 tablet, Rfl: 0     QUEtiapine (SEROQUEL) 50 MG tablet, Take 1 tablet by mouth daily., Disp: , Rfl:      SUMAtriptan (IMITREX STATDOSE) 6 MG/0.5ML pen injector kit, Inject 0.5 mLs (6 mg) Subcutaneous at onset of headache for migraine May repeat in 1 hour. Max 12 mg/24 hours., Disp: 1 kit, Rfl: 0     SUMAtriptan (IMITREX) 6 MG/0.5ML injection, Inject 0.5 mLs (6 mg) Subcutaneous every 12 hours as needed for other (cluster) May repeat dose in 1 hour. Max 12 mg/24 hours., Disp: 18 vial, Rfl: 3     valACYclovir (VALTREX) 1000 mg tablet, Take 1,000 mg by mouth as needed., Disp: , Rfl:      verapamil (CALAN) 80 MG tablet, Take 1  tablet (80 mg) by mouth 3 times daily Take 80 mg daily for one week. Increase by 80 mg weekly to two to three times daily., Disp: 270 tablet, Rfl: 3    Current Facility-Administered Medications:      botulinum toxin type A (BOTOX) 100 units injection 225 Units, 225 Units, Intramuscular, Once, Addie Malhotra MD     bupivacaine (MARCAINE) 0.25% preservative free injection, 4.5 mL, Intradermal, Once, Addie Malhotra MD     Allergies   Allergen Reactions     Fluoxetine Other (See Comments)     PN: LW Reaction: headache  PN: LW Reaction: headache  Migraine       Trazodone Other (See Comments) and Unknown     Other reaction(s): Headache  Other reaction(s): Headache  Other reaction(s): Headache     Amitriptyline Hcl Other (See Comments)     Migraine       Candesartan Cilexetil-Hctz Muscle Pain (Myalgia)     Cymbalta Other (See Comments)     Migraine       Cyproheptadine Hcl      headaches     Desvenlafaxine      Migraine       Diatrizoate Unknown     PN: LW CM1: CONTRAST- iodine Reaction :     Diazepam      Other reaction(s): Vestibular Toxicity  PN: LW Reaction: vertigo  PN: LW Reaction: vertigo     Imipramine Other (See Comments)     Migraine       Lyrica Muscle Pain (Myalgia)     Metoprolol Other (See Comments) and Unknown     headache  headache  headache     Morphine Other (See Comments)     Patient reports seizure      No Clinical Screening - See Comments      PN: LW FI1: lactose intolerance LW FI2: shellfish  PN: LW CM1: CONTRAST- iodine Reaction :  PN: LW Other1: -nka     Nortriptyline Other (See Comments)     Migraine       Phenergan Dm [Promethazine-Dm] Other (See Comments)     seizures     Promethazine      PN: LW Reaction: minimal sizures     Venlafaxine Other (See Comments)     PN: LW Reaction: migraine  PN: LW Reaction: migraine  Migraine       Wellbutrin [Bupropion Hydrobromide] Other (See Comments)     Migraine       Compazine Anxiety     Dihydroergotamine Anxiety and Other (See Comments)  "    Cardiac symptoms     Droperidol Anxiety     PN: LW Reaction: agitation     Medroxyprogesterone Hives     PN: LW Reaction: nausea     Prochlorperazine Anxiety     PN: LW Reaction: \"can't sit still\"        PHYSICAL EXAM:    Pleasant and cooperative  No facial asymmetry  She has a headache today, which is rated a 6/10.     HPI:    Patient denies new medical diagnoses, illnesses, hospitalizations, emergency room visits, and injuries since the previous injection with botulinum neurotoxin.      We reviewed the recommended safety guidelines for  Botox from any vaccine injection, such as the seasonal flu vaccine, by a minimum of 10-14 days with Valerie Sim. She acknowledged understanding.    RESPONSE TO PREVIOUS TREATMENT:  Change in headache pattern following last series of injections with 225 units of Botox on 04/29/2019.    Side effects: Malaise and fatigue for 2 days following injections.    1.  Headache frequency during this injection cycle:  The patient has had \"wild fluctuation\" in headaches since her parathyroidectomy on 5/20/2019. Cluster headaches have subsided, but she continues to have her usual migraines. She reports 10 headache days per month the first month, and 8 headache days per month the second month. This is compared to her baseline migraine frequency of daily.     2.  Headache duration during this injection cycle: Her mild baseline headache varies in length throughout the day but is always present, her migraines last 1-3 days. She did have a flare up of her migraine after her occipital nerve block on 6/18/2019 that lasted until 7/2/2019. Her migraines have typically been responsive to injectable Imitrex since starting Botox injections.    3.  Headache intensity during this injection cycle:    A.  6-7/10  =  Typical pain level.    B.  10/10  =  Worst pain level.    C.  4/10  =  Lowest pain level - has had more lower level headache days this cycle.     4.  Change in headache medication " usage during this injection cycle: She has been using injectable Imitrex, which works well for her occipital migraines. She used this 14 times in the last 9 weeks, and used Ralpax 2 times in the last 9 weeks, and Toradol 6 times.     5.  ER Visits During This Injection Cycle:  None. Prior to receiving Botox, she had been going to the ED for migraines 13 times per year.     6.  Functional Performance:  Change in ADL's, social interaction, days lost from work, etc. She reports missing 5 social obligations and days of 3 work during this injection cycle due to headaches.  She is on disability for her headaches, and works 12 hours per week.     BOTULINUM NEUROTOXIN INJECTION PROCEDURES:    VERIFICATION OF PATIENT IDENTIFICATION AND PROCEDURE     Initials   Patient Name ses   Patient  ses   Procedure Verified by: michelle     Prior to the start of the procedure and with procedural staff participation, I verbally confirmed the patient s identity using two indicators, relevant allergies, that the procedure was appropriate and matched the consent or emergent situation, and that the correct equipment/implants were available. Immediately prior to starting the procedure I conducted the Time Out with the procedural staff and re-confirmed the patient s name, procedure, and site/side. (The Joint Commission universal protocol was followed.)  Yes    Sedation (Moderate or Deep): None    Above assessments performed by:    Addie Malhotra MD    INDICATIONS FOR PROCEDURES:  Valerie Sim is a 54-year-old patient with chronic migraine headaches associated with cervicogenic components. Her baseline symptoms have been recalcitrant to oral medications and conservative therapy. She is here today for an injection of Botox.         GOAL OF PROCEDURE:  The goal of this procedure is to decrease pain and enhance functional independence.     TOTAL DOSE ADMINISTERED:  Dose Administered: 225 units Botox (Botulinum Toxin Type A)   Diluent Used:  0.25% Bupivacaine (Batch: [id number], Exp: 11/2021, NDC: 55150-167-10)  Total Volume of Diluent Used: See below  Lot # /C3 with Expiration Date: 11/2021  NDC #: Botox 100u ([address]-18)     Was there drug waste? Yes  Amount of drug waste (mL): 75 units Botox.  Reason for waste:  Single use vial  Multi-dose vial: No    Addie Malhotra MD  July 3, 2019    Medication guide was offered to patient and was declined.     CONSENT:  The risks, benefits, and treatment options were discussed with Valerie Sim and she agreed to proceed.     Written consent was obtained by SES.      EQUIPMENT USED:  Needle-37mm stimulating/recording  Needle-30 gauge  EMG/NCS Machine      SKIN PREPARATION:  Skin preparation was performed using an alcohol wipe.         AREA/MUSCLE INJECTED: 225 UNITS BOTOX = TOTAL DOSE  1. FACE & SCALP: 115 units of Botox = Total dose, 2:1 Dilution  Right temporalis - 12.5 units of Botox in 4 site/s.  Left temporalis - 12.5 units of Botox in 4 site/s.      Right frontalis - 10 units of Botox in 4 site/s.  Left frontalis - 10 units of Botox in 4 site/s.      Right procerus - 3.5 units of Botox in 1 site/s.  Left procerus - 3.5 units of Botox in 1 site/s.      Right  - 4 units of Botox in 1 site/s.  Left  - 4 units of Botox in 1 site/s.      Right Upper Occipitalis - 25 units of Botox at 4 site/s.   Left Upper Occipitalis - 25 units of Botox at 4 site/s.      Right Nasalis - 2.5 units of Botox at 1 site/s.   Left Nasalis - 2.5 units of Botox at 1 site/s.       2. JAW MUSCLES: 60 units of Botox = Total Dose, 1:1 Dilution  Right Masseter - 25 units of Botox at 1 site/s.   Left Masseter - 25 units of Botox at 1 site/s.      Right temporalis - 5 units of Botox at 1 site/s.  Left temporalis - 5 units of Botox at 1 site/s.       3. SHOULDER & NECK MUSCLES: Total dose 50 units of Botox, 2:1 Dilution      Right levator muscle - 10 units of Botox at 2 site/s (neck and shoulder).  Left levator  muscle - 10 units of Botox at 2 site/s (neck and shoulder).      Right mid trapezius - 5 units of Botox at 1 site.  Left mid trapezius - 5 units of Botox at 1 site.    Right lateral trapezius - 10 units of Botox at 2 site/s  Left lateral trapezuis - 10 units of Botox at 2 site/s.       RESPONSE TO PROCEDURE: Valerie Sim tolerated the procedure well and there were no immediate complications. She was allowed to recover for an appropriate period of time and was discharged home in stable condition.     FOLLOW UP:  Valerie Sim was asked to follow up by phone in 7-14 days with Nely Jean Baptiste PT, Care Coordinator or Estela Magaña RN, Care Coordinator, to report her response to this series of injections. Based on the patient's previous response to this therapy, Valerie Sim was rescheduled for the next series of injections in 9 weeks. Due to a limited duration of Botox effectiveness (approximately 8-9 weeks), we will continue to schedule her Botox every 9 weeks.      PLAN (Medication Changes, Therapy Orders, Work or Disability Issues, etc.): Patient will monitor her response to today's injections and report.    Again, thank you for allowing me to participate in the care of your patient.      Sincerely,    Addie Malhotra MD

## 2019-07-11 DIAGNOSIS — M53.3 SI (SACROILIAC) JOINT DYSFUNCTION: ICD-10-CM

## 2019-07-11 DIAGNOSIS — F07.81 POST CONCUSSION SYNDROME: ICD-10-CM

## 2019-07-11 RX ORDER — HYDROMORPHONE HYDROCHLORIDE 2 MG/1
1-2 TABLET ORAL
Qty: 24 TABLET | Refills: 0 | Status: SHIPPED | OUTPATIENT
Start: 2019-07-11

## 2019-07-11 RX ORDER — DEXTROAMPHETAMINE SACCHARATE, AMPHETAMINE ASPARTATE, DEXTROAMPHETAMINE SULFATE AND AMPHETAMINE SULFATE 5; 5; 5; 5 MG/1; MG/1; MG/1; MG/1
20 TABLET ORAL 2 TIMES DAILY
Qty: 60 TABLET | Refills: 0 | Status: SHIPPED | OUTPATIENT
Start: 2019-07-11 | End: 2019-08-09

## 2019-07-15 ENCOUNTER — CARE COORDINATION (OUTPATIENT)
Dept: NEUROLOGY | Facility: CLINIC | Age: 54
End: 2019-07-15

## 2019-07-15 NOTE — PROGRESS NOTES
Pharmacist, [place] [facility] [place] clarification on the hydromorphone since it says dispense 24 tablets and in comments it stats 20 tablets equal 3 month supply. I confirmed this is the case after looking back in the notes from 2017 in office visit with Vickie it states that 20 tablets is good for 3 months for patient to help.

## 2019-08-08 ASSESSMENT — ENCOUNTER SYMPTOMS
WEIGHT LOSS: 0
HALLUCINATIONS: 0
DECREASED APPETITE: 0
WEAKNESS: 0
SMELL DISTURBANCE: 1
DIZZINESS: 0
TROUBLE SWALLOWING: 0
ALTERED TEMPERATURE REGULATION: 1
SEIZURES: 0
PARALYSIS: 0
FATIGUE: 0
INCREASED ENERGY: 0
TASTE DISTURBANCE: 1
FEVER: 0
SORE THROAT: 0
NECK MASS: 0
SINUS PAIN: 0
NAIL CHANGES: 0
DISTURBANCES IN COORDINATION: 0
POOR WOUND HEALING: 0
WEIGHT GAIN: 0
SKIN CHANGES: 0
HOARSE VOICE: 0
SPEECH CHANGE: 0
LOSS OF CONSCIOUSNESS: 0
POLYDIPSIA: 0
TINGLING: 1
TREMORS: 0
MEMORY LOSS: 0
POLYPHAGIA: 0
CHILLS: 0
SINUS CONGESTION: 0
HEADACHES: 1
NUMBNESS: 1
NIGHT SWEATS: 1

## 2019-08-09 ENCOUNTER — OFFICE VISIT (OUTPATIENT)
Dept: NEUROLOGY | Facility: CLINIC | Age: 54
End: 2019-08-09
Payer: COMMERCIAL

## 2019-08-09 VITALS
HEART RATE: 95 BPM | RESPIRATION RATE: 15 BRPM | TEMPERATURE: 98.1 F | BODY MASS INDEX: 26.53 KG/M2 | SYSTOLIC BLOOD PRESSURE: 154 MMHG | DIASTOLIC BLOOD PRESSURE: 84 MMHG | WEIGHT: 169 LBS | OXYGEN SATURATION: 98 % | HEIGHT: 67 IN

## 2019-08-09 DIAGNOSIS — F07.81 POST CONCUSSION SYNDROME: ICD-10-CM

## 2019-08-09 DIAGNOSIS — G43.009 MIGRAINE WITHOUT AURA AND WITHOUT STATUS MIGRAINOSUS, NOT INTRACTABLE: ICD-10-CM

## 2019-08-09 DIAGNOSIS — G44.019 EPISODIC CLUSTER HEADACHE, NOT INTRACTABLE: ICD-10-CM

## 2019-08-09 PROBLEM — Z79.818 ANDROGEN DEPRIVATION THERAPY: Status: ACTIVE | Noted: 2019-04-10

## 2019-08-09 PROBLEM — E89.2 HYPOPARATHYROIDISM AFTER PROCEDURE (H): Status: ACTIVE | Noted: 2019-04-16

## 2019-08-09 PROBLEM — Z90.89 HISTORY OF PARATHYROIDECTOMY: Status: ACTIVE | Noted: 2019-05-20

## 2019-08-09 PROBLEM — E21.0 HYPERPARATHYROIDISM, PRIMARY (H): Status: ACTIVE | Noted: 2019-04-01

## 2019-08-09 PROBLEM — Z98.890 HISTORY OF PARATHYROIDECTOMY: Status: ACTIVE | Noted: 2019-05-20

## 2019-08-09 PROBLEM — N94.89 OTHER SPECIFIED CONDITIONS ASSOCIATED WITH FEMALE GENITAL ORGANS AND MENSTRUAL CYCLE: Status: ACTIVE | Noted: 2017-12-05

## 2019-08-09 RX ORDER — CHOLECALCIFEROL (VITAMIN D3) 50 MCG
1 TABLET ORAL DAILY
COMMUNITY
End: 2021-02-23

## 2019-08-09 RX ORDER — DEXTROAMPHETAMINE SACCHARATE, AMPHETAMINE ASPARTATE, DEXTROAMPHETAMINE SULFATE AND AMPHETAMINE SULFATE 5; 5; 5; 5 MG/1; MG/1; MG/1; MG/1
20 TABLET ORAL 2 TIMES DAILY
Qty: 60 TABLET | Refills: 0 | Status: SHIPPED | OUTPATIENT
Start: 2019-08-09 | End: 2019-08-09

## 2019-08-09 RX ORDER — DEXTROAMPHETAMINE SACCHARATE, AMPHETAMINE ASPARTATE, DEXTROAMPHETAMINE SULFATE AND AMPHETAMINE SULFATE 5; 5; 5; 5 MG/1; MG/1; MG/1; MG/1
20 TABLET ORAL 2 TIMES DAILY
Qty: 60 TABLET | Refills: 0 | Status: SHIPPED | OUTPATIENT
Start: 2019-08-12 | End: 2024-09-03

## 2019-08-09 ASSESSMENT — PAIN SCALES - GENERAL: PAINLEVEL: SEVERE PAIN (6)

## 2019-08-09 ASSESSMENT — MIFFLIN-ST. JEOR: SCORE: 1404.21

## 2019-08-09 NOTE — PROGRESS NOTES
"[facility]  Neurology Progress Note    Subjective:    Ms. Baker returns to the neurology clinic for follow-up of chronic migraine with visual aura as well as episodic cluster headache.  She also has a previous history of head trauma and is been on Adderall for treatment for several years.    I last saw her on February 5, 2019, which we discussed her migraine headaches and different treatment she could try for cluster.  Unfortunately, her insurance denied oxygen therapy, and she has been unable to obtain this.  This is resulted in her referring back to her previous treatments, including lorazepam and hydromorphone.    At today's visit, we reviewed her headaches, which continue to occur 30 out of 30 headache days a month, and in addition, she will get a sudden severe shortness her headache between 2 and 4 PM if she is during a cluster attack.  On average, her clusters last about 2 weeks at a time.    We reviewed her treatment plan, including all of the different medications and injections that she uses for headaches, her to clarify what she is using when.  We also reviewed her medication list and updated this appropriately.  She reports that she tried verapamil but felt like her \"brain is being squished\" or like it is an \"onion sac\", and stopped taking it.    Otherwise, since I last saw her she has parathyroidectomy in May 2019 for hyperparathyroidism.  She reports that 3-1/2 nodes were removed, and she has had an improvement in her energy as well as cognition since the surgery.  She reports that her depression is doing \"okay\" and is better since the surgery as well.  She continues to receive pudendal nerve blocks.    Answers for HPI/ROS submitted by the patient on 8/8/2019   General Symptoms: Yes  Skin Symptoms: Yes  HENT Symptoms: Yes  EYE SYMPTOMS: No  HEART SYMPTOMS: No  LUNG SYMPTOMS: No  INTESTINAL SYMPTOMS: No  URINARY SYMPTOMS: No  GYNECOLOGIC SYMPTOMS: " "No  BREAST SYMPTOMS: No  SKELETAL SYMPTOMS: No  BLOOD SYMPTOMS: No  NERVOUS SYSTEM SYMPTOMS: Yes  MENTAL HEALTH SYMPTOMS: No  Fever: No  Loss of appetite: No  Weight loss: No  Weight gain: No  Fatigue: No  Night sweats: Yes  Chills: No  Increased stress: No  Excessive hunger: No  Excessive thirst: No  Feeling hot or cold when others believe the temperature is normal: Yes  Loss of height: No  Post-operative complications: No  Surgical site pain: No  Hallucinations: No  Change in or Loss of Energy: No  Hyperactivity: No  Confusion: No  Changes in hair: No  Changes in moles/birth marks: No  Itching: Yes  Rashes: No  Changes in nails: No  Acne: No  Hair in places you don't want it: No  Change in facial hair: No  Warts: No  Non-healing sores: No  Scarring: No  Flaking of skin: No  Color changes of hands/feet in cold : No  Sun sensitivity: No  Skin thickening: No  Ear pain: No  Ear discharge: No  Hearing loss: No  Tinnitus: Yes  Nosebleeds: No  Congestion: No  Sinus pain: No  Trouble swallowing: No   Voice hoarseness: No  Mouth sores: No  Sore throat: No  Tooth pain: Yes  Gum tenderness: No  Bleeding gums: No  Change in taste: Yes  Change in sense of smell: Yes  Dry mouth: No  Hearing aid used: No  Neck lump: No  Trouble with coordination: No  Dizziness or trouble with balance: No  Fainting or black-out spells: No  Memory loss: No  Headache: Yes  Seizures: No  Speech problems: No  Tingling: Yes  Tremor: No  Weakness: No  Difficulty walking: No  Paralysis: No  Numbness: Yes    Objective:    Vitals: BP (!) 154/84   Pulse 95   Temp 98.1  F (36.7  C) (Oral)   Resp 15   Ht 1.702 m (5' 7\")   Wt 76.7 kg (169 lb)   SpO2 98%   BMI 26.47 kg/m    General: Cooperative, NAD  Head: Atraumatic  Neck: Normal range of motion  Cardiac: no lower extremity edema  Neurologic:  Mental Status: Fully alert, attentive and oriented. Speech clear and fluent.   Cranial Nerves: Facial movements symmetric.   Motor: No abnormal movements.  " Moving all extremities.    Station/Gait: Normal casual gait.     Pertinent Investigations:    She underwent neuropsychological testing on April 19, 2019.  This testing showed a mild nonspecific pattern of weaknesses that was not overly concerning for an acquired brain disorder.  Continued psychotherapeutic care was strongly recommended.    Assessment/Plan:   Valerie Sim is a 54-year-old woman who returns to the neurology clinic for follow-up of chronic migraine with visual aura as well as episodic cluster headache.  Since her last visit, she tried verapamil but did not tolerate this and stopped taking it.  She has continued botulinum toxin injections for prevention of migraine, which has been very helpful.  Unfortunately, she has continued to get episodic cluster headaches.    Further testing with neuropsychological testing, did not show any specific cognitive deficits to suggest an underlying brain disorder from her previous head trauma, but did recommend continued mental health care for depression and anxiety.  She continues to take Adderall for postconcussive syndrome.    For treatment of headache, we reviewed her acute treatment options.  - When she experiences a severe headache, she will take either eletriptan oral or sumatriptan subcutaneous injection, depending on the quality of the headache.  She will repeat this dose once if needed.  She is aware that eletriptan and sumatriptan should not be taken together and the should not be taken more than 9 days/month to avoid medication overuse.  -If her triptan is not effective, she will move onto ketorolac subcutaneous injection and should not be taken more than 9 days/month to avoid medication overuse.  -If a further rescue medicine is needed, she has lorazepam or hydromorphone available to her.  We reviewed the importance of not taking a benzodiazepine with the narcotic medication for safety, as well as reviewing the recommended limit of normal more than 4  days/month if these are being used.  I refilled these rescue medications for her today, but it is my overall goal that she will gradually be able to decrease her use.  I will be out of maternity leave for the next 3 months, and she will reach out to her primary care physician regarding any refills that are needed.  -For nausea, she will continue ondansetron as needed, not to exceed more than 9 days/month to avoid medication overuse.    For headache prevention, she will continue botulinum toxin injections for chronic migraine.  Regarding her episodic cluster headaches, I recommended galcanezumab 300 mg subcutaneous injection at the onset of a cluster attack, with a repeat dose in 1 month if needed.  I also renewed her prescription for high flow oxygen via a nonrebreather mask, and she will attempt again to get this filled.  Finally, she can have occipital nerve blocks completed on an as-needed basis with Dr. Ji as well.    I spent 40 minutes with the patient, over half of which was counseling.  I will plan to see her back in 6 months.    Jamila Gonzales MD  Neurology   Pager [phone]

## 2019-08-09 NOTE — LETTER
"8/9/2019       RE: Valerie iSm  [address] [facility] [address]     Dear Colleague,    Thank you for referring your patient, Valerie Sim, to the [facility] NEUROLOGY at [facility]. Please see a copy of my visit note below.    [facility]    Clinics and [facility]  Neurology Progress Note    Subjective:    Ms. Baker returns to the neurology clinic for follow-up of chronic migraine with visual aura as well as episodic cluster headache.  She also has a previous history of head trauma and is been on Adderall for treatment for several years.    I last saw her on February 5, 2019, which we discussed her migraine headaches and different treatment she could try for cluster.  Unfortunately, her insurance denied oxygen therapy, and she has been unable to obtain this.  This is resulted in her referring back to her previous treatments, including lorazepam and hydromorphone.    At today's visit, we reviewed her headaches, which continue to occur 30 out of 30 headache days a month, and in addition, she will get a sudden severe shortness her headache between 2 and 4 PM if she is during a cluster attack.  On average, her clusters last about 2 weeks at a time.    We reviewed her treatment plan, including all of the different medications and injections that she uses for headaches, her to clarify what she is using when.  We also reviewed her medication list and updated this appropriately.  She reports that she tried verapamil but felt like her \"brain is being squished\" or like it is an \"onion sac\", and stopped taking it.    Otherwise, since I last saw her she has parathyroidectomy in May 2019 for hyperparathyroidism.  She reports that 3-1/2 nodes were removed, and she has had an improvement in her energy as well as cognition since the surgery.  She reports that her depression is doing \"okay\" and is better since the surgery as well.  She " "continues to receive pudendal nerve blocks.    Objective:    Vitals: BP (!) 154/84   Pulse 95   Temp 98.1  F (36.7  C) (Oral)   Resp 15   Ht 1.702 m (5' 7\")   Wt 76.7 kg (169 lb)   SpO2 98%   BMI 26.47 kg/m     General: Cooperative, NAD  Head: Atraumatic  Neck: Normal range of motion  Cardiac: no lower extremity edema  Neurologic:  Mental Status: Fully alert, attentive and oriented. Speech clear and fluent.   Cranial Nerves: Facial movements symmetric.   Motor: No abnormal movements.  Moving all extremities.    Station/Gait: Normal casual gait.     Pertinent Investigations:    She underwent neuropsychological testing on April 19, 2019.  This testing showed a mild nonspecific pattern of weaknesses that was not overly concerning for an acquired brain disorder.  Continued psychotherapeutic care was strongly recommended.    Assessment/Plan:   Valerie Sim is a 54-year-old woman who returns to the neurology clinic for follow-up of chronic migraine with visual aura as well as episodic cluster headache.  Since her last visit, she tried verapamil but did not tolerate this and stopped taking it.  She has continued botulinum toxin injections for prevention of migraine, which has been very helpful.  Unfortunately, she has continued to get episodic cluster headaches.    Further testing with neuropsychological testing, did not show any specific cognitive deficits to suggest an underlying brain disorder from her previous head trauma, but did recommend continued mental health care for depression and anxiety.  She continues to take Adderall for postconcussive syndrome.    For treatment of headache, we reviewed her acute treatment options.  - When she experiences a severe headache, she will take either eletriptan oral or sumatriptan subcutaneous injection, depending on the quality of the headache.  She will repeat this dose once if needed.  She is aware that eletriptan and sumatriptan should not be taken together and the " should not be taken more than 9 days/month to avoid medication overuse.  -If her triptan is not effective, she will move onto ketorolac subcutaneous injection and should not be taken more than 9 days/month to avoid medication overuse.  -If a further rescue medicine is needed, she has lorazepam or hydromorphone available to her.  We reviewed the importance of not taking a benzodiazepine with the narcotic medication for safety, as well as reviewing the recommended limit of normal more than 4 days/month if these are being used.  I refilled these rescue medications for her today, but it is my overall goal that she will gradually be able to decrease her use.  I will be out of maternity leave for the next 3 months, and she will reach out to her primary care physician regarding any refills that are needed.  -For nausea, she will continue ondansetron as needed, not to exceed more than 9 days/month to avoid medication overuse.    For headache prevention, she will continue botulinum toxin injections for chronic migraine.  Regarding her episodic cluster headaches, I recommended galcanezumab 300 mg subcutaneous injection at the onset of a cluster attack, with a repeat dose in 1 month if needed.  I also renewed her prescription for high flow oxygen via a nonrebreather mask, and she will attempt again to get this filled.  Finally, she can have occipital nerve blocks completed on an as-needed basis with Dr. Ji as well.    I spent 40 minutes with the patient, over half of which was counseling.  I will plan to see her back in 6 months.    Jamila Gonzales MD  Neurology   Pager [phone]

## 2019-08-12 ENCOUNTER — TELEPHONE (OUTPATIENT)
Dept: NEUROLOGY | Facility: CLINIC | Age: 54
End: 2019-08-12

## 2019-08-12 DIAGNOSIS — G43.719 INTRACTABLE CHRONIC MIGRAINE WITHOUT AURA AND WITHOUT STATUS MIGRAINOSUS: Primary | ICD-10-CM

## 2019-08-12 RX ORDER — KETOROLAC TROMETHAMINE 30 MG/ML
30 INJECTION, SOLUTION INTRAMUSCULAR; INTRAVENOUS EVERY 6 HOURS PRN
Qty: 9 ML | Refills: 11 | Status: SHIPPED | OUTPATIENT
Start: 2019-08-12 | End: 2023-05-08

## 2019-08-12 NOTE — TELEPHONE ENCOUNTER
[facility] Call Center    Phone Message    May a detailed message be left on voicemail: yes    Reason for Call: Other: Pharmacy calling to say they found the 9ml vials.  Disregard message     Action Taken: Message routed to:  [facility] Neurology

## 2019-08-12 NOTE — TELEPHONE ENCOUNTER
[facility] Call Center    Phone Message    May a detailed message be left on voicemail: yes    Reason for Call: Medication Question or concern regarding medication   Prescription Clarification  Name of Medication: ketorolac (TORADOL) 30 MG/ML injection  Prescribing Provider: Dr. Gonzales   Pharmacy: [facility]   What on the order needs clarification? The pharmacy is wanting to verify the quantity on the prescription. It is written for 9mL, but this medication comes in 2mL vials. They are wondering if it is supposed to be 9 doses, which would be 18mL. Please give them a call back to clarify.          Action Taken: Message routed to:  [facility] Neurology

## 2019-08-12 NOTE — TELEPHONE ENCOUNTER
Prior Authorization Retail Medication Request    Medication/Dose: Galcanezumab-gnlm 100 MG/ML SOSY 3 syringes; Inject 300 mg Subcutaneous every 28 days subcutaneous.  ICD code (if different than what is on RX):  Episodic cluster headache, not intractable [G44.019]   Previously Tried and Failed:      Since her last visit, she tried verapamil but did not tolerate this and stopped taking it.  \    She has continued botulinum toxin injections for prevention of migraine, which has been very helpful.  Unfortunately, she has continued to get episodic cluster headaches.     When she experiences a severe headache, she will take either eletriptan oral or sumatriptan subcutaneous injection, depending on the quality of the headache.  She will repeat this dose once if needed.  She is aware that eletriptan and sumatriptan should not be taken together and the should not be taken more than 9 days/month to avoid medication overuse.    If her triptan is not effective, she will move onto ketorolac subcutaneous injection and should not be taken more than 9 days/month to avoid medication overuse.    If a further rescue medicine is needed, she has lorazepam or hydromorphone available to her.  We reviewed the importance of not taking a benzodiazepine with the narcotic medication for safety, as well as reviewing the recommended limit of normal more than 4 days/month if these are being used.  I refilled these rescue medications for her today, but it is my overall goal that she will gradually be able to decrease her use.  I will be out of maternity leave for the next 3 months, and she will reach out to her primary care physician regarding any refills that are needed.    For nausea, she will continue ondansetron as needed, not to exceed more than 9 days/month to avoid medication overuse.    Insurance Name:  1280-[employer]  Insurance ID:  [id number]   Insurance Group number: [id number]   Insurance phone: [phone]       Pharmacy Information  (if different than what is on RX)  Name:  [facility] - [place], [place] - [address]  Phone:  [phone]

## 2019-08-13 DIAGNOSIS — G44.019 EPISODIC CLUSTER HEADACHE, NOT INTRACTABLE: ICD-10-CM

## 2019-08-13 RX ORDER — CEFUROXIME AXETIL 250 MG/1
6 TABLET ORAL
Qty: 1 KIT | Refills: 0 | Status: SHIPPED | OUTPATIENT
Start: 2019-08-13 | End: 2019-09-27

## 2019-08-13 NOTE — TELEPHONE ENCOUNTER
PA Initiation    Medication: Galcanezumab-gnlm 100 MG/ML SOSY 3 syringes; Inject 300 mg Subcutaneous every 28 days subcutaneous.  Insurance Company: [employer] - Phone [phone] Fax [phone]  Pharmacy Filling the Rx: [facility] [address]  Filling Pharmacy Phone: [phone]  Filling Pharmacy Fax:    Start Date: 8/13/2019    Central Prior Authorization Team   Phone: [phone]

## 2019-08-14 NOTE — TELEPHONE ENCOUNTER
Prior Authorization Approval    Authorization Effective Date: 8/13/2019  Authorization Expiration Date: 11/13/2019  Medication: Galcanezumab-gnlm 100 MG/ML SOSY 3 syringes; Inject 300 mg Subcutaneous every 28 days subcutaneous.  Approved Dose/Quantity: 1  Reference #: [id number]   Insurance Company: [employer] - Phone [phone] Fax [phone]  Which Pharmacy is filling the prescription (Not needed for infusion/clinic administered): [facility] [address]  Pharmacy Notified: Yes  Patient Notified: Yes **Instructed pharmacy to notify patient when script is ready to /ship.**

## 2019-08-16 DIAGNOSIS — G43.009 MIGRAINE WITHOUT AURA AND WITHOUT STATUS MIGRAINOSUS, NOT INTRACTABLE: ICD-10-CM

## 2019-08-16 RX ORDER — ONDANSETRON 4 MG/1
4 TABLET, FILM COATED ORAL EVERY 8 HOURS PRN
Qty: 90 TABLET | Refills: 1 | Status: SHIPPED | OUTPATIENT
Start: 2019-08-16 | End: 2023-05-08

## 2019-09-04 ENCOUNTER — OFFICE VISIT (OUTPATIENT)
Dept: PHYSICAL MEDICINE AND REHAB | Facility: CLINIC | Age: 54
End: 2019-09-04
Payer: COMMERCIAL

## 2019-09-04 VITALS
SYSTOLIC BLOOD PRESSURE: 152 MMHG | TEMPERATURE: 98.3 F | WEIGHT: 170.3 LBS | BODY MASS INDEX: 26.67 KG/M2 | HEART RATE: 98 BPM | DIASTOLIC BLOOD PRESSURE: 80 MMHG | OXYGEN SATURATION: 99 %

## 2019-09-04 DIAGNOSIS — G43.719 CHRONIC MIGRAINE WITHOUT AURA, INTRACTABLE, WITHOUT STATUS MIGRAINOSUS: Primary | ICD-10-CM

## 2019-09-04 RX ORDER — BUPIVACAINE HYDROCHLORIDE 2.5 MG/ML
4.5 INJECTION, SOLUTION EPIDURAL; INFILTRATION; INTRACAUDAL ONCE
Status: COMPLETED | OUTPATIENT
Start: 2019-09-04 | End: 2019-09-04

## 2019-09-04 RX ADMIN — BUPIVACAINE HYDROCHLORIDE 11.25 MG: 2.5 INJECTION, SOLUTION EPIDURAL; INFILTRATION; INTRACAUDAL at 14:34

## 2019-09-04 ASSESSMENT — PAIN SCALES - GENERAL: PAINLEVEL: SEVERE PAIN (7)

## 2019-09-04 NOTE — LETTER
9/4/2019       RE: Valerie Sim  [address] [facility] [address]     Dear Colleague,    Thank you for referring your patient, Valerie Sim, to the [facility] PHYSICAL MEDICINE AND REHABILITATION at [facility]. Please see a copy of my visit note below.    BOTULINUM TOXIN PROCEDURE - HEADACHE - NOTE    Chief Complaint   Patient presents with     Botox     UMP RETURN - BOTOX     BP (!) 152/80 (BP Location: Left arm, Patient Position: Sitting, Cuff Size: Adult Regular)   Pulse 98   Temp 98.3  F (36.8  C) (Oral)   Wt 77.2 kg (170 lb 4.8 oz)   SpO2 99%   BMI 26.67 kg/m        Current Outpatient Medications:      aMILoride (MIDAMOR) 5 MG tablet, , Disp: , Rfl:      amLODIPine (NORVASC) 10 MG tablet, , Disp: , Rfl:      amphetamine-dextroamphetamine (ADDERALL) 20 MG tablet, Take 1 tablet (20 mg) by mouth 2 times daily, Disp: 60 tablet, Rfl: 0     budesonide (RINOCORT AQUA) 32 MCG/ACT nasal spray, Spray 1 spray into both nostrils daily., Disp: , Rfl:      Calcium Citrate (CALCITRATE PO), Take 1,200 mg by mouth 2 times daily 1/2 TAB bid, Disp: , Rfl:      cyclobenzaprine (FLEXERIL) 10 MG tablet, , Disp: , Rfl:      CycloSPORINE (RESTASIS OP), Apply to eye 2 times daily, Disp: , Rfl:      eletriptan (RELPAX) 40 MG tablet, TAKE 1 TABLET AT ONSET OF MIGRAINE. MAY REPEAT ONCE AFTER 2 HRS. MAX OF 2 TABS/24HRS AND 3 DAYS/WEEK., Disp: 12 tablet, Rfl: 3     estrogens, conjugated, (PREMARIN) 0.625 MG tablet, Take by mouth daily .30 mg . , Disp: , Rfl:      fexofenadine (ALLEGRA) 180 MG tablet, Take  by mouth daily., Disp: , Rfl:      Galcanezumab-gnlm 100 MG/ML SOSY, Inject 300 mg Subcutaneous every 28 days, Disp: 3 Syringe, Rfl: 3     HYDROmorphone (DILAUDID) 2 MG tablet, Take 0.5-1 tablets (1-2 mg) by mouth every 3 hours as needed (headache) 20 tablets = 3 month supply., Disp: 24 tablet, Rfl: 0     ketorolac (TORADOL) 30 MG/ML injection, Inject 1 mL (30 mg) into  the vein every 6 hours as needed for moderate pain, Disp: 9 mL, Rfl: 11     lamoTRIgine (LAMICTAL) 200 MG tablet, Take 1 tablet by mouth daily, Disp: , Rfl:      LORazepam (ATIVAN) 1 MG tablet, Take 1 mg by mouth 2 times daily as needed., Disp: , Rfl:      ondansetron (ZOFRAN) 4 MG tablet, Take 1 tablet (4 mg) by mouth every 8 hours as needed, Disp: 90 tablet, Rfl: 1     order for DME, Equipment being ordered: Oxygen and non-rebreather mask.   Use high flow oxygen (10-15 L/min) with non-rebreather mask, as needed for cluster headaches, Disp: 1 Units, Rfl: 11     QUEtiapine (SEROQUEL) 50 MG tablet, Take 1 tablet by mouth daily., Disp: , Rfl:      SUMAtriptan (IMITREX STATDOSE) 6 MG/0.5ML pen injector kit, Inject 0.5 mLs (6 mg) Subcutaneous at onset of headache for migraine May repeat in 1 hour. Max 12 mg/24 hours., Disp: 1 kit, Rfl: 0     valACYclovir (VALTREX) 1000 mg tablet, Take 1,000 mg by mouth as needed., Disp: , Rfl:      vitamin D3 (CHOLECALCIFEROL) 2000 units (50 mcg) tablet, Take 1 tablet by mouth daily, Disp: , Rfl:      Allergies   Allergen Reactions     Fluoxetine Other (See Comments)     PN: LW Reaction: headache  PN: LW Reaction: headache  Migraine       Trazodone Other (See Comments) and Unknown     Other reaction(s): Headache  Other reaction(s): Headache  Other reaction(s): Headache     Amitriptyline Hcl Other (See Comments)     Migraine       Candesartan Cilexetil-Hctz Muscle Pain (Myalgia)     Cymbalta Other (See Comments)     Migraine       Cyproheptadine Hcl      headaches     Desvenlafaxine      Migraine       Diatrizoate Unknown     PN: LW CM1: CONTRAST- iodine Reaction :     Diazepam      Other reaction(s): Vestibular Toxicity  PN: LW Reaction: vertigo  PN: LW Reaction: vertigo     Imipramine Other (See Comments)     Migraine       Lyrica Muscle Pain (Myalgia)     Metoprolol Other (See Comments) and Unknown     headache  headache  headache     Morphine Other (See Comments)     Patient reports  "seizure      No Clinical Screening - See Comments      PN: LW FI1: lactose intolerance LW FI2: shellfish  PN: LW CM1: CONTRAST- iodine Reaction :  PN: LW Other1: -nka     Nortriptyline Other (See Comments)     Migraine       Phenergan Dm [Promethazine-Dm] Other (See Comments)     seizures     Promethazine      PN: LW Reaction: minimal sizures     Venlafaxine Other (See Comments)     PN: LW Reaction: migraine  PN: LW Reaction: migraine  Migraine       Wellbutrin [Bupropion Hydrobromide] Other (See Comments)     Migraine       Compazine Anxiety     Dihydroergotamine Anxiety and Other (See Comments)     Cardiac symptoms     Droperidol Anxiety     PN: LW Reaction: agitation     Medroxyprogesterone Hives     PN: LW Reaction: nausea     Prochlorperazine Anxiety     PN: LW Reaction: \"can't sit still\"        PHYSICAL EXAM:    Pleasant and cooperative  No facial asymmetry      HPI:    Patient denies new medical diagnoses, illnesses, hospitalizations, emergency room visits, and injuries since the previous injection with botulinum neurotoxin. She will be starting Emgality in the near future to help manage her cluster headaches.     We reviewed the recommended safety guidelines for  Botox from any vaccine injection, such as the seasonal flu vaccine, by a minimum of 10-14 days with Valerie Sim. She acknowledged understanding.      RESPONSE TO PREVIOUS TREATMENT:  Change in headache pattern following last series of injections with 225 units of Botox on 07/03/2019.    Side effects: Malaise and fatigue for4 days following injections, this has improved with the addition of bupivicaine.    1.  Headache frequency during this injection cycle:   She continues to get aura, irritability and nausea however her HA intensity has decreased.Cluster headaches have subsided, but she continues to have her usual migraines. She reports 10 headache days per month the first month, and 8 headache days per month the second month. This is " compared to her baseline migraine frequency of daily.     2.  Headache duration during this injection cycle: She had 9 Imitrex injections during this last cycle as well as Tordal IM 7 times in 9 weeks. She has had 16 migraines which lasted between 2- 24 hours. Her migraines have typically been responsive to injectable Imitrex since starting Botox injections.    3.  Headache intensity during this injection cycle:    A.  6-7/10  =  Typical pain level.    B.  9/10  =  Worst pain level.    C.  5/10  =  Lowest pain level - has had more lower level headache days this cycle.     4.  Change in headache medication usage during this injection cycle: She has been using injectable Imitrex, which works well for her occipital migraines. She used this 14 times in the last 9 weeks, and used Ralpax 2 times in the last 9 weeks, and Toradol 6 times.     5.  ER Visits During This Injection Cycle:  None. Prior to receiving Botox, she had been going to the ED for migraines 13 times per year.     6.  Functional Performance:  Change in ADL's, social interaction, days lost from work, etc. She reports missing 5-10 social obligations and days of 3 work during this injection cycle due to headaches.  She is on disability for her headaches, and works 12 hours per week, but had to leave work one day.     BOTULINUM NEUROTOXIN INJECTION PROCEDURES:      VERIFICATION OF PATIENT IDENTIFICATION AND PROCEDURE     Initials   Patient Name alp   Patient  alp   Procedure Verified by: toña     Prior to the start of the procedure and with procedural staff participation, I verbally confirmed the patient s identity using two indicators, relevant allergies, that the procedure was appropriate and matched the consent or emergent situation, and that the correct equipment/implants were available. Immediately prior to starting the procedure I conducted the Time Out with the procedural staff and re-confirmed the patient s name, procedure, and site/side. (The Joint  Commission universal protocol was followed.)  Yes    Sedation (Moderate or Deep): None    Above assessments performed by:    Sandhya Purvis MD PGY4          The attending provider was present for the entire procedure documented below.    Addie Malhotra MD      INDICATIONS FOR PROCEDURES:  Valerie Sim is a 54-year-old patient with chronic migraine headaches associated with cervicogenic components. Her baseline symptoms have been recalcitrant to oral medications and conservative therapy. She is here today for an injection of Botox.         GOAL OF PROCEDURE:  The goal of this procedure is to decrease pain and enhance functional independence.     TOTAL DOSE ADMINISTERED:  Dose Administered: 225 units Botox (Botulinum Toxin Type A)   Diluent Used: 0.25% Bupivacaine (Batch: [id number], Exp: 11/2021, NDC: 55150-167-10)  Total Volume of Diluent Used: See below  Lot # /C3 with Expiration Date: 03/2022  NDC #: Botox 100u ([address]-22)     Was there drug waste? Yes  Amount of drug waste (mL): 75 units Botox.  Reason for waste:  Single use vial  Multi-dose vial: No    Sandhya Purvis MD  September 4, 2019      Medication guide was offered to patient and was declined.     CONSENT:  The risks, benefits, and treatment options were discussed with Valerie Sim and she agreed to proceed.     Written consent was obtained by ALP.      EQUIPMENT USED:  Needle-37mm stimulating/recording  Needle-30 gauge  EMG/NCS Machine      SKIN PREPARATION:  Skin preparation was performed using an alcohol wipe.        AREA/MUSCLE INJECTED: 225 UNITS BOTOX = TOTAL DOSE  1. FACE & SCALP: 115 units of Botox = Total dose, 2:1 Dilution  Right temporalis - 12.5 units of Botox in 4 site/s.  Left temporalis - 12.5 units of Botox in 4 site/s.      Right frontalis - 10 units of Botox in 4 site/s.  Left frontalis - 10 units of Botox in 4 site/s.      Right procerus - 3.5 units of Botox in 1 site/s.  Left procerus - 3.5 units of Botox in 1  site/s.      Right  - 4 units of Botox in 1 site/s.  Left  - 4 units of Botox in 1 site/s.      Right Upper Occipitalis - 25 units of Botox at 4 site/s.   Left Upper Occipitalis - 25 units of Botox at 4 site/s.      Right Nasalis - 2.5 units of Botox at 1 site/s.   Left Nasalis - 2.5 units of Botox at 1 site/s.       2. JAW MUSCLES: 60 units of Botox = Total Dose, 1:1 Dilution  Right Masseter - 25 units of Botox at 1 site/s.   Left Masseter - 25 units of Botox at 1 site/s.      Right temporalis - 5 units of Botox at 1 site/s.  Left temporalis - 5 units of Botox at 1 site/s.       3. SHOULDER & NECK MUSCLES: Total dose 50 units of Botox, 2:1 Dilution      Right levator muscle - 10 units of Botox at 2 site/s (neck and shoulder).  Left levator muscle - 10 units of Botox at 2 site/s (neck and shoulder).      Right mid trapezius - 5 units of Botox at 1 site.  Left mid trapezius - 5 units of Botox at 1 site.    Right lateral trapezius - 10 units of Botox at 2 site/s  Left lateral trapezuis - 10 units of Botox at 2 site/s.          RESPONSE TO PROCEDURE: Valerie Sim tolerated the procedure well and there were no immediate complications. She was allowed to recover for an appropriate period of time and was discharged home in stable condition.     FOLLOW UP:  Valerie Sim was asked to follow up by phone in 7-14 days with Nely Jean Baptiste PT, Care Coordinator or Estela Magaña RN, Care Coordinator, to report her response to this series of injections. Based on the patient's previous response to this therapy, Valerie Sim was rescheduled for the next series of injections in 9 weeks. Due to a limited duration of Botox effectiveness (approximately 8-9 weeks), we will continue to schedule her Botox every 9 weeks.      PLAN (Medication Changes, Therapy Orders, Work or Disability Issues, etc.): Patient will monitor her response to today's injections and report.    Again, thank you for allowing me to  participate in the care of your patient.      Sincerely,    Addie Malhotra MD

## 2019-09-04 NOTE — PROGRESS NOTES
BOTULINUM TOXIN PROCEDURE - HEADACHE - NOTE    Chief Complaint   Patient presents with     Botox     [facility] RETURN - BOTOX     BP (!) 152/80 (BP Location: Left arm, Patient Position: Sitting, Cuff Size: Adult Regular)   Pulse 98   Temp 98.3  F (36.8  C) (Oral)   Wt 77.2 kg (170 lb 4.8 oz)   SpO2 99%   BMI 26.67 kg/m       Current Outpatient Medications:      aMILoride (MIDAMOR) 5 MG tablet, , Disp: , Rfl:      amLODIPine (NORVASC) 10 MG tablet, , Disp: , Rfl:      amphetamine-dextroamphetamine (ADDERALL) 20 MG tablet, Take 1 tablet (20 mg) by mouth 2 times daily, Disp: 60 tablet, Rfl: 0     budesonide (RINOCORT AQUA) 32 MCG/ACT nasal spray, Spray 1 spray into both nostrils daily., Disp: , Rfl:      Calcium Citrate (CALCITRATE PO), Take 1,200 mg by mouth 2 times daily 1/2 TAB bid, Disp: , Rfl:      cyclobenzaprine (FLEXERIL) 10 MG tablet, , Disp: , Rfl:      CycloSPORINE (RESTASIS OP), Apply to eye 2 times daily, Disp: , Rfl:      eletriptan (RELPAX) 40 MG tablet, TAKE 1 TABLET AT ONSET OF MIGRAINE. MAY REPEAT ONCE AFTER 2 HRS. MAX OF 2 TABS/24HRS AND 3 DAYS/WEEK., Disp: 12 tablet, Rfl: 3     estrogens, conjugated, (PREMARIN) 0.625 MG tablet, Take by mouth daily .30 mg . , Disp: , Rfl:      fexofenadine (ALLEGRA) 180 MG tablet, Take  by mouth daily., Disp: , Rfl:      Galcanezumab-gnlm 100 MG/ML SOSY, Inject 300 mg Subcutaneous every 28 days, Disp: 3 Syringe, Rfl: 3     HYDROmorphone (DILAUDID) 2 MG tablet, Take 0.5-1 tablets (1-2 mg) by mouth every 3 hours as needed (headache) 20 tablets = 3 month supply., Disp: 24 tablet, Rfl: 0     ketorolac (TORADOL) 30 MG/ML injection, Inject 1 mL (30 mg) into the vein every 6 hours as needed for moderate pain, Disp: 9 mL, Rfl: 11     lamoTRIgine (LAMICTAL) 200 MG tablet, Take 1 tablet by mouth daily, Disp: , Rfl:      LORazepam (ATIVAN) 1 MG tablet, Take 1 mg by mouth 2 times daily as needed., Disp: , Rfl:      ondansetron (ZOFRAN) 4 MG tablet, Take 1 tablet (4 mg) by mouth  every 8 hours as needed, Disp: 90 tablet, Rfl: 1     order for DME, Equipment being ordered: Oxygen and non-rebreather mask.   Use high flow oxygen (10-15 L/min) with non-rebreather mask, as needed for cluster headaches, Disp: 1 Units, Rfl: 11     QUEtiapine (SEROQUEL) 50 MG tablet, Take 1 tablet by mouth daily., Disp: , Rfl:      SUMAtriptan (IMITREX STATDOSE) 6 MG/0.5ML pen injector kit, Inject 0.5 mLs (6 mg) Subcutaneous at onset of headache for migraine May repeat in 1 hour. Max 12 mg/24 hours., Disp: 1 kit, Rfl: 0     valACYclovir (VALTREX) 1000 mg tablet, Take 1,000 mg by mouth as needed., Disp: , Rfl:      vitamin D3 (CHOLECALCIFEROL) 2000 units (50 mcg) tablet, Take 1 tablet by mouth daily, Disp: , Rfl:      Allergies   Allergen Reactions     Fluoxetine Other (See Comments)     PN: LW Reaction: headache  PN: LW Reaction: headache  Migraine       Trazodone Other (See Comments) and Unknown     Other reaction(s): Headache  Other reaction(s): Headache  Other reaction(s): Headache     Amitriptyline Hcl Other (See Comments)     Migraine       Candesartan Cilexetil-Hctz Muscle Pain (Myalgia)     Cymbalta Other (See Comments)     Migraine       Cyproheptadine Hcl      headaches     Desvenlafaxine      Migraine       Diatrizoate Unknown     PN: LW CM1: CONTRAST- iodine Reaction :     Diazepam      Other reaction(s): Vestibular Toxicity  PN: LW Reaction: vertigo  PN: LW Reaction: vertigo     Imipramine Other (See Comments)     Migraine       Lyrica Muscle Pain (Myalgia)     Metoprolol Other (See Comments) and Unknown     headache  headache  headache     Morphine Other (See Comments)     Patient reports seizure      No Clinical Screening - See Comments      PN: LW FI1: lactose intolerance LW FI2: shellfish  PN: LW CM1: CONTRAST- iodine Reaction :  PN: LW Other1: -nka     Nortriptyline Other (See Comments)     Migraine       Phenergan Dm [Promethazine-Dm] Other (See Comments)     seizures     Promethazine      PN: LW  "Reaction: minimal sizures     Venlafaxine Other (See Comments)     PN: LW Reaction: migraine  PN: LW Reaction: migraine  Migraine       Wellbutrin [Bupropion Hydrobromide] Other (See Comments)     Migraine       Compazine Anxiety     Dihydroergotamine Anxiety and Other (See Comments)     Cardiac symptoms     Droperidol Anxiety     PN: LW Reaction: agitation     Medroxyprogesterone Hives     PN: LW Reaction: nausea     Prochlorperazine Anxiety     PN: LW Reaction: \"can't sit still\"        PHYSICAL EXAM:    Pleasant and cooperative  No facial asymmetry      HPI:    Patient denies new medical diagnoses, illnesses, hospitalizations, emergency room visits, and injuries since the previous injection with botulinum neurotoxin. She will be starting Emgality in the near future to help manage her cluster headaches.     We reviewed the recommended safety guidelines for  Botox from any vaccine injection, such as the seasonal flu vaccine, by a minimum of 10-14 days with Valerie Sim. She acknowledged understanding.      RESPONSE TO PREVIOUS TREATMENT:  Change in headache pattern following last series of injections with 225 units of Botox on 07/03/2019.    Side effects: Malaise and fatigue for4 days following injections, this has improved with the addition of bupivicaine.    1.  Headache frequency during this injection cycle:   She continues to get aura, irritability and nausea however her HA intensity has decreased.Cluster headaches have subsided, but she continues to have her usual migraines. She reports 10 headache days per month the first month, and 8 headache days per month the second month. This is compared to her baseline migraine frequency of daily.     2.  Headache duration during this injection cycle: She had 9 Imitrex injections during this last cycle as well as Tordal IM 7 times in 9 weeks. She has had 16 migraines which lasted between 2- 24 hours. Her migraines have typically been responsive to injectable " Imitrex since starting Botox injections.    3.  Headache intensity during this injection cycle:    A.  6-7/10  =  Typical pain level.    B.  9/10  =  Worst pain level.    C.  5/10  =  Lowest pain level - has had more lower level headache days this cycle.     4.  Change in headache medication usage during this injection cycle: She has been using injectable Imitrex, which works well for her occipital migraines. She used this 14 times in the last 9 weeks, and used Ralpax 2 times in the last 9 weeks, and Toradol 6 times.     5.  ER Visits During This Injection Cycle:  None. Prior to receiving Botox, she had been going to the ED for migraines 13 times per year.     6.  Functional Performance:  Change in ADL's, social interaction, days lost from work, etc. She reports missing 5-10 social obligations and days of 3 work during this injection cycle due to headaches.  She is on disability for her headaches, and works 12 hours per week, but had to leave work one day.     BOTULINUM NEUROTOXIN INJECTION PROCEDURES:      VERIFICATION OF PATIENT IDENTIFICATION AND PROCEDURE     Initials   Patient Name alp   Patient  alp   Procedure Verified by: toña     Prior to the start of the procedure and with procedural staff participation, I verbally confirmed the patient s identity using two indicators, relevant allergies, that the procedure was appropriate and matched the consent or emergent situation, and that the correct equipment/implants were available. Immediately prior to starting the procedure I conducted the Time Out with the procedural staff and re-confirmed the patient s name, procedure, and site/side. (The Joint Commission universal protocol was followed.)  Yes    Sedation (Moderate or Deep): None    Above assessments performed by:    Sandhya Purvis MD PGY4          The attending provider was present for the entire procedure documented below.    Addie Malhotra MD      INDICATIONS FOR PROCEDURES:  Valerie Sim is a  54-year-old patient with chronic migraine headaches associated with cervicogenic components. Her baseline symptoms have been recalcitrant to oral medications and conservative therapy. She is here today for an injection of Botox.         GOAL OF PROCEDURE:  The goal of this procedure is to decrease pain and enhance functional independence.     TOTAL DOSE ADMINISTERED:  Dose Administered: 225 units Botox (Botulinum Toxin Type A)   Diluent Used: 0.25% Bupivacaine (Batch: [id number], Exp: 11/2021, NDC: 55150-167-10)  Total Volume of Diluent Used: See below  Lot # /[id number] with Expiration Date: 03/2022  NDC #: Botox 100u ([address]-19)     Was there drug waste? Yes  Amount of drug waste (mL): 75 units Botox.  Reason for waste:  Single use vial  Multi-dose vial: No    Sandhya Purvis MD  September 4, 2019      Medication guide was offered to patient and was declined.     CONSENT:  The risks, benefits, and treatment options were discussed with Valerie Sim and she agreed to proceed.     Written consent was obtained by ALP.      EQUIPMENT USED:  Needle-37mm stimulating/recording  Needle-30 gauge  EMG/NCS Machine      SKIN PREPARATION:  Skin preparation was performed using an alcohol wipe.        AREA/MUSCLE INJECTED: 225 UNITS BOTOX = TOTAL DOSE  1. FACE & SCALP: 115 units of Botox = Total dose, 2:1 Dilution  Right temporalis - 12.5 units of Botox in 4 site/s.  Left temporalis - 12.5 units of Botox in 4 site/s.      Right frontalis - 10 units of Botox in 4 site/s.  Left frontalis - 10 units of Botox in 4 site/s.      Right procerus - 3.5 units of Botox in 1 site/s.  Left procerus - 3.5 units of Botox in 1 site/s.      Right  - 4 units of Botox in 1 site/s.  Left  - 4 units of Botox in 1 site/s.      Right Upper Occipitalis - 25 units of Botox at 4 site/s.   Left Upper Occipitalis - 25 units of Botox at 4 site/s.      Right Nasalis - 2.5 units of Botox at 1 site/s.   Left Nasalis - 2.5 units of Botox at  1 site/s.       2. JAW MUSCLES: 60 units of Botox = Total Dose, 1:1 Dilution  Right Masseter - 25 units of Botox at 1 site/s.   Left Masseter - 25 units of Botox at 1 site/s.      Right temporalis - 5 units of Botox at 1 site/s.  Left temporalis - 5 units of Botox at 1 site/s.       3. SHOULDER & NECK MUSCLES: Total dose 50 units of Botox, 2:1 Dilution      Right levator muscle - 10 units of Botox at 2 site/s (neck and shoulder).  Left levator muscle - 10 units of Botox at 2 site/s (neck and shoulder).      Right mid trapezius - 5 units of Botox at 1 site.  Left mid trapezius - 5 units of Botox at 1 site.    Right lateral trapezius - 10 units of Botox at 2 site/s  Left lateral trapezuis - 10 units of Botox at 2 site/s.          RESPONSE TO PROCEDURE: Valerie Sim tolerated the procedure well and there were no immediate complications. She was allowed to recover for an appropriate period of time and was discharged home in stable condition.     FOLLOW UP:  Valerie Sim was asked to follow up by phone in 7-14 days with Nely Jean Baptiste PT, Care Coordinator or Estela Magaña RN, Care Coordinator, to report her response to this series of injections. Based on the patient's previous response to this therapy, Valerie Sim was rescheduled for the next series of injections in 9 weeks. Due to a limited duration of Botox effectiveness (approximately 8-9 weeks), we will continue to schedule her Botox every 9 weeks.      PLAN (Medication Changes, Therapy Orders, Work or Disability Issues, etc.): Patient will monitor her response to today's injections and report.

## 2019-09-20 ENCOUNTER — OFFICE VISIT (OUTPATIENT)
Dept: PHYSICAL MEDICINE AND REHAB | Facility: CLINIC | Age: 54
End: 2019-09-20
Payer: COMMERCIAL

## 2019-09-20 DIAGNOSIS — M79.18 MYOFASCIAL PAIN: ICD-10-CM

## 2019-09-20 DIAGNOSIS — M54.81 OCCIPITAL NEURALGIA OF LEFT SIDE: Primary | ICD-10-CM

## 2019-09-20 NOTE — PROGRESS NOTES
[facility]  Physical Medicine & Rehabilitation Procedure Note    History of Present Illness:    Valerie Sim is a 54-year-old female with a history of chronic migraines, cluster headaches, and occipital headaches with myofascial pain who presents with a severe left-sided headache for an occipital nerve block and trigger point injections.  Today, the patient rates her pain as an 8/10, mostly on the left occiput, that radiates to her left temporalis and behind her left eye.  She has mild nausea and some visual disturbances.  She has tried all of her usual migraine abortive medications without any success.  Of note, the patient had her Botox injections on 9/4/2019 for management of chronic migraines, at which time she received 225 units.    Procedure:     Prior to the start of the procedure and with procedural staff participation, I verbally confirmed the patient s identity using two indicators, relevant allergies, that the procedure was appropriate and matched the consent or emergent situation, and that the correct equipment/implants were available. Immediately prior to starting the procedure I conducted the Time Out with the procedural staff and re-confirmed the patient s name, procedure, and site/side. (The Joint Commission universal protocol was followed.)  Yes    Sedation (Moderate or Deep): None    Drug:   Kenalog: Lot [id number], Exp 04/2021, NDC 049960-6453727753-1288-3  Bupivacaine 0.5%: Lot [id number], Exp 01/2023, NDC 49254-157-38      Left greater occipital nerve block    Area just inferior to insertion of the right and left superior trapezius insertion onto skull was cleansed with alcohol. Needle was advanced anteriorly to base of skull then slightly withdrawn and injectate was injected in a fan-like distribution at different depths. Total injection of 0.5 cc of 20 mg Kenalog plus 4.5 cc of 0.25 % bupivicaine into the left side for a total of 5 ml.     Trigger Point  Injections    Areas were prepped with ChloraPrep.  Using standard precautions a 30-gauge 1-inch needle was used to inject a 2 ml mixture of 1.5 ml of 0.25% bupivacaine and 20 mg Kenalog into the left occipitalis and left temporalis muscles for a total of 10 injection sites.  Patient tolerated the procedure well and there were no complications.        Valerie Sim tolerated the procedure well without any immediate complications. She was allowed to recover for an appropriate period of time and was discharged home in stable condition.  Patient will follow-up regarding response to this procedure.      Assessment & Recommendations:  Valerie Sim is a 54 year old female who presents to rehabilitation clinic for trigger point injections and a left total nerve block for management of an acute occipital migraine.  Patient tolerated procedure well without any complication.    She will follow-up with her usual Botox injections as scheduled.  She may repeat trigger point injections and/or occipital nerve blocks no sooner than 3 months from today.

## 2019-09-20 NOTE — LETTER
9/20/2019       RE: Valerie Sim  [address]  [place] [address]     Dear Colleague,    Thank you for referring your patient, Valerie Sim, to the [facility] PHYSICAL MEDICINE [facility]. Please see a copy of my visit note below.    [facility]  [facility] & [facility]  Physical Medicine & Rehabilitation Procedure Note    History of Present Illness:    Valerie Sim is a 54-year-old female with a history of chronic migraines, cluster headaches, and occipital headaches with myofascial pain who presents with a severe left-sided headache for an occipital nerve block and trigger point injections.  Today, the patient rates her pain as an 8/10, mostly on the left occiput, that radiates to her left temporalis and behind her left eye.  She has mild nausea and some visual disturbances.  She has tried all of her usual migraine abortive medications without any success.  Of note, the patient had her Botox injections on 9/4/2019 for management of chronic migraines, at which time she received 225 units.    Procedure:     Prior to the start of the procedure and with procedural staff participation, I verbally confirmed the patient s identity using two indicators, relevant allergies, that the procedure was appropriate and matched the consent or emergent situation, and that the correct equipment/implants were available. Immediately prior to starting the procedure I conducted the Time Out with the procedural staff and re-confirmed the patient s name, procedure, and site/side. (The Joint Commission universal protocol was followed.)  Yes    Sedation (Moderate or Deep): None    Drug:   Kenalog: Lot [id number], Exp 04/2021, NDC 012516-5351502868-7849-6  Bupivacaine 0.5%: Lot [id number], Exp 01/2023, NDC 09123-883-07      Left greater occipital nerve block    Area just inferior to insertion of the right and left superior trapezius insertion onto  skull was cleansed with alcohol. Needle was advanced anteriorly to base of skull then slightly withdrawn and injectate was injected in a fan-like distribution at different depths. Total injection of 0.5 cc of 20 mg Kenalog plus 4.5 cc of 0.25 % bupivicaine into the left side for a total of 5 ml.     Trigger Point Injections    Areas were prepped with ChloraPrep.  Using standard precautions a 30-gauge 1-inch needle was used to inject a 2 ml mixture of 1.5 ml of 0.25% bupivacaine and 20 mg Kenalog into the left occipitalis and left temporalis muscles for a total of 10 injection sites.  Patient tolerated the procedure well and there were no complications.        Valerie Sim tolerated the procedure well without any immediate complications. She was allowed to recover for an appropriate period of time and was discharged home in stable condition.  Patient will follow-up regarding response to this procedure.    Assessment & Recommendations:  Valerie Sim is a 54 year old female who presents to rehabilitation clinic for trigger point injections and a left total nerve block for management of an acute occipital migraine.  Patient tolerated procedure well without any complication.    She will follow-up with her usual Botox injections as scheduled.  She may repeat trigger point injections and/or occipital nerve blocks no sooner than 3 months from today.    Again, thank you for allowing me to participate in the care of your patient.      Sincerely,    Addie Malhotra MD

## 2019-09-25 ENCOUNTER — DOCUMENTATION ONLY (OUTPATIENT)
Dept: CARE COORDINATION | Facility: CLINIC | Age: 54
End: 2019-09-25

## 2019-09-26 ENCOUNTER — MYC MEDICAL ADVICE (OUTPATIENT)
Dept: NEUROLOGY | Facility: CLINIC | Age: 54
End: 2019-09-26

## 2019-09-26 DIAGNOSIS — G43.711 INTRACTABLE CHRONIC MIGRAINE WITHOUT AURA AND WITH STATUS MIGRAINOSUS: Primary | ICD-10-CM

## 2019-09-27 DIAGNOSIS — G44.019 EPISODIC CLUSTER HEADACHE, NOT INTRACTABLE: ICD-10-CM

## 2019-09-27 RX ORDER — PREDNISONE 20 MG/1
TABLET ORAL
Qty: 13 TABLET | Refills: 0 | Status: SHIPPED | OUTPATIENT
Start: 2019-09-27 | End: 2020-04-03

## 2019-09-27 RX ORDER — CEFUROXIME AXETIL 250 MG/1
6 TABLET ORAL
Qty: 1 KIT | Refills: 0 | Status: SHIPPED | OUTPATIENT
Start: 2019-09-27 | End: 2020-10-29

## 2019-09-27 NOTE — TELEPHONE ENCOUNTER
Chart reviewed. The patient was last seen in Neurology 8/9/19 by Dr. Gonzales. At that time she reports 30/30 days of headache. There is mention of chronic migraine and episodic cluster. Oxygen therapy has been denied by insurance company.     Plan at that time was for her to use eletriptan oral or sumatriptan injectable prn. If that fails to use ketorolac subcutaneous, if that fails rescue of lorazepam or hydromorphone. The plan was her PCP would fill those scheduled meds.  while Dr. Gonzales is out on maternity leave.    Chronic migraine treatment is Botox    Galcanezumab was ordered with CLuster dosing 300 mg once a month during cluster cycle. Oxygen re-ordered. Occipital nerve blocks can be done by Dr. Malhotra.     Dr. Malhotra performed Occipital nerve block and trigger point injections.  9/20/19, Botox 9/4/19    On review of Chart Dr. Malhotra has sent injectable sumatriptan to pharmacy today.     Pt messaging and I am having a hard time following the whole string of messages it seems to be disconnected but it sounds as though the patient is on day 16 of severe headache flare after dosing with galcanezumab (Emgality not Aimovig as stated in notes)     I would recommend not taking second dose of Emgality (galcanezumab). We can offer a steroid taper. I will send message through my chart.     Chart review took 25 minutes.     Earline Shi MD [facility]  Department of Neurology  Pager [phone]

## 2019-09-30 ENCOUNTER — HOSPITAL ENCOUNTER (EMERGENCY)
Facility: CLINIC | Age: 54
Discharge: HOME | End: 2019-09-30
Attending: EMERGENCY MEDICINE | Admitting: EMERGENCY MEDICINE
Payer: COMMERCIAL

## 2019-09-30 ENCOUNTER — MYC MEDICAL ADVICE (OUTPATIENT)
Dept: NEUROLOGY | Facility: CLINIC | Age: 54
End: 2019-09-30

## 2019-09-30 VITALS
BODY MASS INDEX: 26.56 KG/M2 | RESPIRATION RATE: 16 BRPM | TEMPERATURE: 98.5 F | OXYGEN SATURATION: 97 % | HEIGHT: 67 IN | DIASTOLIC BLOOD PRESSURE: 89 MMHG | SYSTOLIC BLOOD PRESSURE: 144 MMHG | HEART RATE: 91 BPM | WEIGHT: 169.2 LBS

## 2019-09-30 DIAGNOSIS — G43.809 OTHER MIGRAINE WITHOUT STATUS MIGRAINOSUS, NOT INTRACTABLE: ICD-10-CM

## 2019-09-30 PROCEDURE — 96361 HYDRATE IV INFUSION ADD-ON: CPT | Performed by: EMERGENCY MEDICINE

## 2019-09-30 PROCEDURE — 25800030 ZZH RX IP 258 OP 636: Performed by: EMERGENCY MEDICINE

## 2019-09-30 PROCEDURE — 96376 TX/PRO/DX INJ SAME DRUG ADON: CPT | Performed by: EMERGENCY MEDICINE

## 2019-09-30 PROCEDURE — 25000128 H RX IP 250 OP 636: Performed by: EMERGENCY MEDICINE

## 2019-09-30 PROCEDURE — 96365 THER/PROPH/DIAG IV INF INIT: CPT | Performed by: EMERGENCY MEDICINE

## 2019-09-30 PROCEDURE — 99285 EMERGENCY DEPT VISIT HI MDM: CPT | Mod: 25 | Performed by: EMERGENCY MEDICINE

## 2019-09-30 PROCEDURE — 99285 EMERGENCY DEPT VISIT HI MDM: CPT | Mod: Z6 | Performed by: EMERGENCY MEDICINE

## 2019-09-30 PROCEDURE — 96366 THER/PROPH/DIAG IV INF ADDON: CPT | Performed by: EMERGENCY MEDICINE

## 2019-09-30 PROCEDURE — 25000125 ZZHC RX 250: Performed by: EMERGENCY MEDICINE

## 2019-09-30 PROCEDURE — 25000132 ZZH RX MED GY IP 250 OP 250 PS 637: Performed by: EMERGENCY MEDICINE

## 2019-09-30 PROCEDURE — 96375 TX/PRO/DX INJ NEW DRUG ADDON: CPT | Performed by: EMERGENCY MEDICINE

## 2019-09-30 RX ORDER — HYDROMORPHONE HYDROCHLORIDE 1 MG/ML
1 INJECTION, SOLUTION INTRAMUSCULAR; INTRAVENOUS; SUBCUTANEOUS ONCE
Status: COMPLETED | OUTPATIENT
Start: 2019-09-30 | End: 2019-09-30

## 2019-09-30 RX ORDER — SODIUM CHLORIDE 9 MG/ML
1000 INJECTION, SOLUTION INTRAVENOUS CONTINUOUS
Status: DISCONTINUED | OUTPATIENT
Start: 2019-09-30 | End: 2019-10-01 | Stop reason: HOSPADM

## 2019-09-30 RX ORDER — DIPHENHYDRAMINE HYDROCHLORIDE 50 MG/ML
50 INJECTION INTRAMUSCULAR; INTRAVENOUS ONCE
Status: COMPLETED | OUTPATIENT
Start: 2019-09-30 | End: 2019-09-30

## 2019-09-30 RX ORDER — ACETAMINOPHEN 500 MG
1000 TABLET ORAL ONCE
Status: COMPLETED | OUTPATIENT
Start: 2019-09-30 | End: 2019-09-30

## 2019-09-30 RX ORDER — ONDANSETRON 2 MG/ML
8 INJECTION INTRAMUSCULAR; INTRAVENOUS ONCE
Status: COMPLETED | OUTPATIENT
Start: 2019-09-30 | End: 2019-09-30

## 2019-09-30 RX ORDER — ONDANSETRON 2 MG/ML
4 INJECTION INTRAMUSCULAR; INTRAVENOUS ONCE
Status: COMPLETED | OUTPATIENT
Start: 2019-09-30 | End: 2019-09-30

## 2019-09-30 RX ORDER — SODIUM CHLORIDE 9 MG/ML
INJECTION, SOLUTION INTRAVENOUS CONTINUOUS
Status: DISCONTINUED | OUTPATIENT
Start: 2019-09-30 | End: 2019-10-01 | Stop reason: HOSPADM

## 2019-09-30 RX ADMIN — HYDROMORPHONE HYDROCHLORIDE 1 MG: 1 INJECTION, SOLUTION INTRAMUSCULAR; INTRAVENOUS; SUBCUTANEOUS at 22:09

## 2019-09-30 RX ADMIN — Medication 2 G: at 17:35

## 2019-09-30 RX ADMIN — SODIUM CHLORIDE 1000 ML: 900 INJECTION, SOLUTION INTRAVENOUS at 15:41

## 2019-09-30 RX ADMIN — ONDANSETRON 8 MG: 2 INJECTION INTRAMUSCULAR; INTRAVENOUS at 16:02

## 2019-09-30 RX ADMIN — SODIUM CHLORIDE: 9 INJECTION, SOLUTION INTRAVENOUS at 17:35

## 2019-09-30 RX ADMIN — ONDANSETRON HYDROCHLORIDE 4 MG: 2 INJECTION, SOLUTION INTRAMUSCULAR; INTRAVENOUS at 19:36

## 2019-09-30 RX ADMIN — ACETAMINOPHEN 1000 MG: 500 TABLET, FILM COATED ORAL at 17:48

## 2019-09-30 RX ADMIN — HYDROMORPHONE HYDROCHLORIDE 1 MG: 1 INJECTION, SOLUTION INTRAMUSCULAR; INTRAVENOUS; SUBCUTANEOUS at 19:29

## 2019-09-30 RX ADMIN — DIPHENHYDRAMINE HYDROCHLORIDE 50 MG: 50 INJECTION, SOLUTION INTRAMUSCULAR; INTRAVENOUS at 17:35

## 2019-09-30 RX ADMIN — HYDROMORPHONE HYDROCHLORIDE 1 MG: 1 INJECTION, SOLUTION INTRAMUSCULAR; INTRAVENOUS; SUBCUTANEOUS at 17:49

## 2019-09-30 RX ADMIN — DIVALPROEX SODIUM 750 MG: 250 TABLET, FILM COATED, EXTENDED RELEASE ORAL at 18:26

## 2019-09-30 ASSESSMENT — MIFFLIN-ST. JEOR: SCORE: 1405.12

## 2019-09-30 ASSESSMENT — ENCOUNTER SYMPTOMS: HEADACHES: 1

## 2019-09-30 NOTE — ED TRIAGE NOTES
Pt presents ambulatory to triage with c/o migraine/occipital headache, cluster headache for the past 18 days and now is unable to manage at home and not responding as well to at home treatment. Also, states on Sept 11th she received Emgality for her cluster headaches, which she believes exacerbated this. States she was told to come in to the ED by neurology.
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
## 2019-09-30 NOTE — ED AVS SNAPSHOT
[facility], [place], Emergency Department  [address] [place] [id number]  Phone:  [phone]                                    Valerie Sim   MRN: [id number]    Department:  [facility], Emergency Department   Date of Visit:  9/30/2019           After Visit Summary Signature Page    I have received my discharge instructions, and my questions have been answered. I have discussed any challenges I see with this plan with the nurse or doctor.    ..........................................................................................................................................  Patient/Patient Representative Signature      ..........................................................................................................................................  Patient Representative Print Name and Relationship to Patient    ..................................................               ................................................  Date                                   Time    ..........................................................................................................................................  Reviewed by Signature/Title    ...................................................              ..............................................  Date                                               Time          22EPIC Rev 08/18

## 2019-09-30 NOTE — TELEPHONE ENCOUNTER
Called pt to offer her a 2:30 appointment with Dr. Shi tomorrow 10/1. Pt stated she would take it. She was on her way to the ER due to her severe headaches when I called.  I asked that she call us back if she has more concerns or if she will be unable to make the appointment tomorrow.     Feli New RN
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
## 2019-10-01 ENCOUNTER — HEALTH MAINTENANCE LETTER (OUTPATIENT)
Age: 54
End: 2019-10-01

## 2019-10-01 ENCOUNTER — OFFICE VISIT (OUTPATIENT)
Dept: NEUROLOGY | Facility: CLINIC | Age: 54
End: 2019-10-01
Payer: COMMERCIAL

## 2019-10-01 VITALS
SYSTOLIC BLOOD PRESSURE: 166 MMHG | DIASTOLIC BLOOD PRESSURE: 84 MMHG | HEART RATE: 108 BPM | TEMPERATURE: 98.3 F | BODY MASS INDEX: 26.37 KG/M2 | HEIGHT: 67 IN | WEIGHT: 168 LBS | OXYGEN SATURATION: 99 % | RESPIRATION RATE: 16 BRPM

## 2019-10-01 DIAGNOSIS — G43.719 INTRACTABLE CHRONIC MIGRAINE WITHOUT AURA AND WITHOUT STATUS MIGRAINOSUS: Primary | ICD-10-CM

## 2019-10-01 DIAGNOSIS — G44.019 EPISODIC CLUSTER HEADACHE, NOT INTRACTABLE: ICD-10-CM

## 2019-10-01 RX ORDER — TENS UNITS AND TENS ELECTRODES
1 COMBINATION PACKAGE (EA) MISCELLANEOUS DAILY
Qty: 1 DEVICE | Refills: 0 | Status: SHIPPED | OUTPATIENT
Start: 2019-10-01 | End: 2021-02-23

## 2019-10-01 RX ORDER — DIVALPROEX SODIUM 500 MG/1
TABLET, DELAYED RELEASE ORAL
COMMUNITY
Start: 2019-09-30 | End: 2021-02-23

## 2019-10-01 ASSESSMENT — ENCOUNTER SYMPTOMS
WEAKNESS: 0
NECK MASS: 0
EYE WATERING: 1
RECTAL PAIN: 0
FEVER: 0
SINUS PAIN: 1
EYE IRRITATION: 0
LOSS OF CONSCIOUSNESS: 0
INSOMNIA: 0
BLOOD IN STOOL: 0
WEIGHT GAIN: 0
SORE THROAT: 0
INCREASED ENERGY: 1
HEARTBURN: 1
NUMBNESS: 1
DEPRESSION: 1
SEIZURES: 0
BOWEL INCONTINENCE: 0
HEMATURIA: 0
PARALYSIS: 0
DOUBLE VISION: 1
FLANK PAIN: 0
EYE PAIN: 0
NIGHT SWEATS: 1
DISTURBANCES IN COORDINATION: 1
TROUBLE SWALLOWING: 0
POLYPHAGIA: 0
BLOATING: 1
SMELL DISTURBANCE: 0
EYE REDNESS: 1
JAUNDICE: 0
DECREASED APPETITE: 0
NAUSEA: 1
HOARSE VOICE: 0
ALTERED TEMPERATURE REGULATION: 1
SPEECH CHANGE: 0
DIARRHEA: 0
POLYDIPSIA: 0
MEMORY LOSS: 0
SINUS CONGESTION: 1
NERVOUS/ANXIOUS: 1
PANIC: 0
ABDOMINAL PAIN: 1
DYSURIA: 1
TASTE DISTURBANCE: 1
WEIGHT LOSS: 0
DECREASED CONCENTRATION: 1
HALLUCINATIONS: 0
TREMORS: 1
TINGLING: 1
CHILLS: 0
FATIGUE: 1
HEADACHES: 1
DIFFICULTY URINATING: 0
DIZZINESS: 1

## 2019-10-01 ASSESSMENT — HEADACHE IMPACT TEST (HIT 6)
HOW OFTEN HAVE YOU FELT FED UP OR IRRITATED BECAUSE OF YOUR HEADACHES: ALWAYS
HOW OFTEN DID HEADACHS LIMIT CONCENTRATION ON WORK OR DAILY ACTIVITY: VERY OFTEN
HIT6 TOTAL SCORE: 66
HOW OFTEN HAVE YOU FELT TOO TIRED TO WORK BECAUSE OF YOUR HEADACHES: VERY OFTEN
WHEN YOU HAVE HEADACHES HOW OFTEN IS THE PAIN SEVERE: SOMETIMES
HOW OFTEN DO HEADACHES LIMIT YOUR DAILY ACTIVITIES: VERY OFTEN
WHEN YOU HAVE A HEADACHE HOW OFTEN DO YOU WISH YOU COULD LIE DOWN: SOMETIMES

## 2019-10-01 ASSESSMENT — MIFFLIN-ST. JEOR: SCORE: 1399.67

## 2019-10-01 NOTE — DISCHARGE INSTRUCTIONS
Please make an appointment to follow up with Neurology Clinic (phone: [phone]) as soon as possible for continued management of your chronic headaches.
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
## 2019-10-01 NOTE — NURSING NOTE
Chief Complaint   Patient presents with     Headache     [facility] F/U        Donis Hercules, EMT

## 2019-10-01 NOTE — PATIENT INSTRUCTIONS
To order cefaly go to website:  [email or web address]    Use prescription given by Dr. Shi  Remember if it does not help it is ok to return before 60 days is up.    If it fails consider gammacore order.    Finish steroid.  Stay hydrated.    Keep on Depakote 500 mg once a day  Call if you if need Zofran refills.    Do not redose Emgality (galcanezumab)    Send update via my chart Monday next week.     We will add you to the comprehensive headache clinic October 16th, 2019. 12:30pm.

## 2019-10-03 ASSESSMENT — ENCOUNTER SYMPTOMS
NUMBNESS: 0
WEAKNESS: 0
NECK STIFFNESS: 0
NAUSEA: 1
NECK PAIN: 0
FEVER: 0
SEIZURES: 0
VOMITING: 1

## 2019-10-04 DIAGNOSIS — G44.021 INTRACTABLE CHRONIC CLUSTER HEADACHE: Primary | ICD-10-CM

## 2019-10-07 ENCOUNTER — TELEPHONE (OUTPATIENT)
Dept: NEUROLOGY | Facility: CLINIC | Age: 54
End: 2019-10-07

## 2019-10-07 NOTE — TELEPHONE ENCOUNTER
Prior Authorization Retail Medication Request    Medication/Dose: High flow oxygen  ICD code (if different than what is on RX):  Cluster headache, chronic migriane  Previously Tried and Failed:  She is tried numerous treatments in the past including verapamil, divalproex, indomethacin, prednisone, Botox, occipital nerve blocks, topiramate and tricyclic antidepressants without much luck.  She does report in the past she tried lithium and it caused some thyroid disruption and so she is hesitant to use that currently in the setting of her parathyroid disease and concern for added problems.  Emgality high dose for cluster headache    Rationale:   The patient has a chronic pain syndrome and chronic migraine headaches with then flares of other headaches that have been diagnosed as cluster-like headaches. She has a headache 30 days out of 30.  The patient would be very much open to using oxygen if we can get it approved and we will try to get that approved for her home.          Name:  [employer]  Insurance ID:    [id number]     Pharmacy Information (if different than what is on RX)  Name:    Phone:

## 2019-10-08 DIAGNOSIS — G44.019 EPISODIC CLUSTER HEADACHE, NOT INTRACTABLE: Primary | ICD-10-CM

## 2019-10-08 RX ORDER — TRIAMCINOLONE ACETONIDE 40 MG/ML
40 INJECTION, SUSPENSION INTRA-ARTICULAR; INTRAMUSCULAR ONCE
Status: COMPLETED | OUTPATIENT
Start: 2019-10-08 | End: 2019-10-08

## 2019-10-08 RX ORDER — BUPIVACAINE HYDROCHLORIDE 2.5 MG/ML
5 INJECTION, SOLUTION EPIDURAL; INFILTRATION; INTRACAUDAL ONCE
Status: COMPLETED | OUTPATIENT
Start: 2019-10-08 | End: 2019-10-08

## 2019-10-08 RX ADMIN — BUPIVACAINE HYDROCHLORIDE 12.5 MG: 2.5 INJECTION, SOLUTION EPIDURAL; INFILTRATION; INTRACAUDAL at 16:24

## 2019-10-08 RX ADMIN — TRIAMCINOLONE ACETONIDE 40 MG: 40 INJECTION, SUSPENSION INTRA-ARTICULAR; INTRAMUSCULAR at 16:24

## 2019-10-11 ENCOUNTER — DOCUMENTATION ONLY (OUTPATIENT)
Dept: NEUROLOGY | Facility: CLINIC | Age: 54
End: 2019-10-11

## 2019-10-11 ENCOUNTER — MEDICAL CORRESPONDENCE (OUTPATIENT)
Dept: HEALTH INFORMATION MANAGEMENT | Facility: CLINIC | Age: 54
End: 2019-10-11

## 2019-10-11 NOTE — PROGRESS NOTES
oxygen qualification form that the company requires has been received, reviewed, and signed by Dr Shi. It has been faxed along with notes to [facility] [employer] at [phone]

## 2019-10-16 ENCOUNTER — DOCUMENTATION ONLY (OUTPATIENT)
Dept: NEUROLOGY | Facility: CLINIC | Age: 54
End: 2019-10-16

## 2019-10-16 NOTE — PROGRESS NOTES
Oxygen order recieved reviewed and signed by provider it has been faxed to [phone] and scanned to chart

## 2019-11-04 ENCOUNTER — OFFICE VISIT (OUTPATIENT)
Dept: PHYSICAL MEDICINE AND REHAB | Facility: CLINIC | Age: 54
End: 2019-11-04
Payer: COMMERCIAL

## 2019-11-04 VITALS
HEART RATE: 106 BPM | HEIGHT: 67 IN | SYSTOLIC BLOOD PRESSURE: 168 MMHG | RESPIRATION RATE: 16 BRPM | OXYGEN SATURATION: 99 % | DIASTOLIC BLOOD PRESSURE: 92 MMHG | BODY MASS INDEX: 26.31 KG/M2

## 2019-11-04 DIAGNOSIS — G43.719 CHRONIC MIGRAINE WITHOUT AURA, INTRACTABLE, WITHOUT STATUS MIGRAINOSUS: Primary | ICD-10-CM

## 2019-11-04 RX ORDER — BUPIVACAINE HYDROCHLORIDE 2.5 MG/ML
3 INJECTION, SOLUTION EPIDURAL; INFILTRATION; INTRACAUDAL ONCE
Status: COMPLETED | OUTPATIENT
Start: 2019-11-04 | End: 2019-11-04

## 2019-11-04 RX ADMIN — BUPIVACAINE HYDROCHLORIDE 7.5 MG: 2.5 INJECTION, SOLUTION EPIDURAL; INFILTRATION; INTRACAUDAL at 14:50

## 2019-11-04 ASSESSMENT — PAIN SCALES - GENERAL: PAINLEVEL: EXTREME PAIN (8)

## 2019-11-04 NOTE — LETTER
"11/4/2019       RE: Valerie Sim  [address]  [place] [address]     Dear Colleague,    Thank you for referring your patient, Valerie Sim, to the [facility] PHYSICAL [facility]ICINE [facility]. Please see a copy of my visit note below.    BOTULINUM TOXIN PROCEDURE - HEADACHE - NOTE    Chief Complaint   Patient presents with     Headache     [facility] RETURN BOTOX CHRONIC MIGRAINE      BP (!) 168/92   Pulse 106   Resp 16   Ht 1.702 m (5' 7\")   SpO2 99%   BMI 26.31 kg/m        Current Outpatient Medications:      aMILoride (MIDAMOR) 5 MG tablet, , Disp: , Rfl:      amLODIPine (NORVASC) 10 MG tablet, , Disp: , Rfl:      amphetamine-dextroamphetamine (ADDERALL) 20 MG tablet, Take 1 tablet (20 mg) by mouth 2 times daily, Disp: 60 tablet, Rfl: 0     budesonide (RINOCORT AQUA) 32 MCG/ACT nasal spray, Spray 1 spray into both nostrils daily., Disp: , Rfl:      Calcium Citrate (CALCITRATE PO), Take 1,200 mg by mouth 2 times daily 1/2 TAB bid, Disp: , Rfl:      cyclobenzaprine (FLEXERIL) 10 MG tablet, , Disp: , Rfl:      CycloSPORINE (RESTASIS OP), Apply to eye 2 times daily, Disp: , Rfl:      divalproex sodium delayed-release (DEPAKOTE) 500 MG DR tablet, , Disp: , Rfl:      eletriptan (RELPAX) 40 MG tablet, TAKE 1 TABLET AT ONSET OF MIGRAINE. MAY REPEAT ONCE AFTER 2 HRS. MAX OF 2 TABS/24HRS AND 3 DAYS/WEEK., Disp: 12 tablet, Rfl: 3     estrogens, conjugated, (PREMARIN) 0.625 MG tablet, Take by mouth daily .30 mg. , Disp: , Rfl:      fexofenadine (ALLEGRA) 180 MG tablet, Take  by mouth daily., Disp: , Rfl:      HYDROmorphone (DILAUDID) 2 MG tablet, Take 0.5-1 tablets (1-2 mg) by mouth every 3 hours as needed (headache) 20 tablets = 3 month supply., Disp: 24 tablet, Rfl: 0     ketorolac (TORADOL) 30 MG/ML injection, Inject 1 mL (30 mg) into the vein every 6 hours as needed for moderate pain, Disp: 9 mL, Rfl: 11     lamoTRIgine (LAMICTAL) 200 MG tablet, Take " 1 tablet by mouth daily, Disp: , Rfl:      LORazepam (ATIVAN) 1 MG tablet, Take 1 mg by mouth 2 times daily as needed., Disp: , Rfl:      Nerve Stimulator (CEFALY KIT) NAHUM, 1 Device daily Use daily and as needed. Dual device, Disp: 1 Device, Rfl: 0     ondansetron (ZOFRAN) 4 MG tablet, Take 1 tablet (4 mg) by mouth every 8 hours as needed, Disp: 90 tablet, Rfl: 1     order for DME, Equipment being ordered: Oxygen The patient has Cluster Headache and needs 10 liters/minute of high flow oxygen via nonrebreather mask prn headaches, Disp: 99 days, Rfl: 0     order for DME, Equipment being ordered: Oxygen and non-rebreather mask.   Use high flow oxygen (10-15 L/min) with non-rebreather mask, as needed for cluster headaches, Disp: 1 Units, Rfl: 11     predniSONE (DELTASONE) 20 MG tablet, 3 tab po QAM x 2 d, 2 tab po QAM x 2 d, 1 tab po QAM x 2 d half tab po QAM x 2 d then stop, Disp: 13 tablet, Rfl: 0     QUEtiapine (SEROQUEL) 50 MG tablet, Take 1 tablet by mouth daily., Disp: , Rfl:      SUMAtriptan (IMITREX STATDOSE) 6 MG/0.5ML pen injector kit, Inject 0.5 mLs (6 mg) Subcutaneous at onset of headache for migraine May repeat in 1 hour. Max 12 mg/24 hours., Disp: 1 kit, Rfl: 0     SUMAtriptan (IMITREX STATDOSE) 6 MG/0.5ML refill cartridge, Inject 0.5 mLs (6 mg) Subcutaneous at onset of headache for migraine, Disp: 18 Cartridge, Rfl: 3     valACYclovir (VALTREX) 1000 mg tablet, Take 1,000 mg by mouth as needed., Disp: , Rfl:      vitamin D3 (CHOLECALCIFEROL) 2000 units (50 mcg) tablet, Take 1 tablet by mouth daily, Disp: , Rfl:      Allergies   Allergen Reactions     Fluoxetine Other (See Comments)     PN: LW Reaction: headache  PN: LW Reaction: headache  Migraine       Trazodone Other (See Comments) and Unknown     Other reaction(s): Headache  Other reaction(s): Headache  Other reaction(s): Headache     Amitriptyline Hcl Other (See Comments)     Migraine       Candesartan Cilexetil-Hctz Muscle Pain (Myalgia)     Cymbalta  "Other (See Comments)     Migraine       Cyproheptadine Hcl      headaches     Desvenlafaxine      Migraine       Diatrizoate Unknown     PN: LW CM1: CONTRAST- iodine Reaction :     Diazepam      Other reaction(s): Vestibular Toxicity  PN: LW Reaction: vertigo  PN: LW Reaction: vertigo     Galcanezumab-Gnlm      Other reaction(s): Headache     Imipramine Other (See Comments)     Migraine       Lyrica Muscle Pain (Myalgia)     Metoprolol Other (See Comments) and Unknown     headache  headache  headache     Morphine Other (See Comments)     Patient reports seizure      No Clinical Screening - See Comments      PN: LW FI1: lactose intolerance LW FI2: shellfish  PN: LW CM1: CONTRAST- iodine Reaction :  PN: LW Other1: -nka     Nortriptyline Other (See Comments)     Migraine       Phenergan Dm [Promethazine-Dm] Other (See Comments)     seizures     Promethazine      PN: LW Reaction: minimal sizures     Venlafaxine Other (See Comments)     PN: LW Reaction: migraine  PN: LW Reaction: migraine  Migraine       Wellbutrin [Bupropion Hydrobromide] Other (See Comments)     Migraine       Compazine Anxiety     Dihydroergotamine Anxiety and Other (See Comments)     Cardiac symptoms     Droperidol Anxiety     PN: LW Reaction: agitation     Medroxyprogesterone Hives     PN: LW Reaction: nausea     Prochlorperazine Anxiety     PN: LW Reaction: \"can't sit still\"        PHYSICAL EXAM:    Headache: b/l frontal with some cervicogenic component, 8/10.  Pleasant  No facial asymmetry    HPI:    Patient reports the following new medical problems since last visit: Headache visit on the [facility], 9/30/19, treated with Benadryl and followed up with Dr Shi     Also had extreme constipation due to Aimovig on September 2019 dosing, patient has stopped this medication and has had resolution of symptoms.    We reviewed the recommended safety guidelines for  Botox from any vaccine injection, such as the seasonal flu vaccine, by a " minimum of 10-14 days with Valerie Sim. She acknowledged understanding.    RESPONSE TO PREVIOUS TREATMENT:  Change in headache pattern following last series of injections with 225 units of  Botox on 19.    No problems reported  Pain related to injections done (about 50%) of them, had let Dr Malhotra    1.  Headache frequency during this injection cycle:  30 headache days per month.  This is compared to her baseline headache frequency of 30 headache days per month.     2.  Headache duration during this injection cycle:  Headache duration ranged from 1-2 hours to 24 hours. Patient reports up to 30 episodes of multiple day headaches during this injection cycle. Took Imitrex 9 in the first two weeks, and through weeks 3-8 she took 12-15 tabs, then has not taken any in the last 2 weeks.    3.  Headache intensity during this injection cycle:    A.  6/10  =  Typical pain level.  B.  10/10  =  Worst pain level.  C.  4/10  =  Lowest pain level.    4.  Change in headache medication usage during this injection cycle:  (For Example:  Able to decrease use of oral pain medications.) Stopped Premerin and started topical estradiol and topical gabapentin PRN (uses for pelvic floor pain)    5.  ER Visits During This Injection Cycle:  1    6.  Functional Performance:  Change in ADL's, social interaction, days lost from work, etc.   - Working 10 hours per week due to disability, has missed one day in the last cycle  - Reports missing 3-4 social activities, but feels like her mood is effected the majority of the time in a negative way.      BOTULINUM NEUROTOXIN INJECTION PROCEDURES:      VERIFICATION OF PATIENT IDENTIFICATION AND PROCEDURE     Initials   Patient Name pb   Patient  pb   Procedure Verified by: pb     Prior to the start of the procedure and with procedural staff participation, I verbally confirmed the patient s identity using two indicators, relevant allergies, that the procedure was appropriate and matched the  consent or emergent situation, and that the correct equipment/implants were available. Immediately prior to starting the procedure I conducted the Time Out with the procedural staff and re-confirmed the patient s name, procedure, and site/side. (The Joint Commission universal protocol was followed.)  Yes    Sedation (Moderate or Deep): None    Above assessments performed by:  Resident/Fellow         Thomas Jules DO           The attending provider was present for the entire procedure documented below.    Addie Malhotra MD      INDICATIONS FOR PROCEDURES:  Valerie Sim is a 54 year old patient with chronic migraine headaches associated with cervicogenic components. Her baseline symptoms have been recalcitrant to oral medications and conservative therapy.  She is here today for reinjection with Botox.    GOAL OF PROCEDURE:  The goal of this procedure is to decrease pain  and enhance functional independence.    TOTAL DOSE ADMINISTERED:  Dose Administered: 225 units Botox (Botulinum Toxin Type A)   Diluent Used: 0.25% Bupivacaine (Batch: [id number], Exp: 11/2021, NDC: 55150-167-10)  Total Volume of Diluent Used: See below  Lot # /C3 with Expiration Date: 0 4/2022  NDC #: Botox 100u ([address]-00)  Was there drug waste? Yes  Amount of drug waste (mL): 75 units of Botox.  Reason for waste:  Single use vial  Multi-dose vial: No    Thomas Jules DO  November 4, 2019     Medication guide was offered to patient and was declined.    CONSENT:  The risks, benefits, and treatment options were discussed with Valerie Sim and she agreed to proceed.    Written consent was obtained by pb.     EQUIPMENT USED:  Needle-37mm stimulating/recording  Needle-30 gauge  EMG/NCS Machine    SKIN PREPARATION:  Skin preparation was performed using an alcohol wipe.    GUIDANCE DESCRIPTION:  Electro-myographic guidance was necessary throughout the procedure to accurately identify all areas of spastic muscles while avoiding  injection of non-spastic muscles, neighboring nerves and nearby vascular structures.     AREA/MUSCLE INJECTED: 225 UNITS BOTOX = TOTAL DOSE    1. FACE & SCALP: 115 units of Botox = Total dose, 2:1 Dilution    Right temporalis - 12.5 units of Botox in 4 site/s.  Left temporalis - 12.5 units of Botox in 4 site/s.      Right frontalis - 10 units of Botox in 4 site/s.  Left frontalis - 10 units of Botox in 4 site/s.      Right procerus - 3.5 units of Botox in 1 site/s.  Left procerus - 3.5 units of Botox in 1 site/s.      Right  - 4 units of Botox in 1 site/s.  Left  - 4 units of Botox in 1 site/s.      Right Upper Occipitalis - 25 units of Botox at 4 site/s.   Left Upper Occipitalis - 25 units of Botox at 4 site/s.      Right Nasalis - 2.5 units of Botox at 1 site/s.              Left Nasalis - 2.5 units of Botox at 1 site/s.         2. JAW MUSCLES: 60 units of Botox = Total Dose, 1:1 Dilution    Right Masseter - 25 units of Botox at 1 site/s.   Left Masseter - 25 units of Botox at 1 site/s.      Right temporalis - 5 units of Botox at 1 site/s.  Left temporalis - 5 units of Botox at 1 site/s.         3. SHOULDER & NECK MUSCLES: Total dose 50 units of Botox, 2:1 Dilution      Right levator muscle - 10 units of Botox at 2 site/s (neck and shoulder).  Left levator muscle - 10 units of Botox at 2 site/s (neck and shoulder).      Right mid trapezius - 5 units of Botox at 1 site.  Left mid trapezius - 5 units of Botox at 1 site.     Right lateral trapezius - 10 units of Botox at 2 site/s  Left lateral trapezuis - 10 units of Botox at 2 site/s.      RESPONSE TO PROCEDURE:  Valerie Sim tolerated the procedure well and there were no immediate complications. She was allowed to recover for an appropriate period of time and was discharged home in stable condition.    FOLLOW UP:  Valerie Sim was asked to follow up by phone in 7-14 days with Nely Jean Baptiste, PT, Care Coordinator or Estela Magaña RN, Care  Coordinator, to report her response to this series of injections.  Based on the patient's previous response to this therapy, Valerie Sim was rescheduled for the next series of injections in 9 weeks.    PLAN (Medication Changes, Therapy Orders, Work or Disability Issues, etc.):  - Patient will continue to monitor response to today's injections.   - Has stopped Aimovig during last cycle    Again, thank you for allowing me to participate in the care of your patient.      Sincerely,    Addie Malhotra MD

## 2019-11-04 NOTE — PROGRESS NOTES
"BOTULINUM TOXIN PROCEDURE - HEADACHE - NOTE    Chief Complaint   Patient presents with     Headache     [facility] RETURN BOTOX CHRONIC MIGRAINE      BP (!) 168/92   Pulse 106   Resp 16   Ht 1.702 m (5' 7\")   SpO2 99%   BMI 26.31 kg/m       Current Outpatient Medications:      aMILoride (MIDAMOR) 5 MG tablet, , Disp: , Rfl:      amLODIPine (NORVASC) 10 MG tablet, , Disp: , Rfl:      amphetamine-dextroamphetamine (ADDERALL) 20 MG tablet, Take 1 tablet (20 mg) by mouth 2 times daily, Disp: 60 tablet, Rfl: 0     budesonide (RINOCORT AQUA) 32 MCG/ACT nasal spray, Spray 1 spray into both nostrils daily., Disp: , Rfl:      Calcium Citrate (CALCITRATE PO), Take 1,200 mg by mouth 2 times daily 1/2 TAB bid, Disp: , Rfl:      cyclobenzaprine (FLEXERIL) 10 MG tablet, , Disp: , Rfl:      CycloSPORINE (RESTASIS OP), Apply to eye 2 times daily, Disp: , Rfl:      divalproex sodium delayed-release (DEPAKOTE) 500 MG DR tablet, , Disp: , Rfl:      eletriptan (RELPAX) 40 MG tablet, TAKE 1 TABLET AT ONSET OF MIGRAINE. MAY REPEAT ONCE AFTER 2 HRS. MAX OF 2 TABS/24HRS AND 3 DAYS/WEEK., Disp: 12 tablet, Rfl: 3     estrogens, conjugated, (PREMARIN) 0.625 MG tablet, Take by mouth daily .30 mg. , Disp: , Rfl:      fexofenadine (ALLEGRA) 180 MG tablet, Take  by mouth daily., Disp: , Rfl:      HYDROmorphone (DILAUDID) 2 MG tablet, Take 0.5-1 tablets (1-2 mg) by mouth every 3 hours as needed (headache) 20 tablets = 3 month supply., Disp: 24 tablet, Rfl: 0     ketorolac (TORADOL) 30 MG/ML injection, Inject 1 mL (30 mg) into the vein every 6 hours as needed for moderate pain, Disp: 9 mL, Rfl: 11     lamoTRIgine (LAMICTAL) 200 MG tablet, Take 1 tablet by mouth daily, Disp: , Rfl:      LORazepam (ATIVAN) 1 MG tablet, Take 1 mg by mouth 2 times daily as needed., Disp: , Rfl:      Nerve Stimulator (CEFALY KIT) NAHUM 1 Device daily Use daily and as needed. Dual device, Disp: 1 Device, Rfl: 0     ondansetron (ZOFRAN) 4 MG tablet, Take 1 tablet (4 mg) by " mouth every 8 hours as needed, Disp: 90 tablet, Rfl: 1     order for DME, Equipment being ordered: Oxygen The patient has Cluster Headache and needs 10 liters/minute of high flow oxygen via nonrebreather mask prn headaches, Disp: 99 days, Rfl: 0     order for DME, Equipment being ordered: Oxygen and non-rebreather mask.   Use high flow oxygen (10-15 L/min) with non-rebreather mask, as needed for cluster headaches, Disp: 1 Units, Rfl: 11     predniSONE (DELTASONE) 20 MG tablet, 3 tab po QAM x 2 d, 2 tab po QAM x 2 d, 1 tab po QAM x 2 d half tab po QAM x 2 d then stop, Disp: 13 tablet, Rfl: 0     QUEtiapine (SEROQUEL) 50 MG tablet, Take 1 tablet by mouth daily., Disp: , Rfl:      SUMAtriptan (IMITREX STATDOSE) 6 MG/0.5ML pen injector kit, Inject 0.5 mLs (6 mg) Subcutaneous at onset of headache for migraine May repeat in 1 hour. Max 12 mg/24 hours., Disp: 1 kit, Rfl: 0     SUMAtriptan (IMITREX STATDOSE) 6 MG/0.5ML refill cartridge, Inject 0.5 mLs (6 mg) Subcutaneous at onset of headache for migraine, Disp: 18 Cartridge, Rfl: 3     valACYclovir (VALTREX) 1000 mg tablet, Take 1,000 mg by mouth as needed., Disp: , Rfl:      vitamin D3 (CHOLECALCIFEROL) 2000 units (50 mcg) tablet, Take 1 tablet by mouth daily, Disp: , Rfl:      Allergies   Allergen Reactions     Fluoxetine Other (See Comments)     PN: LW Reaction: headache  PN: LW Reaction: headache  Migraine       Trazodone Other (See Comments) and Unknown     Other reaction(s): Headache  Other reaction(s): Headache  Other reaction(s): Headache     Amitriptyline Hcl Other (See Comments)     Migraine       Candesartan Cilexetil-Hctz Muscle Pain (Myalgia)     Cymbalta Other (See Comments)     Migraine       Cyproheptadine Hcl      headaches     Desvenlafaxine      Migraine       Diatrizoate Unknown     PN: LW CM1: CONTRAST- iodine Reaction :     Diazepam      Other reaction(s): Vestibular Toxicity  PN: LW Reaction: vertigo  PN: LW Reaction: vertigo     Galcanezumab-Gnlm      " Other reaction(s): Headache     Imipramine Other (See Comments)     Migraine       Lyrica Muscle Pain (Myalgia)     Metoprolol Other (See Comments) and Unknown     headache  headache  headache     Morphine Other (See Comments)     Patient reports seizure      No Clinical Screening - See Comments      PN: LW FI1: lactose intolerance LW FI2: shellfish  PN: LW CM1: CONTRAST- iodine Reaction :  PN: LW Other1: -nka     Nortriptyline Other (See Comments)     Migraine       Phenergan Dm [Promethazine-Dm] Other (See Comments)     seizures     Promethazine      PN: LW Reaction: minimal sizures     Venlafaxine Other (See Comments)     PN: LW Reaction: migraine  PN: LW Reaction: migraine  Migraine       Wellbutrin [Bupropion Hydrobromide] Other (See Comments)     Migraine       Compazine Anxiety     Dihydroergotamine Anxiety and Other (See Comments)     Cardiac symptoms     Droperidol Anxiety     PN: LW Reaction: agitation     Medroxyprogesterone Hives     PN: LW Reaction: nausea     Prochlorperazine Anxiety     PN: LW Reaction: \"can't sit still\"        PHYSICAL EXAM:    Headache: b/l frontal with some cervicogenic component, 8/10.  Pleasant  No facial asymmetry    HPI:    Patient reports the following new medical problems since last visit: Headache visit on the [facility], 9/30/19, treated with Benadryl and followed up with Dr Shi     Also had extreme constipation due to Aimovig on September 2019 dosing, patient has stopped this medication and has had resolution of symptoms.    We reviewed the recommended safety guidelines for  Botox from any vaccine injection, such as the seasonal flu vaccine, by a minimum of 10-14 days with Valerie Sim. She acknowledged understanding.    RESPONSE TO PREVIOUS TREATMENT:  Change in headache pattern following last series of injections with 225 units of  Botox on 9/4/19.    No problems reported  Pain related to injections done (about 50%) of them, had let Dr Malhotra    1.  " Headache frequency during this injection cycle:  30 headache days per month.  This is compared to her baseline headache frequency of 30 headache days per month.     2.  Headache duration during this injection cycle:  Headache duration ranged from 1-2 hours to 24 hours. Patient reports up to 30 episodes of multiple day headaches during this injection cycle. Took Imitrex 9 in the first two weeks, and through weeks 3-8 she took 12-15 tabs, then has not taken any in the last 2 weeks.    3.  Headache intensity during this injection cycle:    A.  6/10  =  Typical pain level.  B.  10/10  =  Worst pain level.  C.  4/10  =  Lowest pain level.    4.  Change in headache medication usage during this injection cycle:  (For Example:  Able to decrease use of oral pain medications.) Stopped Premerin and started topical estradiol and topical gabapentin PRN (uses for pelvic floor pain)    5.  ER Visits During This Injection Cycle:  1    6.  Functional Performance:  Change in ADL's, social interaction, days lost from work, etc.   - Working 10 hours per week due to disability, has missed one day in the last cycle  - Reports missing 3-4 social activities, but feels like her mood is effected the majority of the time in a negative way.      BOTULINUM NEUROTOXIN INJECTION PROCEDURES:      VERIFICATION OF PATIENT IDENTIFICATION AND PROCEDURE     Initials   Patient Name pb   Patient  pb   Procedure Verified by: pb     Prior to the start of the procedure and with procedural staff participation, I verbally confirmed the patient s identity using two indicators, relevant allergies, that the procedure was appropriate and matched the consent or emergent situation, and that the correct equipment/implants were available. Immediately prior to starting the procedure I conducted the Time Out with the procedural staff and re-confirmed the patient s name, procedure, and site/side. (The Joint Commission universal protocol was followed.)   Yes    Sedation (Moderate or Deep): None    Above assessments performed by:  Resident/Fellow         Thomas Jules DO           The attending provider was present for the entire procedure documented below.    Addie Malhotra MD     INDICATIONS FOR PROCEDURES:  Valerie Sim is a 54 year old patient with chronic migraine headaches associated with cervicogenic components. Her baseline symptoms have been recalcitrant to oral medications and conservative therapy.  She is here today for reinjection with Botox.    GOAL OF PROCEDURE:  The goal of this procedure is to decrease pain  and enhance functional independence.    TOTAL DOSE ADMINISTERED:  Dose Administered: 225 units Botox (Botulinum Toxin Type A)   Diluent Used: 0.25% Bupivacaine (Batch: [id number], Exp: 11/2021, NDC: 55150-167-10)  Total Volume of Diluent Used: See below  Lot # /C3 with Expiration Date: 04/2022  NDC #: Botox 100u ([address]-68)  Was there drug waste? Yes  Amount of drug waste (mL): 75 units of Botox.  Reason for waste:  Single use vial  Multi-dose vial: No    Thomas Jules DO  November 4, 2019     Medication guide was offered to patient and was declined.    CONSENT:  The risks, benefits, and treatment options were discussed with Valerie Sim and she agreed to proceed.    Written consent was obtained by pb.     EQUIPMENT USED:  Needle-37mm stimulating/recording  Needle-30 gauge  EMG/NCS Machine    SKIN PREPARATION:  Skin preparation was performed using an alcohol wipe.    GUIDANCE DESCRIPTION:  Electro-myographic guidance was necessary throughout the procedure to accurately identify all areas of spastic muscles while avoiding injection of non-spastic muscles, neighboring nerves and nearby vascular structures.     AREA/MUSCLE INJECTED: 225 UNITS BOTOX = TOTAL DOSE    1. FACE & SCALP: 115 units of Botox = Total dose, 2:1 Dilution    Right temporalis - 12.5 units of Botox in 4 site/s.  Left temporalis - 12.5 units of Botox in 4  site/s.      Right frontalis - 10 units of Botox in 4 site/s.  Left frontalis - 10 units of Botox in 4 site/s.      Right procerus - 3.5 units of Botox in 1 site/s.  Left procerus - 3.5 units of Botox in 1 site/s.      Right  - 4 units of Botox in 1 site/s.  Left  - 4 units of Botox in 1 site/s.      Right Upper Occipitalis - 25 units of Botox at 4 site/s.   Left Upper Occipitalis - 25 units of Botox at 4 site/s.      Right Nasalis - 2.5 units of Botox at 1 site/s.              Left Nasalis - 2.5 units of Botox at 1 site/s.         2. JAW MUSCLES: 60 units of Botox = Total Dose, 1:1 Dilution    Right Masseter - 25 units of Botox at 1 site/s.   Left Masseter - 25 units of Botox at 1 site/s.      Right temporalis - 5 units of Botox at 1 site/s.  Left temporalis - 5 units of Botox at 1 site/s.         3. SHOULDER & NECK MUSCLES: Total dose 50 units of Botox, 2:1 Dilution      Right levator muscle - 10 units of Botox at 2 site/s (neck and shoulder).  Left levator muscle - 10 units of Botox at 2 site/s (neck and shoulder).      Right mid trapezius - 5 units of Botox at 1 site.  Left mid trapezius - 5 units of Botox at 1 site.     Right lateral trapezius - 10 units of Botox at 2 site/s  Left lateral trapezuis - 10 units of Botox at 2 site/s.      RESPONSE TO PROCEDURE:  Valerie Sim tolerated the procedure well and there were no immediate complications. She was allowed to recover for an appropriate period of time and was discharged home in stable condition.    FOLLOW UP:  Valerie Sim was asked to follow up by phone in 7-14 days with Nely Jean Baptiste PT, Care Coordinator or Estela Magaña RN, Care Coordinator, to report her response to this series of injections.  Based on the patient's previous response to this therapy, Valerie Sim was rescheduled for the next series of injections in 9 weeks.    PLAN (Medication Changes, Therapy Orders, Work or Disability Issues, etc.):  - Patient will continue  to monitor response to today's injections.   - Has stopped Aimovig during last cycle

## 2019-11-07 ENCOUNTER — DOCUMENTATION ONLY (OUTPATIENT)
Dept: NEUROLOGY | Facility: CLINIC | Age: 54
End: 2019-11-07

## 2019-11-07 NOTE — PROGRESS NOTES
Oxygen prescription from [facility] received and placed in providers folder for review and signature.

## 2019-11-11 ENCOUNTER — CARE COORDINATION (OUTPATIENT)
Dept: NEUROLOGY | Facility: CLINIC | Age: 54
End: 2019-11-11

## 2019-11-11 NOTE — PROGRESS NOTES
Prescription for Oxygen filled out and signed by Dr Shi.  I faxed to [facility] [phone]

## 2019-12-18 ENCOUNTER — ALLIED HEALTH/NURSE VISIT (OUTPATIENT)
Dept: PHYSICAL MEDICINE AND REHAB | Facility: CLINIC | Age: 54
End: 2019-12-18
Payer: COMMERCIAL

## 2019-12-18 VITALS — HEART RATE: 88 BPM | DIASTOLIC BLOOD PRESSURE: 89 MMHG | SYSTOLIC BLOOD PRESSURE: 153 MMHG | OXYGEN SATURATION: 100 %

## 2019-12-18 DIAGNOSIS — M79.18 MYOFASCIAL PAIN: ICD-10-CM

## 2019-12-18 DIAGNOSIS — M54.81 OCCIPITAL NEURALGIA OF LEFT SIDE: Primary | ICD-10-CM

## 2019-12-18 ASSESSMENT — PAIN SCALES - GENERAL: PAINLEVEL: MODERATE PAIN (5)

## 2019-12-18 NOTE — PROGRESS NOTES
[facility]  Physical Medicine & Rehabilitation Procedure Note    History of Present Illness:    Valerie Sim is a 54-year-old female with a history of chronic migraines, cluster headaches, and occipital headaches with myofascial pain who presents with a severe left-sided headache for an occipital nerve block and trigger point injections.  Today, the patient rates her pain as an 8/10, mostly on the left occiput, that radiates to her left temporalis and behind her left eye.  She has mild nausea and some visual disturbances.  She has tried all of her usual migraine abortive medications without any success.  She has had trigger point injections and trigger point injections in the past with good success.     Procedure:     Prior to the start of the procedure and with procedural staff participation, I verbally confirmed the patient s identity using two indicators, relevant allergies, that the procedure was appropriate and matched the consent or emergent situation, and that the correct equipment/implants were available. Immediately prior to starting the procedure I conducted the Time Out with the procedural staff and re-confirmed the patient s name, procedure, and site/side. (The Joint Commission universal protocol was followed.)  Yes    Sedation (Moderate or Deep): None    Drug:   Kenalog: Lot [id number], Exp 09/2021, NDC 789918-5500350687-2371-8  Bupivacaine 0.5%: Lot [id number], Exp 04/2023, NDC 36929-323-40      Left greater occipital nerve block    Area just inferior to insertion of the right and left superior trapezius insertion onto skull was cleansed with alcohol. Needle was advanced anteriorly to base of skull then slightly withdrawn and injectate was injected in a fan-like distribution at different depths. Total injection of 0.5 cc of 20 mg Kenalog plus 4.5 cc of 0.5 % bupivicaine into the left side for a total of 5 ml.     Trigger Point Injections    Areas were prepped  with ChloraPrep.  Using standard precautions a 30-gauge 1-inch needle was used to inject a 2 ml mixture of 1.5 ml of 0.25% bupivacaine and 20 mg Kenalog into the left occipitalis and left temporalis muscles for a total of 10 injection sites.  Patient tolerated the procedure well and there were no complications.        Valerie Sim tolerated the procedure well without any immediate complications. She was allowed to recover for an appropriate period of time and was discharged home in stable condition.  Patient will follow-up regarding response to this procedure.      Assessment & Recommendations:  Valerie Sim is a 54 year old female who presents to rehabilitation clinic for trigger point injections and a left total nerve block for management of an acute occipital migraine.  Patient tolerated procedure well without any complication.    She will follow-up with her usual Botox injections as scheduled.  She may repeat trigger point injections and/or occipital nerve blocks no sooner than 3 months from today.    Addie Malhotra MD

## 2019-12-18 NOTE — NURSING NOTE
Chief Complaint   Patient presents with     Procedure     [facility] RETURN OCCIPITAL NERVE BLOCKS       Sarah Almanza, EMT

## 2020-01-02 RX ORDER — TRIAMCINOLONE ACETONIDE 40 MG/ML
40 INJECTION, SUSPENSION INTRA-ARTICULAR; INTRAMUSCULAR ONCE
Status: COMPLETED | OUTPATIENT
Start: 2020-01-02 | End: 2020-01-02

## 2020-01-02 RX ORDER — BUPIVACAINE HYDROCHLORIDE 5 MG/ML
5 INJECTION, SOLUTION EPIDURAL; INTRACAUDAL ONCE
Status: COMPLETED | OUTPATIENT
Start: 2020-01-02 | End: 2020-01-02

## 2020-01-02 RX ADMIN — TRIAMCINOLONE ACETONIDE 40 MG: 40 INJECTION, SUSPENSION INTRA-ARTICULAR; INTRAMUSCULAR at 13:19

## 2020-01-02 RX ADMIN — BUPIVACAINE HYDROCHLORIDE 25 MG: 5 INJECTION, SOLUTION EPIDURAL; INTRACAUDAL at 13:19

## 2020-01-13 ENCOUNTER — OFFICE VISIT (OUTPATIENT)
Dept: PHYSICAL MEDICINE AND REHAB | Facility: CLINIC | Age: 55
End: 2020-01-13
Payer: COMMERCIAL

## 2020-01-13 VITALS
RESPIRATION RATE: 16 BRPM | OXYGEN SATURATION: 99 % | BODY MASS INDEX: 25.27 KG/M2 | HEIGHT: 67 IN | DIASTOLIC BLOOD PRESSURE: 84 MMHG | WEIGHT: 161 LBS | HEART RATE: 103 BPM | SYSTOLIC BLOOD PRESSURE: 167 MMHG

## 2020-01-13 DIAGNOSIS — G43.719 CHRONIC MIGRAINE WITHOUT AURA, INTRACTABLE, WITHOUT STATUS MIGRAINOSUS: Primary | ICD-10-CM

## 2020-01-13 RX ORDER — BUPIVACAINE HYDROCHLORIDE 2.5 MG/ML
5 INJECTION, SOLUTION EPIDURAL; INFILTRATION; INTRACAUDAL ONCE
Status: COMPLETED | OUTPATIENT
Start: 2020-01-13 | End: 2020-01-13

## 2020-01-13 RX ADMIN — BUPIVACAINE HYDROCHLORIDE 12.5 MG: 2.5 INJECTION, SOLUTION EPIDURAL; INFILTRATION; INTRACAUDAL at 10:35

## 2020-01-13 ASSESSMENT — PAIN SCALES - GENERAL: PAINLEVEL: EXTREME PAIN (8)

## 2020-01-13 ASSESSMENT — MIFFLIN-ST. JEOR: SCORE: 1367.92

## 2020-01-13 NOTE — PROGRESS NOTES
"BOTULINUM TOXIN PROCEDURE - HEADACHE - NOTE    Chief Complaint   Patient presents with     Headache     [facility] RETURN BOTOX CHRONIC MIGRAINE      BP (!) 167/84   Pulse 103   Resp 16   Ht 1.702 m (5' 7\")   Wt 73 kg (161 lb)   SpO2 99%   BMI 25.22 kg/m       Current Outpatient Medications:      aMILoride (MIDAMOR) 5 MG tablet, , Disp: , Rfl:      amLODIPine (NORVASC) 10 MG tablet, , Disp: , Rfl:      amphetamine-dextroamphetamine (ADDERALL) 20 MG tablet, Take 1 tablet (20 mg) by mouth 2 times daily, Disp: 60 tablet, Rfl: 0     budesonide (RINOCORT AQUA) 32 MCG/ACT nasal spray, Spray 1 spray into both nostrils daily., Disp: , Rfl:      Calcium Citrate (CALCITRATE PO), Take 1,200 mg by mouth 2 times daily 1/2 TAB bid, Disp: , Rfl:      cyclobenzaprine (FLEXERIL) 10 MG tablet, , Disp: , Rfl:      CycloSPORINE (RESTASIS OP), Apply to eye 2 times daily, Disp: , Rfl:      divalproex sodium delayed-release (DEPAKOTE) 500 MG DR tablet, , Disp: , Rfl:      eletriptan (RELPAX) 40 MG tablet, TAKE 1 TABLET AT ONSET OF MIGRAINE. MAY REPEAT ONCE AFTER 2 HRS. MAX OF 2 TABS/24HRS AND 3 DAYS/WEEK., Disp: 12 tablet, Rfl: 3     fexofenadine (ALLEGRA) 180 MG tablet, Take  by mouth daily., Disp: , Rfl:      HYDROmorphone (DILAUDID) 2 MG tablet, Take 0.5-1 tablets (1-2 mg) by mouth every 3 hours as needed (headache) 20 tablets = 3 month supply., Disp: 24 tablet, Rfl: 0     ketorolac (TORADOL) 30 MG/ML injection, Inject 1 mL (30 mg) into the vein every 6 hours as needed for moderate pain, Disp: 9 mL, Rfl: 11     lamoTRIgine (LAMICTAL) 200 MG tablet, Take 1 tablet by mouth daily, Disp: , Rfl:      LORazepam (ATIVAN) 1 MG tablet, Take 1 mg by mouth 2 times daily as needed., Disp: , Rfl:      Nerve Stimulator (CEFALY KIT) NAHUM, 1 Device daily Use daily and as needed. Dual device, Disp: 1 Device, Rfl: 0     ondansetron (ZOFRAN) 4 MG tablet, Take 1 tablet (4 mg) by mouth every 8 hours as needed, Disp: 90 tablet, Rfl: 1     order for DME, " Equipment being ordered: Oxygen The patient has Cluster Headache and needs 10 liters/minute of high flow oxygen via nonrebreather mask prn headaches, Disp: 99 days, Rfl: 0     order for DME, Equipment being ordered: Oxygen and non-rebreather mask.   Use high flow oxygen (10-15 L/min) with non-rebreather mask, as needed for cluster headaches, Disp: 1 Units, Rfl: 11     QUEtiapine (SEROQUEL) 50 MG tablet, Take 1 tablet by mouth daily., Disp: , Rfl:      SUMAtriptan (IMITREX STATDOSE) 6 MG/0.5ML pen injector kit, Inject 0.5 mLs (6 mg) Subcutaneous at onset of headache for migraine May repeat in 1 hour. Max 12 mg/24 hours., Disp: 1 kit, Rfl: 0     SUMAtriptan (IMITREX STATDOSE) 6 MG/0.5ML refill cartridge, Inject 0.5 mLs (6 mg) Subcutaneous at onset of headache for migraine, Disp: 18 Cartridge, Rfl: 3     valACYclovir (VALTREX) 1000 mg tablet, Take 1,000 mg by mouth as needed., Disp: , Rfl:      estrogens, conjugated, (PREMARIN) 0.625 MG tablet, Take by mouth daily .30 mg . , Disp: , Rfl:      predniSONE (DELTASONE) 20 MG tablet, 3 tab po QAM x 2 d, 2 tab po QAM x 2 d, 1 tab po QAM x 2 d half tab po QAM x 2 d then stop, Disp: 13 tablet, Rfl: 0     vitamin D3 (CHOLECALCIFEROL) 2000 units (50 mcg) tablet, Take 1 tablet by mouth daily, Disp: , Rfl:      Allergies   Allergen Reactions     Fluoxetine Other (See Comments)     PN: LW Reaction: headache  PN: LW Reaction: headache  Migraine       Garlic Blisters, Cough, Dermatitis, Difficulty breathing, Headache, Hives, Itching, Nausea and Vomiting and Shortness Of Breath     Trazodone Other (See Comments) and Unknown     Other reaction(s): Headache  Other reaction(s): Headache  Other reaction(s): Headache     Amitriptyline Hcl Other (See Comments)     Migraine       Candesartan Cilexetil-Hctz Muscle Pain (Myalgia)     Cymbalta Other (See Comments)     Migraine       Cyproheptadine Hcl      headaches     Desvenlafaxine      Migraine       Diatrizoate Unknown     PN: LW CM1:  "CONTRAST- iodine Reaction :     Diazepam      Other reaction(s): Vestibular Toxicity  PN: LW Reaction: vertigo  PN: LW Reaction: vertigo     Galcanezumab-Gnlm      Other reaction(s): Headache     Imipramine Other (See Comments)     Migraine       Lyrica Muscle Pain (Myalgia)     Metoprolol Other (See Comments) and Unknown     headache  headache  headache     Morphine Other (See Comments)     Patient reports seizure      No Clinical Screening - See Comments      PN: LW FI1: lactose intolerance LW FI2: shellfish  PN: LW CM1: CONTRAST- iodine Reaction :  PN: LW Other1: -nka     Nortriptyline Other (See Comments)     Migraine       Phenergan Dm [Promethazine-Dm] Other (See Comments)     seizures     Promethazine      PN: LW Reaction: minimal sizures     Venlafaxine Other (See Comments)     PN: LW Reaction: migraine  PN: LW Reaction: migraine  Migraine       Wellbutrin [Bupropion Hydrobromide] Other (See Comments)     Migraine       Compazine Anxiety     Dihydroergotamine Anxiety and Other (See Comments)     Cardiac symptoms     Droperidol Anxiety     PN: LW Reaction: agitation     Medroxyprogesterone Hives     PN: LW Reaction: nausea     Prochlorperazine Anxiety     PN: LW Reaction: \"can't sit still\"        PHYSICAL EXAM:    Headache: no headache during visit today  Pleasant  No facial asymmetry    HPI:    Patient reports no new problems since her last visit.    Patient has had a very difficult 2 weeks prior to presenting to clinic today due to stress wit. She indicates that due to the stress of these events, she had an episode of cluster headaches over the weekend.   Also had extreme constipation due to Aimovig on September 2019 dosing, patient has stopped this medication and has had resolution of symptoms.    We reviewed the recommended safety guidelines for  Botox from any vaccine injection, such as the seasonal flu vaccine, by a minimum of 10-14 days with Valerie Sim. She acknowledged " understanding.    RESPONSE TO PREVIOUS TREATMENT:  Change in headache pattern following last series of injections with 225 units of  Botox on 19.    No problems reported  Pain related to injections done (about 50%) of them, had let Dr Malhotra    1.  Headache frequency during this injection cycle:  30 headache days per month.  This is compared to her baseline headache frequency of 30 headache days per month.     2.  Headache duration during this injection cycle:  Headache duration ranged from 2-3 hours to 24-36 hours. Patient reports up to 30 episodes of multiple day headaches during this injection cycle. Has had to take Imitrex intermittently throughout the last cycle. Had an episode of cluster headaches during week prior to injections.     3.  Headache intensity during this injection cycle:    A.  6/10  =  Typical pain level.  B.  10/10  =  Worst pain level.  C.  4/10  =  Lowest pain level.    4.  Change in headache medication usage during this injection cycle:  (For Example:  Able to decrease use of oral pain medications.) Stopped Premerin and started topical estradiol and topical gabapentin PRN (uses for pelvic floor pain)    5.  ER Visits During This Injection Cycle:  0    6.  Functional Performance:  Change in ADL's, social interaction, days lost from work, etc.   - Working 10 hours per week due to disability, has missed one day in the last cycle  - Reports missing 3-4 social activities due to headaches over the last cycle.      BOTULINUM NEUROTOXIN INJECTION PROCEDURES:      VERIFICATION OF PATIENT IDENTIFICATION AND PROCEDURE     Initials   Patient Name fj   Patient  fj   Procedure Verified by: ozzy     Prior to the start of the procedure and with procedural staff participation, I verbally confirmed the patient s identity using two indicators, relevant allergies, that the procedure was appropriate and matched the consent or emergent situation, and that the correct equipment/implants were available.  Immediately prior to starting the procedure I conducted the Time Out with the procedural staff and re-confirmed the patient s name, procedure, and site/side. (The Joint Commission universal protocol was followed.)  Yes    Sedation (Moderate or Deep): None    Above assessments performed by:  Resident/Fellow         Hafsa Nix MD          The attending provider was present for the entire procedure documented below.    Addie Malhotra MD     INDICATIONS FOR PROCEDURES:  Valerie Sim is a 54 year old patient with chronic migraine headaches associated with cervicogenic components. Her baseline symptoms have been recalcitrant to oral medications and conservative therapy.  She is here today for reinjection with Botox.    GOAL OF PROCEDURE:  The goal of this procedure is to decrease pain  and enhance functional independence.    TOTAL DOSE ADMINISTERED:  Dose Administered: 225 units Botox (Botulinum Toxin Type A)   Diluent Used: 0.25% Bupivacaine (Batch: [id number], Exp: 11/2021, NDC: 55150-167-10)  Total Volume of Diluent Used: See below  Lot # [id number] with Expiration Date: 05/2022  NDC #: Botox 100u ([address]-76)  Was there drug waste? Yes  Amount of drug waste (mL): 75 units of Botox.  Reason for waste:  Single use vial  Multi-dose vial: No    Hafsa Nix MD  January 13, 2020    Medication guide was offered to patient and was declined.    CONSENT:  The risks, benefits, and treatment options were discussed with Valerie Sim and she agreed to proceed.    Written consent was obtained by fj.     EQUIPMENT USED:  Needle-37mm stimulating/recording  Needle-30 gauge  EMG/NCS Machine    SKIN PREPARATION:  Skin preparation was performed using an alcohol wipe.    GUIDANCE DESCRIPTION:  Electro-myographic guidance was necessary throughout the procedure to accurately identify all areas of spastic muscles while avoiding injection of non-spastic muscles, neighboring nerves and nearby vascular structures.     AREA/MUSCLE  INJECTED: 225 UNITS BOTOX = TOTAL DOSE    1. FACE & SCALP: 115 units of Botox = Total dose, 2:1 Dilution    Right temporalis - 12.5 units of Botox in 4 site/s.  Left temporalis - 12.5 units of Botox in 4 site/s.      Right frontalis - 10 units of Botox in 4 site/s.  Left frontalis - 10 units of Botox in 4 site/s.      Right procerus - 3.5 units of Botox in 1 site/s.  Left procerus - 3.5 units of Botox in 1 site/s.      Right  - 4 units of Botox in 1 site/s.  Left  - 4 units of Botox in 1 site/s.      Right Upper Occipitalis - 25 units of Botox at 4 site/s.   Left Upper Occipitalis - 25 units of Botox at 4 site/s.      Right Nasalis - 2.5 units of Botox at 1 site/s.              Left Nasalis - 2.5 units of Botox at 1 site/s.         2. JAW MUSCLES: 60 units of Botox = Total Dose, 1:1 Dilution    Right Masseter - 25 units of Botox at 1 site/s.   Left [place] - 25 units of Botox at 1 site/s.      Right temporalis - 5 units of Botox at 1 site/s.  Left temporalis - 5 units of Botox at 1 site/s.         3. SHOULDER & NECK MUSCLES: Total dose 50 units of Botox, 2:1 Dilution      Right levator muscle - 10 units of Botox at 2 site/s (neck and shoulder).  Left levator muscle - 10 units of Botox at 2 site/s (neck and shoulder).      Right mid trapezius - 5 units of Botox at 1 site.  Left mid trapezius - 5 units of Botox at 1 site.     Right lateral trapezius - 10 units of Botox at 2 site/s  Left lateral trapezuis - 10 units of Botox at 2 site/s.      RESPONSE TO PROCEDURE:  Valerie Sim tolerated the procedure well and there were no immediate complications. She was allowed to recover for an appropriate period of time and was discharged home in stable condition.    FOLLOW UP:  Valerie Sim was asked to follow up by phone in 7-14 days with Nely Jean Baptiste PT, Care Coordinator or Estela Magaña RN, Care Coordinator, to report her response to this series of injections.  Based on the patient's previous  response to this therapy, Valerie Sim was rescheduled for the next series of injections in 9 weeks.    PLAN (Medication Changes, Therapy Orders, Work or Disability Issues, etc.): Patient will continue to monitor response to today's injections.

## 2020-01-13 NOTE — LETTER
"1/13/2020       RE: Valerie Sim  [address]  [place] [address]     Dear Colleague,    Thank you for referring your patient, Valerie Sim, to the [facility] PHYSICAL MEDICINE AND REHABILITATION at [facility]. Please see a copy of my visit note below.    BOTULINUM TOXIN PROCEDURE - HEADACHE - NOTE    Chief Complaint   Patient presents with     Headache     [facility] RETURN BOTOX CHRONIC MIGRAINE      BP (!) 167/84   Pulse 103   Resp 16   Ht 1.702 m (5' 7\")   Wt 73 kg (161 lb)   SpO2 99%   BMI 25.22 kg/m        Current Outpatient Medications:      aMILoride (MIDAMOR) 5 MG tablet, , Disp: , Rfl:      amLODIPine (NORVASC) 10 MG tablet, , Disp: , Rfl:      amphetamine-dextroamphetamine (ADDERALL) 20 MG tablet, Take 1 tablet (20 mg) by mouth 2 times daily, Disp: 60 tablet, Rfl: 0     budesonide (RINOCORT AQUA) 32 MCG/ACT nasal spray, Spray 1 spray into both nostrils daily., Disp: , Rfl:      Calcium Citrate (CALCITRATE PO), Take 1,200 mg by mouth 2 times daily 1/2 TAB bid, Disp: , Rfl:      cyclobenzaprine (FLEXERIL) 10 MG tablet, , Disp: , Rfl:      CycloSPORINE (RESTASIS OP), Apply to eye 2 times daily, Disp: , Rfl:      divalproex sodium delayed-release (DEPAKOTE) 500 MG DR tablet, , Disp: , Rfl:      eletriptan (RELPAX) 40 MG tablet, TAKE 1 TABLET AT ONSET OF MIGRAINE. MAY REPEAT ONCE AFTER 2 HRS. MAX OF 2 TABS/24HRS AND 3 DAYS/WEEK., Disp: 12 tablet, Rfl: 3     fexofenadine (ALLEGRA) 180 MG tablet, Take  by mouth daily., Disp: , Rfl:      HYDROmorphone (DILAUDID) 2 MG tablet, Take 0.5-1 tablets (1-2 mg) by mouth every 3 hours as needed (headache) 20 tablets = 3 month supply., Disp: 24 tablet, Rfl: 0     ketorolac (TORADOL) 30 MG/ML injection, Inject 1 mL (30 mg) into the vein every 6 hours as needed for moderate pain, Disp: 9 mL, Rfl: 11     lamoTRIgine (LAMICTAL) 200 MG tablet, Take 1 tablet by mouth daily, Disp: , Rfl:      LORazepam (ATIVAN) 1 MG tablet, " Take 1 mg by mouth 2 times daily as needed., Disp: , Rfl:      Nerve Stimulator (CEFALY KIT) NAHUM, 1 Device daily Use daily and as needed. Dual device, Disp: 1 Device, Rfl: 0     ondansetron (ZOFRAN) 4 MG tablet, Take 1 tablet (4 mg) by mouth every 8 hours as needed, Disp: 90 tablet, Rfl: 1     order for DME, Equipment being ordered: Oxygen The patient has Cluster Headache and needs 10 liters/minute of high flow oxygen via nonrebreather mask prn headaches, Disp: 99 days, Rfl: 0     order for DME, Equipment being ordered: Oxygen and non-rebreather mask.   Use high flow oxygen (10-15 L/min) with non-rebreather mask, as needed for cluster headaches, Disp: 1 Units, Rfl: 11     QUEtiapine (SEROQUEL) 50 MG tablet, Take 1 tablet by mouth daily., Disp: , Rfl:      SUMAtriptan (IMITREX STATDOSE) 6 MG/0.5ML pen injector kit, Inject 0.5 mLs (6 mg) Subcutaneous at onset of headache for migraine May repeat in 1 hour. Max 12 mg/24 hours., Disp: 1 kit, Rfl: 0     SUMAtriptan (IMITREX STATDOSE) 6 MG/0.5ML refill cartridge, Inject 0.5 mLs (6 mg) Subcutaneous at onset of headache for migraine, Disp: 18 Cartridge, Rfl: 3     valACYclovir (VALTREX) 1000 mg tablet, Take 1,000 mg by mouth as needed., Disp: , Rfl:      estrogens, conjugated, (PREMARIN) 0.625 MG tablet, Take by mouth daily .30 mg . , Disp: , Rfl:      predniSONE (DELTASONE) 20 MG tablet, 3 tab po QAM x 2 d, 2 tab po QAM x 2 d, 1 tab po QAM x 2 d half tab po QAM x 2 d then stop, Disp: 13 tablet, Rfl: 0     vitamin D3 (CHOLECALCIFEROL) 2000 units (50 mcg) tablet, Take 1 tablet by mouth daily, Disp: , Rfl:      Allergies   Allergen Reactions     Fluoxetine Other (See Comments)     PN: LW Reaction: headache  PN: LW Reaction: headache  Migraine       Garlic Blisters, Cough, Dermatitis, Difficulty breathing, Headache, Hives, Itching, Nausea and Vomiting and Shortness Of Breath     Trazodone Other (See Comments) and Unknown     Other reaction(s): Headache  Other reaction(s):  "Headache  Other reaction(s): Headache     Amitriptyline Hcl Other (See Comments)     Migraine       Candesartan Cilexetil-Hctz Muscle Pain (Myalgia)     Cymbalta Other (See Comments)     Migraine       Cyproheptadine Hcl      headaches     Desvenlafaxine      Migraine       Diatrizoate Unknown     PN: LW CM1: CONTRAST- iodine Reaction :     Diazepam      Other reaction(s): Vestibular Toxicity  PN: LW Reaction: vertigo  PN: LW Reaction: vertigo     Galcanezumab-Gnlm      Other reaction(s): Headache     Imipramine Other (See Comments)     Migraine       Lyrica Muscle Pain (Myalgia)     Metoprolol Other (See Comments) and Unknown     headache  headache  headache     Morphine Other (See Comments)     Patient reports seizure      No Clinical Screening - See Comments      PN: LW FI1: lactose intolerance LW FI2: shellfish  PN: LW CM1: CONTRAST- iodine Reaction :  PN: LW Other1: -nka     Nortriptyline Other (See Comments)     Migraine       Phenergan Dm [Promethazine-Dm] Other (See Comments)     seizures     Promethazine      PN: LW Reaction: minimal sizures     Venlafaxine Other (See Comments)     PN: LW Reaction: migraine  PN: LW Reaction: migraine  Migraine       Wellbutrin [Bupropion Hydrobromide] Other (See Comments)     Migraine       Compazine Anxiety     Dihydroergotamine Anxiety and Other (See Comments)     Cardiac symptoms     Droperidol Anxiety     PN: LW Reaction: agitation     Medroxyprogesterone Hives     PN: LW Reaction: nausea     Prochlorperazine Anxiety     PN: LW Reaction: \"can't sit still\"        PHYSICAL EXAM:    Headache: no headache during visit today  Pleasant  No facial asymmetry    HPI:    Patient reports no new problems since her last visit.    Patient has had a very difficult 2 weeks prior to presenting to clinic today due to stress wit. She indicates that due to the stress of these events, she had an episode of cluster headaches over the weekend.   Also had extreme constipation due to Aimovig on " 2019 dosing, patient has stopped this medication and has had resolution of symptoms.    We reviewed the recommended safety guidelines for  Botox from any vaccine injection, such as the seasonal flu vaccine, by a minimum of 10-14 days with Valerie Sim. She acknowledged understanding.    RESPONSE TO PREVIOUS TREATMENT:  Change in headache pattern following last series of injections with 225 units of  Botox on 19.    No problems reported  Pain related to injections done (about 50%) of them, had let  Standal    1.  Headache frequency during this injection cycle:  30 headache days per month.  This is compared to her baseline headache frequency of 30 headache days per month.     2.  Headache duration during this injection cycle:  Headache duration ranged from 2-3 hours to 24-36 hours. Patient reports up to 30 episodes of multiple day headaches during this injection cycle. Has had to take Imitrex intermittently throughout the last cycle. Had an episode of cluster headaches during week prior to injections.     3.  Headache intensity during this injection cycle:    A.  6/10  =  Typical pain level.  B.  10/10  =  Worst pain level.  C.  4/10  =  Lowest pain level.    4.  Change in headache medication usage during this injection cycle:  (For Example:  Able to decrease use of oral pain medications.) Stopped Premerin and started topical estradiol and topical gabapentin PRN (uses for pelvic floor pain)    5.  ER Visits During This Injection Cycle:  0    6.  Functional Performance:  Change in ADL's, social interaction, days lost from work, etc.   - Working 10 hours per week due to disability, has missed one day in the last cycle  - Reports missing 3-4 social activities due to headaches over the last cycle.      BOTULINUM NEUROTOXIN INJECTION PROCEDURES:      VERIFICATION OF PATIENT IDENTIFICATION AND PROCEDURE     Initials   Patient Name fj   Patient  fj   Procedure Verified by: ozzy     Prior to the  start of the procedure and with procedural staff participation, I verbally confirmed the patient s identity using two indicators, relevant allergies, that the procedure was appropriate and matched the consent or emergent situation, and that the correct equipment/implants were available. Immediately prior to starting the procedure I conducted the Time Out with the procedural staff and re-confirmed the patient s name, procedure, and site/side. (The Joint Commission universal protocol was followed.)  Yes    Sedation (Moderate or Deep): None    Above assessments performed by:  Resident/Fellow         Hafsa Nix MD          The attending provider was present for the entire procedure documented below.    Addie Malhotra MD     INDICATIONS FOR PROCEDURES:  Valerie Sim is a 54 year old patient with chronic migraine headaches associated with cervicogenic components. Her baseline symptoms have been recalcitrant to oral medications and conservative therapy.  She is here today for reinjection with Botox.    GOAL OF PROCEDURE:  The goal of this procedure is to decrease pain  and enhance functional independence.    TOTAL DOSE ADMINISTERED:  Dose Administered: 225 units Botox (Botulinum Toxin Type A)   Diluent Used: 0.25% Bupivacaine (Batch: [id number], Exp: 11/2021, NDC: 55150-167-10)  Total Volume of Diluent Used: See below  Lot # [id number] with Expiration Date:  05/2022  NDC #: Botox 100u ([address]-06)  Was there drug waste? Yes  Amount of drug waste (mL): 75 units of Botox.  Reason for waste:  Single use vial  Multi-dose vial: No    Hafsa Nix MD  January 13, 2020    Medication guide was offered to patient and was declined.    CONSENT:  The risks, benefits, and treatment options were discussed with Valerie Sim and she agreed to proceed.    Written consent was obtained by fj.     EQUIPMENT USED:  Needle-37mm stimulating/recording  Needle-30 gauge  EMG/NCS Machine    SKIN PREPARATION:  Skin preparation was  performed using an alcohol wipe.    GUIDANCE DESCRIPTION:  Electro-myographic guidance was necessary throughout the procedure to accurately identify all areas of spastic muscles while avoiding injection of non-spastic muscles, neighboring nerves and nearby vascular structures.     AREA/MUSCLE INJECTED: 225 UNITS BOTOX = TOTAL DOSE    1. FACE & SCALP: 115 units of Botox = Total dose, 2:1 Dilution    Right temporalis - 12.5 units of Botox in 4 site/s.  Left temporalis - 12.5 units of Botox in 4 site/s.      Right frontalis - 10 units of Botox in 4 site/s.  Left frontalis - 10 units of Botox in 4 site/s.      Right procerus - 3.5 units of Botox in 1 site/s.  Left procerus - 3.5 units of Botox in 1 site/s.      Right  - 4 units of Botox in 1 site/s.  Left  - 4 units of Botox in 1 site/s.      Right Upper Occipitalis - 25 units of Botox at 4 site/s.   Left Upper Occipitalis - 25 units of Botox at 4 site/s.      Right Nasalis - 2.5 units of Botox at 1 site/s.              Left Nasalis - 2.5 units of Botox at 1 site/s.         2. JAW MUSCLES: 60 units of Botox = Total Dose, 1:1 Dilution    Right Masseter - 25 units of Botox at 1 site/s.   Left Masseter - 25 units of Botox at 1 site/s.      Right temporalis - 5 units of Botox at 1 site/s.  Left temporalis - 5 units of Botox at 1 site/s.         3. SHOULDER & NECK MUSCLES: Total dose 50 units of Botox, 2:1 Dilution      Right levator muscle - 10 units of Botox at 2 site/s (neck and shoulder).  Left levator muscle - 10 units of Botox at 2 site/s (neck and shoulder).      Right mid trapezius - 5 units of Botox at 1 site.  Left mid trapezius - 5 units of Botox at 1 site.     Right lateral trapezius - 10 units of Botox at 2 site/s  Left lateral trapezuis - 10 units of Botox at 2 site/s.      RESPONSE TO PROCEDURE:  Valerie Sim tolerated the procedure well and there were no immediate complications. She was allowed to recover for an appropriate period of time  and was discharged home in stable condition.    FOLLOW UP:  Valerie Sim was asked to follow up by phone in 7-14 days with Nely Jean Baptiste PT, Care Coordinator or Estela Magaña RN, Care Coordinator, to report her response to this series of injections.  Based on the patient's previous response to this therapy, Valerie Sim was rescheduled for the next series of injections in 9 weeks.    PLAN (Medication Changes, Therapy Orders, Work or Disability Issues, etc.): Patient will continue to monitor response to today's injections.     Again, thank you for allowing me to participate in the care of your patient.      Sincerely,    Addie Malhotra MD

## 2020-01-13 NOTE — NURSING NOTE
Chief Complaint   Patient presents with     Headache     [facility] RETURN BOTOX CHRONIC MIGRAINE        Donis Hercules, EMT

## 2020-02-26 ENCOUNTER — MYC MEDICAL ADVICE (OUTPATIENT)
Dept: OTHER | Age: 55
End: 2020-02-26

## 2020-03-03 ENCOUNTER — ALLIED HEALTH/NURSE VISIT (OUTPATIENT)
Dept: PHYSICAL MEDICINE AND REHAB | Facility: CLINIC | Age: 55
End: 2020-03-03
Payer: COMMERCIAL

## 2020-03-03 VITALS
OXYGEN SATURATION: 99 % | SYSTOLIC BLOOD PRESSURE: 134 MMHG | DIASTOLIC BLOOD PRESSURE: 82 MMHG | TEMPERATURE: 98.2 F | HEART RATE: 98 BPM

## 2020-03-03 DIAGNOSIS — M79.18 MYOFASCIAL PAIN: Primary | ICD-10-CM

## 2020-03-03 DIAGNOSIS — M54.81 OCCIPITAL NEURALGIA OF LEFT SIDE: ICD-10-CM

## 2020-03-03 RX ORDER — BUPIVACAINE HYDROCHLORIDE 5 MG/ML
9 INJECTION, SOLUTION EPIDURAL; INTRACAUDAL ONCE
Status: COMPLETED | OUTPATIENT
Start: 2020-03-03 | End: 2020-03-03

## 2020-03-03 RX ORDER — TRIAMCINOLONE ACETONIDE 40 MG/ML
40 INJECTION, SUSPENSION INTRA-ARTICULAR; INTRAMUSCULAR ONCE
Status: COMPLETED | OUTPATIENT
Start: 2020-03-03 | End: 2020-03-03

## 2020-03-03 RX ADMIN — BUPIVACAINE HYDROCHLORIDE 45 MG: 5 INJECTION, SOLUTION EPIDURAL; INTRACAUDAL at 12:38

## 2020-03-03 RX ADMIN — TRIAMCINOLONE ACETONIDE 40 MG: 40 INJECTION, SUSPENSION INTRA-ARTICULAR; INTRAMUSCULAR at 12:38

## 2020-03-03 ASSESSMENT — PAIN SCALES - GENERAL: PAINLEVEL: SEVERE PAIN (7)

## 2020-03-03 NOTE — NURSING NOTE
Chief Complaint   Patient presents with     Botox     [facility] NEW - OCCIPITAL NERVE BLOCKS       Topher Pope, EMT

## 2020-03-03 NOTE — PROGRESS NOTES
[facility]  Physical Medicine & Rehabilitation Procedure Note    History of Present Illness:    Valerie Sim is a 54-year-old female with a history of chronic migraines, cluster headaches, and occipital headaches with myofascial pain who presents left-sided headaches and myofascial pain, primarily affecting her left shoulder musculature.  She is here today for occipital nerve block and trigger point injections.     Today, the patient has pain in her left occiput, that radiates to her left temporalis. She also has pain in her left shoulder girdle area in the anterior and posterior aspects.     Procedure:     Prior to the start of the procedure and with procedural staff participation, I verbally confirmed the patient s identity using two indicators, relevant allergies, that the procedure was appropriate and matched the consent or emergent situation, and that the correct equipment/implants were available. Immediately prior to starting the procedure I conducted the Time Out with the procedural staff and re-confirmed the patient s name, procedure, and site/side. (The Joint Commission universal protocol was followed.)  Yes    Sedation (Moderate or Deep): None    Drug:   Kenalo ml = 40 mg (Lot [id number], Exp 10/2021, NDC [address]-7)  Bupivacaine 0.5%: 9 ml (Lot [id number], Exp 2023, NDC 62928-815-14)      Left greater occipital nerve block    Area just inferior to insertion of the right and left superior trapezius insertion onto skull was cleansed with alcohol. Needle was advanced anteriorly to base of skull then slightly withdrawn and injectate was injected in a fan-like distribution at different depths. Total injection of 0.5 cc of 20 mg Kenalog plus 4.5 cc of 0.5 % bupivicaine into the left side for a total of 5 ml.     Trigger Point Injections    Areas were prepped with ChloraPrep.  Using standard precautions a 30-gauge 1-inch needle was used to inject a 5 ml  mixture of 4.5 ml of 0.5% bupivacaine and 20 mg Kenalog into the left occipitalis, trapezius, levator scapulae and anterior scalene muscles for a total of 16 injection sites.  Patient tolerated the procedure well and there were no complications.      Valerie Sim tolerated the procedure well without any immediate complications. She was allowed to recover for an appropriate period of time and was discharged home in stable condition.  Patient will follow-up regarding response to this procedure.      Assessment & Recommendations:  Valerie Sim is a 54 year old female who presents to rehabilitation clinic for trigger point injections and a left total nerve block for management of an acute occipital migraine.  Patient tolerated procedure well without any complication.    She will follow-up with her usual Botox injections as scheduled.  She may repeat trigger point injections and/or occipital nerve blocks no sooner than 3 months from today.    Addie Malhotra MD

## 2020-03-10 ENCOUNTER — MEDICAL CORRESPONDENCE (OUTPATIENT)
Dept: HEALTH INFORMATION MANAGEMENT | Facility: CLINIC | Age: 55
End: 2020-03-10

## 2020-03-18 ENCOUNTER — OFFICE VISIT (OUTPATIENT)
Dept: PHYSICAL MEDICINE AND REHAB | Facility: CLINIC | Age: 55
End: 2020-03-18
Payer: COMMERCIAL

## 2020-03-18 VITALS — TEMPERATURE: 98.4 F

## 2020-03-18 DIAGNOSIS — G43.719 CHRONIC MIGRAINE WITHOUT AURA, INTRACTABLE, WITHOUT STATUS MIGRAINOSUS: Primary | ICD-10-CM

## 2020-03-18 RX ORDER — BUPIVACAINE HYDROCHLORIDE 2.5 MG/ML
4.5 INJECTION, SOLUTION INFILTRATION; PERINEURAL ONCE
Status: COMPLETED | OUTPATIENT
Start: 2020-03-18 | End: 2020-03-18

## 2020-03-18 RX ADMIN — BUPIVACAINE HYDROCHLORIDE 11.25 MG: 2.5 INJECTION, SOLUTION INFILTRATION; PERINEURAL at 15:51

## 2020-03-18 NOTE — PROGRESS NOTES
BOTULINUM TOXIN PROCEDURE - HEADACHE - NOTE    Chief Complaint   Patient presents with     Headache     Botox injections for Migraine Headache       Temp 98.4  F (36.9  C) (Oral)        Current Outpatient Medications:      aMILoride (MIDAMOR) 5 MG tablet, , Disp: , Rfl:      amLODIPine (NORVASC) 10 MG tablet, , Disp: , Rfl:      amphetamine-dextroamphetamine (ADDERALL) 20 MG tablet, Take 1 tablet (20 mg) by mouth 2 times daily, Disp: 60 tablet, Rfl: 0     budesonide (RINOCORT AQUA) 32 MCG/ACT nasal spray, Spray 1 spray into both nostrils daily., Disp: , Rfl:      Calcium Citrate (CALCITRATE PO), Take 1,200 mg by mouth 2 times daily 1/2 TAB bid, Disp: , Rfl:      cyclobenzaprine (FLEXERIL) 10 MG tablet, , Disp: , Rfl:      CycloSPORINE (RESTASIS OP), Apply to eye 2 times daily, Disp: , Rfl:      divalproex sodium delayed-release (DEPAKOTE) 500 MG DR tablet, , Disp: , Rfl:      eletriptan (RELPAX) 40 MG tablet, TAKE 1 TABLET AT ONSET OF MIGRAINE. MAY REPEAT ONCE AFTER 2 HRS. MAX OF 2 TABS/24HRS AND 3 DAYS/WEEK. (Patient not taking: Reported on 3/3/2020), Disp: 12 tablet, Rfl: 3     estrogens, conjugated, (PREMARIN) 0.625 MG tablet, Take by mouth daily .30 mg . , Disp: , Rfl:      fexofenadine (ALLEGRA) 180 MG tablet, Take  by mouth daily., Disp: , Rfl:      HYDROmorphone (DILAUDID) 2 MG tablet, Take 0.5-1 tablets (1-2 mg) by mouth every 3 hours as needed (headache) 20 tablets = 3 month supply., Disp: 24 tablet, Rfl: 0     ketorolac (TORADOL) 30 MG/ML injection, Inject 1 mL (30 mg) into the vein every 6 hours as needed for moderate pain, Disp: 9 mL, Rfl: 11     lamoTRIgine (LAMICTAL) 200 MG tablet, Take 1 tablet by mouth daily, Disp: , Rfl:      LORazepam (ATIVAN) 1 MG tablet, Take 1 mg by mouth 2 times daily as needed., Disp: , Rfl:      Nerve Stimulator (CEFALY KIT) NAHUM, 1 Device daily Use daily and as needed. Dual device, Disp: 1 Device, Rfl: 0     ondansetron (ZOFRAN) 4 MG tablet, Take 1 tablet (4 mg) by mouth  every 8 hours as needed, Disp: 90 tablet, Rfl: 1     order for DME, Equipment being ordered: Oxygen The patient has Cluster Headache and needs 10 liters/minute of high flow oxygen via nonrebreather mask prn headaches, Disp: 99 days, Rfl: 0     order for DME, Equipment being ordered: Oxygen and non-rebreather mask.   Use high flow oxygen (10-15 L/min) with non-rebreather mask, as needed for cluster headaches, Disp: 1 Units, Rfl: 11     predniSONE (DELTASONE) 20 MG tablet, 3 tab po QAM x 2 d, 2 tab po QAM x 2 d, 1 tab po QAM x 2 d half tab po QAM x 2 d then stop (Patient not taking: Reported on 3/3/2020), Disp: 13 tablet, Rfl: 0     QUEtiapine (SEROQUEL) 50 MG tablet, Take 1 tablet by mouth daily., Disp: , Rfl:      SUMAtriptan (IMITREX STATDOSE) 6 MG/0.5ML pen injector kit, Inject 0.5 mLs (6 mg) Subcutaneous at onset of headache for migraine May repeat in 1 hour. Max 12 mg/24 hours., Disp: 1 kit, Rfl: 0     SUMAtriptan (IMITREX STATDOSE) 6 MG/0.5ML refill cartridge, Inject 0.5 mLs (6 mg) Subcutaneous at onset of headache for migraine, Disp: 18 Cartridge, Rfl: 3     valACYclovir (VALTREX) 1000 mg tablet, Take 1,000 mg by mouth as needed., Disp: , Rfl:      vitamin D3 (CHOLECALCIFEROL) 2000 units (50 mcg) tablet, Take 1 tablet by mouth daily, Disp: , Rfl:   No current facility-administered medications for this visit.      Allergies   Allergen Reactions     Fluoxetine Other (See Comments)     PN: LW Reaction: headache  PN: LW Reaction: headache  Migraine       Garlic Blisters, Cough, Dermatitis, Difficulty breathing, Headache, Hives, Itching, Nausea and Vomiting and Shortness Of Breath     Trazodone Other (See Comments) and Unknown     Other reaction(s): Headache  Other reaction(s): Headache  Other reaction(s): Headache     Amitriptyline Hcl Other (See Comments)     Migraine       Candesartan Cilexetil-Hctz Muscle Pain (Myalgia)     Cymbalta Other (See Comments)     Migraine       Cyproheptadine Hcl      headaches      "Desvenlafaxine      Migraine       Diatrizoate Unknown     PN: LW CM1: CONTRAST- iodine Reaction :     Diazepam      Other reaction(s): Vestibular Toxicity  PN: LW Reaction: vertigo  PN: LW Reaction: vertigo     Galcanezumab-Gnlm      Other reaction(s): Headache     Imipramine Other (See Comments)     Migraine       Lyrica Muscle Pain (Myalgia)     Metoprolol Other (See Comments) and Unknown     headache  headache  headache     Morphine Other (See Comments)     Patient reports seizure      No Clinical Screening - See Comments      PN: LW FI1: lactose intolerance LW FI2: shellfish  PN: LW CM1: CONTRAST- iodine Reaction :  PN: LW Other1: -nka     Nortriptyline Other (See Comments)     Migraine       Phenergan Dm [Promethazine-Dm] Other (See Comments)     seizures     Promethazine      PN: LW Reaction: minimal sizures     Venlafaxine Other (See Comments)     PN: LW Reaction: migraine  PN: LW Reaction: migraine  Migraine       Wellbutrin [Bupropion Hydrobromide] Other (See Comments)     Migraine       Compazine Anxiety     Dihydroergotamine Anxiety and Other (See Comments)     Cardiac symptoms     Droperidol Anxiety     PN: LW Reaction: agitation     Medroxyprogesterone Hives     PN: LW Reaction: nausea     Prochlorperazine Anxiety     PN: LW Reaction: \"can't sit still\"      Pt denies any travel, flu symptoms or exposure to others who have symptoms.    PHYSICAL EXAM:  Pt reports migraine headache today rates 8/10 at head and neck, took Tylenol.  Describes headaches as \"flashing, webbing in vision, auditory - everything loud constant rumbling, vertigo, irritated - agitated, insomnia, pressure- migraine \"cracking\" behind eyes\".    \"Imitrex - maxed 24hour and overall dosing - it worked but did not fully abort\"    HPI:    Patient denies new medical diagnoses, illnesses, hospitalizations, emergency room visits, and injuries since the previous injection with botulinum neurotoxin.    We reviewed the recommended safety " guidelines for  Botox from any vaccine injection, such as the seasonal flu vaccine, by a minimum of 10-14 days with Valerie Sim. She acknowledged understanding.    RESPONSE TO PREVIOUS TREATMENT:  Change in headache pattern following last series of injections with 225 units of  Botox on 2020.  Pt states she felt great after occipital block, neck/head pain. 100% improved ringing in ears for a week but has now returned.    No problems reported    1.  Headache frequency during this injection cycle:  10 headache days per month.  This is compared to her baseline headache frequency of 30 headache days per month.     2.  Headache duration during this injection cycle:  Headache duration ranged from 2 hours to 4 days.    3.  Headache intensity during this injection cycle:    A.  7/10  =  Typical pain level.  B.  10/10  =  Worst pain level.  C.  3/10  =  Lowest pain level.    4.  Change in headache medication usage during this injection cycle:  (For Example:  Able to decrease use of oral pain medications.) No change in use of pain medication.    5.  ER Visits During This Injection Cycle:  0    6.  Functional Performance:  Change in ADL's, social interaction, days lost from work, etc. Patient reports some days lost from work and had to cancel some activities due to headache during this injection cycle    BOTULINUM NEUROTOXIN INJECTION PROCEDURES:    VERIFICATION OF PATIENT IDENTIFICATION AND PROCEDURE     Initials   Patient Name lmd   Patient  lmd   Procedure Verified by: pj     Prior to the start of the procedure and with procedural staff participation, I verbally confirmed the patient s identity using two indicators, relevant allergies, that the procedure was appropriate and matched the consent or emergent situation, and that the correct equipment/implants were available. Immediately prior to starting the procedure I conducted the Time Out with the procedural staff and re-confirmed the patient s name,  procedure, and site/side. (The Joint Commission universal protocol was followed.)  Yes    Sedation (Moderate or Deep): None    Above assessments performed by:  Estela Khalil RN Care Coordinator    Siobhan Mata MD    INDICATIONS FOR PROCEDURES:  Valerie Sim is a 54 year old patient with chronic migraine headaches associated with cervicogenic components. Her baseline symptoms have been recalcitrant to oral medications and conservative therapy.  She is here today for reinjection with Botox.     GOAL OF PROCEDURE:  The goal of this procedure is to decrease pain  and enhance functional independence.    TOTAL DOSE ADMINISTERED:  Dose Administered:  225 units  Botox (Botulinum Toxin Type A)       2:1 Dilution   Diluent Used:  0.25% Sensorcaine (Batch: [id number], Exp: 7/2022, NDC: 55150-167-10)  Total Volume of Diluent Used:  4.5 ml  Lot # /C3 with Expiration Date:  8/2022 x 1 vial  Lot # /C3 with Expiration Date:  8/2022 x 2 vials  NDC #: Botox 100u ([address]-15)    Medication guide was offered to patient and was declined.    CONSENT:  The risks, benefits, and treatment options were discussed with Valerie Sim and she agreed to proceed.    Written consent was obtained by lmd.     EQUIPMENT USED:  Needle-37mm stimulating/recording  Needle-30 gauge  EMG/NCS Machine     SKIN PREPARATION:  Skin preparation was performed using an alcohol wipe.     GUIDANCE DESCRIPTION:  Electro-myographic guidance was necessary throughout the procedure to accurately identify all areas of spastic muscles while avoiding injection of non-spastic muscles, neighboring nerves and nearby vascular structures.     AREA/MUSCLE INJECTED:  225 UNITS BOTOX = TOTAL DOSE     1. FACE & SCALP: 115 units of Botox = Total dose, 2:1 Dilution     Right temporalis - 12.5 units of Botox in 4 site/s.  Left temporalis - 12.5 units of Botox in 4 site/s.      Right frontalis - 10 units of Botox in 4 site/s.  Left frontalis - 10 units of Botox in 4  site/s.      Right procerus - 2.5 units of Botox in 1 site/s.  Left procerus - 2.5 units of Botox in 1 site/s.      Right  - 5 units of Botox in 1 site/s.  Left  - 5 units of Botox in 1 site/s.      Right Upper Occipitalis - 25 units of Botox at 4 site/s.   Left Upper Occipitalis - 25 units of Botox at 4 site/s.      Right Nasalis - 2.5 units of Botox at 1 site/s.              Left Nasalis - 2.5 units of Botox at 1 site/s.          2. JAW MUSCLES: 60 units of Botox = Total Dose, 1:1 Dilution     Right Masseter - 25 units of Botox at 1 site/s.   Left Masseter - 25 units of Botox at 1 site/s.      Right temporalis - 5 units of Botox at 1 site/s.  Left temporalis - 5 units of Botox at 1 site/s.          3. SHOULDER & NECK MUSCLES: Total dose 50 units of Botox, 2:1 Dilution      Right levator muscle - 10 units of Botox at 2 site/s (neck and shoulder).  Left levator muscle - 10 units of Botox at 2 site/s (neck and shoulder).      Right mid trapezius - 5 units of Botox at 1 site.  Left mid trapezius - 5 units of Botox at 1 site.     Right lateral trapezius - 10 units of Botox at 2 site/s  Left lateral trapezuis - 10 units of Botox at 2 site/s.      RESPONSE TO PROCEDURE:  Valerie Sim tolerated the procedure well and there were no immediate complications.   She was allowed to recover for an appropriate period of time and was discharged home in stable condition.    FOLLOW UP:  Valerie Sim was asked to follow up by phone in 7-14 days with Nely Jean Baptiste PT, Care Coordinator or Estela Magaña RN, Care Coordinator, to report her response to this series of injections.  Based on the patient's previous response to this therapy, Valerie Sim was rescheduled for the next series of injections in 9 weeks.    PLAN (Medication Changes, Therapy Orders, Work or Disability Issues, etc.): Patient will continue to monitor response to today's injections.     No change in the total dose or sites of injections.      Siobhan Mata MD  Physical Medicine & Rehabilitation

## 2020-03-18 NOTE — LETTER
3/18/2020       RE: Valerie Sim  [address]  [place] [address]     Dear Colleague,    Thank you for referring your patient, Valerie Sim, to the [facility] PH[facility]ICAL [facility]INE AND REHABILITATION at [facility]. Please see a copy of my visit note below.    BOTULINUM TOXIN PROCEDURE - HEADACHE - NOTE    Chief Complaint   Patient presents with     Headache     Botox injections for Migraine Headache       Temp 98.4  F (36.9  C) (Oral)        Current Outpatient Medications:      aMILoride (MIDAMOR) 5 MG tablet, , Disp: , Rfl:      amLODIPine (NORVASC) 10 MG tablet, , Disp: , Rfl:      amphetamine-dextroamphetamine (ADDERALL) 20 MG tablet, Take 1 tablet (20 mg) by mouth 2 times daily, Disp: 60 tablet, Rfl: 0     budesonide (RINOCORT AQUA) 32 MCG/ACT nasal spray, Spray 1 spray into both nostrils daily., Disp: , Rfl:      Calcium Citrate (CALCITRATE PO), Take 1,200 mg by mouth 2 times daily 1/2 TAB bid, Disp: , Rfl:      cyclobenzaprine (FLEXERIL) 10 MG tablet, , Disp: , Rfl:      CycloSPORINE (RESTASIS OP), Apply to eye 2 times daily, Disp: , Rfl:      divalproex sodium delayed-release (DEPAKOTE) 500 MG DR tablet, , Disp: , Rfl:      eletriptan (RELPAX) 40 MG tablet, TAKE 1 TABLET AT ONSET OF MIGRAINE. MAY REPEAT ONCE AFTER 2 HRS. MAX OF 2 TABS/24HRS AND 3 DAYS/WEEK. (Patient not taking: Reported on 3/3/2020), Disp: 12 tablet, Rfl: 3     estrogens, conjugated, (PREMARIN) 0.625 MG tablet, Take by mouth daily .30 mg . , Disp: , Rfl:      fexofenadine (ALLEGRA) 180 MG tablet, Take  by mouth daily., Disp: , Rfl:      HYDROmorphone (DILAUDID) 2 MG tablet, Take 0.5-1 tablets (1-2 mg) by mouth every 3 hours as needed (headache) 20 tablets = 3 month supply., Disp: 24 tablet, Rfl: 0     ketorolac (TORADOL) 30 MG/ML injection, Inject 1 mL (30 mg) into the vein every 6 hours as needed for moderate pain, Disp: 9 mL, Rfl: 11     lamoTRIgine (LAMICTAL) 200 MG tablet, Take 1 tablet  by mouth daily, Disp: , Rfl:      LORazepam (ATIVAN) 1 MG tablet, Take 1 mg by mouth 2 times daily as needed., Disp: , Rfl:      Nerve Stimulator (CEFALY KIT) NAHUM, 1 Device daily Use daily and as needed. Dual device, Disp: 1 Device, Rfl: 0     ondansetron (ZOFRAN) 4 MG tablet, Take 1 tablet (4 mg) by mouth every 8 hours as needed, Disp: 90 tablet, Rfl: 1     order for DME, Equipment being ordered: Oxygen The patient has Cluster Headache and needs 10 liters/minute of high flow oxygen via nonrebreather mask prn headaches, Disp: 99 days, Rfl: 0     order for DME, Equipment being ordered: Oxygen and non-rebreather mask.   Use high flow oxygen (10-15 L/min) with non-rebreather mask, as needed for cluster headaches, Disp: 1 Units, Rfl: 11     predniSONE (DELTASONE) 20 MG tablet, 3 tab po QAM x 2 d, 2 tab po QAM x 2 d, 1 tab po QAM x 2 d half tab po QAM x 2 d then stop (Patient not taking: Reported on 3/3/2020), Disp: 13 tablet, Rfl: 0     QUEtiapine (SEROQUEL) 50 MG tablet, Take 1 tablet by mouth daily., Disp: , Rfl:      SUMAtriptan (IMITREX STATDOSE) 6 MG/0.5ML pen injector kit, Inject 0.5 mLs (6 mg) Subcutaneous at onset of headache for migraine May repeat in 1 hour. Max 12 mg/24 hours., Disp: 1 kit, Rfl: 0     SUMAtriptan (IMITREX STATDOSE) 6 MG/0.5ML refill cartridge, Inject 0.5 mLs (6 mg) Subcutaneous at onset of headache for migraine, Disp: 18 Cartridge, Rfl: 3     valACYclovir (VALTREX) 1000 mg tablet, Take 1,000 mg by mouth as needed., Disp: , Rfl:      vitamin D3 (CHOLECALCIFEROL) 2000 units (50 mcg) tablet, Take 1 tablet by mouth daily, Disp: , Rfl:   No current facility-administered medications for this visit.      Allergies   Allergen Reactions     Fluoxetine Other (See Comments)     PN: LW Reaction: headache  PN: LW Reaction: headache  Migraine       Garlic Blisters, Cough, Dermatitis, Difficulty breathing, Headache, Hives, Itching, Nausea and Vomiting and Shortness Of Breath     Trazodone Other (See  "Comments) and Unknown     Other reaction(s): Headache  Other reaction(s): Headache  Other reaction(s): Headache     Amitriptyline Hcl Other (See Comments)     Migraine       Candesartan Cilexetil-Hctz Muscle Pain (Myalgia)     Cymbalta Other (See Comments)     Migraine       Cyproheptadine Hcl      headaches     Desvenlafaxine      Migraine       Diatrizoate Unknown     PN: LW CM1: CONTRAST- iodine Reaction :     Diazepam      Other reaction(s): Vestibular Toxicity  PN: LW Reaction: vertigo  PN: LW Reaction: vertigo     Galcanezumab-Gnlm      Other reaction(s): Headache     Imipramine Other (See Comments)     Migraine       Lyrica Muscle Pain (Myalgia)     Metoprolol Other (See Comments) and Unknown     headache  headache  headache     Morphine Other (See Comments)     Patient reports seizure      No Clinical Screening - See Comments      PN: LW FI1: lactose intolerance LW FI2: shellfish  PN: LW CM1: CONTRAST- iodine Reaction :  PN: LW Other1: -nka     Nortriptyline Other (See Comments)     Migraine       Phenergan Dm [Promethazine-Dm] Other (See Comments)     seizures     Promethazine      PN: LW Reaction: minimal sizures     Venlafaxine Other (See Comments)     PN: LW Reaction: migraine  PN: LW Reaction: migraine  Migraine       Wellbutrin [Bupropion Hydrobromide] Other (See Comments)     Migraine       Compazine Anxiety     Dihydroergotamine Anxiety and Other (See Comments)     Cardiac symptoms     Droperidol Anxiety     PN: LW Reaction: agitation     Medroxyprogesterone Hives     PN: LW Reaction: nausea     Prochlorperazine Anxiety     PN: LW Reaction: \"can't sit still\"      Pt denies any travel, flu symptoms or exposure to others who have symptoms.    PHYSICAL EXAM:  Pt reports migraine headache today rates 8/10 at head and neck, took Tylenol.  Describes headaches as \"flashing, webbing in vision, auditory - everything loud constant rumbling, vertigo, irritated - agitated, insomnia, pressure- migraine \"cracking\" " "behind eyes\".    \"Imitrex - maxed 24hour and overall dosing - it worked but did not fully abort\"    HPI:    Patient denies new medical diagnoses, illnesses, hospitalizations, emergency room visits, and injuries since the previous injection with botulinum neurotoxin.    We reviewed the recommended safety guidelines for  Botox from any vaccine injection, such as the seasonal flu vaccine, by a minimum of 10-14 days with Valerie Sim. She acknowledged understanding.    RESPONSE TO PREVIOUS TREATMENT:  Change in headache pattern following last series of injections with 225 units of  Botox on 2020.  Pt states she felt great after occipital block, neck/head pain. 100% improved ringing in ears for a week but has now returned.    No problems reported    1.  Headache frequency during this injection cycle:  10 headache days per month.  This is compared to her baseline headache frequency of 30 headache days per month.     2.  Headache duration during this injection cycle:  Headache duration ranged from 2 hours to 4 days.    3.  Headache intensity during this injection cycle:    A.  7/10  =  Typical pain level.  B.  10/10  =  Worst pain level.  C.  3/10  =  Lowest pain level.    4.  Change in headache medication usage during this injection cycle:  (For Example:  Able to decrease use of oral pain medications.) No change in use of pain medication.    5.  ER Visits During This Injection Cycle:  0    6.  Functional Performance:  Change in ADL's, social interaction, days lost from work, etc. Patient reports some days lost from work and had to cancel some activities due to headache during this injection cycle    BOTULINUM NEUROTOXIN INJECTION PROCEDURES:    VERIFICATION OF PATIENT IDENTIFICATION AND PROCEDURE     Initials   Patient Name lmd   Patient  lmd   Procedure Verified by: pj     Prior to the start of the procedure and with procedural staff participation, I verbally confirmed the patient s identity using two " indicators, relevant allergies, that the procedure was appropriate and matched the consent or emergent situation, and that the correct equipment/implants were available. Immediately prior to starting the procedure I conducted the Time Out with the procedural staff and re-confirmed the patient s name, procedure, and site/side. (The Joint Commission universal protocol was followed.)  Yes    Sedation (Moderate or Deep): None    Above assessments performed by:  Estela Khalil RN Care Coordinator    Siobhan Mata MD    INDICATIONS FOR PROCEDURES:  Valerie Sim is a 54 year old patient with chronic migraine headaches associated with cervicogenic components. Her baseline symptoms have been recalcitrant to oral medications and conservative therapy.  She is here today for reinjection with Botox.     GOAL OF PROCEDURE:  The goal of this procedure is to decrease pain  and enhance functional independence.    TOTAL DOSE ADMINISTERED:  Dose Administered:  225 units  Botox (Botulinum Toxin Type A)       2:1 Dilution   Diluent Used:  0.25% Sensorcaine (Batch: [id number], Exp: 7/2022, NDC: 55150-167-10)  Total Volume of Diluent Used:  4.5 ml  Lot # /C3 with Expiration Date:  8/2022 x 1 vial  Lot # /C3 with Expiration Date:  8/2022 x 2 vials  NDC #: Botox 100u ([address]-52)    Medication guide was offered to patient and was declined.    CONSENT:  The risks, benefits, and treatment options were discussed with Valerie Sim and she agreed to proceed.    Written consent was obtained by lmd.     EQUIPMENT USED:  Needle-37mm stimulating/recording  Needle-30 gauge  EMG/NCS Machine     SKIN PREPARATION:  Skin preparation was performed using an alcohol wipe.     GUIDANCE DESCRIPTION:  Electro-myographic guidance was necessary throughout the procedure to accurately identify all areas of spastic muscles while avoiding injection of non-spastic muscles, neighboring nerves and nearby vascular structures.     AREA/MUSCLE INJECTED:   225 UNITS BOTOX = TOTAL DOSE     1. FACE & SCALP: 115 units of Botox = Total dose, 2:1 Dilution     Right temporalis - 12.5 units of Botox in 4 site/s.  Left temporalis - 12.5 units of Botox in 4 site/s.      Right frontalis - 10 units of Botox in 4 site/s.  Left frontalis - 10 units of Botox in 4 site/s.      Right procerus - 2.5 units of Botox in 1 site/s.  Left procerus - 2.5 units of Botox in 1 site/s.      Right  - 5 units of Botox in 1 site/s.  Left  - 5 units of Botox in 1 site/s.      Right Upper Occipitalis - 25 units of Botox at 4 site/s.   Left Upper Occipitalis - 25 units of Botox at 4 site/s.      Right Nasalis - 2.5 units of Botox at 1 site/s.              Left Nasalis - 2.5 units of Botox at 1 site/s.          2. JAW MUSCLES: 60 units of Botox = Total Dose, 1:1 Dilution     Right Masseter - 25 units of Botox at 1 site/s.   Left Masseter - 25 units of Botox at 1 site/s.      Right temporalis - 5 units of Botox at 1 site/s.  Left temporalis - 5 units of Botox at 1 site/s.          3. SHOULDER & NECK MUSCLES: Total dose 50 units of Botox, 2:1 Dilution      Right levator muscle - 10 units of Botox at 2 site/s (neck and shoulder).  Left levator muscle - 10 units of Botox at 2 site/s (neck and shoulder).      Right mid trapezius - 5 units of Botox at 1 site.  Left mid trapezius - 5 units of Botox at 1 site.     Right lateral trapezius - 10 units of Botox at 2 site/s  Left lateral trapezuis - 10 units of Botox at 2 site/s.      RESPONSE TO PROCEDURE:  Valerie Sim tolerated the procedure well and there were no immediate complications.   She was allowed to recover for an appropriate period of time and was discharged home in stable condition.    FOLLOW UP:  Valerie Sim was asked to follow up by phone in 7-14 days with Nely Jean Baptiste PT, Care Coordinator or Estela DesLauriers, RN, Care Coordinator, to report her response to this series of injections.  Based on the patient's previous  response to this therapy, Vaelrie Sim was rescheduled for the next series of injections in 9 weeks.    PLAN (Medication Changes, Therapy Orders, Work or Disability Issues, etc.): Patient will continue to monitor response to today's injections.     No change in the total dose or sites of injections.     Siobhan Mata MD  Physical Medicine & Rehabilitation     Again, thank you for allowing me to participate in the care of your patient.      Sincerely,    Siobhan Mata MD

## 2020-03-27 ENCOUNTER — TELEPHONE (OUTPATIENT)
Dept: NEUROLOGY | Facility: CLINIC | Age: 55
End: 2020-03-27

## 2020-03-27 DIAGNOSIS — G44.019 EPISODIC CLUSTER HEADACHE, NOT INTRACTABLE: ICD-10-CM

## 2020-03-27 NOTE — TELEPHONE ENCOUNTER
Prior Authorization Not Needed per Insurance    Medication: SUMAtriptan (IMITREX STATDOSE) 6 MG/0.5ML refill cartridge; Inject 0.5 mLs (6 mg) Subcutaneous at onset of headache for migraine-PA NOT NEEDED   Insurance Company: [employer] - Phone [phone] Fax [phone]  Expected CoPay: $6.33    Pharmacy Filling the Rx: [facility] [address]  Pharmacy Notified: Yes  Patient Notified: No    Called pharmacy and pharmacy stated that PA is Not Needed and medication is covered. Pharmacy stated that they have a paid claim on medication quantity 18 for 21 day supply on 3/24/2020 and patient has picked up medication.

## 2020-03-27 NOTE — TELEPHONE ENCOUNTER
Prior Authorization Retail Medication Request    Quantity override    Medication/Dose: SUMAtriptan (IMITREX STATDOSE) 6 MG/0.5ML refill cartridge; Inject 0.5 mLs (6 mg) Subcutaneous at onset of headache for migraine   ICD code (if different than what is on RX):  G43.719  Previously Tried and Failed:    Rationale:  Chronic daily migraine    Insurance Name:  [employer]  Insurance ID:  [id number]       Pharmacy Information (if different than what is on RX)  Name:    Phone:

## 2020-03-31 NOTE — TELEPHONE ENCOUNTER
Central Prior Authorization Team   [phone]    PA Initiation    Medication: SUMAtriptan (IMITREX STATDOSE) 6 MG/0.5ML refill cartridge; Inject 0.5 mLs (6 mg) Subcutaneous at onset of headache for migraine--(QTY OVERRIDE)  Insurance Company: [employer] - Phone [phone] Fax [phone]  Pharmacy Filling the Rx: [facility] [address]  Filling Pharmacy Phone: [phone]  Filling Pharmacy Fax: [phone]  Start Date: 3/31/2020    Initiate PA by phone at [phone]. Case # [id number]

## 2020-04-01 NOTE — TELEPHONE ENCOUNTER
PRIOR AUTHORIZATION DENIED    Medication: SUMAtriptan (IMITREX STATDOSE) 6 MG/0.5ML refill cartridge; Inject 0.5 mLs (6 mg) Subcutaneous at onset of headache for migraine--(QTY OVERRIDE)-PA DENIED     Denial Date: 4/1/2020    Denial Rational:             Appeal Information:

## 2020-04-03 ENCOUNTER — PATIENT OUTREACH (OUTPATIENT)
Dept: PHYSICAL MEDICINE AND REHAB | Facility: CLINIC | Age: 55
End: 2020-04-03

## 2020-04-03 DIAGNOSIS — G43.719 INTRACTABLE CHRONIC MIGRAINE WITHOUT AURA AND WITHOUT STATUS MIGRAINOSUS: Primary | ICD-10-CM

## 2020-04-03 RX ORDER — SUMATRIPTAN 100 MG/1
100 TABLET, FILM COATED ORAL
Qty: 12 TABLET | Refills: 3 | Status: SHIPPED | OUTPATIENT
Start: 2020-04-03 | End: 2020-10-16

## 2020-04-03 NOTE — PROGRESS NOTES
Left message on patient's voice mail with PMR number to call to discuss her concerns. This writer will reach out to patient again next week.

## 2020-05-26 ENCOUNTER — ALLIED HEALTH/NURSE VISIT (OUTPATIENT)
Dept: PHYSICAL MEDICINE AND REHAB | Facility: CLINIC | Age: 55
End: 2020-05-26
Payer: COMMERCIAL

## 2020-05-26 DIAGNOSIS — M79.18 MYOFASCIAL PAIN: ICD-10-CM

## 2020-05-26 DIAGNOSIS — M54.81 OCCIPITAL NEURALGIA OF LEFT SIDE: Primary | ICD-10-CM

## 2020-05-26 RX ORDER — TRIAMCINOLONE ACETONIDE 40 MG/ML
40 INJECTION, SUSPENSION INTRA-ARTICULAR; INTRAMUSCULAR ONCE
Status: COMPLETED | OUTPATIENT
Start: 2020-05-26 | End: 2020-05-26

## 2020-05-26 RX ORDER — BUPIVACAINE HYDROCHLORIDE 5 MG/ML
9 INJECTION, SOLUTION PERINEURAL ONCE
Status: COMPLETED | OUTPATIENT
Start: 2020-05-26 | End: 2020-05-26

## 2020-05-26 RX ADMIN — TRIAMCINOLONE ACETONIDE 40 MG: 40 INJECTION, SUSPENSION INTRA-ARTICULAR; INTRAMUSCULAR at 16:39

## 2020-05-26 RX ADMIN — BUPIVACAINE HYDROCHLORIDE 45 MG: 5 INJECTION, SOLUTION PERINEURAL at 16:39

## 2020-05-26 NOTE — PROGRESS NOTES
[facility]  Physical Medicine & Rehabilitation Procedure Note    History of Present Illness:    Valerie Sim is a 55-year-old female with a history of chronic migraines, cluster headaches, and occipital headaches with myofascial pain who presents left-sided headaches and myofascial pain, primarily affecting her left shoulder musculature.  She is here today for occipital nerve block and trigger point injections.     Today, the patient has minimal pain in her bilateral occiput, 4/10, but is noticing more visual disturbance. She would like to avoid injecting left shoulder/chest due to some shoulder instability when she is lying down.       Procedure:   Prior to the start of the procedure and with procedural staff participation, I verbally confirmed the patient s identity using two indicators, relevant allergies, that the procedure was appropriate and matched the consent or emergent situation, and that the correct equipment/implants were available. Immediately prior to starting the procedure I conducted the Time Out with the procedural staff and re-confirmed the patient s name, procedure, and site/side. (The Joint Commission universal protocol was followed.)  Yes    Sedation (Moderate or Deep): None    Drug:   Kenalo ml = 40 mg (Lot [id number], Exp 10/2021, NDC [address]-8)  Bupivacaine 0.5%: 9 ml (Lot [id number], Exp 10/2023, NDC 02508-774-25)      Left greater occipital nerve block  Area just inferior to insertion of the right and left superior trapezius insertion onto skull was cleansed with alcohol. Needle was advanced anteriorly to base of skull then slightly withdrawn and injectate was injected in a fan-like distribution at different depths. Total injection of 0.5 cc of 20 mg Kenalog plus 4.5 cc of 0.5 % bupivicaine into the left side for a total of 5 ml.       Trigger Point Injections  Areas were prepped with ChloraPrep.  Using standard precautions a  30-gauge 1-inch needle was used to inject a 5 ml mixture of 4.5 ml of 0.5% bupivacaine and 20 mg Kenalog into the left occipitalis, trapezius, levator scapulae and anterior scalene muscles for a total of 16 injection sites.  Patient tolerated the procedure well and there were no complications.      Valerie Sim tolerated the procedure well without any immediate complications. She was allowed to recover for an appropriate period of time and was discharged home in stable condition.  Patient will follow-up regarding response to this procedure.      Assessment & Recommendations:  Valerie Sim is a 55 year old female who presents to rehabilitation clinic for trigger point injections and a left total nerve block for management of occipital neuralgia and myofascial pain, contributing to her chronic migraine headaches.  Patient tolerated procedure well without any complication.    She will follow-up with her usual Botox injections as scheduled.  She may repeat trigger point injections and/or occipital nerve blocks no sooner than 3 months from today. Patient requests avoiding injecting her left shoulder/chest area due to shoulder weakness/instability after previous injection.       Siobhan Mata MD  Physical Medicine & Rehabilitation

## 2020-06-01 ENCOUNTER — VIRTUAL VISIT (OUTPATIENT)
Dept: NEUROLOGY | Facility: CLINIC | Age: 55
End: 2020-06-01
Payer: COMMERCIAL

## 2020-06-01 DIAGNOSIS — G43.711 INTRACTABLE CHRONIC MIGRAINE WITHOUT AURA AND WITH STATUS MIGRAINOSUS: Primary | ICD-10-CM

## 2020-06-01 DIAGNOSIS — M54.81 OCCIPITAL NEURALGIA OF LEFT SIDE: ICD-10-CM

## 2020-06-01 DIAGNOSIS — G44.021 INTRACTABLE CHRONIC CLUSTER HEADACHE: ICD-10-CM

## 2020-06-01 NOTE — LETTER
"6/1/2020       RE: Valerie Sim  [address]  [place] [address]     Dear Colleague,    Thank you for referring your patient, Valerie Sim, to the [facility] NEUROLOGY at [facility]. Please see a copy of my visit note below.    Valerie Sim is a 55 year old female who is being evaluated via a billable video visit.      Reason for visit: Recheck for headaches    HPI:    Valerie is a 55-year-old woman who has intractable migraine headaches and occipital neuralgia. In addition she has alarm clock headaches bilaterally that she feels respond to cluster headache treatment.     She had a flare in migraines in the fall originally attributed to Emgality dosing. Today she also reports that her  was going through a work up for prostate cancer at the time and she recognizes the stress was likely playing a role. He has had surgery and is now cancer free.     She reports continued daily low grade headaches with some occasional hearing or visual changes but is unable to accurately report number of days of flares or consistent days of use of Imitrex she reports it is variable. She has oxygen at home as well.     She does report she is using less \"pain medicine\" and Ativan.     She has been receiving Botox Q9 weeks and now scheduled occipital nerve blocks.     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 She has typically worked about 10 hours a week. Since working at home she notices less migraine flares and had previously asked for a letter for work. She now reports she doesn't need it now but will let us know.    Impression/Recommendations  1. Intractable chronic migraine  2. Occipital neuralgia  3. Atypical cluster headache      Continue on current therapies. I do worry she will become immune to Botox with the frequency of her injections and if control becomes better in future " spreading those injections out might be a good idea.     Continue with scheduled occiptial nerve blocks.    Limit narcotic and benzo use. Limit Imitrex use when possible to avoid rebound.     15 minutes with the patient over 50% counseling.       Again, thank you for allowing me to participate in the care of your patient.      Sincerely,    Earline Shi MD

## 2020-06-01 NOTE — PROGRESS NOTES
"Valerie Sim is a 55 year old female who is being evaluated via a billable video visit.      The patient has been notified of following:     \"This video visit will be conducted via a call between you and your physician/provider. We have found that certain health care needs can be provided without the need for an in-person physical exam.  This service lets us provide the care you need with a video conversation.  If a prescription is necessary we can send it directly to your pharmacy.  If lab work is needed we can place an order for that and you can then stop by our lab to have the test done at a later time.    Video visits are billed at different rates depending on your insurance coverage.  Please reach out to your insurance provider with any questions.    If during the course of the call the physician/provider feels a video visit is not appropriate, you will not be charged for this service.\"    Patient has given verbal consent for Video visit? Yes    How would you like to obtain your AVS? [facility]    Patient would like the video invitation sent by: [email or web address]    Will anyone else be joining your video visit? No      Video-Visit Details    Type of service:  Video Visit    Video Start Time: 7:30  Video End Time: 7:46    Originating Location (pt. Location): Home    Distant Location (provider location):  [facility] NEUROLOGY     Platform used for Video Visit: YOHANNES Greer      Reason for visit: Recheck for headaches    HPI:    Valerie is a 55-year-old woman who has intractable migraine headaches and occipital neuralgia. In addition she has alarm clock headaches bilaterally that she feels respond to cluster headache treatment.     She had a flare in migraines in the fall originally attributed to Emgality dosing. Today she also reports that her  was going through a work up for prostate cancer at the time and she recognizes the stress was likely playing a role. He has had surgery and is now " "cancer free.     She reports continued daily low grade headaches with some occasional hearing or visual changes but is unable to accurately report number of days of flares or consistent days of use of Imitrex she reports it is variable. She has oxygen at home as well.     She does report she is using less \"pain medicine\" and Ativan.     She has been receiving Botox Q9 weeks and now scheduled occipital nerve blocks.     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 She has typically worked about 10 hours a week. Since working at home she notices less migraine flares and had previously asked for a letter for work. She now reports she doesn't need it now but will let us know.    Impression/Recommendations  1. Intractable chronic migraine  2. Occipital neuralgia  3. Atypical cluster headache      Continue on current therapies. I do worry she will become immune to Botox with the frequency of her injections and if control becomes better in future spreading those injections out might be a good idea.     Continue with scheduled occiptial nerve blocks.    Limit narcotic and benzo use. Limit Imitrex use when possible to avoid rebound.     15 minutes with the patient over 50% counseling.       "

## 2020-06-23 ENCOUNTER — OFFICE VISIT (OUTPATIENT)
Dept: PHYSICAL MEDICINE AND REHAB | Facility: CLINIC | Age: 55
End: 2020-06-23
Payer: COMMERCIAL

## 2020-06-23 DIAGNOSIS — G43.719 CHRONIC MIGRAINE WITHOUT AURA, INTRACTABLE, WITHOUT STATUS MIGRAINOSUS: Primary | ICD-10-CM

## 2020-06-23 RX ORDER — BUPIVACAINE HYDROCHLORIDE 2.5 MG/ML
5 INJECTION, SOLUTION EPIDURAL; INFILTRATION; INTRACAUDAL ONCE
Status: COMPLETED | OUTPATIENT
Start: 2020-06-23 | End: 2020-06-23

## 2020-06-23 RX ADMIN — BUPIVACAINE HYDROCHLORIDE 12.5 MG: 2.5 INJECTION, SOLUTION EPIDURAL; INFILTRATION; INTRACAUDAL at 10:49

## 2020-06-23 NOTE — PROGRESS NOTES
BOTULINUM TOXIN PROCEDURE - HEADACHE - NOTE    No chief complaint on file.      There were no vitals taken for this visit.       Current Outpatient Medications:      aMILoride (MIDAMOR) 5 MG tablet, , Disp: , Rfl:      amLODIPine (NORVASC) 10 MG tablet, , Disp: , Rfl:      amphetamine-dextroamphetamine (ADDERALL) 20 MG tablet, Take 1 tablet (20 mg) by mouth 2 times daily, Disp: 60 tablet, Rfl: 0     budesonide (RINOCORT AQUA) 32 MCG/ACT nasal spray, Spray 1 spray into both nostrils daily., Disp: , Rfl:      Calcium Citrate (CALCITRATE PO), Take 1,200 mg by mouth 2 times daily 1/2 TAB bid, Disp: , Rfl:      cyclobenzaprine (FLEXERIL) 10 MG tablet, , Disp: , Rfl:      CycloSPORINE (RESTASIS OP), Apply to eye 2 times daily, Disp: , Rfl:      divalproex sodium delayed-release (DEPAKOTE) 500 MG DR tablet, , Disp: , Rfl:      estrogens, conjugated, (PREMARIN) 0.625 MG tablet, Take by mouth daily .30 mg . , Disp: , Rfl:      fexofenadine (ALLEGRA) 180 MG tablet, Take  by mouth daily., Disp: , Rfl:      HYDROmorphone (DILAUDID) 2 MG tablet, Take 0.5-1 tablets (1-2 mg) by mouth every 3 hours as needed (headache) 20 tablets = 3 month supply., Disp: 24 tablet, Rfl: 0     ketorolac (TORADOL) 30 MG/ML injection, Inject 1 mL (30 mg) into the vein every 6 hours as needed for moderate pain, Disp: 9 mL, Rfl: 11     lamoTRIgine (LAMICTAL) 200 MG tablet, Take 1 tablet by mouth daily, Disp: , Rfl:      LORazepam (ATIVAN) 1 MG tablet, Take 1 mg by mouth 2 times daily as needed., Disp: , Rfl:      Nerve Stimulator (CEFALY KIT) NAHUM, 1 Device daily Use daily and as needed. Dual device, Disp: 1 Device, Rfl: 0     ondansetron (ZOFRAN) 4 MG tablet, Take 1 tablet (4 mg) by mouth every 8 hours as needed, Disp: 90 tablet, Rfl: 1     order for DME, Equipment being ordered: Oxygen The patient has Cluster Headache and needs 10 liters/minute of high flow oxygen via nonrebreather mask prn headaches, Disp: 99 days, Rfl: 0     order for DME, Equipment  being ordered: Oxygen and non-rebreather mask.   Use high flow oxygen (10-15 L/min) with non-rebreather mask, as needed for cluster headaches, Disp: 1 Units, Rfl: 11     QUEtiapine (SEROQUEL) 50 MG tablet, Take 1 tablet by mouth daily., Disp: , Rfl:      SUMAtriptan (IMITREX STATDOSE) 6 MG/0.5ML pen injector kit, Inject 0.5 mLs (6 mg) Subcutaneous at onset of headache for migraine May repeat in 1 hour. Max 12 mg/24 hours., Disp: 1 kit, Rfl: 0     SUMAtriptan (IMITREX STATDOSE) 6 MG/0.5ML refill cartridge, Inject 0.5 mLs (6 mg) Subcutaneous at onset of headache for migraine, Disp: 10 Cartridge, Rfl: 3     SUMAtriptan (IMITREX) 100 MG tablet, Take 1 tablet (100 mg) by mouth at onset of headache for migraine May repeat in 2 hours. Max 2 tablets/24 hours., Disp: 12 tablet, Rfl: 3     valACYclovir (VALTREX) 1000 mg tablet, Take 1,000 mg by mouth as needed., Disp: , Rfl:      vitamin D3 (CHOLECALCIFEROL) 2000 units (50 mcg) tablet, Take 1 tablet by mouth daily, Disp: , Rfl:      Allergies   Allergen Reactions     Fluoxetine Other (See Comments)     PN: LW Reaction: headache  PN: LW Reaction: headache  Migraine       Garlic Blisters, Cough, Dermatitis, Difficulty breathing, Headache, Hives, Itching, Nausea and Vomiting and Shortness Of Breath     Trazodone Other (See Comments) and Unknown     Other reaction(s): Headache  Other reaction(s): Headache  Other reaction(s): Headache     Amitriptyline Hcl Other (See Comments)     Migraine       Candesartan Cilexetil-Hctz Muscle Pain (Myalgia)     Cymbalta Other (See Comments)     Migraine       Cyproheptadine Hcl      headaches     Desvenlafaxine      Migraine       Diatrizoate Unknown     PN: LW CM1: CONTRAST- iodine Reaction :     Diazepam      Other reaction(s): Vestibular Toxicity  PN: LW Reaction: vertigo  PN: LW Reaction: vertigo     Galcanezumab-Gnlm      Other reaction(s): Headache     Imipramine Other (See Comments)     Migraine       Lyrica Muscle Pain (Myalgia)      "Metoprolol Other (See Comments) and Unknown     headache  headache  headache     Morphine Other (See Comments)     Patient reports seizure      No Clinical Screening - See Comments      PN: LW FI1: lactose intolerance LW FI2: shellfish  PN: LW CM1: CONTRAST- iodine Reaction :  PN: LW Other1: -nka     Nortriptyline Other (See Comments)     Migraine       Phenergan Dm [Promethazine-Dm] Other (See Comments)     seizures     Promethazine      PN: LW Reaction: minimal sizures     Venlafaxine Other (See Comments)     PN: LW Reaction: migraine  PN: LW Reaction: migraine  Migraine       Wellbutrin [Bupropion Hydrobromide] Other (See Comments)     Migraine       Compazine Anxiety     Dihydroergotamine Anxiety and Other (See Comments)     Cardiac symptoms     Droperidol Anxiety     PN: LW Reaction: agitation     Medroxyprogesterone Hives     PN: LW Reaction: nausea     Prochlorperazine Anxiety     PN: LW Reaction: \"can't sit still\"      Pt denies any travel, flu symptoms or exposure to others who have symptoms.    PHYSICAL EXAM:  Pt denies migraine headache today  Describes cluster headaches that started one week ago: 4 pm and 9 pm, begins with a sneeze followed by coughing fit (no pain), responds to injection of Imitrex.   Continues to have \"vertigo\" migraines, responds to Imitrex.     HPI:    Patient denies new medical diagnoses, illnesses, hospitalizations, emergency room visits, and injuries since the previous injection with botulinum neurotoxin.     Patient underwent trigger point injections and left greater occipital nerve block on 5/26/2020.     Her shoulder feels better after avoiding injecting her shoulder and anterior chest.    We reviewed the recommended safety guidelines for  Botox from any vaccine injection, such as the seasonal flu vaccine, by a minimum of 10-14 days with Valerie Sim. She acknowledged understanding.    RESPONSE TO PREVIOUS TREATMENT:  Change in headache pattern following last series " of injections with 225 units of  Botox on 3/18/2020.  Pt states she felt great after occipital block, neck/head pain. 100% improved ringing in ears for a week but has now returned.    No problems reported    1.  Headache frequency during this injection cycle:  20 headache days per month, which is greater than last cycle.  This is compared to her baseline headache frequency of 30 headache days per month.     2.  Headache duration during this injection cycle:  Headache duration ranged from 30 minutes to 5 days. A couple of episodes of multiple day headaches.     3.  Headache intensity during this injection cycle:    A.  5-6/10  =  Typical pain level.  B.  10/10  =  Worst pain level. Not occurring as often, pain is more stable.   C.  5/10  =  Lowest pain level.    4.  Change in headache medication usage during this injection cycle:  (For Example:  Able to decrease use of oral pain medications.) No change in use of pain medication. Uses O2 and Imitrex mainly (pill form and injection).     5.  ER Visits During This Injection Cycle:  0    6.  Functional Performance:  Change in ADL's, social interaction, days lost from work, etc. Patient reports some days lost from work and had to cancel some activities due to headache during this injection cycle. She works 10 hours per week from home.     BOTULINUM NEUROTOXIN INJECTION PROCEDURES:    VERIFICATION OF PATIENT IDENTIFICATION AND PROCEDURE     Initials   Patient Name [email or web address]   Patient  [facility]   Procedure Verified by: ninoska     Prior to the start of the procedure and with procedural staff participation, I verbally confirmed the patient s identity using two indicators, relevant allergies, that the procedure was appropriate and matched the consent or emergent situation, and that the correct equipment/implants were available. Immediately prior to starting the procedure I conducted the Time Out with the procedural staff and re-confirmed the patient s name, procedure, and site/side. (The  Joint Commission universal protocol was followed.)  Yes    Sedation (Moderate or Deep): None    Above assessments performed by:  Larissa Fernando, PT Care Coordinator    Dr. Addie Malhotra    INDICATIONS FOR PROCEDURES:  Valerie Sim is a 55 year old patient with chronic migraine headaches associated with cervicogenic components. Her baseline symptoms have been recalcitrant to oral medications and conservative therapy.  She is here today for reinjection with Botox.     GOAL OF PROCEDURE:  The goal of this procedure is to decrease pain  and enhance functional independence.    TOTAL DOSE ADMINISTERED:  Dose Administered:  225 units  Botox (Botulinum Toxin Type A)       2:1 Dilution   Diluent Used:  0.25% Sensorcaine (Lot [id number], Exp: 2/2023, NDC: 90153-791-73)  Total Volume of Diluent Used:  4.5 ml  Lot # /C3 with Expiration Date:  2/2023  NDC #: Botox 100u ([address]-83)    Was there drug waste? Yes  Amount of drug waste (mL): 75 units Botox.  Reason for waste:  Single use vial  Multi-dose vial: No    Addie Malhotra MD  June 23, 2020     Medication guide was offered to patient and was declined.    CONSENT:  The risks, benefits, and treatment options were discussed with Valerie Sim and she agreed to proceed.    Written consent was obtained by [facility].     EQUIPMENT USED:  Needle-37mm stimulating/recording  Needle-30 gauge  EMG/NCS Machine     SKIN PREPARATION:  Skin preparation was performed using an alcohol wipe.     GUIDANCE DESCRIPTION:  Electro-myographic guidance was necessary throughout the procedure to accurately identify all areas of spastic muscles while avoiding injection of non-spastic muscles, neighboring nerves and nearby vascular structures.     AREA/MUSCLE INJECTED:  225 UNITS BOTOX = TOTAL DOSE     1. FACE & SCALP: 115 units of Botox = Total dose, 2:1 Dilution     Right temporalis - 12.5 units of Botox in 4 site/s.  Left temporalis - 12.5 units of Botox in 4 site/s.      Right frontalis -  10 units of Botox in 4 site/s.  Left frontalis - 10 units of Botox in 4 site/s.      Right procerus - 2.5 units of Botox in 1 site/s.  Left procerus - 2.5 units of Botox in 1 site/s.      Right  - 5 units of Botox in 1 site/s.  Left  - 5 units of Botox in 1 site/s.      Right Upper Occipitalis - 25 units of Botox at 4 site/s.   Left Upper Occipitalis - 25 units of Botox at 4 site/s.      Right Nasalis - 2.5 units of Botox at 1 site/s.              Left Nasalis - 2.5 units of Botox at 1 site/s.          2. JAW MUSCLES: 60 units of Botox = Total Dose, 1:1 Dilution     Right Masseter - 25 units of Botox at 1 site/s.   Left Masseter - 25 units of Botox at 1 site/s.      Right temporalis - 5 units of Botox at 1 site/s.  Left temporalis - 5 units of Botox at 1 site/s.          3. SHOULDER & NECK MUSCLES: Total dose 50 units of Botox, 2:1 Dilution      Right levator muscle - 10 units of Botox at 2 site/s (neck and shoulder).  Left levator muscle - 10 units of Botox at 2 site/s (neck and shoulder).      Right mid trapezius - 5 units of Botox at 1 site.  Left mid trapezius - 5 units of Botox at 1 site.     Right lateral trapezius - 10 units of Botox at 2 site/s   Left lateral trapezuis - 10 units of Botox at 2 site/s.        RESPONSE TO PROCEDURE:  Valerie Sim tolerated the procedure well and there were no immediate complications.   She was allowed to recover for an appropriate period of time and was discharged home in stable condition.    FOLLOW UP:  Valerie Sim was asked to follow up by phone in 7-14 days with Larissa Fernando PT, Care Coordinator or Estela Magaña RN, Care Coordinator, to report her response to this series of injections.  Based on the patient's previous response to this therapy, Valerie Sim was rescheduled for the next series of injections in 9 weeks and trigger point injections/nerve blocks every 12 weeks.     PLAN (Medication Changes, Therapy Orders, Work or Disability  Issues, etc.): Patient will continue to monitor response to today's injections.

## 2020-06-23 NOTE — LETTER
6/23/2020       RE: Valerie Sim  [address]  [place] [address]     Dear Colleague,    Thank you for referring your patient, Valerie Sim, to the [facility] PHYSICAL [facility]ICINE AND REHABILITATION at [facility]. Please see a copy of my visit note below.    BOTULINUM TOXIN PROCEDURE - HEADACHE - NOTE    No chief complaint on file.      There were no vitals taken for this visit.       Current Outpatient Medications:      aMILoride (MIDAMOR) 5 MG tablet, , Disp: , Rfl:      amLODIPine (NORVASC) 10 MG tablet, , Disp: , Rfl:      amphetamine-dextroamphetamine (ADDERALL) 20 MG tablet, Take 1 tablet (20 mg) by mouth 2 times daily, Disp: 60 tablet, Rfl: 0     budesonide (RINOCORT AQUA) 32 MCG/ACT nasal spray, Spray 1 spray into both nostrils daily., Disp: , Rfl:      Calcium Citrate (CALCITRATE PO), Take 1,200 mg by mouth 2 times daily 1/2 TAB bid, Disp: , Rfl:      cyclobenzaprine (FLEXERIL) 10 MG tablet, , Disp: , Rfl:      CycloSPORINE (RESTASIS OP), Apply to eye 2 times daily, Disp: , Rfl:      divalproex sodium delayed-release (DEPAKOTE) 500 MG DR tablet, , Disp: , Rfl:      estrogens, conjugated, (PREMARIN) 0.625 MG tablet, Take by mouth daily .30 mg . , Disp: , Rfl:      fexofenadine (ALLEGRA) 180 MG tablet, Take  by mouth daily., Disp: , Rfl:      HYDROmorphone (DILAUDID) 2 MG tablet, Take 0.5-1 tablets (1-2 mg) by mouth every 3 hours as needed (headache) 20 tablets = 3 month supply., Disp: 24 tablet, Rfl: 0     ketorolac (TORADOL) 30 MG/ML injection, Inject 1 mL (30 mg) into the vein every 6 hours as needed for moderate pain, Disp: 9 mL, Rfl: 11     lamoTRIgine (LAMICTAL) 200 MG tablet, Take 1 tablet by mouth daily, Disp: , Rfl:      LORazepam (ATIVAN) 1 MG tablet, Take 1 mg by mouth 2 times daily as needed., Disp: , Rfl:      Nerve Stimulator (CEFALY KIT) NAHUM, 1 Device daily Use daily and as needed. Dual device, Disp: 1 Device, Rfl: 0     ondansetron (ZOFRAN)  4 MG tablet, Take 1 tablet (4 mg) by mouth every 8 hours as needed, Disp: 90 tablet, Rfl: 1     order for DME, Equipment being ordered: Oxygen The patient has Cluster Headache and needs 10 liters/minute of high flow oxygen via nonrebreather mask prn headaches, Disp: 99 days, Rfl: 0     order for DME, Equipment being ordered: Oxygen and non-rebreather mask.   Use high flow oxygen (10-15 L/min) with non-rebreather mask, as needed for cluster headaches, Disp: 1 Units, Rfl: 11     QUEtiapine (SEROQUEL) 50 MG tablet, Take 1 tablet by mouth daily., Disp: , Rfl:      SUMAtriptan (IMITREX STATDOSE) 6 MG/0.5ML pen injector kit, Inject 0.5 mLs (6 mg) Subcutaneous at onset of headache for migraine May repeat in 1 hour. Max 12 mg/24 hours., Disp: 1 kit, Rfl: 0     SUMAtriptan (IMITREX STATDOSE) 6 MG/0.5ML refill cartridge, Inject 0.5 mLs (6 mg) Subcutaneous at onset of headache for migraine, Disp: 10 Cartridge, Rfl: 3     SUMAtriptan (IMITREX) 100 MG tablet, Take 1 tablet (100 mg) by mouth at onset of headache for migraine May repeat in 2 hours. Max 2 tablets/24 hours., Disp: 12 tablet, Rfl: 3     valACYclovir (VALTREX) 1000 mg tablet, Take 1,000 mg by mouth as needed., Disp: , Rfl:      vitamin D3 (CHOLECALCIFEROL) 2000 units (50 mcg) tablet, Take 1 tablet by mouth daily, Disp: , Rfl:      Allergies   Allergen Reactions     Fluoxetine Other (See Comments)     PN: LW Reaction: headache  PN: LW Reaction: headache  Migraine       Garlic Blisters, Cough, Dermatitis, Difficulty breathing, Headache, Hives, Itching, Nausea and Vomiting and Shortness Of Breath     Trazodone Other (See Comments) and Unknown     Other reaction(s): Headache  Other reaction(s): Headache  Other reaction(s): Headache     Amitriptyline Hcl Other (See Comments)     Migraine       Candesartan Cilexetil-Hctz Muscle Pain (Myalgia)     Cymbalta Other (See Comments)     Migraine       Cyproheptadine Hcl      headaches     Desvenlafaxine      Migraine        "Diatrizoate Unknown     PN: LW CM1: CONTRAST- iodine Reaction :     Diazepam      Other reaction(s): Vestibular Toxicity  PN: LW Reaction: vertigo  PN: LW Reaction: vertigo     Galcanezumab-Gnlm      Other reaction(s): Headache     Imipramine Other (See Comments)     Migraine       Lyrica Muscle Pain (Myalgia)     Metoprolol Other (See Comments) and Unknown     headache  headache  headache     Morphine Other (See Comments)     Patient reports seizure      No Clinical Screening - See Comments      PN: LW FI1: lactose intolerance LW FI2: shellfish  PN: LW CM1: CONTRAST- iodine Reaction :  PN: LW Other1: -nka     Nortriptyline Other (See Comments)     Migraine       Phenergan Dm [Promethazine-Dm] Other (See Comments)     seizures     Promethazine      PN: LW Reaction: minimal sizures     Venlafaxine Other (See Comments)     PN: LW Reaction: migraine  PN: LW Reaction: migraine  Migraine       Wellbutrin [Bupropion Hydrobromide] Other (See Comments)     Migraine       Compazine Anxiety     Dihydroergotamine Anxiety and Other (See Comments)     Cardiac symptoms     Droperidol Anxiety     PN: LW Reaction: agitation     Medroxyprogesterone Hives     PN: LW Reaction: nausea     Prochlorperazine Anxiety     PN: LW Reaction: \"can't sit still\"      Pt denies any travel, flu symptoms or exposure to others who have symptoms.    PHYSICAL EXAM:  Pt denies migraine headache today  Describes cluster headaches that started one week ago: 4 pm and 9 pm, begins with a sneeze followed by coughing fit (no pain), responds to injection of Imitrex.   Continues to have \"vertigo\" migraines, responds to Imitrex.     HPI:    Patient denies new medical diagnoses, illnesses, hospitalizations, emergency room visits, and injuries since the previous injection with botulinum neurotoxin.     Patient underwent trigger point injections and left greater occipital nerve block on 5/26/2020.     Her shoulder feels better after avoiding injecting her shoulder " and anterior chest.    We reviewed the recommended safety guidelines for  Botox from any vaccine injection, such as the seasonal flu vaccine, by a minimum of 10-14 days with Valerie Sim. She acknowledged understanding.    RESPONSE TO PREVIOUS TREATMENT:  Change in headache pattern following last series of injections with 225 units of  Botox on 3/18/2020.  Pt states she felt great after occipital block, neck/head pain. 100% improved ringing in ears for a week but has now returned.    No problems reported    1.  Headache frequency during this injection cycle:  20 headache days per month, which is greater than last cycle.  This is compared to her baseline headache frequency of 30 headache days per month.     2.  Headache duration during this injection cycle:  Headache duration ranged from 30 minutes to 5 days. A couple of episodes of multiple day headaches.     3.  Headache intensity during this injection cycle:    A.  5-6/10  =  Typical pain level.  B.  10/10  =  Worst pain level. Not occurring as often, pain is more stable.   C.  5/10  =  Lowest pain level.    4.  Change in headache medication usage during this injection cycle:  (For Example:  Able to decrease use of oral pain medications.) No change in use of pain medication. Uses O2 and Imitrex mainly (pill form and injection).     5.  ER Visits During This Injection Cycle:  0    6.  Functional Performance:  Change in ADL's, social interaction, days lost from work, etc. Patient reports some days lost from work and had to cancel some activities due to headache during this injection cycle. She works 10 hours per week from home.     BOTULINUM NEUROTOXIN INJECTION PROCEDURES:    VERIFICATION OF PATIENT IDENTIFICATION AND PROCEDURE     Initials   Patient Name [facility]   Patient  [facility]   Procedure Verified by: [email or web address]     Prior to the start of the procedure and with procedural staff participation, I verbally confirmed the patient s identity using two indicators,  relevant allergies, that the procedure was appropriate and matched the consent or emergent situation, and that the correct equipment/implants were available. Immediately prior to starting the procedure I conducted the Time Out with the procedural staff and re-confirmed the patient s name, procedure, and site/side. (The Joint Commission universal protocol was followed.)  Yes    Sedation (Moderate or Deep): None    Above assessments performed by:  Larissa Fernando, PT Care Coordinator    Dr. Addie Malhotra    INDICATIONS FOR PROCEDURES:  Valerie Sim is a 55 year old patient with chronic migraine headaches associated with cervicogenic components. Her baseline symptoms have been recalcitrant to oral medications and conservative therapy.  She is here today for reinjection with Botox.     GOAL OF PROCEDURE:  The goal of this procedure is to decrease pain  and enhance functional independence.    TOTAL DOSE ADMINISTERED:  Dose Administered:  225 units  Botox (Botulinum Toxin Type A)       2:1 Dilution   Diluent Used:  0.25% Sensorcaine (Lot [id number], Exp: 2/2023, NDC: 33264-019-77)  Total Volume of Diluent Used:  4.5 ml  Lot # /C3 with Expiration Date:  2/2023  NDC #: Botox 100u ([address]-00)    Was there drug waste? Yes  Amount of drug waste (mL): 75 units Botox.  Reason for waste:  Single use vial  Multi-dose vial: No    Addie Malhotra MD  June 23, 2020     Medication guide was offered to patient and was declined.    CONSENT:  The risks, benefits, and treatment options were discussed with Valerie Sim and she agreed to proceed.    Written consent was obtained by [facility].     EQUIPMENT USED:  Needle-37mm stimulating/recording  Needle-30 gauge  EMG/NCS Machine     SKIN PREPARATION:  Skin preparation was performed using an alcohol wipe.     GUIDANCE DESCRIPTION:  Electro-myographic guidance was necessary throughout the procedure to accurately identify all areas of spastic muscles while avoiding injection of  non-spastic muscles, neighboring nerves and nearby vascular structures.     AREA/MUSCLE INJECTED:  225 UNITS BOTOX = TOTAL DOSE     1. FACE & SCALP: 115 units of Botox = Total dose, 2:1 Dilution     Right temporalis - 12.5 units of Botox in 4 site/s.  Left temporalis - 12.5 units of Botox in 4 site/s.      Right frontalis - 10 units of Botox in 4 site/s.  Left frontalis - 10 units of Botox in 4 site/s.      Right procerus - 2.5 units of Botox in 1 site/s.  Left procerus - 2.5 units of Botox in 1 site/s.      Right  - 5 units of Botox in 1 site/s.  Left  - 5 units of Botox in 1 site/s.      Right Upper Occipitalis - 25 units of Botox at 4 site/s.   Left Upper Occipitalis - 25 units of Botox at 4 site/s.      Right Nasalis - 2.5 units of Botox at 1 site/s.              Left Nasalis - 2.5 units of Botox at 1 site/s.          2. JAW MUSCLES: 60 units of Botox = Total Dose, 1:1 Dilution     Right Masseter - 25 units of Botox at 1 site/s.   Left [place] - 25 units of Botox at 1 site/s.      Right temporalis - 5 units of Botox at 1 site/s.  Left temporalis - 5 units of Botox at 1 site/s.          3. SHOULDER & NECK MUSCLES: Total dose 50 units of Botox, 2:1 Dilution      Right levator muscle - 10 units of Botox at 2 site/s (neck and shoulder).  Left levator muscle - 10 units of Botox at 2 site/s (neck and shoulder).      Right mid trapezius - 5 units of Botox at 1 site.  Left mid trapezius - 5 units of Botox at 1 site.     Right lateral trapezius - 10 units of Botox at 2 site/s   Left lateral trapezuis - 10 units of Botox at 2 site/s.        RESPONSE TO PROCEDURE:  Valerie Sim tolerated the procedure well and there were no immediate complications.   She was allowed to recover for an appropriate period of time and was discharged home in stable condition.    FOLLOW UP:  Valerie Sim was asked to follow up by phone in 7-14 days with Lairssa Fernando, PT, Care Coordinator or Estela Magaña RN, Care  Coordinator, to report her response to this series of injections.  Based on the patient's previous response to this therapy, Valerie Sim was rescheduled for the next series of injections in 9 weeks and trigger point injections/nerve blocks every 12 weeks.     PLAN (Medication Changes, Therapy Orders, Work or Disability Issues, etc.): Patient will continue to monitor response to today's injections.            Again, thank you for allowing me to participate in the care of your patient.      Sincerely,    Addie Malhotra MD

## 2020-08-27 ENCOUNTER — OFFICE VISIT (OUTPATIENT)
Dept: PHYSICAL MEDICINE AND REHAB | Facility: CLINIC | Age: 55
End: 2020-08-27
Payer: COMMERCIAL

## 2020-08-27 DIAGNOSIS — G43.719 INTRACTABLE CHRONIC MIGRAINE WITHOUT AURA AND WITHOUT STATUS MIGRAINOSUS: Primary | ICD-10-CM

## 2020-08-27 DIAGNOSIS — G43.719 CHRONIC MIGRAINE WITHOUT AURA, INTRACTABLE, WITHOUT STATUS MIGRAINOSUS: ICD-10-CM

## 2020-08-27 DIAGNOSIS — M79.18 MYOFASCIAL PAIN: ICD-10-CM

## 2020-08-27 DIAGNOSIS — M54.81 OCCIPITAL NEURALGIA OF LEFT SIDE: ICD-10-CM

## 2020-08-27 NOTE — LETTER
8/27/2020       RE: Valerie Sim  [address]  [place] [address]     Dear Colleague,    Thank you for referring your patient, Valerie Sim, to the [facility] PHYSICAL MEDICINE AND REHABILITATION at [facility]. Please see a copy of my visit note below.    BOTULINUM TOXIN PROCEDURE - HEADACHE - NOTE    No chief complaint on file.    There were no vitals taken for this visit.       Current Outpatient Medications:      aMILoride (MIDAMOR) 5 MG tablet, , Disp: , Rfl:      amLODIPine (NORVASC) 10 MG tablet, , Disp: , Rfl:      amphetamine-dextroamphetamine (ADDERALL) 20 MG tablet, Take 1 tablet (20 mg) by mouth 2 times daily, Disp: 60 tablet, Rfl: 0     budesonide (RINOCORT AQUA) 32 MCG/ACT nasal spray, Spray 1 spray into both nostrils daily., Disp: , Rfl:      Calcium Citrate (CALCITRATE PO), Take 1,200 mg by mouth 2 times daily 1/2 TAB bid, Disp: , Rfl:      cyclobenzaprine (FLEXERIL) 10 MG tablet, , Disp: , Rfl:      CycloSPORINE (RESTASIS OP), Apply to eye 2 times daily, Disp: , Rfl:      divalproex sodium delayed-release (DEPAKOTE) 500 MG DR tablet, , Disp: , Rfl:      estrogens, conjugated, (PREMARIN) 0.625 MG tablet, Take by mouth daily .30 mg. , Disp: , Rfl:      fexofenadine (ALLEGRA) 180 MG tablet, Take  by mouth daily., Disp: , Rfl:      HYDROmorphone (DILAUDID) 2 MG tablet, Take 0.5-1 tablets (1-2 mg) by mouth every 3 hours as needed (headache) 20 tablets = 3 month supply., Disp: 24 tablet, Rfl: 0     ketorolac (TORADOL) 30 MG/ML injection, Inject 1 mL (30 mg) into the vein every 6 hours as needed for moderate pain, Disp: 9 mL, Rfl: 11     lamoTRIgine (LAMICTAL) 200 MG tablet, Take 1 tablet by mouth daily, Disp: , Rfl:      LORazepam (ATIVAN) 1 MG tablet, Take 1 mg by mouth 2 times daily as needed., Disp: , Rfl:      Nerve Stimulator (CEFALY KIT) NAHUM, 1 Device daily Use daily and as needed. Dual device, Disp: 1 Device, Rfl: 0     ondansetron (ZOFRAN) 4  MG tablet, Take 1 tablet (4 mg) by mouth every 8 hours as needed, Disp: 90 tablet, Rfl: 1     order for DME, Equipment being ordered: Oxygen The patient has Cluster Headache and needs 10 liters/minute of high flow oxygen via nonrebreather mask prn headaches, Disp: 99 days, Rfl: 0     order for DME, Equipment being ordered: Oxygen and non-rebreather mask.   Use high flow oxygen (10-15 L/min) with non-rebreather mask, as needed for cluster headaches, Disp: 1 Units, Rfl: 11     QUEtiapine (SEROQUEL) 50 MG tablet, Take 1 tablet by mouth daily., Disp: , Rfl:      SUMAtriptan (IMITREX STATDOSE) 6 MG/0.5ML pen injector kit, Inject 0.5 mLs (6 mg) Subcutaneous at onset of headache for migraine May repeat in 1 hour. Max 12 mg/24 hours., Disp: 1 kit, Rfl: 0     SUMAtriptan (IMITREX STATDOSE) 6 MG/0.5ML refill cartridge, Inject 0.5 mLs (6 mg) Subcutaneous at onset of headache for migraine, Disp: 10 Cartridge, Rfl: 3     SUMAtriptan (IMITREX) 100 MG tablet, Take 1 tablet (100 mg) by mouth at onset of headache for migraine May repeat in 2 hours. Max 2 tablets/24 hours., Disp: 12 tablet, Rfl: 3     valACYclovir (VALTREX) 1000 mg tablet, Take 1,000 mg by mouth as needed., Disp: , Rfl:      vitamin D3 (CHOLECALCIFEROL) 2000 units (50 mcg) tablet, Take 1 tablet by mouth daily, Disp: , Rfl:      Allergies   Allergen Reactions     Fluoxetine Other (See Comments)     PN: LW Reaction: headache  PN: LW Reaction: headache  Migraine       Garlic Blisters, Cough, Dermatitis, Difficulty breathing, Headache, Hives, Itching, Nausea and Vomiting and Shortness Of Breath     Trazodone Other (See Comments) and Unknown     Other reaction(s): Headache  Other reaction(s): Headache  Other reaction(s): Headache     Amitriptyline Hcl Other (See Comments)     Migraine       Candesartan Cilexetil-Hctz Muscle Pain (Myalgia)     Cymbalta Other (See Comments)     Migraine       Cyproheptadine Hcl      headaches     Desvenlafaxine      Migraine        "Diatrizoate Unknown     PN: LW CM1: CONTRAST- iodine Reaction :     Diazepam      Other reaction(s): Vestibular Toxicity  PN: LW Reaction: vertigo  PN: LW Reaction: vertigo     Galcanezumab-Gnlm      Other reaction(s): Headache     Imipramine Other (See Comments)     Migraine       Lyrica Muscle Pain (Myalgia)     Metoprolol Other (See Comments) and Unknown     headache  headache  headache     Morphine Other (See Comments)     Patient reports seizure      No Clinical Screening - See Comments      PN: LW FI1: lactose intolerance LW FI2: shellfish  PN: LW CM1: CONTRAST- iodine Reaction :  PN: LW Other1: -nka     Nortriptyline Other (See Comments)     Migraine       Phenergan Dm [Promethazine-Dm] Other (See Comments)     seizures     Promethazine      PN: LW Reaction: minimal sizures     Venlafaxine Other (See Comments)     PN: LW Reaction: migraine  PN: LW Reaction: migraine  Migraine       Wellbutrin [Bupropion Hydrobromide] Other (See Comments)     Migraine       Compazine Anxiety     Dihydroergotamine Anxiety and Other (See Comments)     Cardiac symptoms     Droperidol Anxiety     PN: LW Reaction: agitation     Medroxyprogesterone Hives     PN: LW Reaction: nausea     Prochlorperazine Anxiety     PN: LW Reaction: \"can't sit still\"        PHYSICAL EXAM:    Endorses migraine HA rated at 7/10.     HPI:  Patient denies new medical diagnoses, illnesses, hospitalizations, emergency room visits, and injuries since the previous injection with botulinum neurotoxin. Notes that had 3 \"knocks to my head\" in the past 3 months and noticed more throbbing headaches this past cycle. She notes that she has daily migraine headaches, however only needed to treat ~20 migraines this past cycle which is an improvement to her. Uses home oxygen therapy fr cluster headaches PRN. Notes that she is working to shut her mouth at times, however ongoing since last injection.    We reviewed the recommended safety guidelines for  Botox " from any vaccine injection, such as the seasonal flu vaccine, by a minimum of 10-14 days with Valerie Sim. She acknowledged understanding.    RESPONSE TO PREVIOUS TREATMENT:  Change in headache pattern following last series of injections with 225 units of  Botox on 20.    Post-procedural headache: Rated as 'Mild' severity.  Duration: 2-3 days now resolved    1.  Headache frequency during this injection cycle:  6-7 headache days per month.  This is compared to her baseline headache frequency of daily headache days per month.     2.  Headache duration during this injection cycle:  Headache duration ranged from 4 hours to 4 days. Patient reports 1 episodes of multiple day headaches during this injection cycle.     3.  Headache intensity during this injection cycle:    A.  7/10  =  Typical pain level.  B.  10/10  =  Worst pain level.  C.  3/10  =  Lowest pain level.    4.  Change in headache medication usage during this injection cycle:  (For Example:  Able to decrease use of oral pain medications.) none    5.  ER Visits During This Injection Cycle:  0    6.  Functional Performance:  Change in ADL's, social interaction, days lost from work, etc. Patient reports 6 days lost from work and functional activities due to headache during this injection cycle    BOTULINUM NEUROTOXIN INJECTION PROCEDURES:      VERIFICATION OF PATIENT IDENTIFICATION AND PROCEDURE     Initials   Patient Name jq   Patient  jq   Procedure Verified by: nina     Prior to the start of the procedure and with procedural staff participation, I verbally confirmed the patient s identity using two indicators, relevant allergies, that the procedure was appropriate and matched the consent or emergent situation, and that the correct equipment/implants were available. Immediately prior to starting the procedure I conducted the Time Out with the procedural staff and re-confirmed the patient s name, procedure, and site/side. (The Joint Commission universal  protocol was followed.)  Yes    Sedation (Moderate or Deep): None    Above assessments performed by:  Resident/Fellow       Kyaw Campbell DO        The attending provider was present for the entire procedure documented below.    Addie Malhotra MD       INDICATIONS FOR PROCEDURES:  Valerie Sim is a 55 year old patient with chronic migraine headaches associated with cervicogenic components. Her baseline symptoms have been recalcitrant to oral medications and conservative therapy.  She is here today for reinjection with Botox.     GOAL OF PROCEDURE:  The goal of this procedure is to decrease pain  and enhance functional independence.    TOTAL DOSE ADMINISTERED:  Dose Administered:  225 units  Botox (Botulinum Toxin Type A)       2:1 Dilution   Diluent Used:  0.25% Sensorcaine (Lot: [id number], Exp: 10/2023, NDC: 29649-722-86)  Total Volume of Diluent Used:  4 ml  Lot # [id number] with Expiration Date:  3/2023  NDC #: Botox 100u ([address]-89)    Was there drug waste? Yes  Amount of drug waste (mL): 75 units Botox.  Reason for waste:  Single use vial  Multi-dose vial: No    Kyaw Campbell DO  8/27/2020    Medication guide was offered to patient and was declined.    CONSENT:  The risks, benefits, and treatment options were discussed with Valerie Sim and she agreed to proceed.    Written consent was obtained by jjosiah.     EQUIPMENT USED:  Needle-37mm stimulating/recording  Needle-30 gauge  EMG/NCS Machine     SKIN PREPARATION:  Skin preparation was performed using an alcohol wipe.     GUIDANCE DESCRIPTION:  Electro-myographic guidance was necessary throughout the procedure to accurately identify all areas of spastic muscles while avoiding injection of non-spastic muscles, neighboring nerves and nearby vascular structures.      AREA/MUSCLE INJECTED:  225 UNITS BOTOX = TOTAL DOSE     1. FACE & SCALP: 115 units of Botox = Total dose, 2:1 Dilution     Right temporalis - 12.5 units of Botox in 4 site/s.  Left temporalis -  12.5 units of Botox in 4 site/s.      Right frontalis - 10 units of Botox in 4 site/s.  Left frontalis - 10 units of Botox in 4 site/s.      Right procerus - 2.5 units of Botox in 1 site/s.  Left procerus - 2.5 units of Botox in 1 site/s.      Right  - 5 units of Botox in 1 site/s.  Left  - 5 units of Botox in 1 site/s.      Right Upper Occipitalis - 25 units of Botox at 4 site/s.   Left Upper Occipitalis - 25 units of Botox at 4 site/s.      Right Nasalis - 2.5 units of Botox at 1 site/s.              Left Nasalis - 2.5 units of Botox at 1 site/s.          2. JAW MUSCLES: 60 units of Botox = Total Dose, 1:1 Dilution     Right Masseter - 25 units of Botox at 1 site/s.   Left Mass[place] - 25 units of Botox at 1 site/s.      Right temporalis - 5 units of Botox at 1 site/s.  Left temporalis - 5 units of Botox at 1 site/s.          3. SHOULDER & NECK MUSCLES: Total dose 50 units of Botox, 2:1 Dilution      Right levator muscle - 10 units of Botox at 2 site/s (neck and shoulder).  Left levator muscle - 10 units of Botox at 2 site/s (neck and shoulder).      Right mid trapezius - 5 units of Botox at 1 site.  Left mid trapezius - 5 units of Botox at 1 site.     Right lateral trapezius - 10 units of Botox at 2 site/s           Left lateral trapezuis - 10 units of Botox at 2 site/s.       TRIGGER POINT INJECTIONS / OCCIPITAL NERVE BLOCKS:      Kenalog: (Lot: [id number], Exp: 04/2022, NDC [address]-4)  Bupivacaine 0.5%: (Lot: [id number], Exp: 10/23, NDC 23121-327-46)     Left greater occipital nerve block     Area just inferior to insertion of the right and left superior trapezius insertion onto skull was cleansed with alcohol. Needle was advanced anteriorly to base of skull then slightly withdrawn and injectate was injected in a fan-like distribution at different depths. Total injection of 0.5 cc of 20 mg Kenalog plus 4.5 cc of 0.25 % bupivicaine into the left side for a total of 5 ml.      Trigger Point  Injections  Areas were prepped with ChloraPrep.  Using standard precautions a 30-gauge 1-inch needle was used to inject a 2 ml mixture of 1.5 ml of 0.25% bupivacaine and 20 mg Kenalog into the left occipitalis and left temporalis muscles for a total of 10 injection sites.       RESPONSE TO PROCEDURE:  Valerie Sim tolerated the procedure well and there were no immediate complications. She was allowed to recover for an appropriate period of time and was discharged home in stable condition.     FOLLOW UP:  Valerie Sim was asked to follow up by phone in 7-14 days with Larissa Fernando PT, Care Coordinator or Estela Magaña RN, Care Coordinator, to report her response to this series of injections.  Based on the patient's previous response to this therapy, Valerie Sim was rescheduled for the next series of injections in 9 weeks and trigger point injections/nerve blocks every 12 weeks.      PLAN (Medication Changes, Therapy Orders, Work or Disability Issues, etc.): Patient will continue to monitor response to today's injections.     Again, thank you for allowing me to participate in the care of your patient.      Sincerely,    Addie Malhotra MD

## 2020-08-27 NOTE — PROGRESS NOTES
BOTULINUM TOXIN PROCEDURE - HEADACHE - NOTE    No chief complaint on file.    There were no vitals taken for this visit.       Current Outpatient Medications:      aMILoride (MIDAMOR) 5 MG tablet, , Disp: , Rfl:      amLODIPine (NORVASC) 10 MG tablet, , Disp: , Rfl:      amphetamine-dextroamphetamine (ADDERALL) 20 MG tablet, Take 1 tablet (20 mg) by mouth 2 times daily, Disp: 60 tablet, Rfl: 0     budesonide (RINOCORT AQUA) 32 MCG/ACT nasal spray, Spray 1 spray into both nostrils daily., Disp: , Rfl:      Calcium Citrate (CALCITRATE PO), Take 1,200 mg by mouth 2 times daily 1/2 TAB bid, Disp: , Rfl:      cyclobenzaprine (FLEXERIL) 10 MG tablet, , Disp: , Rfl:      CycloSPORINE (RESTASIS OP), Apply to eye 2 times daily, Disp: , Rfl:      divalproex sodium delayed-release (DEPAKOTE) 500 MG DR tablet, , Disp: , Rfl:      estrogens, conjugated, (PREMARIN) 0.625 MG tablet, Take by mouth daily .30 mg. , Disp: , Rfl:      fexofenadine (ALLEGRA) 180 MG tablet, Take  by mouth daily., Disp: , Rfl:      HYDROmorphone (DILAUDID) 2 MG tablet, Take 0.5-1 tablets (1-2 mg) by mouth every 3 hours as needed (headache) 20 tablets = 3 month supply., Disp: 24 tablet, Rfl: 0     ketorolac (TORADOL) 30 MG/ML injection, Inject 1 mL (30 mg) into the vein every 6 hours as needed for moderate pain, Disp: 9 mL, Rfl: 11     lamoTRIgine (LAMICTAL) 200 MG tablet, Take 1 tablet by mouth daily, Disp: , Rfl:      LORazepam (ATIVAN) 1 MG tablet, Take 1 mg by mouth 2 times daily as needed., Disp: , Rfl:      Nerve Stimulator (CEFALY KIT) NAHUM, 1 Device daily Use daily and as needed. Dual device, Disp: 1 Device, Rfl: 0     ondansetron (ZOFRAN) 4 MG tablet, Take 1 tablet (4 mg) by mouth every 8 hours as needed, Disp: 90 tablet, Rfl: 1     order for DME, Equipment being ordered: Oxygen The patient has Cluster Headache and needs 10 liters/minute of high flow oxygen via nonrebreather mask prn headaches, Disp: 99 days, Rfl: 0     order for DME, Equipment  being ordered: Oxygen and non-rebreather mask.   Use high flow oxygen (10-15 L/min) with non-rebreather mask, as needed for cluster headaches, Disp: 1 Units, Rfl: 11     QUEtiapine (SEROQUEL) 50 MG tablet, Take 1 tablet by mouth daily., Disp: , Rfl:      SUMAtriptan (IMITREX STATDOSE) 6 MG/0.5ML pen injector kit, Inject 0.5 mLs (6 mg) Subcutaneous at onset of headache for migraine May repeat in 1 hour. Max 12 mg/24 hours., Disp: 1 kit, Rfl: 0     SUMAtriptan (IMITREX STATDOSE) 6 MG/0.5ML refill cartridge, Inject 0.5 mLs (6 mg) Subcutaneous at onset of headache for migraine, Disp: 10 Cartridge, Rfl: 3     SUMAtriptan (IMITREX) 100 MG tablet, Take 1 tablet (100 mg) by mouth at onset of headache for migraine May repeat in 2 hours. Max 2 tablets/24 hours., Disp: 12 tablet, Rfl: 3     valACYclovir (VALTREX) 1000 mg tablet, Take 1,000 mg by mouth as needed., Disp: , Rfl:      vitamin D3 (CHOLECALCIFEROL) 2000 units (50 mcg) tablet, Take 1 tablet by mouth daily, Disp: , Rfl:      Allergies   Allergen Reactions     Fluoxetine Other (See Comments)     PN: LW Reaction: headache  PN: LW Reaction: headache  Migraine       Garlic Blisters, Cough, Dermatitis, Difficulty breathing, Headache, Hives, Itching, Nausea and Vomiting and Shortness Of Breath     Trazodone Other (See Comments) and Unknown     Other reaction(s): Headache  Other reaction(s): Headache  Other reaction(s): Headache     Amitriptyline Hcl Other (See Comments)     Migraine       Candesartan Cilexetil-Hctz Muscle Pain (Myalgia)     Cymbalta Other (See Comments)     Migraine       Cyproheptadine Hcl      headaches     Desvenlafaxine      Migraine       Diatrizoate Unknown     PN: LW CM1: CONTRAST- iodine Reaction :     Diazepam      Other reaction(s): Vestibular Toxicity  PN: LW Reaction: vertigo  PN: LW Reaction: vertigo     Galcanezumab-Gnlm      Other reaction(s): Headache     Imipramine Other (See Comments)     Migraine       Lyrica Muscle Pain (Myalgia)      "Metoprolol Other (See Comments) and Unknown     headache  headache  headache     Morphine Other (See Comments)     Patient reports seizure      No Clinical Screening - See Comments      PN: LW FI1: lactose intolerance LW FI2: shellfish  PN: LW CM1: CONTRAST- iodine Reaction :  PN: LW Other1: -nka     Nortriptyline Other (See Comments)     Migraine       Phenergan Dm [Promethazine-Dm] Other (See Comments)     seizures     Promethazine      PN: LW Reaction: minimal sizures     Venlafaxine Other (See Comments)     PN: LW Reaction: migraine  PN: LW Reaction: migraine  Migraine       Wellbutrin [Bupropion Hydrobromide] Other (See Comments)     Migraine       Compazine Anxiety     Dihydroergotamine Anxiety and Other (See Comments)     Cardiac symptoms     Droperidol Anxiety     PN: LW Reaction: agitation     Medroxyprogesterone Hives     PN: LW Reaction: nausea     Prochlorperazine Anxiety     PN: LW Reaction: \"can't sit still\"        PHYSICAL EXAM:    Endorses migraine HA rated at 7/10.     HPI:  Patient denies new medical diagnoses, illnesses, hospitalizations, emergency room visits, and injuries since the previous injection with botulinum neurotoxin. Notes that had 3 \"knocks to my head\" in the past 3 months and noticed more throbbing headaches this past cycle. She notes that she has daily migraine headaches, however only needed to treat ~20 migraines this past cycle which is an improvement to her. Uses home oxygen therapy fr cluster headaches PRN. Notes that she is working to shut her mouth at times, however ongoing since last injection.    We reviewed the recommended safety guidelines for  Botox from any vaccine injection, such as the seasonal flu vaccine, by a minimum of 10-14 days with Valerie Sim. She acknowledged understanding.    RESPONSE TO PREVIOUS TREATMENT:  Change in headache pattern following last series of injections with 225 units of  Botox on 6/23/20.    Post-procedural headache: Rated as " 'Mild' severity.  Duration: 2-3 days now resolved    1.  Headache frequency during this injection cycle:  6-7 headache days per month.  This is compared to her baseline headache frequency of daily headache days per month.     2.  Headache duration during this injection cycle:  Headache duration ranged from 4 hours to 4 days. Patient reports 1 episodes of multiple day headaches during this injection cycle.     3.  Headache intensity during this injection cycle:    A.  7/10  =  Typical pain level.  B.  10/10  =  Worst pain level.  C.  3/10  =  Lowest pain level.    4.  Change in headache medication usage during this injection cycle:  (For Example:  Able to decrease use of oral pain medications.) none    5.  ER Visits During This Injection Cycle:  0    6.  Functional Performance:  Change in ADL's, social interaction, days lost from work, etc. Patient reports 6 days lost from work and functional activities due to headache during this injection cycle    BOTULINUM NEUROTOXIN INJECTION PROCEDURES:      VERIFICATION OF PATIENT IDENTIFICATION AND PROCEDURE     Initials   Patient Name jq   Patient  jq   Procedure Verified by: nina     Prior to the start of the procedure and with procedural staff participation, I verbally confirmed the patient s identity using two indicators, relevant allergies, that the procedure was appropriate and matched the consent or emergent situation, and that the correct equipment/implants were available. Immediately prior to starting the procedure I conducted the Time Out with the procedural staff and re-confirmed the patient s name, procedure, and site/side. (The Joint Commission universal protocol was followed.)  Yes    Sedation (Moderate or Deep): None    Above assessments performed by:  Resident/Fellow       Kyaw Campbell, DO        The attending provider was present for the entire procedure documented below.    Addie Malhotra MD       INDICATIONS FOR PROCEDURES:  Valerie Sim is a 55  year old patient with chronic migraine headaches associated with cervicogenic components. Her baseline symptoms have been recalcitrant to oral medications and conservative therapy.  She is here today for reinjection with Botox.     GOAL OF PROCEDURE:  The goal of this procedure is to decrease pain  and enhance functional independence.    TOTAL DOSE ADMINISTERED:  Dose Administered:  225 units  Botox (Botulinum Toxin Type A)       2:1 Dilution   Diluent Used:  0.25% Sensorcaine (Lot: [id number], Exp: 10/2023, NDC: 55796-989-29)  Total Volume of Diluent Used:  4 ml  Lot # [id number] with Expiration Date:  3/2023  NDC #: Botox 100u ([address]-47)    Was there drug waste? Yes  Amount of drug waste (mL): 75 units Botox.  Reason for waste:  Single use vial  Multi-dose vial: Yadira CampbellDO  8/27/2020    Medication guide was offered to patient and was declined.    CONSENT:  The risks, benefits, and treatment options were discussed with Valerie Sim and she agreed to proceed.    Written consent was obtained by nina.     EQUIPMENT USED:  Needle-37mm stimulating/recording  Needle-30 gauge  EMG/NCS Machine     SKIN PREPARATION:  Skin preparation was performed using an alcohol wipe.     GUIDANCE DESCRIPTION:  Electro-myographic guidance was necessary throughout the procedure to accurately identify all areas of spastic muscles while avoiding injection of non-spastic muscles, neighboring nerves and nearby vascular structures.      AREA/MUSCLE INJECTED:  225 UNITS BOTOX = TOTAL DOSE     1. FACE & SCALP: 115 units of Botox = Total dose, 2:1 Dilution     Right temporalis - 12.5 units of Botox in 4 site/s.  Left temporalis - 12.5 units of Botox in 4 site/s.      Right frontalis - 10 units of Botox in 4 site/s.  Left frontalis - 10 units of Botox in 4 site/s.      Right procerus - 2.5 units of Botox in 1 site/s.  Left procerus - 2.5 units of Botox in 1 site/s.      Right  - 5 units of Botox in 1 site/s.  Left  -  5 units of Botox in 1 site/s.      Right Upper Occipitalis - 25 units of Botox at 4 site/s.   Left Upper Occipitalis - 25 units of Botox at 4 site/s.      Right Nasalis - 2.5 units of Botox at 1 site/s.              Left Nasalis - 2.5 units of Botox at 1 site/s.          2. JAW MUSCLES: 60 units of Botox = Total Dose, 1:1 Dilution     Right Masseter - 25 units of Botox at 1 site/s.   Left Masseter - 25 units of Botox at 1 site/s.      Right temporalis - 5 units of Botox at 1 site/s.  Left temporalis - 5 units of Botox at 1 site/s.          3. SHOULDER & NECK MUSCLES: Total dose 50 units of Botox, 2:1 Dilution      Right levator muscle - 10 units of Botox at 2 site/s (neck and shoulder).  Left levator muscle - 10 units of Botox at 2 site/s (neck and shoulder).      Right mid trapezius - 5 units of Botox at 1 site.  Left mid trapezius - 5 units of Botox at 1 site.     Right lateral trapezius - 10 units of Botox at 2 site/s           Left lateral trapezuis - 10 units of Botox at 2 site/s.       TRIGGER POINT INJECTIONS / OCCIPITAL NERVE BLOCKS:      Kenalog: (Lot: [id number], Exp: 04/2022, NDC [address]-1)  Bupivacaine 0.5%: (Lot: [id number], Exp: 10/23, NDC 40600-000-55)     Left greater occipital nerve block     Area just inferior to insertion of the right and left superior trapezius insertion onto skull was cleansed with alcohol. Needle was advanced anteriorly to base of skull then slightly withdrawn and injectate was injected in a fan-like distribution at different depths. Total injection of 0.5 cc of 20 mg Kenalog plus 4.5 cc of 0.25 % bupivicaine into the left side for a total of 5 ml.      Trigger Point Injections  Areas were prepped with ChloraPrep.  Using standard precautions a 30-gauge 1-inch needle was used to inject a 2 ml mixture of 1.5 ml of 0.25% bupivacaine and 20 mg Kenalog into the left occipitalis and left temporalis muscles for a total of 10 injection sites.       RESPONSE TO PROCEDURE:  Valerie Sim  tolerated the procedure well and there were no immediate complications. She was allowed to recover for an appropriate period of time and was discharged home in stable condition.     FOLLOW UP:  Valerie Sim was asked to follow up by phone in 7-14 days with Larissa Fernando PT, Care Coordinator or Estela Magaña RN, Care Coordinator, to report her response to this series of injections.  Based on the patient's previous response to this therapy, Valerie Sim was rescheduled for the next series of injections in 9 weeks and trigger point injections/nerve blocks every 12 weeks.      PLAN (Medication Changes, Therapy Orders, Work or Disability Issues, etc.): Patient will continue to monitor response to today's injections.

## 2020-09-02 RX ORDER — BUPIVACAINE HYDROCHLORIDE 2.5 MG/ML
10 INJECTION, SOLUTION EPIDURAL; INFILTRATION; INTRACAUDAL ONCE
Status: COMPLETED | OUTPATIENT
Start: 2020-09-02 | End: 2020-09-02

## 2020-09-02 RX ORDER — TRIAMCINOLONE ACETONIDE 40 MG/ML
40 INJECTION, SUSPENSION INTRA-ARTICULAR; INTRAMUSCULAR ONCE
Status: COMPLETED | OUTPATIENT
Start: 2020-09-02 | End: 2020-09-02

## 2020-09-02 RX ADMIN — TRIAMCINOLONE ACETONIDE 40 MG: 40 INJECTION, SUSPENSION INTRA-ARTICULAR; INTRAMUSCULAR at 11:38

## 2020-09-02 RX ADMIN — BUPIVACAINE HYDROCHLORIDE 25 MG: 2.5 INJECTION, SOLUTION EPIDURAL; INFILTRATION; INTRACAUDAL at 11:38

## 2020-10-15 ENCOUNTER — MYC MEDICAL ADVICE (OUTPATIENT)
Dept: NEUROLOGY | Facility: CLINIC | Age: 55
End: 2020-10-15

## 2020-10-15 DIAGNOSIS — G44.019 EPISODIC CLUSTER HEADACHE, NOT INTRACTABLE: ICD-10-CM

## 2020-10-15 DIAGNOSIS — G43.719 INTRACTABLE CHRONIC MIGRAINE WITHOUT AURA AND WITHOUT STATUS MIGRAINOSUS: ICD-10-CM

## 2020-10-16 RX ORDER — SUMATRIPTAN 100 MG/1
100 TABLET, FILM COATED ORAL
Qty: 12 TABLET | Refills: 3 | Status: SHIPPED | OUTPATIENT
Start: 2020-10-16 | End: 2023-01-12

## 2020-10-22 ENCOUNTER — DOCUMENTATION ONLY (OUTPATIENT)
Dept: NEUROLOGY | Facility: CLINIC | Age: 55
End: 2020-10-22

## 2020-10-22 NOTE — PROGRESS NOTES
[facility] room has been received and placed in providers folder for signature and review

## 2020-10-26 ENCOUNTER — MYC MEDICAL ADVICE (OUTPATIENT)
Dept: NEUROLOGY | Facility: CLINIC | Age: 55
End: 2020-10-26

## 2020-10-26 DIAGNOSIS — G44.019 EPISODIC CLUSTER HEADACHE, NOT INTRACTABLE: ICD-10-CM

## 2020-10-27 ENCOUNTER — DOCUMENTATION ONLY (OUTPATIENT)
Dept: NEUROLOGY | Facility: CLINIC | Age: 55
End: 2020-10-27

## 2020-10-27 ENCOUNTER — MEDICAL CORRESPONDENCE (OUTPATIENT)
Dept: HEALTH INFORMATION MANAGEMENT | Facility: CLINIC | Age: 55
End: 2020-10-27

## 2020-10-29 ENCOUNTER — OFFICE VISIT (OUTPATIENT)
Dept: PHYSICAL MEDICINE AND REHAB | Facility: CLINIC | Age: 55
End: 2020-10-29
Payer: COMMERCIAL

## 2020-10-29 VITALS — SYSTOLIC BLOOD PRESSURE: 183 MMHG | HEART RATE: 104 BPM | DIASTOLIC BLOOD PRESSURE: 90 MMHG | OXYGEN SATURATION: 96 %

## 2020-10-29 DIAGNOSIS — G43.719 CHRONIC MIGRAINE WITHOUT AURA, INTRACTABLE, WITHOUT STATUS MIGRAINOSUS: Primary | ICD-10-CM

## 2020-10-29 PROCEDURE — 99207 PR NO CHARGE INJECTABLE MED/DRUG: CPT | Performed by: PHYSICAL MEDICINE & REHABILITATION

## 2020-10-29 PROCEDURE — 64615 CHEMODENERV MUSC MIGRAINE: CPT | Performed by: PHYSICAL MEDICINE & REHABILITATION

## 2020-10-29 RX ORDER — TEMAZEPAM 15 MG/1
1 CAPSULE ORAL DAILY
COMMUNITY
Start: 2020-08-03 | End: 2023-01-12

## 2020-10-29 RX ORDER — BUPIVACAINE HYDROCHLORIDE 2.5 MG/ML
5 INJECTION, SOLUTION EPIDURAL; INFILTRATION; INTRACAUDAL ONCE
Status: COMPLETED | OUTPATIENT
Start: 2020-10-29 | End: 2020-10-29

## 2020-10-29 RX ORDER — GABAPENTIN 100 MG/1
100 CAPSULE ORAL 3 TIMES DAILY
COMMUNITY
Start: 2020-10-26 | End: 2021-02-23

## 2020-10-29 RX ORDER — CEFUROXIME AXETIL 250 MG/1
6 TABLET ORAL
Qty: 2 KIT | Refills: 11 | Status: SHIPPED | OUTPATIENT
Start: 2020-10-29 | End: 2020-10-30

## 2020-10-29 RX ADMIN — BUPIVACAINE HYDROCHLORIDE 12.5 MG: 2.5 INJECTION, SOLUTION EPIDURAL; INFILTRATION; INTRACAUDAL at 15:52

## 2020-10-29 ASSESSMENT — PAIN SCALES - GENERAL: PAINLEVEL: EXTREME PAIN (9)

## 2020-10-29 NOTE — PROGRESS NOTES
BOTULINUM TOXIN PROCEDURE - HEADACHE - NOTE    No chief complaint on file.      There were no vitals taken for this visit.       Current Outpatient Medications:      aMILoride (MIDAMOR) 5 MG tablet, , Disp: , Rfl:      amLODIPine (NORVASC) 10 MG tablet, , Disp: , Rfl:      amphetamine-dextroamphetamine (ADDERALL) 20 MG tablet, Take 1 tablet (20 mg) by mouth 2 times daily, Disp: 60 tablet, Rfl: 0     budesonide (RINOCORT AQUA) 32 MCG/ACT nasal spray, Spray 1 spray into both nostrils daily., Disp: , Rfl:      Calcium Citrate (CALCITRATE PO), Take 1,200 mg by mouth 2 times daily 1/2 TAB bid, Disp: , Rfl:      cyclobenzaprine (FLEXERIL) 10 MG tablet, , Disp: , Rfl:      CycloSPORINE (RESTASIS OP), Apply to eye 2 times daily, Disp: , Rfl:      divalproex sodium delayed-release (DEPAKOTE) 500 MG DR tablet, , Disp: , Rfl:      estrogens, conjugated, (PREMARIN) 0.625 MG tablet, Take by mouth daily .30 mg. , Disp: , Rfl:      fexofenadine (ALLEGRA) 180 MG tablet, Take  by mouth daily., Disp: , Rfl:      HYDROmorphone (DILAUDID) 2 MG tablet, Take 0.5-1 tablets (1-2 mg) by mouth every 3 hours as needed (headache) 20 tablets = 3 month supply., Disp: 24 tablet, Rfl: 0     ketorolac (TORADOL) 30 MG/ML injection, Inject 1 mL (30 mg) into the vein every 6 hours as needed for moderate pain, Disp: 9 mL, Rfl: 11     lamoTRIgine (LAMICTAL) 200 MG tablet, Take 1 tablet by mouth daily, Disp: , Rfl:      LORazepam (ATIVAN) 1 MG tablet, Take 1 mg by mouth 2 times daily as needed., Disp: , Rfl:      Nerve Stimulator (CEFALY KIT) NAHUM, 1 Device daily Use daily and as needed. Dual device, Disp: 1 Device, Rfl: 0     ondansetron (ZOFRAN) 4 MG tablet, Take 1 tablet (4 mg) by mouth every 8 hours as needed, Disp: 90 tablet, Rfl: 1     order for DME, Equipment being ordered: Oxygen The patient has Cluster Headache and needs 10 liters/minute of high flow oxygen via nonrebreather mask prn headaches, Disp: 99 days, Rfl: 0     order for DME, Equipment  being ordered: Oxygen and non-rebreather mask.   Use high flow oxygen (10-15 L/min) with non-rebreather mask, as needed for cluster headaches, Disp: 1 Units, Rfl: 11     QUEtiapine (SEROQUEL) 50 MG tablet, Take 1 tablet by mouth daily., Disp: , Rfl:      SUMAtriptan (IMITREX STATDOSE) 6 MG/0.5ML pen injector kit, Inject 0.5 mLs (6 mg) Subcutaneous at onset of headache for migraine May repeat in 1 hour. Max 12 mg/24 hours., Disp: 1 kit, Rfl: 0     SUMAtriptan (IMITREX STATDOSE) 6 MG/0.5ML refill cartridge, Inject 0.5 mLs (6 mg) Subcutaneous at onset of headache for migraine, Disp: 10 Cartridge, Rfl: 3     SUMAtriptan (IMITREX) 100 MG tablet, Take 1 tablet (100 mg) by mouth at onset of headache for migraine May repeat in 2 hours. Max 2 tablets/24 hours., Disp: 12 tablet, Rfl: 3     valACYclovir (VALTREX) 1000 mg tablet, Take 1,000 mg by mouth as needed., Disp: , Rfl:      vitamin D3 (CHOLECALCIFEROL) 2000 units (50 mcg) tablet, Take 1 tablet by mouth daily, Disp: , Rfl:      Allergies   Allergen Reactions     Fluoxetine Other (See Comments)     PN: LW Reaction: headache  PN: LW Reaction: headache  Migraine       Garlic Blisters, Cough, Dermatitis, Difficulty breathing, Headache, Hives, Itching, Nausea and Vomiting and Shortness Of Breath     Trazodone Other (See Comments) and Unknown     Other reaction(s): Headache  Other reaction(s): Headache  Other reaction(s): Headache     Amitriptyline Hcl Other (See Comments)     Migraine       Candesartan Cilexetil-Hctz Muscle Pain (Myalgia)     Cymbalta Other (See Comments)     Migraine       Cyproheptadine Hcl      headaches     Desvenlafaxine      Migraine       Diatrizoate Unknown     PN: LW CM1: CONTRAST- iodine Reaction :     Diazepam      Other reaction(s): Vestibular Toxicity  PN: LW Reaction: vertigo  PN: LW Reaction: vertigo     Galcanezumab-Gnlm      Other reaction(s): Headache     Imipramine Other (See Comments)     Migraine       Lyrica Muscle Pain (Myalgia)      "Metoprolol Other (See Comments) and Unknown     headache  headache  headache     Morphine Other (See Comments)     Patient reports seizure      No Clinical Screening - See Comments      PN: LW FI1: lactose intolerance LW FI2: shellfish  PN: LW CM1: CONTRAST- iodine Reaction :  PN: LW Other1: -nka     Nortriptyline Other (See Comments)     Migraine       Phenergan Dm [Promethazine-Dm] Other (See Comments)     seizures     Promethazine      PN: LW Reaction: minimal sizures     Venlafaxine Other (See Comments)     PN: LW Reaction: migraine  PN: LW Reaction: migraine  Migraine       Wellbutrin [Bupropion Hydrobromide] Other (See Comments)     Migraine       Compazine Anxiety     Dihydroergotamine Anxiety and Other (See Comments)     Cardiac symptoms     Droperidol Anxiety     PN: LW Reaction: agitation     Medroxyprogesterone Hives     PN: LW Reaction: nausea     Prochlorperazine Anxiety     PN: LW Reaction: \"can't sit still\"        PHYSICAL EXAM:    Pt states migraine not so bad today.   No facial asymmetry.       HPI:    Patient reports the following new medical problems since last visit: She has been on prednisone for the last two weeks for excessive inflammation and pain post pudendal nerve block that was complicated by a large hematoma which led to severe pelvic pain.     We reviewed the recommended safety guidelines for  Botox from any vaccine injection, such as the seasonal flu vaccine, by a minimum of 10-14 days with Valerie Sim. She acknowledged understanding.    RESPONSE TO PREVIOUS TREATMENT:  Change in headache pattern following last series of injections with 225 units of  Botox on 8/27/20.    No problems reported  more discomfort than usual after injections.    1.  Headache frequency during this injection cycle:  15 headache days per month.  This is compared to her baseline headache frequency of daily headache days per month.     2.  Headache duration during this injection cycle:  Headache " duration ranged from 2 hours to 7 days.     3.  Headache intensity during this injection cycle:    A.  7/10  =  Typical pain level.  B.  10/10  =  Worst pain level.  C.  3/10  =  Lowest pain level.    4.  Change in headache medication usage during this injection cycle:  (For Example:  Able to decrease use of oral pain medications.) Increased use of pain medications due to pudendal block.    5.  ER Visits During This Injection Cycle:  0    6.  Functional Performance:  Change in ADL's, social interaction, days lost from work, etc. Patient reports numerous days lost from work due to headache during this injection cycle      BOTULINUM NEUROTOXIN INJECTION PROCEDURES:    VERIFICATION OF PATIENT IDENTIFICATION AND PROCEDURE     Initials   Patient Name lmd   Patient  lmd   Procedure Verified by: pj     Prior to the start of the procedure and with procedural staff participation, I verbally confirmed the patient s identity using two indicators, relevant allergies, that the procedure was appropriate and matched the consent or emergent situation, and that the correct equipment/implants were available. Immediately prior to starting the procedure I conducted the Time Out with the procedural staff and re-confirmed the patient s name, procedure, and site/side. (The Joint Commission universal protocol was followed.)  Yes    Sedation (Moderate or Deep): None    Above assessments performed by:  Estela Khalil RN Care Coordinator    Addie Malhotra MD      INDICATIONS FOR PROCEDURES:  Valerie Sim is a 55 year old patient with chronic migraine headaches associated with cervicogenic components. Her baseline symptoms have been recalcitrant to oral medications and conservative therapy.  She is here today for reinjection with Botox.     GOAL OF PROCEDURE:  The goal of this procedure is to decrease pain  and enhance functional independence.    TOTAL DOSE: 300 UNITS BOTOX  Dose Administered:  225 units  Botox (Botulinum Toxin Type  A)       2:1 Dilution   Diluent Used:  0.25% Sensorcaine (Lot: [id number], Exp: 06/23, NDC: 92101-046-53)  Total Volume of Diluent Used:  4 ml  Lot # /C3 with Expiration Date:  6/2023  NDC #: Botox 100u ([address]-85)     Was there drug waste? Yes  Amount of drug waste (mL): 75 units Botox.  Reason for waste:  Single use vial  Multi-dose vial: No      Medication guide was offered to patient and was declined.    CONSENT:  The risks, benefits, and treatment options were discussed with Valerie YEE Roney and she agreed to proceed.    Written consent was obtained by lmd.     EQUIPMENT USED:  Needle-37mm stimulating/recording  Needle-30 gauge  EMG/NCS Machine     SKIN PREPARATION:  Skin preparation was performed using an alcohol wipe.     GUIDANCE DESCRIPTION:  Electro-myographic guidance was necessary throughout the procedure to accurately identify all areas of spastic muscles while avoiding injection of non-spastic muscles, neighboring nerves and nearby vascular structures.      AREA/MUSCLE INJECTED:  225 UNITS BOTOX = TOTAL DOSE     1. FACE & SCALP: 115 units of Botox = Total dose, 2:1 Dilution     Right temporalis - 12.5 units of Botox in 4 site/s.  Left temporalis - 12.5 units of Botox in 4 site/s.      Right frontalis - 10 units of Botox in 4 site/s.  Left frontalis - 10 units of Botox in 4 site/s.      Right procerus - 2.5 units of Botox in 1 site/s.  Left procerus - 2.5 units of Botox in 1 site/s.      Right  - 5 units of Botox in 1 site/s.  Left  - 5 units of Botox in 1 site/s.      Right Upper Occipitalis - 25 units of Botox at 4 site/s.   Left Upper Occipitalis - 25 units of Botox at 4 site/s.      Right Nasalis - 2.5 units of Botox at 1 site/s.           Left Nasalis - 2.5 units of Botox at 1 site/s.          2. JAW MUSCLES: 60 units of Botox = Total Dose, 1:1 Dilution     Right Masseter - 25 units of Botox at 1 site/s.   Left Masseter - 25 units of Botox at 1 site/s.      Right temporalis -  5 units of Botox at 1 site/s.  Left temporalis - 5 units of Botox at 1 site/s.          3. SHOULDER & NECK MUSCLES: Total dose 50 units of Botox, 2:1 Dilution      Right levator muscle - 10 units of Botox at 2 site/s (neck and shoulder).  Left levator muscle - 10 units of Botox at 2 site/s (neck and shoulder).      Right mid trapezius - 5 units of Botox at 1 site.  Left mid trapezius - 5 units of Botox at 1 site.     Right lateral trapezius - 10 units of Botox at 2 site/s           Left lateral trapezuis - 10 units of Botox at 2 site/s.     RESPONSE TO PROCEDURE:  Valerie Sim tolerated the procedure well and there were no immediate complications. She was allowed to recover for an appropriate period of time and was discharged home in stable condition.     FOLLOW UP:  Valerie Sim was asked to follow up by phone in 7-14 days with Larissa Fernando PT, Care Coordinator or Estela Magaña RN, Care Coordinator, to report her response to this series of injections.  Based on the patient's previous response to this therapy, Valerie Sim was rescheduled for the next series of injections in 9 weeks and trigger point injections/nerve blocks every 11 weeks.      PLAN (Medication Changes, Therapy Orders, Work or Disability Issues, etc.): Patient will continue to monitor response to today's injections.

## 2020-10-29 NOTE — LETTER
10/29/2020       RE: Valerie Sim  [address]  [place] [address]     Dear Colleague,    Thank you for referring your patient, Valerie Sim, to the [facility] PHYSICAL MEDICINE AND REHABILITATION CLINIC [place] at [facility]. Please see a copy of my visit note below.    BOTULINUM TOXIN PROCEDURE - HEADACHE - NOTE    No chief complaint on file.      There were no vitals taken for this visit.       Current Outpatient Medications:      aMILoride (MIDAMOR) 5 MG tablet, , Disp: , Rfl:      amLODIPine (NORVASC) 10 MG tablet, , Disp: , Rfl:      amphetamine-dextroamphetamine (ADDERALL) 20 MG tablet, Take 1 tablet (20 mg) by mouth 2 times daily, Disp: 60 tablet, Rfl: 0     budesonide (RINOCORT AQUA) 32 MCG/ACT nasal spray, Spray 1 spray into both nostrils daily., Disp: , Rfl:      Calcium Citrate (CALCITRATE PO), Take 1,200 mg by mouth 2 times daily 1/2 TAB bid, Disp: , Rfl:      cyclobenzaprine (FLEXERIL) 10 MG tablet, , Disp: , Rfl:      CycloSPORINE (RESTASIS OP), Apply to eye 2 times daily, Disp: , Rfl:      divalproex sodium delayed-release (DEPAKOTE) 500 MG DR tablet, , Disp: , Rfl:      estrogens, conjugated, (PREMARIN) 0.625 MG tablet, Take by mouth daily .30 mg. , Disp: , Rfl:      fexofenadine (ALLEGRA) 180 MG tablet, Take  by mouth daily., Disp: , Rfl:      HYDROmorphone (DILAUDID) 2 MG tablet, Take 0.5-1 tablets (1-2 mg) by mouth every 3 hours as needed (headache) 20 tablets = 3 month supply., Disp: 24 tablet, Rfl: 0     ketorolac (TORADOL) 30 MG/ML injection, Inject 1 mL (30 mg) into the vein every 6 hours as needed for moderate pain, Disp: 9 mL, Rfl: 11     lamoTRIgine (LAMICTAL) 200 MG tablet, Take 1 tablet by mouth daily, Disp: , Rfl:      LORazepam (ATIVAN) 1 MG tablet, Take 1 mg by mouth 2 times daily as needed., Disp: , Rfl:      Nerve Stimulator (CEFALY KIT) NAHUM, 1 Device daily Use daily and as needed. Dual device, Disp: 1 Device, Rfl:  0     ondansetron (ZOFRAN) 4 MG tablet, Take 1 tablet (4 mg) by mouth every 8 hours as needed, Disp: 90 tablet, Rfl: 1     order for DME, Equipment being ordered: Oxygen The patient has Cluster Headache and needs 10 liters/minute of high flow oxygen via nonrebreather mask prn headaches, Disp: 99 days, Rfl: 0     order for DME, Equipment being ordered: Oxygen and non-rebreather mask.   Use high flow oxygen (10-15 L/min) with non-rebreather mask, as needed for cluster headaches, Disp: 1 Units, Rfl: 11     QUEtiapine (SEROQUEL) 50 MG tablet, Take 1 tablet by mouth daily., Disp: , Rfl:      SUMAtriptan (IMITREX STATDOSE) 6 MG/0.5ML pen injector kit, Inject 0.5 mLs (6 mg) Subcutaneous at onset of headache for migraine May repeat in 1 hour. Max 12 mg/24 hours., Disp: 1 kit, Rfl: 0     SUMAtriptan (IMITREX STATDOSE) 6 MG/0.5ML refill cartridge, Inject 0.5 mLs (6 mg) Subcutaneous at onset of headache for migraine, Disp: 10 Cartridge, Rfl: 3     SUMAtriptan (IMITREX) 100 MG tablet, Take 1 tablet (100 mg) by mouth at onset of headache for migraine May repeat in 2 hours. Max 2 tablets/24 hours., Disp: 12 tablet, Rfl: 3     valACYclovir (VALTREX) 1000 mg tablet, Take 1,000 mg by mouth as needed., Disp: , Rfl:      vitamin D3 (CHOLECALCIFEROL) 2000 units (50 mcg) tablet, Take 1 tablet by mouth daily, Disp: , Rfl:      Allergies   Allergen Reactions     Fluoxetine Other (See Comments)     PN: LW Reaction: headache  PN: LW Reaction: headache  Migraine       Garlic Blisters, Cough, Dermatitis, Difficulty breathing, Headache, Hives, Itching, Nausea and Vomiting and Shortness Of Breath     Trazodone Other (See Comments) and Unknown     Other reaction(s): Headache  Other reaction(s): Headache  Other reaction(s): Headache     Amitriptyline Hcl Other (See Comments)     Migraine       Candesartan Cilexetil-Hctz Muscle Pain (Myalgia)     Cymbalta Other (See Comments)     Migraine       Cyproheptadine Hcl      headaches     Desvenlafaxine   "    Migraine       Diatrizoate Unknown     PN: LW CM1: CONTRAST- iodine Reaction :     Diazepam      Other reaction(s): Vestibular Toxicity  PN: LW Reaction: vertigo  PN: LW Reaction: vertigo     Galcanezumab-Gnlm      Other reaction(s): Headache     Imipramine Other (See Comments)     Migraine       Lyrica Muscle Pain (Myalgia)     Metoprolol Other (See Comments) and Unknown     headache  headache  headache     Morphine Other (See Comments)     Patient reports seizure      No Clinical Screening - See Comments      PN: LW FI1: lactose intolerance LW FI2: shellfish  PN: LW CM1: CONTRAST- iodine Reaction :  PN: LW Other1: -nka     Nortriptyline Other (See Comments)     Migraine       Phenergan Dm [Promethazine-Dm] Other (See Comments)     seizures     Promethazine      PN: LW Reaction: minimal sizures     Venlafaxine Other (See Comments)     PN: LW Reaction: migraine  PN: LW Reaction: migraine  Migraine       Wellbutrin [Bupropion Hydrobromide] Other (See Comments)     Migraine       Compazine Anxiety     Dihydroergotamine Anxiety and Other (See Comments)     Cardiac symptoms     Droperidol Anxiety     PN: LW Reaction: agitation     Medroxyprogesterone Hives     PN: LW Reaction: nausea     Prochlorperazine Anxiety     PN: LW Reaction: \"can't sit still\"        PHYSICAL EXAM:    Pt states migraine not so bad today.   No facial asymmetry.       HPI:    Patient reports the following new medical problems since last visit: She has been on prednisone for the last two weeks for excessive inflammation and pain post pudendal nerve block that was complicated by a large hematoma which led to severe pelvic pain.     We reviewed the recommended safety guidelines for  Botox from any vaccine injection, such as the seasonal flu vaccine, by a minimum of 10-14 days with Valerie Sim. She acknowledged understanding.    RESPONSE TO PREVIOUS TREATMENT:  Change in headache pattern following last series of injections with 225 " units of  Botox on 20.    No problems reported  more discomfort than usual after injections.    1.  Headache frequency during this injection cycle:  15 headache days per month.  This is compared to her baseline headache frequency of daily headache days per month.     2.  Headache duration during this injection cycle:  Headache duration ranged from 2 hours to 7 days.     3.  Headache intensity during this injection cycle:    A.  7/10  =  Typical pain level.  B.  10/10  =  Worst pain level.  C.  3/10  =  Lowest pain level.    4.  Change in headache medication usage during this injection cycle:  (For Example:  Able to decrease use of oral pain medications.) Increased use of pain medications due to pudendal block.    5.  ER Visits During This Injection Cycle:  0    6.  Functional Performance:  Change in ADL's, social interaction, days lost from work, etc. Patient reports numerous days lost from work due to headache during this injection cycle      BOTULINUM NEUROTOXIN INJECTION PROCEDURES:    VERIFICATION OF PATIENT IDENTIFICATION AND PROCEDURE     Initials   Patient Name lmd   Patient  lmd   Procedure Verified by: pj     Prior to the start of the procedure and with procedural staff participation, I verbally confirmed the patient s identity using two indicators, relevant allergies, that the procedure was appropriate and matched the consent or emergent situation, and that the correct equipment/implants were available. Immediately prior to starting the procedure I conducted the Time Out with the procedural staff and re-confirmed the patient s name, procedure, and site/side. (The Joint Commission universal protocol was followed.)  Yes    Sedation (Moderate or Deep): None    Above assessments performed by:  Estela Khalil RN Care Coordinator    Addie Malhotra MD      INDICATIONS FOR PROCEDURES:  Valerie Sim is a 55 year old patient with chronic migraine headaches associated with cervicogenic components. Her  baseline symptoms have been recalcitrant to oral medications and conservative therapy.  She is here today for reinjection with Botox.     GOAL OF PROCEDURE:  The goal of this procedure is to decrease pain  and enhance functional independence.    TOTAL DOSE: 300 UNITS BOTOX  Dose Administered:  225 units  Botox (Botulinum Toxin Type A)       2:1 Dilution   Diluent Used:  0.25% Sensorcaine (Lot: [id number], Exp: 06/23, NDC: 04351-361-61)  Total Volume of Diluent Used:  4 ml  Lot # /C3 with Expiration Date:  6/2023  NDC #: Botox 100u ([address]-71)     Was there drug waste? Yes  Amount of drug waste (mL): 75 units Botox.  Reason for waste:  Single use vial  Multi-dose vial: No      Medication guide was offered to patient and was declined.    CONSENT:  The risks, benefits, and treatment options were discussed with Valerie Sim and she agreed to proceed.    Written consent was obtained by lmd.     EQUIPMENT USED:  Needle-37mm stimulating/recording  Needle-30 gauge  EMG/NCS Machine     SKIN PREPARATION:  Skin preparation was performed using an alcohol wipe.     GUIDANCE DESCRIPTION:  Electro-myographic guidance was necessary throughout the procedure to accurately identify all areas of spastic muscles while avoiding injection of non-spastic muscles, neighboring nerves and nearby vascular structures.      AREA/MUSCLE INJECTED:  225 UNITS BOTOX = TOTAL DOSE     1. FACE & SCALP: 115 units of Botox = Total dose, 2:1 Dilution     Right temporalis - 12.5 units of Botox in 4 site/s.  Left temporalis - 12.5 units of Botox in 4 site/s.      Right frontalis - 10 units of Botox in 4 site/s.  Left frontalis - 10 units of Botox in 4 site/s.      Right procerus - 2.5 units of Botox in 1 site/s.  Left procerus - 2.5 units of Botox in 1 site/s.      Right  - 5 units of Botox in 1 site/s.  Left  - 5 units of Botox in 1 site/s.      Right Upper Occipitalis - 25 units of Botox at 4 site/s.   Left Upper Occipitalis -  25 units of Botox at 4 site/s.      Right Nasalis - 2.5 units of Botox at 1 site/s.           Left Nasalis - 2.5 units of Botox at 1 site/s.          2. JAW MUSCLES: 60 units of Botox = Total Dose, 1:1 Dilution     Right Masseter - 25 units of Botox at 1 site/s.   Left Masseter - 25 units of Botox at 1 site/s.      Right temporalis - 5 units of Botox at 1 site/s.  Left temporalis - 5 units of Botox at 1 site/s.          3. SHOULDER & NECK MUSCLES: Total dose 50 units of Botox, 2:1 Dilution      Right levator muscle - 10 units of Botox at 2 site/s (neck and shoulder).  Left levator muscle - 10 units of Botox at 2 site/s (neck and shoulder).      Right mid trapezius - 5 units of Botox at 1 site.  Left mid trapezius - 5 units of Botox at 1 site.     Right lateral trapezius - 10 units of Botox at 2 site/s           Left lateral trapezuis - 10 units of Botox at 2 site/s.     RESPONSE TO PROCEDURE:  Valerie Sim tolerated the procedure well and there were no immediate complications. She was allowed to recover for an appropriate period of time and was discharged home in stable condition.     FOLLOW UP:  Valerie Sim was asked to follow up by phone in 7-14 days with Larissa Fernando PT, Care Coordinator or Estela Magaña RN, Care Coordinator, to report her response to this series of injections.  Based on the patient's previous response to this therapy, Valerie Sim was rescheduled for the next series of injections in 9 weeks and trigger point injections/nerve blocks every 11 weeks.      PLAN (Medication Changes, Therapy Orders, Work or Disability Issues, etc.): Patient will continue to monitor response to today's injections.      Sincerely,    Addie Malhotra MD

## 2020-10-30 DIAGNOSIS — G44.021 INTRACTABLE CHRONIC CLUSTER HEADACHE: ICD-10-CM

## 2020-10-30 RX ORDER — CEFUROXIME AXETIL 250 MG/1
6 TABLET ORAL
Qty: 9 KIT | Refills: 0 | Status: SHIPPED | OUTPATIENT
Start: 2020-10-30 | End: 2021-09-13

## 2021-01-06 ENCOUNTER — CARE COORDINATION (OUTPATIENT)
Dept: PHYSICAL MEDICINE AND REHAB | Facility: CLINIC | Age: 56
End: 2021-01-06

## 2021-01-06 ENCOUNTER — DOCUMENTATION ONLY (OUTPATIENT)
Dept: NEUROLOGY | Facility: CLINIC | Age: 56
End: 2021-01-06

## 2021-01-15 ENCOUNTER — HEALTH MAINTENANCE LETTER (OUTPATIENT)
Age: 56
End: 2021-01-15

## 2021-01-15 ENCOUNTER — OFFICE VISIT (OUTPATIENT)
Dept: PHYSICAL MEDICINE AND REHAB | Facility: CLINIC | Age: 56
End: 2021-01-15
Payer: COMMERCIAL

## 2021-01-15 VITALS
DIASTOLIC BLOOD PRESSURE: 82 MMHG | TEMPERATURE: 98.6 F | OXYGEN SATURATION: 99 % | RESPIRATION RATE: 16 BRPM | HEART RATE: 101 BPM | SYSTOLIC BLOOD PRESSURE: 145 MMHG

## 2021-01-15 DIAGNOSIS — G43.719 CHRONIC MIGRAINE WITHOUT AURA, INTRACTABLE, WITHOUT STATUS MIGRAINOSUS: Primary | ICD-10-CM

## 2021-01-15 PROCEDURE — 64615 CHEMODENERV MUSC MIGRAINE: CPT | Performed by: PHYSICAL MEDICINE & REHABILITATION

## 2021-01-15 RX ORDER — BUPIVACAINE HYDROCHLORIDE 2.5 MG/ML
5 INJECTION, SOLUTION EPIDURAL; INFILTRATION; INTRACAUDAL ONCE
Status: COMPLETED | OUTPATIENT
Start: 2021-01-15 | End: 2021-01-15

## 2021-01-15 RX ADMIN — BUPIVACAINE HYDROCHLORIDE 12.5 MG: 2.5 INJECTION, SOLUTION EPIDURAL; INFILTRATION; INTRACAUDAL at 11:14

## 2021-01-15 NOTE — PROGRESS NOTES
BOTULINUM TOXIN PROCEDURE - HEADACHE - NOTE    Chief Complaint   Patient presents with     RECHECK     [facility] RETURN BOTOX     BP (!) 145/82   Pulse 101   Temp 98.6  F (37  C)   Resp 16   SpO2 99%        Current Outpatient Medications:      aMILoride (MIDAMOR) 5 MG tablet, , Disp: , Rfl:      amLODIPine (NORVASC) 10 MG tablet, , Disp: , Rfl:      amphetamine-dextroamphetamine (ADDERALL) 20 MG tablet, Take 1 tablet (20 mg) by mouth 2 times daily, Disp: 60 tablet, Rfl: 0     budesonide (RINOCORT AQUA) 32 MCG/ACT nasal spray, Spray 1 spray into both nostrils daily., Disp: , Rfl:      Calcium Citrate (CALCITRATE PO), Take 1,200 mg by mouth 2 times daily 1/2 TAB bid, Disp: , Rfl:      Calcium-Magnesium-Vitamin D 600- MG-MG-UNIT TB24, Take 1,200 mg by mouth 2 times daily, Disp: , Rfl:      cyclobenzaprine (FLEXERIL) 10 MG tablet, , Disp: , Rfl:      CycloSPORINE (RESTASIS OP), Apply to eye 2 times daily, Disp: , Rfl:      divalproex sodium delayed-release (DEPAKOTE) 500 MG DR tablet, , Disp: , Rfl:      estrogens, conjugated, (PREMARIN) 0.625 MG tablet, Take by mouth daily .30 mg . , Disp: , Rfl:      fexofenadine (ALLEGRA) 180 MG tablet, Take  by mouth daily., Disp: , Rfl:      gabapentin (NEURONTIN) 100 MG capsule, Take 100 mg by mouth 3 times daily, Disp: , Rfl:      HYDROmorphone (DILAUDID) 2 MG tablet, Take 0.5-1 tablets (1-2 mg) by mouth every 3 hours as needed (headache) 20 tablets = 3 month supply., Disp: 24 tablet, Rfl: 0     ketorolac (TORADOL) 30 MG/ML injection, Inject 1 mL (30 mg) into the vein every 6 hours as needed for moderate pain, Disp: 9 mL, Rfl: 11     lamoTRIgine (LAMICTAL) 200 MG tablet, Take 1 tablet by mouth daily, Disp: , Rfl:      LORazepam (ATIVAN) 1 MG tablet, Take 1 mg by mouth 2 times daily as needed., Disp: , Rfl:      Nerve Stimulator (CEFALY KIT) NAHUM, 1 Device daily Use daily and as needed. Dual device, Disp: 1 Device, Rfl: 0     ondansetron (ZOFRAN) 4 MG tablet, Take 1 tablet (4  mg) by mouth every 8 hours as needed, Disp: 90 tablet, Rfl: 1     order for DME, Equipment being ordered: Oxygen The patient has Cluster Headache and needs 10 liters/minute of high flow oxygen via nonrebreather mask prn headaches, Disp: 99 days, Rfl: 0     order for DME, Equipment being ordered: Oxygen and non-rebreather mask.   Use high flow oxygen (10-15 L/min) with non-rebreather mask, as needed for cluster headaches, Disp: 1 Units, Rfl: 11     QUEtiapine (SEROQUEL) 50 MG tablet, Take 1 tablet by mouth daily., Disp: , Rfl:      SUMAtriptan (IMITREX STATDOSE) 6 MG/0.5ML pen injector kit, Inject 0.5 mLs (6 mg) Subcutaneous at onset of headache for migraine May repeat in 1 hour. Max 12 mg/24 hours., Disp: 9 kit, Rfl: 0     SUMAtriptan (IMITREX) 100 MG tablet, Take 1 tablet (100 mg) by mouth at onset of headache for migraine May repeat in 2 hours. Max 2 tablets/24 hours., Disp: 12 tablet, Rfl: 3     temazepam (RESTORIL) 15 MG capsule, Take 1 capsule by mouth daily, Disp: , Rfl:      valACYclovir (VALTREX) 1000 mg tablet, Take 1,000 mg by mouth as needed., Disp: , Rfl:      vitamin D3 (CHOLECALCIFEROL) 2000 units (50 mcg) tablet, Take 1 tablet by mouth daily, Disp: , Rfl:      Allergies   Allergen Reactions     Fluoxetine Other (See Comments)     PN: LW Reaction: headache  PN: LW Reaction: headache  Migraine       Garlic Blisters, Cough, Dermatitis, Difficulty breathing, Headache, Hives, Itching, Nausea and Vomiting and Shortness Of Breath     Candesartan      Other reaction(s): Myalgia     Duloxetine      Other reaction(s): Headache  migraine     Iodine      Other reaction(s): Other (see comments)  PN: LW CM1: CONTRAST- iodine Reaction :     Metoclopramide      Other reaction(s): Headache  Caused increase in headaches     Perfume      Other reaction(s): Headache  head pain leading to migraines     Pregabalin      Other reaction(s): Headache, Myalgia     Trazodone Other (See Comments) and Unknown     Other  "reaction(s): Headache  Other reaction(s): Headache  Other reaction(s): Headache     Amitriptyline Hcl Other (See Comments)     Migraine       Candesartan Cilexetil-Hctz Muscle Pain (Myalgia)     Cymbalta Other (See Comments)     Migraine       Cyproheptadine Hcl      headaches     Desvenlafaxine      Migraine       Diatrizoate Unknown     PN: LW CM1: CONTRAST- iodine Reaction :     Diazepam      Other reaction(s): Vestibular Toxicity  PN: LW Reaction: vertigo  PN: LW Reaction: vertigo     Galcanezumab-Gnlm      Other reaction(s): Headache     Imipramine Other (See Comments)     Migraine       Lyrica Muscle Pain (Myalgia)     Metoprolol Other (See Comments) and Unknown     headache  headache  headache     Morphine Other (See Comments)     Patient reports seizure      No Clinical Screening - See Comments      PN: LW FI1: lactose intolerance LW FI2: shellfish  PN: LW CM1: CONTRAST- iodine Reaction :  PN: LW Other1: -nka     Nortriptyline Other (See Comments)     Migraine       Phenergan Dm [Promethazine-Dm] Other (See Comments)     seizures     Promethazine      PN: LW Reaction: minimal sizures     Venlafaxine Other (See Comments)     PN: LW Reaction: migraine  PN: LW Reaction: migraine  Migraine       Wellbutrin [Bupropion Hydrobromide] Other (See Comments)     Migraine       Compazine Anxiety     Dihydroergotamine Anxiety and Other (See Comments)     Cardiac symptoms     Droperidol Anxiety     PN: LW Reaction: agitation     Medroxyprogesterone Hives     PN: LW Reaction: nausea     Prochlorperazine Anxiety     PN: LW Reaction: \"can't sit still\"        PHYSICAL EXAM:  Patient states determining her headache pain       HPI:    Patient reports the following new medical problems since last visit: She has been on prednisone for the last two weeks for excessive inflammation and pain post pudendal nerve block that was complicated by a large hematoma which led to severe pelvic pain.   She has worn a heart monitor, and gone " through other tests, and nothing concerning resulted.    We reviewed the recommended safety guidelines for  Botox from any vaccine injection, such as the seasonal flu vaccine, by a minimum of 10-14 days with Valerie Sim. She acknowledged understanding.    RESPONSE TO PREVIOUS TREATMENT:  Change in headache pattern following last series of injections with 225 units of  Botox on 10/29/20.    No problems reported  more discomfort than usual after injections.    1.  Headache frequency during this injection cycle:  20 headache days per month.  This is compared to her baseline headache frequency of daily headache days per month.     2.  Headache duration during this injection cycle:  Headache duration ranged from 2 hours to 2 days (1 was 2 days).     3.  Headache intensity during this injection cycle:    A.  7/10  =  Typical pain level.  B.  10/10  =  Worst pain level.  C.  3/10  =  Lowest pain level.    4.  Change in headache medication usage during this injection cycle:  (For Example:  Able to decrease use of oral pain medications.) not using Imitrex due to other medical issues, not wanting to tax her kidneys at this time.    5.  ER Visits During This Injection Cycle:  0    6.  Functional Performance:  Change in ADL's, social interaction, days lost from work, etc. Patient reports numerous days lost from work, and lot's of social activities, due to headache during this injection cycle.      BOTULINUM NEUROTOXIN INJECTION PROCEDURES:    VERIFICATION OF PATIENT IDENTIFICATION AND PROCEDURE     Initials   Patient Name pag   Patient  pag   Procedure Verified by: pag     Prior to the start of the procedure and with procedural staff participation, I verbally confirmed the patient s identity using two indicators, relevant allergies, that the procedure was appropriate and matched the consent or emergent situation, and that the correct equipment/implants were available. Immediately prior to starting the procedure I  conducted the Time Out with the procedural staff and re-confirmed the patient s name, procedure, and site/side. (The Joint Commission universal protocol was followed.)  Yes    Sedation (Moderate or Deep): None    Above assessments performed by:  Lou HAMILTON Care Coordinator    Addie Malhotra MD      INDICATIONS FOR PROCEDURES:  Valerie Sim is a 55 year old patient with chronic migraine headaches associated with cervicogenic components. Her baseline symptoms have been recalcitrant to oral medications and conservative therapy.  She is here today for reinjection with Botox.     GOAL OF PROCEDURE:  The goal of this procedure is to decrease pain  and enhance functional independence.    TOTAL DOSE: 300 UNITS BOTOX  Dose Administered:  225 units  Botox (Botulinum Toxin Type A)       2:1 Dilution   Diluent Used:  0.25% Sensorcaine (Batch: [id number], Exp: 08/2022, NDC: 55150-167-10)  Total Volume of Diluent Used:  4 ml  Lot # /C3 with Expiration Date: 10/2023  NDC #: Botox 100u ([address]-88)     Was there drug waste? Yes  Amount of drug waste (mL): 75 units Botox.  Reason for waste:  Single use vial  Multi-dose vial: No      Medication guide was offered to patient and was declined.    CONSENT:  The risks, benefits, and treatment options were discussed with Valerie Sim and she agreed to proceed.    Written consent was obtained by pag.     EQUIPMENT USED:  Needle-37mm stimulating/recording  Needle-30 gauge  EMG/NCS Machine     SKIN PREPARATION:  Skin preparation was performed using an alcohol wipe.     GUIDANCE DESCRIPTION:  Electro-myographic guidance was necessary throughout the procedure to accurately identify all areas of spastic muscles while avoiding injection of non-spastic muscles, neighboring nerves and nearby vascular structures.      AREA/MUSCLE INJECTED:  225 UNITS BOTOX = TOTAL DOSE     1. FACE & SCALP: 115 units of Botox = Total dose, 2:1 Dilution     Right temporalis - 12.5 units of Botox  in 4 site/s.  Left temporalis - 12.5 units of Botox in 4 site/s.      Right frontalis - 10 units of Botox in 4 site/s.  Left frontalis - 10 units of Botox in 4 site/s.      Right procerus - 2.5 units of Botox in 1 site/s.  Left procerus - 2.5 units of Botox in 1 site/s.      Right  - 5 units of Botox in 1 site/s.  Left  - 5 units of Botox in 1 site/s.      Right Upper Occipitalis - 25 units of Botox at 4 site/s.   Left Upper Occipitalis - 25 units of Botox at 4 site/s.      Right Nasalis - 2.5 units of Botox at 1 site/s.           Left Nasalis - 2.5 units of Botox at 1 site/s.          2. JAW MUSCLES: 50 units of Botox = Total Dose, 1:1 Dilution     Right Masseter - 15 units of Botox at 1 site/s.   Left Masseter - 15 units of Botox at 1 site/s.      Right temporalis - 10 units of Botox at 1 site/s.  Left temporalis - 10 units of Botox at 1 site/s.          3. SHOULDER & NECK MUSCLES: Total dose 60 units of Botox, 2:1 Dilution      Right levator muscle - 10 units of Botox at 2 site/s (neck and shoulder).  Left levator muscle - 10 units of Botox at 2 site/s (neck and shoulder).      Right mid trapezius - 5 units of Botox at 1 site.  Left mid trapezius - 5 units of Botox at 1 site.     Right lateral trapezius - 10 units of Botox at 2 site/s           Left lateral trapezuis - 10 units of Botox at 2 site/s.             Right Pectoralis Minor - 10 units of Botox at 1 site/s.      RESPONSE TO PROCEDURE:  Valerie Sim tolerated the procedure well and there were no immediate complications. She was allowed to recover for an appropriate period of time and was discharged home in stable condition.     FOLLOW UP:  Valerie Sim was asked to follow up by phone in 7-14 days with Larissa Fernando PT, Care Coordinator or Estela Magaña RN, Care Coordinator, to report her response to this series of injections.  Based on the patient's previous response to this therapy, Valerie Sim was rescheduled for the next  series of injections in 9 weeks and trigger point injections/nerve blocks every 11 weeks.      PLAN (Medication Changes, Therapy Orders, Work or Disability Issues, etc.): Patient will continue to monitor response to today's injections.

## 2021-01-15 NOTE — LETTER
1/15/2021       RE: Valerie Sim  [address]  [place] [address]     Dear Colleague,    Thank you for referring your patient, Valerie Sim, to the [facility] PHYSICAL MEDICINE AND REHABILITATION CLINIC [place] at [facility]. Please see a copy of my visit note below.    BOTULINUM TOXIN PROCEDURE - HEADACHE - NOTE    Chief Complaint   Patient presents with     RECHECK     UMP RETURN BOTOX     BP (!) 145/82   Pulse 101   Temp 98.6  F (37  C)   Resp 16   SpO2 99%        Current Outpatient Medications:      aMILoride (MIDAMOR) 5 MG tablet, , Disp: , Rfl:      amLODIPine (NORVASC) 10 MG tablet, , Disp: , Rfl:      amphetamine-dextroamphetamine (ADDERALL) 20 MG tablet, Take 1 tablet (20 mg) by mouth 2 times daily, Disp: 60 tablet, Rfl: 0     budesonide (RINOCORT AQUA) 32 MCG/ACT nasal spray, Spray 1 spray into both nostrils daily., Disp: , Rfl:      Calcium Citrate (CALCITRATE PO), Take 1,200 mg by mouth 2 times daily 1/2 TAB bid, Disp: , Rfl:      Calcium-Magnesium-Vitamin D 600- MG-MG-UNIT TB24, Take 1,200 mg by mouth 2 times daily, Disp: , Rfl:      cyclobenzaprine (FLEXERIL) 10 MG tablet, , Disp: , Rfl:      CycloSPORINE (RESTASIS OP), Apply to eye 2 times daily, Disp: , Rfl:      divalproex sodium delayed-release (DEPAKOTE) 500 MG DR tablet, , Disp: , Rfl:      estrogens, conjugated, (PREMARIN) 0.625 MG tablet, Take by mouth daily .30 mg . , Disp: , Rfl:      fexofenadine (ALLEGRA) 180 MG tablet, Take  by mouth daily., Disp: , Rfl:      gabapentin (NEURONTIN) 100 MG capsule, Take 100 mg by mouth 3 times daily, Disp: , Rfl:      HYDROmorphone (DILAUDID) 2 MG tablet, Take 0.5-1 tablets (1-2 mg) by mouth every 3 hours as needed (headache) 20 tablets = 3 month supply., Disp: 24 tablet, Rfl: 0     ketorolac (TORADOL) 30 MG/ML injection, Inject 1 mL (30 mg) into the vein every 6 hours as needed for moderate pain, Disp: 9 mL, Rfl: 11      lamoTRIgine (LAMICTAL) 200 MG tablet, Take 1 tablet by mouth daily, Disp: , Rfl:      LORazepam (ATIVAN) 1 MG tablet, Take 1 mg by mouth 2 times daily as needed., Disp: , Rfl:      Nerve Stimulator (CEFALY KIT) NAHUM, 1 Device daily Use daily and as needed. Dual device, Disp: 1 Device, Rfl: 0     ondansetron (ZOFRAN) 4 MG tablet, Take 1 tablet (4 mg) by mouth every 8 hours as needed, Disp: 90 tablet, Rfl: 1     order for DME, Equipment being ordered: Oxygen The patient has Cluster Headache and needs 10 liters/minute of high flow oxygen via nonrebreather mask prn headaches, Disp: 99 days, Rfl: 0     order for DME, Equipment being ordered: Oxygen and non-rebreather mask.   Use high flow oxygen (10-15 L/min) with non-rebreather mask, as needed for cluster headaches, Disp: 1 Units, Rfl: 11     QUEtiapine (SEROQUEL) 50 MG tablet, Take 1 tablet by mouth daily., Disp: , Rfl:      SUMAtriptan (IMITREX STATDOSE) 6 MG/0.5ML pen injector kit, Inject 0.5 mLs (6 mg) Subcutaneous at onset of headache for migraine May repeat in 1 hour. Max 12 mg/24 hours., Disp: 9 kit, Rfl: 0     SUMAtriptan (IMITREX) 100 MG tablet, Take 1 tablet (100 mg) by mouth at onset of headache for migraine May repeat in 2 hours. Max 2 tablets/24 hours., Disp: 12 tablet, Rfl: 3     temazepam (RESTORIL) 15 MG capsule, Take 1 capsule by mouth daily, Disp: , Rfl:      valACYclovir (VALTREX) 1000 mg tablet, Take 1,000 mg by mouth as needed., Disp: , Rfl:      vitamin D3 (CHOLECALCIFEROL) 2000 units (50 mcg) tablet, Take 1 tablet by mouth daily, Disp: , Rfl:      Allergies   Allergen Reactions     Fluoxetine Other (See Comments)     PN: LW Reaction: headache  PN: LW Reaction: headache  Migraine       Garlic Blisters, Cough, Dermatitis, Difficulty breathing, Headache, Hives, Itching, Nausea and Vomiting and Shortness Of Breath     Candesartan      Other reaction(s): Myalgia     Duloxetine      Other reaction(s): Headache  migraine     Iodine      Other  "reaction(s): Other (see comments)  PN: LW CM1: CONTRAST- iodine Reaction :     Metoclopramide      Other reaction(s): Headache  Caused increase in headaches     Perfume      Other reaction(s): Headache  head pain leading to migraines     Pregabalin      Other reaction(s): Headache, Myalgia     Trazodone Other (See Comments) and Unknown     Other reaction(s): Headache  Other reaction(s): Headache  Other reaction(s): Headache     Amitriptyline Hcl Other (See Comments)     Migraine       Candesartan Cilexetil-Hctz Muscle Pain (Myalgia)     Cymbalta Other (See Comments)     Migraine       Cyproheptadine Hcl      headaches     Desvenlafaxine      Migraine       Diatrizoate Unknown     PN: LW CM1: CONTRAST- iodine Reaction :     Diazepam      Other reaction(s): Vestibular Toxicity  PN: LW Reaction: vertigo  PN: LW Reaction: vertigo     Galcanezumab-Gnlm      Other reaction(s): Headache     Imipramine Other (See Comments)     Migraine       Lyrica Muscle Pain (Myalgia)     Metoprolol Other (See Comments) and Unknown     headache  headache  headache     Morphine Other (See Comments)     Patient reports seizure      No Clinical Screening - See Comments      PN: LW FI1: lactose intolerance LW FI2: shellfish  PN: LW CM1: CONTRAST- iodine Reaction :  PN: LW Other1: -nka     Nortriptyline Other (See Comments)     Migraine       Phenergan Dm [Promethazine-Dm] Other (See Comments)     seizures     Promethazine      PN: LW Reaction: minimal sizures     Venlafaxine Other (See Comments)     PN: LW Reaction: migraine  PN: LW Reaction: migraine  Migraine       Wellbutrin [Bupropion Hydrobromide] Other (See Comments)     Migraine       Compazine Anxiety     Dihydroergotamine Anxiety and Other (See Comments)     Cardiac symptoms     Droperidol Anxiety     PN: LW Reaction: agitation     Medroxyprogesterone Hives     PN: LW Reaction: nausea     Prochlorperazine Anxiety     PN: LW Reaction: \"can't sit still\"        PHYSICAL EXAM:  Patient " states determining her headache pain       HPI:    Patient reports the following new medical problems since last visit: She has been on prednisone for the last two weeks for excessive inflammation and pain post pudendal nerve block that was complicated by a large hematoma which led to severe pelvic pain.   She has worn a heart monitor, and gone through other tests, and nothing concerning resulted.    We reviewed the recommended safety guidelines for  Botox from any vaccine injection, such as the seasonal flu vaccine, by a minimum of 10-14 days with Valerie Sim. She acknowledged understanding.    RESPONSE TO PREVIOUS TREATMENT:  Change in headache pattern following last series of injections with 225 units of  Botox on 10/29/20.    No problems reported  more discomfort than usual after injections.    1.  Headache frequency during this injection cycle:  20 headache days per month.  This is compared to her baseline headache frequency of daily headache days per month.     2.  Headache duration during this injection cycle:  Headache duration ranged from 2 hours to 2 days (1 was 2 days).     3.  Headache intensity during this injection cycle:    A.  7/10  =  Typical pain level.  B.  10/10  =  Worst pain level.  C.  3/10  =  Lowest pain level.    4.  Change in headache medication usage during this injection cycle:  (For Example:  Able to decrease use of oral pain medications.) not using Imitrex due to other medical issues, not wanting to tax her kidneys at this time.    5.  ER Visits During This Injection Cycle:  0    6.  Functional Performance:  Change in ADL's, social interaction, days lost from work, etc. Patient reports numerous days lost from work, and lot's of social activities, due to headache during this injection cycle.      BOTULINUM NEUROTOXIN INJECTION PROCEDURES:    VERIFICATION OF PATIENT IDENTIFICATION AND PROCEDURE     Initials   Patient Name pag   Patient  pag   Procedure Verified by: debby      Prior to the start of the procedure and with procedural staff participation, I verbally confirmed the patient s identity using two indicators, relevant allergies, that the procedure was appropriate and matched the consent or emergent situation, and that the correct equipment/implants were available. Immediately prior to starting the procedure I conducted the Time Out with the procedural staff and re-confirmed the patient s name, procedure, and site/side. (The Joint Commission universal protocol was followed.)  Yes    Sedation (Moderate or Deep): None    Above assessments performed by:  Lou HAMILTON Care Coordinator    Addie Malhotra MD      INDICATIONS FOR PROCEDURES:  Valerie Sim is a 55 year old patient with chronic migraine headaches associated with cervicogenic components. Her baseline symptoms have been recalcitrant to oral medications and conservative therapy.  She is here today for reinjection with Botox.     GOAL OF PROCEDURE:  The goal of this procedure is to decrease pain  and enhance functional independence.    TOTAL DOSE: 300 UNITS BOTOX  Dose Administered:  225 units  Botox (Botulinum Toxin Type A)       2:1 Dilution   Diluent Used:  0.25% Sensorcaine (Batch: [id number], Exp: 08/2022, NDC: 55150-167-10)  Total Volume of Diluent Used:  4 ml  Lot # /C3 with Expiration Date: 10/2023  NDC #: Botox 100u ([address]-69)     Was there drug waste? Yes  Amount of drug waste (mL): 75 units Botox.  Reason for waste:  Single use vial  Multi-dose vial: No      Medication guide was offered to patient and was declined.    CONSENT:  The risks, benefits, and treatment options were discussed with Valerie Sim and she agreed to proceed.    Written consent was obtained by pag.     EQUIPMENT USED:  Needle-37mm stimulating/recording  Needle-30 gauge  EMG/NCS Machine     SKIN PREPARATION:  Skin preparation was performed using an alcohol wipe.     GUIDANCE DESCRIPTION:  Electro-myographic guidance was  necessary throughout the procedure to accurately identify all areas of spastic muscles while avoiding injection of non-spastic muscles, neighboring nerves and nearby vascular structures.      AREA/MUSCLE INJECTED:  225 UNITS BOTOX = TOTAL DOSE     1. FACE & SCALP: 115 units of Botox = Total dose, 2:1 Dilution     Right temporalis - 12.5 units of Botox in 4 site/s.  Left temporalis - 12.5 units of Botox in 4 site/s.      Right frontalis - 10 units of Botox in 4 site/s.  Left frontalis - 10 units of Botox in 4 site/s.      Right procerus - 2.5 units of Botox in 1 site/s.  Left procerus - 2.5 units of Botox in 1 site/s.      Right  - 5 units of Botox in 1 site/s.  Left  - 5 units of Botox in 1 site/s.      Right Upper Occipitalis - 25 units of Botox at 4 site/s.   Left Upper Occipitalis - 25 units of Botox at 4 site/s.      Right Nasalis - 2.5 units of Botox at 1 site/s.           Left Nasalis - 2.5 units of Botox at 1 site/s.          2. JAW MUSCLES: 50 units of Botox = Total Dose, 1:1 Dilution     Right Masseter - 15 units of Botox at 1 site/s.   Left Masseter - 15 units of Botox at 1 site/s.      Right temporalis - 10 units of Botox at 1 site/s.  Left temporalis - 10 units of Botox at 1 site/s.          3. SHOULDER & NECK MUSCLES: Total dose 60 units of Botox, 2:1 Dilution      Right levator muscle - 10 units of Botox at 2 site/s (neck and shoulder).  Left levator muscle - 10 units of Botox at 2 site/s (neck and shoulder).      Right mid trapezius - 5 units of Botox at 1 site.  Left mid trapezius - 5 units of Botox at 1 site.     Right lateral trapezius - 10 units of Botox at 2 site/s           Left lateral trapezuis - 10 units of Botox at 2 site/s.             Right Pectoralis Minor - 10 units of Botox at 1 site/s.      RESPONSE TO PROCEDURE:  Valerie Sim tolerated the procedure well and there were no immediate complications. She was allowed to recover for an appropriate period of time and was  discharged home in stable condition.     FOLLOW UP:  Valerie Sim was asked to follow up by phone in 7-14 days with Larissa Fernando PT, Care Coordinator or Estela Magaña RN, Care Coordinator, to report her response to this series of injections.  Based on the patient's previous response to this therapy, Valerie Sim was rescheduled for the next series of injections in 9 weeks and trigger point injections/nerve blocks every 11 weeks.      PLAN (Medication Changes, Therapy Orders, Work or Disability Issues, etc.): Patient will continue to monitor response to today's injections.      Again, thank you for allowing me to participate in the care of your patient.      Sincerely,    Addie Malhotra MD

## 2021-02-10 ENCOUNTER — DOCUMENTATION ONLY (OUTPATIENT)
Dept: CARE COORDINATION | Facility: CLINIC | Age: 56
End: 2021-02-10

## 2021-02-23 ENCOUNTER — OFFICE VISIT (OUTPATIENT)
Dept: PHYSICAL MEDICINE AND REHAB | Facility: CLINIC | Age: 56
End: 2021-02-23
Payer: COMMERCIAL

## 2021-02-23 VITALS
TEMPERATURE: 98.9 F | RESPIRATION RATE: 16 BRPM | DIASTOLIC BLOOD PRESSURE: 99 MMHG | HEART RATE: 61 BPM | SYSTOLIC BLOOD PRESSURE: 164 MMHG | OXYGEN SATURATION: 100 %

## 2021-02-23 DIAGNOSIS — M54.81 BILATERAL OCCIPITAL NEURALGIA: Primary | ICD-10-CM

## 2021-02-23 DIAGNOSIS — G43.719 INTRACTABLE CHRONIC MIGRAINE WITHOUT AURA AND WITHOUT STATUS MIGRAINOSUS: ICD-10-CM

## 2021-02-23 DIAGNOSIS — M79.18 MYOFASCIAL PAIN: ICD-10-CM

## 2021-02-23 PROCEDURE — 20553 NJX 1/MLT TRIGGER POINTS 3/>: CPT | Performed by: PHYSICAL MEDICINE & REHABILITATION

## 2021-02-23 PROCEDURE — 64405 NJX AA&/STRD GR OCPL NRV: CPT | Mod: 59 | Performed by: PHYSICAL MEDICINE & REHABILITATION

## 2021-02-23 RX ORDER — BUPIVACAINE HYDROCHLORIDE 5 MG/ML
11 INJECTION, SOLUTION EPIDURAL; INTRACAUDAL ONCE
Status: COMPLETED | OUTPATIENT
Start: 2021-02-23 | End: 2021-02-23

## 2021-02-23 RX ORDER — TRIAMCINOLONE ACETONIDE 40 MG/ML
40 INJECTION, SUSPENSION INTRA-ARTICULAR; INTRAMUSCULAR ONCE
Status: COMPLETED | OUTPATIENT
Start: 2021-02-23 | End: 2021-02-23

## 2021-02-23 RX ADMIN — TRIAMCINOLONE ACETONIDE 40 MG: 40 INJECTION, SUSPENSION INTRA-ARTICULAR; INTRAMUSCULAR at 13:23

## 2021-02-23 RX ADMIN — BUPIVACAINE HYDROCHLORIDE 55 MG: 5 INJECTION, SOLUTION EPIDURAL; INTRACAUDAL at 13:23

## 2021-02-23 ASSESSMENT — PAIN SCALES - GENERAL: PAINLEVEL: SEVERE PAIN (6)

## 2021-02-23 NOTE — LETTER
2021       RE: Valerie Sim  [address]  [place] [address]     Dear Colleague,    Thank you for referring your patient, Valerie Sim, to the [facility] PHYSICAL MEDICINE AND REHABILITATION CLINIC [place] at [facility]. Please see a copy of my visit note below.    [facility]  Clinics & Surgery Center  Physical Medicine & Rehabilitation Procedure Note    History of Present Illness:    Valerie Sim is a 55-year-old female with a history of chronic migraines, cluster headaches, and occipital headaches with myofascial pain who presents left-sided headaches and myofascial pain, primarily affecting her left shoulder musculature.  She is here today for occipital nerve block and trigger point injections.     Today, the patient has a headache in her bilateral occiput, L>R, 6/10.     Patient underwent a pudendal nerve block in October with pain and unstable BP complications afterwards. She is still working on getting BP under control with medications.       Procedure:   Prior to the start of the procedure and with procedural staff participation, I verbally confirmed the patient s identity using two indicators, relevant allergies, that the procedure was appropriate and matched the consent or emergent situation, and that the correct equipment/implants were available. Immediately prior to starting the procedure I conducted the Time Out with the procedural staff and re-confirmed the patient s name, procedure, and site/side. (The Joint Commission universal protocol was followed.)  Yes    Sedation (Moderate or Deep): None    Drug:   Kenalo ml = 40 mg (Lot [id number], Exp 2022, NDC [address]-4)  Bupivacaine 0.5%: 11 ml (Lot [id number], Exp 2023, NDC 29038-588-88)      Left greater occipital nerve block  Area just inferior to insertion of the right and left superior trapezius insertion onto skull was cleansed with  alcohol. Needle was advanced anteriorly to base of skull then slightly withdrawn and injectate was injected in a fan-like distribution at different depths. Total injection of 20 mg (0.5 ml) Kenalog plus 4.5 ml of 0.5 % bupivicaine into each side for a total of 10 ml.     Trigger Point Injections  Areas were prepped with ChloraPrep.  Using standard precautions a 30-gauge 1-inch needle was used to inject 2 mL of bupivacaine into the left occipitalis, trapezius, levator scapulae and anterior scalene muscles for a total of 12 injection sites.  Patient tolerated the procedure well and there were no complications.      Valerie Sim tolerated the procedure well without any immediate complications. She was allowed to recover for an appropriate period of time and was discharged home in stable condition.  Patient will follow-up regarding response to this procedure.    Assessment & Recommendations:  Valerie Sim is a 55 year old female who presents to rehabilitation clinic for trigger point injections and bilateral occipital nerve blocks for management of occipital neuralgia and myofascial pain, contributing to her chronic migraine headaches.  Patient tolerated procedure well without any complication.    She will follow-up with her usual Botox injections as scheduled.  She may repeat trigger point injections and/or occipital nerve blocks no sooner than 3 months from today. Patient prefers to come every 6 months for trigger point injections and nerve blocks, which she will schedule at her next Botox appointment. Regarding Botox, she would like to avoid injecting left shoulder/chest due to some shoulder instability when she is lying down.     Addie Malhotra MD

## 2021-02-23 NOTE — PROGRESS NOTES
[facility]  Physical Medicine & Rehabilitation Procedure Note    History of Present Illness:    Valerie Sim is a 55-year-old female with a history of chronic migraines, cluster headaches, and occipital headaches with myofascial pain who presents left-sided headaches and myofascial pain, primarily affecting her left shoulder musculature.  She is here today for occipital nerve block and trigger point injections.     Today, the patient has a headache in her bilateral occiput, L>R, 10.     Patient underwent a pudendal nerve block in October with pain and unstable BP complications afterwards. She is still working on getting BP under control with medications.       Procedure:   Prior to the start of the procedure and with procedural staff participation, I verbally confirmed the patient s identity using two indicators, relevant allergies, that the procedure was appropriate and matched the consent or emergent situation, and that the correct equipment/implants were available. Immediately prior to starting the procedure I conducted the Time Out with the procedural staff and re-confirmed the patient s name, procedure, and site/side. (The Joint Commission universal protocol was followed.)  Yes    Sedation (Moderate or Deep): None    Drug:   Kenalo ml = 40 mg (Lot [id number], Exp 2022, NDC [address]-2)  Bupivacaine 0.5%: 11 ml (Lot [id number], Exp 2023, NDC 60063-129-12)      Left greater occipital nerve block  Area just inferior to insertion of the right and left superior trapezius insertion onto skull was cleansed with alcohol. Needle was advanced anteriorly to base of skull then slightly withdrawn and injectate was injected in a fan-like distribution at different depths. Total injection of 20 mg (0.5 ml) Kenalog plus 4.5 ml of 0.5 % bupivicaine into each side for a total of 10 ml.       Trigger Point Injections  Areas were prepped with ChloraPrep.  Using standard  precautions a 30-gauge 1-inch needle was used to inject 2 mL of bupivacaine into the left occipitalis, trapezius, levator scapulae and anterior scalene muscles for a total of 12 injection sites.  Patient tolerated the procedure well and there were no complications.      Valerie Sim tolerated the procedure well without any immediate complications. She was allowed to recover for an appropriate period of time and was discharged home in stable condition.  Patient will follow-up regarding response to this procedure.      Assessment & Recommendations:  Valerie Sim is a 55 year old female who presents to rehabilitation clinic for trigger point injections and bilateral occipital nerve blocks for management of occipital neuralgia and myofascial pain, contributing to her chronic migraine headaches.  Patient tolerated procedure well without any complication.    She will follow-up with her usual Botox injections as scheduled.  She may repeat trigger point injections and/or occipital nerve blocks no sooner than 3 months from today. Patient prefers to come every 6 months for trigger point injections and nerve blocks, which she will schedule at her next Botox appointment. Regarding Botox, she would like to avoid injecting left shoulder/chest due to some shoulder instability when she is lying down.       Addie Malhotra MD

## 2021-03-18 ENCOUNTER — MYC MEDICAL ADVICE (OUTPATIENT)
Dept: PHYSICAL MEDICINE AND REHAB | Facility: CLINIC | Age: 56
End: 2021-03-18

## 2021-03-22 ENCOUNTER — MYC MEDICAL ADVICE (OUTPATIENT)
Dept: PHYSICAL MEDICINE AND REHAB | Facility: CLINIC | Age: 56
End: 2021-03-22

## 2021-03-24 ENCOUNTER — OFFICE VISIT (OUTPATIENT)
Dept: PHYSICAL MEDICINE AND REHAB | Facility: CLINIC | Age: 56
End: 2021-03-24
Payer: COMMERCIAL

## 2021-03-24 DIAGNOSIS — G43.719 CHRONIC MIGRAINE WITHOUT AURA, INTRACTABLE, WITHOUT STATUS MIGRAINOSUS: Primary | ICD-10-CM

## 2021-03-24 PROCEDURE — 64615 CHEMODENERV MUSC MIGRAINE: CPT | Performed by: PHYSICAL MEDICINE & REHABILITATION

## 2021-03-24 NOTE — PROGRESS NOTES
BOTULINUM TOXIN PROCEDURE - HEADACHE - NOTE    No chief complaint on file.    There were no vitals taken for this visit.       Current Outpatient Medications:      aMILoride (MIDAMOR) 5 MG tablet, , Disp: , Rfl:      amLODIPine (NORVASC) 10 MG tablet, , Disp: , Rfl:      amphetamine-dextroamphetamine (ADDERALL) 20 MG tablet, Take 1 tablet (20 mg) by mouth 2 times daily, Disp: 60 tablet, Rfl: 0     budesonide (RINOCORT AQUA) 32 MCG/ACT nasal spray, Spray 1 spray into both nostrils daily., Disp: , Rfl:      Calcium-Magnesium-Vitamin D 600- MG-MG-UNIT TB24, Take 1,200 mg by mouth 2 times daily, Disp: , Rfl:      cyclobenzaprine (FLEXERIL) 10 MG tablet, , Disp: , Rfl:      CycloSPORINE (RESTASIS OP), Apply to eye 2 times daily, Disp: , Rfl:      fexofenadine (ALLEGRA) 180 MG tablet, Take  by mouth daily., Disp: , Rfl:      HYDROmorphone (DILAUDID) 2 MG tablet, Take 0.5-1 tablets (1-2 mg) by mouth every 3 hours as needed (headache) 20 tablets = 3 month supply., Disp: 24 tablet, Rfl: 0     ketorolac (TORADOL) 30 MG/ML injection, Inject 1 mL (30 mg) into the vein every 6 hours as needed for moderate pain, Disp: 9 mL, Rfl: 11     lamoTRIgine (LAMICTAL) 200 MG tablet, Take 1 tablet by mouth daily, Disp: , Rfl:      LORazepam (ATIVAN) 1 MG tablet, Take 1 mg by mouth 2 times daily as needed., Disp: , Rfl:      ondansetron (ZOFRAN) 4 MG tablet, Take 1 tablet (4 mg) by mouth every 8 hours as needed, Disp: 90 tablet, Rfl: 1     order for DME, Equipment being ordered: Oxygen The patient has Cluster Headache and needs 10 liters/minute of high flow oxygen via nonrebreather mask prn headaches, Disp: 99 days, Rfl: 0     order for DME, Equipment being ordered: Oxygen and non-rebreather mask.   Use high flow oxygen (10-15 L/min) with non-rebreather mask, as needed for cluster headaches, Disp: 1 Units, Rfl: 11     QUEtiapine (SEROQUEL) 50 MG tablet, Take 1 tablet by mouth daily., Disp: , Rfl:      SUMAtriptan (IMITREX STATDOSE) 6  MG/0.5ML pen injector kit, Inject 0.5 mLs (6 mg) Subcutaneous at onset of headache for migraine May repeat in 1 hour. Max 12 mg/24 hours., Disp: 9 kit, Rfl: 0     SUMAtriptan (IMITREX) 100 MG tablet, Take 1 tablet (100 mg) by mouth at onset of headache for migraine May repeat in 2 hours. Max 2 tablets/24 hours., Disp: 12 tablet, Rfl: 3     temazepam (RESTORIL) 15 MG capsule, Take 1 capsule by mouth daily, Disp: , Rfl:      valACYclovir (VALTREX) 1000 mg tablet, Take 1,000 mg by mouth as needed., Disp: , Rfl:      Allergies   Allergen Reactions     Fluoxetine Other (See Comments)     PN: LW Reaction: headache  PN: LW Reaction: headache  Migraine       Garlic Blisters, Cough, Dermatitis, Difficulty breathing, Headache, Hives, Itching, Nausea and Vomiting and Shortness Of Breath     Candesartan      Other reaction(s): Myalgia     Duloxetine      Other reaction(s): Headache  migraine     Iodine      Other reaction(s): Other (see comments)  PN: LW CM1: CONTRAST- iodine Reaction :     Metoclopramide      Other reaction(s): Headache  Caused increase in headaches     Perfume      Other reaction(s): Headache  head pain leading to migraines     Pregabalin      Other reaction(s): Headache, Myalgia     Trazodone Other (See Comments) and Unknown     Other reaction(s): Headache  Other reaction(s): Headache  Other reaction(s): Headache     Amitriptyline Hcl Other (See Comments)     Migraine       Candesartan Cilexetil-Hctz Muscle Pain (Myalgia)     Cymbalta Other (See Comments)     Migraine       Cyproheptadine Hcl      headaches     Desvenlafaxine      Migraine       Diatrizoate Unknown     PN: LW CM1: CONTRAST- iodine Reaction :     Diazepam      Other reaction(s): Vestibular Toxicity  PN: LW Reaction: vertigo  PN: LW Reaction: vertigo     Galcanezumab-Gnlm      Other reaction(s): Headache     Imipramine Other (See Comments)     Migraine       Lyrica Muscle Pain (Myalgia)     Metoprolol Other (See Comments) and Unknown      "headache  headache  headache     Morphine Other (See Comments)     Patient reports seizure      No Clinical Screening - See Comments      PN: LW FI1: lactose intolerance LW FI2: shellfish  PN: LW CM1: CONTRAST- iodine Reaction :  PN: LW Other1: -nka     Nortriptyline Other (See Comments)     Migraine       Phenergan Dm [Promethazine-Dm] Other (See Comments)     seizures     Promethazine      PN: LW Reaction: minimal sizures     Venlafaxine Other (See Comments)     PN: LW Reaction: migraine  PN: LW Reaction: migraine  Migraine       Wellbutrin [Bupropion Hydrobromide] Other (See Comments)     Migraine       Compazine Anxiety     Dihydroergotamine Anxiety and Other (See Comments)     Cardiac symptoms     Droperidol Anxiety     PN: LW Reaction: agitation     Medroxyprogesterone Hives     PN: LW Reaction: nausea     Prochlorperazine Anxiety     PN: LW Reaction: \"can't sit still\"        PHYSICAL EXAM:    Patient reports current headache at 7/10, located around left temple and associated with nausea and visual auras within the last month.       HPI:    Patient reports the following new medical problems since last visit: last week she had a pudendal nerve block and Botox.    We reviewed the recommended safety guidelines for  Botox from any vaccine injection, such as the seasonal flu vaccine, by a minimum of 10-14 days with Valerie Sim. She acknowledged understanding.    RESPONSE TO PREVIOUS TREATMENT:  Change in headache pattern following last series of injections with 225 units of  Botox on 1/15/2021.    No problems reported    1.  Headache frequency during this injection cycle:  20 headache days per month.  This is compared to her baseline headache frequency of daily headache days per month.     2.  Headache duration during this injection cycle:  Headache duration ranged from 1 hours to 8 days. She reports 3 episodes of multiple day headaches.    3.  Headache intensity during this injection cycle:    A.  5/10 "  =  Typical pain level.  B.  10/10  =  Worst pain level.  C.  4/10  =  Lowest pain level.    4.  Change in headache medication usage during this injection cycle:  (For Example:  Able to decrease use of oral pain medications.)Taking more Imitrex lately.    5.  ER Visits During This Injection Cycle:  0    6.  Functional Performance:  Change in ADL's, social interaction, days lost from work, etc. Patient reports numerous days of changed plans and missing out on activities, due to headache during this injection cycle.      BOTULINUM NEUROTOXIN INJECTION PROCEDURES:    VERIFICATION OF PATIENT IDENTIFICATION AND PROCEDURE     Initials   Patient Name [facility]   Patient  [facility]   Procedure Verified by: [email or web address]     Prior to the start of the procedure and with procedural staff participation, I verbally confirmed the patient s identity using two indicators, relevant allergies, that the procedure was appropriate and matched the consent or emergent situation, and that the correct equipment/implants were available. Immediately prior to starting the procedure I conducted the Time Out with the procedural staff and re-confirmed the patient s name, procedure, and site/side. (The Joint Commission universal protocol was followed.)  Yes    Sedation (Moderate or Deep): None    Above assessments performed by:  Larissa Fernando, PT, Care Coordinator    Addie Malhotra MD       INDICATIONS FOR PROCEDURES:  Valerie Sim is a 55 year old patient with chronic migraine headaches associated with cervicogenic components. Her baseline symptoms have been recalcitrant to oral medications and conservative therapy.  She is here today for reinjection with Botox.     GOAL OF PROCEDURE:  The goal of this procedure is to decrease pain  and enhance functional independence.    TOTAL DOSE: 300 UNITS BOTOX  Dose Administered:  225 units  Botox (Botulinum Toxin Type A)       2:1 Dilution   Diluent Used:  0.25% Sensorcaine (Batch: [id number], Exp: 2022, NDC:  5817-3330-54)  Total Volume of Diluent Used:  4 ml  Lot # /C3 with Expiration Date: 12/2023  NDC #: Botox 100u ([address]-38)     Was there drug waste? Yes  Amount of drug waste (mL): 75 units Botox.  Reason for waste:  Single use vial  Multi-dose vial: No      Medication guide was offered to patient and was declined.    CONSENT:  The risks, benefits, and treatment options were discussed with Valerie Sim and she agreed to proceed.    Written consent was obtained by [facility].     EQUIPMENT USED:  Needle-37mm stimulating/recording  Needle-30 gauge  EMG/NCS Machine     SKIN PREPARATION:  Skin preparation was performed using an alcohol wipe.     GUIDANCE DESCRIPTION:  Electro-myographic guidance was necessary throughout the procedure to accurately identify all areas of spastic muscles while avoiding injection of non-spastic muscles, neighboring nerves and nearby vascular structures.      AREA/MUSCLE INJECTED:  225 UNITS BOTOX = TOTAL DOSE     1. FACE & SCALP: 115 units of Botox = Total dose, 2:1 Dilution     Right temporalis - 12.5 units of Botox in 4 site/s.  Left temporalis - 12.5 units of Botox in 4 site/s.      Right frontalis - 10 units of Botox in 4 site/s.  Left frontalis - 10 units of Botox in 4 site/s.      Right procerus - 2.5 units of Botox in 1 site/s.  Left procerus - 2.5 units of Botox in 1 site/s.      Right  - 5 units of Botox in 1 site/s.  Left  - 5 units of Botox in 1 site/s.      Right Upper Occipitalis - 25 units of Botox at 4 site/s.   Left Upper Occipitalis - 25 units of Botox at 4 site/s.      Right Nasalis - 2.5 units of Botox at 1 site/s.           Left Nasalis - 2.5 units of Botox at 1 site/s.          2. JAW MUSCLES: 50 units of Botox = Total Dose, 1:1 Dilution     Right Masseter - 15 units of Botox at 1 site/s.   Left Masseter - 15 units of Botox at 1 site/s.      Right temporalis - 10 units of Botox at 1 site/s.  Left temporalis - 10 units of Botox at 1 site/s.           3. SHOULDER & NECK MUSCLES: Total dose 60 units of Botox, 2:1 Dilution      Right levator muscle - 10 units of Botox at 2 site/s (neck and shoulder).  Left levator muscle - 10 units of Botox at 2 site/s (neck and shoulder).      Right mid trapezius - 5 units of Botox at 1 site.  Left mid trapezius - 5 units of Botox at 1 site.     Right lateral trapezius - 10 units of Botox at 2 site/s           Left lateral trapezuis - 10 units of Botox at 2 site/s.             Left Pectoralis Minor - 10 units of Botox at 1 site/s.      RESPONSE TO PROCEDURE:  Valerie Sim tolerated the procedure well and there were no immediate complications. She was allowed to recover for an appropriate period of time and was discharged home in stable condition.     FOLLOW UP:  Valerie Sim was asked to follow up by phone in 7-14 days with Larissa Fernando PT, Care Coordinator or Estela Magaña RN, Care Coordinator, to report her response to this series of injections.  Based on the patient's previous response to this therapy, Valerie Sim was rescheduled for the next series of injections in 9 weeks and trigger point injections/nerve blocks every 6 months.      PLAN (Medication Changes, Therapy Orders, Work or Disability Issues, etc.): Patient will continue to monitor response to today's injections. She will have occipital nerve block with Botox at her August appointment.

## 2021-03-24 NOTE — LETTER
3/24/2021       RE: Valerie Sim  [address]  [place] [address]     Dear Colleague,    Thank you for referring your patient, Valerie Sim, to the [facility] [place] at [facility]. Please see a copy of my visit note below.    BOTULINUM TOXIN PROCEDURE - HEADACHE - NOTE    No chief complaint on file.    There were no vitals taken for this visit.       Current Outpatient Medications:      aMILoride (MIDAMOR) 5 MG tablet, , Disp: , Rfl:      amLODIPine (NORVASC) 10 MG tablet, , Disp: , Rfl:      amphetamine-dextroamphetamine (ADDERALL) 20 MG tablet, Take 1 tablet (20 mg) by mouth 2 times daily, Disp: 60 tablet, Rfl: 0     budesonide (RINOCORT AQUA) 32 MCG/ACT nasal spray, Spray 1 spray into both nostrils daily., Disp: , Rfl:      Calcium-Magnesium-Vitamin D 600- MG-MG-UNIT TB24, Take 1,200 mg by mouth 2 times daily, Disp: , Rfl:      cyclobenzaprine (FLEXERIL) 10 MG tablet, , Disp: , Rfl:      CycloSPORINE (RESTASIS OP), Apply to eye 2 times daily, Disp: , Rfl:      fexofenadine (ALLEGRA) 180 MG tablet, Take  by mouth daily., Disp: , Rfl:      HYDROmorphone (DILAUDID) 2 MG tablet, Take 0.5-1 tablets (1-2 mg) by mouth every 3 hours as needed (headache) 20 tablets = 3 month supply., Disp: 24 tablet, Rfl: 0     ketorolac (TORADOL) 30 MG/ML injection, Inject 1 mL (30 mg) into the vein every 6 hours as needed for moderate pain, Disp: 9 mL, Rfl: 11     lamoTRIgine (LAMICTAL) 200 MG tablet, Take 1 tablet by mouth daily, Disp: , Rfl:      LORazepam (ATIVAN) 1 MG tablet, Take 1 mg by mouth 2 times daily as needed., Disp: , Rfl:      ondansetron (ZOFRAN) 4 MG tablet, Take 1 tablet (4 mg) by mouth every 8 hours as needed, Disp: 90 tablet, Rfl: 1     order for DME, Equipment being ordered: Oxygen The patient has Cluster Headache and needs 10 liters/minute of high flow oxygen via nonrebreather mask prn  headaches, Disp: 99 days, Rfl: 0     order for DME, Equipment being ordered: Oxygen and non-rebreather mask.   Use high flow oxygen (10-15 L/min) with non-rebreather mask, as needed for cluster headaches, Disp: 1 Units, Rfl: 11     QUEtiapine (SEROQUEL) 50 MG tablet, Take 1 tablet by mouth daily., Disp: , Rfl:      SUMAtriptan (IMITREX STATDOSE) 6 MG/0.5ML pen injector kit, Inject 0.5 mLs (6 mg) Subcutaneous at onset of headache for migraine May repeat in 1 hour. Max 12 mg/24 hours., Disp: 9 kit, Rfl: 0     SUMAtriptan (IMITREX) 100 MG tablet, Take 1 tablet (100 mg) by mouth at onset of headache for migraine May repeat in 2 hours. Max 2 tablets/24 hours., Disp: 12 tablet, Rfl: 3     temazepam (RESTORIL) 15 MG capsule, Take 1 capsule by mouth daily, Disp: , Rfl:      valACYclovir (VALTREX) 1000 mg tablet, Take 1,000 mg by mouth as needed., Disp: , Rfl:      Allergies   Allergen Reactions     Fluoxetine Other (See Comments)     PN: LW Reaction: headache  PN: LW Reaction: headache  Migraine       Garlic Blisters, Cough, Dermatitis, Difficulty breathing, Headache, Hives, Itching, Nausea and Vomiting and Shortness Of Breath     Candesartan      Other reaction(s): Myalgia     Duloxetine      Other reaction(s): Headache  migraine     Iodine      Other reaction(s): Other (see comments)  PN: LW CM1: CONTRAST- iodine Reaction :     Metoclopramide      Other reaction(s): Headache  Caused increase in headaches     Perfume      Other reaction(s): Headache  head pain leading to migraines     Pregabalin      Other reaction(s): Headache, Myalgia     Trazodone Other (See Comments) and Unknown     Other reaction(s): Headache  Other reaction(s): Headache  Other reaction(s): Headache     Amitriptyline Hcl Other (See Comments)     Migraine       Candesartan Cilexetil-Hctz Muscle Pain (Myalgia)     Cymbalta Other (See Comments)     Migraine       Cyproheptadine Hcl      headaches     Desvenlafaxine      Migraine       Diatrizoate Unknown  "    PN: LW CM1: CONTRAST- iodine Reaction :     Diazepam      Other reaction(s): Vestibular Toxicity  PN: LW Reaction: vertigo  PN: LW Reaction: vertigo     Galcanezumab-Gnlm      Other reaction(s): Headache     Imipramine Other (See Comments)     Migraine       Lyrica Muscle Pain (Myalgia)     Metoprolol Other (See Comments) and Unknown     headache  headache  headache     Morphine Other (See Comments)     Patient reports seizure      No Clinical Screening - See Comments      PN: LW FI1: lactose intolerance LW FI2: shellfish  PN: LW CM1: CONTRAST- iodine Reaction :  PN: LW Other1: -nka     Nortriptyline Other (See Comments)     Migraine       Phenergan Dm [Promethazine-Dm] Other (See Comments)     seizures     Promethazine      PN: LW Reaction: minimal sizures     Venlafaxine Other (See Comments)     PN: LW Reaction: migraine  PN: LW Reaction: migraine  Migraine       Wellbutrin [Bupropion Hydrobromide] Other (See Comments)     Migraine       Compazine Anxiety     Dihydroergotamine Anxiety and Other (See Comments)     Cardiac symptoms     Droperidol Anxiety     PN: LW Reaction: agitation     Medroxyprogesterone Hives     PN: LW Reaction: nausea     Prochlorperazine Anxiety     PN: LW Reaction: \"can't sit still\"        PHYSICAL EXAM:    Patient reports current headache at 7/10, located around left temple and associated with nausea and visual auras within the last month.       HPI:    Patient reports the following new medical problems since last visit: last week she had a pudendal nerve block and Botox.    We reviewed the recommended safety guidelines for  Botox from any vaccine injection, such as the seasonal flu vaccine, by a minimum of 10-14 days with Valerie Sim. She acknowledged understanding.    RESPONSE TO PREVIOUS TREATMENT:  Change in headache pattern following last series of injections with 225 units of  Botox on 1/15/2021.    No problems reported    1.  Headache frequency during this injection " cycle:  20 headache days per month.  This is compared to her baseline headache frequency of daily headache days per month.     2.  Headache duration during this injection cycle:  Headache duration ranged from 1 hours to 8 days. She reports 3 episodes of multiple day headaches.    3.  Headache intensity during this injection cycle:    A.  5/10  =  Typical pain level.  B.  10/10  =  Worst pain level.  C.  4/10  =  Lowest pain level.    4.  Change in headache medication usage during this injection cycle:  (For Example:  Able to decrease use of oral pain medications.)Taking more Imitrex lately.    5.  ER Visits During This Injection Cycle:  0    6.  Functional Performance:  Change in ADL's, social interaction, days lost from work, etc. Patient reports numerous days of changed plans and missing out on activities, due to headache during this injection cycle.      BOTULINUM NEUROTOXIN INJECTION PROCEDURES:    VERIFICATION OF PATIENT IDENTIFICATION AND PROCEDURE     Initials   Patient Name [facility]   Patient  [facility]   Procedure Verified by: [email or web address]     Prior to the start of the procedure and with procedural staff participation, I verbally confirmed the patient s identity using two indicators, relevant allergies, that the procedure was appropriate and matched the consent or emergent situation, and that the correct equipment/implants were available. Immediately prior to starting the procedure I conducted the Time Out with the procedural staff and re-confirmed the patient s name, procedure, and site/side. (The Joint Commission universal protocol was followed.)  Yes    Sedation (Moderate or Deep): None    Above assessments performed by:  Larissa Fernando, PT, Care Coordinator    Addie Malhotra MD       INDICATIONS FOR PROCEDURES:  Valerie Sim is a 55 year old patient with chronic migraine headaches associated with cervicogenic components. Her baseline symptoms have been recalcitrant to oral medications and conservative therapy.   She is here today for reinjection with Botox.     GOAL OF PROCEDURE:  The goal of this procedure is to decrease pain  and enhance functional independence.    TOTAL DOSE: 300 UNITS BOTOX  Dose Administered:  225 units  Botox (Botulinum Toxin Type A)       2:1 Dilution   Diluent Used:  0.25% Sensorcaine (Batch: [id number], Exp: 6/1/2022, NDC: 2887-6096-34)  Total Volume of Diluent Used:  4 ml  Lot # /C3 with Expiration Date: 12/2023  NDC #: Botox 100u ([address]-69)     Was there drug waste? Yes  Amount of drug waste (mL): 75 units Botox.  Reason for waste:  Single use vial  Multi-dose vial: No      Medication guide was offered to patient and was declined.    CONSENT:  The risks, benefits, and treatment options were discussed with Valerie Sim and she agreed to proceed.    Written consent was obtained by [facility].     EQUIPMENT USED:  Needle-37mm stimulating/recording  Needle-30 gauge  EMG/NCS Machine     SKIN PREPARATION:  Skin preparation was performed using an alcohol wipe.     GUIDANCE DESCRIPTION:  Electro-myographic guidance was necessary throughout the procedure to accurately identify all areas of spastic muscles while avoiding injection of non-spastic muscles, neighboring nerves and nearby vascular structures.      AREA/MUSCLE INJECTED:  225 UNITS BOTOX = TOTAL DOSE     1. FACE & SCALP: 115 units of Botox = Total dose, 2:1 Dilution     Right temporalis - 12.5 units of Botox in 4 site/s.  Left temporalis - 12.5 units of Botox in 4 site/s.      Right frontalis - 10 units of Botox in 4 site/s.  Left frontalis - 10 units of Botox in 4 site/s.      Right procerus - 2.5 units of Botox in 1 site/s.  Left procerus - 2.5 units of Botox in 1 site/s.      Right  - 5 units of Botox in 1 site/s.  Left  - 5 units of Botox in 1 site/s.      Right Upper Occipitalis - 25 units of Botox at 4 site/s.   Left Upper Occipitalis - 25 units of Botox at 4 site/s.      Right Nasalis - 2.5 units of Botox at 1 site/s.            Left Nasalis - 2.5 units of Botox at 1 site/s.          2. JAW MUSCLES: 50 units of Botox = Total Dose, 1:1 Dilution     Right Masseter - 15 units of Botox at 1 site/s.   Left Masseter - 15 units of Botox at 1 site/s.      Right temporalis - 10 units of Botox at 1 site/s.  Left temporalis - 10 units of Botox at 1 site/s.          3. SHOULDER & NECK MUSCLES: Total dose 60 units of Botox, 2:1 Dilution      Right levator muscle - 10 units of Botox at 2 site/s (neck and shoulder).  Left levator muscle - 10 units of Botox at 2 site/s (neck and shoulder).      Right mid trapezius - 5 units of Botox at 1 site.  Left mid trapezius - 5 units of Botox at 1 site.     Right lateral trapezius - 10 units of Botox at 2 site/s           Left lateral trapezuis - 10 units of Botox at 2 site/s.             Left Pectoralis Minor - 10 units of Botox at 1 site/s.      RESPONSE TO PROCEDURE:  Valerie Sim tolerated the procedure well and there were no immediate complications. She was allowed to recover for an appropriate period of time and was discharged home in stable condition.     FOLLOW UP:  Valerie Sim was asked to follow up by phone in 7-14 days with Larissa Fernando PT, Care Coordinator or Estela Magaña RN, Care Coordinator, to report her response to this series of injections.  Based on the patient's previous response to this therapy, Valerie Sim was rescheduled for the next series of injections in 9 weeks and trigger point injections/nerve blocks every 6 months.      PLAN (Medication Changes, Therapy Orders, Work or Disability Issues, etc.): Patient will continue to monitor response to today's injections. She will have occipital nerve block with Botox at her August appointment.     Again, thank you for allowing me to participate in the care of your patient.      Sincerely,    Addie Malhotra MD

## 2021-03-25 RX ORDER — BUPIVACAINE HYDROCHLORIDE 2.5 MG/ML
5 INJECTION, SOLUTION EPIDURAL; INFILTRATION; INTRACAUDAL ONCE
Status: COMPLETED | OUTPATIENT
Start: 2021-03-25 | End: 2021-03-25

## 2021-03-25 RX ADMIN — BUPIVACAINE HYDROCHLORIDE 12.5 MG: 2.5 INJECTION, SOLUTION EPIDURAL; INFILTRATION; INTRACAUDAL at 11:03

## 2021-04-08 ENCOUNTER — MYC MEDICAL ADVICE (OUTPATIENT)
Dept: PHYSICAL MEDICINE AND REHAB | Facility: CLINIC | Age: 56
End: 2021-04-08

## 2021-04-28 ENCOUNTER — TRANSFERRED RECORDS (OUTPATIENT)
Dept: HEALTH INFORMATION MANAGEMENT | Facility: CLINIC | Age: 56
End: 2021-04-28

## 2021-04-29 ENCOUNTER — TRANSFERRED RECORDS (OUTPATIENT)
Dept: HEALTH INFORMATION MANAGEMENT | Facility: CLINIC | Age: 56
End: 2021-04-29

## 2021-04-30 ENCOUNTER — RECORDS - HEALTHEAST (OUTPATIENT)
Dept: ADMINISTRATIVE | Facility: OTHER | Age: 56
End: 2021-04-30

## 2021-04-30 ENCOUNTER — AMBULATORY - HEALTHEAST (OUTPATIENT)
Dept: SURGERY | Facility: HOSPITAL | Age: 56
End: 2021-04-30

## 2021-04-30 DIAGNOSIS — Z11.59 ENCOUNTER FOR SCREENING FOR OTHER VIRAL DISEASES: ICD-10-CM

## 2021-05-06 DIAGNOSIS — Z11.59 ENCOUNTER FOR SCREENING FOR OTHER VIRAL DISEASES: Primary | ICD-10-CM

## 2021-05-07 ENCOUNTER — RECORDS - HEALTHEAST (OUTPATIENT)
Dept: ADMINISTRATIVE | Facility: OTHER | Age: 56
End: 2021-05-07

## 2021-05-07 DIAGNOSIS — Z11.59 ENCOUNTER FOR SCREENING FOR OTHER VIRAL DISEASES: ICD-10-CM

## 2021-05-07 LAB
SARS-COV-2 RNA RESP QL NAA+PROBE: NORMAL
SPECIMEN SOURCE: NORMAL

## 2021-05-07 PROCEDURE — U0003 INFECTIOUS AGENT DETECTION BY NUCLEIC ACID (DNA OR RNA); SEVERE ACUTE RESPIRATORY SYNDROME CORONAVIRUS 2 (SARS-COV-2) (CORONAVIRUS DISEASE [COVID-19]), AMPLIFIED PROBE TECHNIQUE, MAKING USE OF HIGH THROUGHPUT TECHNOLOGIES AS DESCRIBED BY CMS-2020-01-R: HCPCS | Performed by: OBSTETRICS & GYNECOLOGY

## 2021-05-07 PROCEDURE — U0005 INFEC AGEN DETEC AMPLI PROBE: HCPCS | Performed by: OBSTETRICS & GYNECOLOGY

## 2021-05-07 ASSESSMENT — MIFFLIN-ST. JEOR: SCORE: 1402.14

## 2021-05-08 LAB
LABORATORY COMMENT REPORT: NORMAL
SARS-COV-2 RNA RESP QL NAA+PROBE: NEGATIVE
SPECIMEN SOURCE: NORMAL

## 2021-05-11 ENCOUNTER — SURGERY - HEALTHEAST (OUTPATIENT)
Dept: SURGERY | Facility: HOSPITAL | Age: 56
End: 2021-05-11
Payer: COMMERCIAL

## 2021-05-11 ENCOUNTER — RECORDS - HEALTHEAST (OUTPATIENT)
Dept: MEDSURG UNIT | Facility: HOSPITAL | Age: 56
End: 2021-05-11

## 2021-05-11 ENCOUNTER — ANESTHESIA - HEALTHEAST (OUTPATIENT)
Dept: SURGERY | Facility: HOSPITAL | Age: 56
End: 2021-05-11

## 2021-05-11 ENCOUNTER — RECORDS - HEALTHEAST (OUTPATIENT)
Dept: ADMINISTRATIVE | Facility: OTHER | Age: 56
End: 2021-05-11

## 2021-05-11 ASSESSMENT — MIFFLIN-ST. JEOR: SCORE: 1402.37

## 2021-05-25 ENCOUNTER — MYC MEDICAL ADVICE (OUTPATIENT)
Dept: PHYSICAL MEDICINE AND REHAB | Facility: CLINIC | Age: 56
End: 2021-05-25

## 2021-05-26 ENCOUNTER — OFFICE VISIT (OUTPATIENT)
Dept: PHYSICAL MEDICINE AND REHAB | Facility: CLINIC | Age: 56
End: 2021-05-26
Payer: COMMERCIAL

## 2021-05-26 VITALS
RESPIRATION RATE: 16 BRPM | OXYGEN SATURATION: 99 % | SYSTOLIC BLOOD PRESSURE: 137 MMHG | TEMPERATURE: 98.4 F | HEART RATE: 91 BPM | DIASTOLIC BLOOD PRESSURE: 83 MMHG

## 2021-05-26 DIAGNOSIS — G43.719 CHRONIC MIGRAINE WITHOUT AURA, INTRACTABLE, WITHOUT STATUS MIGRAINOSUS: Primary | ICD-10-CM

## 2021-05-26 PROCEDURE — 64615 CHEMODENERV MUSC MIGRAINE: CPT | Performed by: PHYSICAL MEDICINE & REHABILITATION

## 2021-05-26 NOTE — PROGRESS NOTES
BOTULINUM TOXIN PROCEDURE - HEADACHE - NOTE    Chief Complaint   Patient presents with     Botox     /83   Pulse 91   Temp 98.4  F (36.9  C)   Resp 16   SpO2 99%        Current Outpatient Medications:      aMILoride (MIDAMOR) 5 MG tablet, , Disp: , Rfl:      amLODIPine (NORVASC) 10 MG tablet, , Disp: , Rfl:      amphetamine-dextroamphetamine (ADDERALL) 20 MG tablet, Take 1 tablet (20 mg) by mouth 2 times daily, Disp: 60 tablet, Rfl: 0     budesonide (RINOCORT AQUA) 32 MCG/ACT nasal spray, Spray 1 spray into both nostrils daily., Disp: , Rfl:      Calcium-Magnesium-Vitamin D 600- MG-MG-UNIT TB24, Take 1,200 mg by mouth 2 times daily, Disp: , Rfl:      cyclobenzaprine (FLEXERIL) 10 MG tablet, , Disp: , Rfl:      CycloSPORINE (RESTASIS OP), Apply to eye 2 times daily, Disp: , Rfl:      fexofenadine (ALLEGRA) 180 MG tablet, Take  by mouth daily., Disp: , Rfl:      HYDROmorphone (DILAUDID) 2 MG tablet, Take 0.5-1 tablets (1-2 mg) by mouth every 3 hours as needed (headache) 20 tablets = 3 month supply., Disp: 24 tablet, Rfl: 0     ketorolac (TORADOL) 30 MG/ML injection, Inject 1 mL (30 mg) into the vein every 6 hours as needed for moderate pain, Disp: 9 mL, Rfl: 11     lamoTRIgine (LAMICTAL) 200 MG tablet, Take 1 tablet by mouth daily, Disp: , Rfl:      LORazepam (ATIVAN) 1 MG tablet, Take 1 mg by mouth 2 times daily as needed., Disp: , Rfl:      ondansetron (ZOFRAN) 4 MG tablet, Take 1 tablet (4 mg) by mouth every 8 hours as needed, Disp: 90 tablet, Rfl: 1     order for DME, Equipment being ordered: Oxygen The patient has Cluster Headache and needs 10 liters/minute of high flow oxygen via nonrebreather mask prn headaches, Disp: 99 days, Rfl: 0     order for DME, Equipment being ordered: Oxygen and non-rebreather mask.   Use high flow oxygen (10-15 L/min) with non-rebreather mask, as needed for cluster headaches, Disp: 1 Units, Rfl: 11     QUEtiapine (SEROQUEL) 50 MG tablet, Take 1 tablet by mouth daily.,  Disp: , Rfl:      SUMAtriptan (IMITREX STATDOSE) 6 MG/0.5ML pen injector kit, Inject 0.5 mLs (6 mg) Subcutaneous at onset of headache for migraine May repeat in 1 hour. Max 12 mg/24 hours., Disp: 9 kit, Rfl: 0     SUMAtriptan (IMITREX) 100 MG tablet, Take 1 tablet (100 mg) by mouth at onset of headache for migraine May repeat in 2 hours. Max 2 tablets/24 hours., Disp: 12 tablet, Rfl: 3     temazepam (RESTORIL) 15 MG capsule, Take 1 capsule by mouth daily, Disp: , Rfl:      valACYclovir (VALTREX) 1000 mg tablet, Take 1,000 mg by mouth as needed., Disp: , Rfl:      Allergies   Allergen Reactions     Fluoxetine Other (See Comments)     PN: LW Reaction: headache  PN: LW Reaction: headache  Migraine       Garlic Blisters, Cough, Dermatitis, Difficulty breathing, Headache, Hives, Itching, Nausea and Vomiting and Shortness Of Breath     Candesartan      Other reaction(s): Myalgia     Duloxetine      Other reaction(s): Headache  migraine     Iodine      Other reaction(s): Other (see comments)  PN: LW CM1: CONTRAST- iodine Reaction :     Metoclopramide      Other reaction(s): Headache  Caused increase in headaches     Perfume      Other reaction(s): Headache  head pain leading to migraines     Pregabalin      Other reaction(s): Headache, Myalgia     Trazodone Other (See Comments) and Unknown     Other reaction(s): Headache  Other reaction(s): Headache  Other reaction(s): Headache     Amitriptyline Hcl Other (See Comments)     Migraine       Candesartan Cilexetil-Hctz Muscle Pain (Myalgia)     Cymbalta Other (See Comments)     Migraine       Cyproheptadine Hcl      headaches     Desvenlafaxine      Migraine       Diatrizoate Unknown     PN: LW CM1: CONTRAST- iodine Reaction :     Diazepam      Other reaction(s): Vestibular Toxicity  PN: LW Reaction: vertigo  PN: LW Reaction: vertigo     Galcanezumab-Gnlm      Other reaction(s): Headache     Imipramine Other (See Comments)     Migraine       Lyrica Muscle Pain (Myalgia)      "Metoprolol Other (See Comments) and Unknown     headache  headache  headache     Morphine Other (See Comments)     Patient reports seizure      No Clinical Screening - See Comments      PN: LW FI1: lactose intolerance LW FI2: shellfish  PN: LW CM1: CONTRAST- iodine Reaction :  PN: LW Other1: -nka     Nortriptyline Other (See Comments)     Migraine       Phenergan Dm [Promethazine-Dm] Other (See Comments)     seizures     Promethazine      PN: LW Reaction: minimal sizures     Venlafaxine Other (See Comments)     PN: LW Reaction: migraine  PN: LW Reaction: migraine  Migraine       Wellbutrin [Bupropion Hydrobromide] Other (See Comments)     Migraine       Compazine Anxiety     Dihydroergotamine Anxiety and Other (See Comments)     Cardiac symptoms     Droperidol Anxiety     PN: LW Reaction: agitation     Medroxyprogesterone Hives     PN: LW Reaction: nausea     Prochlorperazine Anxiety     PN: LW Reaction: \"can't sit still\"        PHYSICAL EXAM:    Patient reports current headache at 8/10, located behind her eyes and base of skull, R>L, described as \"pounding\" with visual changes.     HPI:    Patient reports the following new medical problems since last visit: she is s/p pelvic floor surgery on 5/11/2021 (prolapse repairs and bladder sling).    We reviewed the recommended safety guidelines for  Botox from any vaccine injection, such as the seasonal flu vaccine, by a minimum of 10-14 days with Valerie Sim. She acknowledged understanding.    RESPONSE TO PREVIOUS TREATMENT:  Change in headache pattern following last series of injections with 225 units of  Botox on 3/24/2021.    No problems reported    1.  Headache frequency during this injection cycle:  5 headache days the first 6 weeks, but none that were intense or lasted more than one day. Within the last week she has experienced more breakthrough headaches that were not responding to Imitrex.  This is compared to her baseline headache frequency of daily " headache days per month.     2.  Headache duration during this injection cycle:  Headache duration ranged from 3-4 hours to 4 days. She reports 6 episodes of multiple day headaches.    3.  Headache intensity during this injection cycle:    A.  6/10  =  Typical pain level.  B.  9/10  =  Worst pain level.  C.  5/10  =  Lowest pain level.    4.  Change in headache medication usage during this injection cycle:  (For Example:  Able to decrease use of oral pain medications.)Taking less Imitrex lately, though she is on pain meds following surgery.    5.  ER Visits During This Injection Cycle:  0    6.  Functional Performance:  Change in ADL's, social interaction, days lost from work, etc. Patient reports numerous days of changed plans and missing out on activities, due to headache during this injection cycle.      BOTULINUM NEUROTOXIN INJECTION PROCEDURES:    VERIFICATION OF PATIENT IDENTIFICATION AND PROCEDURE     Initials   Patient Name [facility]   Patient  [facility]   Procedure Verified by: [email or web address]     Prior to the start of the procedure and with procedural staff participation, I verbally confirmed the patient s identity using two indicators, relevant allergies, that the procedure was appropriate and matched the consent or emergent situation, and that the correct equipment/implants were available. Immediately prior to starting the procedure I conducted the Time Out with the procedural staff and re-confirmed the patient s name, procedure, and site/side. (The Joint Commission universal protocol was followed.)  Yes    Sedation (Moderate or Deep): None    Above assessments performed by:  Larissa Fernando, PT, Care Coordinator    Addie Malhotra MD       INDICATIONS FOR PROCEDURES:  Valerie Sim is a 55 year old patient with chronic migraine headaches associated with cervicogenic components. Her baseline symptoms have been recalcitrant to oral medications and conservative therapy.  She is here today for reinjection with Botox.      GOAL OF PROCEDURE:  The goal of this procedure is to decrease pain  and enhance functional independence.    TOTAL DOSE: 300 UNITS BOTOX  Dose Administered:  225 units  Botox (Botulinum Toxin Type A)       2:1 Dilution   Diluent Used:  0.25% Sensorcaine (Batch: [id number], Exp: 10/2023, NDC: 55150-167-10)  Total Volume of Diluent Used:  4 ml  Lot # [id number] with Expiration Date: 12/2023  NDC #: Botox 100u ([address]-20)     Was there drug waste? Yes  Amount of drug waste (mL): 75 units Botox.  Reason for waste:  Single use vial  Multi-dose vial: No      Medication guide was offered to patient and was declined.    CONSENT:  The risks, benefits, and treatment options were discussed with Valerie Sim and she agreed to proceed.    Written consent was obtained by [facility].     EQUIPMENT USED:  Needle-37mm stimulating/recording  Needle-30 gauge  EMG/NCS Machine     SKIN PREPARATION:  Skin preparation was performed using an alcohol wipe.     GUIDANCE DESCRIPTION:  Electro-myographic guidance was necessary throughout the procedure to accurately identify all areas of spastic muscles while avoiding injection of non-spastic muscles, neighboring nerves and nearby vascular structures.      AREA/MUSCLE INJECTED:  225 UNITS BOTOX = TOTAL DOSE     1. FACE & SCALP: 115 units of Botox = Total dose, 2:1 Dilution     Right temporalis - 12.5 units of Botox in 4 site/s.  Left temporalis - 12.5 units of Botox in 4 site/s.      Right frontalis - 10 units of Botox in 4 site/s.  Left frontalis - 10 units of Botox in 4 site/s.      Right procerus - 2.5 units of Botox in 1 site/s.  Left procerus - 2.5 units of Botox in 1 site/s.      Right  - 5 units of Botox in 1 site/s.  Left  - 5 units of Botox in 1 site/s.      Right Upper Occipitalis - 25 units of Botox at 4 site/s.   Left Upper Occipitalis - 25 units of Botox at 4 site/s.      Right Nasalis - 2.5 units of Botox at 1 site/s.           Left Nasalis - 2.5 units of Botox at  1 site/s.          2. JAW MUSCLES: 50 units of Botox = Total Dose, 1:1 Dilution     Right Masseter - 15 units of Botox at 1 site/s.   Left Masseter - 15 units of Botox at 1 site/s.      Right temporalis - 10 units of Botox at 1 site/s.  Left temporalis - 10 units of Botox at 1 site/s.          3. SHOULDER & NECK MUSCLES: Total dose 60 units of Botox, 2:1 Dilution      Right levator muscle - 10 units of Botox at 2 site/s (neck and shoulder).  Left levator muscle - 10 units of Botox at 2 site/s (neck and shoulder).      Right mid trapezius - 5 units of Botox at 1 site.  Left mid trapezius - 5 units of Botox at 1 site.     Right lateral trapezius - 10 units of Botox at 2 site/s           Left lateral trapezuis - 10 units of Botox at 2 site/s.             Left Pectoralis Minor - 10 units of Botox at 1 site/s.        RESPONSE TO PROCEDURE:  Valerie Sim tolerated the procedure well and there were no immediate complications. She was allowed to recover for an appropriate period of time and was discharged home in stable condition.     FOLLOW UP:  Valerie Sim was asked to follow up by phone in 7-14 days with Larissa Fernando PT, Care Coordinator or Estela Magaña RN, Care Coordinator, to report her response to this series of injections.  Based on the patient's previous response to this therapy, Valerie Sim was rescheduled for the next series of injections in 9 weeks and trigger point injections/nerve blocks every 6 months.      PLAN (Medication Changes, Therapy Orders, Work or Disability Issues, etc.): Patient will continue to monitor response to today's injections. She will have occipital nerve block with Botox at her next appointment (prefers nerve blocks at every other visit).

## 2021-05-26 NOTE — LETTER
5/26/2021       RE: Valerie Sim  [address]  [place] [address]     Dear Colleague,    Thank you for referring your patient, Valerie Sim, to the [facility] [place] at [facility]. Please see a copy of my visit note below.    BOTULINUM TOXIN PROCEDURE - HEADACHE - NOTE    Chief Complaint   Patient presents with     Botox     /83   Pulse 91   Temp 98.4  F (36.9  C)   Resp 16   SpO2 99%        Current Outpatient Medications:      aMILoride (MIDAMOR) 5 MG tablet, , Disp: , Rfl:      amLODIPine (NORVASC) 10 MG tablet, , Disp: , Rfl:      amphetamine-dextroamphetamine (ADDERALL) 20 MG tablet, Take 1 tablet (20 mg) by mouth 2 times daily, Disp: 60 tablet, Rfl: 0     budesonide (RINOCORT AQUA) 32 MCG/ACT nasal spray, Spray 1 spray into both nostrils daily., Disp: , Rfl:      Calcium-Magnesium-Vitamin D 600- MG-MG-UNIT TB24, Take 1,200 mg by mouth 2 times daily, Disp: , Rfl:      cyclobenzaprine (FLEXERIL) 10 MG tablet, , Disp: , Rfl:      CycloSPORINE (RESTASIS OP), Apply to eye 2 times daily, Disp: , Rfl:      fexofenadine (ALLEGRA) 180 MG tablet, Take  by mouth daily., Disp: , Rfl:      HYDROmorphone (DILAUDID) 2 MG tablet, Take 0.5-1 tablets (1-2 mg) by mouth every 3 hours as needed (headache) 20 tablets = 3 month supply., Disp: 24 tablet, Rfl: 0     ketorolac (TORADOL) 30 MG/ML injection, Inject 1 mL (30 mg) into the vein every 6 hours as needed for moderate pain, Disp: 9 mL, Rfl: 11     lamoTRIgine (LAMICTAL) 200 MG tablet, Take 1 tablet by mouth daily, Disp: , Rfl:      LORazepam (ATIVAN) 1 MG tablet, Take 1 mg by mouth 2 times daily as needed., Disp: , Rfl:      ondansetron (ZOFRAN) 4 MG tablet, Take 1 tablet (4 mg) by mouth every 8 hours as needed, Disp: 90 tablet, Rfl: 1     order for DME, Equipment being ordered: Oxygen The patient has Cluster Headache and needs 10  liters/minute of high flow oxygen via nonrebreather mask prn headaches, Disp: 99 days, Rfl: 0     order for DME, Equipment being ordered: Oxygen and non-rebreather mask.   Use high flow oxygen (10-15 L/min) with non-rebreather mask, as needed for cluster headaches, Disp: 1 Units, Rfl: 11     QUEtiapine (SEROQUEL) 50 MG tablet, Take 1 tablet by mouth daily., Disp: , Rfl:      SUMAtriptan (IMITREX STATDOSE) 6 MG/0.5ML pen injector kit, Inject 0.5 mLs (6 mg) Subcutaneous at onset of headache for migraine May repeat in 1 hour. Max 12 mg/24 hours., Disp: 9 kit, Rfl: 0     SUMAtriptan (IMITREX) 100 MG tablet, Take 1 tablet (100 mg) by mouth at onset of headache for migraine May repeat in 2 hours. Max 2 tablets/24 hours., Disp: 12 tablet, Rfl: 3     temazepam (RESTORIL) 15 MG capsule, Take 1 capsule by mouth daily, Disp: , Rfl:      valACYclovir (VALTREX) 1000 mg tablet, Take 1,000 mg by mouth as needed., Disp: , Rfl:      Allergies   Allergen Reactions     Fluoxetine Other (See Comments)     PN: LW Reaction: headache  PN: LW Reaction: headache  Migraine       Garlic Blisters, Cough, Dermatitis, Difficulty breathing, Headache, Hives, Itching, Nausea and Vomiting and Shortness Of Breath     Candesartan      Other reaction(s): Myalgia     Duloxetine      Other reaction(s): Headache  migraine     Iodine      Other reaction(s): Other (see comments)  PN: LW CM1: CONTRAST- iodine Reaction :     Metoclopramide      Other reaction(s): Headache  Caused increase in headaches     Perfume      Other reaction(s): Headache  head pain leading to migraines     Pregabalin      Other reaction(s): Headache, Myalgia     Trazodone Other (See Comments) and Unknown     Other reaction(s): Headache  Other reaction(s): Headache  Other reaction(s): Headache     Amitriptyline Hcl Other (See Comments)     Migraine       Candesartan Cilexetil-Hctz Muscle Pain (Myalgia)     Cymbalta Other (See Comments)     Migraine       Cyproheptadine Hcl       "headaches     Desvenlafaxine      Migraine       Diatrizoate Unknown     PN: LW CM1: CONTRAST- iodine Reaction :     Diazepam      Other reaction(s): Vestibular Toxicity  PN: LW Reaction: vertigo  PN: LW Reaction: vertigo     Galcanezumab-Gnlm      Other reaction(s): Headache     Imipramine Other (See Comments)     Migraine       Lyrica Muscle Pain (Myalgia)     Metoprolol Other (See Comments) and Unknown     headache  headache  headache     Morphine Other (See Comments)     Patient reports seizure      No Clinical Screening - See Comments      PN: LW FI1: lactose intolerance LW FI2: shellfish  PN: LW CM1: CONTRAST- iodine Reaction :  PN: LW Other1: -nka     Nortriptyline Other (See Comments)     Migraine       Phenergan Dm [Promethazine-Dm] Other (See Comments)     seizures     Promethazine      PN: LW Reaction: minimal sizures     Venlafaxine Other (See Comments)     PN: LW Reaction: migraine  PN: LW Reaction: migraine  Migraine       Wellbutrin [Bupropion Hydrobromide] Other (See Comments)     Migraine       Compazine Anxiety     Dihydroergotamine Anxiety and Other (See Comments)     Cardiac symptoms     Droperidol Anxiety     PN: LW Reaction: agitation     Medroxyprogesterone Hives     PN: LW Reaction: nausea     Prochlorperazine Anxiety     PN: LW Reaction: \"can't sit still\"        PHYSICAL EXAM:    Patient reports current headache at 8/10, located behind her eyes and base of skull, R>L, described as \"pounding\" with visual changes.     HPI:    Patient reports the following new medical problems since last visit: she is s/p pelvic floor surgery on 5/11/2021 (prolapse repairs and bladder sling).    We reviewed the recommended safety guidelines for  Botox from any vaccine injection, such as the seasonal flu vaccine, by a minimum of 10-14 days with Valerie Sim. She acknowledged understanding.    RESPONSE TO PREVIOUS TREATMENT:  Change in headache pattern following last series of injections with 225 units " of  Botox on 3/24/2021.    No problems reported    1.  Headache frequency during this injection cycle:  5 headache days the first 6 weeks, but none that were intense or lasted more than one day. Within the last week she has experienced more breakthrough headaches that were not responding to Imitrex.  This is compared to her baseline headache frequency of daily headache days per month.     2.  Headache duration during this injection cycle:  Headache duration ranged from 3-4 hours to 4 days. She reports 6 episodes of multiple day headaches.    3.  Headache intensity during this injection cycle:    A.  6/10  =  Typical pain level.  B.  9/10  =  Worst pain level.  C.  5/10  =  Lowest pain level.    4.  Change in headache medication usage during this injection cycle:  (For Example:  Able to decrease use of oral pain medications.)Taking less Imitrex lately, though she is on pain meds following surgery.    5.  ER Visits During This Injection Cycle:  0    6.  Functional Performance:  Change in ADL's, social interaction, days lost from work, etc. Patient reports numerous days of changed plans and missing out on activities, due to headache during this injection cycle.      BOTULINUM NEUROTOXIN INJECTION PROCEDURES:    VERIFICATION OF PATIENT IDENTIFICATION AND PROCEDURE     Initials   Patient Name [email or web address]   Patient  [email or web address]   Procedure Verified by: [email or web address]     Prior to the start of the procedure and with procedural staff participation, I verbally confirmed the patient s identity using two indicators, relevant allergies, that the procedure was appropriate and matched the consent or emergent situation, and that the correct equipment/implants were available. Immediately prior to starting the procedure I conducted the Time Out with the procedural staff and re-confirmed the patient s name, procedure, and site/side. (The Joint Commission universal protocol was followed.)  Yes    Sedation (Moderate or Deep): None    Above assessments  performed by:  Larissa Fernando, PT, Care Coordinator    Addie Malhotra MD       INDICATIONS FOR PROCEDURES:  Valerie Sim is a 55 year old patient with chronic migraine headaches associated with cervicogenic components. Her baseline symptoms have been recalcitrant to oral medications and conservative therapy.  She is here today for reinjection with Botox.     GOAL OF PROCEDURE:  The goal of this procedure is to decrease pain  and enhance functional independence.    TOTAL DOSE: 300 UNITS BOTOX  Dose Administered:  225 units  Botox (Botulinum Toxin Type A)       2:1 Dilution   Diluent Used:  0.25% Sensorcaine (Batch: [id number], Exp: 10/2023, NDC: 55150-167-10)  Total Volume of Diluent Used:  4 ml  Lot # [id number] with Expiration Date: 12/2023  NDC #: Botox 100u ([address]-19)     Was there drug waste? Yes  Amount of drug waste (mL): 75 units Botox.  Reason for waste:  Single use vial  Multi-dose vial: No      Medication guide was offered to patient and was declined.    CONSENT:  The risks, benefits, and treatment options were discussed with Valerie Sim and she agreed to proceed.    Written consent was obtained by [facility].     EQUIPMENT USED:  Needle-37mm stimulating/recording  Needle-30 gauge  EMG/NCS Machine     SKIN PREPARATION:  Skin preparation was performed using an alcohol wipe.     GUIDANCE DESCRIPTION:  Electro-myographic guidance was necessary throughout the procedure to accurately identify all areas of spastic muscles while avoiding injection of non-spastic muscles, neighboring nerves and nearby vascular structures.      AREA/MUSCLE INJECTED:  225 UNITS BOTOX = TOTAL DOSE     1. FACE & SCALP: 115 units of Botox = Total dose, 2:1 Dilution     Right temporalis - 12.5 units of Botox in 4 site/s.  Left temporalis - 12.5 units of Botox in 4 site/s.      Right frontalis - 10 units of Botox in 4 site/s.  Left frontalis - 10 units of Botox in 4 site/s.      Right procerus - 2.5 units of Botox in 1 site/s.  Left  procerus - 2.5 units of Botox in 1 site/s.      Right  - 5 units of Botox in 1 site/s.  Left  - 5 units of Botox in 1 site/s.      Right Upper Occipitalis - 25 units of Botox at 4 site/s.   Left Upper Occipitalis - 25 units of Botox at 4 site/s.      Right Nasalis - 2.5 units of Botox at 1 site/s.           Left Nasalis - 2.5 units of Botox at 1 site/s.          2. JAW MUSCLES: 50 units of Botox = Total Dose, 1:1 Dilution     Right Masseter - 15 units of Botox at 1 site/s.   Left Masseter - 15 units of Botox at 1 site/s.      Right temporalis - 10 units of Botox at 1 site/s.  Left temporalis - 10 units of Botox at 1 site/s.          3. SHOULDER & NECK MUSCLES: Total dose 60 units of Botox, 2:1 Dilution      Right levator muscle - 10 units of Botox at 2 site/s (neck and shoulder).  Left levator muscle - 10 units of Botox at 2 site/s (neck and shoulder).      Right mid trapezius - 5 units of Botox at 1 site.  Left mid trapezius - 5 units of Botox at 1 site.     Right lateral trapezius - 10 units of Botox at 2 site/s           Left lateral trapezuis - 10 units of Botox at 2 site/s.             Left Pectoralis Minor - 10 units of Botox at 1 site/s.        RESPONSE TO PROCEDURE:  Valerie Sim tolerated the procedure well and there were no immediate complications. She was allowed to recover for an appropriate period of time and was discharged home in stable condition.     FOLLOW UP:  Valerie Sim was asked to follow up by phone in 7-14 days with Larissa Fernando, JOANNA, Care Coordinator or Estela Magaña RN, Care Coordinator, to report her response to this series of injections.  Based on the patient's previous response to this therapy, Valerie Sim was rescheduled for the next series of injections in 9 weeks and trigger point injections/nerve blocks every 6 months.      PLAN (Medication Changes, Therapy Orders, Work or Disability Issues, etc.): Patient will continue to monitor response to today's  injections. She will have occipital nerve block with Botox at her next appointment (prefers nerve blocks at every other visit).     Again, thank you for allowing me to participate in the care of your patient.      Sincerely,    Addie Malhotra MD

## 2021-05-27 VITALS — BODY MASS INDEX: 26.47 KG/M2 | HEIGHT: 68 IN | BODY MASS INDEX: 26.08 KG/M2 | WEIGHT: 169 LBS

## 2021-05-27 VITALS — WEIGHT: 170.8 LBS | BODY MASS INDEX: 26.36 KG/M2

## 2021-05-27 VITALS — HEIGHT: 67 IN | BODY MASS INDEX: 26.75 KG/M2
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
## 2021-06-02 ENCOUNTER — MYC MEDICAL ADVICE (OUTPATIENT)
Dept: PHYSICAL MEDICINE AND REHAB | Facility: CLINIC | Age: 56
End: 2021-06-02

## 2021-06-08 RX ORDER — BUPIVACAINE HYDROCHLORIDE 2.5 MG/ML
5 INJECTION, SOLUTION EPIDURAL; INFILTRATION; INTRACAUDAL ONCE
Status: COMPLETED | OUTPATIENT
Start: 2021-06-08 | End: 2021-06-08

## 2021-06-08 RX ADMIN — BUPIVACAINE HYDROCHLORIDE 12.5 MG: 2.5 INJECTION, SOLUTION EPIDURAL; INFILTRATION; INTRACAUDAL at 13:33

## 2021-06-17 NOTE — ANESTHESIA POSTPROCEDURE EVALUATION
Patient: Valerie Sim  Procedure(s):  SACROSPINOUS LIGAMENT SUSPENSION ANTERIOR REPAIR POSTERIOR REPAIR /PERINEORRHAPHY  MIDURETHRAL SLING, CYSTOSCOPY  Anesthesia type: general    Patient location: PACU  Last vitals:   Vitals Value Taken Time   /57 05/11/21 1150   Temp 36.4  C (97.6  F) 05/11/21 1111   Pulse 64 05/11/21 1156   Resp 12 05/11/21 1156   SpO2 100 % 05/11/21 1156   Vitals shown include unvalidated device data.  Post vital signs: stable  Level of consciousness: awake and responds to simple questions  Post-anesthesia pain: pain controlled  Post-anesthesia nausea and vomiting: no  Pulmonary: unassisted, return to baseline  Cardiovascular: stable and blood pressure at baseline  Hydration: adequate  Anesthetic events: no    QCDR Measures:  ASA# 11 - Bere-op Cardiac Arrest: ASA11B - Patient did NOT experience unanticipated cardiac arrest  ASA# 12 - Bere-op Mortality Rate: ASA12B - Patient did NOT die  ASA# 13 - PACU Re-Intubation Rate: ASA13B - Patient did NOT require a new airway mgmt  ASA# 10 - Composite Anes Safety: ASA10A - No serious adverse event    Additional Notes:

## 2021-06-17 NOTE — ANESTHESIA PREPROCEDURE EVALUATION
Anesthesia Evaluation      Patient summary reviewed   History of anesthetic complications     Airway   Mallampati: I  Neck ROM: full   Pulmonary - negative ROS and normal exam    breath sounds clear to auscultation                         Cardiovascular - normal exam  Exercise tolerance: > or = 4 METS  (+) hypertension, CAD, , hypercholesterolemia,     Rhythm: regular  Rate: normal,         Neuro/Psych    (+) depression, anxiety/panic attacks, chronic pain    Endo/Other - negative ROS      GI/Hepatic/Renal    (+) GERD,             Dental - normal exam                        Anesthesia Plan  Planned anesthetic: general LMA    ASA 3     Anesthetic plan and risks discussed with: patient  Anesthesia plan special considerations: antiemetics,   Post-op plan: routine recovery

## 2021-06-17 NOTE — ANESTHESIA CARE TRANSFER NOTE
Last vitals:   Vitals:    05/11/21 1111   BP: (!) 85/51   Pulse: (!) 50   Resp:    Temp: 36.4  C (97.6  F)   SpO2: 100%     Patient's level of consciousness is awake  Spontaneous respirations: yes  Maintains airway independently: yes  Dentition unchanged: yes  Oropharynx: oropharynx clear of all foreign objects    QCDR Measures:  ASA# 20 - Surgical Safety Checklist: WHO surgical safety checklist completed prior to induction    PQRS# 430 - Adult PONV Prevention: 4558F - Pt received => 2 anti-emetic agents (different classes) preop & intraop  ASA# 8 - Peds PONV Prevention: NA - Not pediatric patient, not GA or 2 or more risk factors NOT present  PQRS# 424 - Bere-op Temp Management: 4559F - At least one body temp DOCUMENTED => 35.5C or 95.9F within required timeframe  PQRS# 426 - PACU Transfer Protocol: - Transfer of care checklist used  ASA# 14 - Acute Post-op Pain: ASA14B - Patient did NOT experience pain >= 7 out of 10

## 2021-07-05 ENCOUNTER — TRANSFERRED RECORDS (OUTPATIENT)
Dept: HEALTH INFORMATION MANAGEMENT | Facility: CLINIC | Age: 56
End: 2021-07-05

## 2021-07-28 ENCOUNTER — OFFICE VISIT (OUTPATIENT)
Dept: PHYSICAL MEDICINE AND REHAB | Facility: CLINIC | Age: 56
End: 2021-07-28
Payer: COMMERCIAL

## 2021-07-28 VITALS
HEART RATE: 79 BPM | RESPIRATION RATE: 16 BRPM | OXYGEN SATURATION: 98 % | TEMPERATURE: 98.5 F | DIASTOLIC BLOOD PRESSURE: 76 MMHG | SYSTOLIC BLOOD PRESSURE: 142 MMHG

## 2021-07-28 DIAGNOSIS — M79.18 MYOFASCIAL PAIN: ICD-10-CM

## 2021-07-28 DIAGNOSIS — M54.81 BILATERAL OCCIPITAL NEURALGIA: ICD-10-CM

## 2021-07-28 DIAGNOSIS — G43.719 CHRONIC MIGRAINE WITHOUT AURA, INTRACTABLE, WITHOUT STATUS MIGRAINOSUS: Primary | ICD-10-CM

## 2021-07-28 PROCEDURE — 20552 NJX 1/MLT TRIGGER POINT 1/2: CPT | Performed by: PHYSICAL MEDICINE & REHABILITATION

## 2021-07-28 PROCEDURE — 64615 CHEMODENERV MUSC MIGRAINE: CPT | Mod: 59 | Performed by: PHYSICAL MEDICINE & REHABILITATION

## 2021-07-28 PROCEDURE — 64405 NJX AA&/STRD GR OCPL NRV: CPT | Mod: 59 | Performed by: PHYSICAL MEDICINE & REHABILITATION

## 2021-07-28 PROCEDURE — 95874 GUIDE NERV DESTR NEEDLE EMG: CPT | Performed by: PHYSICAL MEDICINE & REHABILITATION

## 2021-07-28 RX ORDER — BUPIVACAINE HYDROCHLORIDE 2.5 MG/ML
10 INJECTION, SOLUTION EPIDURAL; INFILTRATION; INTRACAUDAL ONCE
Status: COMPLETED | OUTPATIENT
Start: 2021-07-28 | End: 2021-07-28

## 2021-07-28 RX ORDER — TRIAMCINOLONE ACETONIDE 40 MG/ML
40 INJECTION, SUSPENSION INTRA-ARTICULAR; INTRAMUSCULAR ONCE
Status: COMPLETED | OUTPATIENT
Start: 2021-07-28 | End: 2021-07-28

## 2021-07-28 RX ADMIN — TRIAMCINOLONE ACETONIDE 40 MG: 40 INJECTION, SUSPENSION INTRA-ARTICULAR; INTRAMUSCULAR at 10:41

## 2021-07-28 RX ADMIN — BUPIVACAINE HYDROCHLORIDE 25 MG: 2.5 INJECTION, SOLUTION EPIDURAL; INFILTRATION; INTRACAUDAL at 10:41

## 2021-07-28 NOTE — PROGRESS NOTES
BOTULINUM TOXIN PROCEDURE - HEADACHE - NOTE    Chief Complaint   Patient presents with     Botox     BP (!) 142/76   Pulse 79   Temp 98.5  F (36.9  C)   Resp 16   SpO2 98%        Current Outpatient Medications:      aMILoride (MIDAMOR) 5 MG tablet, , Disp: , Rfl:      amLODIPine (NORVASC) 10 MG tablet, , Disp: , Rfl:      amphetamine-dextroamphetamine (ADDERALL) 20 MG tablet, Take 1 tablet (20 mg) by mouth 2 times daily, Disp: 60 tablet, Rfl: 0     budesonide (RINOCORT AQUA) 32 MCG/ACT nasal spray, Spray 1 spray into both nostrils daily., Disp: , Rfl:      Calcium-Magnesium-Vitamin D 600- MG-MG-UNIT TB24, Take 1,200 mg by mouth 2 times daily, Disp: , Rfl:      cyclobenzaprine (FLEXERIL) 10 MG tablet, , Disp: , Rfl:      CycloSPORINE (RESTASIS OP), Apply to eye 2 times daily, Disp: , Rfl:      fexofenadine (ALLEGRA) 180 MG tablet, Take  by mouth daily., Disp: , Rfl:      HYDROmorphone (DILAUDID) 2 MG tablet, Take 0.5-1 tablets (1-2 mg) by mouth every 3 hours as needed (headache) 20 tablets = 3 month supply., Disp: 24 tablet, Rfl: 0     ketorolac (TORADOL) 30 MG/ML injection, Inject 1 mL (30 mg) into the vein every 6 hours as needed for moderate pain, Disp: 9 mL, Rfl: 11     lamoTRIgine (LAMICTAL) 200 MG tablet, Take 1 tablet by mouth daily, Disp: , Rfl:      LORazepam (ATIVAN) 1 MG tablet, Take 1 mg by mouth 2 times daily as needed., Disp: , Rfl:      ondansetron (ZOFRAN) 4 MG tablet, Take 1 tablet (4 mg) by mouth every 8 hours as needed, Disp: 90 tablet, Rfl: 1     order for DME, Equipment being ordered: Oxygen The patient has Cluster Headache and needs 10 liters/minute of high flow oxygen via nonrebreather mask prn headaches, Disp: 99 days, Rfl: 0     order for DME, Equipment being ordered: Oxygen and non-rebreather mask.   Use high flow oxygen (10-15 L/min) with non-rebreather mask, as needed for cluster headaches, Disp: 1 Units, Rfl: 11     QUEtiapine (SEROQUEL) 50 MG tablet, Take 1 tablet by mouth  daily., Disp: , Rfl:      SUMAtriptan (IMITREX STATDOSE) 6 MG/0.5ML pen injector kit, Inject 0.5 mLs (6 mg) Subcutaneous at onset of headache for migraine May repeat in 1 hour. Max 12 mg/24 hours., Disp: 9 kit, Rfl: 0     SUMAtriptan (IMITREX) 100 MG tablet, Take 1 tablet (100 mg) by mouth at onset of headache for migraine May repeat in 2 hours. Max 2 tablets/24 hours., Disp: 12 tablet, Rfl: 3     temazepam (RESTORIL) 15 MG capsule, Take 1 capsule by mouth daily, Disp: , Rfl:      valACYclovir (VALTREX) 1000 mg tablet, Take 1,000 mg by mouth as needed., Disp: , Rfl:      Allergies   Allergen Reactions     Fluoxetine Other (See Comments)     PN: LW Reaction: headache  PN: LW Reaction: headache  Migraine       Garlic Blisters, Cough, Dermatitis, Difficulty breathing, Headache, Hives, Itching, Nausea and Vomiting and Shortness Of Breath     Candesartan      Other reaction(s): Myalgia     Duloxetine      Other reaction(s): Headache  migraine     Iodine      Other reaction(s): Other (see comments)  PN: LW CM1: CONTRAST- iodine Reaction :     Metoclopramide      Other reaction(s): Headache  Caused increase in headaches     Perfume      Other reaction(s): Headache  head pain leading to migraines     Pregabalin      Other reaction(s): Headache, Myalgia     Trazodone Other (See Comments) and Unknown     Other reaction(s): Headache  Other reaction(s): Headache  Other reaction(s): Headache     Amitriptyline Hcl Other (See Comments)     Migraine       Candesartan Cilexetil-Hctz Muscle Pain (Myalgia)     Cymbalta Other (See Comments)     Migraine       Cyproheptadine Hcl      headaches     Desvenlafaxine      Migraine       Diatrizoate Unknown     PN: LW CM1: CONTRAST- iodine Reaction :     Diazepam      Other reaction(s): Vestibular Toxicity  PN: LW Reaction: vertigo  PN: LW Reaction: vertigo     Galcanezumab-Gnlm      Other reaction(s): Headache     Imipramine Other (See Comments)     Migraine       Lyrica Muscle Pain  "(Myalgia)     Metoprolol Other (See Comments) and Unknown     headache  headache  headache     Morphine Other (See Comments)     Patient reports seizure      No Clinical Screening - See Comments      PN: LW FI1: lactose intolerance LW FI2: shellfish  PN: LW CM1: CONTRAST- iodine Reaction :  PN: LW Other1: -nka     Nortriptyline Other (See Comments)     Migraine       Phenergan Dm [Promethazine-Dm] Other (See Comments)     seizures     Promethazine      PN: LW Reaction: minimal sizures     Venlafaxine Other (See Comments)     PN: LW Reaction: migraine  PN: LW Reaction: migraine  Migraine       Wellbutrin [Bupropion Hydrobromide] Other (See Comments)     Migraine       Compazine Anxiety     Dihydroergotamine Anxiety and Other (See Comments)     Cardiac symptoms     Droperidol Anxiety     PN: LW Reaction: agitation     Medroxyprogesterone Hives     PN: LW Reaction: nausea     Prochlorperazine Anxiety     PN: LW Reaction: \"can't sit still\"        PHYSICAL EXAM:    Patient reports current headache at 8/10, located behind her eyes and base of skull, R>L, described as \"pounding\" with visual changes.     HPI:    Patient denies new medical diagnoses, illnesses, hospitalizations, emergency room visits, and injuries since the previous injection with botulinum neurotoxin. She did have prolapse surgery on 5/11/2021, and has recovered nicely.     We reviewed the recommended safety guidelines for  Botox from any vaccine injection, such as the seasonal flu vaccine, by a minimum of 10-14 days with Valerie Sim. She acknowledged understanding.    RESPONSE TO PREVIOUS TREATMENT:  Change in headache pattern following last series of injections with 225 units of Botox on 5/24/2021.    Side effects: No problems reported    1.  Headache frequency during this injection cycle:  She is having daily headaches, but the headaches are less severe with Botox, which subsequently leads to less need for her abortive medications. This is " compared to her baseline headache frequency of daily headache days per month, which were more severe than they are now on Botox.     2.  Headache duration during this injection cycle:  Headache duration ranged from 3 hours to 2 days. She reports one episodes of multiple day headaches per week during this injection cycle.     3.  Headache intensity during this injection cycle:    A.  6/10  =  Typical pain level.  B.  9/10  =  Worst pain level.  C.  5/10  =  Lowest pain level.    4.  Change in headache medication usage during this injection cycle:  (For Example:  Able to decrease use of oral pain medications.)Taking less Imitrex when the Botox is in effect. She is no longer on pain medications for her prolapse surgery.     5.  ER Visits During This Injection Cycle:  0    6.  Functional Performance:  Change in ADL's, social interaction, days lost from work, etc. Patient reports numerous days of changed plans and missing out on activities, due to headache during this injection cycle.      BOTULINUM NEUROTOXIN INJECTION PROCEDURES:    VERIFICATION OF PATIENT IDENTIFICATION AND PROCEDURE     Initials   Patient Name ses   Patient  ses   Procedure Verified by: ses     Prior to the start of the procedure and with procedural staff participation, I verbally confirmed the patient s identity using two indicators, relevant allergies, that the procedure was appropriate and matched the consent or emergent situation, and that the correct equipment/implants were available. Immediately prior to starting the procedure I conducted the Time Out with the procedural staff and re-confirmed the patient s name, procedure, and site/side. (The Joint Commission universal protocol was followed.)  Yes    Sedation (Moderate or Deep): None    Above assessments performed by:    Addie Malhotra MD       INDICATIONS FOR PROCEDURES:  Valerie Sim is a 56 year old patient with chronic migraine headaches associated with cervicogenic components.  Her baseline symptoms have been recalcitrant to oral medications and conservative therapy.  She is here today for reinjection with Botox.     GOAL OF PROCEDURE:  The goal of this procedure is to decrease pain  and enhance functional independence.    TOTAL DOSE: 300 UNITS BOTOX  Dose Administered:  225 units  Botox (Botulinum Toxin Type A)       2:1 Dilution   Diluent Used:  0.25% Sensorcaine (Batch: [id number], Exp: 11/2023, NDC: 55150-167-10)  Total Volume of Diluent Used:  4 ml  Lot # [id number] with Expiration Date: 10/2023  NDC #: Botox 100u ([address]-29)     Was there drug waste? Yes  Amount of drug waste (mL): 75 units Botox.  Reason for waste:  Single use vial  Multi-dose vial: No      Medication guide was offered to patient and was declined.    CONSENT:  The risks, benefits, and treatment options were discussed with Valerie Sim and she agreed to proceed.    Written consent was obtained by [facility].     EQUIPMENT USED:  Needle-37mm stimulating/recording  Needle-30 gauge  EMG/NCS Machine     SKIN PREPARATION:  Skin preparation was performed using an alcohol wipe.     GUIDANCE DESCRIPTION:  Electro-myographic guidance was necessary throughout the procedure to accurately identify all areas of spastic muscles while avoiding injection of non-spastic muscles, neighboring nerves and nearby vascular structures.      AREA/MUSCLE INJECTED:  225 UNITS BOTOX = TOTAL DOSE     1. FACE & SCALP: 115 units of Botox = Total dose, 2:1 Dilution     Right temporalis - 12.5 units of Botox in 4 site/s.  Left temporalis - 12.5 units of Botox in 4 site/s.      Right frontalis - 10 units of Botox in 4 site/s.  Left frontalis - 10 units of Botox in 4 site/s.      Right procerus - 2.5 units of Botox in 1 site/s.  Left procerus - 2.5 units of Botox in 1 site/s.      Right  - 5 units of Botox in 1 site/s.  Left  - 5 units of Botox in 1 site/s.      Right Upper Occipitalis - 25 units of Botox at 4 site/s.   Left Upper  Occipitalis - 25 units of Botox at 4 site/s.      Right Nasalis - 2.5 units of Botox at 1 site/s.           Left Nasalis - 2.5 units of Botox at 1 site/s.          2. JAW MUSCLES: 50 units of Botox = Total Dose, 1:1 Dilution     Right Masseter - 15 units of Botox at 1 site/s.   Left Masseter - 15 units of Botox at 1 site/s.      Right temporalis - 10 units of Botox at 1 site/s.  Left temporalis - 10 units of Botox at 1 site/s.          3. SHOULDER & NECK MUSCLES: Total dose 60 units of Botox, 2:1 Dilution      Right levator muscle - 10 units of Botox at 2 site/s (neck and shoulder).  Left levator muscle - 10 units of Botox at 2 site/s (neck and shoulder).      Right mid trapezius - 5 units of Botox at 1 site.  Left mid trapezius - 5 units of Botox at 1 site.     Right lateral trapezius - 10 units of Botox at 2 site/s           Left lateral trapezuis - 10 units of Botox at 2 site/s.             Left Pectoralis Minor - 10 units of Botox at 1 site/s.        Drug:   Kenalo ml = 40 mg (Lot [id number], Exp 2022, NDC [address]-9)  Bupivacaine 0.25%: 11 ml (Batch: [id number], Exp 2023, NDC 20565-030-61)  Lidocaine 1%: Lot [id number], Exp 2024, NDC 79011-582-66      LEFT AND RIGHT GREATER/LESSER OCCIPITAL NERVE BLOCK  Area just inferior to insertion of the right and left superior trapezius insertion onto skull was cleansed with alcohol. Needle was advanced anteriorly to base of skull then slightly withdrawn and injectate was injected in a fan-like distribution at different depths. Total injection of 20 mg (0.5 ml) Kenalog plus 3.5 ml of 0.5 % bupivacaine and 1 ml of 1% lidocaine into each side for a total of 5 ml per side.       TRIGGER POINT INJECTIONS  Areas were prepped with ChloraPrep.  Using standard precautions a 30-gauge 1-inch needle was used to inject 2 mL of bupivacaine into the bilateral upper trapezius muscles for a total of 12 injection sites.    Valerie Noelkory tolerated the procedure well without any  immediate complications. She was allowed to recover for an appropriate period of time and was discharged home in stable condition.  Patient will follow-up regarding response to this procedure.      RESPONSE TO PROCEDURE:  Valerie Sim tolerated the procedure well and there were no immediate complications. She was allowed to recover for an appropriate period of time and was discharged home in stable condition.     FOLLOW UP:  Valerie Sim was asked to follow up by phone in 7-14 days with Larissa Fernando PT, Care Coordinator or Estela Magaña RN, Care Coordinator, to report her response to this series of injections.  Based on the patient's previous response to this therapy, Valerie Sim was rescheduled for the next series of injections in 9 weeks and trigger point injections/nerve blocks every 6 months.      PLAN (Medication Changes, Therapy Orders, Work or Disability Issues, etc.): Patient will continue to monitor response to today's injections. She will have occipital nerve block/trigger points every 6 months.

## 2021-07-28 NOTE — LETTER
7/28/2021       RE: Valerie Sim  [address]  [place] [address]     Dear Colleague,    Thank you for referring your patient, Valerie Sim, to the [facility] [place] [facility]. Please see a copy of my visit note below.    BOTULINUM TOXIN PROCEDURE - HEADACHE - NOTE    Chief Complaint   Patient presents with     Botox     BP (!) 142/76   Pulse 79   Temp 98.5  F (36.9  C)   Resp 16   SpO2 98%        Current Outpatient Medications:      aMILoride (MIDAMOR) 5 MG tablet, , Disp: , Rfl:      amLODIPine (NORVASC) 10 MG tablet, , Disp: , Rfl:      amphetamine-dextroamphetamine (ADDERALL) 20 MG tablet, Take 1 tablet (20 mg) by mouth 2 times daily, Disp: 60 tablet, Rfl: 0     budesonide (RINOCORT AQUA) 32 MCG/ACT nasal spray, Spray 1 spray into both nostrils daily., Disp: , Rfl:      Calcium-Magnesium-Vitamin D 600- MG-MG-UNIT TB24, Take 1,200 mg by mouth 2 times daily, Disp: , Rfl:      cyclobenzaprine (FLEXERIL) 10 MG tablet, , Disp: , Rfl:      CycloSPORINE (RESTASIS OP), Apply to eye 2 times daily, Disp: , Rfl:      fexofenadine (ALLEGRA) 180 MG tablet, Take  by mouth daily., Disp: , Rfl:      HYDROmorphone (DILAUDID) 2 MG tablet, Take 0.5-1 tablets (1-2 mg) by mouth every 3 hours as needed (headache) 20 tablets = 3 month supply., Disp: 24 tablet, Rfl: 0     ketorolac (TORADOL) 30 MG/ML injection, Inject 1 mL (30 mg) into the vein every 6 hours as needed for moderate pain, Disp: 9 mL, Rfl: 11     lamoTRIgine (LAMICTAL) 200 MG tablet, Take 1 tablet by mouth daily, Disp: , Rfl:      LORazepam (ATIVAN) 1 MG tablet, Take 1 mg by mouth 2 times daily as needed., Disp: , Rfl:      ondansetron (ZOFRAN) 4 MG tablet, Take 1 tablet (4 mg) by mouth every 8 hours as needed, Disp: 90 tablet, Rfl: 1     order for DME, Equipment being ordered: Oxygen The patient has Cluster Headache and needs 10  liters/minute of high flow oxygen via nonrebreather mask prn headaches, Disp: 99 days, Rfl: 0     order for DME, Equipment being ordered: Oxygen and non-rebreather mask.   Use high flow oxygen (10-15 L/min) with non-rebreather mask, as needed for cluster headaches, Disp: 1 Units, Rfl: 11     QUEtiapine (SEROQUEL) 50 MG tablet, Take 1 tablet by mouth daily., Disp: , Rfl:      SUMAtriptan (IMITREX STATDOSE) 6 MG/0.5ML pen injector kit, Inject 0.5 mLs (6 mg) Subcutaneous at onset of headache for migraine May repeat in 1 hour. Max 12 mg/24 hours., Disp: 9 kit, Rfl: 0     SUMAtriptan (IMITREX) 100 MG tablet, Take 1 tablet (100 mg) by mouth at onset of headache for migraine May repeat in 2 hours. Max 2 tablets/24 hours., Disp: 12 tablet, Rfl: 3     temazepam (RESTORIL) 15 MG capsule, Take 1 capsule by mouth daily, Disp: , Rfl:      valACYclovir (VALTREX) 1000 mg tablet, Take 1,000 mg by mouth as needed., Disp: , Rfl:      Allergies   Allergen Reactions     Fluoxetine Other (See Comments)     PN: LW Reaction: headache  PN: LW Reaction: headache  Migraine       Garlic Blisters, Cough, Dermatitis, Difficulty breathing, Headache, Hives, Itching, Nausea and Vomiting and Shortness Of Breath     Candesartan      Other reaction(s): Myalgia     Duloxetine      Other reaction(s): Headache  migraine     Iodine      Other reaction(s): Other (see comments)  PN: LW CM1: CONTRAST- iodine Reaction :     Metoclopramide      Other reaction(s): Headache  Caused increase in headaches     Perfume      Other reaction(s): Headache  head pain leading to migraines     Pregabalin      Other reaction(s): Headache, Myalgia     Trazodone Other (See Comments) and Unknown     Other reaction(s): Headache  Other reaction(s): Headache  Other reaction(s): Headache     Amitriptyline Hcl Other (See Comments)     Migraine       Candesartan Cilexetil-Hctz Muscle Pain (Myalgia)     Cymbalta Other (See Comments)     Migraine       Cyproheptadine Hcl       "headaches     Desvenlafaxine      Migraine       Diatrizoate Unknown     PN: LW CM1: CONTRAST- iodine Reaction :     Diazepam      Other reaction(s): Vestibular Toxicity  PN: LW Reaction: vertigo  PN: LW Reaction: vertigo     Galcanezumab-Gnlm      Other reaction(s): Headache     Imipramine Other (See Comments)     Migraine       Lyrica Muscle Pain (Myalgia)     Metoprolol Other (See Comments) and Unknown     headache  headache  headache     Morphine Other (See Comments)     Patient reports seizure      No Clinical Screening - See Comments      PN: LW FI1: lactose intolerance LW FI2: shellfish  PN: LW CM1: CONTRAST- iodine Reaction :  PN: LW Other1: -nka     Nortriptyline Other (See Comments)     Migraine       Phenergan Dm [Promethazine-Dm] Other (See Comments)     seizures     Promethazine      PN: LW Reaction: minimal sizures     Venlafaxine Other (See Comments)     PN: LW Reaction: migraine  PN: LW Reaction: migraine  Migraine       Wellbutrin [Bupropion Hydrobromide] Other (See Comments)     Migraine       Compazine Anxiety     Dihydroergotamine Anxiety and Other (See Comments)     Cardiac symptoms     Droperidol Anxiety     PN: LW Reaction: agitation     Medroxyprogesterone Hives     PN: LW Reaction: nausea     Prochlorperazine Anxiety     PN: LW Reaction: \"can't sit still\"        PHYSICAL EXAM:    Patient reports current headache at 8/10, located behind her eyes and base of skull, R>L, described as \"pounding\" with visual changes.     HPI:    Patient denies new medical diagnoses, illnesses, hospitalizations, emergency room visits, and injuries since the previous injection with botulinum neurotoxin. She did have prolapse surgery on 5/11/2021, and has recovered nicely.     We reviewed the recommended safety guidelines for  Botox from any vaccine injection, such as the seasonal flu vaccine, by a minimum of 10-14 days with Valerie Sim. She acknowledged understanding.    RESPONSE TO PREVIOUS " TREATMENT:  Change in headache pattern following last series of injections with 225 units of Botox on 2021.    Side effects: No problems reported    1.  Headache frequency during this injection cycle:  She is having daily headaches, but the headaches are less severe with Botox, which subsequently leads to less need for her abortive medications. This is compared to her baseline headache frequency of daily headache days per month, which were more severe than they are now on Botox.     2.  Headache duration during this injection cycle:  Headache duration ranged from 3 hours to 2 days. She reports one episodes of multiple day headaches per week during this injection cycle.     3.  Headache intensity during this injection cycle:    A.  6/10  =  Typical pain level.  B.  9/10  =  Worst pain level.  C.  5/10  =  Lowest pain level.    4.  Change in headache medication usage during this injection cycle:  (For Example:  Able to decrease use of oral pain medications.)Taking less Imitrex when the Botox is in effect. She is no longer on pain medications for her prolapse surgery.     5.  ER Visits During This Injection Cycle:  0    6.  Functional Performance:  Change in ADL's, social interaction, days lost from work, etc. Patient reports numerous days of changed plans and missing out on activities, due to headache during this injection cycle.      BOTULINUM NEUROTOXIN INJECTION PROCEDURES:    VERIFICATION OF PATIENT IDENTIFICATION AND PROCEDURE     Initials   Patient Name ses   Patient  ses   Procedure Verified by: michelle     Prior to the start of the procedure and with procedural staff participation, I verbally confirmed the patient s identity using two indicators, relevant allergies, that the procedure was appropriate and matched the consent or emergent situation, and that the correct equipment/implants were available. Immediately prior to starting the procedure I conducted the Time Out with the procedural staff and  re-confirmed the patient s name, procedure, and site/side. (The Joint Commission universal protocol was followed.)  Yes    Sedation (Moderate or Deep): None    Above assessments performed by:    Addie Malhotra MD       INDICATIONS FOR PROCEDURES:  Valerie Sim is a 56 year old patient with chronic migraine headaches associated with cervicogenic components. Her baseline symptoms have been recalcitrant to oral medications and conservative therapy.  She is here today for reinjection with Botox.     GOAL OF PROCEDURE:  The goal of this procedure is to decrease pain  and enhance functional independence.    TOTAL DOSE: 300 UNITS BOTOX  Dose Administered:  225 units  Botox (Botulinum Toxin Type A)       2:1 Dilution   Diluent Used:  0.25% Sensorcaine (Batch: [id number], Exp: 11/2023, NDC: 55150-167-10)  Total Volume of Diluent Used:  4 ml  Lot # [id number] with Expiration Date: 10/2023  NDC #: Botox 100u ([address]-08)     Was there drug waste? Yes  Amount of drug waste (mL): 75 units Botox.  Reason for waste:  Single use vial  Multi-dose vial: No      Medication guide was offered to patient and was declined.    CONSENT:  The risks, benefits, and treatment options were discussed with Valerie Sim and she agreed to proceed.    Written consent was obtained by [facility].     EQUIPMENT USED:  Needle-37mm stimulating/recording  Needle-30 gauge  EMG/NCS Machine     SKIN PREPARATION:  Skin preparation was performed using an alcohol wipe.     GUIDANCE DESCRIPTION:  Electro-myographic guidance was necessary throughout the procedure to accurately identify all areas of spastic muscles while avoiding injection of non-spastic muscles, neighboring nerves and nearby vascular structures.      AREA/MUSCLE INJECTED:  225 UNITS BOTOX = TOTAL DOSE     1. FACE & SCALP: 115 units of Botox = Total dose, 2:1 Dilution     Right temporalis - 12.5 units of Botox in 4 site/s.  Left temporalis - 12.5 units of Botox in 4 site/s.      Right frontalis -  10 units of Botox in 4 site/s.  Left frontalis - 10 units of Botox in 4 site/s.      Right procerus - 2.5 units of Botox in 1 site/s.  Left procerus - 2.5 units of Botox in 1 site/s.      Right  - 5 units of Botox in 1 site/s.  Left  - 5 units of Botox in 1 site/s.      Right Upper Occipitalis - 25 units of Botox at 4 site/s.   Left Upper Occipitalis - 25 units of Botox at 4 site/s.      Right Nasalis - 2.5 units of Botox at 1 site/s.           Left Nasalis - 2.5 units of Botox at 1 site/s.          2. JAW MUSCLES: 50 units of Botox = Total Dose, 1:1 Dilution     Right Masseter - 15 units of Botox at 1 site/s.   Left Mass[place] - 15 units of Botox at 1 site/s.      Right temporalis - 10 units of Botox at 1 site/s.  Left temporalis - 10 units of Botox at 1 site/s.          3. SHOULDER & NECK MUSCLES: Total dose 60 units of Botox, 2:1 Dilution      Right levator muscle - 10 units of Botox at 2 site/s (neck and shoulder).  Left levator muscle - 10 units of Botox at 2 site/s (neck and shoulder).      Right mid trapezius - 5 units of Botox at 1 site.  Left mid trapezius - 5 units of Botox at 1 site.     Right lateral trapezius - 10 units of Botox at 2 site/s           Left lateral trapezuis - 10 units of Botox at 2 site/s.             Left Pectoralis Minor - 10 units of Botox at 1 site/s.        Drug:   Kenalo ml = 40 mg (Lot [id number], Exp 2022, NDC [address]-7)  Bupivacaine 0.25%: 11 ml (Batch: [id number], Exp 2023, NDC 58233-076-12)  Lidocaine 1%: Lot [id number], Exp 2024, NDC 59609-124-77      LEFT AND RIGHT GREATER/LESSER OCCIPITAL NERVE BLOCK  Area just inferior to insertion of the right and left superior trapezius insertion onto skull was cleansed with alcohol. Needle was advanced anteriorly to base of skull then slightly withdrawn and injectate was injected in a fan-like distribution at different depths. Total injection of 20 mg (0.5 ml) Kenalog plus 3.5 ml of 0.5 % bupivacaine and 1 ml  of 1% lidocaine into each side for a total of 5 ml per side.       TRIGGER POINT INJECTIONS  Areas were prepped with ChloraPrep.  Using standard precautions a 30-gauge 1-inch needle was used to inject 2 mL of bupivacaine into the bilateral upper trapezius muscles for a total of 12 injection sites.    Valerie Sim tolerated the procedure well without any immediate complications. She was allowed to recover for an appropriate period of time and was discharged home in stable condition.  Patient will follow-up regarding response to this procedure.      RESPONSE TO PROCEDURE:  Valerie Sim tolerated the procedure well and there were no immediate complications. She was allowed to recover for an appropriate period of time and was discharged home in stable condition.     FOLLOW UP:  Valerie Sim was asked to follow up by phone in 7-14 days with Larissa Fernando PT, Care Coordinator or Estela Magaña RN, Care Coordinator, to report her response to this series of injections.  Based on the patient's previous response to this therapy, Valerie Sim was rescheduled for the next series of injections in 9 weeks and trigger point injections/nerve blocks every 6 months.      PLAN (Medication Changes, Therapy Orders, Work or Disability Issues, etc.): Patient will continue to monitor response to today's injections. She will have occipital nerve block/trigger points every 6 months.       Again, thank you for allowing me to participate in the care of your patient.      Sincerely,    Addie Malhotra MD

## 2021-07-29 ENCOUNTER — TRANSFERRED RECORDS (OUTPATIENT)
Dept: HEALTH INFORMATION MANAGEMENT | Facility: CLINIC | Age: 56
End: 2021-07-29

## 2021-09-13 DIAGNOSIS — G44.021 INTRACTABLE CHRONIC CLUSTER HEADACHE: ICD-10-CM

## 2021-09-13 RX ORDER — CEFUROXIME AXETIL 250 MG/1
6 TABLET ORAL
Qty: 9 KIT | Refills: 0 | Status: SHIPPED | OUTPATIENT
Start: 2021-09-13 | End: 2023-01-12

## 2021-10-05 ENCOUNTER — OFFICE VISIT (OUTPATIENT)
Dept: PHYSICAL MEDICINE AND REHAB | Facility: CLINIC | Age: 56
End: 2021-10-05
Payer: COMMERCIAL

## 2021-10-05 VITALS
HEART RATE: 102 BPM | TEMPERATURE: 98.3 F | RESPIRATION RATE: 16 BRPM | OXYGEN SATURATION: 97 % | DIASTOLIC BLOOD PRESSURE: 90 MMHG | SYSTOLIC BLOOD PRESSURE: 165 MMHG

## 2021-10-05 DIAGNOSIS — G43.719 CHRONIC MIGRAINE WITHOUT AURA, INTRACTABLE, WITHOUT STATUS MIGRAINOSUS: Primary | ICD-10-CM

## 2021-10-05 PROCEDURE — 64615 CHEMODENERV MUSC MIGRAINE: CPT | Performed by: PHYSICAL MEDICINE & REHABILITATION

## 2021-10-05 NOTE — LETTER
10/5/2021       RE: Valerie Sim  [address]  [place] [address]     Dear Colleague,    Thank you for referring your patient, Valerie Sim, to the [facility] [place] at [facility]. Please see a copy of my visit note below.    BOTULINUM TOXIN PROCEDURE - HEADACHE - NOTE    Chief Complaint   Patient presents with     Botox       Current Outpatient Medications:      aMILoride (MIDAMOR) 5 MG tablet, , Disp: , Rfl:      amLODIPine (NORVASC) 10 MG tablet, , Disp: , Rfl:      amphetamine-dextroamphetamine (ADDERALL) 20 MG tablet, Take 1 tablet (20 mg) by mouth 2 times daily, Disp: 60 tablet, Rfl: 0     budesonide (RINOCORT AQUA) 32 MCG/ACT nasal spray, Spray 1 spray into both nostrils daily., Disp: , Rfl:      Calcium-Magnesium-Vitamin D 600- MG-MG-UNIT TB24, Take 1,200 mg by mouth 2 times daily, Disp: , Rfl:      cyclobenzaprine (FLEXERIL) 10 MG tablet, , Disp: , Rfl:      CycloSPORINE (RESTASIS OP), Apply to eye 2 times daily, Disp: , Rfl:      fexofenadine (ALLEGRA) 180 MG tablet, Take  by mouth daily., Disp: , Rfl:      HYDROmorphone (DILAUDID) 2 MG tablet, Take 0.5-1 tablets (1-2 mg) by mouth every 3 hours as needed (headache) 20 tablets = 3 month supply., Disp: 24 tablet, Rfl: 0     ketorolac (TORADOL) 30 MG/ML injection, Inject 1 mL (30 mg) into the vein every 6 hours as needed for moderate pain, Disp: 9 mL, Rfl: 11     lamoTRIgine (LAMICTAL) 200 MG tablet, Take 1 tablet by mouth daily, Disp: , Rfl:      LORazepam (ATIVAN) 1 MG tablet, Take 1 mg by mouth 2 times daily as needed., Disp: , Rfl:      ondansetron (ZOFRAN) 4 MG tablet, Take 1 tablet (4 mg) by mouth every 8 hours as needed, Disp: 90 tablet, Rfl: 1     order for DME, Equipment being ordered: Oxygen The patient has Cluster Headache and needs 10 liters/minute of high flow oxygen via nonrebreather mask prn headaches, Disp: 99 days,  Rfl: 0     order for DME, Equipment being ordered: Oxygen and non-rebreather mask.   Use high flow oxygen (10-15 L/min) with non-rebreather mask, as needed for cluster headaches, Disp: 1 Units, Rfl: 11     QUEtiapine (SEROQUEL) 50 MG tablet, Take 1 tablet by mouth daily., Disp: , Rfl:      SUMAtriptan (IMITREX STATDOSE) 6 MG/0.5ML pen injector kit, Inject 0.5 mLs (6 mg) Subcutaneous at onset of headache for migraine (max 2 injections a day) May repeat in 1 hour. Max 12 mg/24 hours., Disp: 9 kit, Rfl: 0     SUMAtriptan (IMITREX) 100 MG tablet, Take 1 tablet (100 mg) by mouth at onset of headache for migraine May repeat in 2 hours. Max 2 tablets/24 hours., Disp: 12 tablet, Rfl: 3     temazepam (RESTORIL) 15 MG capsule, Take 1 capsule by mouth daily, Disp: , Rfl:      valACYclovir (VALTREX) 1000 mg tablet, Take 1,000 mg by mouth as needed., Disp: , Rfl:      Allergies   Allergen Reactions     Fluoxetine Other (See Comments)     PN: LW Reaction: headache  PN: LW Reaction: headache  Migraine       Garlic Blisters, Cough, Dermatitis, Difficulty breathing, Headache, Hives, Itching, Nausea and Vomiting and Shortness Of Breath     Candesartan      Other reaction(s): Myalgia     Duloxetine      Other reaction(s): Headache  migraine     Iodine      Other reaction(s): Other (see comments)  PN: LW CM1: CONTRAST- iodine Reaction :     Metoclopramide      Other reaction(s): Headache  Caused increase in headaches     Perfume      Other reaction(s): Headache  head pain leading to migraines     Pregabalin      Other reaction(s): Headache, Myalgia     Trazodone Other (See Comments) and Unknown     Other reaction(s): Headache  Other reaction(s): Headache  Other reaction(s): Headache     Amitriptyline Hcl Other (See Comments)     Migraine       Candesartan Cilexetil-Hctz Muscle Pain (Myalgia)     Cymbalta Other (See Comments)     Migraine       Cyproheptadine Hcl      headaches     Desvenlafaxine      Migraine       Diatrizoate Unknown  "    PN: LW CM1: CONTRAST- iodine Reaction :     Diazepam      Other reaction(s): Vestibular Toxicity  PN: LW Reaction: vertigo  PN: LW Reaction: vertigo     Galcanezumab-Gnlm      Other reaction(s): Headache     Imipramine Other (See Comments)     Migraine       Lyrica Muscle Pain (Myalgia)     Metoprolol Other (See Comments) and Unknown     headache  headache  headache     Morphine Other (See Comments)     Patient reports seizure      No Clinical Screening - See Comments      PN: LW FI1: lactose intolerance LW FI2: shellfish  PN: LW CM1: CONTRAST- iodine Reaction :  PN: LW Other1: -nka     Nortriptyline Other (See Comments)     Migraine       Phenergan Dm [Promethazine-Dm] Other (See Comments)     seizures     Promethazine      PN: LW Reaction: minimal sizures     Venlafaxine Other (See Comments)     PN: LW Reaction: migraine  PN: LW Reaction: migraine  Migraine       Wellbutrin [Bupropion Hydrobromide] Other (See Comments)     Migraine       Compazine Anxiety     Dihydroergotamine Anxiety and Other (See Comments)     Cardiac symptoms     Droperidol Anxiety     PN: LW Reaction: agitation     Medroxyprogesterone Hives     PN: LW Reaction: nausea     Prochlorperazine Anxiety     PN: LW Reaction: \"can't sit still\"        PHYSICAL EXAM:    VS: BP (!) 165/90   Pulse 102   Temp 98.3  F (36.8  C)   Resp 16   SpO2 97%    No facial asymmetry    HPI:    Patient denies new medical diagnoses, illnesses, hospitalizations, emergency room visits, and injuries since the previous injection with botulinum neurotoxin. She has been slowly recovering from pelvic floor surgery, which was four months ago.     We reviewed the recommended safety guidelines for  Botox from any vaccine injection, such as the seasonal flu vaccine, by a minimum of 10-14 days with Valerie Sim. She acknowledged understanding.    RESPONSE TO PREVIOUS TREATMENT:  Change in headache pattern following last series of injections with 225 units of Botox " on 2021.    Side effects: No problems reported    1.  Headache frequency during this injection cycle:  She had more reaction to botox this past cycle, with more pain and swelling post-injection, which she attributes to difficulties with post-operative issues on top of her migraines. She had a cluster headache in mid-September for around 2 weeks, which was associated with physical symptoms of facial numbness. She took Prednisone (about 4 days of it), which seemed to help this. She had another cluster headache more recently and is now taking 4 10 mgs of prednisone a day. She continues to have a baseline headache every day during this current cycle. She took 7 Imitrex in the last ten weeks and 3 doses of IM Toradol.    2.  Headache duration during this injection cycle:  Headache duration ranged from 1 hours to 2 weeks. She reports two episodes of multiple day headaches per week during this injection cycle.     3.  Headache intensity during this injection cycle:    A.  5-6/10  =  Typical pain level.  B.  10/10  =  Worst pain level.  C.  5/10  =  Lowest pain level.    4.  Change in headache medication usage during this injection cycle:  (For Example:  Able to decrease use of oral pain medications.) Taking less Imitrex when the Botox is in effect.     5.  ER Visits During This Injection Cycle:  None.     6.  Functional Performance:  Change in ADL's, social interaction, days lost from work, etc. Patient reports numerous days (at least 10) of changed plans and missing out on activities, due to headache during this injection cycle.      BOTULINUM NEUROTOXIN INJECTION PROCEDURES:    VERIFICATION OF PATIENT IDENTIFICATION AND PROCEDURE     Initials   Patient Name ses   Patient  ses   Procedure Verified by: ses     Prior to the start of the procedure and with procedural staff participation, I verbally confirmed the patient s identity using two indicators, relevant allergies, that the procedure was appropriate and  matched the consent or emergent situation, and that the correct equipment/implants were available. Immediately prior to starting the procedure I conducted the Time Out with the procedural staff and re-confirmed the patient s name, procedure, and site/side. (The Joint Commission universal protocol was followed.)  Yes    Sedation (Moderate or Deep): None    Above assessments performed by:    Addie Malhotra MD       INDICATIONS FOR PROCEDURES:  Valerie Sim is a 56 year old patient with chronic migraine headaches associated with cervicogenic components. Her baseline symptoms have been recalcitrant to oral medications and conservative therapy.  She is here today for reinjection with Botox.     GOAL OF PROCEDURE:  The goal of this procedure is to decrease pain  and enhance functional independence.    TOTAL DOSE: 300 UNITS BOTOX  Dose Administered:  225 units  Botox (Botulinum Toxin Type A)       2:1 Dilution   Diluent Used:  0.25% Sensorcaine (Lot: -DK, Exp: 3/1/2023, NDC: 4919-6887-10)  Total Volume of Diluent Used:  4 ml  Lot # [id number] with Expiration Date: 10/2023  NDC #: Botox 100u ([address]-30)     Was there drug waste? Yes  Amount of drug waste (mL): 75 units Botox.  Reason for waste:  Single use vial  Multi-dose vial: No      Medication guide was offered to patient and was declined.    CONSENT:  The risks, benefits, and treatment options were discussed with Valerie Sim and she agreed to proceed.    Written consent was obtained by [facility].     EQUIPMENT USED:  Needle-37mm stimulating/recording  Needle-30 gauge  EMG/NCS Machine     SKIN PREPARATION:  Skin preparation was performed using an alcohol wipe.     GUIDANCE DESCRIPTION:  Electro-myographic guidance was necessary throughout the procedure to accurately identify all areas of spastic muscles while avoiding injection of non-spastic muscles, neighboring nerves and nearby vascular structures.      AREA/MUSCLE INJECTED:  225 UNITS BOTOX = TOTAL  DOSE     1. FACE & SCALP MUSCLES: 115 units of Botox = Total dose, 2:1 Dilution     Right temporalis - 12.5 units of Botox in 4 site/s.  Left temporalis - 12.5 units of Botox in 4 site/s.      Right frontalis - 10 units of Botox in 4 site/s.  Left frontalis - 10 units of Botox in 4 site/s.      Right procerus - 2.5 units of Botox in 1 site/s.  Left procerus - 2.5 units of Botox in 1 site/s.      Right  - 5 units of Botox in 1 site/s.  Left  - 5 units of Botox in 1 site/s.      Right Upper Occipitalis - 25 units of Botox at 4 site/s.   Left Upper Occipitalis - 25 units of Botox at 4 site/s.      Right Nasalis - 2.5 units of Botox at 1 site/s.           Left Nasalis - 2.5 units of Botox at 1 site/s.          2. JAW MUSCLES: 50 units of Botox = Total Dose, 1:1 Dilution     Right [place] - 15 units of Botox at 1 site/s.   Left [place] - 15 units of Botox at 1 site/s.      Right temporalis - 10 units of Botox at 1 site/s.  Left temporalis - 10 units of Botox at 1 site/s.          3. SHOULDER & NECK MUSCLES: Total dose 60 units of Botox, 2:1 Dilution      Right levator muscle - 10 units of Botox at 2 site/s (neck and shoulder).  Left levator muscle - 10 units of Botox at 2 site/s (neck and shoulder).      Right mid trapezius - 5 units of Botox at 1 site.  Left mid trapezius - 5 units of Botox at 1 site.     Right lateral trapezius - 10 units of Botox at 2 site/s           Left lateral trapezuis - 10 units of Botox at 2 site/s.             Left Pectoralis Minor - 10 units of Botox at 1 site/s.       RESPONSE TO PROCEDURE:  Valerie Sim tolerated the procedure well and there were no immediate complications. She was allowed to recover for an appropriate period of time and was discharged home in stable condition.     FOLLOW UP:  Valerie Sim was asked to follow up by phone in 7-14 days with Larissa Fernando PT, Care Coordinator or Estela Magaña RN, Care Coordinator, to report her response to this  series of injections.  Based on the patient's previous response to this therapy, Valerie Sim was rescheduled for the next series of injections in 9 weeks and trigger point injections/nerve blocks every 6 months.      PLAN (Medication Changes, Therapy Orders, Work or Disability Issues, etc.): Patient will continue to monitor response to today's injections. She will have occipital nerve block/trigger points approximately every 6 months or sooner if needed.       Again, thank you for allowing me to participate in the care of your patient.      Sincerely,    Addie Malhotra MD

## 2021-10-05 NOTE — PROGRESS NOTES
BOTULINUM TOXIN PROCEDURE - HEADACHE - NOTE    Chief Complaint   Patient presents with     Botox       Current Outpatient Medications:      aMILoride (MIDAMOR) 5 MG tablet, , Disp: , Rfl:      amLODIPine (NORVASC) 10 MG tablet, , Disp: , Rfl:      amphetamine-dextroamphetamine (ADDERALL) 20 MG tablet, Take 1 tablet (20 mg) by mouth 2 times daily, Disp: 60 tablet, Rfl: 0     budesonide (RINOCORT AQUA) 32 MCG/ACT nasal spray, Spray 1 spray into both nostrils daily., Disp: , Rfl:      Calcium-Magnesium-Vitamin D 600- MG-MG-UNIT TB24, Take 1,200 mg by mouth 2 times daily, Disp: , Rfl:      cyclobenzaprine (FLEXERIL) 10 MG tablet, , Disp: , Rfl:      CycloSPORINE (RESTASIS OP), Apply to eye 2 times daily, Disp: , Rfl:      fexofenadine (ALLEGRA) 180 MG tablet, Take  by mouth daily., Disp: , Rfl:      HYDROmorphone (DILAUDID) 2 MG tablet, Take 0.5-1 tablets (1-2 mg) by mouth every 3 hours as needed (headache) 20 tablets = 3 month supply., Disp: 24 tablet, Rfl: 0     ketorolac (TORADOL) 30 MG/ML injection, Inject 1 mL (30 mg) into the vein every 6 hours as needed for moderate pain, Disp: 9 mL, Rfl: 11     lamoTRIgine (LAMICTAL) 200 MG tablet, Take 1 tablet by mouth daily, Disp: , Rfl:      LORazepam (ATIVAN) 1 MG tablet, Take 1 mg by mouth 2 times daily as needed., Disp: , Rfl:      ondansetron (ZOFRAN) 4 MG tablet, Take 1 tablet (4 mg) by mouth every 8 hours as needed, Disp: 90 tablet, Rfl: 1     order for DME, Equipment being ordered: Oxygen The patient has Cluster Headache and needs 10 liters/minute of high flow oxygen via nonrebreather mask prn headaches, Disp: 99 days, Rfl: 0     order for DME, Equipment being ordered: Oxygen and non-rebreather mask.   Use high flow oxygen (10-15 L/min) with non-rebreather mask, as needed for cluster headaches, Disp: 1 Units, Rfl: 11     QUEtiapine (SEROQUEL) 50 MG tablet, Take 1 tablet by mouth daily., Disp: , Rfl:      SUMAtriptan (IMITREX STATDOSE) 6 MG/0.5ML pen injector  kit, Inject 0.5 mLs (6 mg) Subcutaneous at onset of headache for migraine (max 2 injections a day) May repeat in 1 hour. Max 12 mg/24 hours., Disp: 9 kit, Rfl: 0     SUMAtriptan (IMITREX) 100 MG tablet, Take 1 tablet (100 mg) by mouth at onset of headache for migraine May repeat in 2 hours. Max 2 tablets/24 hours., Disp: 12 tablet, Rfl: 3     temazepam (RESTORIL) 15 MG capsule, Take 1 capsule by mouth daily, Disp: , Rfl:      valACYclovir (VALTREX) 1000 mg tablet, Take 1,000 mg by mouth as needed., Disp: , Rfl:      Allergies   Allergen Reactions     Fluoxetine Other (See Comments)     PN: LW Reaction: headache  PN: LW Reaction: headache  Migraine       Garlic Blisters, Cough, Dermatitis, Difficulty breathing, Headache, Hives, Itching, Nausea and Vomiting and Shortness Of Breath     Candesartan      Other reaction(s): Myalgia     Duloxetine      Other reaction(s): Headache  migraine     Iodine      Other reaction(s): Other (see comments)  PN: LW CM1: CONTRAST- iodine Reaction :     Metoclopramide      Other reaction(s): Headache  Caused increase in headaches     Perfume      Other reaction(s): Headache  head pain leading to migraines     Pregabalin      Other reaction(s): Headache, Myalgia     Trazodone Other (See Comments) and Unknown     Other reaction(s): Headache  Other reaction(s): Headache  Other reaction(s): Headache     Amitriptyline Hcl Other (See Comments)     Migraine       Candesartan Cilexetil-Hctz Muscle Pain (Myalgia)     Cymbalta Other (See Comments)     Migraine       Cyproheptadine Hcl      headaches     Desvenlafaxine      Migraine       Diatrizoate Unknown     PN: LW CM1: CONTRAST- iodine Reaction :     Diazepam      Other reaction(s): Vestibular Toxicity  PN: LW Reaction: vertigo  PN: LW Reaction: vertigo     Galcanezumab-Gnlm      Other reaction(s): Headache     Imipramine Other (See Comments)     Migraine       Lyrica Muscle Pain (Myalgia)     Metoprolol Other (See Comments) and Unknown      "headache  headache  headache     Morphine Other (See Comments)     Patient reports seizure      No Clinical Screening - See Comments      PN: LW FI1: lactose intolerance LW FI2: shellfish  PN: LW CM1: CONTRAST- iodine Reaction :  PN: LW Other1: -nka     Nortriptyline Other (See Comments)     Migraine       Phenergan Dm [Promethazine-Dm] Other (See Comments)     seizures     Promethazine      PN: LW Reaction: minimal sizures     Venlafaxine Other (See Comments)     PN: LW Reaction: migraine  PN: LW Reaction: migraine  Migraine       Wellbutrin [Bupropion Hydrobromide] Other (See Comments)     Migraine       Compazine Anxiety     Dihydroergotamine Anxiety and Other (See Comments)     Cardiac symptoms     Droperidol Anxiety     PN: LW Reaction: agitation     Medroxyprogesterone Hives     PN: LW Reaction: nausea     Prochlorperazine Anxiety     PN: LW Reaction: \"can't sit still\"        PHYSICAL EXAM:    VS: BP (!) 165/90   Pulse 102   Temp 98.3  F (36.8  C)   Resp 16   SpO2 97%    No facial asymmetry    HPI:    Patient denies new medical diagnoses, illnesses, hospitalizations, emergency room visits, and injuries since the previous injection with botulinum neurotoxin. She has been slowly recovering from pelvic floor surgery, which was four months ago.     We reviewed the recommended safety guidelines for  Botox from any vaccine injection, such as the seasonal flu vaccine, by a minimum of 10-14 days with Valerie Sim. She acknowledged understanding.    RESPONSE TO PREVIOUS TREATMENT:  Change in headache pattern following last series of injections with 225 units of Botox on 7/28/2021.    Side effects: No problems reported    1.  Headache frequency during this injection cycle:  She had more reaction to botox this past cycle, with more pain and swelling post-injection, which she attributes to difficulties with post-operative issues on top of her migraines. She had a cluster headache in mid-September for around " 2 weeks, which was associated with physical symptoms of facial numbness. She took Prednisone (about 4 days of it), which seemed to help this. She had another cluster headache more recently and is now taking 4 10 mgs of prednisone a day. She continues to have a baseline headache every day during this current cycle. She took 7 Imitrex in the last ten weeks and 3 doses of IM Toradol.    2.  Headache duration during this injection cycle:  Headache duration ranged from 1 hours to 2 weeks. She reports two episodes of multiple day headaches per week during this injection cycle.     3.  Headache intensity during this injection cycle:    A.  5-6/10  =  Typical pain level.  B.  10/10  =  Worst pain level.  C.  5/10  =  Lowest pain level.    4.  Change in headache medication usage during this injection cycle:  (For Example:  Able to decrease use of oral pain medications.) Taking less Imitrex when the Botox is in effect.     5.  ER Visits During This Injection Cycle:  None.     6.  Functional Performance:  Change in ADL's, social interaction, days lost from work, etc. Patient reports numerous days (at least 10) of changed plans and missing out on activities, due to headache during this injection cycle.      BOTULINUM NEUROTOXIN INJECTION PROCEDURES:    VERIFICATION OF PATIENT IDENTIFICATION AND PROCEDURE     Initials   Patient Name ses   Patient  ses   Procedure Verified by: ses     Prior to the start of the procedure and with procedural staff participation, I verbally confirmed the patient s identity using two indicators, relevant allergies, that the procedure was appropriate and matched the consent or emergent situation, and that the correct equipment/implants were available. Immediately prior to starting the procedure I conducted the Time Out with the procedural staff and re-confirmed the patient s name, procedure, and site/side. (The Joint Commission universal protocol was followed.)  Yes    Sedation (Moderate or Deep):  None    Above assessments performed by:    Addie Malhotra MD       INDICATIONS FOR PROCEDURES:  Valerie Sim is a 56 year old patient with chronic migraine headaches associated with cervicogenic components. Her baseline symptoms have been recalcitrant to oral medications and conservative therapy.  She is here today for reinjection with Botox.     GOAL OF PROCEDURE:  The goal of this procedure is to decrease pain  and enhance functional independence.    TOTAL DOSE: 300 UNITS BOTOX  Dose Administered:  225 units  Botox (Botulinum Toxin Type A)       2:1 Dilution   Diluent Used:  0.25% Sensorcaine (Lot: -DK, Exp: 3/1/2023, NDC: 0432-1028-66)  Total Volume of Diluent Used:  4 ml  Lot # [id number] with Expiration Date: 10/2023  NDC #: Botox 100u ([address]-53)     Was there drug waste? Yes  Amount of drug waste (mL): 75 units Botox.  Reason for waste:  Single use vial  Multi-dose vial: No      Medication guide was offered to patient and was declined.    CONSENT:  The risks, benefits, and treatment options were discussed with Valerie Sim and she agreed to proceed.    Written consent was obtained by [facility].     EQUIPMENT USED:  Needle-37mm stimulating/recording  Needle-30 gauge  EMG/NCS Machine     SKIN PREPARATION:  Skin preparation was performed using an alcohol wipe.     GUIDANCE DESCRIPTION:  Electro-myographic guidance was necessary throughout the procedure to accurately identify all areas of spastic muscles while avoiding injection of non-spastic muscles, neighboring nerves and nearby vascular structures.      AREA/MUSCLE INJECTED:  225 UNITS BOTOX = TOTAL DOSE     1. FACE & SCALP MUSCLES: 115 units of Botox = Total dose, 2:1 Dilution     Right temporalis - 12.5 units of Botox in 4 site/s.  Left temporalis - 12.5 units of Botox in 4 site/s.      Right frontalis - 10 units of Botox in 4 site/s.  Left frontalis - 10 units of Botox in 4 site/s.      Right procerus - 2.5 units of Botox in 1 site/s.  Left  procerus - 2.5 units of Botox in 1 site/s.      Right  - 5 units of Botox in 1 site/s.  Left  - 5 units of Botox in 1 site/s.      Right Upper Occipitalis - 25 units of Botox at 4 site/s.   Left Upper Occipitalis - 25 units of Botox at 4 site/s.      Right Nasalis - 2.5 units of Botox at 1 site/s.           Left Nasalis - 2.5 units of Botox at 1 site/s.          2. JAW MUSCLES: 50 units of Botox = Total Dose, 1:1 Dilution     Right Masseter - 15 units of Botox at 1 site/s.   Left Masseter - 15 units of Botox at 1 site/s.      Right temporalis - 10 units of Botox at 1 site/s.  Left temporalis - 10 units of Botox at 1 site/s.          3. SHOULDER & NECK MUSCLES: Total dose 60 units of Botox, 2:1 Dilution      Right levator muscle - 10 units of Botox at 2 site/s (neck and shoulder).  Left levator muscle - 10 units of Botox at 2 site/s (neck and shoulder).      Right mid trapezius - 5 units of Botox at 1 site.  Left mid trapezius - 5 units of Botox at 1 site.     Right lateral trapezius - 10 units of Botox at 2 site/s           Left lateral trapezuis - 10 units of Botox at 2 site/s.             Left Pectoralis Minor - 10 units of Botox at 1 site/s.       RESPONSE TO PROCEDURE:  Valerie Sim tolerated the procedure well and there were no immediate complications. She was allowed to recover for an appropriate period of time and was discharged home in stable condition.     FOLLOW UP:  Valerie Sim was asked to follow up by phone in 7-14 days with Larissa Fernando, JOANNA, Care Coordinator or Estela Magaña RN, Care Coordinator, to report her response to this series of injections.  Based on the patient's previous response to this therapy, Valerie Sim was rescheduled for the next series of injections in 9 weeks and trigger point injections/nerve blocks every 6 months.      PLAN (Medication Changes, Therapy Orders, Work or Disability Issues, etc.): Patient will continue to monitor response to today's  injections. She will have occipital nerve block/trigger points approximately every 6 months or sooner if needed.

## 2021-10-06 RX ORDER — BUPIVACAINE HYDROCHLORIDE 2.5 MG/ML
5 INJECTION, SOLUTION EPIDURAL; INFILTRATION; INTRACAUDAL ONCE
Status: COMPLETED | OUTPATIENT
Start: 2021-10-06 | End: 2021-10-06

## 2021-10-06 RX ADMIN — BUPIVACAINE HYDROCHLORIDE 12.5 MG: 2.5 INJECTION, SOLUTION EPIDURAL; INFILTRATION; INTRACAUDAL at 10:37

## 2021-10-15 ENCOUNTER — CARE COORDINATION (OUTPATIENT)
Dept: NEUROLOGY | Facility: CLINIC | Age: 56
End: 2021-10-15

## 2021-10-15 NOTE — PROGRESS NOTES
[facility]     Feli FONG

## 2021-10-24 ENCOUNTER — HEALTH MAINTENANCE LETTER (OUTPATIENT)
Age: 56
End: 2021-10-24

## 2021-12-14 ENCOUNTER — OFFICE VISIT (OUTPATIENT)
Dept: PHYSICAL MEDICINE AND REHAB | Facility: CLINIC | Age: 56
End: 2021-12-14
Payer: COMMERCIAL

## 2021-12-14 VITALS
DIASTOLIC BLOOD PRESSURE: 89 MMHG | OXYGEN SATURATION: 98 % | SYSTOLIC BLOOD PRESSURE: 142 MMHG | HEART RATE: 80 BPM | RESPIRATION RATE: 16 BRPM | TEMPERATURE: 98.3 F

## 2021-12-14 DIAGNOSIS — G43.719 CHRONIC MIGRAINE WITHOUT AURA, INTRACTABLE, WITHOUT STATUS MIGRAINOSUS: Primary | ICD-10-CM

## 2021-12-14 PROCEDURE — 64615 CHEMODENERV MUSC MIGRAINE: CPT | Mod: 59 | Performed by: PHYSICAL MEDICINE & REHABILITATION

## 2021-12-14 PROCEDURE — 96372 THER/PROPH/DIAG INJ SC/IM: CPT | Performed by: PHYSICAL MEDICINE & REHABILITATION

## 2021-12-14 ASSESSMENT — PAIN SCALES - GENERAL: PAINLEVEL: NO PAIN (0)

## 2021-12-14 NOTE — LETTER
12/14/2021       RE: Valerie Sim  [address]  [place] [address]     Dear Colleague,    Thank you for referring your patient, Valerie Sim, to the [facility] [place] [facility]. Please see a copy of my visit note below.    BOTULINUM TOXIN PROCEDURE - HEADACHE - NOTE    Chief Complaint   Patient presents with     Headache     [facility] RETURN BOTOX/ TPI       Current Outpatient Medications:      aMILoride (MIDAMOR) 5 MG tablet, , Disp: , Rfl:      amLODIPine (NORVASC) 10 MG tablet, , Disp: , Rfl:      amphetamine-dextroamphetamine (ADDERALL) 20 MG tablet, Take 1 tablet (20 mg) by mouth 2 times daily, Disp: 60 tablet, Rfl: 0     budesonide (RINOCORT AQUA) 32 MCG/ACT nasal spray, Spray 1 spray into both nostrils daily., Disp: , Rfl:      Calcium-Magnesium-Vitamin D 600- MG-MG-UNIT TB24, Take 1,200 mg by mouth 2 times daily, Disp: , Rfl:      cyclobenzaprine (FLEXERIL) 10 MG tablet, , Disp: , Rfl:      CycloSPORINE (RESTASIS OP), Apply to eye 2 times daily, Disp: , Rfl:      fexofenadine (ALLEGRA) 180 MG tablet, Take  by mouth daily., Disp: , Rfl:      HYDROmorphone (DILAUDID) 2 MG tablet, Take 0.5-1 tablets (1-2 mg) by mouth every 3 hours as needed (headache) 20 tablets = 3 month supply., Disp: 24 tablet, Rfl: 0     ketorolac (TORADOL) 30 MG/ML injection, Inject 1 mL (30 mg) into the vein every 6 hours as needed for moderate pain, Disp: 9 mL, Rfl: 11     lamoTRIgine (LAMICTAL) 200 MG tablet, Take 1 tablet by mouth daily, Disp: , Rfl:      LORazepam (ATIVAN) 1 MG tablet, Take 1 mg by mouth 2 times daily as needed., Disp: , Rfl:      ondansetron (ZOFRAN) 4 MG tablet, Take 1 tablet (4 mg) by mouth every 8 hours as needed, Disp: 90 tablet, Rfl: 1     order for DME, Equipment being ordered: Oxygen The patient has Cluster Headache and needs 10 liters/minute of high flow oxygen via nonrebreather mask  prn headaches, Disp: 99 days, Rfl: 0     order for DME, Equipment being ordered: Oxygen and non-rebreather mask.   Use high flow oxygen (10-15 L/min) with non-rebreather mask, as needed for cluster headaches, Disp: 1 Units, Rfl: 11     QUEtiapine (SEROQUEL) 50 MG tablet, Take 1 tablet by mouth daily., Disp: , Rfl:      SUMAtriptan (IMITREX STATDOSE) 6 MG/0.5ML pen injector kit, Inject 0.5 mLs (6 mg) Subcutaneous at onset of headache for migraine (max 2 injections a day) May repeat in 1 hour. Max 12 mg/24 hours., Disp: 9 kit, Rfl: 0     SUMAtriptan (IMITREX) 100 MG tablet, Take 1 tablet (100 mg) by mouth at onset of headache for migraine May repeat in 2 hours. Max 2 tablets/24 hours., Disp: 12 tablet, Rfl: 3     temazepam (RESTORIL) 15 MG capsule, Take 1 capsule by mouth daily, Disp: , Rfl:      valACYclovir (VALTREX) 1000 mg tablet, Take 1,000 mg by mouth as needed., Disp: , Rfl:      Allergies   Allergen Reactions     Fluoxetine Other (See Comments)     PN: LW Reaction: headache  PN: LW Reaction: headache  Migraine       Garlic Blisters, Cough, Dermatitis, Difficulty breathing, Headache, Hives, Itching, Nausea and Vomiting and Shortness Of Breath     Candesartan      Other reaction(s): Myalgia     Duloxetine      Other reaction(s): Headache  migraine     Iodine      Other reaction(s): Other (see comments)  PN: LW CM1: CONTRAST- iodine Reaction :     Metoclopramide      Other reaction(s): Headache  Caused increase in headaches     Perfume      Other reaction(s): Headache  head pain leading to migraines     Pregabalin      Other reaction(s): Headache, Myalgia     Trazodone Other (See Comments) and Unknown     Other reaction(s): Headache  Other reaction(s): Headache  Other reaction(s): Headache     Amitriptyline Hcl Other (See Comments)     Migraine       Candesartan Cilexetil-Hctz Muscle Pain (Myalgia)     Cymbalta Other (See Comments)     Migraine       Cyproheptadine Hcl      headaches     Desvenlafaxine       "Migraine       Diatrizoate Unknown     PN: LW CM1: CONTRAST- iodine Reaction :     Diazepam      Other reaction(s): Vestibular Toxicity  PN: LW Reaction: vertigo  PN: LW Reaction: vertigo     Galcanezumab-Gnlm      Other reaction(s): Headache     Imipramine Other (See Comments)     Migraine       Lyrica Muscle Pain (Myalgia)     Metoprolol Other (See Comments) and Unknown     headache  headache  headache     Morphine Other (See Comments)     Patient reports seizure      No Clinical Screening - See Comments      PN: LW FI1: lactose intolerance LW FI2: shellfish  PN: LW CM1: CONTRAST- iodine Reaction :  PN: LW Other1: -nka     Nortriptyline Other (See Comments)     Migraine       Phenergan Dm [Promethazine-Dm] Other (See Comments)     seizures     Promethazine      PN: LW Reaction: minimal sizures     Venlafaxine Other (See Comments)     PN: LW Reaction: migraine  PN: LW Reaction: migraine  Migraine       Wellbutrin [Bupropion Hydrobromide] Other (See Comments)     Migraine       Compazine Anxiety     Dihydroergotamine Anxiety and Other (See Comments)     Cardiac symptoms     Droperidol Anxiety     PN: LW Reaction: agitation     Medroxyprogesterone Hives     PN: LW Reaction: nausea     Prochlorperazine Anxiety     PN: LW Reaction: \"can't sit still\"        PHYSICAL EXAM:    VS: BP (!) 142/89   Pulse 80   Temp 98.3  F (36.8  C)   Resp 16   SpO2 98%    No facial asymmetry    HPI:    Patient did have an ER visit on 10/28/2021 and was found to have low BP and calcium, requiring a calcium infusion. Otherwise, denies any other new medical diagnoses, illnesses, hospitalizations, emergency room visits, and injuries since the previous injection with botulinum neurotoxin.     We reviewed the recommended safety guidelines for  Botox from any vaccine injection, such as the seasonal flu vaccine, by a minimum of 10-14 days with Valerie Sim. She acknowledged understanding.    RESPONSE TO PREVIOUS TREATMENT:  Change in " headache pattern following last series of injections with 225 units of Botox on 10/5/2021.    Side effects: No problems reported    1.  Headache frequency during this injection cycle: She continues to have a baseline headache at all times, but intensity is improved when Botox is in effect.    2.  Headache duration during this injection cycle:  Headache duration ranged from 3 hours to 2 weeks. She reports a few episodes of multiple day headaches per week during this injection cycle.     3.  Headache intensity during this injection cycle:    A.  8/10  =  Typical pain level.  B.  10/10  =  Worst pain level.  C.  4/10  =  Lowest pain level.    4.  Change in headache medication usage during this injection cycle:  (For Example:  Able to decrease use of oral pain medications.) Taking less Imitrex when the Botox is in effect, but utilizing it more often as the Botox wears off.     5.  ER Visits During This Injection Cycle:  None.     6.  Functional Performance:  Change in ADL's, social interaction, days lost from work, etc. Patient reports numerous days (at least 10) of changed plans and missing out on activities, due to headache during this injection cycle.      BOTULINUM NEUROTOXIN INJECTION PROCEDURES:    VERIFICATION OF PATIENT IDENTIFICATION AND PROCEDURE     Initials   Patient Name ses   Patient  ses   Procedure Verified by: ses     Prior to the start of the procedure and with procedural staff participation, I verbally confirmed the patient s identity using two indicators, relevant allergies, that the procedure was appropriate and matched the consent or emergent situation, and that the correct equipment/implants were available. Immediately prior to starting the procedure I conducted the Time Out with the procedural staff and re-confirmed the patient s name, procedure, and site/side. (The Joint Commission universal protocol was followed.)  Yes    Sedation (Moderate or Deep): None    Above assessments performed  by:    Addie Malhotra MD       INDICATIONS FOR PROCEDURES:  Valerie Sim is a 56 year old patient with chronic migraine headaches associated with cervicogenic components. Her baseline symptoms have been recalcitrant to oral medications and conservative therapy.  She is here today for reinjection with Botox.     GOAL OF PROCEDURE:  The goal of this procedure is to decrease pain  and enhance functional independence.    TOTAL DOSE: 300 UNITS BOTOX  Dose Administered:  225 units  Botox (Botulinum Toxin Type A)       2:1 Dilution   Diluent Used:  0.25% Sensorcaine (Lot: -DK, Exp: 3/1/2023, NDC: 1182-3133-20)  Total Volume of Diluent Used:  4.5 ml  Lot # [id number] with Expiration Date: 2/2024  NDC #: Botox 100u ([address]-60)     Was there drug waste? Yes  Amount of drug waste (mL): 75 units Botox.  Reason for waste:  Single use vial  Multi-dose vial: No      Medication guide was offered to patient and was declined.    CONSENT:  The risks, benefits, and treatment options were discussed with Valerie Sim and she agreed to proceed.    Written consent was obtained by [facility].     EQUIPMENT USED:  Needle-37mm stimulating/recording  Needle-30 gauge  EMG/NCS Machine     SKIN PREPARATION:  Skin preparation was performed using an alcohol wipe.     GUIDANCE DESCRIPTION:  Electro-myographic guidance was necessary throughout the procedure to accurately identify all areas of spastic muscles while avoiding injection of non-spastic muscles, neighboring nerves and nearby vascular structures.      AREA/MUSCLE INJECTED:  225 UNITS BOTOX = TOTAL DOSE     1. FACE & SCALP MUSCLES: 115 units of Botox = Total dose, 2:1 Dilution     Right temporalis - 12.5 units of Botox in 4 site/s.  Left temporalis - 12.5 units of Botox in 4 site/s.      Right frontalis - 10 units of Botox in 4 site/s.  Left frontalis - 10 units of Botox in 4 site/s.      Right procerus - 2.5 units of Botox in 1 site/s.  Left procerus - 2.5 units of Botox in 1  site/s.      Right  - 5 units of Botox in 1 site/s.  Left  - 5 units of Botox in 1 site/s.      Right Upper Occipitalis - 25 units of Botox at 4 site/s.   Left Upper Occipitalis - 25 units of Botox at 4 site/s.      Right Nasalis - 2.5 units of Botox at 1 site/s.           Left Nasalis - 2.5 units of Botox at 1 site/s.          2. JAW MUSCLES: 50 units of Botox = Total Dose, 2:1 Dilution     Right Masseter - 15 units of Botox at 1 site/s.   Left Masseter - 15 units of Botox at 1 site/s.      Right temporalis - 10 units of Botox at 1 site/s.  Left temporalis - 10 units of Botox at 1 site/s.          3. SHOULDER & NECK MUSCLES: Total dose 60 units of Botox, 2:1 Dilution      Right levator muscle - 10 units of Botox at 2 site/s (neck and shoulder).  Left levator muscle - 10 units of Botox at 2 site/s (neck and shoulder).      Right mid trapezius - 5 units of Botox at 1 site.  Left mid trapezius - 5 units of Botox at 1 site.     Right lateral trapezius - 10 units of Botox at 2 site/s           Left lateral trapezuis - 10 units of Botox at 2 site/s.             Left Pectoralis Minor - 10 units of Botox at 1 site/s.       RESPONSE TO PROCEDURE:  Valerie Sim tolerated the procedure well and there were no immediate complications. She was allowed to recover for an appropriate period of time and was discharged home in stable condition.     FOLLOW UP: Valerie Sim was asked to follow up by phone in 7-14 days with Larissa Fernando, PT, Care Coordinator or Estela Magaña RN, Care Coordinator, to report her response to this series of injections.  Based on the patient's previous response to this therapy, Valerie Sim was rescheduled for the next series of injections in 9 weeks and trigger point injections/nerve blocks every 6 months.      PLAN (Medication Changes, Therapy Orders, Work or Disability Issues, etc.): Patient will continue to monitor response to today's injections. She will have occipital nerve  block/trigger points approximately every 6 months or sooner if needed.       Addie Malhotra MD

## 2021-12-14 NOTE — NURSING NOTE
Chief Complaint   Patient presents with     Headache     [facility] RETURN BOTOX/ TPI       Donis Hercules, EMT

## 2021-12-14 NOTE — PROGRESS NOTES
BOTULINUM TOXIN PROCEDURE - HEADACHE - NOTE    Chief Complaint   Patient presents with     Headache     [facility] RETURN BOTOX/ TPI       Current Outpatient Medications:      aMILoride (MIDAMOR) 5 MG tablet, , Disp: , Rfl:      amLODIPine (NORVASC) 10 MG tablet, , Disp: , Rfl:      amphetamine-dextroamphetamine (ADDERALL) 20 MG tablet, Take 1 tablet (20 mg) by mouth 2 times daily, Disp: 60 tablet, Rfl: 0     budesonide (RINOCORT AQUA) 32 MCG/ACT nasal spray, Spray 1 spray into both nostrils daily., Disp: , Rfl:      Calcium-Magnesium-Vitamin D 600- MG-MG-UNIT TB24, Take 1,200 mg by mouth 2 times daily, Disp: , Rfl:      cyclobenzaprine (FLEXERIL) 10 MG tablet, , Disp: , Rfl:      CycloSPORINE (RESTASIS OP), Apply to eye 2 times daily, Disp: , Rfl:      fexofenadine (ALLEGRA) 180 MG tablet, Take  by mouth daily., Disp: , Rfl:      HYDROmorphone (DILAUDID) 2 MG tablet, Take 0.5-1 tablets (1-2 mg) by mouth every 3 hours as needed (headache) 20 tablets = 3 month supply., Disp: 24 tablet, Rfl: 0     ketorolac (TORADOL) 30 MG/ML injection, Inject 1 mL (30 mg) into the vein every 6 hours as needed for moderate pain, Disp: 9 mL, Rfl: 11     lamoTRIgine (LAMICTAL) 200 MG tablet, Take 1 tablet by mouth daily, Disp: , Rfl:      LORazepam (ATIVAN) 1 MG tablet, Take 1 mg by mouth 2 times daily as needed., Disp: , Rfl:      ondansetron (ZOFRAN) 4 MG tablet, Take 1 tablet (4 mg) by mouth every 8 hours as needed, Disp: 90 tablet, Rfl: 1     order for DME, Equipment being ordered: Oxygen The patient has Cluster Headache and needs 10 liters/minute of high flow oxygen via nonrebreather mask prn headaches, Disp: 99 days, Rfl: 0     order for DME, Equipment being ordered: Oxygen and non-rebreather mask.   Use high flow oxygen (10-15 L/min) with non-rebreather mask, as needed for cluster headaches, Disp: 1 Units, Rfl: 11     QUEtiapine (SEROQUEL) 50 MG tablet, Take 1 tablet by mouth daily., Disp: , Rfl:      SUMAtriptan (IMITREX  STATDOSE) 6 MG/0.5ML pen injector kit, Inject 0.5 mLs (6 mg) Subcutaneous at onset of headache for migraine (max 2 injections a day) May repeat in 1 hour. Max 12 mg/24 hours., Disp: 9 kit, Rfl: 0     SUMAtriptan (IMITREX) 100 MG tablet, Take 1 tablet (100 mg) by mouth at onset of headache for migraine May repeat in 2 hours. Max 2 tablets/24 hours., Disp: 12 tablet, Rfl: 3     temazepam (RESTORIL) 15 MG capsule, Take 1 capsule by mouth daily, Disp: , Rfl:      valACYclovir (VALTREX) 1000 mg tablet, Take 1,000 mg by mouth as needed., Disp: , Rfl:      Allergies   Allergen Reactions     Fluoxetine Other (See Comments)     PN: LW Reaction: headache  PN: LW Reaction: headache  Migraine       Garlic Blisters, Cough, Dermatitis, Difficulty breathing, Headache, Hives, Itching, Nausea and Vomiting and Shortness Of Breath     Candesartan      Other reaction(s): Myalgia     Duloxetine      Other reaction(s): Headache  migraine     Iodine      Other reaction(s): Other (see comments)  PN: LW CM1: CONTRAST- iodine Reaction :     Metoclopramide      Other reaction(s): Headache  Caused increase in headaches     Perfume      Other reaction(s): Headache  head pain leading to migraines     Pregabalin      Other reaction(s): Headache, Myalgia     Trazodone Other (See Comments) and Unknown     Other reaction(s): Headache  Other reaction(s): Headache  Other reaction(s): Headache     Amitriptyline Hcl Other (See Comments)     Migraine       Candesartan Cilexetil-Hctz Muscle Pain (Myalgia)     Cymbalta Other (See Comments)     Migraine       Cyproheptadine Hcl      headaches     Desvenlafaxine      Migraine       Diatrizoate Unknown     PN: LW CM1: CONTRAST- iodine Reaction :     Diazepam      Other reaction(s): Vestibular Toxicity  PN: LW Reaction: vertigo  PN: LW Reaction: vertigo     Galcanezumab-Gnlm      Other reaction(s): Headache     Imipramine Other (See Comments)     Migraine       Lyrica Muscle Pain (Myalgia)     Metoprolol Other  "(See Comments) and Unknown     headache  headache  headache     Morphine Other (See Comments)     Patient reports seizure      No Clinical Screening - See Comments      PN: LW FI1: lactose intolerance LW FI2: shellfish  PN: LW CM1: CONTRAST- iodine Reaction :  PN: LW Other1: -nka     Nortriptyline Other (See Comments)     Migraine       Phenergan Dm [Promethazine-Dm] Other (See Comments)     seizures     Promethazine      PN: LW Reaction: minimal sizures     Venlafaxine Other (See Comments)     PN: LW Reaction: migraine  PN: LW Reaction: migraine  Migraine       Wellbutrin [Bupropion Hydrobromide] Other (See Comments)     Migraine       Compazine Anxiety     Dihydroergotamine Anxiety and Other (See Comments)     Cardiac symptoms     Droperidol Anxiety     PN: LW Reaction: agitation     Medroxyprogesterone Hives     PN: LW Reaction: nausea     Prochlorperazine Anxiety     PN: LW Reaction: \"can't sit still\"        PHYSICAL EXAM:    VS: BP (!) 142/89   Pulse 80   Temp 98.3  F (36.8  C)   Resp 16   SpO2 98%    No facial asymmetry    HPI:    Patient did have an ER visit on 10/28/2021 and was found to have low BP and calcium, requiring a calcium infusion. Otherwise, denies any other new medical diagnoses, illnesses, hospitalizations, emergency room visits, and injuries since the previous injection with botulinum neurotoxin.     We reviewed the recommended safety guidelines for  Botox from any vaccine injection, such as the seasonal flu vaccine, by a minimum of 10-14 days with Valerie Sim. She acknowledged understanding.    RESPONSE TO PREVIOUS TREATMENT:  Change in headache pattern following last series of injections with 225 units of Botox on 10/5/2021.    Side effects: No problems reported    1.  Headache frequency during this injection cycle: She continues to have a baseline headache at all times, but intensity is improved when Botox is in effect.    2.  Headache duration during this injection cycle:  " Headache duration ranged from 3 hours to 2 weeks. She reports a few episodes of multiple day headaches per week during this injection cycle.     3.  Headache intensity during this injection cycle:    A.  8/10  =  Typical pain level.  B.  10/10  =  Worst pain level.  C.  4/10  =  Lowest pain level.    4.  Change in headache medication usage during this injection cycle:  (For Example:  Able to decrease use of oral pain medications.) Taking less Imitrex when the Botox is in effect, but utilizing it more often as the Botox wears off.     5.  ER Visits During This Injection Cycle:  None.     6.  Functional Performance:  Change in ADL's, social interaction, days lost from work, etc. Patient reports numerous days (at least 10) of changed plans and missing out on activities, due to headache during this injection cycle.      BOTULINUM NEUROTOXIN INJECTION PROCEDURES:    VERIFICATION OF PATIENT IDENTIFICATION AND PROCEDURE     Initials   Patient Name ses   Patient  ses   Procedure Verified by: ses     Prior to the start of the procedure and with procedural staff participation, I verbally confirmed the patient s identity using two indicators, relevant allergies, that the procedure was appropriate and matched the consent or emergent situation, and that the correct equipment/implants were available. Immediately prior to starting the procedure I conducted the Time Out with the procedural staff and re-confirmed the patient s name, procedure, and site/side. (The Joint Commission universal protocol was followed.)  Yes    Sedation (Moderate or Deep): None    Above assessments performed by:    Addie Malhotra MD       INDICATIONS FOR PROCEDURES:  Valerie Sim is a 56 year old patient with chronic migraine headaches associated with cervicogenic components. Her baseline symptoms have been recalcitrant to oral medications and conservative therapy.  She is here today for reinjection with Botox.     GOAL OF PROCEDURE:  The goal  of this procedure is to decrease pain  and enhance functional independence.    TOTAL DOSE: 300 UNITS BOTOX  Dose Administered:  225 units  Botox (Botulinum Toxin Type A)       2:1 Dilution   Diluent Used:  0.25% Sensorcaine (Lot: -DK, Exp: 3/1/2023, NDC: 4000-0404-91)  Total Volume of Diluent Used:  4.5 ml  Lot # [id number] with Expiration Date: 2/2024  NDC #: Botox 100u ([address]-03)     Was there drug waste? Yes  Amount of drug waste (mL): 75 units Botox.  Reason for waste:  Single use vial  Multi-dose vial: No      Medication guide was offered to patient and was declined.    CONSENT:  The risks, benefits, and treatment options were discussed with Valerie Sim and she agreed to proceed.    Written consent was obtained by [facility].     EQUIPMENT USED:  Needle-37mm stimulating/recording  Needle-30 gauge  EMG/NCS Machine     SKIN PREPARATION:  Skin preparation was performed using an alcohol wipe.     GUIDANCE DESCRIPTION:  Electro-myographic guidance was necessary throughout the procedure to accurately identify all areas of spastic muscles while avoiding injection of non-spastic muscles, neighboring nerves and nearby vascular structures.      AREA/MUSCLE INJECTED:  225 UNITS BOTOX = TOTAL DOSE     1. FACE & SCALP MUSCLES: 115 units of Botox = Total dose, 2:1 Dilution     Right temporalis - 12.5 units of Botox in 4 site/s.  Left temporalis - 12.5 units of Botox in 4 site/s.      Right frontalis - 10 units of Botox in 4 site/s.  Left frontalis - 10 units of Botox in 4 site/s.      Right procerus - 2.5 units of Botox in 1 site/s.  Left procerus - 2.5 units of Botox in 1 site/s.      Right  - 5 units of Botox in 1 site/s.  Left  - 5 units of Botox in 1 site/s.      Right Upper Occipitalis - 25 units of Botox at 4 site/s.   Left Upper Occipitalis - 25 units of Botox at 4 site/s.      Right Nasalis - 2.5 units of Botox at 1 site/s.           Left Nasalis - 2.5 units of Botox at 1 site/s.          2.  JAW MUSCLES: 50 units of Botox = Total Dose, 2:1 Dilution     Right Masseter - 15 units of Botox at 1 site/s.   Left [place] - 15 units of Botox at 1 site/s.      Right temporalis - 10 units of Botox at 1 site/s.  Left temporalis - 10 units of Botox at 1 site/s.          3. SHOULDER & NECK MUSCLES: Total dose 60 units of Botox, 2:1 Dilution      Right levator muscle - 10 units of Botox at 2 site/s (neck and shoulder).  Left levator muscle - 10 units of Botox at 2 site/s (neck and shoulder).      Right mid trapezius - 5 units of Botox at 1 site.  Left mid trapezius - 5 units of Botox at 1 site.     Right lateral trapezius - 10 units of Botox at 2 site/s           Left lateral trapezuis - 10 units of Botox at 2 site/s.             Left Pectoralis Minor - 10 units of Botox at 1 site/s.       RESPONSE TO PROCEDURE:  Valerie Sim tolerated the procedure well and there were no immediate complications. She was allowed to recover for an appropriate period of time and was discharged home in stable condition.     FOLLOW UP: Valerie Sim was asked to follow up by phone in 7-14 days with Larissa Fernando PT, Care Coordinator or Estela Magaña RN, Care Coordinator, to report her response to this series of injections.  Based on the patient's previous response to this therapy, Valerie Sim was rescheduled for the next series of injections in 9 weeks and trigger point injections/nerve blocks every 6 months.      PLAN (Medication Changes, Therapy Orders, Work or Disability Issues, etc.): Patient will continue to monitor response to today's injections. She will have occipital nerve block/trigger points approximately every 6 months or sooner if needed.

## 2021-12-31 RX ORDER — BUPIVACAINE HYDROCHLORIDE 2.5 MG/ML
5 INJECTION, SOLUTION EPIDURAL; INFILTRATION; INTRACAUDAL ONCE
Status: COMPLETED | OUTPATIENT
Start: 2021-12-31 | End: 2021-12-31

## 2021-12-31 RX ADMIN — BUPIVACAINE HYDROCHLORIDE 12.5 MG: 2.5 INJECTION, SOLUTION EPIDURAL; INFILTRATION; INTRACAUDAL at 11:28

## 2022-01-12 VITALS — BODY MASS INDEX: 26.81 KG/M2 | WEIGHT: 170.8 LBS | HEIGHT: 67 IN

## 2022-02-13 ENCOUNTER — HEALTH MAINTENANCE LETTER (OUTPATIENT)
Age: 57
End: 2022-02-13

## 2022-02-16 ENCOUNTER — OFFICE VISIT (OUTPATIENT)
Dept: PHYSICAL MEDICINE AND REHAB | Facility: CLINIC | Age: 57
End: 2022-02-16
Payer: COMMERCIAL

## 2022-02-16 VITALS
SYSTOLIC BLOOD PRESSURE: 141 MMHG | DIASTOLIC BLOOD PRESSURE: 84 MMHG | HEART RATE: 89 BPM | OXYGEN SATURATION: 98 % | RESPIRATION RATE: 16 BRPM

## 2022-02-16 DIAGNOSIS — G43.719 CHRONIC MIGRAINE WITHOUT AURA, INTRACTABLE, WITHOUT STATUS MIGRAINOSUS: Primary | ICD-10-CM

## 2022-02-16 DIAGNOSIS — M79.18 MYOFASCIAL PAIN: ICD-10-CM

## 2022-02-16 DIAGNOSIS — M54.81 BILATERAL OCCIPITAL NEURALGIA: ICD-10-CM

## 2022-02-16 PROCEDURE — 64615 CHEMODENERV MUSC MIGRAINE: CPT | Mod: 59 | Performed by: PHYSICAL MEDICINE & REHABILITATION

## 2022-02-16 PROCEDURE — 64405 NJX AA&/STRD GR OCPL NRV: CPT | Mod: 59 | Performed by: PHYSICAL MEDICINE & REHABILITATION

## 2022-02-16 PROCEDURE — 20552 NJX 1/MLT TRIGGER POINT 1/2: CPT | Mod: 59 | Performed by: PHYSICAL MEDICINE & REHABILITATION

## 2022-02-16 PROCEDURE — 96372 THER/PROPH/DIAG INJ SC/IM: CPT | Performed by: PHYSICAL MEDICINE & REHABILITATION

## 2022-02-16 RX ORDER — BUPIVACAINE HYDROCHLORIDE 2.5 MG/ML
10 INJECTION, SOLUTION EPIDURAL; INFILTRATION; INTRACAUDAL ONCE
Status: COMPLETED | OUTPATIENT
Start: 2022-02-16 | End: 2022-02-16

## 2022-02-16 RX ORDER — TRIAMCINOLONE ACETONIDE 40 MG/ML
40 INJECTION, SUSPENSION INTRA-ARTICULAR; INTRAMUSCULAR ONCE
Status: COMPLETED | OUTPATIENT
Start: 2022-02-16 | End: 2022-02-16

## 2022-02-16 RX ADMIN — TRIAMCINOLONE ACETONIDE 40 MG: 40 INJECTION, SUSPENSION INTRA-ARTICULAR; INTRAMUSCULAR at 10:52

## 2022-02-16 RX ADMIN — BUPIVACAINE HYDROCHLORIDE 25 MG: 2.5 INJECTION, SOLUTION EPIDURAL; INFILTRATION; INTRACAUDAL at 10:52

## 2022-02-16 NOTE — PROGRESS NOTES
BOTULINUM TOXIN PROCEDURE - HEADACHE - NOTE    Chief Complaint   Patient presents with     Botox     ump return- botox       Current Outpatient Medications:      aMILoride (MIDAMOR) 5 MG tablet, , Disp: , Rfl:      amLODIPine (NORVASC) 10 MG tablet, , Disp: , Rfl:      amphetamine-dextroamphetamine (ADDERALL) 20 MG tablet, Take 1 tablet (20 mg) by mouth 2 times daily, Disp: 60 tablet, Rfl: 0     budesonide (RINOCORT AQUA) 32 MCG/ACT nasal spray, Spray 1 spray into both nostrils daily., Disp: , Rfl:      Calcium-Magnesium-Vitamin D 600- MG-MG-UNIT TB24, Take 1,200 mg by mouth 2 times daily, Disp: , Rfl:      cyclobenzaprine (FLEXERIL) 10 MG tablet, , Disp: , Rfl:      CycloSPORINE (RESTASIS OP), Apply to eye 2 times daily, Disp: , Rfl:      fexofenadine (ALLEGRA) 180 MG tablet, Take  by mouth daily., Disp: , Rfl:      HYDROmorphone (DILAUDID) 2 MG tablet, Take 0.5-1 tablets (1-2 mg) by mouth every 3 hours as needed (headache) 20 tablets = 3 month supply., Disp: 24 tablet, Rfl: 0     ketorolac (TORADOL) 30 MG/ML injection, Inject 1 mL (30 mg) into the vein every 6 hours as needed for moderate pain, Disp: 9 mL, Rfl: 11     lamoTRIgine (LAMICTAL) 200 MG tablet, Take 1 tablet by mouth daily, Disp: , Rfl:      LORazepam (ATIVAN) 1 MG tablet, Take 1 mg by mouth 2 times daily as needed., Disp: , Rfl:      ondansetron (ZOFRAN) 4 MG tablet, Take 1 tablet (4 mg) by mouth every 8 hours as needed, Disp: 90 tablet, Rfl: 1     order for DME, Equipment being ordered: Oxygen The patient has Cluster Headache and needs 10 liters/minute of high flow oxygen via nonrebreather mask prn headaches, Disp: 99 days, Rfl: 0     order for DME, Equipment being ordered: Oxygen and non-rebreather mask.   Use high flow oxygen (10-15 L/min) with non-rebreather mask, as needed for cluster headaches, Disp: 1 Units, Rfl: 11     QUEtiapine (SEROQUEL) 50 MG tablet, Take 1 tablet by mouth daily., Disp: , Rfl:      SUMAtriptan (IMITREX STATDOSE) 6  MG/0.5ML pen injector kit, Inject 0.5 mLs (6 mg) Subcutaneous at onset of headache for migraine (max 2 injections a day) May repeat in 1 hour. Max 12 mg/24 hours., Disp: 9 kit, Rfl: 0     SUMAtriptan (IMITREX) 100 MG tablet, Take 1 tablet (100 mg) by mouth at onset of headache for migraine May repeat in 2 hours. Max 2 tablets/24 hours., Disp: 12 tablet, Rfl: 3     temazepam (RESTORIL) 15 MG capsule, Take 1 capsule by mouth daily, Disp: , Rfl:      valACYclovir (VALTREX) 1000 mg tablet, Take 1,000 mg by mouth as needed., Disp: , Rfl:     Current Facility-Administered Medications:      botulinum toxin type A (BOTOX) 100 units injection 225 Units, 225 Units, Intramuscular, See Admin Instructions, StandAddie sterling MD     Allergies   Allergen Reactions     Fluoxetine Other (See Comments)     PN: LW Reaction: headache  PN: LW Reaction: headache  Migraine       Garlic Blisters, Cough, Dermatitis, Difficulty breathing, Headache, Hives, Itching, Nausea and Vomiting and Shortness Of Breath     Candesartan      Other reaction(s): Myalgia     Duloxetine      Other reaction(s): Headache  migraine     Iodine      Other reaction(s): Other (see comments)  PN: LW CM1: CONTRAST- iodine Reaction :     Metoclopramide      Other reaction(s): Headache  Caused increase in headaches     Perfume      Other reaction(s): Headache  head pain leading to migraines     Pregabalin      Other reaction(s): Headache, Myalgia     Trazodone Other (See Comments) and Unknown     Other reaction(s): Headache  Other reaction(s): Headache  Other reaction(s): Headache     Amitriptyline Hcl Other (See Comments)     Migraine       Candesartan Cilexetil-Hctz Muscle Pain (Myalgia)     Cymbalta Other (See Comments)     Migraine       Cyproheptadine Hcl      headaches     Desvenlafaxine      Migraine       Diatrizoate Unknown     PN: LW CM1: CONTRAST- iodine Reaction :     Diazepam      Other reaction(s): Vestibular Toxicity  PN: LW Reaction: vertigo  PN:  "LW Reaction: vertigo     Galcanezumab-Gnlm      Other reaction(s): Headache     Imipramine Other (See Comments)     Migraine       Lyrica Muscle Pain (Myalgia)     Metoprolol Other (See Comments) and Unknown     headache  headache  headache     Morphine Other (See Comments)     Patient reports seizure      No Clinical Screening - See Comments      PN: LW FI1: lactose intolerance LW FI2: shellfish  PN: LW CM1: CONTRAST- iodine Reaction :  PN: LW Other1: -nka     Nortriptyline Other (See Comments)     Migraine       Phenergan Dm [Promethazine-Dm] Other (See Comments)     seizures     Promethazine      PN: LW Reaction: minimal sizures     Venlafaxine Other (See Comments)     PN: LW Reaction: migraine  PN: LW Reaction: migraine  Migraine       Wellbutrin [Bupropion Hydrobromide] Other (See Comments)     Migraine       Compazine Anxiety     Dihydroergotamine Anxiety and Other (See Comments)     Cardiac symptoms     Droperidol Anxiety     PN: LW Reaction: agitation     Medroxyprogesterone Hives     PN: LW Reaction: nausea     Prochlorperazine Anxiety     PN: LW Reaction: \"can't sit still\"        PHYSICAL EXAM:    VS: BP (!) 141/84   Pulse 89   Resp 16   SpO2 98%    No facial asymmetry    HPI:    Patient reports current HA is 7/10, throbbing, located behind eyes and posterior head, described as \"fullness and pressure\"    Patient likely going to [place] for endocrinology workup and cardiology due to parathyroid issues. Was in ED a couple of times with cardiac issues and unrelenting pain in face and jaw.     We reviewed the recommended safety guidelines for  Botox from any vaccine injection, such as the seasonal flu vaccine, by a minimum of 10-14 days with Valerie Sim. She acknowledged understanding.    RESPONSE TO PREVIOUS TREATMENT:  Change in headache pattern following last series of injections with 225 units of Botox on 12/14/2021.    Side effects: No problems reported    1.  Headache frequency during this " "injection cycle: She continues to have a baseline headache at all times, but intensity is improved when Botox is in effect. Daily HA pain described as \"throbbing\".     2.  Headache duration during this injection cycle:  Headache duration ranged from 3 hours to 2 weeks. She reports a few episodes of multiple day headaches per week during this injection cycle.     3.  Headache intensity during this injection cycle:    A.  6/10  =  Typical pain level.  B.  8/10  =  Worst pain level.  C.  4/10  =  Lowest pain level.    4.  Change in headache medication usage during this injection cycle:  (For Example:  Able to decrease use of oral pain medications.) Taking less Imitrex when the Botox is in effect, but utilizing it more often as the Botox wears off.     5.  ER Visits During This Injection Cycle:  None for migraines, which compares to ER visits at least once/month prior to initiating Botox.     6.  Functional Performance:  Change in ADL's, social interaction, days lost from work, etc. Patient reports numerous days (at least 10) of changed plans and missing out on activities, due to headache during this injection cycle.      BOTULINUM NEUROTOXIN INJECTION PROCEDURES:    VERIFICATION OF PATIENT IDENTIFICATION AND PROCEDURE     Initials   Patient Name [email or web address]   Patient  [email or web address]   Procedure Verified by: [email or web address]     Prior to the start of the procedure and with procedural staff participation, I verbally confirmed the patient s identity using two indicators, relevant allergies, that the procedure was appropriate and matched the consent or emergent situation, and that the correct equipment/implants were available. Immediately prior to starting the procedure I conducted the Time Out with the procedural staff and re-confirmed the patient s name, procedure, and site/side. (The Joint Commission universal protocol was followed.)  Yes    Sedation (Moderate or Deep): None    Above assessments performed by:    Larissa Fernando PT, Care " Coordinator    Addie Malhotra MD       INDICATIONS FOR PROCEDURES:  Valerie Sim is a 56 year old patient with chronic migraine headaches associated with cervicogenic components. Her baseline symptoms have been recalcitrant to oral medications and conservative therapy.  She is here today for reinjection with Botox.     GOAL OF PROCEDURE:  The goal of this procedure is to decrease pain  and enhance functional independence.    TOTAL DOSE: 300 UNITS BOTOX  Dose Administered:  225 units  Botox (Botulinum Toxin Type A)       2:1 Dilution   Diluent Used:  0.25% Sensorcaine (Batch: [id number], Exp: 05/2024, NDC: 55150-167-10)  Total Volume of Diluent Used:  4.5 ml  Lot # [id number] with Expiration Date: 3/2024  NDC #: Botox 100u ([address]-08)     Was there drug waste? Yes  Amount of drug waste (mL): 75 units Botox.  Reason for waste:  Single use vial  Multi-dose vial: No      Medication guide was offered to patient and was declined.    CONSENT:  The risks, benefits, and treatment options were discussed with Valerie Sim and she agreed to proceed.    Written consent was obtained by [facility].     EQUIPMENT USED:  Needle-37mm stimulating/recording  Needle-30 gauge  EMG/NCS Machine     SKIN PREPARATION:  Skin preparation was performed using an alcohol wipe.     GUIDANCE DESCRIPTION:  Electro-myographic guidance was necessary throughout the procedure to accurately identify all areas of spastic muscles while avoiding injection of non-spastic muscles, neighboring nerves and nearby vascular structures.      AREA/MUSCLE INJECTED:  225 UNITS BOTOX = TOTAL DOSE     1. FACE & SCALP MUSCLES: 115 units of Botox = Total dose, 2:1 Dilution     Right temporalis - 12.5 units of Botox in 4 site/s.  Left temporalis - 12.5 units of Botox in 4 site/s.      Right frontalis - 10 units of Botox in 4 site/s.  Left frontalis - 10 units of Botox in 4 site/s.      Right procerus - 2.5 units of Botox in 1 site/s.  Left procerus - 2.5 units of Botox in  1 site/s.      Right  - 5 units of Botox in 1 site/s.  Left  - 5 units of Botox in 1 site/s.      Right Upper Occipitalis - 25 units of Botox at 4 site/s.   Left Upper Occipitalis - 25 units of Botox at 4 site/s.      Right Nasalis - 2.5 units of Botox at 1 site/s.           Left Nasalis - 2.5 units of Botox at 1 site/s.          2. JAW MUSCLES: 50 units of Botox = Total Dose, 2:1 Dilution     Right Mass[place] - 15 units of Botox at 1 site/s.   Left [place] - 15 units of Botox at 1 site/s.      Right temporalis - 10 units of Botox at 1 site/s.  Left temporalis - 10 units of Botox at 1 site/s.          3. SHOULDER & NECK MUSCLES: Total dose 60 units of Botox, 2:1 Dilution      Right levator muscle - 10 units of Botox at 2 site/s (neck and shoulder).  Left levator muscle - 10 units of Botox at 2 site/s (neck and shoulder).      Right mid trapezius - 5 units of Botox at 1 site.  Left mid trapezius - 5 units of Botox at 1 site.     Right lateral trapezius - 10 units of Botox at 2 site/s           Left lateral trapezuis - 10 units of Botox at 2 site/s.             Left Pectoralis Minor - 10 units of Botox at 1 site/s.       BILATERAL OCCIPITAL NERVE BLOCKS & TRIGGER POINT INJECTIONS:     Bupivacaine 0.25%: 10 ml (Lot: [id number], Exp: 2023, NDC: 4846-8083-13)  Lidocaine 1%: 2 ml (Lot: [id number], Exp: 2025, NDC: 67268-325-71)  Kenalo mg = 1 ml (Lot: [id number], Exp: 2025, NDC: [address]-5)    ONB: Area just inferior to insertion of the right and left superior trapezius insertion onto skull was cleansed with alcohol. Needle was advanced anteriorly to base of skull then slightly withdrawn and injectate was injected in a fan-like distribution at different depths. Total injection volume of 5 ml of the above medications (bupivicaine, lidocaine and Kenalog) was placed per side.     TPI: Using standard precautions a 30-gauge 1-inch needle was used to inject a 3 ml mixture of 1% Lidocaine and 0.25%  bupivacaine into the upper trapezius muscle(s) for a total of 12 sites.      RESPONSE TO PROCEDURE:  Valerie Sim tolerated the procedure well and there were no immediate complications. She was allowed to recover for an appropriate period of time and was discharged home in stable condition.     FOLLOW UP: Valerie Sim was asked to follow up by phone in 7-14 days with Larissa Fernando PT, Care Coordinator or Estela Magaña RN, Care Coordinator, to report her response to this series of injections.  Based on the patient's previous response to this therapy, Valerie Sim was rescheduled for the next series of injections in 9 weeks and trigger point injections/nerve blocks every 6 months.      PLAN (Medication Changes, Therapy Orders, Work or Disability Issues, etc.): Patient will continue to monitor response to today's injections. She had Botox, ONB and TPIs today for worsening headache and will have occipital nerve block/trigger points approximately every 6 months or sooner if needed. Next visit we will plan to just to Botox unless she requests ONB/TPI to be added.

## 2022-02-16 NOTE — LETTER
2/16/2022       RE: Valerie Sim  [address]  [place] [address]     Dear Colleague,    Thank you for referring your patient, Valerie Sim, to the [facility] [place] at [facility]. Please see a copy of my visit note below.    BOTULINUM TOXIN PROCEDURE - HEADACHE - NOTE    Chief Complaint   Patient presents with     Botox     ump return- botox       Current Outpatient Medications:      aMILoride (MIDAMOR) 5 MG tablet, , Disp: , Rfl:      amLODIPine (NORVASC) 10 MG tablet, , Disp: , Rfl:      amphetamine-dextroamphetamine (ADDERALL) 20 MG tablet, Take 1 tablet (20 mg) by mouth 2 times daily, Disp: 60 tablet, Rfl: 0     budesonide (RINOCORT AQUA) 32 MCG/ACT nasal spray, Spray 1 spray into both nostrils daily., Disp: , Rfl:      Calcium-Magnesium-Vitamin D 600- MG-MG-UNIT TB24, Take 1,200 mg by mouth 2 times daily, Disp: , Rfl:      cyclobenzaprine (FLEXERIL) 10 MG tablet, , Disp: , Rfl:      CycloSPORINE (RESTASIS OP), Apply to eye 2 times daily, Disp: , Rfl:      fexofenadine (ALLEGRA) 180 MG tablet, Take  by mouth daily., Disp: , Rfl:      HYDROmorphone (DILAUDID) 2 MG tablet, Take 0.5-1 tablets (1-2 mg) by mouth every 3 hours as needed (headache) 20 tablets = 3 month supply., Disp: 24 tablet, Rfl: 0     ketorolac (TORADOL) 30 MG/ML injection, Inject 1 mL (30 mg) into the vein every 6 hours as needed for moderate pain, Disp: 9 mL, Rfl: 11     lamoTRIgine (LAMICTAL) 200 MG tablet, Take 1 tablet by mouth daily, Disp: , Rfl:      LORazepam (ATIVAN) 1 MG tablet, Take 1 mg by mouth 2 times daily as needed., Disp: , Rfl:      ondansetron (ZOFRAN) 4 MG tablet, Take 1 tablet (4 mg) by mouth every 8 hours as needed, Disp: 90 tablet, Rfl: 1     order for DME, Equipment being ordered: Oxygen The patient has Cluster Headache and needs 10 liters/minute of high flow oxygen via nonrebreather mask prn  headaches, Disp: 99 days, Rfl: 0     order for DME, Equipment being ordered: Oxygen and non-rebreather mask.   Use high flow oxygen (10-15 L/min) with non-rebreather mask, as needed for cluster headaches, Disp: 1 Units, Rfl: 11     QUEtiapine (SEROQUEL) 50 MG tablet, Take 1 tablet by mouth daily., Disp: , Rfl:      SUMAtriptan (IMITREX STATDOSE) 6 MG/0.5ML pen injector kit, Inject 0.5 mLs (6 mg) Subcutaneous at onset of headache for migraine (max 2 injections a day) May repeat in 1 hour. Max 12 mg/24 hours., Disp: 9 kit, Rfl: 0     SUMAtriptan (IMITREX) 100 MG tablet, Take 1 tablet (100 mg) by mouth at onset of headache for migraine May repeat in 2 hours. Max 2 tablets/24 hours., Disp: 12 tablet, Rfl: 3     temazepam (RESTORIL) 15 MG capsule, Take 1 capsule by mouth daily, Disp: , Rfl:      valACYclovir (VALTREX) 1000 mg tablet, Take 1,000 mg by mouth as needed., Disp: , Rfl:     Current Facility-Administered Medications:      botulinum toxin type A (BOTOX) 100 units injection 225 Units, 225 Units, Intramuscular, See Admin Instructions, Standal, Addie Saeed MD     Allergies   Allergen Reactions     Fluoxetine Other (See Comments)     PN: LW Reaction: headache  PN: LW Reaction: headache  Migraine       Garlic Blisters, Cough, Dermatitis, Difficulty breathing, Headache, Hives, Itching, Nausea and Vomiting and Shortness Of Breath     Candesartan      Other reaction(s): Myalgia     Duloxetine      Other reaction(s): Headache  migraine     Iodine      Other reaction(s): Other (see comments)  PN: LW CM1: CONTRAST- iodine Reaction :     Metoclopramide      Other reaction(s): Headache  Caused increase in headaches     Perfume      Other reaction(s): Headache  head pain leading to migraines     Pregabalin      Other reaction(s): Headache, Myalgia     Trazodone Other (See Comments) and Unknown     Other reaction(s): Headache  Other reaction(s): Headache  Other reaction(s): Headache     Amitriptyline Hcl Other (See  "Comments)     Migraine       Candesartan Cilexetil-Hctz Muscle Pain (Myalgia)     Cymbalta Other (See Comments)     Migraine       Cyproheptadine Hcl      headaches     Desvenlafaxine      Migraine       Diatrizoate Unknown     PN: LW CM1: CONTRAST- iodine Reaction :     Diazepam      Other reaction(s): Vestibular Toxicity  PN: LW Reaction: vertigo  PN: LW Reaction: vertigo     Galcanezumab-Gnlm      Other reaction(s): Headache     Imipramine Other (See Comments)     Migraine       Lyrica Muscle Pain (Myalgia)     Metoprolol Other (See Comments) and Unknown     headache  headache  headache     Morphine Other (See Comments)     Patient reports seizure      No Clinical Screening - See Comments      PN: LW FI1: lactose intolerance LW FI2: shellfish  PN: LW CM1: CONTRAST- iodine Reaction :  PN: LW Other1: -nka     Nortriptyline Other (See Comments)     Migraine       Phenergan Dm [Promethazine-Dm] Other (See Comments)     seizures     Promethazine      PN: LW Reaction: minimal sizures     Venlafaxine Other (See Comments)     PN: LW Reaction: migraine  PN: LW Reaction: migraine  Migraine       Wellbutrin [Bupropion Hydrobromide] Other (See Comments)     Migraine       Compazine Anxiety     Dihydroergotamine Anxiety and Other (See Comments)     Cardiac symptoms     Droperidol Anxiety     PN: LW Reaction: agitation     Medroxyprogesterone Hives     PN: LW Reaction: nausea     Prochlorperazine Anxiety     PN: LW Reaction: \"can't sit still\"        PHYSICAL EXAM:    VS: BP (!) 141/84   Pulse 89   Resp 16   SpO2 98%    No facial asymmetry    HPI:    Patient reports current HA is 7/10, throbbing, located behind eyes and posterior head, described as \"fullness and pressure\"    Patient likely going to [facility] for endocrinology workup and cardiology due to parathyroid issues. Was in ED a couple of times with cardiac issues and unrelenting pain in face and jaw.     We reviewed the recommended safety guidelines for  Botox " "from any vaccine injection, such as the seasonal flu vaccine, by a minimum of 10-14 days with Valerie Sim. She acknowledged understanding.    RESPONSE TO PREVIOUS TREATMENT:  Change in headache pattern following last series of injections with 225 units of Botox on 2021.    Side effects: No problems reported    1.  Headache frequency during this injection cycle: She continues to have a baseline headache at all times, but intensity is improved when Botox is in effect. Daily HA pain described as \"throbbing\".     2.  Headache duration during this injection cycle:  Headache duration ranged from 3 hours to 2 weeks. She reports a few episodes of multiple day headaches per week during this injection cycle.     3.  Headache intensity during this injection cycle:    A.  6/10  =  Typical pain level.  B.  8/10  =  Worst pain level.  C.  4/10  =  Lowest pain level.    4.  Change in headache medication usage during this injection cycle:  (For Example:  Able to decrease use of oral pain medications.) Taking less Imitrex when the Botox is in effect, but utilizing it more often as the Botox wears off.     5.  ER Visits During This Injection Cycle:  None for migraines, which compares to ER visits at least once/month prior to initiating Botox.     6.  Functional Performance:  Change in ADL's, social interaction, days lost from work, etc. Patient reports numerous days (at least 10) of changed plans and missing out on activities, due to headache during this injection cycle.      BOTULINUM NEUROTOXIN INJECTION PROCEDURES:    VERIFICATION OF PATIENT IDENTIFICATION AND PROCEDURE     Initials   Patient Name [facility]   Patient  [facility]   Procedure Verified by: [email or web address]     Prior to the start of the procedure and with procedural staff participation, I verbally confirmed the patient s identity using two indicators, relevant allergies, that the procedure was appropriate and matched the consent or emergent situation, and that the correct " equipment/implants were available. Immediately prior to starting the procedure I conducted the Time Out with the procedural staff and re-confirmed the patient s name, procedure, and site/side. (The Joint Commission universal protocol was followed.)  Yes    Sedation (Moderate or Deep): None    Above assessments performed by:    Larissa Fernando PT, Care Coordinator    Addie Malhotra MD       INDICATIONS FOR PROCEDURES:  Valerie Sim is a 56 year old patient with chronic migraine headaches associated with cervicogenic components. Her baseline symptoms have been recalcitrant to oral medications and conservative therapy.  She is here today for reinjection with Botox.     GOAL OF PROCEDURE:  The goal of this procedure is to decrease pain  and enhance functional independence.    TOTAL DOSE: 300 UNITS BOTOX  Dose Administered:  225 units  Botox (Botulinum Toxin Type A)       2:1 Dilution   Diluent Used:  0.25% Sensorcaine (Batch: [id number], Exp: 05/2024, NDC: 55150-167-10)  Total Volume of Diluent Used:  4.5 ml  Lot # [id number] with Expiration Date: 3/2024  NDC #: Botox 100u ([address]-38)     Was there drug waste? Yes  Amount of drug waste (mL): 75 units Botox.  Reason for waste:  Single use vial  Multi-dose vial: No      Medication guide was offered to patient and was declined.    CONSENT:  The risks, benefits, and treatment options were discussed with Valerie Sim and she agreed to proceed.    Written consent was obtained by [facility].     EQUIPMENT USED:  Needle-37mm stimulating/recording  Needle-30 gauge  EMG/NCS Machine     SKIN PREPARATION:  Skin preparation was performed using an alcohol wipe.     GUIDANCE DESCRIPTION:  Electro-myographic guidance was necessary throughout the procedure to accurately identify all areas of spastic muscles while avoiding injection of non-spastic muscles, neighboring nerves and nearby vascular structures.      AREA/MUSCLE INJECTED:  225 UNITS BOTOX = TOTAL DOSE     1. FACE & SCALP  MUSCLES: 115 units of Botox = Total dose, 2:1 Dilution     Right temporalis - 12.5 units of Botox in 4 site/s.  Left temporalis - 12.5 units of Botox in 4 site/s.      Right frontalis - 10 units of Botox in 4 site/s.  Left frontalis - 10 units of Botox in 4 site/s.      Right procerus - 2.5 units of Botox in 1 site/s.  Left procerus - 2.5 units of Botox in 1 site/s.      Right  - 5 units of Botox in 1 site/s.  Left  - 5 units of Botox in 1 site/s.      Right Upper Occipitalis - 25 units of Botox at 4 site/s.   Left Upper Occipitalis - 25 units of Botox at 4 site/s.      Right Nasalis - 2.5 units of Botox at 1 site/s.           Left Nasalis - 2.5 units of Botox at 1 site/s.          2. JAW MUSCLES: 50 units of Botox = Total Dose, 2:1 Dilution     Right [place] - 15 units of Botox at 1 site/s.   Left [place] - 15 units of Botox at 1 site/s.      Right temporalis - 10 units of Botox at 1 site/s.  Left temporalis - 10 units of Botox at 1 site/s.          3. SHOULDER & NECK MUSCLES: Total dose 60 units of Botox, 2:1 Dilution      Right levator muscle - 10 units of Botox at 2 site/s (neck and shoulder).  Left levator muscle - 10 units of Botox at 2 site/s (neck and shoulder).      Right mid trapezius - 5 units of Botox at 1 site.  Left mid trapezius - 5 units of Botox at 1 site.     Right lateral trapezius - 10 units of Botox at 2 site/s           Left lateral trapezuis - 10 units of Botox at 2 site/s.             Left Pectoralis Minor - 10 units of Botox at 1 site/s.       BILATERAL OCCIPITAL NERVE BLOCKS & TRIGGER POINT INJECTIONS:     Bupivacaine 0.25%: 10 ml (Lot: [id number], Exp: 2023, NDC: 8455-4925-41)  Lidocaine 1%: 2 ml (Lot: [id number], Exp: 2025, NDC: 51043-536-62)  Kenalo mg = 1 ml (Lot: [id number], Exp: 2025, NDC: [address]-1)    ONB: Area just inferior to insertion of the right and left superior trapezius insertion onto skull was cleansed with alcohol. Needle was advanced  anteriorly to base of skull then slightly withdrawn and injectate was injected in a fan-like distribution at different depths. Total injection volume of 5 ml of the above medications (bupivicaine, lidocaine and Kenalog) was placed per side.     TPI: Using standard precautions a 30-gauge 1-inch needle was used to inject a 3 ml mixture of 1% Lidocaine and 0.25% bupivacaine into the upper trapezius muscle(s) for a total of 12 sites.      RESPONSE TO PROCEDURE:  Valerie Sim tolerated the procedure well and there were no immediate complications. She was allowed to recover for an appropriate period of time and was discharged home in stable condition.     FOLLOW UP: Valerie Sim was asked to follow up by phone in 7-14 days with Larissa Fernando PT, Care Coordinator or Estela Magaña RN, Care Coordinator, to report her response to this series of injections.  Based on the patient's previous response to this therapy, Valerie Sim was rescheduled for the next series of injections in 9 weeks and trigger point injections/nerve blocks every 6 months.      PLAN (Medication Changes, Therapy Orders, Work or Disability Issues, etc.): Patient will continue to monitor response to today's injections. She had Botox, ONB and TPIs today for worsening headache and will have occipital nerve block/trigger points approximately every 6 months or sooner if needed. Next visit we will plan to just to Botox unless she requests ONB/TPI to be added.       Again, thank you for allowing me to participate in the care of your patient.      Sincerely,    Addie Malhotra MD

## 2022-04-19 ENCOUNTER — OFFICE VISIT (OUTPATIENT)
Dept: PHYSICAL MEDICINE AND REHAB | Facility: CLINIC | Age: 57
End: 2022-04-19
Payer: COMMERCIAL

## 2022-04-19 DIAGNOSIS — G43.719 CHRONIC MIGRAINE WITHOUT AURA, INTRACTABLE, WITHOUT STATUS MIGRAINOSUS: Primary | ICD-10-CM

## 2022-04-19 PROCEDURE — 64615 CHEMODENERV MUSC MIGRAINE: CPT | Mod: 59 | Performed by: PHYSICAL MEDICINE & REHABILITATION

## 2022-04-19 PROCEDURE — 96372 THER/PROPH/DIAG INJ SC/IM: CPT | Performed by: PHYSICAL MEDICINE & REHABILITATION

## 2022-04-19 RX ORDER — BUPIVACAINE HYDROCHLORIDE 2.5 MG/ML
5 INJECTION, SOLUTION EPIDURAL; INFILTRATION; INTRACAUDAL ONCE
Status: COMPLETED | OUTPATIENT
Start: 2022-04-19 | End: 2022-04-19

## 2022-04-19 RX ADMIN — BUPIVACAINE HYDROCHLORIDE 12.5 MG: 2.5 INJECTION, SOLUTION EPIDURAL; INFILTRATION; INTRACAUDAL at 15:28

## 2022-04-19 NOTE — LETTER
4/19/2022       RE: Valerie Sim  [address]  [place] [address]     Dear Colleague,    Thank you for referring your patient, Valerie Sim, to the [facility] PHYSICAL MEDICINE AND REHABILITATION CLINIC [place] at [facility]. Please see a copy of my visit note below.        [facility]     PM&R CLINIC NOTE  BOTULINUM TOXIN PROCEDURE      HPI  Chief Complaint   Patient presents with     Botox Migraine     Valerie Sim is a 57 year old year old female with a history of chronic migraine headaches who presents to clinic for botulinum toxin injections for management of chronic migraine headaches and excessive jaw clenching.     SINCE LAST VISIT  Valerie Sim was last seen here in clinic on 2/16/2022, at which time she received 225 units of Botox.    Patient reports the following new medical problems since last visit: She is being worked up for possible endocrine tumor, for which she is having an abdominal MRI tomorrow.    RESPONSE TO PREVIOUS TREATMENT    Side effects: No problems reported    1.  Headache frequency during this injection cycle:  4 severe migraine headache days per month, which are usually short-lived and self-limiting. She spends about 60% of her time with a low-grade 5/10 headache which is quite manageable.  This is compared to her baseline headache frequency of 30 headache days per month.     2.  Headache duration during this injection cycle:  Headache duration was typically no longer than 5 minutes, but when they did come on, they were quite severe and associated with all of the normal migraine symptoms including ear pain, agitation, nausea, vomiting and light sensitivity. Patient reports no episodes of multiple day headaches during this injection cycle.     3.  Headache intensity during this injection cycle:    A.  5/10  =  Typical pain level.  B.  8-9/10  =  Worst pain level.  C.  0/10   =  Lowest pain level.    4.  Change in headache medication usage during this injection cycle:  (For Example:  Able to decrease use of oral pain medications.) No changes.     5.  ER Visits During This Injection Cycle:  None for headaches.     6.  Functional Performance:  Change in ADL's, social interaction, days lost from work, etc. Patient reports being able to more fully participate in social and family activities and responsibilities as headache symptoms have improved      PHYSICAL EXAM  VS: There were no vitals taken for this visit.   GEN: Pleasant and cooperative, in no acute distress  HEENT: No facial asymmetry    ALLERGIES  Allergies   Allergen Reactions     Fluoxetine Other (See Comments)     PN: LW Reaction: headache  PN: LW Reaction: headache  Migraine       Garlic Blisters, Cough, Dermatitis, Difficulty breathing, Headache, Hives, Itching, Nausea and Vomiting and Shortness Of Breath     Candesartan      Other reaction(s): Myalgia     Duloxetine      Other reaction(s): Headache  migraine     Iodine      Other reaction(s): Other (see comments)  PN: LW CM1: CONTRAST- iodine Reaction :     Metoclopramide      Other reaction(s): Headache  Caused increase in headaches     Perfume      Other reaction(s): Headache  head pain leading to migraines     Pregabalin      Other reaction(s): Headache, Myalgia     Trazodone Other (See Comments) and Unknown     Other reaction(s): Headache  Other reaction(s): Headache  Other reaction(s): Headache     Amitriptyline Hcl Other (See Comments)     Migraine       Candesartan Cilexetil-Hctz Muscle Pain (Myalgia)     Cymbalta Other (See Comments)     Migraine       Cyproheptadine Hcl      headaches     Desvenlafaxine      Migraine       Diatrizoate Unknown     PN: LW CM1: CONTRAST- iodine Reaction :     Diazepam      Other reaction(s): Vestibular Toxicity  PN: LW Reaction: vertigo  PN: LW Reaction: vertigo     Galcanezumab-Gnlm      Other reaction(s): Headache     Imipramine Other  "(See Comments)     Migraine       Lyrica Muscle Pain (Myalgia)     Metoprolol Other (See Comments) and Unknown     headache  headache  headache     Morphine Other (See Comments)     Patient reports seizure      No Clinical Screening - See Comments      PN: LW FI1: lactose intolerance LW FI2: shellfish  PN: LW CM1: CONTRAST- iodine Reaction :  PN: LW Other1: -nka     Nortriptyline Other (See Comments)     Migraine       Phenergan Dm [Promethazine-Dm] Other (See Comments)     seizures     Promethazine      PN: LW Reaction: minimal sizures     Venlafaxine Other (See Comments)     PN: LW Reaction: migraine  PN: LW Reaction: migraine  Migraine       Wellbutrin [Bupropion Hydrobromide] Other (See Comments)     Migraine       Compazine Anxiety     Dihydroergotamine Anxiety and Other (See Comments)     Cardiac symptoms     Droperidol Anxiety     PN: LW Reaction: agitation     Medroxyprogesterone Hives     PN: LW Reaction: nausea     Prochlorperazine Anxiety     PN: LW Reaction: \"can't sit still\"       CURRENT MEDICATIONS    Current Outpatient Medications:      aMILoride (MIDAMOR) 5 MG tablet, , Disp: , Rfl:      amLODIPine (NORVASC) 10 MG tablet, , Disp: , Rfl:      amphetamine-dextroamphetamine (ADDERALL) 20 MG tablet, Take 1 tablet (20 mg) by mouth 2 times daily, Disp: 60 tablet, Rfl: 0     budesonide (RINOCORT AQUA) 32 MCG/ACT nasal spray, Spray 1 spray into both nostrils daily., Disp: , Rfl:      cyclobenzaprine (FLEXERIL) 10 MG tablet, , Disp: , Rfl:      CycloSPORINE (RESTASIS OP), Apply to eye 2 times daily, Disp: , Rfl:      fexofenadine (ALLEGRA) 180 MG tablet, Take  by mouth daily., Disp: , Rfl:      HYDROmorphone (DILAUDID) 2 MG tablet, Take 0.5-1 tablets (1-2 mg) by mouth every 3 hours as needed (headache) 20 tablets = 3 month supply., Disp: 24 tablet, Rfl: 0     ketorolac (TORADOL) 30 MG/ML injection, Inject 1 mL (30 mg) into the vein every 6 hours as needed for moderate pain, Disp: 9 mL, Rfl: 11     LORazepam " (ATIVAN) 1 MG tablet, Take 1 mg by mouth 2 times daily as needed., Disp: , Rfl:      ondansetron (ZOFRAN) 4 MG tablet, Take 1 tablet (4 mg) by mouth every 8 hours as needed, Disp: 90 tablet, Rfl: 1     order for DME, Equipment being ordered: Oxygen The patient has Cluster Headache and needs 10 liters/minute of high flow oxygen via nonrebreather mask prn headaches, Disp: 99 days, Rfl: 0     order for DME, Equipment being ordered: Oxygen and non-rebreather mask.   Use high flow oxygen (10-15 L/min) with non-rebreather mask, as needed for cluster headaches, Disp: 1 Units, Rfl: 11     QUEtiapine (SEROQUEL) 50 MG tablet, Take 1 tablet by mouth daily., Disp: , Rfl:      SUMAtriptan (IMITREX STATDOSE) 6 MG/0.5ML pen injector kit, Inject 0.5 mLs (6 mg) Subcutaneous at onset of headache for migraine (max 2 injections a day) May repeat in 1 hour. Max 12 mg/24 hours., Disp: 9 kit, Rfl: 0     SUMAtriptan (IMITREX) 100 MG tablet, Take 1 tablet (100 mg) by mouth at onset of headache for migraine May repeat in 2 hours. Max 2 tablets/24 hours., Disp: 12 tablet, Rfl: 3     temazepam (RESTORIL) 15 MG capsule, Take 1 capsule by mouth daily, Disp: , Rfl:      valACYclovir (VALTREX) 1000 mg tablet, Take 1,000 mg by mouth as needed., Disp: , Rfl:     Current Facility-Administered Medications:      botulinum toxin type A (BOTOX) 100 units injection 225 Units, 225 Units, Intramuscular, See Admin Instructions, Standal, Addie Saeed MD, 225 Units at 22 1051       BOTULINUM NEUROTOXIN INJECTION PROCEDURES    VERIFICATION OF PATIENT IDENTIFICATION AND PROCEDURE     Initials   Patient Name SES   Patient  SES   Procedure Verified by: SES     Prior to the start of the procedure and with procedural staff participation, I verbally confirmed the patient s identity using two indicators, relevant allergies, that the procedure was appropriate and matched the consent or emergent situation, and that the correct equipment/implants were  available. Immediately prior to starting the procedure I conducted the Time Out with the procedural staff and re-confirmed the patient s name, procedure, and site/side. (The Joint Commission universal protocol was followed.)  Yes    Sedation (Moderate or Deep): None    ABOVE ASSESSMENTS PERFORMED BY    Addie Malhotra MD      INDICATIONS FOR PROCEDURES  Valerie iSm is a 57 year old patient with pain and jaw clenching secondary to the diagnosis of chronic migraine headaches and oromandibular dystonia. Her baseline symptoms have been recalcitrant to oral medications and conservative therapy.  She is here today for reinjection with Botox.    GOAL OF PROCEDURE  The goal of this procedure is to decrease pain and excessive jaw clenching.       TOTAL DOSE ADMINISTERED  Dose Administered:  225 units  Botox (Botulinum Toxin Type A)       2:1 Dilution   Unavoidable Drug Waste: Yes  Amount of drug waste (mL): 75 units Botox.  Reason for waste:  Single use vial  Diluent Used:  0.25% bupivacaine (Batch: [id number], Exp: 09/2024, NDC: 55150-167-10)  Total Volume of Diluent Used:  4.5 ml  Lot # [id number] with Expiration Date: 09/2023  NDC #: Botox 100u ([address]-52)      CONSENT  The risks, benefits, and treatment options were discussed with Valerie Sim and she agreed to proceed.    Written consent was obtained by [facility].     EQUIPMENT USED  Needle-25mm stimulating/recording  Needle-30 gauge  EMG/NCS Machine    SKIN PREPARATION  Skin preparation was performed using an alcohol wipe.    GUIDANCE DESCRIPTION  Electro-myographic guidance was necessary throughout the neck and jaw portion of the procedure to accurately identify all areas of dystonic muscles while avoiding injection of non-dystonic muscles, neighboring nerves and nearby vascular structures.     AREA/MUSCLE INJECTED:  225 UNITS BOTOX = TOTAL DOSE, 2:1 DILUTION     1. FACIAL & SCALP MUSCLES: 115 UNITS BOTOX = TOTAL DOSE     Right Temporalis - 12.5 units of Botox in 4  site/s.  Left Temporalis - 12.5 units of Botox in 4 site/s.      Right Frontalis - 10 units of Botox in 4 site/s.  Left Frontalis - 10 units of Botox in 4 site/s.      Procerus - 5 units of Botox at 1 site/s.       Right  - 5 units of Botox in 1 site/s.  Left  - 5 units of Botox in 1 site/s.      Right Upper Occipitalis - 25 units of Botox at 4 site/s.   Left Upper Occipitalis - 25 units of Botox at 4 site/s.      Right Nasalis - 2.5 units of Botox at 1 site/s.           Left Nasalis - 2.5 units of Botox at 1 site/s.          2. JAW MUSCLES: 50 UNITS BOTOX = TOTAL DOSE     Right Masseter - 15 units of Botox at 1 site/s.   Left Masseter - 15 units of Botox at 1 site/s.      Right Temporalis - 10 units of Botox at 1 site/s.  Left Temporalis - 10 units of Botox at 1 site/s.          3. SHOULDER & NECK MUSCLES: 60 UNITS BOTOX = TOTAL DOSE      Right Levator Scapula - 10 units of Botox at 2 site/s (neck and shoulder).  Left Levator Scapula - 10 units of Botox at 2 site/s (neck and shoulder).      Right Upper Trapezius (mid & low lateral) - 15 units of Botox at 3 site/s.  Left Upper Trapezius (mid & low lateral) - 15 units of Botox at 3 site/s.             Left Pectoralis Minor - 10 units of Botox at 1 site/s.       RESPONSE TO PROCEDURE  Valerie Sim tolerated the procedure well and there were no immediate complications. She was allowed to recover for an appropriate period of time and was discharged home in stable condition.      ASSESSMENT AND PLAN   1. Botulinum toxin injections: No changes made to Botox dose or distribution today. Patient will continue to monitor response and report at next appointment.   2. Referrals: None.   3. Follow up: Valerie Sim was rescheduled for the next series of injections in 9 weeks, at which time we will evaluate response to today's injections. she may call the clinic prior with any questions or concerns prior to the next appointment.          Sincerely,    Addie Malhotra MD

## 2022-04-19 NOTE — PROGRESS NOTES
[facility]     PM&R CLINIC NOTE  BOTULINUM TOXIN PROCEDURE      HPI  Chief Complaint   Patient presents with     Botox Migraine     Valerie Sim is a 57 year old year old female with a history of chronic migraine headaches who presents to clinic for botulinum toxin injections for management of chronic migraine headaches and excessive jaw clenching.     SINCE LAST VISIT  Valerie Sim was last seen here in clinic on 2/16/2022, at which time she received 225 units of Botox.    Patient reports the following new medical problems since last visit: She is being worked up for possible endocrine tumor, for which she is having an abdominal MRI tomorrow.    RESPONSE TO PREVIOUS TREATMENT    Side effects: No problems reported    1.  Headache frequency during this injection cycle:  4 severe migraine headache days per month, which are usually short-lived and self-limiting. She spends about 60% of her time with a low-grade 5/10 headache which is quite manageable.  This is compared to her baseline headache frequency of 30 headache days per month.     2.  Headache duration during this injection cycle:  Headache duration was typically no longer than 5 minutes, but when they did come on, they were quite severe and associated with all of the normal migraine symptoms including ear pain, agitation, nausea, vomiting and light sensitivity. Patient reports no episodes of multiple day headaches during this injection cycle.     3.  Headache intensity during this injection cycle:    A.  5/10  =  Typical pain level.  B.  8-9/10  =  Worst pain level.  C.  0/10  =  Lowest pain level.    4.  Change in headache medication usage during this injection cycle:  (For Example:  Able to decrease use of oral pain medications.) No changes.     5.  ER Visits During This Injection Cycle:  None for headaches.     6.  Functional Performance:  Change in ADL's, social interaction, days lost from work, etc. Patient  reports being able to more fully participate in social and family activities and responsibilities as headache symptoms have improved      PHYSICAL EXAM  VS: There were no vitals taken for this visit.   GEN: Pleasant and cooperative, in no acute distress  HEENT: No facial asymmetry    ALLERGIES  Allergies   Allergen Reactions     Fluoxetine Other (See Comments)     PN: LW Reaction: headache  PN: LW Reaction: headache  Migraine       Garlic Blisters, Cough, Dermatitis, Difficulty breathing, Headache, Hives, Itching, Nausea and Vomiting and Shortness Of Breath     Candesartan      Other reaction(s): Myalgia     Duloxetine      Other reaction(s): Headache  migraine     Iodine      Other reaction(s): Other (see comments)  PN: LW CM1: CONTRAST- iodine Reaction :     Metoclopramide      Other reaction(s): Headache  Caused increase in headaches     Perfume      Other reaction(s): Headache  head pain leading to migraines     Pregabalin      Other reaction(s): Headache, Myalgia     Trazodone Other (See Comments) and Unknown     Other reaction(s): Headache  Other reaction(s): Headache  Other reaction(s): Headache     Amitriptyline Hcl Other (See Comments)     Migraine       Candesartan Cilexetil-Hctz Muscle Pain (Myalgia)     Cymbalta Other (See Comments)     Migraine       Cyproheptadine Hcl      headaches     Desvenlafaxine      Migraine       Diatrizoate Unknown     PN: LW CM1: CONTRAST- iodine Reaction :     Diazepam      Other reaction(s): Vestibular Toxicity  PN: LW Reaction: vertigo  PN: LW Reaction: vertigo     Galcanezumab-Gnlm      Other reaction(s): Headache     Imipramine Other (See Comments)     Migraine       Lyrica Muscle Pain (Myalgia)     Metoprolol Other (See Comments) and Unknown     headache  headache  headache     Morphine Other (See Comments)     Patient reports seizure      No Clinical Screening - See Comments      PN: LW FI1: lactose intolerance LW FI2: shellfish  PN: LW CM1: CONTRAST- iodine Reaction  ":  PN: LW Other1: -nka     Nortriptyline Other (See Comments)     Migraine       Phenergan Dm [Promethazine-Dm] Other (See Comments)     seizures     Promethazine      PN: LW Reaction: minimal sizures     Venlafaxine Other (See Comments)     PN: LW Reaction: migraine  PN: LW Reaction: migraine  Migraine       Wellbutrin [Bupropion Hydrobromide] Other (See Comments)     Migraine       Compazine Anxiety     Dihydroergotamine Anxiety and Other (See Comments)     Cardiac symptoms     Droperidol Anxiety     PN: LW Reaction: agitation     Medroxyprogesterone Hives     PN: LW Reaction: nausea     Prochlorperazine Anxiety     PN: LW Reaction: \"can't sit still\"       CURRENT MEDICATIONS    Current Outpatient Medications:      aMILoride (MIDAMOR) 5 MG tablet, , Disp: , Rfl:      amLODIPine (NORVASC) 10 MG tablet, , Disp: , Rfl:      amphetamine-dextroamphetamine (ADDERALL) 20 MG tablet, Take 1 tablet (20 mg) by mouth 2 times daily, Disp: 60 tablet, Rfl: 0     budesonide (RINOCORT AQUA) 32 MCG/ACT nasal spray, Spray 1 spray into both nostrils daily., Disp: , Rfl:      cyclobenzaprine (FLEXERIL) 10 MG tablet, , Disp: , Rfl:      CycloSPORINE (RESTASIS OP), Apply to eye 2 times daily, Disp: , Rfl:      fexofenadine (ALLEGRA) 180 MG tablet, Take  by mouth daily., Disp: , Rfl:      HYDROmorphone (DILAUDID) 2 MG tablet, Take 0.5-1 tablets (1-2 mg) by mouth every 3 hours as needed (headache) 20 tablets = 3 month supply., Disp: 24 tablet, Rfl: 0     ketorolac (TORADOL) 30 MG/ML injection, Inject 1 mL (30 mg) into the vein every 6 hours as needed for moderate pain, Disp: 9 mL, Rfl: 11     LORazepam (ATIVAN) 1 MG tablet, Take 1 mg by mouth 2 times daily as needed., Disp: , Rfl:      ondansetron (ZOFRAN) 4 MG tablet, Take 1 tablet (4 mg) by mouth every 8 hours as needed, Disp: 90 tablet, Rfl: 1     order for DME, Equipment being ordered: Oxygen The patient has Cluster Headache and needs 10 liters/minute of high flow oxygen via " nonrebreather mask prn headaches, Disp: 99 days, Rfl: 0     order for DME, Equipment being ordered: Oxygen and non-rebreather mask.   Use high flow oxygen (10-15 L/min) with non-rebreather mask, as needed for cluster headaches, Disp: 1 Units, Rfl: 11     QUEtiapine (SEROQUEL) 50 MG tablet, Take 1 tablet by mouth daily., Disp: , Rfl:      SUMAtriptan (IMITREX STATDOSE) 6 MG/0.5ML pen injector kit, Inject 0.5 mLs (6 mg) Subcutaneous at onset of headache for migraine (max 2 injections a day) May repeat in 1 hour. Max 12 mg/24 hours., Disp: 9 kit, Rfl: 0     SUMAtriptan (IMITREX) 100 MG tablet, Take 1 tablet (100 mg) by mouth at onset of headache for migraine May repeat in 2 hours. Max 2 tablets/24 hours., Disp: 12 tablet, Rfl: 3     temazepam (RESTORIL) 15 MG capsule, Take 1 capsule by mouth daily, Disp: , Rfl:      valACYclovir (VALTREX) 1000 mg tablet, Take 1,000 mg by mouth as needed., Disp: , Rfl:     Current Facility-Administered Medications:      botulinum toxin type A (BOTOX) 100 units injection 225 Units, 225 Units, Intramuscular, See Admin Instructions, Roscoe, Addie Saeed MD, 225 Units at 22 1051       BOTULINUM NEUROTOXIN INJECTION PROCEDURES    VERIFICATION OF PATIENT IDENTIFICATION AND PROCEDURE     Initials   Patient Name SES   Patient  SES   Procedure Verified by: SES     Prior to the start of the procedure and with procedural staff participation, I verbally confirmed the patient s identity using two indicators, relevant allergies, that the procedure was appropriate and matched the consent or emergent situation, and that the correct equipment/implants were available. Immediately prior to starting the procedure I conducted the Time Out with the procedural staff and re-confirmed the patient s name, procedure, and site/side. (The Joint Commission universal protocol was followed.)  Yes    Sedation (Moderate or Deep): None    ABOVE ASSESSMENTS PERFORMED BY    Addie Malhotra  MD      INDICATIONS FOR PROCEDURES  Valerie Sim is a 57 year old patient with pain and jaw clenching secondary to the diagnosis of chronic migraine headaches and oromandibular dystonia. Her baseline symptoms have been recalcitrant to oral medications and conservative therapy.  She is here today for reinjection with Botox.    GOAL OF PROCEDURE  The goal of this procedure is to decrease pain and excessive jaw clenching.       TOTAL DOSE ADMINISTERED  Dose Administered:  225 units  Botox (Botulinum Toxin Type A)       2:1 Dilution   Unavoidable Drug Waste: Yes  Amount of drug waste (mL): 75 units Botox.  Reason for waste:  Single use vial  Diluent Used:  0.25% bupivacaine (Batch: [id number], Exp: 09/2024, NDC: 55150-167-10)  Total Volume of Diluent Used:  4.5 ml  Lot # [id number] with Expiration Date: 09/2023  NDC #: Botox 100u ([address]-43)      CONSENT  The risks, benefits, and treatment options were discussed with Valerie Sim and she agreed to proceed.    Written consent was obtained by [facility].     EQUIPMENT USED  Needle-25mm stimulating/recording  Needle-30 gauge  EMG/NCS Machine    SKIN PREPARATION  Skin preparation was performed using an alcohol wipe.    GUIDANCE DESCRIPTION  Electro-myographic guidance was necessary throughout the neck and jaw portion of the procedure to accurately identify all areas of dystonic muscles while avoiding injection of non-dystonic muscles, neighboring nerves and nearby vascular structures.     AREA/MUSCLE INJECTED:  225 UNITS BOTOX = TOTAL DOSE, 2:1 DILUTION     1. FACIAL & SCALP MUSCLES: 115 UNITS BOTOX = TOTAL DOSE     Right Temporalis - 12.5 units of Botox in 4 site/s.  Left Temporalis - 12.5 units of Botox in 4 site/s.      Right Frontalis - 10 units of Botox in 4 site/s.  Left Frontalis - 10 units of Botox in 4 site/s.      Procerus - 5 units of Botox at 1 site/s.       Right  - 5 units of Botox in 1 site/s.  Left  - 5 units of Botox in 1 site/s.      Right  Upper Occipitalis - 25 units of Botox at 4 site/s.   Left Upper Occipitalis - 25 units of Botox at 4 site/s.      Right Nasalis - 2.5 units of Botox at 1 site/s.           Left Nasalis - 2.5 units of Botox at 1 site/s.          2. JAW MUSCLES: 50 UNITS BOTOX = TOTAL DOSE     Right Masseter - 15 units of Botox at 1 site/s.   Left Masseter - 15 units of Botox at 1 site/s.      Right Temporalis - 10 units of Botox at 1 site/s.  Left Temporalis - 10 units of Botox at 1 site/s.          3. SHOULDER & NECK MUSCLES: 60 UNITS BOTOX = TOTAL DOSE      Right Levator Scapula - 10 units of Botox at 2 site/s (neck and shoulder).  Left Levator Scapula - 10 units of Botox at 2 site/s (neck and shoulder).      Right Upper Trapezius (mid & low lateral) - 15 units of Botox at 3 site/s.  Left Upper Trapezius (mid & low lateral) - 15 units of Botox at 3 site/s.             Left Pectoralis Minor - 10 units of Botox at 1 site/s.       RESPONSE TO PROCEDURE  Valerie Sim tolerated the procedure well and there were no immediate complications. She was allowed to recover for an appropriate period of time and was discharged home in stable condition.      ASSESSMENT AND PLAN   1. Botulinum toxin injections: No changes made to Botox dose or distribution today. Patient will continue to monitor response and report at next appointment.   2. Referrals: None.   3. Follow up: Valerie Sim was rescheduled for the next series of injections in 9 weeks, at which time we will evaluate response to today's injections. she may call the clinic prior with any questions or concerns prior to the next appointment.

## 2022-05-16 DIAGNOSIS — G43.719 INTRACTABLE CHRONIC MIGRAINE WITHOUT AURA AND WITHOUT STATUS MIGRAINOSUS: ICD-10-CM

## 2022-05-16 RX ORDER — SUMATRIPTAN 100 MG/1
100 TABLET, FILM COATED ORAL
Qty: 12 TABLET | Refills: 3 | OUTPATIENT
Start: 2022-05-16

## 2022-05-16 NOTE — TELEPHONE ENCOUNTER
Rx Authorization:    Requested Medication/ Dose SUMAtriptan (IMITREX) 100 MG tablet    Date last refill ordered: 10/16/20    Quantity ordered: 12    # refills: 3    Date of last clinic visit with ordering provider: 6/1/20    Date of next clinic visit with ordering provider:     All pertinent protocol data (lab date/result):     Include pertinent information from patients message:

## 2022-06-22 ENCOUNTER — OFFICE VISIT (OUTPATIENT)
Dept: PHYSICAL MEDICINE AND REHAB | Facility: CLINIC | Age: 57
End: 2022-06-22
Payer: COMMERCIAL

## 2022-06-22 VITALS
TEMPERATURE: 97.6 F | OXYGEN SATURATION: 98 % | SYSTOLIC BLOOD PRESSURE: 124 MMHG | HEART RATE: 86 BPM | DIASTOLIC BLOOD PRESSURE: 81 MMHG | RESPIRATION RATE: 16 BRPM

## 2022-06-22 DIAGNOSIS — G43.719 CHRONIC MIGRAINE WITHOUT AURA, INTRACTABLE, WITHOUT STATUS MIGRAINOSUS: Primary | ICD-10-CM

## 2022-06-22 DIAGNOSIS — M79.18 MYOFASCIAL PAIN: ICD-10-CM

## 2022-06-22 DIAGNOSIS — M54.81 BILATERAL OCCIPITAL NEURALGIA: ICD-10-CM

## 2022-06-22 PROCEDURE — 64405 NJX AA&/STRD GR OCPL NRV: CPT | Mod: 59 | Performed by: PHYSICAL MEDICINE & REHABILITATION

## 2022-06-22 PROCEDURE — 64615 CHEMODENERV MUSC MIGRAINE: CPT | Mod: 59 | Performed by: PHYSICAL MEDICINE & REHABILITATION

## 2022-06-22 PROCEDURE — 20552 NJX 1/MLT TRIGGER POINT 1/2: CPT | Mod: 59 | Performed by: PHYSICAL MEDICINE & REHABILITATION

## 2022-06-22 PROCEDURE — 96372 THER/PROPH/DIAG INJ SC/IM: CPT | Performed by: PHYSICAL MEDICINE & REHABILITATION

## 2022-06-22 RX ORDER — NEBIVOLOL 5 MG/1
5 TABLET ORAL DAILY
COMMUNITY

## 2022-06-22 ASSESSMENT — PAIN SCALES - GENERAL: PAINLEVEL: SEVERE PAIN (6)

## 2022-06-22 NOTE — LETTER
"6/22/2022       RE: Valerie Sim  [address]  [place] [address]     Dear Colleague,    Thank you for referring your patient, Valerie Sim, to the [facility] [place] [facility]. Please see a copy of my visit note below.        [facility]     PM&R CLINIC NOTE  BOTULINUM TOXIN PROCEDURE      HPI  Chief Complaint   Patient presents with     Headache     Valerie Sim is a 57 year old female with a history of chronic migraine headaches who presents to clinic for botulinum toxin injections for management of chronic migraine headaches and excessive jaw clenching.     SINCE LAST VISIT  Valerie Sim was last seen here in clinic on 4/19/2022, at which time she received 225 units of Botox.    Patient denies new medical diagnoses, illnesses, hospitalizations, emergency room visits, and injuries since the previous injection with botulinum neurotoxin.    RESPONSE TO PREVIOUS TREATMENT    Side effects: No problems reported    1.  Headache frequency during this injection cycle:  4 more severe migraine headache days per month, which are usually short-lived and more responsive to medication. Otherwise, she does have 8-12 more mild headaches that just \"linger\".  This is compared to her baseline headache frequency of 30 headache days per month.     2.  Headache duration during this injection cycle:  Headache duration was typically between 1-6 hours depending on how she manages her headaches. Patient reports no episodes of multiple day headaches during this injection cycle.     3.  Headache intensity during this injection cycle:    A.  5/10  =  Typical pain level.  B.  8-9/10  =  Worst pain level.  C.  0/10  =  Lowest pain level.    4.  Change in headache medication usage during this injection cycle:  (For Example:  Able to decrease use of oral pain medications.) No changes " other than her blood pressure has been managed better with new medication and this is making a positive impact on her migraines.      5.  ER Visits During This Injection Cycle:  None for headaches.     6.  Functional Performance:  Change in ADL's, social interaction, days lost from work, etc. Patient reports being able to more fully participate in social and family activities and responsibilities as headache symptoms have improved      PHYSICAL EXAM  VS: /81   Pulse 86   Temp 97.6  F (36.4  C)   Resp 16   SpO2 98%    GEN: Pleasant and cooperative, in no acute distress  HEENT: No facial asymmetry    ALLERGIES  Allergies   Allergen Reactions     Fluoxetine Other (See Comments)     PN: LW Reaction: headache  PN: LW Reaction: headache  Migraine       Garlic Blisters, Cough, Dermatitis, Difficulty breathing, Headache, Hives, Itching, Nausea and Vomiting and Shortness Of Breath     Candesartan      Other reaction(s): Myalgia     Duloxetine      Other reaction(s): Headache  migraine     Iodine      Other reaction(s): Other (see comments)  PN: LW CM1: CONTRAST- iodine Reaction :     Metoclopramide      Other reaction(s): Headache  Caused increase in headaches     Perfume      Other reaction(s): Headache  head pain leading to migraines     Pregabalin      Other reaction(s): Headache, Myalgia     Trazodone Other (See Comments) and Unknown     Other reaction(s): Headache  Other reaction(s): Headache  Other reaction(s): Headache     Amitriptyline Hcl Other (See Comments)     Migraine       Candesartan Cilexetil-Hctz Muscle Pain (Myalgia)     Cymbalta Other (See Comments)     Migraine       Cyproheptadine Hcl      headaches     Desvenlafaxine      Migraine       Diatrizoate Unknown     PN: LW CM1: CONTRAST- iodine Reaction :     Diazepam      Other reaction(s): Vestibular Toxicity  PN: LW Reaction: vertigo  PN: LW Reaction: vertigo     Galcanezumab-Gnlm      Other reaction(s): Headache     Imipramine Other (See  "Comments)     Migraine       Lyrica Muscle Pain (Myalgia)     Metoprolol Other (See Comments) and Unknown     headache  headache  headache     Morphine Other (See Comments)     Patient reports seizure   Other reaction(s): Unknown  Seizure; 5/111/21 updated info: patient states she had a seizure while having a migraine headache years ago and is not sure if it was due to morphine. She has tolerated Dilaudid since then.       No Clinical Screening - See Comments      PN: LW FI1: lactose intolerance LW FI2: shellfish  PN: LW CM1: CONTRAST- iodine Reaction :  PN: LW Other1: -nka     Nortriptyline Other (See Comments)     Migraine       Phenergan Dm [Promethazine-Dm] Other (See Comments)     seizures     Promethazine      PN: LW Reaction: minimal sizures     Venlafaxine Other (See Comments)     PN: LW Reaction: migraine  PN: LW Reaction: migraine  Migraine       Wellbutrin [Bupropion Hydrobromide] Other (See Comments)     Migraine       Compazine Anxiety     Dihydroergotamine Anxiety and Other (See Comments)     Cardiac symptoms     Droperidol Anxiety     PN: LW Reaction: agitation     Medroxyprogesterone Hives     PN: LW Reaction: nausea     Prochlorperazine Anxiety     PN: LW Reaction: \"can't sit still\"       CURRENT MEDICATIONS    Current Outpatient Medications:      aMILoride (MIDAMOR) 5 MG tablet, , Disp: , Rfl:      amLODIPine (NORVASC) 10 MG tablet, , Disp: , Rfl:      amphetamine-dextroamphetamine (ADDERALL) 20 MG tablet, Take 1 tablet (20 mg) by mouth 2 times daily, Disp: 60 tablet, Rfl: 0     budesonide (RINOCORT AQUA) 32 MCG/ACT nasal spray, Spray 1 spray into both nostrils daily., Disp: , Rfl:      cyclobenzaprine (FLEXERIL) 10 MG tablet, , Disp: , Rfl:      CycloSPORINE (RESTASIS OP), Apply to eye 2 times daily, Disp: , Rfl:      fexofenadine (ALLEGRA) 180 MG tablet, Take  by mouth daily., Disp: , Rfl:      HYDROmorphone (DILAUDID) 2 MG tablet, Take 0.5-1 tablets (1-2 mg) by mouth every 3 hours as needed " (headache) 20 tablets = 3 month supply., Disp: 24 tablet, Rfl: 0     ketorolac (TORADOL) 30 MG/ML injection, Inject 1 mL (30 mg) into the vein every 6 hours as needed for moderate pain, Disp: 9 mL, Rfl: 11     LORazepam (ATIVAN) 1 MG tablet, Take 1 mg by mouth 2 times daily as needed., Disp: , Rfl:      nebivolol (BYSTOLIC) 5 MG tablet, Take 5 mg by mouth daily Take twice a day totaling 10 mg., Disp: , Rfl:      ondansetron (ZOFRAN) 4 MG tablet, Take 1 tablet (4 mg) by mouth every 8 hours as needed, Disp: 90 tablet, Rfl: 1     order for DME, Equipment being ordered: Oxygen The patient has Cluster Headache and needs 10 liters/minute of high flow oxygen via nonrebreather mask prn headaches, Disp: 99 days, Rfl: 0     order for DME, Equipment being ordered: Oxygen and non-rebreather mask.   Use high flow oxygen (10-15 L/min) with non-rebreather mask, as needed for cluster headaches, Disp: 1 Units, Rfl: 11     QUEtiapine (SEROQUEL) 50 MG tablet, Take 1 tablet by mouth daily., Disp: , Rfl:      SUMAtriptan (IMITREX STATDOSE) 6 MG/0.5ML pen injector kit, Inject 0.5 mLs (6 mg) Subcutaneous at onset of headache for migraine (max 2 injections a day) May repeat in 1 hour. Max 12 mg/24 hours., Disp: 9 kit, Rfl: 0     SUMAtriptan (IMITREX) 100 MG tablet, Take 1 tablet (100 mg) by mouth at onset of headache for migraine May repeat in 2 hours. Max 2 tablets/24 hours., Disp: 12 tablet, Rfl: 3     temazepam (RESTORIL) 15 MG capsule, Take 1 capsule by mouth daily, Disp: , Rfl:      valACYclovir (VALTREX) 1000 mg tablet, Take 1,000 mg by mouth as needed., Disp: , Rfl:     Current Facility-Administered Medications:      botulinum toxin type A (BOTOX) 100 units injection 225 Units, 225 Units, Intramuscular, See Admin Instructions, Standal, Addie Saeed MD, 225 Units at 22 1528       BOTULINUM NEUROTOXIN INJECTION PROCEDURES    VERIFICATION OF PATIENT IDENTIFICATION AND PROCEDURE     Initials   Patient Name SES   Patient  SES    Procedure Verified by: DILAN     Prior to the start of the procedure and with procedural staff participation, I verbally confirmed the patient s identity using two indicators, relevant allergies, that the procedure was appropriate and matched the consent or emergent situation, and that the correct equipment/implants were available. Immediately prior to starting the procedure I conducted the Time Out with the procedural staff and re-confirmed the patient s name, procedure, and site/side. (The Joint Commission universal protocol was followed.)  Yes    Sedation (Moderate or Deep): None    ABOVE ASSESSMENTS PERFORMED BY    Addie Malhotra MD      INDICATIONS FOR PROCEDURES  Valerie Sim is a 57 year old patient with pain and jaw clenching secondary to the diagnosis of chronic migraine headaches and oromandibular dystonia. Her baseline symptoms have been recalcitrant to oral medications and conservative therapy.  She is here today for reinjection with Botox.    GOAL OF PROCEDURE  The goal of this procedure is to decrease pain and excessive jaw clenching due to chronic migraine headaches and bruxism.     TOTAL DOSE ADMINISTERED  Dose Administered:  225 units  Botox (Botulinum Toxin Type A)       2:1 Dilution   Unavoidable Drug Waste: Yes  Amount of drug waste (mL): 75 units Botox.  Reason for waste:  Single use vial  Diluent Used:  0.25% bupivacaine (Batch: [id number], Exp: 09/2024, NDC: 55150-167-10)  Total Volume of Diluent Used:  4.5 ml  Lot # [id number] with Expiration Date: 09/2023  NDC #: Botox 100u ([address]-17)      CONSENT  The risks, benefits, and treatment options were discussed with Valerie Sim and she agreed to proceed.    Written consent was obtained by ninoska.     EQUIPMENT USED  Needle-25mm stimulating/recording  Needle-30 gauge  EMG/NCS Machine    SKIN PREPARATION  Skin preparation was performed using an alcohol wipe.    GUIDANCE DESCRIPTION  Electro-myographic guidance was necessary throughout the neck  and jaw portion of the procedure to accurately identify all areas of dystonic muscles while avoiding injection of non-dystonic muscles, neighboring nerves and nearby vascular structures.     AREA/MUSCLE INJECTED:  225 UNITS BOTOX = TOTAL DOSE, 2:1 DILUTION     1. FACIAL & SCALP MUSCLES: 115 UNITS BOTOX = TOTAL DOSE     Right Temporalis - 12.5 units of Botox in 4 site/s.  Left Temporalis - 12.5 units of Botox in 4 site/s.      Right Frontalis - 10 units of Botox in 4 site/s.  Left Frontalis - 10 units of Botox in 4 site/s.      Procerus - 5 units of Botox at 1 site/s.       Right  - 5 units of Botox in 1 site/s.  Left  - 5 units of Botox in 1 site/s.      Right Upper Occipitalis - 25 units of Botox at 4 site/s.   Left Upper Occipitalis - 25 units of Botox at 4 site/s.      Right Nasalis - 2.5 units of Botox at 1 site/s.           Left Nasalis - 2.5 units of Botox at 1 site/s.          2. JAW MUSCLES: 50 UNITS BOTOX = TOTAL DOSE     Right Masseter - 15 units of Botox at 1 site/s.   Left Masseter - 15 units of Botox at 1 site/s.      Right Temporalis - 10 units of Botox at 1 site/s.  Left Temporalis - 10 units of Botox at 1 site/s.          3. SHOULDER & NECK MUSCLES: 60 UNITS BOTOX = TOTAL DOSE      Right Levator Scapula - 10 units of Botox at 2 site/s (neck and shoulder).  Left Levator Scapula - 10 units of Botox at 2 site/s (neck and shoulder).      Right Upper Trapezius (mid & low lateral) - 15 units of Botox at 3 site/s.  Left Upper Trapezius (mid & low lateral) - 15 units of Botox at 3 site/s.             Left Pectoralis Minor - 10 units of Botox at 1 site/s.       BILATERAL OCCIPITAL NERVE BLOCKS & TRIGGER POINT INJECTIONS:     Bupivacaine 0.25%: 10 ml (Batch: [id number], Exp: 2024, NDC: 55150-167-10)  Lidocaine 1%: 2 ml (Lot: [id number], Exp: 10/2025, NDC: 01884-789-20)  Kenalo mg = 1 ml (Lot: [id number], Exp: 2024, NDC: [address]-3)    ONB: Area just inferior to insertion of the right  and left superior trapezius insertion onto skull was cleansed with alcohol. Needle was advanced anteriorly to base of skull then slightly withdrawn and injectate was injected in a fan-like distribution at different depths. Total injection volume of 5 ml of the above medications (bupivicaine, lidocaine and Kenalog) was placed per side.     TPI: Using standard precautions a 30-gauge 1-inch needle was used to inject a 3 ml mixture of 1% Lidocaine and 0.25% bupivacaine into the upper trapezius muscle(s) for a total of 12 sites.      RESPONSE TO PROCEDURE  Valerie Sim tolerated the procedure well and there were no immediate complications. She was allowed to recover for an appropriate period of time and was discharged home in stable condition.    ASSESSMENT AND PLAN   1. Botulinum toxin injections: No changes made to Botox dose or distribution today. Patient will continue to monitor response and report at next appointment.   2. Referrals: None.   3. Follow up: Valerie Sim was rescheduled for the next series of injections in 9 weeks, at which time we will evaluate response to today's injections. she may call the clinic prior with any questions or concerns prior to the next appointment.         Sincerely,    Addie Malhotra MD

## 2022-06-22 NOTE — PROGRESS NOTES
"    [facility]     PM&R CLINIC NOTE  BOTULINUM TOXIN PROCEDURE      HPI  Chief Complaint   Patient presents with     Headache     Valerie Sim is a 57 year old female with a history of chronic migraine headaches who presents to clinic for botulinum toxin injections for management of chronic migraine headaches and excessive jaw clenching.     SINCE LAST VISIT  Valerie Sim was last seen here in clinic on 4/19/2022, at which time she received 225 units of Botox.    Patient denies new medical diagnoses, illnesses, hospitalizations, emergency room visits, and injuries since the previous injection with botulinum neurotoxin.    RESPONSE TO PREVIOUS TREATMENT    Side effects: No problems reported    1.  Headache frequency during this injection cycle:  4 more severe migraine headache days per month, which are usually short-lived and more responsive to medication. Otherwise, she does have 8-12 more mild headaches that just \"linger\".  This is compared to her baseline headache frequency of 30 headache days per month.     2.  Headache duration during this injection cycle:  Headache duration was typically between 1-6 hours depending on how she manages her headaches. Patient reports no episodes of multiple day headaches during this injection cycle.     3.  Headache intensity during this injection cycle:    A.  5/10  =  Typical pain level.  B.  8-9/10  =  Worst pain level.  C.  0/10  =  Lowest pain level.    4.  Change in headache medication usage during this injection cycle:  (For Example:  Able to decrease use of oral pain medications.) No changes other than her blood pressure has been managed better with new medication and this is making a positive impact on her migraines.      5.  ER Visits During This Injection Cycle:  None for headaches.     6.  Functional Performance:  Change in ADL's, social interaction, days lost from work, etc. Patient reports being able to more fully participate in " social and family activities and responsibilities as headache symptoms have improved      PHYSICAL EXAM  VS: /81   Pulse 86   Temp 97.6  F (36.4  C)   Resp 16   SpO2 98%    GEN: Pleasant and cooperative, in no acute distress  HEENT: No facial asymmetry    ALLERGIES  Allergies   Allergen Reactions     Fluoxetine Other (See Comments)     PN: LW Reaction: headache  PN: LW Reaction: headache  Migraine       Garlic Blisters, Cough, Dermatitis, Difficulty breathing, Headache, Hives, Itching, Nausea and Vomiting and Shortness Of Breath     Candesartan      Other reaction(s): Myalgia     Duloxetine      Other reaction(s): Headache  migraine     Iodine      Other reaction(s): Other (see comments)  PN: LW CM1: CONTRAST- iodine Reaction :     Metoclopramide      Other reaction(s): Headache  Caused increase in headaches     Perfume      Other reaction(s): Headache  head pain leading to migraines     Pregabalin      Other reaction(s): Headache, Myalgia     Trazodone Other (See Comments) and Unknown     Other reaction(s): Headache  Other reaction(s): Headache  Other reaction(s): Headache     Amitriptyline Hcl Other (See Comments)     Migraine       Candesartan Cilexetil-Hctz Muscle Pain (Myalgia)     Cymbalta Other (See Comments)     Migraine       Cyproheptadine Hcl      headaches     Desvenlafaxine      Migraine       Diatrizoate Unknown     PN: LW CM1: CONTRAST- iodine Reaction :     Diazepam      Other reaction(s): Vestibular Toxicity  PN: LW Reaction: vertigo  PN: LW Reaction: vertigo     Galcanezumab-Gnlm      Other reaction(s): Headache     Imipramine Other (See Comments)     Migraine       Lyrica Muscle Pain (Myalgia)     Metoprolol Other (See Comments) and Unknown     headache  headache  headache     Morphine Other (See Comments)     Patient reports seizure   Other reaction(s): Unknown  Seizure; 5/111/21 updated info: patient states she had a seizure while having a migraine headache years ago and is not sure if  "it was due to morphine. She has tolerated Dilaudid since then.       No Clinical Screening - See Comments      PN: LW FI1: lactose intolerance LW FI2: shellfish  PN: LW CM1: CONTRAST- iodine Reaction :  PN: LW Other1: -nka     Nortriptyline Other (See Comments)     Migraine       Phenergan Dm [Promethazine-Dm] Other (See Comments)     seizures     Promethazine      PN: LW Reaction: minimal sizures     Venlafaxine Other (See Comments)     PN: LW Reaction: migraine  PN: LW Reaction: migraine  Migraine       Wellbutrin [Bupropion Hydrobromide] Other (See Comments)     Migraine       Compazine Anxiety     Dihydroergotamine Anxiety and Other (See Comments)     Cardiac symptoms     Droperidol Anxiety     PN: LW Reaction: agitation     Medroxyprogesterone Hives     PN: LW Reaction: nausea     Prochlorperazine Anxiety     PN: LW Reaction: \"can't sit still\"       CURRENT MEDICATIONS    Current Outpatient Medications:      aMILoride (MIDAMOR) 5 MG tablet, , Disp: , Rfl:      amLODIPine (NORVASC) 10 MG tablet, , Disp: , Rfl:      amphetamine-dextroamphetamine (ADDERALL) 20 MG tablet, Take 1 tablet (20 mg) by mouth 2 times daily, Disp: 60 tablet, Rfl: 0     budesonide (RINOCORT AQUA) 32 MCG/ACT nasal spray, Spray 1 spray into both nostrils daily., Disp: , Rfl:      cyclobenzaprine (FLEXERIL) 10 MG tablet, , Disp: , Rfl:      CycloSPORINE (RESTASIS OP), Apply to eye 2 times daily, Disp: , Rfl:      fexofenadine (ALLEGRA) 180 MG tablet, Take  by mouth daily., Disp: , Rfl:      HYDROmorphone (DILAUDID) 2 MG tablet, Take 0.5-1 tablets (1-2 mg) by mouth every 3 hours as needed (headache) 20 tablets = 3 month supply., Disp: 24 tablet, Rfl: 0     ketorolac (TORADOL) 30 MG/ML injection, Inject 1 mL (30 mg) into the vein every 6 hours as needed for moderate pain, Disp: 9 mL, Rfl: 11     LORazepam (ATIVAN) 1 MG tablet, Take 1 mg by mouth 2 times daily as needed., Disp: , Rfl:      nebivolol (BYSTOLIC) 5 MG tablet, Take 5 mg by mouth " daily Take twice a day totaling 10 mg., Disp: , Rfl:      ondansetron (ZOFRAN) 4 MG tablet, Take 1 tablet (4 mg) by mouth every 8 hours as needed, Disp: 90 tablet, Rfl: 1     order for DME, Equipment being ordered: Oxygen The patient has Cluster Headache and needs 10 liters/minute of high flow oxygen via nonrebreather mask prn headaches, Disp: 99 days, Rfl: 0     order for DME, Equipment being ordered: Oxygen and non-rebreather mask.   Use high flow oxygen (10-15 L/min) with non-rebreather mask, as needed for cluster headaches, Disp: 1 Units, Rfl: 11     QUEtiapine (SEROQUEL) 50 MG tablet, Take 1 tablet by mouth daily., Disp: , Rfl:      SUMAtriptan (IMITREX STATDOSE) 6 MG/0.5ML pen injector kit, Inject 0.5 mLs (6 mg) Subcutaneous at onset of headache for migraine (max 2 injections a day) May repeat in 1 hour. Max 12 mg/24 hours., Disp: 9 kit, Rfl: 0     SUMAtriptan (IMITREX) 100 MG tablet, Take 1 tablet (100 mg) by mouth at onset of headache for migraine May repeat in 2 hours. Max 2 tablets/24 hours., Disp: 12 tablet, Rfl: 3     temazepam (RESTORIL) 15 MG capsule, Take 1 capsule by mouth daily, Disp: , Rfl:      valACYclovir (VALTREX) 1000 mg tablet, Take 1,000 mg by mouth as needed., Disp: , Rfl:     Current Facility-Administered Medications:      botulinum toxin type A (BOTOX) 100 units injection 225 Units, 225 Units, Intramuscular, See Admin Instructions, Standal, Addie Saeed MD, 225 Units at 22 1528       BOTULINUM NEUROTOXIN INJECTION PROCEDURES    VERIFICATION OF PATIENT IDENTIFICATION AND PROCEDURE     Initials   Patient Name SES   Patient  SES   Procedure Verified by: SES     Prior to the start of the procedure and with procedural staff participation, I verbally confirmed the patient s identity using two indicators, relevant allergies, that the procedure was appropriate and matched the consent or emergent situation, and that the correct equipment/implants were available. Immediately prior to  starting the procedure I conducted the Time Out with the procedural staff and re-confirmed the patient s name, procedure, and site/side. (The Joint Commission universal protocol was followed.)  Yes    Sedation (Moderate or Deep): None    ABOVE ASSESSMENTS PERFORMED BY    Addie Malhotra MD      INDICATIONS FOR PROCEDURES  Valerie Sim is a 57 year old patient with pain and jaw clenching secondary to the diagnosis of chronic migraine headaches and oromandibular dystonia. Her baseline symptoms have been recalcitrant to oral medications and conservative therapy.  She is here today for reinjection with Botox.    GOAL OF PROCEDURE  The goal of this procedure is to decrease pain and excessive jaw clenching due to chronic migraine headaches and bruxism.     TOTAL DOSE ADMINISTERED  Dose Administered:  225 units  Botox (Botulinum Toxin Type A)       2:1 Dilution   Unavoidable Drug Waste: Yes  Amount of drug waste (mL): 75 units Botox.  Reason for waste:  Single use vial  Diluent Used:  0.25% bupivacaine (Batch: [id number], Exp: 09/2024, NDC: 55150-167-10)  Total Volume of Diluent Used:  4.5 ml  Lot # [id number] with Expiration Date: 09/2023  NDC #: Botox 100u ([address]-72)      CONSENT  The risks, benefits, and treatment options were discussed with Valerie Sim and she agreed to proceed.    Written consent was obtained by [facility].     EQUIPMENT USED  Needle-25mm stimulating/recording  Needle-30 gauge  EMG/NCS Machine    SKIN PREPARATION  Skin preparation was performed using an alcohol wipe.    GUIDANCE DESCRIPTION  Electro-myographic guidance was necessary throughout the neck and jaw portion of the procedure to accurately identify all areas of dystonic muscles while avoiding injection of non-dystonic muscles, neighboring nerves and nearby vascular structures.     AREA/MUSCLE INJECTED:  225 UNITS BOTOX = TOTAL DOSE, 2:1 DILUTION     1. FACIAL & SCALP MUSCLES: 115 UNITS BOTOX = TOTAL DOSE     Right Temporalis - 12.5 units of  Botox in 4 site/s.  Left Temporalis - 12.5 units of Botox in 4 site/s.      Right Frontalis - 10 units of Botox in 4 site/s.  Left Frontalis - 10 units of Botox in 4 site/s.      Procerus - 5 units of Botox at 1 site/s.       Right  - 5 units of Botox in 1 site/s.  Left  - 5 units of Botox in 1 site/s.      Right Upper Occipitalis - 25 units of Botox at 4 site/s.   Left Upper Occipitalis - 25 units of Botox at 4 site/s.      Right Nasalis - 2.5 units of Botox at 1 site/s.           Left Nasalis - 2.5 units of Botox at 1 site/s.          2. JAW MUSCLES: 50 UNITS BOTOX = TOTAL DOSE     Right Masseter - 15 units of Botox at 1 site/s.   Left Masseter - 15 units of Botox at 1 site/s.      Right Temporalis - 10 units of Botox at 1 site/s.  Left Temporalis - 10 units of Botox at 1 site/s.          3. SHOULDER & NECK MUSCLES: 60 UNITS BOTOX = TOTAL DOSE      Right Levator Scapula - 10 units of Botox at 2 site/s (neck and shoulder).  Left Levator Scapula - 10 units of Botox at 2 site/s (neck and shoulder).      Right Upper Trapezius (mid & low lateral) - 15 units of Botox at 3 site/s.  Left Upper Trapezius (mid & low lateral) - 15 units of Botox at 3 site/s.             Left Pectoralis Minor - 10 units of Botox at 1 site/s.       BILATERAL OCCIPITAL NERVE BLOCKS & TRIGGER POINT INJECTIONS:     Bupivacaine 0.25%: 10 ml (Batch: [id number], Exp: 2024, NDC: 55150-167-10)  Lidocaine 1%: 2 ml (Lot: [id number], Exp: 10/2025, NDC: 83650-829-00)  [place] mg = 1 ml (Lot: [id number], Exp: 2024, NDC: [address]-6)    ONB: Area just inferior to insertion of the right and left superior trapezius insertion onto skull was cleansed with alcohol. Needle was advanced anteriorly to base of skull then slightly withdrawn and injectate was injected in a fan-like distribution at different depths. Total injection volume of 5 ml of the above medications (bupivicaine, lidocaine and Kenalog) was placed per side.     TPI: Using  standard precautions a 30-gauge 1-inch needle was used to inject a 3 ml mixture of 1% Lidocaine and 0.25% bupivacaine into the upper trapezius muscle(s) for a total of 12 sites.      RESPONSE TO PROCEDURE  Valerie Sim tolerated the procedure well and there were no immediate complications. She was allowed to recover for an appropriate period of time and was discharged home in stable condition.    ASSESSMENT AND PLAN   1. Botulinum toxin injections: No changes made to Botox dose or distribution today. Patient will continue to monitor response and report at next appointment.   2. Referrals: None.   3. Follow up: Valerie Sim was rescheduled for the next series of injections in 9 weeks, at which time we will evaluate response to today's injections. she may call the clinic prior with any questions or concerns prior to the next appointment.

## 2022-06-30 RX ORDER — TRIAMCINOLONE ACETONIDE 40 MG/ML
40 INJECTION, SUSPENSION INTRA-ARTICULAR; INTRAMUSCULAR ONCE
Status: COMPLETED | OUTPATIENT
Start: 2022-06-30 | End: 2022-06-30

## 2022-06-30 RX ORDER — BUPIVACAINE HYDROCHLORIDE 2.5 MG/ML
10 INJECTION, SOLUTION EPIDURAL; INFILTRATION; INTRACAUDAL ONCE
Status: COMPLETED | OUTPATIENT
Start: 2022-06-30 | End: 2022-06-30

## 2022-06-30 RX ADMIN — BUPIVACAINE HYDROCHLORIDE 25 MG: 2.5 INJECTION, SOLUTION EPIDURAL; INFILTRATION; INTRACAUDAL at 14:33

## 2022-06-30 RX ADMIN — TRIAMCINOLONE ACETONIDE 40 MG: 40 INJECTION, SUSPENSION INTRA-ARTICULAR; INTRAMUSCULAR at 14:34

## 2022-08-24 ENCOUNTER — OFFICE VISIT (OUTPATIENT)
Dept: PHYSICAL MEDICINE AND REHAB | Facility: CLINIC | Age: 57
End: 2022-08-24
Payer: COMMERCIAL

## 2022-08-24 VITALS
TEMPERATURE: 98.8 F | WEIGHT: 183 LBS | OXYGEN SATURATION: 96 % | DIASTOLIC BLOOD PRESSURE: 82 MMHG | HEART RATE: 91 BPM | SYSTOLIC BLOOD PRESSURE: 144 MMHG | BODY MASS INDEX: 28.66 KG/M2

## 2022-08-24 DIAGNOSIS — G43.719 CHRONIC MIGRAINE WITHOUT AURA, INTRACTABLE, WITHOUT STATUS MIGRAINOSUS: Primary | ICD-10-CM

## 2022-08-24 PROCEDURE — 64615 CHEMODENERV MUSC MIGRAINE: CPT | Mod: 59 | Performed by: PHYSICAL MEDICINE & REHABILITATION

## 2022-08-24 PROCEDURE — 96372 THER/PROPH/DIAG INJ SC/IM: CPT | Performed by: PHYSICAL MEDICINE & REHABILITATION

## 2022-08-24 RX ORDER — BUPIVACAINE HYDROCHLORIDE 2.5 MG/ML
5 INJECTION, SOLUTION EPIDURAL; INFILTRATION; INTRACAUDAL ONCE
Status: COMPLETED | OUTPATIENT
Start: 2022-08-24 | End: 2022-08-24

## 2022-08-24 RX ADMIN — BUPIVACAINE HYDROCHLORIDE 12.5 MG: 2.5 INJECTION, SOLUTION EPIDURAL; INFILTRATION; INTRACAUDAL at 11:19

## 2022-08-24 NOTE — NURSING NOTE
Chief Complaint   Patient presents with     RECHECK     Botox injections confirmed with patient     Addis Holt CMA at 9:37 AM on 8/24/2022.

## 2022-08-24 NOTE — LETTER
8/24/2022       RE: Valerie Sim  [address]  [place] [address]     Dear Colleague,    Thank you for referring your patient, Valerie Sim, to the [facility] [place] at [facility]. Please see a copy of my visit note below.      [facility]     PM&R CLINIC NOTE  BOTULINUM TOXIN PROCEDURE      HPI  Chief Complaint   Patient presents with     RECHECK     Botox injections confirmed with patient     Valerie Sim is a 57 year old female with a history of chronic migraine headaches who presents to clinic for botulinum toxin injections for management of chronic migraine headaches and excessive jaw clenching.     SINCE LAST VISIT  Valerie Sim was last seen here in clinic on 6/22/2022, at which time she received 225 units of Botox.    Patient denies new medical diagnoses, illnesses, hospitalizations, emergency room visits, and injuries since the previous injection with botulinum neurotoxin.    RESPONSE TO PREVIOUS TREATMENT    Side effects: No problems reported    1.  Headache frequency during this injection cycle:  4 more severe migraine headache days per month, which are usually short-lived and more responsive to medications. She does experience daily mild headaches, but these do not interfere with her life the way her migraine headaches do and she does not require medication for them. This is compared to her baseline headache frequency of 30 severe headache days per month.     2.  Headache duration during this injection cycle:  Headache duration was typically between 1-6 hours depending on how she manages her headaches. Patient reports no episodes of multiple day headaches during this injection cycle.     3.  Headache intensity during this injection cycle:    A.  5/10  =  Typical pain level.  B.  9/10  =  Worst pain level.  C.  0/10  =  Lowest pain level.    4.   Change in headache medication usage during this injection cycle:  (For Example:  Able to decrease use of oral pain medications.) No changes.      5.  ER Visits During This Injection Cycle:  None for headaches.     6.  Functional Performance:  Change in ADL's, social interaction, days lost from work, etc. Patient reports being able to more fully participate in social and family activities and responsibilities as headache symptoms have improved      PHYSICAL EXAM  VS: BP (!) 144/82 (BP Location: Right arm, Patient Position: Sitting, Cuff Size: Adult Regular)   Pulse 91   Temp 98.8  F (37.1  C) (Oral)   Wt 83 kg (183 lb)   SpO2 96%   BMI 28.66 kg/m     GEN: Pleasant and cooperative, in no acute distress  HEENT: No facial asymmetry    ALLERGIES  Allergies   Allergen Reactions     Fluoxetine Other (See Comments)     PN: LW Reaction: headache  PN: LW Reaction: headache  Migraine       Garlic Blisters, Cough, Dermatitis, Difficulty breathing, Headache, Hives, Itching, Nausea and Vomiting and Shortness Of Breath     Candesartan      Other reaction(s): Myalgia     Duloxetine      Other reaction(s): Headache  migraine     Iodine      Other reaction(s): Other (see comments)  PN: LW CM1: CONTRAST- iodine Reaction :     Metoclopramide      Other reaction(s): Headache  Caused increase in headaches     Perfume      Other reaction(s): Headache  head pain leading to migraines     Pregabalin      Other reaction(s): Headache, Myalgia     Trazodone Other (See Comments) and Unknown     Other reaction(s): Headache  Other reaction(s): Headache  Other reaction(s): Headache     Amitriptyline Hcl Other (See Comments)     Migraine       Candesartan Cilexetil-Hctz Muscle Pain (Myalgia)     Cymbalta Other (See Comments)     Migraine       Cyproheptadine Hcl      headaches     Desvenlafaxine      Migraine       Diatrizoate Unknown     PN: LW CM1: CONTRAST- iodine Reaction :     Diazepam      Other reaction(s): Vestibular Toxicity  PN: LW  "Reaction: vertigo  PN: LW Reaction: vertigo     Galcanezumab-Gnlm      Other reaction(s): Headache     Imipramine Other (See Comments)     Migraine       Lyrica Muscle Pain (Myalgia)     Metoprolol Other (See Comments) and Unknown     headache  headache  headache     Morphine Other (See Comments)     Patient reports seizure   Other reaction(s): Unknown  Seizure; 5/111/21 updated info: patient states she had a seizure while having a migraine headache years ago and is not sure if it was due to morphine. She has tolerated Dilaudid since then.       No Clinical Screening - See Comments      PN: LW FI1: lactose intolerance LW FI2: shellfish  PN: LW CM1: CONTRAST- iodine Reaction :  PN: LW Other1: -nka     Nortriptyline Other (See Comments)     Migraine       Phenergan Dm [Promethazine-Dm] Other (See Comments)     seizures     Promethazine      PN: LW Reaction: minimal sizures     Venlafaxine Other (See Comments)     PN: LW Reaction: migraine  PN: LW Reaction: migraine  Migraine       Wellbutrin [Bupropion Hydrobromide] Other (See Comments)     Migraine       Compazine Anxiety     Dihydroergotamine Anxiety and Other (See Comments)     Cardiac symptoms     Droperidol Anxiety     PN: LW Reaction: agitation     Medroxyprogesterone Hives     PN: LW Reaction: nausea     Prochlorperazine Anxiety     PN: LW Reaction: \"can't sit still\"       CURRENT MEDICATIONS    Current Outpatient Medications:      aMILoride (MIDAMOR) 5 MG tablet, , Disp: , Rfl:      amLODIPine (NORVASC) 10 MG tablet, , Disp: , Rfl:      amphetamine-dextroamphetamine (ADDERALL) 20 MG tablet, Take 1 tablet (20 mg) by mouth 2 times daily, Disp: 60 tablet, Rfl: 0     budesonide (RINOCORT AQUA) 32 MCG/ACT nasal spray, Spray 1 spray into both nostrils daily., Disp: , Rfl:      cyclobenzaprine (FLEXERIL) 10 MG tablet, , Disp: , Rfl:      CycloSPORINE (RESTASIS OP), Apply to eye 2 times daily, Disp: , Rfl:      fexofenadine (ALLEGRA) 180 MG tablet, Take  by mouth " daily., Disp: , Rfl:      HYDROmorphone (DILAUDID) 2 MG tablet, Take 0.5-1 tablets (1-2 mg) by mouth every 3 hours as needed (headache) 20 tablets = 3 month supply., Disp: 24 tablet, Rfl: 0     ketorolac (TORADOL) 30 MG/ML injection, Inject 1 mL (30 mg) into the vein every 6 hours as needed for moderate pain, Disp: 9 mL, Rfl: 11     LORazepam (ATIVAN) 1 MG tablet, Take 1 mg by mouth 2 times daily as needed., Disp: , Rfl:      nebivolol (BYSTOLIC) 5 MG tablet, Take 5 mg by mouth daily Take twice a day totaling 10 mg., Disp: , Rfl:      ondansetron (ZOFRAN) 4 MG tablet, Take 1 tablet (4 mg) by mouth every 8 hours as needed, Disp: 90 tablet, Rfl: 1     order for DME, Equipment being ordered: Oxygen The patient has Cluster Headache and needs 10 liters/minute of high flow oxygen via nonrebreather mask prn headaches, Disp: 99 days, Rfl: 0     order for DME, Equipment being ordered: Oxygen and non-rebreather mask.   Use high flow oxygen (10-15 L/min) with non-rebreather mask, as needed for cluster headaches, Disp: 1 Units, Rfl: 11     QUEtiapine (SEROQUEL) 50 MG tablet, Take 1 tablet by mouth daily., Disp: , Rfl:      SUMAtriptan (IMITREX STATDOSE) 6 MG/0.5ML pen injector kit, Inject 0.5 mLs (6 mg) Subcutaneous at onset of headache for migraine (max 2 injections a day) May repeat in 1 hour. Max 12 mg/24 hours., Disp: 9 kit, Rfl: 0     SUMAtriptan (IMITREX) 100 MG tablet, Take 1 tablet (100 mg) by mouth at onset of headache for migraine May repeat in 2 hours. Max 2 tablets/24 hours., Disp: 12 tablet, Rfl: 3     temazepam (RESTORIL) 15 MG capsule, Take 1 capsule by mouth daily, Disp: , Rfl:      valACYclovir (VALTREX) 1000 mg tablet, Take 1,000 mg by mouth as needed., Disp: , Rfl:     Current Facility-Administered Medications:      botulinum toxin type A (BOTOX) 100 units injection 225 Units, 225 Units, Intramuscular, See Admin Instructions, Standal, Addie Saeed MD, 225 Units at 06/30/22 7247       BOTULINUM NEUROTOXIN  INJECTION PROCEDURES    VERIFICATION OF PATIENT IDENTIFICATION AND PROCEDURE     Initials   Patient Name SES   Patient  SES   Procedure Verified by: DILAN     Prior to the start of the procedure and with procedural staff participation, I verbally confirmed the patient s identity using two indicators, relevant allergies, that the procedure was appropriate and matched the consent or emergent situation, and that the correct equipment/implants were available. Immediately prior to starting the procedure I conducted the Time Out with the procedural staff and re-confirmed the patient s name, procedure, and site/side. (The Joint Commission universal protocol was followed.)  Yes    Sedation (Moderate or Deep): None    ABOVE ASSESSMENTS PERFORMED BY    Addie Malhotra MD      INDICATIONS FOR PROCEDURES  Valerie Sim is a 57 year old patient with pain and jaw clenching secondary to the diagnosis of chronic migraine headaches and oromandibular dystonia. Her baseline symptoms have been recalcitrant to oral medications and conservative therapy.  She is here today for reinjection with Botox.    GOAL OF PROCEDURE  The goal of this procedure is to decrease pain and excessive jaw clenching due to chronic migraine headaches and bruxism.     TOTAL DOSE ADMINISTERED  Dose Administered:  225 units  Botox (Botulinum Toxin Type A)       2:1 Dilution   Unavoidable Drug Waste: Yes  Amount of drug waste (mL): 75 units Botox.  Reason for waste:  Single use vial  Diluent Used:  0.25% bupivacaine (Batch: [id number], Exp: 2024, NDC: 55150-167-10)  Total Volume of Diluent Used:  4.5 ml  Lot # [id number] with Expiration Date: 2023  NDC #: Botox 100u ([address]-47)      CONSENT  The risks, benefits, and treatment options were discussed with Valerie Sim and she agreed to proceed.    Written consent was obtained by [facility].     EQUIPMENT USED  Needle-25mm stimulating/recording  Needle-30 gauge  EMG/NCS Machine    SKIN PREPARATION  Skin  preparation was performed using an alcohol wipe.    GUIDANCE DESCRIPTION  Electro-myographic guidance was necessary throughout the neck and jaw portion of the procedure to accurately identify all areas of dystonic muscles while avoiding injection of non-dystonic muscles, neighboring nerves and nearby vascular structures.     AREA/MUSCLE INJECTED:  225 UNITS BOTOX = TOTAL DOSE, 2:1 DILUTION     1. FACIAL & SCALP MUSCLES: 115 UNITS BOTOX = TOTAL DOSE     Right Temporalis - 12.5 units of Botox in 4 site/s.  Left Temporalis - 12.5 units of Botox in 4 site/s.      Right Frontalis - 10 units of Botox in 4 site/s.  Left Frontalis - 10 units of Botox in 4 site/s.      Procerus - 5 units of Botox at 1 site/s.       Right  - 5 units of Botox in 1 site/s.  Left  - 5 units of Botox in 1 site/s.      Right Upper Occipitalis - 25 units of Botox at 4 site/s.   Left Upper Occipitalis - 25 units of Botox at 4 site/s.      Right Nasalis - 2.5 units of Botox at 1 site/s.           Left Nasalis - 2.5 units of Botox at 1 site/s.          2. JAW MUSCLES: 50 UNITS BOTOX = TOTAL DOSE     Right Masseter - 15 units of Botox at 1 site/s.   Left Masseter - 15 units of Botox at 1 site/s.      Right Temporalis - 10 units of Botox at 1 site/s.  Left Temporalis - 10 units of Botox at 1 site/s.          3. SHOULDER & NECK MUSCLES: 60 UNITS BOTOX = TOTAL DOSE      Right Levator Scapula - 10 units of Botox at 2 site/s (neck and shoulder).  Left Levator Scapula - 10 units of Botox at 2 site/s (neck and shoulder).      Right Upper Trapezius (mid & low lateral) - 15 units of Botox at 3 site/s.  Left Upper Trapezius (mid & low lateral) - 15 units of Botox at 3 site/s.             Left Pectoralis Minor - 10 units of Botox at 1 site/s.       RESPONSE TO PROCEDURE  Valerie Sim tolerated the procedure well and there were no immediate complications. She was allowed to recover for an appropriate period of time and was discharged home in  stable condition.    ASSESSMENT AND PLAN   1. Botulinum toxin injections: No changes made to Botox dose or distribution today. Patient will continue to monitor response and report at next appointment.   2. Referrals: None.   3. Follow up: Valerie Sim was rescheduled for the next series of injections in 9 weeks, at which time we will evaluate response to today's injections. she may call the clinic prior with any questions or concerns prior to the next appointment.         Sincerely,    Addie Malhotra MD

## 2022-08-24 NOTE — PROGRESS NOTES
[facility]     PM&R CLINIC NOTE  BOTULINUM TOXIN PROCEDURE      HPI  Chief Complaint   Patient presents with     RECHECK     Botox injections confirmed with patient     Valerie Sim is a 57 year old female with a history of chronic migraine headaches who presents to clinic for botulinum toxin injections for management of chronic migraine headaches and excessive jaw clenching.     SINCE LAST VISIT  Valerie Sim was last seen here in clinic on 6/22/2022, at which time she received 225 units of Botox.    Patient denies new medical diagnoses, illnesses, hospitalizations, emergency room visits, and injuries since the previous injection with botulinum neurotoxin.    RESPONSE TO PREVIOUS TREATMENT    Side effects: No problems reported    1.  Headache frequency during this injection cycle:  4 more severe migraine headache days per month, which are usually short-lived and more responsive to medications. She does experience daily mild headaches, but these do not interfere with her life the way her migraine headaches do and she does not require medication for them. This is compared to her baseline headache frequency of 30 severe headache days per month.     2.  Headache duration during this injection cycle:  Headache duration was typically between 1-6 hours depending on how she manages her headaches. Patient reports no episodes of multiple day headaches during this injection cycle.     3.  Headache intensity during this injection cycle:    A.  5/10  =  Typical pain level.  B.  9/10  =  Worst pain level.  C.  0/10  =  Lowest pain level.    4.  Change in headache medication usage during this injection cycle:  (For Example:  Able to decrease use of oral pain medications.) No changes.      5.  ER Visits During This Injection Cycle:  None for headaches.     6.  Functional Performance:  Change in ADL's, social interaction, days lost from work, etc. Patient reports being able to more fully  participate in social and family activities and responsibilities as headache symptoms have improved      PHYSICAL EXAM  VS: BP (!) 144/82 (BP Location: Right arm, Patient Position: Sitting, Cuff Size: Adult Regular)   Pulse 91   Temp 98.8  F (37.1  C) (Oral)   Wt 83 kg (183 lb)   SpO2 96%   BMI 28.66 kg/m     GEN: Pleasant and cooperative, in no acute distress  HEENT: No facial asymmetry    ALLERGIES  Allergies   Allergen Reactions     Fluoxetine Other (See Comments)     PN: LW Reaction: headache  PN: LW Reaction: headache  Migraine       Garlic Blisters, Cough, Dermatitis, Difficulty breathing, Headache, Hives, Itching, Nausea and Vomiting and Shortness Of Breath     Candesartan      Other reaction(s): Myalgia     Duloxetine      Other reaction(s): Headache  migraine     Iodine      Other reaction(s): Other (see comments)  PN: LW CM1: CONTRAST- iodine Reaction :     Metoclopramide      Other reaction(s): Headache  Caused increase in headaches     Perfume      Other reaction(s): Headache  head pain leading to migraines     Pregabalin      Other reaction(s): Headache, Myalgia     Trazodone Other (See Comments) and Unknown     Other reaction(s): Headache  Other reaction(s): Headache  Other reaction(s): Headache     Amitriptyline Hcl Other (See Comments)     Migraine       Candesartan Cilexetil-Hctz Muscle Pain (Myalgia)     Cymbalta Other (See Comments)     Migraine       Cyproheptadine Hcl      headaches     Desvenlafaxine      Migraine       Diatrizoate Unknown     PN: LW CM1: CONTRAST- iodine Reaction :     Diazepam      Other reaction(s): Vestibular Toxicity  PN: LW Reaction: vertigo  PN: LW Reaction: vertigo     Galcanezumab-Gnlm      Other reaction(s): Headache     Imipramine Other (See Comments)     Migraine       Lyrica Muscle Pain (Myalgia)     Metoprolol Other (See Comments) and Unknown     headache  headache  headache     Morphine Other (See Comments)     Patient reports seizure   Other reaction(s):  "Unknown  Seizure; 5/111/21 updated info: patient states she had a seizure while having a migraine headache years ago and is not sure if it was due to morphine. She has tolerated Dilaudid since then.       No Clinical Screening - See Comments      PN: LW FI1: lactose intolerance LW FI2: shellfish  PN: LW CM1: CONTRAST- iodine Reaction :  PN: LW Other1: -nka     Nortriptyline Other (See Comments)     Migraine       Phenergan Dm [Promethazine-Dm] Other (See Comments)     seizures     Promethazine      PN: LW Reaction: minimal sizures     Venlafaxine Other (See Comments)     PN: LW Reaction: migraine  PN: LW Reaction: migraine  Migraine       Wellbutrin [Bupropion Hydrobromide] Other (See Comments)     Migraine       Compazine Anxiety     Dihydroergotamine Anxiety and Other (See Comments)     Cardiac symptoms     Droperidol Anxiety     PN: LW Reaction: agitation     Medroxyprogesterone Hives     PN: LW Reaction: nausea     Prochlorperazine Anxiety     PN: LW Reaction: \"can't sit still\"       CURRENT MEDICATIONS    Current Outpatient Medications:      aMILoride (MIDAMOR) 5 MG tablet, , Disp: , Rfl:      amLODIPine (NORVASC) 10 MG tablet, , Disp: , Rfl:      amphetamine-dextroamphetamine (ADDERALL) 20 MG tablet, Take 1 tablet (20 mg) by mouth 2 times daily, Disp: 60 tablet, Rfl: 0     budesonide (RINOCORT AQUA) 32 MCG/ACT nasal spray, Spray 1 spray into both nostrils daily., Disp: , Rfl:      cyclobenzaprine (FLEXERIL) 10 MG tablet, , Disp: , Rfl:      CycloSPORINE (RESTASIS OP), Apply to eye 2 times daily, Disp: , Rfl:      fexofenadine (ALLEGRA) 180 MG tablet, Take  by mouth daily., Disp: , Rfl:      HYDROmorphone (DILAUDID) 2 MG tablet, Take 0.5-1 tablets (1-2 mg) by mouth every 3 hours as needed (headache) 20 tablets = 3 month supply., Disp: 24 tablet, Rfl: 0     ketorolac (TORADOL) 30 MG/ML injection, Inject 1 mL (30 mg) into the vein every 6 hours as needed for moderate pain, Disp: 9 mL, Rfl: 11     LORazepam " (ATIVAN) 1 MG tablet, Take 1 mg by mouth 2 times daily as needed., Disp: , Rfl:      nebivolol (BYSTOLIC) 5 MG tablet, Take 5 mg by mouth daily Take twice a day totaling 10 mg., Disp: , Rfl:      ondansetron (ZOFRAN) 4 MG tablet, Take 1 tablet (4 mg) by mouth every 8 hours as needed, Disp: 90 tablet, Rfl: 1     order for DME, Equipment being ordered: Oxygen The patient has Cluster Headache and needs 10 liters/minute of high flow oxygen via nonrebreather mask prn headaches, Disp: 99 days, Rfl: 0     order for DME, Equipment being ordered: Oxygen and non-rebreather mask.   Use high flow oxygen (10-15 L/min) with non-rebreather mask, as needed for cluster headaches, Disp: 1 Units, Rfl: 11     QUEtiapine (SEROQUEL) 50 MG tablet, Take 1 tablet by mouth daily., Disp: , Rfl:      SUMAtriptan (IMITREX STATDOSE) 6 MG/0.5ML pen injector kit, Inject 0.5 mLs (6 mg) Subcutaneous at onset of headache for migraine (max 2 injections a day) May repeat in 1 hour. Max 12 mg/24 hours., Disp: 9 kit, Rfl: 0     SUMAtriptan (IMITREX) 100 MG tablet, Take 1 tablet (100 mg) by mouth at onset of headache for migraine May repeat in 2 hours. Max 2 tablets/24 hours., Disp: 12 tablet, Rfl: 3     temazepam (RESTORIL) 15 MG capsule, Take 1 capsule by mouth daily, Disp: , Rfl:      valACYclovir (VALTREX) 1000 mg tablet, Take 1,000 mg by mouth as needed., Disp: , Rfl:     Current Facility-Administered Medications:      botulinum toxin type A (BOTOX) 100 units injection 225 Units, 225 Units, Intramuscular, See Admin Instructions, Standal, Addie Saeed MD, 225 Units at 22 1434       BOTULINUM NEUROTOXIN INJECTION PROCEDURES    VERIFICATION OF PATIENT IDENTIFICATION AND PROCEDURE     Initials   Patient Name SES   Patient  SES   Procedure Verified by: SES     Prior to the start of the procedure and with procedural staff participation, I verbally confirmed the patient s identity using two indicators, relevant allergies, that the procedure was  appropriate and matched the consent or emergent situation, and that the correct equipment/implants were available. Immediately prior to starting the procedure I conducted the Time Out with the procedural staff and re-confirmed the patient s name, procedure, and site/side. (The Joint Commission universal protocol was followed.)  Yes    Sedation (Moderate or Deep): None    ABOVE ASSESSMENTS PERFORMED BY    Addie Malhotra MD      INDICATIONS FOR PROCEDURES  Valerie Sim is a 57 year old patient with pain and jaw clenching secondary to the diagnosis of chronic migraine headaches and oromandibular dystonia. Her baseline symptoms have been recalcitrant to oral medications and conservative therapy.  She is here today for reinjection with Botox.    GOAL OF PROCEDURE  The goal of this procedure is to decrease pain and excessive jaw clenching due to chronic migraine headaches and bruxism.     TOTAL DOSE ADMINISTERED  Dose Administered:  225 units  Botox (Botulinum Toxin Type A)       2:1 Dilution   Unavoidable Drug Waste: Yes  Amount of drug waste (mL): 75 units Botox.  Reason for waste:  Single use vial  Diluent Used:  0.25% bupivacaine (Batch: [id number], Exp: 09/2024, NDC: 55150-167-10)  Total Volume of Diluent Used:  4.5 ml  Lot # [id number] with Expiration Date: 09/2023  NDC #: Botox 100u ([address]-56)      CONSENT  The risks, benefits, and treatment options were discussed with Valerie Sim and she agreed to proceed.    Written consent was obtained by [facility].     EQUIPMENT USED  Needle-25mm stimulating/recording  Needle-30 gauge  EMG/NCS Machine    SKIN PREPARATION  Skin preparation was performed using an alcohol wipe.    GUIDANCE DESCRIPTION  Electro-myographic guidance was necessary throughout the neck and jaw portion of the procedure to accurately identify all areas of dystonic muscles while avoiding injection of non-dystonic muscles, neighboring nerves and nearby vascular structures.     AREA/MUSCLE INJECTED:  225  UNITS BOTOX = TOTAL DOSE, 2:1 DILUTION     1. FACIAL & SCALP MUSCLES: 115 UNITS BOTOX = TOTAL DOSE     Right Temporalis - 12.5 units of Botox in 4 site/s.  Left Temporalis - 12.5 units of Botox in 4 site/s.      Right Frontalis - 10 units of Botox in 4 site/s.  Left Frontalis - 10 units of Botox in 4 site/s.      Procerus - 5 units of Botox at 1 site/s.       Right  - 5 units of Botox in 1 site/s.  Left  - 5 units of Botox in 1 site/s.      Right Upper Occipitalis - 25 units of Botox at 4 site/s.   Left Upper Occipitalis - 25 units of Botox at 4 site/s.      Right Nasalis - 2.5 units of Botox at 1 site/s.           Left Nasalis - 2.5 units of Botox at 1 site/s.          2. JAW MUSCLES: 50 UNITS BOTOX = TOTAL DOSE     Right Masseter - 15 units of Botox at 1 site/s.   Left Masseter - 15 units of Botox at 1 site/s.      Right Temporalis - 10 units of Botox at 1 site/s.  Left Temporalis - 10 units of Botox at 1 site/s.          3. SHOULDER & NECK MUSCLES: 60 UNITS BOTOX = TOTAL DOSE      Right Levator Scapula - 10 units of Botox at 2 site/s (neck and shoulder).  Left Levator Scapula - 10 units of Botox at 2 site/s (neck and shoulder).      Right Upper Trapezius (mid & low lateral) - 15 units of Botox at 3 site/s.  Left Upper Trapezius (mid & low lateral) - 15 units of Botox at 3 site/s.             Left Pectoralis Minor - 10 units of Botox at 1 site/s.       RESPONSE TO PROCEDURE  Valerie Sim tolerated the procedure well and there were no immediate complications. She was allowed to recover for an appropriate period of time and was discharged home in stable condition.    ASSESSMENT AND PLAN   1. Botulinum toxin injections: No changes made to Botox dose or distribution today. Patient will continue to monitor response and report at next appointment.   2. Referrals: None.   3. Follow up: Valerie Sim was rescheduled for the next series of injections in 9 weeks, at which time we will evaluate response  to today's injections. she may call the clinic prior with any questions or concerns prior to the next appointment.

## 2022-10-12 NOTE — LETTER
"4/4/2018       RE: Valerie Sim  [address]  [facility] [address]     Dear Colleague,    Thank you for referring your patient, Valerie Sim, to the [facility] PHYSICAL MEDICINE AND REHABILITATION at [facility]. Please see a copy of my visit note below.    BOTULINUM TOXIN PROCEDURE - HEADACHE - NOTE    Chief Complaint   Patient presents with     RECHECK     UMP- BOTOX- MIGRAINE     /88  Pulse 105  Temp 98.2  F (36.8  C) (Oral)  Resp 24  Ht 1.702 m (5' 7\")  Wt 77.1 kg (169 lb 15.6 oz)  SpO2 98%  Breastfeeding? No  BMI 26.62 kg/m2       Current Outpatient Prescriptions:      INDOMETHACIN PO, Take 50 mg by mouth every other day, Disp: , Rfl:      Divalproex Sodium (DEPAKOTE PO), Take 250 mg by mouth every other day, Disp: , Rfl:      HYDROmorphone (DILAUDID) 2 MG tablet, Take 0.5-1 tablets (1-2 mg) by mouth every 3 hours as needed 20 tablets = 3 month supply., Disp: 24 tablet, Rfl: 0     amphetamine-dextroamphetamine (ADDERALL) 20 MG per tablet, Take 1 tablet (20 mg) by mouth 2 times daily, Disp: 60 tablet, Rfl: 0     [START ON 4/20/2018] amphetamine-dextroamphetamine (ADDERALL) 20 MG per tablet, Take 1 tablet (20 mg) by mouth 2 times daily, Disp: 60 tablet, Rfl: 0     [START ON 5/20/2018] amphetamine-dextroamphetamine (ADDERALL) 20 MG per tablet, Take 1 tablet (20 mg) by mouth 2 times daily, Disp: 60 tablet, Rfl: 0     OnabotulinumtoxinA (BOTOX IJ), Inject 200 Units into the muscle once Lot # /C3 with Expiration Date: 08/2020, Disp: , Rfl:      eletriptan (RELPAX) 40 MG tablet, take 1 tablet at onset of migraine. may repeat once after 2 hours. max of 2 tabs/24 hours and 3 days/week, Disp: 12 tablet, Rfl: 4     ondansetron (ZOFRAN) 4 MG tablet, Take 1 tablet (4 mg) by mouth every 8 hours as needed, Disp: 90 tablet, Rfl: 1     CycloSPORINE (RESTASIS OP), Apply to eye 2 times daily, Disp: , Rfl:      Lutein 20 MG TABS, Take 1 tablet by " Problem: Pain  Goal: Verbalizes/displays adequate comfort level or baseline comfort level  Outcome: Progressing     Problem: Safety - Adult  Goal: Free from fall injury  Outcome: Progressing     Problem: ABCDS Injury Assessment  Goal: Absence of physical injury  Outcome: Progressing     Problem: Skin/Tissue Integrity  Goal: Absence of new skin breakdown  Description: 1. Monitor for areas of redness and/or skin breakdown  2. Assess vascular access sites hourly  3. Every 4-6 hours minimum:  Change oxygen saturation probe site  4. Every 4-6 hours:  If on nasal continuous positive airway pressure, respiratory therapy assess nares and determine need for appliance change or resting period.   Outcome: Progressing mouth every other day Reported on 5/9/2017, Disp: , Rfl:      LORazepam (ATIVAN) 1 MG tablet, Take 1 mg by mouth 2 times daily as needed., Disp: , Rfl:      QUEtiapine (SEROQUEL) 50 MG tablet, Take 1 tablet by mouth daily., Disp: , Rfl:      budesonide (RINOCORT AQUA) 32 MCG/ACT nasal spray, Spray 1 spray into both nostrils daily., Disp: , Rfl:      fexofenadine (ALLEGRA) 180 MG tablet, Take  by mouth daily., Disp: , Rfl:      estrogens, conjugated, (PREMARIN) 0.625 MG tablet, Take by mouth daily .30 mg. , Disp: , Rfl:      valACYclovir (VALTREX) 1000 mg tablet, Take 1,000 mg by mouth as needed., Disp: , Rfl:      [DISCONTINUED] OnabotulinumtoxinA (BOTOX IJ), Inject 175 Units into the muscle once Lot: [id number] Exp: 04/2020, Disp: , Rfl:      [DISCONTINUED] OnabotulinumtoxinA (BOTOX IJ), Inject 175 Units as directed once Lot#: /C3 Exp: 01/2020, Disp: , Rfl:      [DISCONTINUED] OnabotulinumtoxinA (BOTOX IJ), Inject 175 Units as directed once Lot # /C3 with Expiration Date: 10/2019, Disp: , Rfl:      [DISCONTINUED] OnabotulinumtoxinA (BOTOX IJ), Inject 175 Units into the muscle Lot # /C3  Exp: 9/2019, Disp: , Rfl:      [DISCONTINUED] OnabotulinumtoxinA (BOTOX IJ), Inject 175 Units as directed once Lot# /C3, Exp 08/2019, Disp: , Rfl:      [DISCONTINUED] OnabotulinumtoxinA (BOTOX IJ), Inject 175 Units into the muscle Lot # /C3  Exp: 6/2019, Disp: , Rfl:      [DISCONTINUED] OnabotulinumtoxinA (BOTOX IJ), Inject 175 Units into the muscle Lot # /C3  Exp: 4/2019, Disp: , Rfl:      [DISCONTINUED] OnabotulinumtoxinA (BOTOX IJ), Inject 175 Units into the muscle Lot # /c3  Exp: 2/2019, Disp: , Rfl:      Allergies   Allergen Reactions     Trazodone      Other reaction(s): Headache     Amitriptyline Hcl Other (See Comments)     Migraine       Candesartan Cilexetil-Hctz Muscle Pain (Myalgia)     Cymbalta Other (See Comments)     Migraine       Cyproheptadine Hcl      headaches     Desvenlafaxine       Migraine       Fluoxetine Other (See Comments)     Migraine       Imipramine Other (See Comments)     Migraine       Lyrica Muscle Pain (Myalgia)     Metoprolol Other (See Comments)     headache     Morphine Other (See Comments)     Patient reports seizure      Nortriptyline Other (See Comments)     Migraine       Phenergan Dm [Promethazine-Dm] Other (See Comments)     seizures     Venlafaxine Other (See Comments)     Migraine       Wellbutrin [Bupropion Hydrobromide] Other (See Comments)     Migraine       Compazine Anxiety     Dihydroergotamine Anxiety and Other (See Comments)     Cardiac symptoms     Droperidol Anxiety     Medroxyprogesterone Hives     Prochlorperazine Anxiety        PHYSICAL EXAM:    Pleasant and cooperative  In no acute distress  Has a headache today - rated 10/10  No facial asymmetry    HPI:    Patient denies new medical diagnoses, illnesses, hospitalizations, emergency room visits, and injuries since the previous injection with botulinum neurotoxin.  She had occipital nerve blocks at [facility] on 3/14/18 - this was minimally effective.      We reviewed the recommended safety guidelines for  Botox from any vaccine injection, such as the seasonal flu vaccine, by a minimum of 10-14 days with Valerie Sim. She acknowledged understanding.      RESPONSE TO PREVIOUS TREATMENT:  Change in headache pattern following last series of injections with 200 units of  Botox on 01/17/2018.    No problems reported: She had 3-5 days of malaise post injections and a rebound headache per usual for her.     1.  Headache frequency during this injection cycle:  She has daily headaches and 15 migraine days per month. This is compared to her baseline headache frequency of daily headaches.     2.  Headache duration during this injection cycle:  Her mild baseline headache varies in length throughout the day but is always present, her migraines last an few hours to up 1.5 days.    3.  Headache intensity during  this injection cycle:    A.  8-9/10  =  Typical pain level.  B.  10/10  =  Worst pain level.  C.  7/10  =  Lowest pain level.    4.  Change in headache medication usage during this injection cycle: She used her max dose of Relpax, which is about 8 times per month. She was also recently prescribed IM toradol, which has been helpful.     5.  ER Visits During This Injection Cycle:  0     6.  Functional Performance:  Change in ADL's, social interaction, days lost from work, etc. She reports missing multiple social and work obligations due to headaches, most days of the month. She is also on disability for her headaches.     BOTULINUM NEUROTOXIN INJECTION PROCEDURES:      VERIFICATION OF PATIENT IDENTIFICATION AND PROCEDURE     Initials   Patient Name SES   Patient  SES   Procedure Verified by: SES     Prior to the start of the procedure and with procedural staff participation, I verbally confirmed the patient s identity using two indicators, relevant allergies, that the procedure was appropriate and matched the consent or emergent situation, and that the correct equipment/implants were available. Immediately prior to starting the procedure I conducted the Time Out with the procedural staff and re-confirmed the patient s name, procedure, and site/side. (The Joint Commission universal protocol was followed.)  Yes    Sedation (Moderate or Deep): None    Above assessments performed by:  Addie Jerry MD    INDICATIONS FOR PROCEDURES:  Valerie Sim is a 53-year-old patient with chronic migraine headaches associated with cervicogenic components.      Her baseline symptoms have been recalcitrant to oral medications and conservative therapy. She is here today for an injection of Botox. We have received insurance approval to increase dose up to 225 units in hopes of increasing effectiveness and duration of effect. Decision was also made to change diluent to bupivacaine in attempt to decrease  post-injection headache associated with this procedure.       GOAL OF PROCEDURE:  The goal of this procedure is to decrease pain and enhance functional independence.     TOTAL DOSE ADMINISTERED:  Dose Administered: 200 units Botox (Botulinum Toxin Type A)  2:1 and 1:1 Dilution   Diluent Used: 0.25% Bupivacaine   Total Volume of Diluent Used: 3.5 mL  Lot # /C3 with Expiration Date: 10/2020  NDC #: Botox 100u ([address]-10)     Medication guide was offered to patient and was declined.     CONSENT:  The risks, benefits, and treatment options were discussed with Valerie Sim and she agreed to proceed.     Written consent was obtained by [facility].      EQUIPMENT USED:  Needle-37mm stimulating/recording  Needle-30 gauge  EMG/NCS Machine      SKIN PREPARATION:  Skin preparation was performed using an alcohol wipe.        AREA/MUSCLE INJECTED: 225 Units of Botox  1. FACE & SCALP: 115 units of Botox = Total dose, 2:1 Dilution  Right temporalis - 12.5 units of Botox in 4 site/s.  Left temporalis - 12.5 units of Botox in 4 site/s.      Right frontalis - 10 units of Botox in 4 site/s.  Left frontalis - 10 units of Botox in 4 site/s.      Right procerus - 3.5 units of Botox in 1 site/s.  Left procerus - 3.5 units of Botox in 1 site/s.      Right  - 4 units of Botox in 1 site/s.  Left  - 4 units of Botox in 1 site/s.      Right Occipitalis - 25 units of Botox at 4 site/s.   Left Occipitalis - 25 units of Botox at 4 site/s.      Right Nasalis - 2.5 units of Botox at 1 site/s.   Left Nasalis - 2.5 units of Botox at 1 site/s.       2. JAW MUSCLES: 60 units of Botox = Total Dose, 1:1 Dilution  Right Mass[place] - 25 units of Botox at 1 site/s.   Left [place] - 25 units of Botox at 1 site/s.      Right temporalis - 5 units of Botox at 1 site/s.  Left temporalis - 5 units of Botox at 1 site/s.       3. SHOULDER & NECK MUSCLES: Total dose 50 units of Botox, 2:1 Dilution      Right levator muscle - 10 units of Botox  at 2 site/s (neck and shoulder).  Left levator muscle - 10 units of Botox at 2 site/s (neck and shoulder).      Right mid trapezius - 5 units of Botox at 1 site.  Left mid trapezius - 5 units of Botox at 1 site.    Right lateral trapezius - 10 units of Botox at 2 site/s  Left lateral trapezuis - 10 units of Botox at 2 site/s.          RESPONSE TO PROCEDURE: Valerei Sim tolerated the procedure well and there were no immediate complications. She was allowed to recover for an appropriate period of time and was discharged home in stable condition.     FOLLOW UP:  Valerie Sim was asked to follow up by phone in 7-14 days with Nely Jean Baptiste PT, Care Coordinator or Estela Magaña RN, Care Coordinator, to report her response to this series of injections. Based on the patient's previous response to this therapy, Valerie Sim was rescheduled for the next series of injections in 9 weeks. Due to a limited duration of Botox effectiveness (approximately 8-9 weeks), we will continue to schedule her Botox every 9 weeks.      PLAN (Medication Changes, Therapy Orders, Work or Disability Issues, etc.): Diluent changed from normal saline to Bupivacaine and dose of Botox increased from 200 units to 225 units in hopes of achieving increased effectiveness and duration. Patient will continue to monitor response to today's injections.     There was 75 units of unavoidable waste for this patient.     Again, thank you for allowing me to participate in the care of your patient.      Sincerely,    Addie Malhotra MD

## 2022-10-16 ENCOUNTER — HEALTH MAINTENANCE LETTER (OUTPATIENT)
Age: 57
End: 2022-10-16

## 2022-10-26 ENCOUNTER — OFFICE VISIT (OUTPATIENT)
Dept: PHYSICAL MEDICINE AND REHAB | Facility: CLINIC | Age: 57
End: 2022-10-26
Payer: COMMERCIAL

## 2022-10-26 DIAGNOSIS — G43.719 CHRONIC MIGRAINE WITHOUT AURA, INTRACTABLE, WITHOUT STATUS MIGRAINOSUS: Primary | ICD-10-CM

## 2022-10-26 DIAGNOSIS — M54.81 BILATERAL OCCIPITAL NEURALGIA: ICD-10-CM

## 2022-10-26 DIAGNOSIS — M79.18 MYOFASCIAL PAIN: ICD-10-CM

## 2022-10-26 PROCEDURE — 20552 NJX 1/MLT TRIGGER POINT 1/2: CPT | Mod: 59 | Performed by: PHYSICAL MEDICINE & REHABILITATION

## 2022-10-26 PROCEDURE — 64405 NJX AA&/STRD GR OCPL NRV: CPT | Mod: 59 | Performed by: PHYSICAL MEDICINE & REHABILITATION

## 2022-10-26 PROCEDURE — 96372 THER/PROPH/DIAG INJ SC/IM: CPT | Performed by: PHYSICAL MEDICINE & REHABILITATION

## 2022-10-26 PROCEDURE — 64615 CHEMODENERV MUSC MIGRAINE: CPT | Mod: 59 | Performed by: PHYSICAL MEDICINE & REHABILITATION

## 2022-10-26 RX ORDER — TRIAMCINOLONE ACETONIDE 40 MG/ML
40 INJECTION, SUSPENSION INTRA-ARTICULAR; INTRAMUSCULAR ONCE
Status: COMPLETED | OUTPATIENT
Start: 2022-10-26 | End: 2022-10-26

## 2022-10-26 RX ORDER — BUPIVACAINE HYDROCHLORIDE 2.5 MG/ML
5 INJECTION, SOLUTION EPIDURAL; INFILTRATION; INTRACAUDAL ONCE
Status: COMPLETED | OUTPATIENT
Start: 2022-10-26 | End: 2022-10-26

## 2022-10-26 RX ADMIN — BUPIVACAINE HYDROCHLORIDE 12.5 MG: 2.5 INJECTION, SOLUTION EPIDURAL; INFILTRATION; INTRACAUDAL at 12:59

## 2022-10-26 RX ADMIN — TRIAMCINOLONE ACETONIDE 40 MG: 40 INJECTION, SUSPENSION INTRA-ARTICULAR; INTRAMUSCULAR at 12:59

## 2022-10-26 NOTE — LETTER
10/26/2022       RE: Valerie Sim  [address]  [place] [address]     Dear Colleague,    Thank you for referring your patient, Valerie Sim, to the [facility] PHYSICAL MEDICINE AND REHABILITATION CLINIC [place] at [facility]. Please see a copy of my visit note below.      [facility]     PM&R CLINIC NOTE  BOTULINUM TOXIN PROCEDURE      HPI  No chief complaint on file.    Valerie Sim is a 57 year old female with a history of chronic migraine headaches who presents to clinic for botulinum toxin injections for management of chronic migraine headaches and excessive jaw clenching.     SINCE LAST VISIT  Valerie Sim was last seen here in clinic on 8/24/2022, at which time she received 225 units of Botox.    Patient reports the following new medical problems since last visit: She was hospitalized for an intentional overdose due to severe depression with suicidal ideation and psychosis which is ultimately felt to be due to the gabapentin she was placed on.    RESPONSE TO PREVIOUS TREATMENT    Side effects: No problems reported    1.  Headache frequency during this injection cycle:  4 more severe migraine headache days (~8/10) per month, which are usually short-lived and more responsive to medications. She does experience daily mild headaches, but these do not interfere with her life the way her migraine headaches do and she does not require medication for them. This is compared to her baseline headache frequency of 30 severe headache days per month.     2.  Headache duration during this injection cycle:  Headache duration was typically between 1-6 hours depending on how she manages her headaches. Patient reports no episodes of multiple day headaches during this injection cycle.     3.  Headache intensity during this injection cycle:    A.  6/10  =  Typical pain level.  B.  10/10  =  Worst pain level.  C.   0/10  =  Lowest pain level.    4.  Change in headache medication usage during this injection cycle:  (For Example:  Able to decrease use of oral pain medications.) No changes.      5.  ER Visits During This Injection Cycle:  None for headaches.     6.  Functional Performance:  Change in ADL's, social interaction, days lost from work, etc. Patient reports being able to more fully participate in social and family activities and responsibilities as headache symptoms have improved.      PHYSICAL EXAM  VS: There were no vitals taken for this visit.   GEN: Pleasant and cooperative, in no acute distress  HEENT: No facial asymmetry    ALLERGIES  Allergies   Allergen Reactions     Fluoxetine Other (See Comments)     PN: LW Reaction: headache  PN: LW Reaction: headache  Migraine       Garlic Blisters, Cough, Dermatitis, Difficulty breathing, Headache, Hives, Itching, Nausea and Vomiting and Shortness Of Breath     Candesartan      Other reaction(s): Myalgia     Duloxetine      Other reaction(s): Headache  migraine     Iodine      Other reaction(s): Other (see comments)  PN: LW CM1: CONTRAST- iodine Reaction :     Metoclopramide      Other reaction(s): Headache  Caused increase in headaches     Perfume      Other reaction(s): Headache  head pain leading to migraines     Pregabalin      Other reaction(s): Headache, Myalgia     Trazodone Other (See Comments) and Unknown     Other reaction(s): Headache  Other reaction(s): Headache  Other reaction(s): Headache     Amitriptyline Hcl Other (See Comments)     Migraine       Candesartan Cilexetil-Hctz Muscle Pain (Myalgia)     Cymbalta Other (See Comments)     Migraine       Cyproheptadine Hcl      headaches     Desvenlafaxine      Migraine       Diatrizoate Unknown     PN: LW CM1: CONTRAST- iodine Reaction :     Diazepam      Other reaction(s): Vestibular Toxicity  PN: LW Reaction: vertigo  PN: LW Reaction: vertigo     Galcanezumab-Gnlm      Other reaction(s): Headache     Imipramine  "Other (See Comments)     Migraine       Lyrica Muscle Pain (Myalgia)     Metoprolol Other (See Comments) and Unknown     headache  headache  headache     Morphine Other (See Comments)     Patient reports seizure   Other reaction(s): Unknown  Seizure; 5/111/21 updated info: patient states she had a seizure while having a migraine headache years ago and is not sure if it was due to morphine. She has tolerated Dilaudid since then.       No Clinical Screening - See Comments      PN: LW FI1: lactose intolerance LW FI2: shellfish  PN: LW CM1: CONTRAST- iodine Reaction :  PN: LW Other1: -nka     Nortriptyline Other (See Comments)     Migraine       Phenergan Dm [Promethazine-Dm] Other (See Comments)     seizures     Promethazine      PN: LW Reaction: minimal sizures     Venlafaxine Other (See Comments)     PN: LW Reaction: migraine  PN: LW Reaction: migraine  Migraine       Wellbutrin [Bupropion Hydrobromide] Other (See Comments)     Migraine       Compazine Anxiety     Dihydroergotamine Anxiety and Other (See Comments)     Cardiac symptoms     Droperidol Anxiety     PN: LW Reaction: agitation     Medroxyprogesterone Hives     PN: LW Reaction: nausea     Prochlorperazine Anxiety     PN: LW Reaction: \"can't sit still\"       CURRENT MEDICATIONS    Current Outpatient Medications:      aMILoride (MIDAMOR) 5 MG tablet, , Disp: , Rfl:      amLODIPine (NORVASC) 10 MG tablet, , Disp: , Rfl:      amphetamine-dextroamphetamine (ADDERALL) 20 MG tablet, Take 1 tablet (20 mg) by mouth 2 times daily, Disp: 60 tablet, Rfl: 0     budesonide (RINOCORT AQUA) 32 MCG/ACT nasal spray, Spray 1 spray into both nostrils daily., Disp: , Rfl:      cyclobenzaprine (FLEXERIL) 10 MG tablet, , Disp: , Rfl:      CycloSPORINE (RESTASIS OP), Apply to eye 2 times daily, Disp: , Rfl:      fexofenadine (ALLEGRA) 180 MG tablet, Take  by mouth daily., Disp: , Rfl:      HYDROmorphone (DILAUDID) 2 MG tablet, Take 0.5-1 tablets (1-2 mg) by mouth every 3 hours as " needed (headache) 20 tablets = 3 month supply., Disp: 24 tablet, Rfl: 0     ketorolac (TORADOL) 30 MG/ML injection, Inject 1 mL (30 mg) into the vein every 6 hours as needed for moderate pain, Disp: 9 mL, Rfl: 11     LORazepam (ATIVAN) 1 MG tablet, Take 1 mg by mouth 2 times daily as needed., Disp: , Rfl:      nebivolol (BYSTOLIC) 5 MG tablet, Take 5 mg by mouth daily Take twice a day totaling 10 mg., Disp: , Rfl:      ondansetron (ZOFRAN) 4 MG tablet, Take 1 tablet (4 mg) by mouth every 8 hours as needed, Disp: 90 tablet, Rfl: 1     order for DME, Equipment being ordered: Oxygen The patient has Cluster Headache and needs 10 liters/minute of high flow oxygen via nonrebreather mask prn headaches, Disp: 99 days, Rfl: 0     order for DME, Equipment being ordered: Oxygen and non-rebreather mask.   Use high flow oxygen (10-15 L/min) with non-rebreather mask, as needed for cluster headaches, Disp: 1 Units, Rfl: 11     QUEtiapine (SEROQUEL) 50 MG tablet, Take 1 tablet by mouth daily., Disp: , Rfl:      SUMAtriptan (IMITREX STATDOSE) 6 MG/0.5ML pen injector kit, Inject 0.5 mLs (6 mg) Subcutaneous at onset of headache for migraine (max 2 injections a day) May repeat in 1 hour. Max 12 mg/24 hours., Disp: 9 kit, Rfl: 0     SUMAtriptan (IMITREX) 100 MG tablet, Take 1 tablet (100 mg) by mouth at onset of headache for migraine May repeat in 2 hours. Max 2 tablets/24 hours., Disp: 12 tablet, Rfl: 3     temazepam (RESTORIL) 15 MG capsule, Take 1 capsule by mouth daily, Disp: , Rfl:      valACYclovir (VALTREX) 1000 mg tablet, Take 1,000 mg by mouth as needed., Disp: , Rfl:     Current Facility-Administered Medications:      botulinum toxin type A (BOTOX) 100 units injection 225 Units, 225 Units, Intramuscular, See Admin Instructions, Standal, Addie Saeed MD       BOTULINUM NEUROTOXIN INJECTION PROCEDURES    VERIFICATION OF PATIENT IDENTIFICATION AND PROCEDURE     Initials   Patient Name SES   Patient  SES   Procedure Verified  by: SES     Prior to the start of the procedure and with procedural staff participation, I verbally confirmed the patient s identity using two indicators, relevant allergies, that the procedure was appropriate and matched the consent or emergent situation, and that the correct equipment/implants were available. Immediately prior to starting the procedure I conducted the Time Out with the procedural staff and re-confirmed the patient s name, procedure, and site/side. (The Joint Commission universal protocol was followed.)  Yes    Sedation (Moderate or Deep): None    ABOVE ASSESSMENTS PERFORMED BY    Addie Malhotra MD      INDICATIONS FOR PROCEDURES  Valerie Sim is a 57 year old patient with pain and jaw clenching secondary to the diagnosis of chronic migraine headaches and oromandibular dystonia. Her baseline symptoms have been recalcitrant to oral medications and conservative therapy.  She is here today for reinjection with Botox.    GOAL OF PROCEDURE  The goal of this procedure is to decrease pain and excessive jaw clenching due to chronic migraine headaches and bruxism.     TOTAL DOSE ADMINISTERED  Dose Administered:  225 units  Botox (Botulinum Toxin Type A)       2:1 Dilution   Unavoidable Drug Waste: Yes  Amount of drug waste (mL): 75 units Botox.  Reason for waste:  Single use vial  Diluent Used:  0.25% bupivacaine (Lot: [id number], Exp: 01/2023, NDC: 65143-326-95)  Total Volume of Diluent Used:  4.6 ml  Lot # [id number] with Expiration Date: 11/2024  NDC #: Botox 100u ([address]-72)      CONSENT  The risks, benefits, and treatment options were discussed with Valerie Sim and she agreed to proceed.    Written consent was obtained by [facility].     EQUIPMENT USED  Needle-25mm stimulating/recording  Needle-30 gauge  EMG/NCS Machine    SKIN PREPARATION  Skin preparation was performed using an alcohol wipe.    GUIDANCE DESCRIPTION  Electro-myographic guidance was necessary throughout the neck and jaw portion of the  procedure to accurately identify all areas of dystonic muscles while avoiding injection of non-dystonic muscles, neighboring nerves and nearby vascular structures.     AREA/MUSCLE INJECTED:  225 UNITS BOTOX = TOTAL DOSE, 2:1 DILUTION     1. FACIAL & SCALP MUSCLES: 85 UNITS BOTOX = TOTAL DOSE      Right Frontalis - 10 units of Botox in 2 site/s.  Left Frontalis - 10 units of Botox in 2 site/s.      Procerus - 5 units of Botox at 1 site/s.       Right  - 2.5 units of Botox in 1 site/s.  Left  - 2.5 units of Botox in 1 site/s.      Right Upper Occipitalis - 25 units of Botox at 4 site/s.   Left Upper Occipitalis - 25 units of Botox at 4 site/s.      Right Nasalis - 2.5 units of Botox at 1 site/s.           Left Nasalis - 2.5 units of Botox at 1 site/s.          2. JAW MUSCLES: 80 UNITS BOTOX = TOTAL DOSE     Right Masseter - 15 units of Botox at 1 site/s.   Left Masseter - 15 units of Botox at 1 site/s.      Right Temporalis - 25 units of Botox at 4 site/s.  Left Temporalis - 25 units of Botox at 4 site/s.          3. SHOULDER & NECK MUSCLES: 60 UNITS BOTOX = TOTAL DOSE      Right Levator Scapula - 10 units of Botox at 2 site/s (neck and shoulder).  Left Levator Scapula - 10 units of Botox at 2 site/s (neck and shoulder).      Right Upper Trapezius (mid & low lateral) - 15 units of Botox at 3 site/s.  Left Upper Trapezius (mid & low lateral) - 15 units of Botox at 3 site/s.             Right Pectoralis Minor - 5 units of Botox at 1 site/s.                     Left Pectoralis Minor - 5 units of Botox at 1 site/s.       TRIGGER POINT INJECTIONS  Areas of the skin were prepped with ChloraPrep.  Using standard precautions, a 30-gauge 1-inch needle was used to inject a 3 ml mixture of 1% lidocaine (Lot: [id number], Exp: 07/2026, NDC: 33891-258-34) and 0.25% bupivacaine (Lot: [id number], Exp: 01/2023, NDC: 36857-244-71) into the upper trapezius muscles bilaterally for a total of 10 sites.        GREATER  OCCIPITAL NERVE BLOCKS:  Area just inferior to insertion of the right and left superior trapezius insertion onto skull was cleansed with ChloraPrep. Needle was advanced anteriorly to base of skull then slightly withdrawn and injectate was injected in a fan-like distribution at different depths. Total injection of 0.5 ml of 20 mg Kenalog (Lot: [id number], Exp: 06/2024, NDC: [address]-4) plus 4.5 cc of 0.25 % bupivicaine per side. Valerie Sim tolerated the procedure well without any immediate complications.      RESPONSE TO PROCEDURE  Valerie Sim tolerated the procedure well and there were no immediate complications. She was allowed to recover for an appropriate period of time and was discharged home in stable condition.      ASSESSMENT AND PLAN   1. Botulinum toxin injections: No major changes made to Botox dose or distribution today other than more equal distribution of Botox on both sides as she notes that her symptoms are bilateral lately. Patient will continue to monitor response and report at next appointment.   2. Referrals: None.   3. Follow up: Valerie Sim was rescheduled for the next series of injections in 9 weeks, at which time we will evaluate response to today's injections. she may call the clinic prior with any questions or concerns prior to the next appointment.       Sincerely,    Addie Malhotra MD

## 2022-10-26 NOTE — PROGRESS NOTES
[facility]     PM&R CLINIC NOTE  BOTULINUM TOXIN PROCEDURE      HPI  No chief complaint on file.    Valerie Sim is a 57 year old female with a history of chronic migraine headaches who presents to clinic for botulinum toxin injections for management of chronic migraine headaches and excessive jaw clenching.     SINCE LAST VISIT  Valerie Sim was last seen here in clinic on 8/24/2022, at which time she received 225 units of Botox.    Patient reports the following new medical problems since last visit: She was hospitalized for an intentional overdose due to severe depression with suicidal ideation and psychosis which is ultimately felt to be due to the gabapentin she was placed on.    RESPONSE TO PREVIOUS TREATMENT    Side effects: No problems reported    1.  Headache frequency during this injection cycle:  4 more severe migraine headache days (~8/10) per month, which are usually short-lived and more responsive to medications. She does experience daily mild headaches, but these do not interfere with her life the way her migraine headaches do and she does not require medication for them. This is compared to her baseline headache frequency of 30 severe headache days per month.     2.  Headache duration during this injection cycle:  Headache duration was typically between 1-6 hours depending on how she manages her headaches. Patient reports no episodes of multiple day headaches during this injection cycle.     3.  Headache intensity during this injection cycle:    A.  6/10  =  Typical pain level.  B.  10/10  =  Worst pain level.  C.  0/10  =  Lowest pain level.    4.  Change in headache medication usage during this injection cycle:  (For Example:  Able to decrease use of oral pain medications.) No changes.      5.  ER Visits During This Injection Cycle:  None for headaches.     6.  Functional Performance:  Change in ADL's, social interaction, days lost from work, etc. Patient  reports being able to more fully participate in social and family activities and responsibilities as headache symptoms have improved.      PHYSICAL EXAM  VS: There were no vitals taken for this visit.   GEN: Pleasant and cooperative, in no acute distress  HEENT: No facial asymmetry    ALLERGIES  Allergies   Allergen Reactions     Fluoxetine Other (See Comments)     PN: LW Reaction: headache  PN: LW Reaction: headache  Migraine       Garlic Blisters, Cough, Dermatitis, Difficulty breathing, Headache, Hives, Itching, Nausea and Vomiting and Shortness Of Breath     Candesartan      Other reaction(s): Myalgia     Duloxetine      Other reaction(s): Headache  migraine     Iodine      Other reaction(s): Other (see comments)  PN: LW CM1: CONTRAST- iodine Reaction :     Metoclopramide      Other reaction(s): Headache  Caused increase in headaches     Perfume      Other reaction(s): Headache  head pain leading to migraines     Pregabalin      Other reaction(s): Headache, Myalgia     Trazodone Other (See Comments) and Unknown     Other reaction(s): Headache  Other reaction(s): Headache  Other reaction(s): Headache     Amitriptyline Hcl Other (See Comments)     Migraine       Candesartan Cilexetil-Hctz Muscle Pain (Myalgia)     Cymbalta Other (See Comments)     Migraine       Cyproheptadine Hcl      headaches     Desvenlafaxine      Migraine       Diatrizoate Unknown     PN: LW CM1: CONTRAST- iodine Reaction :     Diazepam      Other reaction(s): Vestibular Toxicity  PN: LW Reaction: vertigo  PN: LW Reaction: vertigo     Galcanezumab-Gnlm      Other reaction(s): Headache     Imipramine Other (See Comments)     Migraine       Lyrica Muscle Pain (Myalgia)     Metoprolol Other (See Comments) and Unknown     headache  headache  headache     Morphine Other (See Comments)     Patient reports seizure   Other reaction(s): Unknown  Seizure; 5/111/21 updated info: patient states she had a seizure while having a migraine headache years  "ago and is not sure if it was due to morphine. She has tolerated Dilaudid since then.       No Clinical Screening - See Comments      PN: LW FI1: lactose intolerance LW FI2: shellfish  PN: LW CM1: CONTRAST- iodine Reaction :  PN: LW Other1: -nka     Nortriptyline Other (See Comments)     Migraine       Phenergan Dm [Promethazine-Dm] Other (See Comments)     seizures     Promethazine      PN: LW Reaction: minimal sizures     Venlafaxine Other (See Comments)     PN: LW Reaction: migraine  PN: LW Reaction: migraine  Migraine       Wellbutrin [Bupropion Hydrobromide] Other (See Comments)     Migraine       Compazine Anxiety     Dihydroergotamine Anxiety and Other (See Comments)     Cardiac symptoms     Droperidol Anxiety     PN: LW Reaction: agitation     Medroxyprogesterone Hives     PN: LW Reaction: nausea     Prochlorperazine Anxiety     PN: LW Reaction: \"can't sit still\"       CURRENT MEDICATIONS    Current Outpatient Medications:      aMILoride (MIDAMOR) 5 MG tablet, , Disp: , Rfl:      amLODIPine (NORVASC) 10 MG tablet, , Disp: , Rfl:      amphetamine-dextroamphetamine (ADDERALL) 20 MG tablet, Take 1 tablet (20 mg) by mouth 2 times daily, Disp: 60 tablet, Rfl: 0     budesonide (RINOCORT AQUA) 32 MCG/ACT nasal spray, Spray 1 spray into both nostrils daily., Disp: , Rfl:      cyclobenzaprine (FLEXERIL) 10 MG tablet, , Disp: , Rfl:      CycloSPORINE (RESTASIS OP), Apply to eye 2 times daily, Disp: , Rfl:      fexofenadine (ALLEGRA) 180 MG tablet, Take  by mouth daily., Disp: , Rfl:      HYDROmorphone (DILAUDID) 2 MG tablet, Take 0.5-1 tablets (1-2 mg) by mouth every 3 hours as needed (headache) 20 tablets = 3 month supply., Disp: 24 tablet, Rfl: 0     ketorolac (TORADOL) 30 MG/ML injection, Inject 1 mL (30 mg) into the vein every 6 hours as needed for moderate pain, Disp: 9 mL, Rfl: 11     LORazepam (ATIVAN) 1 MG tablet, Take 1 mg by mouth 2 times daily as needed., Disp: , Rfl:      nebivolol (BYSTOLIC) 5 MG tablet, " Take 5 mg by mouth daily Take twice a day totaling 10 mg., Disp: , Rfl:      ondansetron (ZOFRAN) 4 MG tablet, Take 1 tablet (4 mg) by mouth every 8 hours as needed, Disp: 90 tablet, Rfl: 1     order for DME, Equipment being ordered: Oxygen The patient has Cluster Headache and needs 10 liters/minute of high flow oxygen via nonrebreather mask prn headaches, Disp: 99 days, Rfl: 0     order for DME, Equipment being ordered: Oxygen and non-rebreather mask.   Use high flow oxygen (10-15 L/min) with non-rebreather mask, as needed for cluster headaches, Disp: 1 Units, Rfl: 11     QUEtiapine (SEROQUEL) 50 MG tablet, Take 1 tablet by mouth daily., Disp: , Rfl:      SUMAtriptan (IMITREX STATDOSE) 6 MG/0.5ML pen injector kit, Inject 0.5 mLs (6 mg) Subcutaneous at onset of headache for migraine (max 2 injections a day) May repeat in 1 hour. Max 12 mg/24 hours., Disp: 9 kit, Rfl: 0     SUMAtriptan (IMITREX) 100 MG tablet, Take 1 tablet (100 mg) by mouth at onset of headache for migraine May repeat in 2 hours. Max 2 tablets/24 hours., Disp: 12 tablet, Rfl: 3     temazepam (RESTORIL) 15 MG capsule, Take 1 capsule by mouth daily, Disp: , Rfl:      valACYclovir (VALTREX) 1000 mg tablet, Take 1,000 mg by mouth as needed., Disp: , Rfl:     Current Facility-Administered Medications:      botulinum toxin type A (BOTOX) 100 units injection 225 Units, 225 Units, Intramuscular, See Admin Instructions, Standal, Addie Saeed MD       BOTULINUM NEUROTOXIN INJECTION PROCEDURES    VERIFICATION OF PATIENT IDENTIFICATION AND PROCEDURE     Initials   Patient Name SES   Patient  SES   Procedure Verified by: SES     Prior to the start of the procedure and with procedural staff participation, I verbally confirmed the patient s identity using two indicators, relevant allergies, that the procedure was appropriate and matched the consent or emergent situation, and that the correct equipment/implants were available. Immediately prior to starting  the procedure I conducted the Time Out with the procedural staff and re-confirmed the patient s name, procedure, and site/side. (The Joint Commission universal protocol was followed.)  Yes    Sedation (Moderate or Deep): None    ABOVE ASSESSMENTS PERFORMED BY    Addie Malhotra MD      INDICATIONS FOR PROCEDURES  Valerie Sim is a 57 year old patient with pain and jaw clenching secondary to the diagnosis of chronic migraine headaches and oromandibular dystonia. Her baseline symptoms have been recalcitrant to oral medications and conservative therapy.  She is here today for reinjection with Botox.    GOAL OF PROCEDURE  The goal of this procedure is to decrease pain and excessive jaw clenching due to chronic migraine headaches and bruxism.     TOTAL DOSE ADMINISTERED  Dose Administered:  225 units  Botox (Botulinum Toxin Type A)       2:1 Dilution   Unavoidable Drug Waste: Yes  Amount of drug waste (mL): 75 units Botox.  Reason for waste:  Single use vial  Diluent Used:  0.25% bupivacaine (Lot: [id number], Exp: 01/2023, NDC: 14912-156-44)  Total Volume of Diluent Used:  4.6 ml  Lot # [id number] with Expiration Date: 11/2024  NDC #: Botox 100u ([address]-04)      CONSENT  The risks, benefits, and treatment options were discussed with Valerie Sim and she agreed to proceed.    Written consent was obtained by [facility].     EQUIPMENT USED  Needle-25mm stimulating/recording  Needle-30 gauge  EMG/NCS Machine    SKIN PREPARATION  Skin preparation was performed using an alcohol wipe.    GUIDANCE DESCRIPTION  Electro-myographic guidance was necessary throughout the neck and jaw portion of the procedure to accurately identify all areas of dystonic muscles while avoiding injection of non-dystonic muscles, neighboring nerves and nearby vascular structures.     AREA/MUSCLE INJECTED:  225 UNITS BOTOX = TOTAL DOSE, 2:1 DILUTION     1. FACIAL & SCALP MUSCLES: 85 UNITS BOTOX = TOTAL DOSE      Right Frontalis - 10 units of Botox in 2  site/s.  Left Frontalis - 10 units of Botox in 2 site/s.      Procerus - 5 units of Botox at 1 site/s.       Right  - 2.5 units of Botox in 1 site/s.  Left  - 2.5 units of Botox in 1 site/s.      Right Upper Occipitalis - 25 units of Botox at 4 site/s.   Left Upper Occipitalis - 25 units of Botox at 4 site/s.      Right Nasalis - 2.5 units of Botox at 1 site/s.           Left Nasalis - 2.5 units of Botox at 1 site/s.          2. JAW MUSCLES: 80 UNITS BOTOX = TOTAL DOSE     Right Masseter - 15 units of Botox at 1 site/s.   Left Masseter - 15 units of Botox at 1 site/s.      Right Temporalis - 25 units of Botox at 4 site/s.  Left Temporalis - 25 units of Botox at 4 site/s.          3. SHOULDER & NECK MUSCLES: 60 UNITS BOTOX = TOTAL DOSE      Right Levator Scapula - 10 units of Botox at 2 site/s (neck and shoulder).  Left Levator Scapula - 10 units of Botox at 2 site/s (neck and shoulder).      Right Upper Trapezius (mid & low lateral) - 15 units of Botox at 3 site/s.  Left Upper Trapezius (mid & low lateral) - 15 units of Botox at 3 site/s.             Right Pectoralis Minor - 5 units of Botox at 1 site/s.                     Left Pectoralis Minor - 5 units of Botox at 1 site/s.       TRIGGER POINT INJECTIONS  Areas of the skin were prepped with ChloraPrep.  Using standard precautions, a 30-gauge 1-inch needle was used to inject a 3 ml mixture of 1% lidocaine (Lot: [id number], Exp: 07/2026, NDC: 41039-293-29) and 0.25% bupivacaine (Lot: [id number], Exp: 01/2023, NDC: 93141-439-30) into the upper trapezius muscles bilaterally for a total of 10 sites.        GREATER OCCIPITAL NERVE BLOCKS:  Area just inferior to insertion of the right and left superior trapezius insertion onto skull was cleansed with ChloraPrep. Needle was advanced anteriorly to base of skull then slightly withdrawn and injectate was injected in a fan-like distribution at different depths. Total injection of 0.5 ml of 20 mg Kenalog (Lot:  [id number], Exp: 06/2024, NDC: [address]-8) plus 4.5 cc of 0.25 % bupivicaine per side. Valerie Sim tolerated the procedure well without any immediate complications.      RESPONSE TO PROCEDURE  Valerie Sim tolerated the procedure well and there were no immediate complications. She was allowed to recover for an appropriate period of time and was discharged home in stable condition.      ASSESSMENT AND PLAN   1. Botulinum toxin injections: No major changes made to Botox dose or distribution today other than more equal distribution of Botox on both sides as she notes that her symptoms are bilateral lately. Patient will continue to monitor response and report at next appointment.   2. Referrals: None.   3. Follow up: Valerie Sim was rescheduled for the next series of injections in 9 weeks, at which time we will evaluate response to today's injections. she may call the clinic prior with any questions or concerns prior to the next appointment.

## 2022-11-03 DIAGNOSIS — R45.851 SUICIDAL IDEATION: Primary | ICD-10-CM

## 2022-11-03 DIAGNOSIS — F33.3 SEVERE RECURRENT MAJOR DEPRESSIVE DISORDER WITH PSYCHOTIC FEATURES (H): ICD-10-CM

## 2022-11-07 ASSESSMENT — PATIENT HEALTH QUESTIONNAIRE - PHQ9
10. IF YOU CHECKED OFF ANY PROBLEMS, HOW DIFFICULT HAVE THESE PROBLEMS MADE IT FOR YOU TO DO YOUR WORK, TAKE CARE OF THINGS AT HOME, OR GET ALONG WITH OTHER PEOPLE: SOMEWHAT DIFFICULT
SUM OF ALL RESPONSES TO PHQ QUESTIONS 1-9: 10
SUM OF ALL RESPONSES TO PHQ QUESTIONS 1-9: 10

## 2022-11-08 ENCOUNTER — HOSPITAL ENCOUNTER (OUTPATIENT)
Dept: BEHAVIORAL HEALTH | Facility: CLINIC | Age: 57
Discharge: HOME | End: 2022-11-08
Attending: PHYSICAL MEDICINE & REHABILITATION
Payer: COMMERCIAL

## 2022-11-08 DIAGNOSIS — F41.0 PANIC DISORDER WITHOUT AGORAPHOBIA: Primary | ICD-10-CM

## 2022-11-08 DIAGNOSIS — R45.851 SUICIDAL IDEATION: ICD-10-CM

## 2022-11-08 DIAGNOSIS — F32.A DEPRESSION, UNSPECIFIED DEPRESSION TYPE: ICD-10-CM

## 2022-11-08 DIAGNOSIS — F41.1 GAD (GENERALIZED ANXIETY DISORDER): ICD-10-CM

## 2022-11-08 DIAGNOSIS — F33.3 SEVERE RECURRENT MAJOR DEPRESSIVE DISORDER WITH PSYCHOTIC FEATURES (H): ICD-10-CM

## 2022-11-08 DIAGNOSIS — F33.2 SEVERE EPISODE OF RECURRENT MAJOR DEPRESSIVE DISORDER, WITHOUT PSYCHOTIC FEATURES (H): ICD-10-CM

## 2022-11-08 PROCEDURE — 999N000216 HC STATISTIC ADULT CD FACE TO FACE-NO CHRG: Mod: GT,95 | Performed by: COUNSELOR

## 2022-11-08 ASSESSMENT — COLUMBIA-SUICIDE SEVERITY RATING SCALE - C-SSRS
1. SINCE LAST CONTACT, HAVE YOU WISHED YOU WERE DEAD OR WISHED YOU COULD GO TO SLEEP AND NOT WAKE UP?: NO
6. HAVE YOU EVER DONE ANYTHING, STARTED TO DO ANYTHING, OR PREPARED TO DO ANYTHING TO END YOUR LIFE?: NO
TOTAL  NUMBER OF INTERRUPTED ATTEMPTS SINCE LAST CONTACT: NO
2. HAVE YOU ACTUALLY HAD ANY THOUGHTS OF KILLING YOURSELF?: NO
TOTAL  NUMBER OF ABORTED OR SELF INTERRUPTED ATTEMPTS SINCE LAST CONTACT: NO
SUICIDE, SINCE LAST CONTACT: NO
ATTEMPT SINCE LAST CONTACT: NO
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
## 2022-11-09 ENCOUNTER — VIRTUAL VISIT (OUTPATIENT)
Dept: FAMILY MEDICINE | Facility: CLINIC | Age: 57
End: 2022-11-09
Payer: COMMERCIAL

## 2022-11-09 ENCOUNTER — TELEPHONE (OUTPATIENT)
Dept: PSYCHIATRY | Facility: CLINIC | Age: 57
End: 2022-11-09

## 2022-11-09 DIAGNOSIS — F33.3 SEVERE RECURRENT MAJOR DEPRESSIVE DISORDER WITH PSYCHOTIC FEATURES (H): Primary | ICD-10-CM

## 2022-11-09 PROCEDURE — 99203 OFFICE O/P NEW LOW 30 MIN: CPT | Mod: GT | Performed by: PREVENTIVE MEDICINE

## 2022-11-09 RX ORDER — LAMOTRIGINE 100 MG/1
200 TABLET ORAL DAILY
Start: 2022-11-09 | End: 2023-01-30

## 2022-11-09 NOTE — PROGRESS NOTES
Valerie is a 57 year old who is being evaluated via a billable video visit.      How would you like to obtain your AVS? MyChart  If the video visit is dropped, the invitation should be resent by: Text to cell phone: [phone]  Will anyone else be joining your video visit? No          Assessment & Plan     Severe recurrent major depressive disorder with psychotic features (H)  -long standing history of complicated and resistant depression  -recent overdose  -establishing with Psychiatry  -needs PCP within [place]   - lamoTRIgine (LAMICTAL) 100 MG tablet      16 minutes spent on the date of the encounter doing chart review, history and exam, documentation and further activities per the note        Return in about 1 year (around 11/9/2023) for Routine preventive, with me, in person.    Rosanna Ghosh MD MPH    [facility]    Lucina Padilla is a 57 year old presenting for the following health issues:  Referral, Establish Care, and Recheck Medication    *Gabapentin allergy causes Psychosis.*    HPI       Recent psychotic episode after taking Gabapentin  Establishing mental health care with [facility]  Overdosed on blood pressure medication  ED and hospital admission reviewed from 10/21/22  Trying to put together things  Resistant depression per patient     Has appointment 1/10/23 with Psychiatry   Dr. Billy is the therapist, twice a week visits, is outside of [place], now independent practice   Currently on Lamictal 200 mg daily     Main reason for visit today is to have a PCP within the [place] system as required by Psychiatry in order to establish care with them.     Medical records reviewed.  Agree to be listed as primary care physician within [place]         Review of Systems   Constitutional, HEENT, cardiovascular, pulmonary, gi and gu systems are negative, except as otherwise noted.      Objective           Vitals:  No vitals were obtained today due to virtual  visit.    Physical Exam   GENERAL: Healthy, alert and no distress  EYES: Eyes grossly normal to inspection.  No discharge or erythema, or obvious scleral/conjunctival abnormalities.  RESP: No audible wheeze, cough, or visible cyanosis.  No visible retractions or increased work of breathing.    SKIN: Visible skin clear. No significant rash, abnormal pigmentation or lesions.  NEURO: Cranial nerves grossly intact.  Mentation and speech appropriate for age.  PSYCH: Mentation appears normal      Video-Visit Details    Video Start Time: 4:22 PM    Type of service:  Video Visit    Video End Time:4:31 PM    Originating Location (pt. Location): Home        Distant Location (provider location):  On-site    Platform used for Video Visit: [employer]

## 2022-11-10 ENCOUNTER — PATIENT OUTREACH (OUTPATIENT)
Dept: CARE COORDINATION | Facility: CLINIC | Age: 57
End: 2022-11-10

## 2022-11-10 NOTE — PROGRESS NOTES
Clinic Care Coordination Contact  Care Team Conversations    ELIZABETH CC contacted Care Counseling on behalf of patient to inquire about Treatment Resistant Depression Psychiatry. Care Counseling will contact ELIZABETH CC with more information within the next week.    ELIZABETH CC will follow up with patient once more information is given.    NICO Ochoa  Clinic Care Coordination  [facility]  [email or web address]  [phone]

## 2022-11-28 ENCOUNTER — PATIENT OUTREACH (OUTPATIENT)
Dept: CARE COORDINATION | Facility: CLINIC | Age: 57
End: 2022-11-28

## 2022-11-28 NOTE — PROGRESS NOTES
Clinic Care Coordination Contact  [facility]/Voicemail       Clinical Data: Care Coordinator Outreach  Outreach attempted x 1.  Left message on patient's voicemail with call back information and requested return call.  Plan: Care Coordinator will try to reach patient again in 3-5 business days.    Gabriel Maynard, [facility] [place]  Clinic Care Coordination  [facility]  [email or web address]  [phone]

## 2022-12-07 ENCOUNTER — PATIENT OUTREACH (OUTPATIENT)
Dept: CARE COORDINATION | Facility: CLINIC | Age: 57
End: 2022-12-07

## 2022-12-07 ASSESSMENT — ACTIVITIES OF DAILY LIVING (ADL): DEPENDENT_IADLS:: INDEPENDENT

## 2022-12-07 NOTE — PROGRESS NOTES
Clinic Care Coordination Contact    Initial Outreach Note      Assessment: ELIZABETH SUMMERS contacted patient to offer services. Patient stated she currently has an appointment scheduled with  psychiatry for 01/10/2023. Patient had questions about this appointment and inquired if the appointment was specifically for treatment resistant depression, patient stated that was her main focus at this time.     ELIZABETH SUMMERS shared with patient a clinic outside of  that is focused on treatment resistant depression psychiatry and told patient that information could be sent if the patient wants additional services. Patient stated she would like the contact information for the clinic and would like to get something scheduled with them as well.     ELIZABETH SUMMERS will send information for the [facility] to the patient.      Care Gaps:    Health Maintenance Due   Topic Date Due     YEARLY PREVENTIVE VISIT  Never done     URINE DRUG SCREEN  Never done     ADVANCE CARE PLANNING  Never done     MIGRAINE ACTION PLAN  Never done     HEPATITIS B IMMUNIZATION (1 of 3 - 3-dose series) Never done     HIV SCREENING  Never done     HEPATITIS C SCREENING  Never done     LIPID  Never done     INFLUENZA VACCINE (1) 09/01/2022         Care Plans  Care Plan: Mental Health     Problem: Mood/Psychosocial Concerns     Goal: Mental Health Management     Start Date: 12/7/2022 Expected End Date: 3/7/2023    Note:     Barriers: Complex mental health needs.  Strengths: Strong self advocate  Patient expressed understanding of goal: Yes  Action steps to achieve this goal:  1. I will contact clinic provided by ELIZABETH SUMMERS to inquire about treatment resistant depression therapy.  2. I will attend all scheduled  appointments to maintain current services.   3. I will contact ELIZABETH SUMMERS with any questions or concerns.                          Intervention/Education provided during outreach: Patient verbalized understanding, engaged in AIDET communication during patient  encounter.      Plan:   [facility] will send patient information via My Chart per patient request. Patient will contact [facility] with any questions or concerns.   Care Coordinator will follow up in 2 weeks.

## 2022-12-08 ENCOUNTER — TELEPHONE (OUTPATIENT)
Dept: PSYCHIATRY | Facility: CLINIC | Age: 57
End: 2022-12-08

## 2022-12-08 NOTE — TELEPHONE ENCOUNTER
On 11/22/22 the patient signed an AILIN authorizing medical records to be exchanged between [facility] and [facility] for the purpose of continuing care. The request was faxed to our medical records department at [phone] and sent to scanning on December 8, 2022 and held a copy in Psychiatry until scanning is confirmed. YOHANNES Solis    On 11/22/22 the patient signed an AILIN authorizing medical records to be exchanged between [facility] and [facility] for the purpose of continuing care. The request was faxed to our medical records department at [phone] and sent to scanning on December 8, 2022 and held a copy in Psychiatry until scanning is confirmed. YOHANNES Solis    On 11/22/22 the patient signed an AILIN authorizing medical records to be exchanged between [facility] and [facility] for the purpose of continuing care. The request was faxed to our medical records department at [phone] and sent to scanning on December 8, 2022 and held a copy in Psychiatry until scanning is confirmed. YOHANNES Solis    On 11/22/22 the patient signed an AILIN authorizing medical records to be exchanged between [facility] and [facility] for the purpose of continuing care. The request was faxed to our medical records department at [phone] and sent to scanning on December 8, 2022 and held a copy in Psychiatry until scanning is confirmed. YOHANNES Solis

## 2022-12-22 ENCOUNTER — PATIENT OUTREACH (OUTPATIENT)
Dept: CARE COORDINATION | Facility: CLINIC | Age: 57
End: 2022-12-22

## 2022-12-22 NOTE — PROGRESS NOTES
Clinic Care Coordination Contact  [facility]/Voicemail       Clinical Data: Care Coordinator Outreach  Outreach attempted x 1.  Left message on patient's voicemail with call back information and requested return call.  Plan: Care Coordinator will try to reach patient again in 10 business days.    [facility]  Clinic Care Coordination  [facility]  [email or web address]  [phone]

## 2022-12-30 ENCOUNTER — OFFICE VISIT (OUTPATIENT)
Dept: PHYSICAL MEDICINE AND REHAB | Facility: CLINIC | Age: 57
End: 2022-12-30
Payer: COMMERCIAL

## 2022-12-30 VITALS — SYSTOLIC BLOOD PRESSURE: 134 MMHG | HEART RATE: 83 BPM | DIASTOLIC BLOOD PRESSURE: 80 MMHG | OXYGEN SATURATION: 98 %

## 2022-12-30 DIAGNOSIS — G43.719 CHRONIC MIGRAINE WITHOUT AURA, INTRACTABLE, WITHOUT STATUS MIGRAINOSUS: Primary | ICD-10-CM

## 2022-12-30 PROCEDURE — 95874 GUIDE NERV DESTR NEEDLE EMG: CPT | Performed by: PHYSICAL MEDICINE & REHABILITATION

## 2022-12-30 PROCEDURE — 64615 CHEMODENERV MUSC MIGRAINE: CPT | Performed by: PHYSICAL MEDICINE & REHABILITATION

## 2022-12-30 RX ORDER — BUPIVACAINE HYDROCHLORIDE 2.5 MG/ML
5 INJECTION, SOLUTION EPIDURAL; INFILTRATION; INTRACAUDAL ONCE
Status: COMPLETED | OUTPATIENT
Start: 2022-12-30 | End: 2022-12-30

## 2022-12-30 RX ADMIN — BUPIVACAINE HYDROCHLORIDE 12.5 MG: 2.5 INJECTION, SOLUTION EPIDURAL; INFILTRATION; INTRACAUDAL at 13:48

## 2022-12-30 NOTE — LETTER
12/30/2022       RE: Valerie Sim  [address]  [place] [address]     Dear Colleague,    Thank you for referring your patient, Valerie Sim, to the [facility] [place] [facility]. Please see a copy of my visit note below.      [facility]     PM&R CLINIC NOTE  BOTULINUM TOXIN PROCEDURE      HPI  Chief Complaint   Patient presents with     RECHECK     Botox injections confirmed with patient     Valerie Sim is a 57 year old female with a history of chronic migraine headaches who presents to clinic for botulinum toxin injections for management of chronic migraine headaches and excessive jaw clenching.     SINCE LAST VISIT  Valerie Sim was last seen here in clinic on 8/24/2022, at which time she received 225 units of Botox.    Patient denies new medical diagnoses, illnesses, hospitalizations, emergency room visits, and injuries since the previous injection with botulinum neurotoxin. She did have a kidney stone earlier this week which has since passed and she is feeling better.     She reports that overall her jaw muscles are feeling tighter and more painful today. She also has a small bump with occasional pain above her right eyebrow.     RESPONSE TO PREVIOUS TREATMENT    Side effects: No problems reported, although she did experience one day of sweats and irritability following the Botox injections.     1.  Headache frequency during this injection cycle:  Typically 4 migraine headache days per month. She does experience daily mild headaches, but these do not interfere with her life the way her migraine headaches do and she does not require medication for them. This is compared to her baseline headache frequency of 30 severe headache days per month.     2.  Headache duration during this injection cycle:  Headache duration was typically between 1-6 hours  depending on how she manages her headaches, however she did experience a two week headache at the beginning of injection cycle. Patient reports a few episodes of multiple day headaches during this injection cycle.     3.  Headache intensity during this injection cycle:    A.  5/10  =  Typical pain level.  B.  9/10  =  Worst pain level.  C.  0/10  =  Lowest pain level.    4.  Change in headache medication usage during this injection cycle:  (For Example:  Able to decrease use of oral pain medications.) No changes.       5.  ER Visits During This Injection Cycle:  None for headaches. She reports she used Imitrex x5, Dilaudid x1, Zofran x4 and IM Toradol.     6.  Functional Performance:  Change in ADL's, social interaction, days lost from work, etc. Patient reports being able to more fully participate in social and family activities and responsibilities as headache symptoms have improved.      PHYSICAL EXAM  VS: /80 (BP Location: Left arm, Patient Position: Sitting, Cuff Size: Adult Large)   Pulse 83   SpO2 98%    GEN: Pleasant and cooperative, in no acute distress  HEENT: No facial asymmetry    ALLERGIES  Allergies   Allergen Reactions     Fluoxetine Other (See Comments)     PN: LW Reaction: headache  PN: LW Reaction: headache  Migraine       Garlic Blisters, Cough, Dermatitis, Difficulty breathing, Headache, Hives, Itching, Nausea and Vomiting and Shortness Of Breath     Candesartan      Other reaction(s): Myalgia     Duloxetine      Other reaction(s): Headache  migraine     Iodine      Other reaction(s): Other (see comments)  PN: LW CM1: CONTRAST- iodine Reaction :     Metoclopramide      Other reaction(s): Headache  Caused increase in headaches     Perfume      Other reaction(s): Headache  head pain leading to migraines     Pregabalin      Other reaction(s): Headache, Myalgia     Trazodone Other (See Comments) and Unknown     Other reaction(s): Headache  Other reaction(s): Headache  Other reaction(s):  "Headache     Amitriptyline Hcl Other (See Comments)     Migraine       Candesartan Cilexetil-Hctz Muscle Pain (Myalgia)     Cymbalta Other (See Comments)     Migraine       Cyproheptadine Hcl      headaches     Desvenlafaxine      Migraine       Diatrizoate Unknown     PN: LW CM1: CONTRAST- iodine Reaction :     Diazepam      Other reaction(s): Vestibular Toxicity  PN: LW Reaction: vertigo  PN: LW Reaction: vertigo     Galcanezumab-Gnlm      Other reaction(s): Headache     Imipramine Other (See Comments)     Migraine       Lyrica Muscle Pain (Myalgia)     Metoprolol Other (See Comments) and Unknown     headache  headache  headache     Morphine Other (See Comments)     Patient reports seizure   Other reaction(s): Unknown  Seizure; 5/111/21 updated info: patient states she had a seizure while having a migraine headache years ago and is not sure if it was due to morphine. She has tolerated Dilaudid since then.       No Clinical Screening - See Comments      PN: LW FI1: lactose intolerance LW FI2: shellfish  PN: LW CM1: CONTRAST- iodine Reaction :  PN: LW Other1: -nka     Nortriptyline Other (See Comments)     Migraine       Phenergan Dm [Promethazine-Dm] Other (See Comments)     seizures     Promethazine      PN: LW Reaction: minimal sizures     Venlafaxine Other (See Comments)     PN: LW Reaction: migraine  PN: LW Reaction: migraine  Migraine       Wellbutrin [Bupropion Hydrobromide] Other (See Comments)     Migraine       Compazine Anxiety     Dihydroergotamine Anxiety and Other (See Comments)     Cardiac symptoms     Droperidol Anxiety     PN: LW Reaction: agitation     Medroxyprogesterone Hives     PN: LW Reaction: nausea     Prochlorperazine Anxiety     PN: LW Reaction: \"can't sit still\"       CURRENT MEDICATIONS    Current Outpatient Medications:      aMILoride (MIDAMOR) 5 MG tablet, , Disp: , Rfl:      amLODIPine (NORVASC) 10 MG tablet, , Disp: , Rfl:      amphetamine-dextroamphetamine (ADDERALL) 20 MG tablet, " Take 1 tablet (20 mg) by mouth 2 times daily, Disp: 60 tablet, Rfl: 0     budesonide (RINOCORT AQUA) 32 MCG/ACT nasal spray, Spray 1 spray into both nostrils daily., Disp: , Rfl:      cyclobenzaprine (FLEXERIL) 10 MG tablet, , Disp: , Rfl:      CycloSPORINE (RESTASIS OP), Apply to eye 2 times daily, Disp: , Rfl:      fexofenadine (ALLEGRA) 180 MG tablet, Take  by mouth daily., Disp: , Rfl:      HYDROmorphone (DILAUDID) 2 MG tablet, Take 0.5-1 tablets (1-2 mg) by mouth every 3 hours as needed (headache) 20 tablets = 3 month supply., Disp: 24 tablet, Rfl: 0     ketorolac (TORADOL) 30 MG/ML injection, Inject 1 mL (30 mg) into the vein every 6 hours as needed for moderate pain, Disp: 9 mL, Rfl: 11     lamoTRIgine (LAMICTAL) 100 MG tablet, Take 2 tablets (200 mg) by mouth daily, Disp: , Rfl:      nebivolol (BYSTOLIC) 5 MG tablet, Take 5 mg by mouth daily Take twice a day totaling 10 mg., Disp: , Rfl:      ondansetron (ZOFRAN) 4 MG tablet, Take 1 tablet (4 mg) by mouth every 8 hours as needed, Disp: 90 tablet, Rfl: 1     order for DME, Equipment being ordered: Oxygen The patient has Cluster Headache and needs 10 liters/minute of high flow oxygen via nonrebreather mask prn headaches, Disp: 99 days, Rfl: 0     order for DME, Equipment being ordered: Oxygen and non-rebreather mask.   Use high flow oxygen (10-15 L/min) with non-rebreather mask, as needed for cluster headaches, Disp: 1 Units, Rfl: 11     QUEtiapine (SEROQUEL) 50 MG tablet, Take 1 tablet by mouth daily., Disp: , Rfl:      valACYclovir (VALTREX) 1000 mg tablet, Take 1,000 mg by mouth as needed., Disp: , Rfl:      LORazepam (ATIVAN) 1 MG tablet, Take 1 mg by mouth 2 times daily as needed. (Patient not taking: Reported on 11/9/2022), Disp: , Rfl:      SUMAtriptan (IMITREX STATDOSE) 6 MG/0.5ML pen injector kit, Inject 0.5 mLs (6 mg) Subcutaneous at onset of headache for migraine (max 2 injections a day) May repeat in 1 hour. Max 12 mg/24 hours., Disp: 9 kit, Rfl:  0     SUMAtriptan (IMITREX) 100 MG tablet, Take 1 tablet (100 mg) by mouth at onset of headache for migraine May repeat in 2 hours. Max 2 tablets/24 hours., Disp: 12 tablet, Rfl: 3     temazepam (RESTORIL) 15 MG capsule, Take 1 capsule by mouth daily (Patient not taking: Reported on 2022), Disp: , Rfl:     Current Facility-Administered Medications:      botulinum toxin type A (BOTOX) 100 units injection 225 Units, 225 Units, Intramuscular, See Admin Instructions, Addie Malhotra MD, 225 Units at 10/26/22 1256       BOTULINUM NEUROTOXIN INJECTION PROCEDURES    VERIFICATION OF PATIENT IDENTIFICATION AND PROCEDURE     Initials   Patient Name SES   Patient  SES   Procedure Verified by: SES     Prior to the start of the procedure and with procedural staff participation, I verbally confirmed the patient s identity using two indicators, relevant allergies, that the procedure was appropriate and matched the consent or emergent situation, and that the correct equipment/implants were available. Immediately prior to starting the procedure I conducted the Time Out with the procedural staff and re-confirmed the patient s name, procedure, and site/side. (The Joint Commission universal protocol was followed.)  Yes    Sedation (Moderate or Deep): None    ABOVE ASSESSMENTS PERFORMED BY    Addie Malhotra MD      INDICATIONS FOR PROCEDURES  Valerie Sim is a 57 year old patient with pain and jaw clenching secondary to the diagnosis of chronic migraine headaches and oromandibular dystonia. Her baseline symptoms have been recalcitrant to oral medications and conservative therapy.  She is here today for reinjection with Botox.    GOAL OF PROCEDURE  The goal of this procedure is to decrease pain and excessive jaw clenching due to chronic migraine headaches and bruxism.     TOTAL DOSE ADMINISTERED  Dose Administered:  250 units  Botox (Botulinum Toxin Type A)       2:1 Dilution   Unavoidable Drug Waste: Yes  Amount of  drug waste (mL): 50 units Botox.  Reason for waste:  Single use vial  Diluent Used:  0.25% bupivacaine  Total Volume of Diluent Used:  5 ml  NDC #: Botox 100u ([address]-70)      CONSENT  The risks, benefits, and treatment options were discussed with Valerie Sim and she agreed to proceed.    Written consent was obtained by [facility].     EQUIPMENT USED  Needle-25mm stimulating/recording  Needle-30 gauge  EMG/NCS Machine    SKIN PREPARATION  Skin preparation was performed using an alcohol wipe.    GUIDANCE DESCRIPTION  Electro-myographic guidance was necessary throughout the neck and jaw portion of the procedure to accurately identify all areas of dystonic muscles while avoiding injection of non-dystonic muscles, neighboring nerves and nearby vascular structures.       AREA/MUSCLE INJECTED:  250 UNITS BOTOX = TOTAL DOSE, 2:1 DILUTION     1. FACIAL & SCALP MUSCLES: 105 UNITS BOTOX = TOTAL DOSE      Right Frontalis - 10 units of Botox in 2 site/s.  Left Frontalis - 10 units of Botox in 2 site/s.      Procerus - 5 units of Botox at 1 site/s.       Right  - 2.5 units of Botox in 1 site/s.  Left  - 2.5 units of Botox in 1 site/s.      Right Upper Occipitalis - 35 units of Botox at 7 site/s.   Left Upper Occipitalis - 35 units of Botox at 7 site/s.      Right Nasalis - 2.5 units of Botox at 1 site/s.           Left Nasalis - 2.5 units of Botox at 1 site/s.          2. JAW MUSCLES: 90 UNITS BOTOX = TOTAL DOSE     Right Masseter - 20 units of Botox at 2 site/s.   Left Masseter - 20 units of Botox at 2 site/s.      Right Temporalis - 25 units of Botox at 4 site/s.  Left Temporalis - 25 units of Botox at 4 site/s.          3. SHOULDER & NECK MUSCLES: 55 UNITS BOTOX = TOTAL DOSE      Right Levator Scapula - 10 units of Botox at 2 site/s (neck and shoulder).  Left Levator Scapula - 10 units of Botox at 2 site/s (neck and shoulder).      Right Upper Trapezius (mid & low lateral) - 15 units of Botox at 3  site/s.  Left Upper Trapezius (mid & low lateral) - 15 units of Botox at 3 site/s.             Right Pectoralis Minor - 2.5 units of Botox at 1 site/s.                     Left Pectoralis Minor - 2.5 units of Botox at 1 site/s.       RESPONSE TO PROCEDURE  Valerie Sim tolerated the procedure well and there were no immediate complications. She was allowed to recover for an appropriate period of time and was discharged home in stable condition.      ASSESSMENT AND PLAN   1. Botulinum toxin injections: Dose of Botox was increased from 225 units to 250 units in hopes of increasing effectiveness and prolonging duration of benefit of injections. Patient will continue to monitor response and report at next appointment.   2. Referrals: None.   3. Follow up: Valerie Sim was rescheduled for the next series of injections in 9 weeks, at which time we will evaluate response to today's injections. she may call the clinic prior with any questions or concerns prior to the next appointment.           Sincerely,    Addie Malhotra MD

## 2022-12-30 NOTE — NURSING NOTE
Chief Complaint   Patient presents with     RECHECK     Botox injections confirmed with patient     Addis Holt CMA at 12:25 PM on 12/30/2022.

## 2022-12-30 NOTE — PROGRESS NOTES
[facility]     PM&R CLINIC NOTE  BOTULINUM TOXIN PROCEDURE      HPI  Chief Complaint   Patient presents with     RECHECK     Botox injections confirmed with patient     Valerie Sim is a 57 year old female with a history of chronic migraine headaches who presents to clinic for botulinum toxin injections for management of chronic migraine headaches and excessive jaw clenching.     SINCE LAST VISIT  Valerie Sim was last seen here in clinic on 8/24/2022, at which time she received 225 units of Botox.    Patient denies new medical diagnoses, illnesses, hospitalizations, emergency room visits, and injuries since the previous injection with botulinum neurotoxin. She did have a kidney stone earlier this week which has since passed and she is feeling better.     She reports that overall her jaw muscles are feeling tighter and more painful today. She also has a small bump with occasional pain above her right eyebrow.     RESPONSE TO PREVIOUS TREATMENT    Side effects: No problems reported, although she did experience one day of sweats and irritability following the Botox injections.     1.  Headache frequency during this injection cycle:  Typically 4 migraine headache days per month. She does experience daily mild headaches, but these do not interfere with her life the way her migraine headaches do and she does not require medication for them. This is compared to her baseline headache frequency of 30 severe headache days per month.     2.  Headache duration during this injection cycle:  Headache duration was typically between 1-6 hours depending on how she manages her headaches, however she did experience a two week headache at the beginning of injection cycle. Patient reports a few episodes of multiple day headaches during this injection cycle.     3.  Headache intensity during this injection cycle:    A.  5/10  =  Typical pain level.  B.  9/10  =  Worst pain level.  C.  0/10  =   Called daughter on the HIPPA forms, discussed results. She verbalizes understanding and has follow up appointment to discuss further.   Lowest pain level.    4.  Change in headache medication usage during this injection cycle:  (For Example:  Able to decrease use of oral pain medications.) No changes.       5.  ER Visits During This Injection Cycle:  None for headaches. She reports she used Imitrex x5, Dilaudid x1, Zofran x4 and IM Toradol.     6.  Functional Performance:  Change in ADL's, social interaction, days lost from work, etc. Patient reports being able to more fully participate in social and family activities and responsibilities as headache symptoms have improved.      PHYSICAL EXAM  VS: /80 (BP Location: Left arm, Patient Position: Sitting, Cuff Size: Adult Large)   Pulse 83   SpO2 98%    GEN: Pleasant and cooperative, in no acute distress  HEENT: No facial asymmetry    ALLERGIES  Allergies   Allergen Reactions     Fluoxetine Other (See Comments)     PN: LW Reaction: headache  PN: LW Reaction: headache  Migraine       Garlic Blisters, Cough, Dermatitis, Difficulty breathing, Headache, Hives, Itching, Nausea and Vomiting and Shortness Of Breath     Candesartan      Other reaction(s): Myalgia     Duloxetine      Other reaction(s): Headache  migraine     Iodine      Other reaction(s): Other (see comments)  PN: LW CM1: CONTRAST- iodine Reaction :     Metoclopramide      Other reaction(s): Headache  Caused increase in headaches     Perfume      Other reaction(s): Headache  head pain leading to migraines     Pregabalin      Other reaction(s): Headache, Myalgia     Trazodone Other (See Comments) and Unknown     Other reaction(s): Headache  Other reaction(s): Headache  Other reaction(s): Headache     Amitriptyline Hcl Other (See Comments)     Migraine       Candesartan Cilexetil-Hctz Muscle Pain (Myalgia)     Cymbalta Other (See Comments)     Migraine       Cyproheptadine Hcl      headaches     Desvenlafaxine      Migraine       Diatrizoate Unknown     PN: LW CM1: CONTRAST- iodine Reaction :     Diazepam      Other reaction(s): Vestibular  "Toxicity  PN: LW Reaction: vertigo  PN: LW Reaction: vertigo     Galcanezumab-Gnlm      Other reaction(s): Headache     Imipramine Other (See Comments)     Migraine       Lyrica Muscle Pain (Myalgia)     Metoprolol Other (See Comments) and Unknown     headache  headache  headache     Morphine Other (See Comments)     Patient reports seizure   Other reaction(s): Unknown  Seizure; 5/111/21 updated info: patient states she had a seizure while having a migraine headache years ago and is not sure if it was due to morphine. She has tolerated Dilaudid since then.       No Clinical Screening - See Comments      PN: LW FI1: lactose intolerance LW FI2: shellfish  PN: LW CM1: CONTRAST- iodine Reaction :  PN: LW Other1: -nka     Nortriptyline Other (See Comments)     Migraine       Phenergan Dm [Promethazine-Dm] Other (See Comments)     seizures     Promethazine      PN: LW Reaction: minimal sizures     Venlafaxine Other (See Comments)     PN: LW Reaction: migraine  PN: LW Reaction: migraine  Migraine       Wellbutrin [Bupropion Hydrobromide] Other (See Comments)     Migraine       Compazine Anxiety     Dihydroergotamine Anxiety and Other (See Comments)     Cardiac symptoms     Droperidol Anxiety     PN: LW Reaction: agitation     Medroxyprogesterone Hives     PN: LW Reaction: nausea     Prochlorperazine Anxiety     PN: LW Reaction: \"can't sit still\"       CURRENT MEDICATIONS    Current Outpatient Medications:      aMILoride (MIDAMOR) 5 MG tablet, , Disp: , Rfl:      amLODIPine (NORVASC) 10 MG tablet, , Disp: , Rfl:      amphetamine-dextroamphetamine (ADDERALL) 20 MG tablet, Take 1 tablet (20 mg) by mouth 2 times daily, Disp: 60 tablet, Rfl: 0     budesonide (RINOCORT AQUA) 32 MCG/ACT nasal spray, Spray 1 spray into both nostrils daily., Disp: , Rfl:      cyclobenzaprine (FLEXERIL) 10 MG tablet, , Disp: , Rfl:      CycloSPORINE (RESTASIS OP), Apply to eye 2 times daily, Disp: , Rfl:      fexofenadine (ALLEGRA) 180 MG tablet, " Take  by mouth daily., Disp: , Rfl:      HYDROmorphone (DILAUDID) 2 MG tablet, Take 0.5-1 tablets (1-2 mg) by mouth every 3 hours as needed (headache) 20 tablets = 3 month supply., Disp: 24 tablet, Rfl: 0     ketorolac (TORADOL) 30 MG/ML injection, Inject 1 mL (30 mg) into the vein every 6 hours as needed for moderate pain, Disp: 9 mL, Rfl: 11     lamoTRIgine (LAMICTAL) 100 MG tablet, Take 2 tablets (200 mg) by mouth daily, Disp: , Rfl:      nebivolol (BYSTOLIC) 5 MG tablet, Take 5 mg by mouth daily Take twice a day totaling 10 mg., Disp: , Rfl:      ondansetron (ZOFRAN) 4 MG tablet, Take 1 tablet (4 mg) by mouth every 8 hours as needed, Disp: 90 tablet, Rfl: 1     order for DME, Equipment being ordered: Oxygen The patient has Cluster Headache and needs 10 liters/minute of high flow oxygen via nonrebreather mask prn headaches, Disp: 99 days, Rfl: 0     order for DME, Equipment being ordered: Oxygen and non-rebreather mask.   Use high flow oxygen (10-15 L/min) with non-rebreather mask, as needed for cluster headaches, Disp: 1 Units, Rfl: 11     QUEtiapine (SEROQUEL) 50 MG tablet, Take 1 tablet by mouth daily., Disp: , Rfl:      valACYclovir (VALTREX) 1000 mg tablet, Take 1,000 mg by mouth as needed., Disp: , Rfl:      LORazepam (ATIVAN) 1 MG tablet, Take 1 mg by mouth 2 times daily as needed. (Patient not taking: Reported on 11/9/2022), Disp: , Rfl:      SUMAtriptan (IMITREX STATDOSE) 6 MG/0.5ML pen injector kit, Inject 0.5 mLs (6 mg) Subcutaneous at onset of headache for migraine (max 2 injections a day) May repeat in 1 hour. Max 12 mg/24 hours., Disp: 9 kit, Rfl: 0     SUMAtriptan (IMITREX) 100 MG tablet, Take 1 tablet (100 mg) by mouth at onset of headache for migraine May repeat in 2 hours. Max 2 tablets/24 hours., Disp: 12 tablet, Rfl: 3     temazepam (RESTORIL) 15 MG capsule, Take 1 capsule by mouth daily (Patient not taking: Reported on 11/9/2022), Disp: , Rfl:     Current Facility-Administered Medications:       botulinum toxin type A (BOTOX) 100 units injection 225 Units, 225 Units, Intramuscular, See Admin Instructions, [place], Addie Saeed MD, 225 Units at 10/26/22 1256       BOTULINUM NEUROTOXIN INJECTION PROCEDURES    VERIFICATION OF PATIENT IDENTIFICATION AND PROCEDURE     Initials   Patient Name SES   Patient  SES   Procedure Verified by: SES     Prior to the start of the procedure and with procedural staff participation, I verbally confirmed the patient s identity using two indicators, relevant allergies, that the procedure was appropriate and matched the consent or emergent situation, and that the correct equipment/implants were available. Immediately prior to starting the procedure I conducted the Time Out with the procedural staff and re-confirmed the patient s name, procedure, and site/side. (The Joint Commission universal protocol was followed.)  Yes    Sedation (Moderate or Deep): None    ABOVE ASSESSMENTS PERFORMED BY    Addie Malhotra MD      INDICATIONS FOR PROCEDURES  Valerie Sim is a 57 year old patient with pain and jaw clenching secondary to the diagnosis of chronic migraine headaches and oromandibular dystonia. Her baseline symptoms have been recalcitrant to oral medications and conservative therapy.  She is here today for reinjection with Botox.    GOAL OF PROCEDURE  The goal of this procedure is to decrease pain and excessive jaw clenching due to chronic migraine headaches and bruxism.     TOTAL DOSE ADMINISTERED  Dose Administered:  250 units  Botox (Botulinum Toxin Type A)       2:1 Dilution   Unavoidable Drug Waste: Yes  Amount of drug waste (mL): 50 units Botox.  Reason for waste:  Single use vial  Diluent Used:  0.25% bupivacaine  Total Volume of Diluent Used:  5 ml  NDC #: Botox 100u ([address]-11)      CONSENT  The risks, benefits, and treatment options were discussed with Valerie Sim and she agreed to proceed.    Written consent was obtained by [facility].     EQUIPMENT  USED  Needle-25mm stimulating/recording  Needle-30 gauge  EMG/NCS Machine    SKIN PREPARATION  Skin preparation was performed using an alcohol wipe.    GUIDANCE DESCRIPTION  Electro-myographic guidance was necessary throughout the neck and jaw portion of the procedure to accurately identify all areas of dystonic muscles while avoiding injection of non-dystonic muscles, neighboring nerves and nearby vascular structures.       AREA/MUSCLE INJECTED:  250 UNITS BOTOX = TOTAL DOSE, 2:1 DILUTION     1. FACIAL & SCALP MUSCLES: 105 UNITS BOTOX = TOTAL DOSE      Right Frontalis - 10 units of Botox in 2 site/s.  Left Frontalis - 10 units of Botox in 2 site/s.      Procerus - 5 units of Botox at 1 site/s.       Right  - 2.5 units of Botox in 1 site/s.  Left  - 2.5 units of Botox in 1 site/s.      Right Upper Occipitalis - 35 units of Botox at 7 site/s.   Left Upper Occipitalis - 35 units of Botox at 7 site/s.      Right Nasalis - 2.5 units of Botox at 1 site/s.           Left Nasalis - 2.5 units of Botox at 1 site/s.          2. JAW MUSCLES: 90 UNITS BOTOX = TOTAL DOSE     Right Masseter - 20 units of Botox at 2 site/s.   Left Masseter - 20 units of Botox at 2 site/s.      Right Temporalis - 25 units of Botox at 4 site/s.  Left Temporalis - 25 units of Botox at 4 site/s.          3. SHOULDER & NECK MUSCLES: 55 UNITS BOTOX = TOTAL DOSE      Right Levator Scapula - 10 units of Botox at 2 site/s (neck and shoulder).  Left Levator Scapula - 10 units of Botox at 2 site/s (neck and shoulder).      Right Upper Trapezius (mid & low lateral) - 15 units of Botox at 3 site/s.  Left Upper Trapezius (mid & low lateral) - 15 units of Botox at 3 site/s.             Right Pectoralis Minor - 2.5 units of Botox at 1 site/s.                     Left Pectoralis Minor - 2.5 units of Botox at 1 site/s.       RESPONSE TO PROCEDURE  Valerie Sim tolerated the procedure well and there were no immediate complications. She was  allowed to recover for an appropriate period of time and was discharged home in stable condition.      ASSESSMENT AND PLAN   1. Botulinum toxin injections: Dose of Botox was increased from 225 units to 250 units in hopes of increasing effectiveness and prolonging duration of benefit of injections. Patient will continue to monitor response and report at next appointment.   2. Referrals: None.   3. Follow up: Valerie Sim was rescheduled for the next series of injections in 9 weeks, at which time we will evaluate response to today's injections. she may call the clinic prior with any questions or concerns prior to the next appointment.

## 2023-01-03 NOTE — PROGRESS NOTES
Clinic Care Coordination Contact  [facility]/Voicemail        Clinical Data: Care Coordinator Outreach  Outreach attempted x 2.  Left message on patient's voicemail with call back information and requested return call.  Plan: Care Coordinator will try to reach patient again in 10 business days.     [facility]  Clinic Care Coordination  [facility]  [email or web address]  [phone]

## 2023-01-09 ASSESSMENT — PATIENT HEALTH QUESTIONNAIRE - PHQ9
10. IF YOU CHECKED OFF ANY PROBLEMS, HOW DIFFICULT HAVE THESE PROBLEMS MADE IT FOR YOU TO DO YOUR WORK, TAKE CARE OF THINGS AT HOME, OR GET ALONG WITH OTHER PEOPLE: SOMEWHAT DIFFICULT
SUM OF ALL RESPONSES TO PHQ QUESTIONS 1-9: 8
SUM OF ALL RESPONSES TO PHQ QUESTIONS 1-9: 8

## 2023-01-10 ENCOUNTER — VIRTUAL VISIT (OUTPATIENT)
Dept: PSYCHIATRY | Facility: CLINIC | Age: 58
End: 2023-01-10
Attending: PSYCHIATRY & NEUROLOGY
Payer: COMMERCIAL

## 2023-01-10 ENCOUNTER — TRANSFERRED RECORDS (OUTPATIENT)
Dept: PSYCHIATRY | Facility: CLINIC | Age: 58
End: 2023-01-10
Payer: COMMERCIAL

## 2023-01-10 DIAGNOSIS — F41.0 PANIC DISORDER WITHOUT AGORAPHOBIA: ICD-10-CM

## 2023-01-10 DIAGNOSIS — F33.2 SEVERE EPISODE OF RECURRENT MAJOR DEPRESSIVE DISORDER, WITHOUT PSYCHOTIC FEATURES (H): ICD-10-CM

## 2023-01-10 DIAGNOSIS — F41.1 GAD (GENERALIZED ANXIETY DISORDER): ICD-10-CM

## 2023-01-10 PROCEDURE — 90792 PSYCH DIAG EVAL W/MED SRVCS: CPT | Mod: GC | Performed by: STUDENT IN AN ORGANIZED HEALTH CARE EDUCATION/TRAINING PROGRAM

## 2023-01-10 RX ORDER — ESTRADIOL 0.1 MG/G
CREAM VAGINAL
COMMUNITY
Start: 2022-08-18

## 2023-01-10 RX ORDER — DIETHYLTOLUAMIDE 7 %
600 SPRAY, NON-AEROSOL (ML) TOPICAL 4 TIMES DAILY
COMMUNITY
Start: 2022-10-25

## 2023-01-10 RX ORDER — CALCITRIOL 0.25 UG/1
0.25 CAPSULE, LIQUID FILLED ORAL PRN
COMMUNITY
Start: 2022-10-25

## 2023-01-10 RX ORDER — CALCITRIOL 0.25 UG/1
CAPSULE, LIQUID FILLED ORAL
COMMUNITY
Start: 2022-02-04 | End: 2023-01-12

## 2023-01-10 NOTE — PATIENT INSTRUCTIONS
- no medication changes, PCP will continue to prescribe medications   - at your earliest convenience lets us know about Ketamine clinics within your network so we can make referrals   - follow-up Feb 23 at 1:40 pm via video.     **For crisis resources, please see the information at the end of this document**   Patient Education    Thank you for coming to the [facility] [place] [facility].     Lab Testing:  If you had lab testing today and your results are reassuring or normal they will be mailed to you or sent through [employer] within 7 days. If the lab tests need quick action we will call you with the results. The phone number we will call with results is # [phone]. If this is not the best number please call our clinic and change the number.     Medication Refills:  If you need any refills please call your pharmacy and they will contact us. Our fax number for refills is [phone].   Three business days of notice are needed for general medication refill requests.   Five business days of notice are needed for controlled substance refill requests.   If you need to change to a different pharmacy, please contact the new pharmacy directly. The new pharmacy will help you get your medications transferred.     Contact Us:  Please call [phone] during business hours (8-5:00 M-F).   If you have medication related questions after clinic hours, or on the weekend, please call [phone].     Financial Assistance [phone]   Medical Records [phone]       MENTAL HEALTH CRISIS RESOURCES:  For a emergency help, please call 911 or go to the nearest Emergency Department.     Emergency Walk-In Options:   EmPATH Unit @ [facility] ([place]): [phone] - Specialized mental health emergency area designed to be calming  [facility] ([place]): [phone]  [facility] ([place]): [phone]  [facility]):  [phone]    [facility] Crisis Information:   [place]: [phone]  [place]: [phone]  [place] ([place]) - Adult: [phone]     Child: [phone]  [place] - Adult: [phone]     Child: [phone]  [place]: [phone]  List of all [facility] resources:   https://[place].gov/dhs/people-we-serve/adults/health-care/mental-health/resources/crisis-contacts.jsp    National Crisis Information:   Crisis Text Line: Text  [place]  to 586955  Suicide & Crisis Lifeline: 988  National Suicide Prevention Lifeline: 5-632-224-TALK (1-199.301.4519)       For online chat options, visit https://suicidepreventionlifeline.org/chat/  Poison Control Center: 1-193.826.8144  Trans Lifeline: 1-743.673.3897 - Hotline for transgender people of all ages  The [employer] Project: 5-326-256-1027 - Hotline for LGBT youth     For Non-Emergency Support:   Fast Tracker: Mental Health & Substance Use Disorder Resources -   https://www.[employer].org/
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
## 2023-01-10 NOTE — PROGRESS NOTES
Valerie Sim is a 57 year old who is being evaluated via a billable video visit.      Pt will join video visit via: nextsocial  If there are problems joining the visit, send backup video invite via: Send to preferred e-mail: [email or web address]    Reason for telehealth visit: Patient has requested telehealth visit    Originating location (patient location): Patient's home    Will anyone else be joining the visit? No      Answers for HPI/ROS submitted by the patient on 1/9/2023  If you checked off any problems, how difficult have these problems made it for you to do your work, take care of things at home, or get along with other people?: Somewhat difficult  PHQ9 TOTAL SCORE: 8

## 2023-01-10 NOTE — NURSING NOTE
FYI- Pt reports shes taking 100mg of the Seroqel at bedtime. Medication list has 50mg listed only.       Pt reports she is also taking Potassium chloride 20 MEQ ER tab- 1 tab by mouth 2x daily. This is not on the medication list.

## 2023-01-23 ENCOUNTER — PATIENT OUTREACH (OUTPATIENT)
Dept: CARE COORDINATION | Facility: CLINIC | Age: 58
End: 2023-01-23
Payer: COMMERCIAL

## 2023-01-23 NOTE — PROGRESS NOTES
Clinic Care Coordination Contact    Follow Up Progress Note      Assessment: ELIZABETH SUMMERS contacted patient to check in. Patient stated she completed her [facility] psychiatry appt with Dr. Barnes, and enjoyed working with them and will continue to do so.    Patient stated the art therapy she waited 4 months to start turned out to be different than she was expecting. She shared that it felt more like group therapy where you could do art if you wanted instead of focused art therapy. She stated it was not the best fit and didn't continue it.     Patient reported she worked with a PTSD/DBT specialist but didn't have the best experience. She shared the provider didn't remember much of what they talked about and had the wrong diagnosis. Patient stated she discontinued these appointments as well.     Patient was informed of a treatment resistant depression program through [facility] in [place]. She is very interested in pursing this as she wants to try ketamine/TMS treatment. ELIZABETH SUMMERS will gather additional information about this program and any wait time and pass along to the patient. ELIZABETH SUMMERS also suggested the patient talk about this program with Dr. Barnes during her next psychiatry appointment.    Care Gaps:    Health Maintenance Due   Topic Date Due     YEARLY PREVENTIVE VISIT  Never done     URINE DRUG SCREEN  Never done     ADVANCE CARE PLANNING  Never done     MIGRAINE ACTION PLAN  Never done     HEPATITIS B IMMUNIZATION (1 of 3 - 3-dose series) Never done     HIV SCREENING  Never done     HEPATITIS C SCREENING  Never done     LIPID  Never done       Care Plans  Care Plan: Mental Health     Problem: Mood/Psychosocial Concerns     Goal: Mental Health Management     Start Date: 12/7/2022 Expected End Date: 3/7/2023    Note:     Barriers: Complex mental health needs.  Strengths: Strong self advocate  Patient expressed understanding of goal: Yes  Action steps to achieve this goal:  1. I will contact clinic provided by ELIZABETH  CC to inquire about treatment resistant depression therapy.  2. I will attend all scheduled [facility] appointments to maintain current services.   3. I will contact  CC with any questions or concerns.                          Intervention/Education provided during outreach: Patient verbalized understanding, engaged in AIDET communication during patient encounter.      Plan:    CC will gather more information about TRD program with [facility] and send to patient via My Chart per patient request. Patient will contact  CC with any questions or concerns.  Care Coordinator will follow up in 2-3 weeks.    NICO Ochoa  Clinic Care Coordination  [facility]  [email or web address]  [phone]

## 2023-01-30 ENCOUNTER — VIRTUAL VISIT (OUTPATIENT)
Dept: PSYCHIATRY | Facility: CLINIC | Age: 58
End: 2023-01-30
Attending: PSYCHIATRY & NEUROLOGY
Payer: COMMERCIAL

## 2023-01-30 DIAGNOSIS — F33.1 MODERATE EPISODE OF RECURRENT MAJOR DEPRESSIVE DISORDER (H): Primary | ICD-10-CM

## 2023-01-30 DIAGNOSIS — F33.3 SEVERE RECURRENT MAJOR DEPRESSIVE DISORDER WITH PSYCHOTIC FEATURES (H): ICD-10-CM

## 2023-01-30 PROCEDURE — G0463 HOSPITAL OUTPT CLINIC VISIT: HCPCS | Mod: PN,GT | Performed by: STUDENT IN AN ORGANIZED HEALTH CARE EDUCATION/TRAINING PROGRAM

## 2023-01-30 PROCEDURE — 99214 OFFICE O/P EST MOD 30 MIN: CPT | Mod: HN | Performed by: STUDENT IN AN ORGANIZED HEALTH CARE EDUCATION/TRAINING PROGRAM

## 2023-01-30 RX ORDER — LAMOTRIGINE 100 MG/1
300 TABLET ORAL DAILY
Qty: 90 TABLET | Refills: 1 | Status: SHIPPED | OUTPATIENT
Start: 2023-01-30 | End: 2023-06-26

## 2023-01-30 NOTE — PATIENT INSTRUCTIONS
- increase lamotrigine to 300 mg   - TRD referral   - therapy referral   - already has follow-up scheduled Feb 23rd       **For crisis resources, please see the information at the end of this document**   Patient Education    Thank you for coming to the [facility].     Lab Testing:  If you had lab testing today and your results are reassuring or normal they will be mailed to you or sent through [facility] within 7 days. If the lab tests need quick action we will call you with the results. The phone number we will call with results is # [phone]. If this is not the best number please call our clinic and change the number.     Medication Refills:  If you need any refills please call your pharmacy and they will contact us. Our fax number for refills is [phone].   Three business days of notice are needed for general medication refill requests.   Five business days of notice are needed for controlled substance refill requests.   If you need to change to a different pharmacy, please contact the new pharmacy directly. The new pharmacy will help you get your medications transferred.     Contact Us:  Please call [phone] during business hours (8-5:00 M-F).   If you have medication related questions after clinic hours, or on the weekend, please call [phone].     Financial Assistance [phone]   Medical Records [phone]       MENTAL HEALTH CRISIS RESOURCES:  For a emergency help, please call 911 or go to the nearest Emergency Department.     Emergency Walk-In Options:   EmPATH Unit @ [facility] ([place]): [phone] - Specialized mental health emergency area designed to be calming  [facility] ([place]): [phone]  [facility] ([place]): [phone]  [facility]): [phone]    [facility] Crisis Information:   [place]: [phone]  [place]: [phone]  [place] (MARTH[place]) - Adult:  [phone]     Child: [phone]  [place] - Adult: [phone]     Child: [phone]  [place]: [phone]  List of all [facility] resources:   https://[place].gov/dhs/people-we-serve/adults/health-care/mental-health/resources/crisis-contacts.jsp    National Crisis Information:   Crisis Text Line: Text  [place]  to 960600  Suicide & Crisis Lifeline: 988  National Suicide Prevention Lifeline: 3-761-326-TALK (1-291.674.5145)       For online chat options, visit https://suicidepreventionlifeline.org/chat/  Poison Control Center: 1-315.122.6572  Trans Lifeline: 1-262.381.9709 - Hotline for transgender people of all ages  The Will Project: 1-710-903-5142 - Hotline for LGBT youth     For Non-Emergency Support:   Fast Tracker: Mental Health & Substance Use Disorder Resources -   https://www.[employer] HoldingsckActivehoursn.org/

## 2023-01-30 NOTE — PROGRESS NOTES
Valerie Sim is a 57 year old who is being evaluated via a billable video visit.      Pt will join video visit via: [employer]  If there are problems joining the visit, send backup video invite via: Text to preferred phone: [phone]    Reason for telehealth visit: Patient has requested telehealth visit    Originating location (patient location): Patient's home    Will anyone else be joining the visit? No
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
## 2023-02-22 ENCOUNTER — PATIENT OUTREACH (OUTPATIENT)
Dept: CARE COORDINATION | Facility: CLINIC | Age: 58
End: 2023-02-22
Payer: COMMERCIAL

## 2023-02-22 NOTE — PROGRESS NOTES
Clinic Care Coordination Contact  [facility]/Voicemail       Clinical Data: Care Coordinator Outreach  Outreach attempted x 2.  Left message on patient's voicemail with call back information and requested return call.  Plan: Care Coordinator will try to reach patient again in 1 month.    Gabriel Maynard BART  Clinic Care Coordination  [facility]  [email or web address]  [phone]

## 2023-02-23 ENCOUNTER — VIRTUAL VISIT (OUTPATIENT)
Dept: PSYCHIATRY | Facility: CLINIC | Age: 58
End: 2023-02-23
Attending: PSYCHIATRY & NEUROLOGY
Payer: COMMERCIAL

## 2023-02-23 DIAGNOSIS — F33.0 MILD EPISODE OF RECURRENT MAJOR DEPRESSIVE DISORDER (H): ICD-10-CM

## 2023-02-23 DIAGNOSIS — F33.3 SEVERE RECURRENT MAJOR DEPRESSIVE DISORDER WITH PSYCHOTIC FEATURES (H): Primary | ICD-10-CM

## 2023-02-23 PROCEDURE — 99214 OFFICE O/P EST MOD 30 MIN: CPT | Mod: HN | Performed by: STUDENT IN AN ORGANIZED HEALTH CARE EDUCATION/TRAINING PROGRAM

## 2023-02-23 ASSESSMENT — PATIENT HEALTH QUESTIONNAIRE - PHQ9
SUM OF ALL RESPONSES TO PHQ QUESTIONS 1-9: 8
SUM OF ALL RESPONSES TO PHQ QUESTIONS 1-9: 8
10. IF YOU CHECKED OFF ANY PROBLEMS, HOW DIFFICULT HAVE THESE PROBLEMS MADE IT FOR YOU TO DO YOUR WORK, TAKE CARE OF THINGS AT HOME, OR GET ALONG WITH OTHER PEOPLE: SOMEWHAT DIFFICULT

## 2023-02-23 NOTE — PROGRESS NOTES
Valerie Sim is a 57 year old who is being evaluated via a billable video visit.      Pt will join video visit via: [employer]  If there are problems joining the visit, send backup video invite via: Text to preferred phone: [phone]    Reason for telehealth visit: Patient has requested telehealth visit    Originating location (patient location): Patient's home    Will anyone else be joining the visit? No     [facility]rs [facility] prior to joining the video.    Perla Cosby, CHON on 2/23/2023 at 1:26 PM

## 2023-02-23 NOTE — PATIENT INSTRUCTIONS
- Decrease lamotrigine back to 200 mg (she had already self decreased)   - DBT intake pending   - TRD referral pending   - follow-up April 27th at 1 pm via video     **For crisis resources, please see the information at the end of this document**   Patient Education    Thank you for coming to the [facility] [place] [facility].     Lab Testing:  If you had lab testing today and your results are reassuring or normal they will be mailed to you or sent through [employer] within 7 days. If the lab tests need quick action we will call you with the results. The phone number we will call with results is # [phone]. If this is not the best number please call our clinic and change the number.     Medication Refills:  If you need any refills please call your pharmacy and they will contact us. Our fax number for refills is [phone].   Three business days of notice are needed for general medication refill requests.   Five business days of notice are needed for controlled substance refill requests.   If you need to change to a different pharmacy, please contact the new pharmacy directly. The new pharmacy will help you get your medications transferred.     Contact Us:  Please call [phone] during business hours (8-5:00 M-F).   If you have medication related questions after clinic hours, or on the weekend, please call [phone].     Financial Assistance [phone]   Medical Records [phone]       MENTAL HEALTH CRISIS RESOURCES:  For a emergency help, please call 911 or go to the nearest Emergency Department.     Emergency Walk-In Options:   EmPATH Unit @ [place] ([place]): [phone] - Specialized mental health emergency area designed to be calming  [facility] ([place]): [phone]  [facility] ([place]): [phone]  [facility] ([place]): [phone]    [facility] Crisis Information:   [place]:  [phone]  [place]: [phone]  [place] (COP[place]) - Adult: [phone]     Child: [phone]  [place] - Adult: [phone]     Child: [phone]  [place]: [phone]  List of all [facility] resources:   https://[place].gov/dhs/people-we-serve/adults/health-care/mental-health/resources/crisis-contacts.jsp    National Crisis Information:   Crisis Text Line: Text  [place]  to 456390  Suicide & Crisis Lifeline: 988  National Suicide Prevention Lifeline: 2-981-266-TALK (1-194.854.3233)       For online chat options, visit https://suicidepreventionlifeline.org/chat/  Poison Control Center: 5-674-317-1364  Trans Lifeline: 1-955.964.6971 - Hotline for transgender people of all ages  The Will Project: 7-258-639-5223 - Hotline for LGBT youth     For Non-Emergency Support:   Fast Tracker: Mental Health & Substance Use Disorder Resources -   https://www.XIHAckBPTn.org/

## 2023-02-23 NOTE — NURSING NOTE
Is the patient currently in the state of [place]? YES    Visit mode:VIDEO    If the visit is dropped, the patient can be reconnected by: VIDEO VISIT: Text to cell phone: [phone]    Will anyone else be joining the visit? NO      How would you like to obtain your AVS? MyChart    Are changes needed to the allergy or medication list? NO    Reason for visit: Psychiatry Return    Pt will complete qnrs in MyChart prior to joining the video.    Perla Cosby, CHON on 2/23/2023 at 1:28 PM
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
## 2023-02-25 ENCOUNTER — MYC MEDICAL ADVICE (OUTPATIENT)
Dept: NEUROLOGY | Facility: CLINIC | Age: 58
End: 2023-02-25
Payer: COMMERCIAL

## 2023-02-27 NOTE — TELEPHONE ENCOUNTER
Dr. Shi asked if Imani Wallace could see pt to discuss oxygen order.     Attempted to call pt, LVM to call back and schedule a sooner appt with Imani.  Steven sent to patient.     Abby FISCHER RN, BSN  [facility]

## 2023-02-28 NOTE — TELEPHONE ENCOUNTER
Spoke with patient about scheduling an appointment either in person or virtual. Patient reports that she would like to hold off right now and wait to schedule. Currently, the patient has her primary care provider navigating approval for her oxygen to continue refills through the primary. Patient will call us if any other questions come up.     Daisy Alberts MA

## 2023-03-01 ENCOUNTER — OFFICE VISIT (OUTPATIENT)
Dept: PHYSICAL MEDICINE AND REHAB | Facility: CLINIC | Age: 58
End: 2023-03-01
Payer: COMMERCIAL

## 2023-03-01 DIAGNOSIS — M54.81 BILATERAL OCCIPITAL NEURALGIA: Primary | ICD-10-CM

## 2023-03-01 DIAGNOSIS — G43.719 CHRONIC MIGRAINE WITHOUT AURA, INTRACTABLE, WITHOUT STATUS MIGRAINOSUS: ICD-10-CM

## 2023-03-01 DIAGNOSIS — M79.18 MYOFASCIAL PAIN: ICD-10-CM

## 2023-03-01 PROCEDURE — 64405 NJX AA&/STRD GR OCPL NRV: CPT | Mod: XS | Performed by: PHYSICAL MEDICINE & REHABILITATION

## 2023-03-01 PROCEDURE — 95874 GUIDE NERV DESTR NEEDLE EMG: CPT | Performed by: PHYSICAL MEDICINE & REHABILITATION

## 2023-03-01 PROCEDURE — 64615 CHEMODENERV MUSC MIGRAINE: CPT | Performed by: PHYSICAL MEDICINE & REHABILITATION

## 2023-03-01 PROCEDURE — 20552 NJX 1/MLT TRIGGER POINT 1/2: CPT | Mod: XS | Performed by: PHYSICAL MEDICINE & REHABILITATION

## 2023-03-01 RX ORDER — TRIAMCINOLONE ACETONIDE 40 MG/ML
40 INJECTION, SUSPENSION INTRA-ARTICULAR; INTRAMUSCULAR ONCE
Status: COMPLETED | OUTPATIENT
Start: 2023-03-01 | End: 2023-03-01

## 2023-03-01 RX ORDER — BUPIVACAINE HYDROCHLORIDE 2.5 MG/ML
10 INJECTION, SOLUTION EPIDURAL; INFILTRATION; INTRACAUDAL ONCE
Status: COMPLETED | OUTPATIENT
Start: 2023-03-01 | End: 2023-03-01

## 2023-03-01 RX ADMIN — BUPIVACAINE HYDROCHLORIDE 25 MG: 2.5 INJECTION, SOLUTION EPIDURAL; INFILTRATION; INTRACAUDAL at 16:56

## 2023-03-01 RX ADMIN — TRIAMCINOLONE ACETONIDE 40 MG: 40 INJECTION, SUSPENSION INTRA-ARTICULAR; INTRAMUSCULAR at 16:57

## 2023-03-01 NOTE — LETTER
3/1/2023       RE: Valerie Sim  [address]  [place] [address]     Dear Colleague,    Thank you for referring your patient, Valerie Sim, to the [facility] [place] [facility]. Please see a copy of my visit note below.      [facility]     PM&R CLINIC NOTE  BOTULINUM TOXIN PROCEDURE      HPI  No chief complaint on file.    Valerie Sim is a 57 year old female with a history of chronic migraine headaches who presents to clinic for botulinum toxin injections for management of chronic migraine headaches and excessive jaw clenching.     SINCE LAST VISIT  Valerie Sim was last seen here in clinic on 12/30/2022, at which time she received 250 units of Botox.    Patient denies new medical diagnoses, illnesses, hospitalizations, emergency room visits, and injuries since the previous injection with botulinum neurotoxin.     Overall, migraines have been relatively well controlled, but she continues to experience cluster headaches. More recently, she did not get the oxygen therapy she needed, and this caused an increase in her cluster headaches.     RESPONSE TO PREVIOUS TREATMENT    Side effects: No problems reported.     1.  Headache frequency during this injection cycle:  She has had approximately 7 migraine headache days over the last 9 weeks since her last Botox injections. This is compared to her baseline headache frequency of 30 severe headache days per month.     2.  Headache duration during this injection cycle:  Headache duration was typically a half day, which is a shorter duration than usual, but she is currently on a 5 day stretch of a migraine. Patient reports a few episodes of multiple day headaches during this injection cycle.     3.  Headache intensity during this injection cycle:    A.  8-9/10  =  Typical pain level.  B.  9/10  =  Worst pain  level.  C.  0/10  =  Lowest pain level.    4.  Change in headache medication usage during this injection cycle:  (For Example:  Able to decrease use of oral pain medications.) No changes.       5.  ER Visits During This Injection Cycle:  None. She reports she used Relpax x6, Toradol x3, Zofran x8 and IM Toradol.     6.  Functional Performance:  Change in ADL's, social interaction, days lost from work, etc. Patient reports being able to more fully participate in social and family activities and responsibilities as headache symptoms have improved.      PHYSICAL EXAM  VS: There were no vitals taken for this visit.   GEN: Pleasant and cooperative, in no acute distress  HEENT: No facial asymmetry    ALLERGIES  Allergies   Allergen Reactions     Fluoxetine Other (See Comments)     PN: LW Reaction: headache  PN: LW Reaction: headache  Migraine       Garlic Blisters, Cough, Dermatitis, Difficulty breathing, Headache, Hives, Itching, Nausea and Vomiting and Shortness Of Breath     Candesartan      Other reaction(s): Myalgia     Duloxetine      Other reaction(s): Headache  migraine     Iodine      Other reaction(s): Other (see comments)  PN: LW CM1: CONTRAST- iodine Reaction :     Metoclopramide      Other reaction(s): Headache  Caused increase in headaches     Perfume      Other reaction(s): Headache  head pain leading to migraines     Pregabalin      Other reaction(s): Headache, Myalgia     Trazodone Other (See Comments) and Unknown     Other reaction(s): Headache  Other reaction(s): Headache  Other reaction(s): Headache     Amitriptyline Hcl Other (See Comments)     Migraine       Candesartan Cilexetil-Hctz Muscle Pain (Myalgia)     Cymbalta Other (See Comments)     Migraine       Cyproheptadine Hcl      headaches     Desvenlafaxine      Migraine       Diatrizoate Unknown     PN: LW CM1: CONTRAST- iodine Reaction :     Diazepam      Other reaction(s): Vestibular Toxicity  PN: LW Reaction: vertigo  PN: LW Reaction: vertigo  "    Galcanezumab-Gnlm      Other reaction(s): Headache     Imipramine Other (See Comments)     Migraine       Lyrica Muscle Pain (Myalgia)     Metoprolol Other (See Comments) and Unknown     headache  headache  headache     Morphine Other (See Comments)     Patient reports seizure   Other reaction(s): Unknown  Seizure; 5/111/21 updated info: patient states she had a seizure while having a migraine headache years ago and is not sure if it was due to morphine. She has tolerated Dilaudid since then.       No Clinical Screening - See Comments      PN: LW FI1: lactose intolerance LW FI2: shellfish  PN: LW CM1: CONTRAST- iodine Reaction :  PN: LW Other1: -nka     Nortriptyline Other (See Comments)     Migraine       Phenergan Dm [Promethazine-Dm] Other (See Comments)     seizures     Promethazine      PN: LW Reaction: minimal sizures     Venlafaxine Other (See Comments)     PN: LW Reaction: migraine  PN: LW Reaction: migraine  Migraine       Wellbutrin [Bupropion Hydrobromide] Other (See Comments)     Migraine       Compazine Anxiety     Dihydroergotamine Anxiety and Other (See Comments)     Cardiac symptoms     Droperidol Anxiety     PN: LW Reaction: agitation     Medroxyprogesterone Hives     PN: LW Reaction: nausea     Prochlorperazine Anxiety     PN: LW Reaction: \"can't sit still\"       CURRENT MEDICATIONS    Current Outpatient Medications:      aMILoride (MIDAMOR) 5 MG tablet, , Disp: , Rfl:      amLODIPine (NORVASC) 10 MG tablet, , Disp: , Rfl:      amphetamine-dextroamphetamine (ADDERALL) 20 MG tablet, Take 1 tablet (20 mg) by mouth 2 times daily, Disp: 60 tablet, Rfl: 0     budesonide (RINOCORT AQUA) 32 MCG/ACT nasal spray, Spray 1 spray into both nostrils daily., Disp: , Rfl:      calcitRIOL (ROCALTROL) 0.25 MCG capsule, Take 0.25 mcg by mouth, Disp: , Rfl:      Calcium-Magnesium-Vitamin D (CITRACAL SLOW RELEASE) 600- MG-MG-UNIT TB24, Take 600 mg by mouth, Disp: , Rfl:      CycloSPORINE (RESTASIS OP), Apply " to eye 2 times daily, Disp: , Rfl:      estradiol (ESTRACE) 0.1 MG/GM vaginal cream, , Disp: , Rfl:      HYDROmorphone (DILAUDID) 2 MG tablet, Take 0.5-1 tablets (1-2 mg) by mouth every 3 hours as needed (headache) 20 tablets = 3 month supply., Disp: 24 tablet, Rfl: 0     ketorolac (TORADOL) 30 MG/ML injection, Inject 1 mL (30 mg) into the vein every 6 hours as needed for moderate pain, Disp: 9 mL, Rfl: 11     lamoTRIgine (LAMICTAL) 100 MG tablet, Take 3 tablets (300 mg) by mouth daily, Disp: 90 tablet, Rfl: 1     nebivolol (BYSTOLIC) 5 MG tablet, Take 5 mg by mouth daily Take twice a day totaling 10 mg., Disp: , Rfl:      ondansetron (ZOFRAN) 4 MG tablet, Take 1 tablet (4 mg) by mouth every 8 hours as needed, Disp: 90 tablet, Rfl: 1     order for DME, Equipment being ordered: Oxygen The patient has Cluster Headache and needs 10 liters/minute of high flow oxygen via nonrebreather mask prn headaches, Disp: 99 days, Rfl: 0     order for DME, Equipment being ordered: Oxygen and non-rebreather mask.   Use high flow oxygen (10-15 L/min) with non-rebreather mask, as needed for cluster headaches, Disp: 1 Units, Rfl: 11     QUEtiapine (SEROQUEL) 50 MG tablet, Take 1 tablet by mouth daily., Disp: , Rfl:      valACYclovir (VALTREX) 1000 mg tablet, Take 1,000 mg by mouth as needed., Disp: , Rfl:     Current Facility-Administered Medications:      botulinum toxin type A (BOTOX) 100 units injection 225 Units, 225 Units, Intramuscular, See Admin Instructions, Standal, Addie Saeed MD, 225 Units at 22 1346       BOTULINUM NEUROTOXIN INJECTION PROCEDURES    VERIFICATION OF PATIENT IDENTIFICATION AND PROCEDURE     Initials   Patient Name SES   Patient  SES   Procedure Verified by: SES     Prior to the start of the procedure and with procedural staff participation, I verbally confirmed the patient s identity using two indicators, relevant allergies, that the procedure was appropriate and matched the consent or emergent  situation, and that the correct equipment/implants were available. Immediately prior to starting the procedure I conducted the Time Out with the procedural staff and re-confirmed the patient s name, procedure, and site/side. (The Joint Commission universal protocol was followed.)  Yes    Sedation (Moderate or Deep): None    ABOVE ASSESSMENTS PERFORMED BY    Addie Malhotra MD      INDICATIONS FOR PROCEDURES  Valerie Sim is a 57 year old patient with pain and jaw clenching secondary to the diagnosis of chronic migraine headaches and oromandibular dystonia. Her baseline symptoms have been recalcitrant to oral medications and conservative therapy.  She is here today for reinjection with Botox.    GOAL OF PROCEDURE  The goal of this procedure is to decrease pain and excessive jaw clenching due to chronic migraine headaches and bruxism.     TOTAL DOSE ADMINISTERED  Dose Administered:  225 units  Botox (Botulinum Toxin Type A)       2:1 Dilution   Unavoidable Drug Waste: Yes  Amount of drug waste (mL): 75 units Botox.  Reason for waste:  Single use vial  Diluent Used:  0.25% bupivacaine  Total Volume of Diluent Used:  4.5 ml  NDC #: Botox 100u ([address]-37)      CONSENT  The risks, benefits, and treatment options were discussed with Valerie Sim and she agreed to proceed.    Written consent was obtained by [facility].     EQUIPMENT USED  Needle-25mm stimulating/recording  Needle-30 gauge  EMG/NCS Machine    SKIN PREPARATION  Skin preparation was performed using an alcohol wipe.    GUIDANCE DESCRIPTION  Electro-myographic guidance was necessary throughout the neck and jaw portion of the procedure to accurately identify all areas of dystonic muscles while avoiding injection of non-dystonic muscles, neighboring nerves and nearby vascular structures.       AREA/MUSCLE INJECTED:  225 UNITS BOTOX = TOTAL DOSE, 2:1 DILUTION     1. FACIAL & SCALP MUSCLES: 85 UNITS BOTOX = TOTAL DOSE      Right Frontalis - 10 units of Botox in 2  site/s.  Left Frontalis - 10 units of Botox in 2 site/s.      Procerus - 5 units of Botox at 1 site/s.       Right  - 2.5 units of Botox in 1 site/s.  Left  - 2.5 units of Botox in 1 site/s.      Right Nasalis - 2.5 units of Botox at 1 site/s.           Left Nasalis - 2.5 units of Botox at 1 site/s.     Right Upper Occipitalis - 25 units of Botox at 5 site/s.   Left Upper Occipitalis - 25 units of Botox at 5 site/s.         2. JAW MUSCLES: 90 UNITS BOTOX = TOTAL DOSE     Right Masseter - 20 units of Botox at 2 site/s.   Left Masseter - 20 units of Botox at 2 site/s.      Right Temporalis - 25 units of Botox at 5 site/s.  Left Temporalis - 25 units of Botox at 5 site/s.          3. SHOULDER & NECK MUSCLES: 50 UNITS BOTOX = TOTAL DOSE      Right Levator Scapula - 10 units of Botox at 2 site/s (neck and scapular insertion).  Left Levator Scapula - 10 units of Botox at 2 site/s (neck and scapular insertion).      Right Upper Trapezius (mid & low lateral) - 10 units of Botox at 3 site/s.  Left Upper Trapezius (mid & low lateral) - 10 units of Botox at 3 site/s.             Right Pectoralis Minor - 5 units of Botox at 1 site/s.                     Left Pectoralis Minor - 5 units of Botox at 1 site/s.       TRIGGER POINT INJECTIONS  Areas of the skin were prepped with ChloraPrep.  Using standard precautions, a 30-gauge 1-inch needle was used to inject a 3 ml mixture of 1% lidocaine and 0.25% bupivacaine into the upper trapezius muscles bilaterally for a total of 10 sites.        GREATER OCCIPITAL NERVE BLOCKS:  Area just inferior to insertion of the right and left superior trapezius insertion onto skull was cleansed with ChloraPrep. Needle was advanced anteriorly to base of skull then slightly withdrawn and injectate was injected in a fan-like distribution at different depths. Total injection of 0.5 ml of 20 mg Kenalog plus 4.5 cc of 0.25 % bupivicaine per side. Valerie Sim tolerated the procedure  well without any immediate complications.      RESPONSE TO PROCEDURE  Valerie Sim tolerated the procedure well and there were no immediate complications. She was allowed to recover for an appropriate period of time and was discharged home in stable condition.      ASSESSMENT AND PLAN   1. Botulinum toxin injections: Dose of Botox was decreased from 250 units to 225 units due to insurance limitations. She was also given trigger point injections and occipital nerve blocks for headaches and myofascial pain, which is used as an adjuvant to Botox treatment. Patient will continue to monitor response and report at next appointment.   2. Referrals: None.   3. Follow up: Valerie Sim was rescheduled for the next series of injections in 9 weeks, at which time we will evaluate response to today's injections. she may call the clinic prior with any questions or concerns prior to the next appointment.           Sincerely,    Addie Malhotra MD

## 2023-03-01 NOTE — PROGRESS NOTES
[facility]     PM&R CLINIC NOTE  BOTULINUM TOXIN PROCEDURE      HPI  No chief complaint on file.    Valerie Sim is a 57 year old female with a history of chronic migraine headaches who presents to clinic for botulinum toxin injections for management of chronic migraine headaches and excessive jaw clenching.     SINCE LAST VISIT  Valerie Sim was last seen here in clinic on 12/30/2022, at which time she received 250 units of Botox.    Patient denies new medical diagnoses, illnesses, hospitalizations, emergency room visits, and injuries since the previous injection with botulinum neurotoxin.     Overall, migraines have been relatively well controlled, but she continues to experience cluster headaches. More recently, she did not get the oxygen therapy she needed, and this caused an increase in her cluster headaches.     RESPONSE TO PREVIOUS TREATMENT    Side effects: No problems reported.     1.  Headache frequency during this injection cycle:  She has had approximately 7 migraine headache days over the last 9 weeks since her last Botox injections. This is compared to her baseline headache frequency of 30 severe headache days per month.     2.  Headache duration during this injection cycle:  Headache duration was typically a half day, which is a shorter duration than usual, but she is currently on a 5 day stretch of a migraine. Patient reports a few episodes of multiple day headaches during this injection cycle.     3.  Headache intensity during this injection cycle:    A.  8-9/10  =  Typical pain level.  B.  9/10  =  Worst pain level.  C.  0/10  =  Lowest pain level.    4.  Change in headache medication usage during this injection cycle:  (For Example:  Able to decrease use of oral pain medications.) No changes.       5.  ER Visits During This Injection Cycle:  None. She reports she used Relpax x6, Toradol x3, Zofran x8 and IM Toradol.     6.  Functional Performance:  Change  in ADL's, social interaction, days lost from work, etc. Patient reports being able to more fully participate in social and family activities and responsibilities as headache symptoms have improved.      PHYSICAL EXAM  VS: There were no vitals taken for this visit.   GEN: Pleasant and cooperative, in no acute distress  HEENT: No facial asymmetry    ALLERGIES  Allergies   Allergen Reactions     Fluoxetine Other (See Comments)     PN: LW Reaction: headache  PN: LW Reaction: headache  Migraine       Garlic Blisters, Cough, Dermatitis, Difficulty breathing, Headache, Hives, Itching, Nausea and Vomiting and Shortness Of Breath     Candesartan      Other reaction(s): Myalgia     Duloxetine      Other reaction(s): Headache  migraine     Iodine      Other reaction(s): Other (see comments)  PN: LW CM1: CONTRAST- iodine Reaction :     Metoclopramide      Other reaction(s): Headache  Caused increase in headaches     Perfume      Other reaction(s): Headache  head pain leading to migraines     Pregabalin      Other reaction(s): Headache, Myalgia     Trazodone Other (See Comments) and Unknown     Other reaction(s): Headache  Other reaction(s): Headache  Other reaction(s): Headache     Amitriptyline Hcl Other (See Comments)     Migraine       Candesartan Cilexetil-Hctz Muscle Pain (Myalgia)     Cymbalta Other (See Comments)     Migraine       Cyproheptadine Hcl      headaches     Desvenlafaxine      Migraine       Diatrizoate Unknown     PN: LW CM1: CONTRAST- iodine Reaction :     Diazepam      Other reaction(s): Vestibular Toxicity  PN: LW Reaction: vertigo  PN: LW Reaction: vertigo     Galcanezumab-Gnlm      Other reaction(s): Headache     Imipramine Other (See Comments)     Migraine       Lyrica Muscle Pain (Myalgia)     Metoprolol Other (See Comments) and Unknown     headache  headache  headache     Morphine Other (See Comments)     Patient reports seizure   Other reaction(s): Unknown  Seizure; 5/111/21 updated info: patient  "states she had a seizure while having a migraine headache years ago and is not sure if it was due to morphine. She has tolerated Dilaudid since then.       No Clinical Screening - See Comments      PN: LW FI1: lactose intolerance LW FI2: shellfish  PN: LW CM1: CONTRAST- iodine Reaction :  PN: LW Other1: -nka     Nortriptyline Other (See Comments)     Migraine       Phenergan Dm [Promethazine-Dm] Other (See Comments)     seizures     Promethazine      PN: LW Reaction: minimal sizures     Venlafaxine Other (See Comments)     PN: LW Reaction: migraine  PN: LW Reaction: migraine  Migraine       Wellbutrin [Bupropion Hydrobromide] Other (See Comments)     Migraine       Compazine Anxiety     Dihydroergotamine Anxiety and Other (See Comments)     Cardiac symptoms     Droperidol Anxiety     PN: LW Reaction: agitation     Medroxyprogesterone Hives     PN: LW Reaction: nausea     Prochlorperazine Anxiety     PN: LW Reaction: \"can't sit still\"       CURRENT MEDICATIONS    Current Outpatient Medications:      aMILoride (MIDAMOR) 5 MG tablet, , Disp: , Rfl:      amLODIPine (NORVASC) 10 MG tablet, , Disp: , Rfl:      amphetamine-dextroamphetamine (ADDERALL) 20 MG tablet, Take 1 tablet (20 mg) by mouth 2 times daily, Disp: 60 tablet, Rfl: 0     budesonide (RINOCORT AQUA) 32 MCG/ACT nasal spray, Spray 1 spray into both nostrils daily., Disp: , Rfl:      calcitRIOL (ROCALTROL) 0.25 MCG capsule, Take 0.25 mcg by mouth, Disp: , Rfl:      Calcium-Magnesium-Vitamin D (CITRACAL SLOW RELEASE) 600- MG-MG-UNIT TB24, Take 600 mg by mouth, Disp: , Rfl:      CycloSPORINE (RESTASIS OP), Apply to eye 2 times daily, Disp: , Rfl:      estradiol (ESTRACE) 0.1 MG/GM vaginal cream, , Disp: , Rfl:      HYDROmorphone (DILAUDID) 2 MG tablet, Take 0.5-1 tablets (1-2 mg) by mouth every 3 hours as needed (headache) 20 tablets = 3 month supply., Disp: 24 tablet, Rfl: 0     ketorolac (TORADOL) 30 MG/ML injection, Inject 1 mL (30 mg) into the vein " every 6 hours as needed for moderate pain, Disp: 9 mL, Rfl: 11     lamoTRIgine (LAMICTAL) 100 MG tablet, Take 3 tablets (300 mg) by mouth daily, Disp: 90 tablet, Rfl: 1     nebivolol (BYSTOLIC) 5 MG tablet, Take 5 mg by mouth daily Take twice a day totaling 10 mg., Disp: , Rfl:      ondansetron (ZOFRAN) 4 MG tablet, Take 1 tablet (4 mg) by mouth every 8 hours as needed, Disp: 90 tablet, Rfl: 1     order for DME, Equipment being ordered: Oxygen The patient has Cluster Headache and needs 10 liters/minute of high flow oxygen via nonrebreather mask prn headaches, Disp: 99 days, Rfl: 0     order for DME, Equipment being ordered: Oxygen and non-rebreather mask.   Use high flow oxygen (10-15 L/min) with non-rebreather mask, as needed for cluster headaches, Disp: 1 Units, Rfl: 11     QUEtiapine (SEROQUEL) 50 MG tablet, Take 1 tablet by mouth daily., Disp: , Rfl:      valACYclovir (VALTREX) 1000 mg tablet, Take 1,000 mg by mouth as needed., Disp: , Rfl:     Current Facility-Administered Medications:      botulinum toxin type A (BOTOX) 100 units injection 225 Units, 225 Units, Intramuscular, See Admin Instructions, Standal, Addie Saeed MD, 225 Units at 22 [id number]       BOTULINUM NEUROTOXIN INJECTION PROCEDURES    VERIFICATION OF PATIENT IDENTIFICATION AND PROCEDURE     Initials   Patient Name SES   Patient  SES   Procedure Verified by: SES     Prior to the start of the procedure and with procedural staff participation, I verbally confirmed the patient s identity using two indicators, relevant allergies, that the procedure was appropriate and matched the consent or emergent situation, and that the correct equipment/implants were available. Immediately prior to starting the procedure I conducted the Time Out with the procedural staff and re-confirmed the patient s name, procedure, and site/side. (The Joint Commission universal protocol was followed.)  Yes    Sedation (Moderate or Deep): None    ABOVE ASSESSMENTS  PERFORMED BY    Addie Malhotra MD      INDICATIONS FOR PROCEDURES  Valerie Sim is a 57 year old patient with pain and jaw clenching secondary to the diagnosis of chronic migraine headaches and oromandibular dystonia. Her baseline symptoms have been recalcitrant to oral medications and conservative therapy.  She is here today for reinjection with Botox.    GOAL OF PROCEDURE  The goal of this procedure is to decrease pain and excessive jaw clenching due to chronic migraine headaches and bruxism.     TOTAL DOSE ADMINISTERED  Dose Administered:  225 units  Botox (Botulinum Toxin Type A)       2:1 Dilution   Unavoidable Drug Waste: Yes  Amount of drug waste (mL): 75 units Botox.  Reason for waste:  Single use vial  Diluent Used:  0.25% bupivacaine  Total Volume of Diluent Used:  4.5 ml  NDC #: Botox 100u ([address]-57)      CONSENT  The risks, benefits, and treatment options were discussed with Valerie Sim and she agreed to proceed.    Written consent was obtained by [facility].     EQUIPMENT USED  Needle-25mm stimulating/recording  Needle-30 gauge  EMG/NCS Machine    SKIN PREPARATION  Skin preparation was performed using an alcohol wipe.    GUIDANCE DESCRIPTION  Electro-myographic guidance was necessary throughout the neck and jaw portion of the procedure to accurately identify all areas of dystonic muscles while avoiding injection of non-dystonic muscles, neighboring nerves and nearby vascular structures.       AREA/MUSCLE INJECTED:  225 UNITS BOTOX = TOTAL DOSE, 2:1 DILUTION     1. FACIAL & SCALP MUSCLES: 85 UNITS BOTOX = TOTAL DOSE      Right Frontalis - 10 units of Botox in 2 site/s.  Left Frontalis - 10 units of Botox in 2 site/s.      Procerus - 5 units of Botox at 1 site/s.       Right  - 2.5 units of Botox in 1 site/s.  Left  - 2.5 units of Botox in 1 site/s.      Right Nasalis - 2.5 units of Botox at 1 site/s.           Left Nasalis - 2.5 units of Botox at 1 site/s.     Right Upper  Occipitalis - 25 units of Botox at 5 site/s.   Left Upper Occipitalis - 25 units of Botox at 5 site/s.         2. JAW MUSCLES: 90 UNITS BOTOX = TOTAL DOSE     Right Masseter - 20 units of Botox at 2 site/s.   Left Masseter - 20 units of Botox at 2 site/s.      Right Temporalis - 25 units of Botox at 5 site/s.  Left Temporalis - 25 units of Botox at 5 site/s.          3. SHOULDER & NECK MUSCLES: 50 UNITS BOTOX = TOTAL DOSE      Right Levator Scapula - 10 units of Botox at 2 site/s (neck and scapular insertion).  Left Levator Scapula - 10 units of Botox at 2 site/s (neck and scapular insertion).      Right Upper Trapezius (mid & low lateral) - 10 units of Botox at 3 site/s.  Left Upper Trapezius (mid & low lateral) - 10 units of Botox at 3 site/s.             Right Pectoralis Minor - 5 units of Botox at 1 site/s.                     Left Pectoralis Minor - 5 units of Botox at 1 site/s.       TRIGGER POINT INJECTIONS  Areas of the skin were prepped with ChloraPrep.  Using standard precautions, a 30-gauge 1-inch needle was used to inject a 3 ml mixture of 1% lidocaine and 0.25% bupivacaine into the upper trapezius muscles bilaterally for a total of 10 sites.        GREATER OCCIPITAL NERVE BLOCKS:  Area just inferior to insertion of the right and left superior trapezius insertion onto skull was cleansed with ChloraPrep. Needle was advanced anteriorly to base of skull then slightly withdrawn and injectate was injected in a fan-like distribution at different depths. Total injection of 0.5 ml of 20 mg Kenalog plus 4.5 cc of 0.25 % bupivicaine per side. Valerie Sim tolerated the procedure well without any immediate complications.      RESPONSE TO PROCEDURE  Valerie Sim tolerated the procedure well and there were no immediate complications. She was allowed to recover for an appropriate period of time and was discharged home in stable condition.      ASSESSMENT AND PLAN   1. Botulinum toxin injections: Dose of Botox was  decreased from 250 units to 225 units due to insurance limitations. She was also given trigger point injections and occipital nerve blocks for headaches and myofascial pain, which is used as an adjuvant to Botox treatment. Patient will continue to monitor response and report at next appointment.   2. Referrals: None.   3. Follow up: Valerie Sim was rescheduled for the next series of injections in 9 weeks, at which time we will evaluate response to today's injections. she may call the clinic prior with any questions or concerns prior to the next appointment.

## 2023-03-02 ENCOUNTER — MYC MEDICAL ADVICE (OUTPATIENT)
Dept: PHYSICAL MEDICINE AND REHAB | Facility: CLINIC | Age: 58
End: 2023-03-02
Payer: COMMERCIAL

## 2023-03-07 ENCOUNTER — MYC MEDICAL ADVICE (OUTPATIENT)
Dept: PSYCHIATRY | Facility: CLINIC | Age: 58
End: 2023-03-07
Payer: COMMERCIAL

## 2023-03-07 DIAGNOSIS — Z79.899 ENCOUNTER FOR MEDICATION MANAGEMENT: Primary | ICD-10-CM

## 2023-03-07 NOTE — TELEPHONE ENCOUNTER
Aguilar Barnes MD Labossiere, Laura, RN  Phone Number: [phone]     If that is a change she would like to make going forward then we can make that change now, even before our next appointment. I generally dont make changes with most patients though I trust her insight and level of function.     She can use her current supply, taking 1 1/2 tabs. Then we can fill for the new dose, 150 mg if needed.     Additionally, she is due for routine monitoring labs. No concerns, just let her know anyone that is on medications like quetiapine need screening labs for lipids (fasting), CBC, and CMP.     Thank you, Nancy!

## 2023-03-13 NOTE — TELEPHONE ENCOUNTER
Writer called patient to advise to follow up with her PCP about a PTH level and patient verbalized understanding. She states she is not too concerned and reports she has monthly metabolic testing, so if her calcium level is abnormal, it will be an indicator of whether further follow up is needed.     Patient states that the night terrors have improved since decreasing quetiapine back to original dose. She states she does not vividly remember the night terrors, but she states her  informed her that they were very intense.      Patient denies any other questions or concerns at this time.

## 2023-03-15 ENCOUNTER — LAB (OUTPATIENT)
Dept: LAB | Facility: CLINIC | Age: 58
End: 2023-03-15
Payer: COMMERCIAL

## 2023-03-15 ENCOUNTER — TELEPHONE (OUTPATIENT)
Dept: PSYCHIATRY | Facility: CLINIC | Age: 58
End: 2023-03-15

## 2023-03-15 DIAGNOSIS — Z79.899 ENCOUNTER FOR MEDICATION MANAGEMENT: ICD-10-CM

## 2023-03-15 LAB
ALBUMIN SERPL-MCNC: 4.3 G/DL (ref 3.4–5)
ALP SERPL-CCNC: 76 U/L (ref 40–150)
ALT SERPL W P-5'-P-CCNC: 28 U/L (ref 0–50)
ANION GAP SERPL CALCULATED.3IONS-SCNC: 3 MMOL/L (ref 3–14)
AST SERPL W P-5'-P-CCNC: 14 U/L (ref 0–45)
BASOPHILS # BLD AUTO: 0.1 10E3/UL (ref 0–0.2)
BASOPHILS NFR BLD AUTO: 1 %
BILIRUB SERPL-MCNC: 0.4 MG/DL (ref 0.2–1.3)
BUN SERPL-MCNC: 16 MG/DL (ref 7–30)
CALCIUM SERPL-MCNC: 9.4 MG/DL (ref 8.5–10.1)
CHLORIDE BLD-SCNC: 106 MMOL/L (ref 94–109)
CHOLEST SERPL-MCNC: 260 MG/DL
CO2 SERPL-SCNC: 29 MMOL/L (ref 20–32)
CREAT SERPL-MCNC: 1.03 MG/DL (ref 0.52–1.04)
EOSINOPHIL # BLD AUTO: 0.2 10E3/UL (ref 0–0.7)
EOSINOPHIL NFR BLD AUTO: 3 %
ERYTHROCYTE [DISTWIDTH] IN BLOOD BY AUTOMATED COUNT: 13.4 % (ref 10–15)
FASTING STATUS PATIENT QL REPORTED: YES
GFR SERPL CREATININE-BSD FRML MDRD: 64 ML/MIN/1.73M2
GLUCOSE BLD-MCNC: 104 MG/DL (ref 70–99)
HCT VFR BLD AUTO: 42.7 % (ref 35–47)
HDLC SERPL-MCNC: 75 MG/DL
HGB BLD-MCNC: 13.7 G/DL (ref 11.7–15.7)
IMM GRANULOCYTES # BLD: 0 10E3/UL
IMM GRANULOCYTES NFR BLD: 0 %
LDLC SERPL CALC-MCNC: 161 MG/DL
LYMPHOCYTES # BLD AUTO: 2.2 10E3/UL (ref 0.8–5.3)
LYMPHOCYTES NFR BLD AUTO: 30 %
MCH RBC QN AUTO: 30.1 PG (ref 26.5–33)
MCHC RBC AUTO-ENTMCNC: 32.1 G/DL (ref 31.5–36.5)
MCV RBC AUTO: 94 FL (ref 78–100)
MONOCYTES # BLD AUTO: 0.5 10E3/UL (ref 0–1.3)
MONOCYTES NFR BLD AUTO: 6 %
NEUTROPHILS # BLD AUTO: 4.4 10E3/UL (ref 1.6–8.3)
NEUTROPHILS NFR BLD AUTO: 60 %
NONHDLC SERPL-MCNC: 185 MG/DL
NRBC # BLD AUTO: 0 10E3/UL
NRBC BLD AUTO-RTO: 0 /100
PLATELET # BLD AUTO: 320 10E3/UL (ref 150–450)
POTASSIUM BLD-SCNC: 4.5 MMOL/L (ref 3.4–5.3)
PROT SERPL-MCNC: 7.8 G/DL (ref 6.8–8.8)
RBC # BLD AUTO: 4.55 10E6/UL (ref 3.8–5.2)
SODIUM SERPL-SCNC: 138 MMOL/L (ref 133–144)
TRIGL SERPL-MCNC: 119 MG/DL
WBC # BLD AUTO: 7.3 10E3/UL (ref 4–11)

## 2023-03-15 PROCEDURE — 36415 COLL VENOUS BLD VENIPUNCTURE: CPT

## 2023-03-15 PROCEDURE — 80053 COMPREHEN METABOLIC PANEL: CPT

## 2023-03-15 PROCEDURE — 80061 LIPID PANEL: CPT

## 2023-03-15 PROCEDURE — 85025 COMPLETE CBC W/AUTO DIFF WBC: CPT

## 2023-03-19 ASSESSMENT — PATIENT HEALTH QUESTIONNAIRE - PHQ9
10. IF YOU CHECKED OFF ANY PROBLEMS, HOW DIFFICULT HAVE THESE PROBLEMS MADE IT FOR YOU TO DO YOUR WORK, TAKE CARE OF THINGS AT HOME, OR GET ALONG WITH OTHER PEOPLE: SOMEWHAT DIFFICULT
SUM OF ALL RESPONSES TO PHQ QUESTIONS 1-9: 11
SUM OF ALL RESPONSES TO PHQ QUESTIONS 1-9: 11

## 2023-03-20 ENCOUNTER — VIRTUAL VISIT (OUTPATIENT)
Dept: PSYCHIATRY | Facility: CLINIC | Age: 58
End: 2023-03-20
Payer: COMMERCIAL

## 2023-03-20 VITALS
HEIGHT: 67 IN | WEIGHT: 175 LBS | BODY MASS INDEX: 27.47 KG/M2 | DIASTOLIC BLOOD PRESSURE: 70 MMHG | SYSTOLIC BLOOD PRESSURE: 135 MMHG

## 2023-03-20 DIAGNOSIS — F33.3 SEVERE RECURRENT MAJOR DEPRESSIVE DISORDER WITH PSYCHOTIC FEATURES (H): Primary | ICD-10-CM

## 2023-03-20 RX ORDER — BACLOFEN 20 MG
500 TABLET ORAL 2 TIMES DAILY
COMMUNITY
Start: 2022-10-25

## 2023-03-20 RX ORDER — ELETRIPTAN HYDROBROMIDE 40 MG/1
40 TABLET, FILM COATED ORAL PRN
COMMUNITY
Start: 2023-03-08

## 2023-03-20 RX ORDER — DOCUSATE SODIUM 100 MG/1
CAPSULE, LIQUID FILLED ORAL
COMMUNITY
Start: 2021-05-12

## 2023-03-20 RX ORDER — POTASSIUM CHLORIDE 1500 MG/1
1 TABLET, EXTENDED RELEASE ORAL 2 TIMES DAILY
COMMUNITY
Start: 2023-01-17

## 2023-03-20 RX ORDER — FEXOFENADINE HCL 180 MG/1
180 TABLET ORAL DAILY
COMMUNITY

## 2023-03-20 ASSESSMENT — PAIN SCALES - GENERAL: PAINLEVEL: SEVERE PAIN (6)
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
## 2023-03-20 NOTE — NURSING NOTE
PHQ-9 score is 11 and # 9 is 1, several days.     Is the patient currently in the state of [place]? YES    Visit mode:VIDEO    If the visit is dropped, the patient can be reconnected by: VIDEO VISIT: Send to e-mail at: [email or web address]    Will anyone else be joining the visit? NO      How would you like to obtain your AVS? MyChart    Are changes needed to the allergy or medication list? Yes, pt is still receiving Botulinum toxin type A injections, every 9 weeks.    Reason for visit: New pt- intake for the 3 step process.    Medication and allergies have been reviewed.     Maximo Serra, VF

## 2023-03-26 ENCOUNTER — HEALTH MAINTENANCE LETTER (OUTPATIENT)
Age: 58
End: 2023-03-26

## 2023-03-27 ENCOUNTER — PATIENT OUTREACH (OUTPATIENT)
Dept: CARE COORDINATION | Facility: CLINIC | Age: 58
End: 2023-03-27
Payer: COMMERCIAL

## 2023-03-27 NOTE — PROGRESS NOTES
Clinic Care Coordination Contact  [facility]/Voicemail       Clinical Data: Care Coordinator Outreach  Outreach attempted x 3.  Left message on patient's voicemail with call back information and requested return call.  Plan: Care Coordinator will do no further outreaches at this time.    Gabriel Maynard \[facility]\""  Clinic Care Coordination  [facility]  [email or web address]  [phone]      
Yes

## 2023-03-29 ASSESSMENT — ANXIETY QUESTIONNAIRES
7. FEELING AFRAID AS IF SOMETHING AWFUL MIGHT HAPPEN: NOT AT ALL
7. FEELING AFRAID AS IF SOMETHING AWFUL MIGHT HAPPEN: NOT AT ALL
GAD7 TOTAL SCORE: 8
3. WORRYING TOO MUCH ABOUT DIFFERENT THINGS: SEVERAL DAYS
GAD7 TOTAL SCORE: 8
GAD7 TOTAL SCORE: 8
5. BEING SO RESTLESS THAT IT IS HARD TO SIT STILL: MORE THAN HALF THE DAYS
8. IF YOU CHECKED OFF ANY PROBLEMS, HOW DIFFICULT HAVE THESE MADE IT FOR YOU TO DO YOUR WORK, TAKE CARE OF THINGS AT HOME, OR GET ALONG WITH OTHER PEOPLE?: VERY DIFFICULT
6. BECOMING EASILY ANNOYED OR IRRITABLE: SEVERAL DAYS
IF YOU CHECKED OFF ANY PROBLEMS ON THIS QUESTIONNAIRE, HOW DIFFICULT HAVE THESE PROBLEMS MADE IT FOR YOU TO DO YOUR WORK, TAKE CARE OF THINGS AT HOME, OR GET ALONG WITH OTHER PEOPLE: VERY DIFFICULT
2. NOT BEING ABLE TO STOP OR CONTROL WORRYING: SEVERAL DAYS
1. FEELING NERVOUS, ANXIOUS, OR ON EDGE: MORE THAN HALF THE DAYS
4. TROUBLE RELAXING: SEVERAL DAYS

## 2023-04-04 ASSESSMENT — PATIENT HEALTH QUESTIONNAIRE - PHQ9
10. IF YOU CHECKED OFF ANY PROBLEMS, HOW DIFFICULT HAVE THESE PROBLEMS MADE IT FOR YOU TO DO YOUR WORK, TAKE CARE OF THINGS AT HOME, OR GET ALONG WITH OTHER PEOPLE: SOMEWHAT DIFFICULT
SUM OF ALL RESPONSES TO PHQ QUESTIONS 1-9: 12
SUM OF ALL RESPONSES TO PHQ QUESTIONS 1-9: 12

## 2023-04-05 ENCOUNTER — VIRTUAL VISIT (OUTPATIENT)
Dept: PSYCHOLOGY | Facility: CLINIC | Age: 58
End: 2023-04-05
Payer: COMMERCIAL

## 2023-04-05 DIAGNOSIS — F33.1 MODERATE EPISODE OF RECURRENT MAJOR DEPRESSIVE DISORDER (H): ICD-10-CM

## 2023-04-05 PROCEDURE — 90791 PSYCH DIAGNOSTIC EVALUATION: CPT | Mod: VID | Performed by: MARRIAGE & FAMILY THERAPIST

## 2023-04-05 ASSESSMENT — COLUMBIA-SUICIDE SEVERITY RATING SCALE - C-SSRS
4. HAVE YOU HAD THESE THOUGHTS AND HAD SOME INTENTION OF ACTING ON THEM?: NO
ATTEMPT PAST THREE MONTHS: NO
5. HAVE YOU STARTED TO WORK OUT OR WORKED OUT THE DETAILS OF HOW TO KILL YOURSELF? DO YOU INTEND TO CARRY OUT THIS PLAN?: NO
4. HAVE YOU HAD THESE THOUGHTS AND HAD SOME INTENTION OF ACTING ON THEM?: NO
1. HAVE YOU WISHED YOU WERE DEAD OR WISHED YOU COULD GO TO SLEEP AND NOT WAKE UP?: YES
2. HAVE YOU ACTUALLY HAD ANY THOUGHTS OF KILLING YOURSELF?: YES
6. HAVE YOU EVER DONE ANYTHING, STARTED TO DO ANYTHING, OR PREPARED TO DO ANYTHING TO END YOUR LIFE?: NO
5. HAVE YOU STARTED TO WORK OUT OR WORKED OUT THE DETAILS OF HOW TO KILL YOURSELF? DO YOU INTEND TO CARRY OUT THIS PLAN?: NO
1. IN THE PAST MONTH, HAVE YOU WISHED YOU WERE DEAD OR WISHED YOU COULD GO TO SLEEP AND NOT WAKE UP?: YES
ATTEMPT LIFETIME: YES
3. HAVE YOU BEEN THINKING ABOUT HOW YOU MIGHT KILL YOURSELF?: YES
2. HAVE YOU ACTUALLY HAD ANY THOUGHTS OF KILLING YOURSELF?: YES
TOTAL  NUMBER OF ABORTED OR SELF INTERRUPTED ATTEMPTS LIFETIME: NO
TOTAL  NUMBER OF INTERRUPTED ATTEMPTS LIFETIME: NO
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
## 2023-04-12 ENCOUNTER — VIRTUAL VISIT (OUTPATIENT)
Dept: PSYCHIATRY | Facility: CLINIC | Age: 58
End: 2023-04-12
Payer: COMMERCIAL

## 2023-04-12 VITALS
BODY MASS INDEX: 27.47 KG/M2 | WEIGHT: 175 LBS | HEIGHT: 67 IN | DIASTOLIC BLOOD PRESSURE: 84 MMHG | SYSTOLIC BLOOD PRESSURE: 145 MMHG

## 2023-04-12 DIAGNOSIS — F33.3 SEVERE RECURRENT MAJOR DEPRESSIVE DISORDER WITH PSYCHOTIC FEATURES (H): Primary | ICD-10-CM

## 2023-04-12 ASSESSMENT — PATIENT HEALTH QUESTIONNAIRE - PHQ9: SUM OF ALL RESPONSES TO PHQ QUESTIONS 1-9: 11

## 2023-04-12 ASSESSMENT — PAIN SCALES - GENERAL: PAINLEVEL: SEVERE PAIN (7)

## 2023-04-12 NOTE — NURSING NOTE
PHQ-9 score is 11 and # 9 is 1, several days.    Is the patient currently in the state of [place]? YES    Visit mode:VIDEO    If the visit is dropped, the patient can be reconnected by: VIDEO VISIT: Text to cell phone: [phone]    Will anyone else be joining the visit? NO    How would you like to obtain your AVS? MyChart    Are changes needed to the allergy or medication list? NO    Reason for visit: New pt- Pharm D appt her pt.     Medication and allergies have been reviewed.     Maximo Serra, VF

## 2023-04-12 NOTE — PROGRESS NOTES
Virtual Visit Details    Type of service:  Video Visit     Originating Location (pt. Location): Home    Distant Location (provider location):  Off-site  Platform used for Video Visit: [employer]     Depression Response    Patient completed the PHQ-9 assessment for depression and scored >9? Yes  Question 9 on the PHQ-9 was positive for suicidality? Yes  Does patient have current mental health provider? Yes    Is this a virtual visit? Yes   Does patient have suicidal ideation (positive question 9)? No - offer to place Mental Health Referral.  Patient declined referral/not needed    I personally notified the following: visit provider

## 2023-04-17 ENCOUNTER — VIRTUAL VISIT (OUTPATIENT)
Dept: PSYCHOLOGY | Facility: CLINIC | Age: 58
End: 2023-04-17
Payer: COMMERCIAL

## 2023-04-17 DIAGNOSIS — F33.1 MODERATE EPISODE OF RECURRENT MAJOR DEPRESSIVE DISORDER (H): Primary | ICD-10-CM

## 2023-04-17 PROCEDURE — 90834 PSYTX W PT 45 MINUTES: CPT | Mod: VID | Performed by: MARRIAGE & FAMILY THERAPIST

## 2023-04-17 NOTE — PROGRESS NOTES
[facility]                                     Progress Note    Patient Name: Valerie Sim  Date: 23         Service Type: Individual      Session Start Time: 2:00p  Session End Time: 2:50p     Session Length: 50    Session #: 2    Attendees: Client attended alone    Service Modality:  Video Visit:      Provider verified identity through the following two step process.  Patient provided:  Patient     Telemedicine Visit: The patient's condition can be safely assessed and treated via synchronous audio and visual telemedicine encounter.      Reason for Telemedicine Visit: Services only offered telehealth    Originating Site (Patient Location): Patient's home    Distant Site (Provider Location): Provider Remote Setting- Home Office    Consent:  The patient/guardian has verbally consented to: the potential risks and benefits of telemedicine (video visit) versus in person care; bill my insurance or make self-payment for services provided; and responsibility for payment of non-covered services.     Patient would like the video invitation sent by:  Send to e-mail at: @Compliance [email or web address]    Mode of Communication:  Video Conference via [employer]    Distant Location (Provider):  Off-site    As the provider I attest to compliance with applicable laws and regulations related to telemedicine.    DATA  Interactive Complexity: No  Crisis: No        Progress Since Last Session (Related to Symptoms / Goals / Homework):   Symptoms: Improving MOod at 5/10    Homework: Completed in session      Episode of Care Goals: Minimal progress - PREPARATION (Decided to change - considering how); Intervened by negotiating a change plan and determining options / strategies for behavior change, identifying triggers, exploring social supports, and working towards setting a date to begin behavior change     Current / Ongoing Stressors and Concerns:   Last session , pt reports the past 2 weeks have been ok, some  challenges with task completion.   Goal: PT will create weekly schedule and will break up larger tasks into smaller pieces to be completed     Treatment Objective(s) Addressed in This Session:   Increase interest, engagement, and pleasure in doing things       Intervention:   Motivational Interviewing    MI Intervention: Permission to raise concern or advise, Open-ended questions and Reflections: simple and complex     Change Talk Expressed by the Patient: Reasons to change Need to change    Provider Response to Change Talk: R - Reflected patient's change talk and S - Summarized patient's change talk statements     ASSESSMENT: Current Emotional / Mental Status (status of significant symptoms):   Risk status (Self / Other harm or suicidal ideation)   Patient denies current fears or concerns for personal safety.   Patient denies current or recent suicidal ideation or behaviors.   Patient denies current or recent homicidal ideation or behaviors.   Patient denies current or recent self injurious behavior or ideation.   Patient denies other safety concerns.   Patient reports there has been no change in risk factors since their last session.     Patient reports there has been no change in protective factors since their last session.     A safety and risk management plan has been developed including: Patient consented to co-developed safety plan on 4/5/23.  Safety and risk management plan was reviewed.   Patient agreed to use safety plan should any safety concerns arise.  A copy was made available to the patient.     Appearance:   Appropriate    Eye Contact:   Good    Psychomotor Behavior: Normal    Attitude:   Cooperative    Orientation:   All   Speech    Rate / Production: Normal     Volume:  Normal    Mood:    Normal   Affect:    Appropriate    Thought Content:  Clear    Thought Form:  Coherent  Logical    Insight:    Good      Medication Review:   No changes to current psychiatric medication(s)     Medication  Compliance:   Yes     Changes in Health Issues:   None reported     Chemical Use Review:   Substance Use: Chemical use reviewed, no active concerns identified      Tobacco Use: No current tobacco use.      Diagnosis:  MDD, moderate, recurrent    Collateral Reports Completed:   Not Applicable    PLAN: (Patient Tasks / Therapist Tasks / Other)  PT will engage in weekly task scheduling        CAROLEE Mendez   4/17/23                                                         ______________________________________________________________________    Individual Treatment Plan    Patient's Name: Valerie Sim  YOB: 1965    Date of Creation: 4/17/23  Date Treatment Plan Last Reviewed/Revised: 7/17/23    DSM5 Diagnoses: 296.32 (F33.1) Major Depressive Disorder, Recurrent Episode, Moderate _  Psychosocial / Contextual Factors:   PROMIS (reviewed every 90 days):     Referral / Collaboration:  Referral to another professional/service is not indicated at this time..    Anticipated number of session for this episode of care: 3-6 sessions  Anticipation frequency of session: Biweekly  Anticipated Duration of each session: 38-52 minutes  Treatment plan will be reviewed in 90 days or when goals have been changed.       MeasurableTreatment Goal(s) related to diagnosis / functional impairment(s)  Goal 1: Patient will engage in CBT skills for depression sx reduction    Objective #A (Patient Action)    Patient will Identify negative self-talk and behaviors: challenge core beliefs, myths, and actions.  Status: New - Date: 4/17/23     Intervention(s)  Therapist will teach emotional regulation skills. CBT skills.      Patient has reviewed and agreed to the above plan.      CAROLEE Mendez  April 17, 2023

## 2023-04-26 ASSESSMENT — PATIENT HEALTH QUESTIONNAIRE - PHQ9
SUM OF ALL RESPONSES TO PHQ QUESTIONS 1-9: 12
SUM OF ALL RESPONSES TO PHQ QUESTIONS 1-9: 12
10. IF YOU CHECKED OFF ANY PROBLEMS, HOW DIFFICULT HAVE THESE PROBLEMS MADE IT FOR YOU TO DO YOUR WORK, TAKE CARE OF THINGS AT HOME, OR GET ALONG WITH OTHER PEOPLE: SOMEWHAT DIFFICULT

## 2023-04-27 ENCOUNTER — VIRTUAL VISIT (OUTPATIENT)
Dept: PSYCHIATRY | Facility: CLINIC | Age: 58
End: 2023-04-27
Attending: PSYCHIATRY & NEUROLOGY
Payer: COMMERCIAL

## 2023-04-27 DIAGNOSIS — F33.0 MILD EPISODE OF RECURRENT MAJOR DEPRESSIVE DISORDER (H): Primary | ICD-10-CM

## 2023-04-27 DIAGNOSIS — F41.1 GAD (GENERALIZED ANXIETY DISORDER): ICD-10-CM

## 2023-04-27 PROCEDURE — 99214 OFFICE O/P EST MOD 30 MIN: CPT | Mod: VID | Performed by: STUDENT IN AN ORGANIZED HEALTH CARE EDUCATION/TRAINING PROGRAM

## 2023-04-27 NOTE — PATIENT INSTRUCTIONS
- follow-up June 8th at 1:40 pm via video   - no medication changes     **For crisis resources, please see the information at the end of this document**   Patient Education    Thank you for coming to the [facility].     Lab Testing:  If you had lab testing today and your results are reassuring or normal they will be mailed to you or sent through [employer] within 7 days. If the lab tests need quick action we will call you with the results. The phone number we will call with results is # [phone]. If this is not the best number please call our clinic and change the number.     Medication Refills:  If you need any refills please call your pharmacy and they will contact us. Our fax number for refills is [phone].   Three business days of notice are needed for general medication refill requests.   Five business days of notice are needed for controlled substance refill requests.   If you need to change to a different pharmacy, please contact the new pharmacy directly. The new pharmacy will help you get your medications transferred.     Contact Us:  Please call [phone] during business hours (8-5:00 M-F).   If you have medication related questions after clinic hours, or on the weekend, please call [phone].     Financial Assistance [phone]   Medical Records [phone]       MENTAL HEALTH CRISIS RESOURCES:  For a emergency help, please call 911 or go to the nearest Emergency Department.     Emergency Walk-In Options:   Em[facility] Unit @ [facility] ([place]): [phone] - Specialized mental health emergency area designed to be calming  [facility] ([place]): [phone]  [facility] ([place]): [phone]  [facility]): [phone]    [facility] Crisis Information:   [place]: [phone]  [place]: [phone]  [place] (MARTHA) - Adult: [phone]     Child: [phone]  [place] -  Adult: [phone]     Child: [phone]  [place]: [phone]  List of all [facility] resources:   https://[place].gov/dhs/people-we-serve/adults/health-care/mental-health/resources/crisis-contacts.jsp    National Crisis Information:   Crisis Text Line: Text  [place]  to 323604  Suicide & Crisis Lifeline: 988  National Suicide Prevention Lifeline: 9-042-351-TALK (1-995.393.3474)       For online chat options, visit https://suicidepreventionlifeline.org/chat/  Poison Control Center: 9-585-186-7063  Trans Lifeline: 1-969.897.7906 - Hotline for transgender people of all ages  [employer]: 0-774-638-7623 - Hotline for LGBT youth     For Non-Emergency Support:   Fast Tracker: Mental Health & Substance Use Disorder Resources -   https://www.[employer] Measurementtrack[employer]n.org/

## 2023-04-27 NOTE — NURSING NOTE
Is the patient currently in the state of [place]? YES    Visit mode:VIDEO    If the visit is dropped, the patient can be reconnected by: VIDEO VISIT: Text to cell phone: [phone]    Will anyone else be joining the visit? NO      How would you like to obtain your AVS? MyChart    Are changes needed to the allergy or medication list? NO   Patient denies any changes since echeck-in regarding medication and allergies and states all information entered during echeck-in remains accurate. Declined individual medication review.    Reason for visit: Video Visit      Mariely Riley VF
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
## 2023-04-28 ENCOUNTER — TELEPHONE (OUTPATIENT)
Dept: FAMILY MEDICINE | Facility: CLINIC | Age: 58
End: 2023-04-28
Payer: COMMERCIAL

## 2023-04-28 ENCOUNTER — MYC MEDICAL ADVICE (OUTPATIENT)
Dept: FAMILY MEDICINE | Facility: CLINIC | Age: 58
End: 2023-04-28
Payer: COMMERCIAL

## 2023-04-28 NOTE — TELEPHONE ENCOUNTER
Patient Quality Outreach    Patient is due for the following:   Physical Preventive Adult Physical      Topic Date Due     Hepatitis B Vaccine (1 of 3 - 3-dose series) Never done     Chronic Opioid Use -  Urine Drug Screen    Next Steps:   Schedule a Adult Preventative    Type of outreach:    Sent [employer] message.      Questions for provider review:         Judy Lu MA
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
## 2023-05-01 ENCOUNTER — VIRTUAL VISIT (OUTPATIENT)
Dept: PSYCHOLOGY | Facility: CLINIC | Age: 58
End: 2023-05-01
Payer: COMMERCIAL

## 2023-05-01 ENCOUNTER — TELEPHONE (OUTPATIENT)
Dept: FAMILY MEDICINE | Facility: CLINIC | Age: 58
End: 2023-05-01
Payer: COMMERCIAL

## 2023-05-01 DIAGNOSIS — F33.1 MODERATE EPISODE OF RECURRENT MAJOR DEPRESSIVE DISORDER (H): Primary | ICD-10-CM

## 2023-05-01 PROCEDURE — 90834 PSYTX W PT 45 MINUTES: CPT | Mod: VID | Performed by: MARRIAGE & FAMILY THERAPIST

## 2023-05-01 ASSESSMENT — COLUMBIA-SUICIDE SEVERITY RATING SCALE - C-SSRS
6. HAVE YOU EVER DONE ANYTHING, STARTED TO DO ANYTHING, OR PREPARED TO DO ANYTHING TO END YOUR LIFE?: NO
SUICIDE, SINCE LAST CONTACT: NO
TOTAL  NUMBER OF ABORTED OR SELF INTERRUPTED ATTEMPTS SINCE LAST CONTACT: NO
ATTEMPT SINCE LAST CONTACT: NO
1. SINCE LAST CONTACT, HAVE YOU WISHED YOU WERE DEAD OR WISHED YOU COULD GO TO SLEEP AND NOT WAKE UP?: NO
TOTAL  NUMBER OF INTERRUPTED ATTEMPTS SINCE LAST CONTACT: NO
2. HAVE YOU ACTUALLY HAD ANY THOUGHTS OF KILLING YOURSELF?: NO

## 2023-05-01 NOTE — PROGRESS NOTES
[facility]                                     Progress Note    Patient Name: Valerie Sim  Date: 23         Service Type: Individual      Session Start Time: 8:02a  Session End Time: 8:52a     Session Length: 50    Session #: 3    Attendees: Client attended alone    Service Modality:  Video Visit:      Provider verified identity through the following two step process.  Patient provided:  Patient     Telemedicine Visit: The patient's condition can be safely assessed and treated via synchronous audio and visual telemedicine encounter.      Reason for Telemedicine Visit: Services only offered telehealth    Originating Site (Patient Location): Patient's home    Distant Site (Provider Location): Provider Remote Setting- Home Office    Consent:  The patient/guardian has verbally consented to: the potential risks and benefits of telemedicine (video visit) versus in person care; bill my insurance or make self-payment for services provided; and responsibility for payment of non-covered services.     Patient would like the video invitation sent by:  Send to e-mail at: @[email or web address]    Mode of Communication:  Video Conference via [employer]    Distant Location (Provider):  Off-site    As the provider I attest to compliance with applicable laws and regulations related to telemedicine.    DATA  Interactive Complexity: No  Crisis: No        Progress Since Last Session (Related to Symptoms / Goals / Homework):   Symptoms:     Homework: Completed in session      Episode of Care Goals: Minimal progress - PREPARATION (Decided to change - considering how); Intervened by negotiating a change plan and determining options / strategies for behavior change, identifying triggers, exploring social supports, and working towards setting a date to begin behavior change     Current / Ongoing Stressors and Concerns:  Goal: Continue to work on breaking up larger home tasks into small parts. PT will also work  to document any dissociative periods        Treatment Objective(s) Addressed in This Session:   Increase interest, engagement, and pleasure in doing things       Intervention:   Motivational Interviewing    MI Intervention: Permission to raise concern or advise, Open-ended questions and Reflections: simple and complex     Change Talk Expressed by the Patient: Reasons to change Need to change    Provider Response to Change Talk: R - Reflected patient's change talk and S - Summarized patient's change talk statements     ASSESSMENT: Current Emotional / Mental Status (status of significant symptoms):   Risk status (Self / Other harm or suicidal ideation)   Patient denies current fears or concerns for personal safety.   Patient denies current or recent suicidal ideation or behaviors.   Patient denies current or recent homicidal ideation or behaviors.   Patient denies current or recent self injurious behavior or ideation.   Patient denies other safety concerns.   Patient reports there has been no change in risk factors since their last session.     Patient reports there has been no change in protective factors since their last session.     A safety and risk management plan has been developed including: Patient consented to co-developed safety plan on 4/5/23.  Safety and risk management plan was reviewed.   Patient agreed to use safety plan should any safety concerns arise.  A copy was made available to the patient.     Appearance:   Appropriate    Eye Contact:   Good    Psychomotor Behavior: Normal    Attitude:   Cooperative    Orientation:   All   Speech    Rate / Production: Normal     Volume:  Normal    Mood:    Normal   Affect:    Appropriate    Thought Content:  Clear    Thought Form:  Coherent  Logical    Insight:    Good      Medication Review:   No changes to current psychiatric medication(s)     Medication Compliance:   Yes     Changes in Health Issues:   None reported     Chemical Use Review:   Substance Use:  Chemical use reviewed, no active concerns identified      Tobacco Use: No current tobacco use.      Diagnosis:  MDD, moderate, recurrent    Collateral Reports Completed:   Not Applicable    PLAN: (Patient Tasks / Therapist Tasks / Other)  PT will engage in small task completion along with documentation around dissociative periods        CAROLEE Mendez   5/1/23                                                         ______________________________________________________________________    Individual Treatment Plan    Patient's Name: Valerie Sim  YOB: 1965    Date of Creation: 4/17/23  Date Treatment Plan Last Reviewed/Revised: 7/17/23    DSM5 Diagnoses: 296.32 (F33.1) Major Depressive Disorder, Recurrent Episode, Moderate _  Psychosocial / Contextual Factors:   PROMIS (reviewed every 90 days):     Referral / Collaboration:  Referral to another professional/service is not indicated at this time..    Anticipated number of session for this episode of care: 3-6 sessions  Anticipation frequency of session: Biweekly  Anticipated Duration of each session: 38-52 minutes  Treatment plan will be reviewed in 90 days or when goals have been changed.       MeasurableTreatment Goal(s) related to diagnosis / functional impairment(s)  Goal 1: Patient will engage in CBT skills for depression sx reduction    Objective #A (Patient Action)    Patient will Identify negative self-talk and behaviors: challenge core beliefs, myths, and actions.  Status: New - Date: 4/17/23     Intervention(s)  Therapist will teach emotional regulation skills. CBT skills.      Patient has reviewed and agreed to the above plan.      CAROLEE Mendez  April 17, 2023

## 2023-05-01 NOTE — TELEPHONE ENCOUNTER
Patient Quality Outreach    Patient is due for the following:   Physical Preventive Adult Physical  Chronic Opioid Use -  Urine Drug Screen    Next Steps:   Schedule a Adult Preventative    Type of outreach:    Sent KTM Advance message.      Questions for provider review:    None     Judy Lu MA

## 2023-05-03 ENCOUNTER — OFFICE VISIT (OUTPATIENT)
Dept: PHYSICAL MEDICINE AND REHAB | Facility: CLINIC | Age: 58
End: 2023-05-03
Payer: COMMERCIAL

## 2023-05-03 DIAGNOSIS — G43.719 CHRONIC MIGRAINE WITHOUT AURA, INTRACTABLE, WITHOUT STATUS MIGRAINOSUS: Primary | ICD-10-CM

## 2023-05-03 PROCEDURE — 95874 GUIDE NERV DESTR NEEDLE EMG: CPT | Performed by: PHYSICAL MEDICINE & REHABILITATION

## 2023-05-03 PROCEDURE — 64615 CHEMODENERV MUSC MIGRAINE: CPT | Performed by: PHYSICAL MEDICINE & REHABILITATION

## 2023-05-03 RX ORDER — BUPIVACAINE HYDROCHLORIDE 2.5 MG/ML
5 INJECTION, SOLUTION EPIDURAL; INFILTRATION; INTRACAUDAL ONCE
Status: COMPLETED | OUTPATIENT
Start: 2023-05-03 | End: 2023-05-03

## 2023-05-03 RX ADMIN — BUPIVACAINE HYDROCHLORIDE 12.5 MG: 2.5 INJECTION, SOLUTION EPIDURAL; INFILTRATION; INTRACAUDAL at 15:08

## 2023-05-03 NOTE — LETTER
5/3/2023       RE: Valerie Sim  [address]  [place] [address]       Dear Colleague,    Thank you for referring your patient, Valerie Sim, to the [facility] PHYSICAL MEDICINE AND REHABILITATION CLINIC [place] at [facility]. Please see a copy of my visit note below.      [facility]     PM&R CLINIC NOTE  BOTULINUM TOXIN PROCEDURE      HPI  No chief complaint on file.    Valerie Sim is a 58 year old female with a history of chronic migraine headaches who presents to clinic for botulinum toxin injections for management of chronic migraine headaches and excessive jaw clenching.     SINCE LAST VISIT  Valerie Sim was last seen here in clinic on 3/1/2023, at which time she received 250 units of Botox.    Patient denies new medical diagnoses, illnesses, hospitalizations, emergency room visits, and injuries since the previous injection with botulinum neurotoxin.     She did recently have her oxygen therapy filled, and this has been helpful for mitigating her cluster headaches.     RESPONSE TO PREVIOUS TREATMENT    Side effects: No problems reported.     1.  Headache frequency during this injection cycle:  She has had 12 migraine headache days, 3 cluster headache days, and 4 occipital headache days over the last 9 weeks since her last Botox injections. This is compared to her baseline headache frequency of 30 severe headache days per month.     2.  Headache duration during this injection cycle:  Headache duration was typically between 3 hours and 5 days. Patient reports one episode of multiple day headaches during this injection cycle.     3.  Headache intensity during this injection cycle:    A.  8-9/10  =  Typical pain level.  B.  9/10  =  Worst pain level.  C.  0/10  =  Lowest pain level.    4.  Change in headache medication usage during this injection cycle:  (For Example:  Able to decrease use of oral  pain medications.) No changes.       5.  ER Visits During This Injection Cycle:  None. She reports she used Relpax x6, Toradol x2, Zofran x8 and IM Toradol.     6.  Functional Performance:  Change in ADL's, social interaction, days lost from work, etc. Patient reports being able to more fully participate in social and family activities and responsibilities as headache symptoms have improved.      PHYSICAL EXAM  VS: There were no vitals taken for this visit.   GEN: Pleasant and cooperative, in no acute distress  HEENT: No facial asymmetry    ALLERGIES  Allergies   Allergen Reactions    Fluoxetine Other (See Comments)     PN: LW Reaction: headache  PN: LW Reaction: headache  Migraine      Garlic Blisters, Cough, Dermatitis, Difficulty breathing, Headache, Hives, Itching, Nausea and Vomiting and Shortness Of Breath    Candesartan      Other reaction(s): Myalgia    Duloxetine      Other reaction(s): Headache  migraine    Iodine      Other reaction(s): Other (see comments)  PN: LW CM1: CONTRAST- iodine Reaction :    Metoclopramide      Other reaction(s): Headache  Caused increase in headaches    Perfume      Other reaction(s): Headache  head pain leading to migraines    Pregabalin      Other reaction(s): Headache, Myalgia    Trazodone Other (See Comments) and Unknown     Other reaction(s): Headache  Other reaction(s): Headache  Other reaction(s): Headache    Amitriptyline Hcl Other (See Comments)     Migraine      Candesartan Cilexetil-Hctz Muscle Pain (Myalgia)    Cyproheptadine Hcl      headaches    Desvenlafaxine      Migraine      Diatrizoate Unknown     PN: LW CM1: CONTRAST- iodine Reaction :    Diazepam      Other reaction(s): Vestibular Toxicity  PN: LW Reaction: vertigo  PN: LW Reaction: vertigo    Duloxetine Hcl Other (See Comments)     Migraine      Gabapentin Other (See Comments)    Galcanezumab      Other reaction(s): Headache    Imipramine Other (See Comments)     Migraine      Metoprolol Other (See  "Comments) and Unknown     headache  headache  headache    Morphine Other (See Comments)     Patient reports seizure   Other reaction(s): Unknown  Seizure; 5/111/21 updated info: patient states she had a seizure while having a migraine headache years ago and is not sure if it was due to morphine. She has tolerated Dilaudid since then.      No Clinical Screening - See Comments      PN: LW FI1: lactose intolerance LW FI2: shellfish  PN: LW CM1: CONTRAST- iodine Reaction :  PN: LW Other1: -nka    Nortriptyline Other (See Comments)     Migraine      Phenergan Dm [Promethazine-Dm] Other (See Comments)     seizures    Pregabalin Muscle Pain (Myalgia)    Promethazine      PN: LW Reaction: minimal sizures    Venlafaxine Other (See Comments)     PN: LW Reaction: migraine  PN: LW Reaction: migraine  Migraine      Wellbutrin [Bupropion Hydrobromide] Other (See Comments)     Migraine      Compazine Anxiety    Dihydroergotamine Anxiety and Other (See Comments)     Cardiac symptoms    Droperidol Anxiety     PN: LW Reaction: agitation    Medroxyprogesterone Hives     PN: LW Reaction: nausea    Prochlorperazine Anxiety     PN: LW Reaction: \"can't sit still\"       CURRENT MEDICATIONS    Current Outpatient Medications:     aMILoride (MIDAMOR) 5 MG tablet, Take 7.5 mg by mouth daily 2.5MG in the AM and 5MG in the PM., Disp: , Rfl:     amLODIPine (NORVASC) 10 MG tablet, Take 10 mg by mouth daily, Disp: , Rfl:     amphetamine-dextroamphetamine (ADDERALL) 20 MG tablet, Take 1 tablet (20 mg) by mouth 2 times daily (Patient taking differently: Take 20 mg by mouth 2 times daily Pt currently taking 10MG in the AM and 20MG in the PM.), Disp: 60 tablet, Rfl: 0    botulinum toxin type A (BOTOX) 100 units injection, Inject intramuscular. Every 18 weeks for pelvic floor spasms, Disp: , Rfl:     budesonide (RINOCORT AQUA) 32 MCG/ACT nasal spray, Spray 1 spray into both nostrils daily., Disp: , Rfl:     calcitRIOL (ROCALTROL) 0.25 MCG capsule, Take " 0.25 mcg by mouth as needed, Disp: , Rfl:     Calcium-Magnesium-Vitamin D (CITRACAL SLOW RELEASE) 600- MG-MG-UNIT TB24, Take 600 mg by mouth 4 times daily, Disp: , Rfl:     CycloSPORINE (RESTASIS OP), Apply to eye 2 times daily, Disp: , Rfl:     docusate sodium (COLACE) 100 MG capsule, Take 1 capsule by mouth as needed for constipation, Disp: , Rfl:     eletriptan (RELPAX) 40 MG tablet, Take 40 mg by mouth as needed, Disp: , Rfl:     estradiol (ESTRACE) 0.1 MG/GM vaginal cream, , Disp: , Rfl:     fexofenadine (ALLEGRA) 180 MG tablet, Take 180 mg by mouth daily, Disp: , Rfl:     HYDROmorphone (DILAUDID) 2 MG tablet, Take 0.5-1 tablets (1-2 mg) by mouth every 3 hours as needed (headache) 20 tablets = 3 month supply., Disp: 24 tablet, Rfl: 0    ketorolac (TORADOL) 30 MG/ML injection, Inject 1 mL (30 mg) into the vein every 6 hours as needed for moderate pain, Disp: 9 mL, Rfl: 11    lamoTRIgine (LAMICTAL) 100 MG tablet, Take 3 tablets (300 mg) by mouth daily (Patient taking differently: Take 200 mg by mouth daily 100MG in AM and 100MG in the PM.), Disp: 90 tablet, Rfl: 1    Magnesium Oxide 500 MG TABS, Take 500 mg by mouth 2 times daily, Disp: , Rfl:     nebivolol (BYSTOLIC) 5 MG tablet, Take 5 mg by mouth daily Take twice a day totaling 10 mg., Disp: , Rfl:     ondansetron (ZOFRAN) 4 MG tablet, Take 1 tablet (4 mg) by mouth every 8 hours as needed, Disp: 90 tablet, Rfl: 1    order for DME, Equipment being ordered: Oxygen The patient has Cluster Headache and needs 10 liters/minute of high flow oxygen via nonrebreather mask prn headaches (Patient not taking: Reported on 3/20/2023), Disp: 99 days, Rfl: 0    order for DME, Equipment being ordered: Oxygen and non-rebreather mask.   Use high flow oxygen (10-15 L/min) with non-rebreather mask, as needed for cluster headaches, Disp: 1 Units, Rfl: 11    potassium chloride ER (KLOR-CON M) 20 MEQ CR tablet, Take 1 tablet by mouth 2 times daily, Disp: , Rfl:     QUEtiapine  (SEROQUEL) 50 MG tablet, Take 1 tablet by mouth daily., Disp: , Rfl:     valACYclovir (VALTREX) 1000 mg tablet, Take 1,000 mg by mouth as needed., Disp: , Rfl:     Current Facility-Administered Medications:     botulinum toxin type A (BOTOX) 100 units injection 225 Units, 225 Units, Intramuscular, See Admin Instructions, Addie Malhotra MD, 225 Units at [phone]       BOTULINUM NEUROTOXIN INJECTION PROCEDURES    VERIFICATION OF PATIENT IDENTIFICATION AND PROCEDURE     Initials   Patient Name SES   Patient  SES   Procedure Verified by: SES     Prior to the start of the procedure and with procedural staff participation, I verbally confirmed the patient s identity using two indicators, relevant allergies, that the procedure was appropriate and matched the consent or emergent situation, and that the correct equipment/implants were available. Immediately prior to starting the procedure I conducted the Time Out with the procedural staff and re-confirmed the patient s name, procedure, and site/side. (The Joint Commission universal protocol was followed.)  Yes    Sedation (Moderate or Deep): None    ABOVE ASSESSMENTS PERFORMED BY    Addie Malhotra MD      INDICATIONS FOR PROCEDURES  Valerie Sim is a 58 year old patient with pain and jaw clenching secondary to the diagnosis of chronic migraine headaches and oromandibular dystonia. Her baseline symptoms have been recalcitrant to oral medications and conservative therapy.  She is here today for reinjection with Botox.    GOAL OF PROCEDURE  The goal of this procedure is to decrease pain and excessive jaw clenching due to chronic migraine headaches and bruxism.     TOTAL DOSE ADMINISTERED  Dose Administered:  225 units  Botox (Botulinum Toxin Type A)       2:1 Dilution   Unavoidable Drug Waste: Yes  Amount of drug waste (mL): 75 units Botox.  Reason for waste:  Single use vial  Diluent Used:  0.25% bupivacaine  Total Volume of Diluent Used:  4.5 ml  NDC #:  Botox 100u ([address]-96)      CONSENT  The risks, benefits, and treatment options were discussed with Valerie YEE Roney and she agreed to proceed.    Written consent was obtained by [facility].     EQUIPMENT USED  Needle-25mm stimulating/recording  Needle-30 gauge  EMG/NCS Machine    SKIN PREPARATION  Skin preparation was performed using an alcohol wipe.    GUIDANCE DESCRIPTION  Electro-myographic guidance was necessary throughout the neck and jaw portion of the procedure to accurately identify all areas of dystonic muscles while avoiding injection of non-dystonic muscles, neighboring nerves and nearby vascular structures.       AREA/MUSCLE INJECTED:  225 UNITS BOTOX = TOTAL DOSE, 2:1 DILUTION     1. FACIAL & SCALP MUSCLES: 85 UNITS BOTOX = TOTAL DOSE      Right Frontalis - 10 units of Botox in 2 site/s.  Left Frontalis - 10 units of Botox in 2 site/s.      Procerus - 5 units of Botox at 1 site/s.       Right  - 2.5 units of Botox in 1 site/s.  Left  - 2.5 units of Botox in 1 site/s.      Right Nasalis - 2.5 units of Botox at 1 site/s.           Left Nasalis - 2.5 units of Botox at 1 site/s.     Right Upper Occipitalis - 25 units of Botox at 5 site/s.   Left Upper Occipitalis - 25 units of Botox at 5 site/s.         2. JAW MUSCLES: 90 UNITS BOTOX = TOTAL DOSE     Right Masseter - 20 units of Botox at 2 site/s.   Left Masseter - 20 units of Botox at 2 site/s.      Right Temporalis - 25 units of Botox at 5 site/s.  Left Temporalis - 25 units of Botox at 5 site/s.          3. SHOULDER & NECK MUSCLES: 50 UNITS BOTOX = TOTAL DOSE      Right Levator Scapula - 10 units of Botox at 2 site/s (neck and scapular insertion).  Left Levator Scapula - 10 units of Botox at 2 site/s (neck and scapular insertion).      Right Upper Trapezius (mid & low lateral) - 10 units of Botox at 3 site/s.  Left Upper Trapezius (mid & low lateral) - 10 units of Botox at 3 site/s.             Right Pectoralis Minor - 5 units of Botox at  1 site/s.                     Left Pectoralis Minor - 5 units of Botox at 1 site/s.       RESPONSE TO PROCEDURE  Valerie Sim tolerated the procedure well and there were no immediate complications. She was allowed to recover for an appropriate period of time and was discharged home in stable condition.      ASSESSMENT AND PLAN   Botulinum toxin injections: No changes made to Botox dose or distribution today. Patient will continue to monitor response to Botox and report at next appointment.    Referrals: None.   Follow up: Valerie Sim was rescheduled for the next series of injections in 9 weeks, at which time we will evaluate response to today's injections. she may call the clinic prior with any questions or concerns prior to the next appointment.         Again, thank you for allowing me to participate in the care of your patient.      Sincerely,    Addie Malhotra MD

## 2023-05-03 NOTE — TELEPHONE ENCOUNTER
Records Requested     May 3, 2023 2:32 PM   [id number]   Facility  [facility]   Outcome 2:37pm Sent request for imaging to be pushed to PACS. -SUDHEER Fierro on 5/8/2023 at 7:55 AM Imaging resolved into PACS. -SUDHEER     RECORDS RECEIVED FROM: Care Everywhere   REASON FOR VISIT: Headaches   Date of Appt: 5/8/23 8:10am    NOTES (FOR ALL VISITS) STATUS DETAILS   OFFICE NOTE from other specialist Care Everywhere 3/1/23, 12/30/22, 10/26/22, 8/24/22  Addie Malhotra MD @ [facility]-PM&R    2/10/23, 1/16/23, 11/23/22 Chacorta Villalpando DC @ [facility]    See Additional Encounters   MEDICATION LIST Internal    IMAGING  (FOR ALL VISITS)     CT (HEAD, NECK, SPINE) PACS [facility]  10/21/22 CT Head Brain  10/18/22 CT Head Brain    [facility]  11/2/17 CT Head

## 2023-05-03 NOTE — PROGRESS NOTES
[facility]     PM&R CLINIC NOTE  BOTULINUM TOXIN PROCEDURE      HPI  No chief complaint on file.    Valerie Sim is a 58 year old female with a history of chronic migraine headaches who presents to clinic for botulinum toxin injections for management of chronic migraine headaches and excessive jaw clenching.     SINCE LAST VISIT  Valerie Sim was last seen here in clinic on 3/1/2023, at which time she received 250 units of Botox.    Patient denies new medical diagnoses, illnesses, hospitalizations, emergency room visits, and injuries since the previous injection with botulinum neurotoxin.     She did recently have her oxygen therapy filled, and this has been helpful for mitigating her cluster headaches.     RESPONSE TO PREVIOUS TREATMENT    Side effects: No problems reported.     1.  Headache frequency during this injection cycle:  She has had 12 migraine headache days, 3 cluster headache days, and 4 occipital headache days over the last 9 weeks since her last Botox injections. This is compared to her baseline headache frequency of 30 severe headache days per month.     2.  Headache duration during this injection cycle:  Headache duration was typically between 3 hours and 5 days. Patient reports one episode of multiple day headaches during this injection cycle.     3.  Headache intensity during this injection cycle:    A.  8-9/10  =  Typical pain level.  B.  9/10  =  Worst pain level.  C.  0/10  =  Lowest pain level.    4.  Change in headache medication usage during this injection cycle:  (For Example:  Able to decrease use of oral pain medications.) No changes.       5.  ER Visits During This Injection Cycle:  None. She reports she used Relpax x6, Toradol x2, Zofran x8 and IM Toradol.     6.  Functional Performance:  Change in ADL's, social interaction, days lost from work, etc. Patient reports being able to more fully participate in social and family activities and  responsibilities as headache symptoms have improved.      PHYSICAL EXAM  VS: There were no vitals taken for this visit.   GEN: Pleasant and cooperative, in no acute distress  HEENT: No facial asymmetry    ALLERGIES  Allergies   Allergen Reactions     Fluoxetine Other (See Comments)     PN: LW Reaction: headache  PN: LW Reaction: headache  Migraine       Garlic Blisters, Cough, Dermatitis, Difficulty breathing, Headache, Hives, Itching, Nausea and Vomiting and Shortness Of Breath     Candesartan      Other reaction(s): Myalgia     Duloxetine      Other reaction(s): Headache  migraine     Iodine      Other reaction(s): Other (see comments)  PN: LW CM1: CONTRAST- iodine Reaction :     Metoclopramide      Other reaction(s): Headache  Caused increase in headaches     Perfume      Other reaction(s): Headache  head pain leading to migraines     Pregabalin      Other reaction(s): Headache, Myalgia     Trazodone Other (See Comments) and Unknown     Other reaction(s): Headache  Other reaction(s): Headache  Other reaction(s): Headache     Amitriptyline Hcl Other (See Comments)     Migraine       Candesartan Cilexetil-Hctz Muscle Pain (Myalgia)     Cyproheptadine Hcl      headaches     Desvenlafaxine      Migraine       Diatrizoate Unknown     PN: LW CM1: CONTRAST- iodine Reaction :     Diazepam      Other reaction(s): Vestibular Toxicity  PN: LW Reaction: vertigo  PN: LW Reaction: vertigo     Duloxetine Hcl Other (See Comments)     Migraine       Gabapentin Other (See Comments)     Galcanezumab      Other reaction(s): Headache     Imipramine Other (See Comments)     Migraine       Metoprolol Other (See Comments) and Unknown     headache  headache  headache     Morphine Other (See Comments)     Patient reports seizure   Other reaction(s): Unknown  Seizure; 5/111/21 updated info: patient states she had a seizure while having a migraine headache years ago and is not sure if it was due to morphine. She has tolerated Dilaudid since  "then.       No Clinical Screening - See Comments      PN: LW FI1: lactose intolerance LW FI2: shellfish  PN: LW CM1: CONTRAST- iodine Reaction :  PN: LW Other1: -nka     Nortriptyline Other (See Comments)     Migraine       Phenergan Dm [Promethazine-Dm] Other (See Comments)     seizures     Pregabalin Muscle Pain (Myalgia)     Promethazine      PN: LW Reaction: minimal sizures     Venlafaxine Other (See Comments)     PN: LW Reaction: migraine  PN: LW Reaction: migraine  Migraine       Wellbutrin [Bupropion Hydrobromide] Other (See Comments)     Migraine       Compazine Anxiety     Dihydroergotamine Anxiety and Other (See Comments)     Cardiac symptoms     Droperidol Anxiety     PN: LW Reaction: agitation     Medroxyprogesterone Hives     PN: LW Reaction: nausea     Prochlorperazine Anxiety     PN: LW Reaction: \"can't sit still\"       CURRENT MEDICATIONS    Current Outpatient Medications:      aMILoride (MIDAMOR) 5 MG tablet, Take 7.5 mg by mouth daily 2.5MG in the AM and 5MG in the PM., Disp: , Rfl:      amLODIPine (NORVASC) 10 MG tablet, Take 10 mg by mouth daily, Disp: , Rfl:      amphetamine-dextroamphetamine (ADDERALL) 20 MG tablet, Take 1 tablet (20 mg) by mouth 2 times daily (Patient taking differently: Take 20 mg by mouth 2 times daily Pt currently taking 10MG in the AM and 20MG in the PM.), Disp: 60 tablet, Rfl: 0     botulinum toxin type A (BOTOX) 100 units injection, Inject intramuscular. Every 18 weeks for pelvic floor spasms, Disp: , Rfl:      budesonide (RINOCORT AQUA) 32 MCG/ACT nasal spray, Spray 1 spray into both nostrils daily., Disp: , Rfl:      calcitRIOL (ROCALTROL) 0.25 MCG capsule, Take 0.25 mcg by mouth as needed, Disp: , Rfl:      Calcium-Magnesium-Vitamin D (CITRACAL SLOW RELEASE) 600- MG-MG-UNIT TB24, Take 600 mg by mouth 4 times daily, Disp: , Rfl:      CycloSPORINE (RESTASIS OP), Apply to eye 2 times daily, Disp: , Rfl:      docusate sodium (COLACE) 100 MG capsule, Take 1 capsule " by mouth as needed for constipation, Disp: , Rfl:      eletriptan (RELPAX) 40 MG tablet, Take 40 mg by mouth as needed, Disp: , Rfl:      estradiol (ESTRACE) 0.1 MG/GM vaginal cream, , Disp: , Rfl:      fexofenadine (ALLEGRA) 180 MG tablet, Take 180 mg by mouth daily, Disp: , Rfl:      HYDROmorphone (DILAUDID) 2 MG tablet, Take 0.5-1 tablets (1-2 mg) by mouth every 3 hours as needed (headache) 20 tablets = 3 month supply., Disp: 24 tablet, Rfl: 0     ketorolac (TORADOL) 30 MG/ML injection, Inject 1 mL (30 mg) into the vein every 6 hours as needed for moderate pain, Disp: 9 mL, Rfl: 11     lamoTRIgine (LAMICTAL) 100 MG tablet, Take 3 tablets (300 mg) by mouth daily (Patient taking differently: Take 200 mg by mouth daily 100MG in AM and 100MG in the PM.), Disp: 90 tablet, Rfl: 1     Magnesium Oxide 500 MG TABS, Take 500 mg by mouth 2 times daily, Disp: , Rfl:      nebivolol (BYSTOLIC) 5 MG tablet, Take 5 mg by mouth daily Take twice a day totaling 10 mg., Disp: , Rfl:      ondansetron (ZOFRAN) 4 MG tablet, Take 1 tablet (4 mg) by mouth every 8 hours as needed, Disp: 90 tablet, Rfl: 1     order for DME, Equipment being ordered: Oxygen The patient has Cluster Headache and needs 10 liters/minute of high flow oxygen via nonrebreather mask prn headaches (Patient not taking: Reported on 3/20/2023), Disp: 99 days, Rfl: 0     order for DME, Equipment being ordered: Oxygen and non-rebreather mask.   Use high flow oxygen (10-15 L/min) with non-rebreather mask, as needed for cluster headaches, Disp: 1 Units, Rfl: 11     potassium chloride ER (KLOR-CON M) 20 MEQ CR tablet, Take 1 tablet by mouth 2 times daily, Disp: , Rfl:      QUEtiapine (SEROQUEL) 50 MG tablet, Take 1 tablet by mouth daily., Disp: , Rfl:      valACYclovir (VALTREX) 1000 mg tablet, Take 1,000 mg by mouth as needed., Disp: , Rfl:     Current Facility-Administered Medications:      botulinum toxin type A (BOTOX) 100 units injection 225 Units, 225 Units,  Intramuscular, See Admin Instructions, Addie Malhotra MD, 225 Units at [phone]       BOTULINUM NEUROTOXIN INJECTION PROCEDURES    VERIFICATION OF PATIENT IDENTIFICATION AND PROCEDURE     Initials   Patient Name SES   Patient  SES   Procedure Verified by: SES     Prior to the start of the procedure and with procedural staff participation, I verbally confirmed the patient s identity using two indicators, relevant allergies, that the procedure was appropriate and matched the consent or emergent situation, and that the correct equipment/implants were available. Immediately prior to starting the procedure I conducted the Time Out with the procedural staff and re-confirmed the patient s name, procedure, and site/side. (The Joint Commission universal protocol was followed.)  Yes    Sedation (Moderate or Deep): None    ABOVE ASSESSMENTS PERFORMED BY    Addie Malhotra MD      INDICATIONS FOR PROCEDURES  Valerie Sim is a 58 year old patient with pain and jaw clenching secondary to the diagnosis of chronic migraine headaches and oromandibular dystonia. Her baseline symptoms have been recalcitrant to oral medications and conservative therapy.  She is here today for reinjection with Botox.    GOAL OF PROCEDURE  The goal of this procedure is to decrease pain and excessive jaw clenching due to chronic migraine headaches and bruxism.     TOTAL DOSE ADMINISTERED  Dose Administered:  225 units  Botox (Botulinum Toxin Type A)       2:1 Dilution   Unavoidable Drug Waste: Yes  Amount of drug waste (mL): 75 units Botox.  Reason for waste:  Single use vial  Diluent Used:  0.25% bupivacaine  Total Volume of Diluent Used:  4.5 ml  NDC #: Botox 100u ([address]-62)      CONSENT  The risks, benefits, and treatment options were discussed with Valerie Sim and she agreed to proceed.    Written consent was obtained by [facility].     EQUIPMENT USED  Needle-25mm stimulating/recording  Needle-30 gauge  EMG/NCS Machine    SKIN  PREPARATION  Skin preparation was performed using an alcohol wipe.    GUIDANCE DESCRIPTION  Electro-myographic guidance was necessary throughout the neck and jaw portion of the procedure to accurately identify all areas of dystonic muscles while avoiding injection of non-dystonic muscles, neighboring nerves and nearby vascular structures.       AREA/MUSCLE INJECTED:  225 UNITS BOTOX = TOTAL DOSE, 2:1 DILUTION     1. FACIAL & SCALP MUSCLES: 85 UNITS BOTOX = TOTAL DOSE      Right Frontalis - 10 units of Botox in 2 site/s.  Left Frontalis - 10 units of Botox in 2 site/s.      Procerus - 5 units of Botox at 1 site/s.       Right  - 2.5 units of Botox in 1 site/s.  Left  - 2.5 units of Botox in 1 site/s.      Right Nasalis - 2.5 units of Botox at 1 site/s.           Left Nasalis - 2.5 units of Botox at 1 site/s.     Right Upper Occipitalis - 25 units of Botox at 5 site/s.   Left Upper Occipitalis - 25 units of Botox at 5 site/s.         2. JAW MUSCLES: 90 UNITS BOTOX = TOTAL DOSE     Right Masseter - 20 units of Botox at 2 site/s.   Left Masseter - 20 units of Botox at 2 site/s.      Right Temporalis - 25 units of Botox at 5 site/s.  Left Temporalis - 25 units of Botox at 5 site/s.          3. SHOULDER & NECK MUSCLES: 50 UNITS BOTOX = TOTAL DOSE      Right Levator Scapula - 10 units of Botox at 2 site/s (neck and scapular insertion).  Left Levator Scapula - 10 units of Botox at 2 site/s (neck and scapular insertion).      Right Upper Trapezius (mid & low lateral) - 10 units of Botox at 3 site/s.  Left Upper Trapezius (mid & low lateral) - 10 units of Botox at 3 site/s.             Right Pectoralis Minor - 5 units of Botox at 1 site/s.                     Left Pectoralis Minor - 5 units of Botox at 1 site/s.       RESPONSE TO PROCEDURE  Valerie Sim tolerated the procedure well and there were no immediate complications. She was allowed to recover for an appropriate period of time and was discharged  home in stable condition.      ASSESSMENT AND PLAN   1. Botulinum toxin injections: No changes made to Botox dose or distribution today. Patient will continue to monitor response to Botox and report at next appointment.    2. Referrals: None.   3. Follow up: Valerie Sim was rescheduled for the next series of injections in 9 weeks, at which time we will evaluate response to today's injections. she may call the clinic prior with any questions or concerns prior to the next appointment.

## 2023-05-04 ASSESSMENT — HEADACHE IMPACT TEST (HIT 6)
HOW OFTEN HAVE YOU FELT FED UP OR IRRITATED BECAUSE OF YOUR HEADACHES: VERY OFTEN
HOW OFTEN DO HEADACHES LIMIT YOUR DAILY ACTIVITIES: VERY OFTEN
WHEN YOU HAVE HEADACHES HOW OFTEN IS THE PAIN SEVERE: SOMETIMES
HOW OFTEN DID HEADACHS LIMIT CONCENTRATION ON WORK OR DAILY ACTIVITY: SOMETIMES
WHEN YOU HAVE A HEADACHE HOW OFTEN DO YOU WISH YOU COULD LIE DOWN: RARELY
HIT6 TOTAL SCORE: 60
HOW OFTEN HAVE YOU FELT TOO TIRED TO WORK BECAUSE OF YOUR HEADACHES: SOMETIMES

## 2023-05-08 ENCOUNTER — OFFICE VISIT (OUTPATIENT)
Dept: NEUROLOGY | Facility: CLINIC | Age: 58
End: 2023-05-08
Payer: COMMERCIAL

## 2023-05-08 ENCOUNTER — PRE VISIT (OUTPATIENT)
Dept: NEUROLOGY | Facility: CLINIC | Age: 58
End: 2023-05-08

## 2023-05-08 VITALS
WEIGHT: 183 LBS | SYSTOLIC BLOOD PRESSURE: 128 MMHG | OXYGEN SATURATION: 98 % | BODY MASS INDEX: 28.66 KG/M2 | HEART RATE: 90 BPM | DIASTOLIC BLOOD PRESSURE: 83 MMHG

## 2023-05-08 DIAGNOSIS — G43.719 INTRACTABLE CHRONIC MIGRAINE WITHOUT AURA AND WITHOUT STATUS MIGRAINOSUS: ICD-10-CM

## 2023-05-08 DIAGNOSIS — G43.009 MIGRAINE WITHOUT AURA AND WITHOUT STATUS MIGRAINOSUS, NOT INTRACTABLE: ICD-10-CM

## 2023-05-08 DIAGNOSIS — G44.019 EPISODIC CLUSTER HEADACHE, NOT INTRACTABLE: Primary | ICD-10-CM

## 2023-05-08 PROCEDURE — 99215 OFFICE O/P EST HI 40 MIN: CPT | Performed by: NURSE PRACTITIONER

## 2023-05-08 RX ORDER — AMILORIDE HYDROCHLORIDE 5 MG/1
7.5 TABLET ORAL DAILY
COMMUNITY
End: 2023-10-19

## 2023-05-08 RX ORDER — KETOROLAC TROMETHAMINE 30 MG/ML
30 INJECTION, SOLUTION INTRAMUSCULAR; INTRAVENOUS EVERY 6 HOURS PRN
Qty: 6 ML | Refills: 1 | Status: SHIPPED | OUTPATIENT
Start: 2023-05-08

## 2023-05-08 RX ORDER — ONDANSETRON 4 MG/1
4 TABLET, FILM COATED ORAL EVERY 8 HOURS PRN
Qty: 30 TABLET | Refills: 1 | Status: SHIPPED | OUTPATIENT
Start: 2023-05-08 | End: 2023-12-05

## 2023-05-08 RX ORDER — AMLODIPINE BESYLATE 10 MG/1
10 TABLET ORAL DAILY
COMMUNITY
End: 2023-10-19

## 2023-05-08 ASSESSMENT — PAIN SCALES - GENERAL: PAINLEVEL: SEVERE PAIN (6)

## 2023-05-08 NOTE — PATIENT INSTRUCTIONS
Plan:  Continue with Botox and ONB -already established with Dr Malhotra  Rescue treatment -ketorolac as needed and refills provided-including syringe and needles 25G-1 in, eletriptan as needed  Ondansetron as needed for nausea   Oxygen for cluster treatment   Follow up in 9-12 months if stable  as needed if headaches were getting worse     Chronic pain managed and  meds managed by PCP. Follow up with primary care
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
## 2023-05-09 ENCOUNTER — VIRTUAL VISIT (OUTPATIENT)
Dept: PSYCHOLOGY | Facility: CLINIC | Age: 58
End: 2023-05-09
Payer: COMMERCIAL

## 2023-05-09 ENCOUNTER — TELEPHONE (OUTPATIENT)
Dept: NEUROLOGY | Facility: CLINIC | Age: 58
End: 2023-05-09
Payer: COMMERCIAL

## 2023-05-09 DIAGNOSIS — F33.1 MODERATE EPISODE OF RECURRENT MAJOR DEPRESSIVE DISORDER (H): Primary | ICD-10-CM

## 2023-05-09 PROCEDURE — 90834 PSYTX W PT 45 MINUTES: CPT | Mod: VID | Performed by: MARRIAGE & FAMILY THERAPIST

## 2023-05-09 NOTE — PROGRESS NOTES
[facility]                                     Progress Note    Patient Name: Valerie Sim  Date: 23         Service Type: Individual      Session Start Time: 3:05p Session End Time: 3:55p     Session Length: 50    Session #: 4    Attendees: Client attended alone    Service Modality:  Video Visit:      Provider verified identity through the following two step process.  Patient provided:  Patient     Telemedicine Visit: The patient's condition can be safely assessed and treated via synchronous audio and visual telemedicine encounter.      Reason for Telemedicine Visit: Services only offered telehealth    Originating Site (Patient Location): Patient's home    Distant Site (Provider Location): Provider Remote Setting- Home Office    Consent:  The patient/guardian has verbally consented to: the potential risks and benefits of telemedicine (video visit) versus in person care; bill my insurance or make self-payment for services provided; and responsibility for payment of non-covered services.     Patient would like the video invitation sent by:  Send to e-mail at: [email or web address]    Mode of Communication:  Video Conference via [employer]    Distant Location (Provider):  Off-site    As the provider I attest to compliance with applicable laws and regulations related to telemedicine.    DATA  Interactive Complexity: No  Crisis: No        Progress Since Last Session (Related to Symptoms / Goals / Homework):   Symptoms: No change, mood at 5/10    Homework: Completed in session      Episode of Care Goals: Minimal progress - PREPARATION (Decided to change - considering how); Intervened by negotiating a change plan and determining options / strategies for behavior change, identifying triggers, exploring social supports, and working towards setting a date to begin behavior change     Current / Ongoing Stressors and Concerns:  Last session , pt notes ongoing anxiety around feeling overwhelmed  with tasks along with higher amounts of appt this week.        Treatment Objective(s) Addressed in This Session:   Increase interest, engagement, and pleasure in doing things       Intervention:   Motivational Interviewing    MI Intervention: Permission to raise concern or advise, Open-ended questions and Reflections: simple and complex     Change Talk Expressed by the Patient: Reasons to change Need to change    Provider Response to Change Talk: R - Reflected patient's change talk and S - Summarized patient's change talk statements     ASSESSMENT: Current Emotional / Mental Status (status of significant symptoms):   Risk status (Self / Other harm or suicidal ideation)   Patient denies current fears or concerns for personal safety.   Patient denies current or recent suicidal ideation or behaviors.   Patient denies current or recent homicidal ideation or behaviors.   Patient denies current or recent self injurious behavior or ideation.   Patient denies other safety concerns.   Patient reports there has been no change in risk factors since their last session.     Patient reports there has been no change in protective factors since their last session.     A safety and risk management plan has been developed including: Patient consented to co-developed safety plan on 4/5/23.  Safety and risk management plan was reviewed.   Patient agreed to use safety plan should any safety concerns arise.  A copy was made available to the patient.     Appearance:   Appropriate    Eye Contact:   Good    Psychomotor Behavior: Normal    Attitude:   Cooperative    Orientation:   All   Speech    Rate / Production: Normal     Volume:  Normal    Mood:    Normal   Affect:    Appropriate    Thought Content:  Clear    Thought Form:  Coherent  Logical    Insight:    Good      Medication Review:   No changes to current psychiatric medication(s)     Medication Compliance:   Yes     Changes in Health Issues:   None reported     Chemical Use  Review:   Substance Use: Chemical use reviewed, no active concerns identified      Tobacco Use: No current tobacco use.      Diagnosis:  MDD, moderate, recurrent    Collateral Reports Completed:   Not Applicable    PLAN: (Patient Tasks / Therapist Tasks / Other)  Pt will engage in using CBT skills 1x daily        CAROLEE Mendez   5/9/23                                                         ______________________________________________________________________    Individual Treatment Plan    Patient's Name: Valerie Sim  YOB: 1965    Date of Creation: 4/17/23  Date Treatment Plan Last Reviewed/Revised: 7/17/23    DSM5 Diagnoses: 296.32 (F33.1) Major Depressive Disorder, Recurrent Episode, Moderate _  Psychosocial / Contextual Factors:   PROMIS (reviewed every 90 days):     Referral / Collaboration:  Referral to another professional/service is not indicated at this time..    Anticipated number of session for this episode of care: 3-6 sessions  Anticipation frequency of session: Biweekly  Anticipated Duration of each session: 38-52 minutes  Treatment plan will be reviewed in 90 days or when goals have been changed.       MeasurableTreatment Goal(s) related to diagnosis / functional impairment(s)  Goal 1: Patient will engage in CBT skills for depression sx reduction    Objective #A (Patient Action)    Patient will Identify negative self-talk and behaviors: challenge core beliefs, myths, and actions.  Status: New - Date: 4/17/23     Intervention(s)  Therapist will teach emotional regulation skills. CBT skills.      Patient has reviewed and agreed to the above plan.      CAROLEE Mendez  April 17, 2023

## 2023-05-09 NOTE — TELEPHONE ENCOUNTER
Left Voicemail (1st Attempt) and Sent Mychart (1st Attempt) for the patient to call back and schedule the following:    Appointment type: follow up  Provider: Jonathon Meza CNP  Return date: 1 year  Specialty phone number: [phone]  Additional appointment(s) needed: NONE  Additonal Notes: LVM, sent MyC 5/9/23 NP

## 2023-05-24 ENCOUNTER — OFFICE VISIT (OUTPATIENT)
Dept: PSYCHIATRY | Facility: CLINIC | Age: 58
End: 2023-05-24
Payer: COMMERCIAL

## 2023-05-24 VITALS
SYSTOLIC BLOOD PRESSURE: 132 MMHG | HEIGHT: 67 IN | DIASTOLIC BLOOD PRESSURE: 85 MMHG | BODY MASS INDEX: 28.5 KG/M2 | HEART RATE: 88 BPM | WEIGHT: 181.6 LBS | TEMPERATURE: 97.3 F

## 2023-05-24 DIAGNOSIS — F33.41 RECURRENT MAJOR DEPRESSIVE DISORDER, IN PARTIAL REMISSION (H): Primary | ICD-10-CM

## 2023-05-24 ASSESSMENT — PATIENT HEALTH QUESTIONNAIRE - PHQ9: SUM OF ALL RESPONSES TO PHQ QUESTIONS 1-9: 13

## 2023-05-24 ASSESSMENT — PAIN SCALES - GENERAL: PAINLEVEL: MODERATE PAIN (5)
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
## 2023-05-29 ASSESSMENT — ANXIETY QUESTIONNAIRES
2. NOT BEING ABLE TO STOP OR CONTROL WORRYING: SEVERAL DAYS
3. WORRYING TOO MUCH ABOUT DIFFERENT THINGS: SEVERAL DAYS
7. FEELING AFRAID AS IF SOMETHING AWFUL MIGHT HAPPEN: SEVERAL DAYS
8. IF YOU CHECKED OFF ANY PROBLEMS, HOW DIFFICULT HAVE THESE MADE IT FOR YOU TO DO YOUR WORK, TAKE CARE OF THINGS AT HOME, OR GET ALONG WITH OTHER PEOPLE?: VERY DIFFICULT
GAD7 TOTAL SCORE: 12
5. BEING SO RESTLESS THAT IT IS HARD TO SIT STILL: NEARLY EVERY DAY
GAD7 TOTAL SCORE: 12
1. FEELING NERVOUS, ANXIOUS, OR ON EDGE: MORE THAN HALF THE DAYS
4. TROUBLE RELAXING: NEARLY EVERY DAY
GAD7 TOTAL SCORE: 12
6. BECOMING EASILY ANNOYED OR IRRITABLE: SEVERAL DAYS
7. FEELING AFRAID AS IF SOMETHING AWFUL MIGHT HAPPEN: SEVERAL DAYS
IF YOU CHECKED OFF ANY PROBLEMS ON THIS QUESTIONNAIRE, HOW DIFFICULT HAVE THESE PROBLEMS MADE IT FOR YOU TO DO YOUR WORK, TAKE CARE OF THINGS AT HOME, OR GET ALONG WITH OTHER PEOPLE: VERY DIFFICULT

## 2023-06-04 ASSESSMENT — PATIENT HEALTH QUESTIONNAIRE - PHQ9
SUM OF ALL RESPONSES TO PHQ QUESTIONS 1-9: 19
SUM OF ALL RESPONSES TO PHQ QUESTIONS 1-9: 19
10. IF YOU CHECKED OFF ANY PROBLEMS, HOW DIFFICULT HAVE THESE PROBLEMS MADE IT FOR YOU TO DO YOUR WORK, TAKE CARE OF THINGS AT HOME, OR GET ALONG WITH OTHER PEOPLE: VERY DIFFICULT

## 2023-06-05 ENCOUNTER — VIRTUAL VISIT (OUTPATIENT)
Dept: PSYCHOLOGY | Facility: CLINIC | Age: 58
End: 2023-06-05
Payer: COMMERCIAL

## 2023-06-05 DIAGNOSIS — F33.1 MODERATE EPISODE OF RECURRENT MAJOR DEPRESSIVE DISORDER (H): Primary | ICD-10-CM

## 2023-06-05 PROCEDURE — 90834 PSYTX W PT 45 MINUTES: CPT | Mod: VID | Performed by: MARRIAGE & FAMILY THERAPIST

## 2023-06-05 NOTE — PROGRESS NOTES
Answers for HPI/ROS submitted by the patient on 2023  If you checked off any problems, how difficult have these problems made it for you to do your work, take care of things at home, or get along with other people?: Very difficult  PHQ9 TOTAL SCORE: 19  MAHAMED 7 TOTAL SCORE: 12        [facility]                                     Progress Note    Patient Name: Valerie Sim  Date: 23         Service Type: Individual      Session Start Time: 8:02a Session End Time:8:84a     Session Length: 52    Session #: 5    Attendees: Client attended alone    Service Modality:  Video Visit:      Provider verified identity through the following two step process.  Patient provided:  Patient     Telemedicine Visit: The patient's condition can be safely assessed and treated via synchronous audio and visual telemedicine encounter.      Reason for Telemedicine Visit: Services only offered telehealth    Originating Site (Patient Location): Patient's home    Distant Site (Provider Location): Provider Remote Setting- Home Office    Consent:  The patient/guardian has verbally consented to: the potential risks and benefits of telemedicine (video visit) versus in person care; bill my insurance or make self-payment for services provided; and responsibility for payment of non-covered services.     Patient would like the video invitation sent by:  Send to e-mail at: [email or web address]    Mode of Communication:  Video Conference via Amwell    Distant Location (Provider):  Off-site    As the provider I attest to compliance with applicable laws and regulations related to telemedicine.    DATA  Interactive Complexity: No  Crisis: No        Progress Since Last Session (Related to Symptoms / Goals / Homework):   Symptoms: No change, mood at 5/10    Homework: Completed in session      Episode of Care Goals: Minimal progress - PREPARATION (Decided to change - considering how); Intervened by negotiating a change plan and  determining options / strategies for behavior change, identifying triggers, exploring social supports, and working towards setting a date to begin behavior change     Current / Ongoing Stressors and Concerns:  Last session 5/9, pt was not accepted into a tx resistant depression program due to sx not being worse enough. Son reached out to pt wanting to re-connect and has some questions he wants answered. He was open to doing some therapy with her son together.      Treatment Objective(s) Addressed in This Session:   Increase interest, engagement, and pleasure in doing things       Intervention:   Motivational Interviewing    MI Intervention: Permission to raise concern or advise, Open-ended questions and Reflections: simple and complex     Change Talk Expressed by the Patient: Reasons to change Need to change    Provider Response to Change Talk: R - Reflected patient's change talk and S - Summarized patient's change talk statements     ASSESSMENT: Current Emotional / Mental Status (status of significant symptoms):   Risk status (Self / Other harm or suicidal ideation)   Patient denies current fears or concerns for personal safety.   Patient denies current or recent suicidal ideation or behaviors.   Patient denies current or recent homicidal ideation or behaviors.   Patient denies current or recent self injurious behavior or ideation.   Patient denies other safety concerns.   Patient reports there has been no change in risk factors since their last session.     Patient reports there has been no change in protective factors since their last session.     A safety and risk management plan has been developed including: Patient consented to co-developed safety plan on 4/5/23.  Safety and risk management plan was reviewed.   Patient agreed to use safety plan should any safety concerns arise.  A copy was made available to the patient.     Appearance:   Appropriate    Eye Contact:   Good    Psychomotor Behavior: Normal     Attitude:   Cooperative    Orientation:   All   Speech    Rate / Production: Normal     Volume:  Normal    Mood:    Normal   Affect:    Appropriate    Thought Content:  Clear    Thought Form:  Coherent  Logical    Insight:    Good      Medication Review:   No changes to current psychiatric medication(s)     Medication Compliance:   Yes     Changes in Health Issues:   None reported     Chemical Use Review:   Substance Use: Chemical use reviewed, no active concerns identified      Tobacco Use: No current tobacco use.      Diagnosis:  MDD, moderate, recurrent    Collateral Reports Completed:   Not Applicable    PLAN: (Patient Tasks / Therapist Tasks / Other)  PT will work to write down topics to discuss with son and plans for the future        Yoni LANDA Isai, [facility]   6/5/23                                                         ______________________________________________________________________    Individual Treatment Plan    Patient's Name: Valerie Sim  YOB: 1965    Date of Creation: 4/17/23  Date Treatment Plan Last Reviewed/Revised: 7/17/23    DSM5 Diagnoses: 296.32 (F33.1) Major Depressive Disorder, Recurrent Episode, Moderate _  Psychosocial / Contextual Factors:   PROMIS (reviewed every 90 days):     Referral / Collaboration:  Referral to another professional/service is not indicated at this time..    Anticipated number of session for this episode of care: 3-6 sessions  Anticipation frequency of session: Biweekly  Anticipated Duration of each session: 38-52 minutes  Treatment plan will be reviewed in 90 days or when goals have been changed.       MeasurableTreatment Goal(s) related to diagnosis / functional impairment(s)  Goal 1: Patient will engage in CBT skills for depression sx reduction    Objective #A (Patient Action)    Patient will Identify negative self-talk and behaviors: challenge core beliefs, myths, and actions.  Status: New - Date: 4/17/23     Intervention(s)  Therapist  will teach emotional regulation skills. CBT skills.      Patient has reviewed and agreed to the above plan.      Yoni Alex, [facility]  April 17, 2023

## 2023-06-07 ASSESSMENT — PATIENT HEALTH QUESTIONNAIRE - PHQ9: SUM OF ALL RESPONSES TO PHQ QUESTIONS 1-9: 16

## 2023-06-13 ASSESSMENT — ANXIETY QUESTIONNAIRES
GAD7 TOTAL SCORE: 13
8. IF YOU CHECKED OFF ANY PROBLEMS, HOW DIFFICULT HAVE THESE MADE IT FOR YOU TO DO YOUR WORK, TAKE CARE OF THINGS AT HOME, OR GET ALONG WITH OTHER PEOPLE?: VERY DIFFICULT
GAD7 TOTAL SCORE: 13
3. WORRYING TOO MUCH ABOUT DIFFERENT THINGS: SEVERAL DAYS
6. BECOMING EASILY ANNOYED OR IRRITABLE: MORE THAN HALF THE DAYS
7. FEELING AFRAID AS IF SOMETHING AWFUL MIGHT HAPPEN: NOT AT ALL
IF YOU CHECKED OFF ANY PROBLEMS ON THIS QUESTIONNAIRE, HOW DIFFICULT HAVE THESE PROBLEMS MADE IT FOR YOU TO DO YOUR WORK, TAKE CARE OF THINGS AT HOME, OR GET ALONG WITH OTHER PEOPLE: VERY DIFFICULT
1. FEELING NERVOUS, ANXIOUS, OR ON EDGE: MORE THAN HALF THE DAYS
7. FEELING AFRAID AS IF SOMETHING AWFUL MIGHT HAPPEN: NOT AT ALL
5. BEING SO RESTLESS THAT IT IS HARD TO SIT STILL: NEARLY EVERY DAY
GAD7 TOTAL SCORE: 13
2. NOT BEING ABLE TO STOP OR CONTROL WORRYING: MORE THAN HALF THE DAYS
4. TROUBLE RELAXING: NEARLY EVERY DAY

## 2023-06-15 ENCOUNTER — VIRTUAL VISIT (OUTPATIENT)
Dept: PSYCHOLOGY | Facility: CLINIC | Age: 58
End: 2023-06-15
Payer: COMMERCIAL

## 2023-06-15 DIAGNOSIS — F33.1 MODERATE EPISODE OF RECURRENT MAJOR DEPRESSIVE DISORDER (H): Primary | ICD-10-CM

## 2023-06-15 PROCEDURE — 90834 PSYTX W PT 45 MINUTES: CPT | Mod: VID | Performed by: MARRIAGE & FAMILY THERAPIST

## 2023-06-15 NOTE — PROGRESS NOTES
Answers for HPI/ROS submitted by the patient on 2023  If you checked off any problems, how difficult have these problems made it for you to do your work, take care of things at home, or get along with other people?: Very difficult  PHQ9 TOTAL SCORE: 19  MAHAMED 7 TOTAL SCORE: 12        [facility]                                     Progress Note    Patient Name: Valerie Sim  Date: 6/15/23         Service Type: Individual      Session Start Time: 8:03a Session End Time:8:54a     Session Length: 51    Session #: 6    Attendees: Client attended alone    Service Modality:  Video Visit:      Provider verified identity through the following two step process.  Patient provided:  Patient     Telemedicine Visit: The patient's condition can be safely assessed and treated via synchronous audio and visual telemedicine encounter.      Reason for Telemedicine Visit: Services only offered telehealth    Originating Site (Patient Location): Patient's home    Distant Site (Provider Location): Provider Remote Setting- Home Office    Consent:  The patient/guardian has verbally consented to: the potential risks and benefits of telemedicine (video visit) versus in person care; bill my insurance or make self-payment for services provided; and responsibility for payment of non-covered services.     Patient would like the video invitation sent by:  Send to e-mail at: [email or web address]    Mode of Communication:  Video Conference via Amwell    Distant Location (Provider):  Off-site    As the provider I attest to compliance with applicable laws and regulations related to telemedicine.    DATA  Interactive Complexity: No  Crisis: No        Progress Since Last Session (Related to Symptoms / Goals / Homework):   Symptoms: No change, mood around 5/10    Homework: Completed in session      Episode of Care Goals: Minimal progress - PREPARATION (Decided to change - considering how); Intervened by negotiating a change  plan and determining options / strategies for behavior change, identifying triggers, exploring social supports, and working towards setting a date to begin behavior change     Current / Ongoing Stressors and Concerns:  Last session 6/5, pt was able to speak with son about being involved in family therapy and as of now, her son is not fully wanting to be involved. However it is possible that her kids will join a session with pt and her trauma therapist in the near future.   -Pt has been working on some projects at home that have helped to improve mood and keep her focused on a task.      Treatment Objective(s) Addressed in This Session:   Increase interest, engagement, and pleasure in doing things       Intervention:   Motivational Interviewing    MI Intervention: Permission to raise concern or advise, Open-ended questions and Reflections: simple and complex     Change Talk Expressed by the Patient: Reasons to change Need to change    Provider Response to Change Talk: R - Reflected patient's change talk and S - Summarized patient's change talk statements     ASSESSMENT: Current Emotional / Mental Status (status of significant symptoms):   Risk status (Self / Other harm or suicidal ideation)   Patient denies current fears or concerns for personal safety.   Patient denies current or recent suicidal ideation or behaviors.   Patient denies current or recent homicidal ideation or behaviors.   Patient denies current or recent self injurious behavior or ideation.   Patient denies other safety concerns.   Patient reports there has been no change in risk factors since their last session.     Patient reports there has been no change in protective factors since their last session.     A safety and risk management plan has been developed including: Patient consented to co-developed safety plan on 4/5/23.  Safety and risk management plan was reviewed.   Patient agreed to use safety plan should any safety concerns arise.  A copy  was made available to the patient.     Appearance:   Appropriate    Eye Contact:   Good    Psychomotor Behavior: Normal    Attitude:   Cooperative    Orientation:   All   Speech    Rate / Production: Normal     Volume:  Normal    Mood:    Normal   Affect:    Appropriate    Thought Content:  Clear    Thought Form:  Coherent  Logical    Insight:    Good      Medication Review:   No changes to current psychiatric medication(s)     Medication Compliance:   Yes     Changes in Health Issues:   None reported     Chemical Use Review:   Substance Use: Chemical use reviewed, no active concerns identified      Tobacco Use: No current tobacco use.      Diagnosis:  MDD, moderate, recurrent    Collateral Reports Completed:   Not Applicable    PLAN: (Patient Tasks / Therapist Tasks / Other)  PT will work to develop 2-3 areas to focus on about the relationship with her son.         Yoni FORDEEvette Alex, [facility]   6/15/23                                                         ______________________________________________________________________    Individual Treatment Plan    Patient's Name: Valerie Sim  YOB: 1965    Date of Creation: 4/17/23  Date Treatment Plan Last Reviewed/Revised: 7/17/23    DSM5 Diagnoses: 296.32 (F33.1) Major Depressive Disorder, Recurrent Episode, Moderate _  Psychosocial / Contextual Factors:   PROMIS (reviewed every 90 days):     Referral / Collaboration:  Referral to another professional/service is not indicated at this time..    Anticipated number of session for this episode of care: 3-6 sessions  Anticipation frequency of session: Biweekly  Anticipated Duration of each session: 38-52 minutes  Treatment plan will be reviewed in 90 days or when goals have been changed.       MeasurableTreatment Goal(s) related to diagnosis / functional impairment(s)  Goal 1: Patient will engage in CBT skills for depression sx reduction    Objective #A (Patient Action)    Patient will Identify negative  self-talk and behaviors: challenge core beliefs, myths, and actions.  Status: New - Date: 4/17/23     Intervention(s)  Therapist will teach emotional regulation skills. CBT skills.      Patient has reviewed and agreed to the above plan.      Yoni Alex, [facility]  April 17, 2023

## 2023-06-19 ASSESSMENT — PATIENT HEALTH QUESTIONNAIRE - PHQ9
10. IF YOU CHECKED OFF ANY PROBLEMS, HOW DIFFICULT HAVE THESE PROBLEMS MADE IT FOR YOU TO DO YOUR WORK, TAKE CARE OF THINGS AT HOME, OR GET ALONG WITH OTHER PEOPLE: VERY DIFFICULT
SUM OF ALL RESPONSES TO PHQ QUESTIONS 1-9: 17
SUM OF ALL RESPONSES TO PHQ QUESTIONS 1-9: 17

## 2023-06-20 ENCOUNTER — VIRTUAL VISIT (OUTPATIENT)
Dept: PSYCHOLOGY | Facility: CLINIC | Age: 58
End: 2023-06-20
Payer: COMMERCIAL

## 2023-06-20 DIAGNOSIS — F33.1 MODERATE EPISODE OF RECURRENT MAJOR DEPRESSIVE DISORDER (H): Primary | ICD-10-CM

## 2023-06-20 PROCEDURE — 90834 PSYTX W PT 45 MINUTES: CPT | Mod: VID | Performed by: MARRIAGE & FAMILY THERAPIST

## 2023-06-20 NOTE — PROGRESS NOTES
Answers for HPI/ROS submitted by the patient on 2023  If you checked off any problems, how difficult have these problems made it for you to do your work, take care of things at home, or get along with other people?: Very difficult  PHQ9 TOTAL SCORE: 17  MAHAMED 7 TOTAL SCORE: 13          [facility]                                     Progress Note    Patient Name: Valerie Sim  Date:          Service Type: Individual      Session Start Time: 2:02p Session End Time: 2:52p     Session Length:  50    Session #: 7    Attendees: Client attended alone    Service Modality:  Video Visit:      Provider verified identity through the following two step process.  Patient provided:  Patient     Telemedicine Visit: The patient's condition can be safely assessed and treated via synchronous audio and visual telemedicine encounter.      Reason for Telemedicine Visit: Services only offered telehealth    Originating Site (Patient Location): Patient's home    Distant Site (Provider Location): Provider Remote Setting- Home Office    Consent:  The patient/guardian has verbally consented to: the potential risks and benefits of telemedicine (video visit) versus in person care; bill my insurance or make self-payment for services provided; and responsibility for payment of non-covered services.     Patient would like the video invitation sent by:  Send to e-mail at: [email or web address]    Mode of Communication:  Video Conference via Amwell    Distant Location (Provider):  Off-site    As the provider I attest to compliance with applicable laws and regulations related to telemedicine.    DATA  Interactive Complexity: No  Crisis: No        Progress Since Last Session (Related to Symptoms / Goals / Homework):   Symptoms: No change, mood at 5/10    Homework: Completed in session      Episode of Care Goals: Minimal progress - PREPARATION (Decided to change - considering how); Intervened by negotiating a change plan and  determining options / strategies for behavior change, identifying triggers, exploring social supports, and working towards setting a date to begin behavior change     Current / Ongoing Stressors and Concerns:  Last session 6/15, pt's provider has reached out to kids about joining session tomorrow to discuss trauma sx but kids have not responded about showing up.        Treatment Objective(s) Addressed in This Session:   Increase interest, engagement, and pleasure in doing things       Intervention:   Motivational Interviewing    MI Intervention: Permission to raise concern or advise, Open-ended questions and Reflections: simple and complex     Change Talk Expressed by the Patient: Reasons to change Need to change    Provider Response to Change Talk: R - Reflected patient's change talk and S - Summarized patient's change talk statements     ASSESSMENT: Current Emotional / Mental Status (status of significant symptoms):   Risk status (Self / Other harm or suicidal ideation)   Patient denies current fears or concerns for personal safety.   Patient denies current or recent suicidal ideation or behaviors.   Patient denies current or recent homicidal ideation or behaviors.   Patient denies current or recent self injurious behavior or ideation.   Patient denies other safety concerns.   Patient reports there has been no change in risk factors since their last session.     Patient reports there has been no change in protective factors since their last session.     A safety and risk management plan has been developed including: Patient consented to co-developed safety plan on 4/5/23.  Safety and risk management plan was reviewed.   Patient agreed to use safety plan should any safety concerns arise.  A copy was made available to the patient.     Appearance:   Appropriate    Eye Contact:   Good    Psychomotor Behavior: Normal    Attitude:   Cooperative    Orientation:   All   Speech    Rate / Production: Normal      Volume:  Normal    Mood:    Normal   Affect:    Appropriate    Thought Content:  Clear    Thought Form:  Coherent  Logical    Insight:    Good      Medication Review:   No changes to current psychiatric medication(s)     Medication Compliance:   Yes     Changes in Health Issues:   None reported     Chemical Use Review:   Substance Use: Chemical use reviewed, no active concerns identified      Tobacco Use: No current tobacco use.      Diagnosis:  MDD, moderate, recurrent    Collateral Reports Completed:   Not Applicable    PLAN: (Patient Tasks / Therapist Tasks / Other)   PT will work to engage in 1x daily healthy coping skills        Yoni Alex, [facility]   6/20/23                                                         ______________________________________________________________________    Individual Treatment Plan    Patient's Name: Valerie Sim  YOB: 1965    Date of Creation: 4/17/23  Date Treatment Plan Last Reviewed/Revised: 7/17/23    DSM5 Diagnoses: 296.32 (F33.1) Major Depressive Disorder, Recurrent Episode, Moderate _  Psychosocial / Contextual Factors:   PROMIS (reviewed every 90 days):     Referral / Collaboration:  Referral to another professional/service is not indicated at this time..    Anticipated number of session for this episode of care: 3-6 sessions  Anticipation frequency of session: Biweekly  Anticipated Duration of each session: 38-52 minutes  Treatment plan will be reviewed in 90 days or when goals have been changed.       MeasurableTreatment Goal(s) related to diagnosis / functional impairment(s)  Goal 1: Patient will engage in CBT skills for depression sx reduction    Objective #A (Patient Action)    Patient will Identify negative self-talk and behaviors: challenge core beliefs, myths, and actions.  Status: New - Date: 4/17/23     Intervention(s)  Therapist will teach emotional regulation skills. CBT skills.      Patient has reviewed and agreed to the above  plan.      Yoni Alex, [facility]  April 17, 2023

## 2023-06-23 ENCOUNTER — MYC MEDICAL ADVICE (OUTPATIENT)
Dept: PHYSICAL MEDICINE AND REHAB | Facility: CLINIC | Age: 58
End: 2023-06-23
Payer: COMMERCIAL

## 2023-06-23 ASSESSMENT — PATIENT HEALTH QUESTIONNAIRE - PHQ9
SUM OF ALL RESPONSES TO PHQ QUESTIONS 1-9: 18
10. IF YOU CHECKED OFF ANY PROBLEMS, HOW DIFFICULT HAVE THESE PROBLEMS MADE IT FOR YOU TO DO YOUR WORK, TAKE CARE OF THINGS AT HOME, OR GET ALONG WITH OTHER PEOPLE: VERY DIFFICULT
SUM OF ALL RESPONSES TO PHQ QUESTIONS 1-9: 18

## 2023-06-24 ASSESSMENT — PATIENT HEALTH QUESTIONNAIRE - PHQ9: SUM OF ALL RESPONSES TO PHQ QUESTIONS 1-9: 18

## 2023-06-26 ENCOUNTER — VIRTUAL VISIT (OUTPATIENT)
Dept: PSYCHIATRY | Facility: CLINIC | Age: 58
End: 2023-06-26
Attending: PSYCHIATRY & NEUROLOGY
Payer: COMMERCIAL

## 2023-06-26 DIAGNOSIS — F41.1 GAD (GENERALIZED ANXIETY DISORDER): ICD-10-CM

## 2023-06-26 DIAGNOSIS — F33.0 MILD EPISODE OF RECURRENT MAJOR DEPRESSIVE DISORDER (H): Primary | ICD-10-CM

## 2023-06-26 DIAGNOSIS — F33.3 SEVERE RECURRENT MAJOR DEPRESSIVE DISORDER WITH PSYCHOTIC FEATURES (H): ICD-10-CM

## 2023-06-26 PROCEDURE — 99214 OFFICE O/P EST MOD 30 MIN: CPT | Mod: HN | Performed by: STUDENT IN AN ORGANIZED HEALTH CARE EDUCATION/TRAINING PROGRAM

## 2023-06-26 RX ORDER — LAMOTRIGINE 100 MG/1
200 TABLET ORAL DAILY
Qty: 60 TABLET | Refills: 3 | Status: SHIPPED | OUTPATIENT
Start: 2023-06-26 | End: 2023-08-22

## 2023-06-26 RX ORDER — QUETIAPINE FUMARATE 50 MG/1
TABLET, FILM COATED ORAL
Qty: 75 TABLET | Refills: 3 | Status: SHIPPED | OUTPATIENT
Start: 2023-06-26 | End: 2023-08-22

## 2023-06-26 NOTE — NURSING NOTE
Is the patient currently in the state of [place]? YES    Visit mode:VIDEO    If the visit is dropped, the patient can be reconnected by: VIDEO VISIT: Text to cell phone: [phone]    Will anyone else be joining the visit? NO      How would you like to obtain your AVS? MyChart    Are changes needed to the allergy or medication list? NO    Reason for visit: RECHECK
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
## 2023-06-26 NOTE — PATIENT INSTRUCTIONS
- add 50 mg PRN quetiapine, no other changes   - follow-up with new provider 4-6 weeks     **For crisis resources, please see the information at the end of this document**   Patient Education    Thank you for coming to the [facility] [place] [facility].     Lab Testing:  If you had lab testing today and your results are reassuring or normal they will be mailed to you or sent through [employer] within 7 days. If the lab tests need quick action we will call you with the results. The phone number we will call with results is # [phone]. If this is not the best number please call our clinic and change the number.     Medication Refills:  If you need any refills please call your pharmacy and they will contact us. Our fax number for refills is [phone].   Three business days of notice are needed for general medication refill requests.   Five business days of notice are needed for controlled substance refill requests.   If you need to change to a different pharmacy, please contact the new pharmacy directly. The new pharmacy will help you get your medications transferred.     Contact Us:  Please call [phone] during business hours (8-5:00 M-F).   If you have medication related questions after clinic hours, or on the weekend, please call [phone].     Financial Assistance [phone]   Medical Records [phone]       MENTAL HEALTH CRISIS RESOURCES:  For a emergency help, please call 911 or go to the nearest Emergency Department.     Emergency Walk-In Options:   EmPATH Unit @ [facility] ([place]): [phone] - Specialized mental health emergency area designed to be calming  [facility] [place] ([place]): [phone]  [facility] Acute Psychiatry Services ([place]): [phone]  [facility]): [phone]    [facility] Crisis Information:   [place]: [phone]  [place]: [phone]  [place] ([place]) - Adult: [phone]     Child:  [phone]  [place] - Adult: [phone]     Child: [phone]  [place]: [phone]  List of all [facility] resources:   https://[place].gov/dhs/people-we-serve/adults/health-care/mental-health/resources/crisis-contacts.jsp    National Crisis Information:   Crisis Text Line: Text  [place]  to 551504  Suicide & Crisis Lifeline: 988  National Suicide Prevention Lifeline: 8-850-958-TALK (1-283.194.1886)       For online chat options, visit https://suicidepreventionlifeline.org/chat/  Poison Control Center: 9-813-593-4651  Trans Lifeline: 1-599.142.6872 - Hotline for transgender people of all ages  The Will Project: 0-844-391-3407 - Hotline for LGBT youth     For Non-Emergency Support:   Fast Tracker: Mental Health & Substance Use Disorder Resources -   https://www.[employer]d.b.a[employer] [place])ckRealConnex.comn.org/

## 2023-06-28 ENCOUNTER — VIRTUAL VISIT (OUTPATIENT)
Dept: PSYCHOLOGY | Facility: CLINIC | Age: 58
End: 2023-06-28
Payer: COMMERCIAL

## 2023-06-28 DIAGNOSIS — F33.1 MODERATE EPISODE OF RECURRENT MAJOR DEPRESSIVE DISORDER (H): Primary | ICD-10-CM

## 2023-06-28 PROCEDURE — 90837 PSYTX W PT 60 MINUTES: CPT | Mod: VID | Performed by: MARRIAGE & FAMILY THERAPIST

## 2023-06-28 NOTE — PROGRESS NOTES
Answers for HPI/ROS submitted by the patient on 2023  If you checked off any problems, how difficult have these problems made it for you to do your work, take care of things at home, or get along with other people?: Very difficult  PHQ9 TOTAL SCORE: 17  MAHAMED 7 TOTAL SCORE: 13          [facility]                                     Progress Note    Patient Name: Valerie Sim  Date:  23         Service Type: Individual      Session Start Time: 1:58p Session End Time: 2:56p     Session Length:  58    Session #: 8    Attendees: Client and her son Jairo    Service Modality:  Video Visit:      Provider verified identity through the following two step process.  Patient provided:  Patient     Telemedicine Visit: The patient's condition can be safely assessed and treated via synchronous audio and visual telemedicine encounter.      Reason for Telemedicine Visit: Services only offered telehealth    Originating Site (Patient Location): Patient's home    Distant Site (Provider Location): Provider Remote Setting- Home Office    Consent:  The patient/guardian has verbally consented to: the potential risks and benefits of telemedicine (video visit) versus in person care; bill my insurance or make self-payment for services provided; and responsibility for payment of non-covered services.     Patient would like the video invitation sent by:  Send to e-mail at: [email or web address]    Mode of Communication:  Video Conference via [employer]    Distant Location (Provider):  Off-site    As the provider I attest to compliance with applicable laws and regulations related to telemedicine.    DATA  Interactive Complexity: No  Crisis: No        Progress Since Last Session (Related to Symptoms / Goals / Homework):   Symptoms: No change, mood at 610    Homework: Completed in session      Episode of Care Goals: Minimal progress - PREPARATION (Decided to change - considering how); Intervened by negotiating a change  plan and determining options / strategies for behavior change, identifying triggers, exploring social supports, and working towards setting a date to begin behavior change     Current / Ongoing Stressors and Concerns:  Pt's son joined appt which is a positive step forward.   Both were able to discuss plans for the short term future along with taking steps to resolve the conflict that has been between them.         Treatment Objective(s) Addressed in This Session:   Increase interest, engagement, and pleasure in doing things       Intervention:   Motivational Interviewing    MI Intervention: Permission to raise concern or advise, Open-ended questions and Reflections: simple and complex     Change Talk Expressed by the Patient: Reasons to change Need to change    Provider Response to Change Talk: R - Reflected patient's change talk and S - Summarized patient's change talk statements     ASSESSMENT: Current Emotional / Mental Status (status of significant symptoms):   Risk status (Self / Other harm or suicidal ideation)   Patient denies current fears or concerns for personal safety.   Patient denies current or recent suicidal ideation or behaviors.   Patient denies current or recent homicidal ideation or behaviors.   Patient denies current or recent self injurious behavior or ideation.   Patient denies other safety concerns.   Patient reports there has been no change in risk factors since their last session.     Patient reports there has been no change in protective factors since their last session.     A safety and risk management plan has been developed including: Patient consented to co-developed safety plan on 4/5/23.  Safety and risk management plan was reviewed.   Patient agreed to use safety plan should any safety concerns arise.  A copy was made available to the patient.     Appearance:   Appropriate    Eye Contact:   Good    Psychomotor Behavior: Normal    Attitude:   Cooperative     Orientation:   All   Speech    Rate / Production: Normal     Volume:  Normal    Mood:    Normal   Affect:    Appropriate    Thought Content:  Clear    Thought Form:  Coherent  Logical    Insight:    Good      Medication Review:   No changes to current psychiatric medication(s)     Medication Compliance:   Yes     Changes in Health Issues:   None reported     Chemical Use Review:   Substance Use: Chemical use reviewed, no active concerns identified      Tobacco Use: No current tobacco use.      Diagnosis:  MDD, moderate, recurrent    Collateral Reports Completed:   Not Applicable    PLAN: (Patient Tasks / Therapist Tasks / Other)  PT and son will work to engage in time together at least 1x every 2 weeks      Yoni Alex, [facility]   6/28/23                                                         ______________________________________________________________________    Individual Treatment Plan    Patient's Name: Valerie Sim  YOB: 1965    Date of Creation: 4/17/23  Date Treatment Plan Last Reviewed/Revised: 7/17/23    DSM5 Diagnoses: 296.32 (F33.1) Major Depressive Disorder, Recurrent Episode, Moderate _  Psychosocial / Contextual Factors:   PROMIS (reviewed every 90 days):     Referral / Collaboration:  Referral to another professional/service is not indicated at this time..    Anticipated number of session for this episode of care: 3-6 sessions  Anticipation frequency of session: Biweekly  Anticipated Duration of each session: 38-52 minutes  Treatment plan will be reviewed in 90 days or when goals have been changed.       MeasurableTreatment Goal(s) related to diagnosis / functional impairment(s)  Goal 1: Patient will engage in CBT skills for depression sx reduction    Objective #A (Patient Action)    Patient will Identify negative self-talk and behaviors: challenge core beliefs, myths, and actions.  Status: New - Date: 4/17/23     Intervention(s)  Therapist will teach emotional regulation  skills. CBT skills.      Patient has reviewed and agreed to the above plan.      Yoni Alex, CAROLEE  April 17, 2023

## 2023-07-07 ENCOUNTER — OFFICE VISIT (OUTPATIENT)
Dept: PHYSICAL MEDICINE AND REHAB | Facility: CLINIC | Age: 58
End: 2023-07-07
Payer: COMMERCIAL

## 2023-07-07 DIAGNOSIS — M79.18 MYOFASCIAL PAIN: ICD-10-CM

## 2023-07-07 DIAGNOSIS — G43.719 CHRONIC MIGRAINE WITHOUT AURA, INTRACTABLE, WITHOUT STATUS MIGRAINOSUS: Primary | ICD-10-CM

## 2023-07-07 DIAGNOSIS — M54.81 BILATERAL OCCIPITAL NEURALGIA: ICD-10-CM

## 2023-07-07 PROCEDURE — 20552 NJX 1/MLT TRIGGER POINT 1/2: CPT | Mod: XS | Performed by: PHYSICAL MEDICINE & REHABILITATION

## 2023-07-07 PROCEDURE — 64405 NJX AA&/STRD GR OCPL NRV: CPT | Mod: XS | Performed by: PHYSICAL MEDICINE & REHABILITATION

## 2023-07-07 PROCEDURE — 64615 CHEMODENERV MUSC MIGRAINE: CPT | Performed by: PHYSICAL MEDICINE & REHABILITATION

## 2023-07-07 PROCEDURE — 95874 GUIDE NERV DESTR NEEDLE EMG: CPT | Performed by: PHYSICAL MEDICINE & REHABILITATION

## 2023-07-07 RX ORDER — BUPIVACAINE HYDROCHLORIDE 5 MG/ML
10 INJECTION, SOLUTION EPIDURAL; INTRACAUDAL ONCE
Status: COMPLETED | OUTPATIENT
Start: 2023-07-07 | End: 2023-07-10

## 2023-07-07 RX ORDER — TRIAMCINOLONE ACETONIDE 40 MG/ML
40 INJECTION, SUSPENSION INTRA-ARTICULAR; INTRAMUSCULAR ONCE
Status: COMPLETED | OUTPATIENT
Start: 2023-07-07 | End: 2023-07-10

## 2023-07-07 NOTE — LETTER
7/7/2023       RE: Valerie Sim  [address]  [place] [address]       Dear Colleague,    Thank you for referring your patient, Valerie Sim, to the [facility] PHYSICAL MEDICINE AND REHABILITATION CLINIC [place] at [facility]. Please see a copy of my visit note below.      [facility]     PM&R CLINIC NOTE  BOTULINUM TOXIN PROCEDURE      HPI  No chief complaint on file.    Valerie Sim is a 58 year old female with a history of chronic migraine headaches who presents to clinic for botulinum toxin injections for management of chronic migraine headaches and excessive jaw clenching.     SINCE LAST VISIT  Valerie Sim was last seen here in clinic on 5/3/2023, at which time she received 225 units of Botox.    Patient denies new medical diagnoses, illnesses, hospitalizations, emergency room visits, and injuries since the previous injection with botulinum neurotoxin. She did experience a broken toe on 6/20/2023, which has been healing without any medical intervention.       RESPONSE TO PREVIOUS TREATMENT    Side effects: No problems reported.     1.  Headache frequency during this injection cycle:  She has had approximately 11 migraine headache days over the last 9 weeks since her last Botox injections. This is compared to her baseline headache frequency of 30 severe headache days per month.     2.  Headache duration during this injection cycle:  Headache duration was between 2 hours and 8 days. Patient reports a few episodes of multiple day headaches during this injection cycle.     3.  Headache intensity during this injection cycle:    A.  7/10  =  Typical pain level.  B.  10/10  =  Worst pain level.  C.  0/10  =  Lowest pain level.    4.  Change in headache medication usage during this injection cycle:  (For Example:  Able to decrease use of oral pain medications.) No changes.       5.  ER Visits During This  Injection Cycle:  None. She reports she used Relpax x6, Toradol x3, Zofran x8 and IM Toradol.     6.  Functional Performance:  Change in ADL's, social interaction, days lost from work, etc. Patient reports being able to more fully participate in social and family activities and responsibilities as headache symptoms have improved.      PHYSICAL EXAM  VS: There were no vitals taken for this visit.   GEN: Pleasant and cooperative, in no acute distress  HEENT: No facial asymmetry    ALLERGIES  Allergies   Allergen Reactions    Fluoxetine Other (See Comments)     PN: LW Reaction: headache  PN: LW Reaction: headache  Migraine      Garlic Blisters, Cough, Dermatitis, Difficulty breathing, Headache, Hives, Itching, Nausea and Vomiting and Shortness Of Breath    Candesartan      Other reaction(s): Myalgia    Duloxetine      Other reaction(s): Headache  migraine    Iodine      Other reaction(s): Other (see comments)  PN: LW CM1: CONTRAST- iodine Reaction :    Metoclopramide      Other reaction(s): Headache  Caused increase in headaches    Perfume      Other reaction(s): Headache  head pain leading to migraines    Pregabalin      Other reaction(s): Headache, Myalgia    Trazodone Other (See Comments) and Unknown     Other reaction(s): Headache  Other reaction(s): Headache  Other reaction(s): Headache    Amitriptyline Hcl Other (See Comments)     Migraine      Candesartan Cilexetil-Hctz Muscle Pain (Myalgia)    Cyproheptadine Hcl      headaches    Desvenlafaxine      Migraine      Diatrizoate Unknown     PN: LW CM1: CONTRAST- iodine Reaction :    Diazepam      Other reaction(s): Vestibular Toxicity  PN: LW Reaction: vertigo  PN: LW Reaction: vertigo    Duloxetine Hcl Other (See Comments)     Migraine      Gabapentin Other (See Comments)    Galcanezumab      Other reaction(s): Headache    Imipramine Other (See Comments)     Migraine      Metoprolol Other (See Comments) and Unknown     headache  headache  headache    Morphine  "Other (See Comments)     Patient reports seizure   Other reaction(s): Unknown  Seizure; 5/111/21 updated info: patient states she had a seizure while having a migraine headache years ago and is not sure if it was due to morphine. She has tolerated Dilaudid since then.      No Clinical Screening - See Comments      PN: LW FI1: lactose intolerance LW FI2: shellfish  PN: LW CM1: CONTRAST- iodine Reaction :  PN: LW Other1: -nka    Nortriptyline Other (See Comments)     Migraine      Phenergan Dm [Promethazine-Dm] Other (See Comments)     seizures    Pregabalin Muscle Pain (Myalgia)    Promethazine      PN: LW Reaction: minimal sizures    Venlafaxine Other (See Comments)     PN: LW Reaction: migraine  PN: LW Reaction: migraine  Migraine      Wellbutrin [Bupropion Hydrobromide] Other (See Comments)     Migraine      Compazine Anxiety    Dihydroergotamine Anxiety and Other (See Comments)     Cardiac symptoms    Droperidol Anxiety     PN: LW Reaction: agitation    Medroxyprogesterone Hives     PN: LW Reaction: nausea    Prochlorperazine Anxiety     PN: LW Reaction: \"can't sit still\"       CURRENT MEDICATIONS    Current Outpatient Medications:     aMILoride (MIDAMOR) 5 MG tablet, Take 7.5 mg by mouth daily, Disp: , Rfl:     aMILoride (MIDAMOR) 5 MG tablet, Take 7.5 mg by mouth daily 2.5MG in the AM and 5MG in the PM., Disp: , Rfl:     amLODIPine (NORVASC) 10 MG tablet, Take 10 mg by mouth daily, Disp: , Rfl:     amLODIPine (NORVASC) 10 MG tablet, Take 10 mg by mouth daily, Disp: , Rfl:     amphetamine-dextroamphetamine (ADDERALL) 20 MG tablet, Take 1 tablet (20 mg) by mouth 2 times daily (Patient taking differently: Take 20 mg by mouth 2 times daily Pt currently taking 10MG in the AM and 20MG in the PM.), Disp: 60 tablet, Rfl: 0    B Complex-Biotin-FA (B-COMPLEX PO), Take 1 tablet by mouth daily, Disp: , Rfl:     botulinum toxin type A (BOTOX) 100 units injection, Inject intramuscular. Every 18 weeks for pelvic floor spasms, " "Disp: , Rfl:     budesonide (RINOCORT AQUA) 32 MCG/ACT nasal spray, Spray 1 spray into both nostrils daily., Disp: , Rfl:     calcitRIOL (ROCALTROL) 0.25 MCG capsule, Take 0.25 mcg by mouth as needed, Disp: , Rfl:     Calcium-Magnesium-Vitamin D (CITRACAL SLOW RELEASE) 600- MG-MG-UNIT TB24, Take 600 mg by mouth 4 times daily, Disp: , Rfl:     CycloSPORINE (RESTASIS OP), Apply to eye 2 times daily, Disp: , Rfl:     docusate sodium (COLACE) 100 MG capsule, Take 1 capsule by mouth as needed for constipation, Disp: , Rfl:     eletriptan (RELPAX) 40 MG tablet, Take 40 mg by mouth as needed, Disp: , Rfl:     estradiol (ESTRACE) 0.1 MG/GM vaginal cream, , Disp: , Rfl:     fexofenadine (ALLEGRA) 180 MG tablet, Take 180 mg by mouth daily, Disp: , Rfl:     HYDROmorphone (DILAUDID) 2 MG tablet, Take 0.5-1 tablets (1-2 mg) by mouth every 3 hours as needed (headache) 20 tablets = 3 month supply., Disp: 24 tablet, Rfl: 0    ketorolac (TORADOL) 30 MG/ML injection, Inject 1 mL (30 mg) into the vein every 6 hours as needed for moderate pain, Disp: 6 mL, Rfl: 1    lamoTRIgine (LAMICTAL) 100 MG tablet, Take 2 tablets (200 mg) by mouth daily 100MG in AM and 100MG in the PM., Disp: 60 tablet, Rfl: 3    Magnesium Oxide 500 MG TABS, Take 500 mg by mouth 2 times daily, Disp: , Rfl:     nebivolol (BYSTOLIC) 5 MG tablet, Take 5 mg by mouth daily Take twice a day totaling 10 mg., Disp: , Rfl:     Needle, Disp, 25G X 1\" MISC, 2 each as needed (with toradol) 90-day supply, Disp: 6 each, Rfl: 1    ondansetron (ZOFRAN) 4 MG tablet, Take 1 tablet (4 mg) by mouth every 8 hours as needed for nausea, Disp: 30 tablet, Rfl: 1    order for DME, Equipment being ordered: Oxygen The patient has Cluster Headache and needs 10 liters/minute of high flow oxygen via nonrebreather mask prn headaches (Patient not taking: Reported on 3/20/2023), Disp: 99 days, Rfl: 0    order for DME, Equipment being ordered: Oxygen and non-rebreather mask.   Use high flow " oxygen (10-15 L/min) with non-rebreather mask, as needed for cluster headaches, Disp: 1 Units, Rfl: 11    potassium chloride ER (KLOR-CON M) 20 MEQ CR tablet, Take 1 tablet by mouth 2 times daily, Disp: , Rfl:     QUEtiapine (SEROQUEL) 50 MG tablet, Take 2 tablets (100 mg) by mouth daily. May also take 1 tablet (50 mg) daily as needed (sleep/anxiety)., Disp: 75 tablet, Rfl: 3    Syringe, Disposable, (SYRINGE LUER SLIP) 1 ML MISC, 1 each as needed (with toradol/migraine), Disp: 6 each, Rfl: 1    valACYclovir (VALTREX) 1000 mg tablet, Take 1,000 mg by mouth as needed., Disp: , Rfl:     Current Facility-Administered Medications:     botulinum toxin type A (BOTOX) 100 units injection 225 Units, 225 Units, Intramuscular, See Admin Instructions, Addie Malhotra MD, 225 Units at 23 1507       BOTULINUM NEUROTOXIN INJECTION PROCEDURES    VERIFICATION OF PATIENT IDENTIFICATION AND PROCEDURE     Initials   Patient Name SES   Patient  SES   Procedure Verified by: SES     Prior to the start of the procedure and with procedural staff participation, I verbally confirmed the patient s identity using two indicators, relevant allergies, that the procedure was appropriate and matched the consent or emergent situation, and that the correct equipment/implants were available. Immediately prior to starting the procedure I conducted the Time Out with the procedural staff and re-confirmed the patient s name, procedure, and site/side. (The Joint Commission universal protocol was followed.)  Yes    Sedation (Moderate or Deep): None    ABOVE ASSESSMENTS PERFORMED BY    Addie Malhotra MD      INDICATIONS FOR PROCEDURES  Valerie Sim is a 58 year old patient with pain and jaw clenching secondary to the diagnosis of chronic migraine headaches and oromandibular dystonia. Her baseline symptoms have been recalcitrant to oral medications and conservative therapy.  She is here today for reinjection with Botox.    GOAL OF  PROCEDURE  The goal of this procedure is to decrease pain and excessive jaw clenching due to chronic migraine headaches and bruxism.     TOTAL DOSE ADMINISTERED  Dose Administered:  225 units  Botox (Botulinum Toxin Type A)       2:1 Dilution   Unavoidable Drug Waste: Yes  Amount of drug waste (mL): 75 units Botox.  Reason for waste:  Single use vial  Diluent Used:  0.25% bupivacaine  Total Volume of Diluent Used:  4.5 ml  NDC #: Botox 100u ([address]-87)      CONSENT  The risks, benefits, and treatment options were discussed with Valerie Sim and she agreed to proceed.    Written consent was obtained by [facility].     EQUIPMENT USED  Needle-25mm stimulating/recording  Needle-30 gauge  EMG/NCS Machine    SKIN PREPARATION  Skin preparation was performed using an alcohol wipe.    GUIDANCE DESCRIPTION  Electro-myographic guidance was necessary throughout the neck and jaw portion of the procedure to accurately identify all areas of dystonic muscles while avoiding injection of non-dystonic muscles, neighboring nerves and nearby vascular structures.       AREA/MUSCLE INJECTED:  225 UNITS BOTOX = TOTAL DOSE, 2:1 DILUTION     1. FACIAL & SCALP MUSCLES: 85 UNITS BOTOX = TOTAL DOSE      Right Frontalis - 10 units of Botox in 2 site/s.  Left Frontalis - 10 units of Botox in 2 site/s.      Procerus - 5 units of Botox at 1 site/s.       Right  - 2.5 units of Botox in 1 site/s.  Left  - 2.5 units of Botox in 1 site/s.      Right Nasalis - 2.5 units of Botox at 1 site/s.           Left Nasalis - 2.5 units of Botox at 1 site/s.     Right Upper Occipitalis - 25 units of Botox at 5 site/s.   Left Upper Occipitalis - 25 units of Botox at 5 site/s.         2. JAW MUSCLES: 90 UNITS BOTOX = TOTAL DOSE     Right Masseter - 20 units of Botox at 2 site/s.   Left Masseter - 20 units of Botox at 2 site/s.      Right Temporalis - 25 units of Botox at 5 site/s.  Left Temporalis - 25 units of Botox at 5 site/s.          3.  SHOULDER & NECK MUSCLES: 50 UNITS BOTOX = TOTAL DOSE      Right Levator Scapula - 10 units of Botox at 2 site/s (neck and scapular insertion).  Left Levator Scapula - 10 units of Botox at 2 site/s (neck and scapular insertion).      Right Upper Trapezius (mid & low lateral) - 10 units of Botox at 3 site/s.  Left Upper Trapezius (mid & low lateral) - 10 units of Botox at 3 site/s.             Right Pectoralis Minor - 5 units of Botox at 1 site/s.                     Left Pectoralis Minor - 5 units of Botox at 1 site/s.       TRIGGER POINT INJECTIONS  Areas of the skin were prepped with ChloraPrep.  Using standard precautions, a 30-gauge 1-inch needle was used to inject a 3 ml mixture of 1% lidocaine and 0.25% bupivacaine into the upper trapezius muscles bilaterally for a total of 10 sites.        GREATER OCCIPITAL NERVE BLOCKS:  Area just inferior to insertion of the right and left superior trapezius insertion onto skull was cleansed with ChloraPrep. Needle was advanced anteriorly to base of skull then slightly withdrawn and injectate was injected in a fan-like distribution at different depths. Total injection of 0.5 ml of 20 mg Kenalog plus 4.5 cc of 0.25 % bupivicaine per side. Valerie Sim tolerated the procedure well without any immediate complications.      RESPONSE TO PROCEDURE  Valerie Sim tolerated the procedure well and there were no immediate complications. She was allowed to recover for an appropriate period of time and was discharged home in stable condition.      ASSESSMENT AND PLAN   Botulinum toxin injections: Dose of Botox was decreased from 250 units to 225 units due to insurance limitations. She was also given trigger point injections and occipital nerve blocks for headaches and myofascial pain, which is used as an adjuvant to Botox treatment. Patient will continue to monitor response and report at next appointment.   Referrals: None.   Follow up: Valerie Sim was rescheduled for the next series  of injections in 9 weeks, at which time we will evaluate response to today's injections. she may call the clinic prior with any questions or concerns prior to the next appointment.         Again, thank you for allowing me to participate in the care of your patient.      Sincerely,    Addie Malhotra MD

## 2023-07-07 NOTE — PROGRESS NOTES
[facility]     PM&R CLINIC NOTE  BOTULINUM TOXIN PROCEDURE      HPI  No chief complaint on file.    Valerie Sim is a 58 year old female with a history of chronic migraine headaches who presents to clinic for botulinum toxin injections for management of chronic migraine headaches and excessive jaw clenching.     SINCE LAST VISIT  Valerie Sim was last seen here in clinic on 5/3/2023, at which time she received 225 units of Botox.    Patient denies new medical diagnoses, illnesses, hospitalizations, emergency room visits, and injuries since the previous injection with botulinum neurotoxin. She did experience a broken toe on 6/20/2023, which has been healing without any medical intervention.       RESPONSE TO PREVIOUS TREATMENT    Side effects: No problems reported.     1.  Headache frequency during this injection cycle:  She has had approximately 11 migraine headache days over the last 9 weeks since her last Botox injections. This is compared to her baseline headache frequency of 30 severe headache days per month.     2.  Headache duration during this injection cycle:  Headache duration was between 2 hours and 8 days. Patient reports a few episodes of multiple day headaches during this injection cycle.     3.  Headache intensity during this injection cycle:    A.  7/10  =  Typical pain level.  B.  10/10  =  Worst pain level.  C.  0/10  =  Lowest pain level.    4.  Change in headache medication usage during this injection cycle:  (For Example:  Able to decrease use of oral pain medications.) No changes.       5.  ER Visits During This Injection Cycle:  None. She reports she used Relpax x6, Toradol x3, Zofran x8 and IM Toradol.     6.  Functional Performance:  Change in ADL's, social interaction, days lost from work, etc. Patient reports being able to more fully participate in social and family activities and responsibilities as headache symptoms have improved.      PHYSICAL  EXAM  VS: There were no vitals taken for this visit.   GEN: Pleasant and cooperative, in no acute distress  HEENT: No facial asymmetry    ALLERGIES  Allergies   Allergen Reactions     Fluoxetine Other (See Comments)     PN: LW Reaction: headache  PN: LW Reaction: headache  Migraine       Garlic Blisters, Cough, Dermatitis, Difficulty breathing, Headache, Hives, Itching, Nausea and Vomiting and Shortness Of Breath     Candesartan      Other reaction(s): Myalgia     Duloxetine      Other reaction(s): Headache  migraine     Iodine      Other reaction(s): Other (see comments)  PN: LW CM1: CONTRAST- iodine Reaction :     Metoclopramide      Other reaction(s): Headache  Caused increase in headaches     Perfume      Other reaction(s): Headache  head pain leading to migraines     Pregabalin      Other reaction(s): Headache, Myalgia     Trazodone Other (See Comments) and Unknown     Other reaction(s): Headache  Other reaction(s): Headache  Other reaction(s): Headache     Amitriptyline Hcl Other (See Comments)     Migraine       Candesartan Cilexetil-Hctz Muscle Pain (Myalgia)     Cyproheptadine Hcl      headaches     Desvenlafaxine      Migraine       Diatrizoate Unknown     PN: LW CM1: CONTRAST- iodine Reaction :     Diazepam      Other reaction(s): Vestibular Toxicity  PN: LW Reaction: vertigo  PN: LW Reaction: vertigo     Duloxetine Hcl Other (See Comments)     Migraine       Gabapentin Other (See Comments)     Galcanezumab      Other reaction(s): Headache     Imipramine Other (See Comments)     Migraine       Metoprolol Other (See Comments) and Unknown     headache  headache  headache     Morphine Other (See Comments)     Patient reports seizure   Other reaction(s): Unknown  Seizure; 5/111/21 updated info: patient states she had a seizure while having a migraine headache years ago and is not sure if it was due to morphine. She has tolerated Dilaudid since then.       No Clinical Screening - See Comments      PN: LW FI1:  "lactose intolerance LW FI2: shellfish  PN: LW CM1: CONTRAST- iodine Reaction :  PN: LW Other1: -nka     Nortriptyline Other (See Comments)     Migraine       Phenergan Dm [Promethazine-Dm] Other (See Comments)     seizures     Pregabalin Muscle Pain (Myalgia)     Promethazine      PN: LW Reaction: minimal sizures     Venlafaxine Other (See Comments)     PN: LW Reaction: migraine  PN: LW Reaction: migraine  Migraine       Wellbutrin [Bupropion Hydrobromide] Other (See Comments)     Migraine       Compazine Anxiety     Dihydroergotamine Anxiety and Other (See Comments)     Cardiac symptoms     Droperidol Anxiety     PN: LW Reaction: agitation     Medroxyprogesterone Hives     PN: LW Reaction: nausea     Prochlorperazine Anxiety     PN: LW Reaction: \"can't sit still\"       CURRENT MEDICATIONS    Current Outpatient Medications:      aMILoride (MIDAMOR) 5 MG tablet, Take 7.5 mg by mouth daily, Disp: , Rfl:      aMILoride (MIDAMOR) 5 MG tablet, Take 7.5 mg by mouth daily 2.5MG in the AM and 5MG in the PM., Disp: , Rfl:      amLODIPine (NORVASC) 10 MG tablet, Take 10 mg by mouth daily, Disp: , Rfl:      amLODIPine (NORVASC) 10 MG tablet, Take 10 mg by mouth daily, Disp: , Rfl:      amphetamine-dextroamphetamine (ADDERALL) 20 MG tablet, Take 1 tablet (20 mg) by mouth 2 times daily (Patient taking differently: Take 20 mg by mouth 2 times daily Pt currently taking 10MG in the AM and 20MG in the PM.), Disp: 60 tablet, Rfl: 0     B Complex-Biotin-FA (B-COMPLEX PO), Take 1 tablet by mouth daily, Disp: , Rfl:      botulinum toxin type A (BOTOX) 100 units injection, Inject intramuscular. Every 18 weeks for pelvic floor spasms, Disp: , Rfl:      budesonide (RINOCORT AQUA) 32 MCG/ACT nasal spray, Spray 1 spray into both nostrils daily., Disp: , Rfl:      calcitRIOL (ROCALTROL) 0.25 MCG capsule, Take 0.25 mcg by mouth as needed, Disp: , Rfl:      Calcium-Magnesium-Vitamin D (CITRACAL SLOW RELEASE) 600- MG-MG-UNIT TB24, Take 600 " "mg by mouth 4 times daily, Disp: , Rfl:      CycloSPORINE (RESTASIS OP), Apply to eye 2 times daily, Disp: , Rfl:      docusate sodium (COLACE) 100 MG capsule, Take 1 capsule by mouth as needed for constipation, Disp: , Rfl:      eletriptan (RELPAX) 40 MG tablet, Take 40 mg by mouth as needed, Disp: , Rfl:      estradiol (ESTRACE) 0.1 MG/GM vaginal cream, , Disp: , Rfl:      fexofenadine (ALLEGRA) 180 MG tablet, Take 180 mg by mouth daily, Disp: , Rfl:      HYDROmorphone (DILAUDID) 2 MG tablet, Take 0.5-1 tablets (1-2 mg) by mouth every 3 hours as needed (headache) 20 tablets = 3 month supply., Disp: 24 tablet, Rfl: 0     ketorolac (TORADOL) 30 MG/ML injection, Inject 1 mL (30 mg) into the vein every 6 hours as needed for moderate pain, Disp: 6 mL, Rfl: 1     lamoTRIgine (LAMICTAL) 100 MG tablet, Take 2 tablets (200 mg) by mouth daily 100MG in AM and 100MG in the PM., Disp: 60 tablet, Rfl: 3     Magnesium Oxide 500 MG TABS, Take 500 mg by mouth 2 times daily, Disp: , Rfl:      nebivolol (BYSTOLIC) 5 MG tablet, Take 5 mg by mouth daily Take twice a day totaling 10 mg., Disp: , Rfl:      Needle, Disp, 25G X 1\" MISC, 2 each as needed (with toradol) 90-day supply, Disp: 6 each, Rfl: 1     ondansetron (ZOFRAN) 4 MG tablet, Take 1 tablet (4 mg) by mouth every 8 hours as needed for nausea, Disp: 30 tablet, Rfl: 1     order for DME, Equipment being ordered: Oxygen The patient has Cluster Headache and needs 10 liters/minute of high flow oxygen via nonrebreather mask prn headaches (Patient not taking: Reported on 3/20/2023), Disp: 99 days, Rfl: 0     order for DME, Equipment being ordered: Oxygen and non-rebreather mask.   Use high flow oxygen (10-15 L/min) with non-rebreather mask, as needed for cluster headaches, Disp: 1 Units, Rfl: 11     potassium chloride ER (KLOR-CON M) 20 MEQ CR tablet, Take 1 tablet by mouth 2 times daily, Disp: , Rfl:      QUEtiapine (SEROQUEL) 50 MG tablet, Take 2 tablets (100 mg) by mouth daily. " May also take 1 tablet (50 mg) daily as needed (sleep/anxiety)., Disp: 75 tablet, Rfl: 3     Syringe, Disposable, (SYRINGE LUER SLIP) 1 ML MISC, 1 each as needed (with toradol/migraine), Disp: 6 each, Rfl: 1     valACYclovir (VALTREX) 1000 mg tablet, Take 1,000 mg by mouth as needed., Disp: , Rfl:     Current Facility-Administered Medications:      botulinum toxin type A (BOTOX) 100 units injection 225 Units, 225 Units, Intramuscular, See Admin Instructions, Addie Malhotra MD, 225 Units at 23 1507       BOTULINUM NEUROTOXIN INJECTION PROCEDURES    VERIFICATION OF PATIENT IDENTIFICATION AND PROCEDURE     Initials   Patient Name SES   Patient  SES   Procedure Verified by: SES     Prior to the start of the procedure and with procedural staff participation, I verbally confirmed the patient s identity using two indicators, relevant allergies, that the procedure was appropriate and matched the consent or emergent situation, and that the correct equipment/implants were available. Immediately prior to starting the procedure I conducted the Time Out with the procedural staff and re-confirmed the patient s name, procedure, and site/side. (The Joint Commission universal protocol was followed.)  Yes    Sedation (Moderate or Deep): None    ABOVE ASSESSMENTS PERFORMED BY    Addie Malhotra MD      INDICATIONS FOR PROCEDURES  Valerie Sim is a 58 year old patient with pain and jaw clenching secondary to the diagnosis of chronic migraine headaches and oromandibular dystonia. Her baseline symptoms have been recalcitrant to oral medications and conservative therapy.  She is here today for reinjection with Botox.    GOAL OF PROCEDURE  The goal of this procedure is to decrease pain and excessive jaw clenching due to chronic migraine headaches and bruxism.     TOTAL DOSE ADMINISTERED  Dose Administered:  225 units  Botox (Botulinum Toxin Type A)       2:1 Dilution   Unavoidable Drug Waste: Yes  Amount of drug  waste (mL): 75 units Botox.  Reason for waste:  Single use vial  Diluent Used:  0.25% bupivacaine  Total Volume of Diluent Used:  4.5 ml  NDC #: Botox 100u ([address]-85)      CONSENT  The risks, benefits, and treatment options were discussed with Valerie YEE Sadiekory and she agreed to proceed.    Written consent was obtained by [facility].     EQUIPMENT USED  Needle-25mm stimulating/recording  Needle-30 gauge  EMG/NCS Machine    SKIN PREPARATION  Skin preparation was performed using an alcohol wipe.    GUIDANCE DESCRIPTION  Electro-myographic guidance was necessary throughout the neck and jaw portion of the procedure to accurately identify all areas of dystonic muscles while avoiding injection of non-dystonic muscles, neighboring nerves and nearby vascular structures.       AREA/MUSCLE INJECTED:  225 UNITS BOTOX = TOTAL DOSE, 2:1 DILUTION     1. FACIAL & SCALP MUSCLES: 85 UNITS BOTOX = TOTAL DOSE      Right Frontalis - 10 units of Botox in 2 site/s.  Left Frontalis - 10 units of Botox in 2 site/s.      Procerus - 5 units of Botox at 1 site/s.       Right  - 2.5 units of Botox in 1 site/s.  Left  - 2.5 units of Botox in 1 site/s.      Right Nasalis - 2.5 units of Botox at 1 site/s.           Left Nasalis - 2.5 units of Botox at 1 site/s.     Right Upper Occipitalis - 25 units of Botox at 5 site/s.   Left Upper Occipitalis - 25 units of Botox at 5 site/s.         2. JAW MUSCLES: 90 UNITS BOTOX = TOTAL DOSE     Right Masseter - 20 units of Botox at 2 site/s.   Left Masseter - 20 units of Botox at 2 site/s.      Right Temporalis - 25 units of Botox at 5 site/s.  Left Temporalis - 25 units of Botox at 5 site/s.          3. SHOULDER & NECK MUSCLES: 50 UNITS BOTOX = TOTAL DOSE      Right Levator Scapula - 10 units of Botox at 2 site/s (neck and scapular insertion).  Left Levator Scapula - 10 units of Botox at 2 site/s (neck and scapular insertion).      Right Upper Trapezius (mid & low lateral) - 10 units of Botox  at 3 site/s.  Left Upper Trapezius (mid & low lateral) - 10 units of Botox at 3 site/s.             Right Pectoralis Minor - 5 units of Botox at 1 site/s.                     Left Pectoralis Minor - 5 units of Botox at 1 site/s.       TRIGGER POINT INJECTIONS  Areas of the skin were prepped with ChloraPrep.  Using standard precautions, a 30-gauge 1-inch needle was used to inject a 3 ml mixture of 1% lidocaine and 0.25% bupivacaine into the upper trapezius muscles bilaterally for a total of 10 sites.        GREATER OCCIPITAL NERVE BLOCKS:  Area just inferior to insertion of the right and left superior trapezius insertion onto skull was cleansed with ChloraPrep. Needle was advanced anteriorly to base of skull then slightly withdrawn and injectate was injected in a fan-like distribution at different depths. Total injection of 0.5 ml of 20 mg Kenalog plus 4.5 cc of 0.25 % bupivicaine per side. Valerie Sim tolerated the procedure well without any immediate complications.      RESPONSE TO PROCEDURE  Valerie Sim tolerated the procedure well and there were no immediate complications. She was allowed to recover for an appropriate period of time and was discharged home in stable condition.      ASSESSMENT AND PLAN   1. Botulinum toxin injections: Dose of Botox was decreased from 250 units to 225 units due to insurance limitations. She was also given trigger point injections and occipital nerve blocks for headaches and myofascial pain, which is used as an adjuvant to Botox treatment. Patient will continue to monitor response and report at next appointment.   2. Referrals: None.   3. Follow up: Valerie Sim was rescheduled for the next series of injections in 9 weeks, at which time we will evaluate response to today's injections. she may call the clinic prior with any questions or concerns prior to the next appointment.

## 2023-07-10 RX ADMIN — BUPIVACAINE HYDROCHLORIDE 50 MG: 5 INJECTION, SOLUTION EPIDURAL; INTRACAUDAL at 12:15

## 2023-07-10 RX ADMIN — TRIAMCINOLONE ACETONIDE 40 MG: 40 INJECTION, SUSPENSION INTRA-ARTICULAR; INTRAMUSCULAR at 12:15

## 2023-07-11 ASSESSMENT — ANXIETY QUESTIONNAIRES
7. FEELING AFRAID AS IF SOMETHING AWFUL MIGHT HAPPEN: NOT AT ALL
GAD7 TOTAL SCORE: 12
1. FEELING NERVOUS, ANXIOUS, OR ON EDGE: MORE THAN HALF THE DAYS
3. WORRYING TOO MUCH ABOUT DIFFERENT THINGS: MORE THAN HALF THE DAYS
6. BECOMING EASILY ANNOYED OR IRRITABLE: SEVERAL DAYS
5. BEING SO RESTLESS THAT IT IS HARD TO SIT STILL: NEARLY EVERY DAY
2. NOT BEING ABLE TO STOP OR CONTROL WORRYING: SEVERAL DAYS
4. TROUBLE RELAXING: NEARLY EVERY DAY
IF YOU CHECKED OFF ANY PROBLEMS ON THIS QUESTIONNAIRE, HOW DIFFICULT HAVE THESE PROBLEMS MADE IT FOR YOU TO DO YOUR WORK, TAKE CARE OF THINGS AT HOME, OR GET ALONG WITH OTHER PEOPLE: VERY DIFFICULT

## 2023-07-13 ASSESSMENT — PATIENT HEALTH QUESTIONNAIRE - PHQ9
SUM OF ALL RESPONSES TO PHQ QUESTIONS 1-9: 16
SUM OF ALL RESPONSES TO PHQ QUESTIONS 1-9: 16
10. IF YOU CHECKED OFF ANY PROBLEMS, HOW DIFFICULT HAVE THESE PROBLEMS MADE IT FOR YOU TO DO YOUR WORK, TAKE CARE OF THINGS AT HOME, OR GET ALONG WITH OTHER PEOPLE: VERY DIFFICULT

## 2023-07-13 ASSESSMENT — ANXIETY QUESTIONNAIRES: GAD7 TOTAL SCORE: 12

## 2023-07-14 ENCOUNTER — VIRTUAL VISIT (OUTPATIENT)
Dept: PSYCHOLOGY | Facility: CLINIC | Age: 58
End: 2023-07-14
Payer: COMMERCIAL

## 2023-07-14 DIAGNOSIS — F33.1 MODERATE EPISODE OF RECURRENT MAJOR DEPRESSIVE DISORDER (H): Primary | ICD-10-CM

## 2023-07-14 PROCEDURE — 90834 PSYTX W PT 45 MINUTES: CPT | Mod: VID | Performed by: MARRIAGE & FAMILY THERAPIST

## 2023-07-14 NOTE — PROGRESS NOTES
Answers for HPI/ROS submitted by the patient on 2023  If you checked off any problems, how difficult have these problems made it for you to do your work, take care of things at home, or get along with other people?: Very difficult  PHQ9 TOTAL SCORE: 16  MAHAMED 7 TOTAL SCORE: 12        [facility]                                     Progress Note    Patient Name: Valerie Sim  Date:  23         Service Type: Individual      Session Start Time: 8:01a Session End Time: 8:53a     Session Length:  52    Session #: 9    Attendees: Client and her son Jairo    Service Modality:  Video Visit:      Provider verified identity through the following two step process.  Patient provided:  Patient     Telemedicine Visit: The patient's condition can be safely assessed and treated via synchronous audio and visual telemedicine encounter.      Reason for Telemedicine Visit: Services only offered telehealth    Originating Site (Patient Location): Patient's home    Distant Site (Provider Location): Provider Remote Setting- Home Office    Consent:  The patient/guardian has verbally consented to: the potential risks and benefits of telemedicine (video visit) versus in person care; bill my insurance or make self-payment for services provided; and responsibility for payment of non-covered services.     Patient would like the video invitation sent by:  Send to e-mail at: [email or web address]    Mode of Communication:  Video Conference via [employer]    Distant Location (Provider):  Off-site    As the provider I attest to compliance with applicable laws and regulations related to telemedicine.    DATA  Interactive Complexity: No  Crisis: No        Progress Since Last Session (Related to Symptoms / Goals / Homework):   Symptoms: Improving, mood at 6/10    Homework: Completed in session      Episode of Care Goals: Minimal progress - PREPARATION (Decided to change - considering how); Intervened by negotiating a change  plan and determining options / strategies for behavior change, identifying triggers, exploring social supports, and working towards setting a date to begin behavior change     Current / Ongoing Stressors and Concerns:  Last session 6/28, ongoing medical issues with migraines and some delays with other services. Daughter did not respond to attempts of pt to reach out to join for today. Son was able to meet up with pt 1x but there was little further progress with talking.      Treatment Objective(s) Addressed in This Session:   Increase interest, engagement, and pleasure in doing things       Intervention:   Motivational Interviewing    MI Intervention: Permission to raise concern or advise, Open-ended questions and Reflections: simple and complex     Change Talk Expressed by the Patient: Reasons to change Need to change    Provider Response to Change Talk: R - Reflected patient's change talk and S - Summarized patient's change talk statements     ASSESSMENT: Current Emotional / Mental Status (status of significant symptoms):   Risk status (Self / Other harm or suicidal ideation)   Patient denies current fears or concerns for personal safety.   Patient denies current or recent suicidal ideation or behaviors.   Patient denies current or recent homicidal ideation or behaviors.   Patient denies current or recent self injurious behavior or ideation.   Patient denies other safety concerns.   Patient reports there has been no change in risk factors since their last session.     Patient reports there has been no change in protective factors since their last session.     A safety and risk management plan has been developed including: Patient consented to co-developed safety plan on 4/5/23.  Safety and risk management plan was reviewed.   Patient agreed to use safety plan should any safety concerns arise.  A copy was made available to the patient.     Appearance:   Appropriate    Eye Contact:   Good    Psychomotor  Behavior: Normal    Attitude:   Cooperative    Orientation:   All   Speech    Rate / Production: Normal     Volume:  Normal    Mood:    Normal   Affect:    Appropriate    Thought Content:  Clear    Thought Form:  Coherent  Logical    Insight:    Good      Medication Review:   No changes to current psychiatric medication(s)     Medication Compliance:   Yes     Changes in Health Issues:   None reported     Chemical Use Review:   Substance Use: Chemical use reviewed, no active concerns identified      Tobacco Use: No current tobacco use.      Diagnosis:  MDD, moderate, recurrent    Collateral Reports Completed:   Not Applicable    PLAN: (Patient Tasks / Therapist Tasks / Other)  PT will work to engage in assertive communication with children      Yoni SYEDA Alex, [facility]   7/14/23                                                       ______________________________________________________________________    Individual Treatment Plan    Patient's Name: Valerie Sim  YOB: 1965    Date of Creation: 4/17/23  Date Treatment Plan Last Reviewed/Revised: 7/17/23    DSM5 Diagnoses: 296.32 (F33.1) Major Depressive Disorder, Recurrent Episode, Moderate _  Psychosocial / Contextual Factors:   PROMIS (reviewed every 90 days):     Referral / Collaboration:  Referral to another professional/service is not indicated at this time..    Anticipated number of session for this episode of care: 3-6 sessions  Anticipation frequency of session: Biweekly  Anticipated Duration of each session: 38-52 minutes  Treatment plan will be reviewed in 90 days or when goals have been changed.       MeasurableTreatment Goal(s) related to diagnosis / functional impairment(s)  Goal 1: Patient will engage in CBT skills for depression sx reduction    Objective #A (Patient Action)    Patient will Identify negative self-talk and behaviors: challenge core beliefs, myths, and actions.  Status: New - Date: 4/17/23     Intervention(s)  Therapist  will teach emotional regulation skills. CBT skills.      Patient has reviewed and agreed to the above plan.      Yoni Alex, CAROLEE  April 17, 2023

## 2023-07-25 ASSESSMENT — PATIENT HEALTH QUESTIONNAIRE - PHQ9
SUM OF ALL RESPONSES TO PHQ QUESTIONS 1-9: 9
10. IF YOU CHECKED OFF ANY PROBLEMS, HOW DIFFICULT HAVE THESE PROBLEMS MADE IT FOR YOU TO DO YOUR WORK, TAKE CARE OF THINGS AT HOME, OR GET ALONG WITH OTHER PEOPLE: SOMEWHAT DIFFICULT
SUM OF ALL RESPONSES TO PHQ QUESTIONS 1-9: 9

## 2023-07-26 ENCOUNTER — VIRTUAL VISIT (OUTPATIENT)
Dept: PSYCHOLOGY | Facility: CLINIC | Age: 58
End: 2023-07-26
Payer: COMMERCIAL

## 2023-07-26 DIAGNOSIS — F33.1 MDD (MAJOR DEPRESSIVE DISORDER), RECURRENT EPISODE, MODERATE (H): Primary | ICD-10-CM

## 2023-07-26 PROCEDURE — 90834 PSYTX W PT 45 MINUTES: CPT | Mod: VID | Performed by: MARRIAGE & FAMILY THERAPIST

## 2023-07-26 NOTE — PROGRESS NOTES
Answers submitted by the patient for this visit:  Patient Health Questionnaire (Submitted on 2023)  If you checked off any problems, how difficult have these problems made it for you to do your work, take care of things at home, or get along with other people?: Somewhat difficult  PHQ9 TOTAL SCORE: 9  Answers for HPI/ROS submitted by the patient on 2023  If you checked off any problems, how difficult have these problems made it for you to do your work, take care of things at home, or get along with other people?: Very difficult  PHQ9 TOTAL SCORE: 16  MAHAMED 7 TOTAL SCORE: 12        [facility]                                     Progress Note    Patient Name: Valerie Sim  Date:  23         Service Type: Individual      Session Start Time: 8:03a Session End Time: 8:55a     Session Length:  52    Session #: 10    Attendees: Client and her son Jairo    Service Modality:  Video Visit:      Provider verified identity through the following two step process.  Patient provided:  Patient     Telemedicine Visit: The patient's condition can be safely assessed and treated via synchronous audio and visual telemedicine encounter.      Reason for Telemedicine Visit: Services only offered telehealth    Originating Site (Patient Location): Patient's home    Distant Site (Provider Location): Provider Remote Setting- Home Office    Consent:  The patient/guardian has verbally consented to: the potential risks and benefits of telemedicine (video visit) versus in person care; bill my insurance or make self-payment for services provided; and responsibility for payment of non-covered services.     Patient would like the video invitation sent by:  Send to e-mail at: [email or web address]    Mode of Communication:  Video Conference via [facility]    Distant Location (Provider):  Off-site    As the provider I attest to compliance with applicable laws and regulations related to telemedicine.    DATA  Interactive  Complexity: No  Crisis: No        Progress Since Last Session (Related to Symptoms / Goals / Homework):   Symptoms: No change, mood at 6/10    Homework: Completed in session      Episode of Care Goals: Minimal progress - PREPARATION (Decided to change - considering how); Intervened by negotiating a change plan and determining options / strategies for behavior change, identifying triggers, exploring social supports, and working towards setting a date to begin behavior change     Current / Ongoing Stressors and Concerns:  Last session 7/14, pt has met with son 2x in the past couple weeks which were positive.      Treatment Objective(s) Addressed in This Session:   Increase interest, engagement, and pleasure in doing things       Intervention:   Motivational Interviewing    MI Intervention: Permission to raise concern or advise, Open-ended questions and Reflections: simple and complex     Change Talk Expressed by the Patient: Reasons to change Need to change    Provider Response to Change Talk: R - Reflected patient's change talk and S - Summarized patient's change talk statements     ASSESSMENT: Current Emotional / Mental Status (status of significant symptoms):   Risk status (Self / Other harm or suicidal ideation)   Patient denies current fears or concerns for personal safety.   Patient denies current or recent suicidal ideation or behaviors.   Patient denies current or recent homicidal ideation or behaviors.   Patient denies current or recent self injurious behavior or ideation.   Patient denies other safety concerns.   Patient reports there has been no change in risk factors since their last session.     Patient reports there has been no change in protective factors since their last session.     A safety and risk management plan has been developed including: Patient consented to co-developed safety plan on 4/5/23.  Safety and risk management plan was reviewed.   Patient agreed to use safety plan should any safety  concerns arise.  A copy was made available to the patient.     Appearance:   Appropriate    Eye Contact:   Good    Psychomotor Behavior: Normal    Attitude:   Cooperative    Orientation:   All   Speech    Rate / Production: Normal     Volume:  Normal    Mood:    Normal   Affect:    Appropriate    Thought Content:  Clear    Thought Form:  Coherent  Logical    Insight:    Good      Medication Review:   No changes to current psychiatric medication(s)     Medication Compliance:   Yes     Changes in Health Issues:   None reported     Chemical Use Review:   Substance Use: Chemical use reviewed, no active concerns identified      Tobacco Use: No current tobacco use.      Diagnosis:  MDD, moderate, recurrent    Collateral Reports Completed:   Not Applicable    PLAN: (Patient Tasks / Therapist Tasks / Other)  PT will work to engage in using healthy coping skills      Yoni Alex, [facility]   7/26/23                                                       ______________________________________________________________________    Individual Treatment Plan    Patient's Name: Valerie Sim  YOB: 1965    Date of Creation: 7/26/23  Date Treatment Plan Last Reviewed/Revised: 10/26/23    DSM5 Diagnoses: 296.32 (F33.1) Major Depressive Disorder, Recurrent Episode, Moderate _  Psychosocial / Contextual Factors:   PROMIS (reviewed every 90 days):     Referral / Collaboration:  Referral to another professional/service is not indicated at this time..    Anticipated number of session for this episode of care: 3-6 sessions  Anticipation frequency of session: Biweekly  Anticipated Duration of each session: 38-52 minutes  Treatment plan will be reviewed in 90 days or when goals have been changed.       MeasurableTreatment Goal(s) related to diagnosis / functional impairment(s)  Goal 1: Patient will engage in CBT skills for depression sx reduction    Objective #A (Patient Action)    Patient will Identify negative self-talk  and behaviors: challenge core beliefs, myths, and actions.  Status Continued 7/26/23    Intervention(s)  Therapist will teach emotional regulation skills. CBT skills .      Patient has reviewed and agreed to the above plan.      Yoni Alex, [facility]  July 26, 2023

## 2023-08-07 ASSESSMENT — PATIENT HEALTH QUESTIONNAIRE - PHQ9
SUM OF ALL RESPONSES TO PHQ QUESTIONS 1-9: 16
10. IF YOU CHECKED OFF ANY PROBLEMS, HOW DIFFICULT HAVE THESE PROBLEMS MADE IT FOR YOU TO DO YOUR WORK, TAKE CARE OF THINGS AT HOME, OR GET ALONG WITH OTHER PEOPLE: SOMEWHAT DIFFICULT
SUM OF ALL RESPONSES TO PHQ QUESTIONS 1-9: 16

## 2023-08-08 ENCOUNTER — VIRTUAL VISIT (OUTPATIENT)
Dept: PSYCHOLOGY | Facility: CLINIC | Age: 58
End: 2023-08-08
Payer: COMMERCIAL

## 2023-08-08 DIAGNOSIS — F33.1 MDD (MAJOR DEPRESSIVE DISORDER), RECURRENT EPISODE, MODERATE (H): Primary | ICD-10-CM

## 2023-08-08 PROCEDURE — 90834 PSYTX W PT 45 MINUTES: CPT | Mod: VID | Performed by: MARRIAGE & FAMILY THERAPIST

## 2023-08-08 ASSESSMENT — COLUMBIA-SUICIDE SEVERITY RATING SCALE - C-SSRS
2. HAVE YOU ACTUALLY HAD ANY THOUGHTS OF KILLING YOURSELF?: NO
ATTEMPT SINCE LAST CONTACT: NO
TOTAL  NUMBER OF ABORTED OR SELF INTERRUPTED ATTEMPTS SINCE LAST CONTACT: NO
TOTAL  NUMBER OF INTERRUPTED ATTEMPTS SINCE LAST CONTACT: NO
1. SINCE LAST CONTACT, HAVE YOU WISHED YOU WERE DEAD OR WISHED YOU COULD GO TO SLEEP AND NOT WAKE UP?: YES

## 2023-08-08 NOTE — PROGRESS NOTES
Answers submitted by the patient for this visit:  Patient Health Questionnaire (Submitted on 2023)  If you checked off any problems, how difficult have these problems made it for you to do your work, take care of things at home, or get along with other people?: Somewhat difficult  PHQ9 TOTAL SCORE: 16          [facility]                                     Progress Note    Patient Name: Valerie Sim  Date:  23         Service Type: Individual      Session Start Time: 8:02a Session End Time: 8:54a     Session Length:  52    Session #: 11    Attendees: Client and her son Jairo    Service Modality:  Video Visit:      Provider verified identity through the following two step process.  Patient provided:  Patient     Telemedicine Visit: The patient's condition can be safely assessed and treated via synchronous audio and visual telemedicine encounter.      Reason for Telemedicine Visit: Services only offered telehealth    Originating Site (Patient Location): Patient's home    Distant Site (Provider Location): Provider Remote Setting- Home Office    Consent:  The patient/guardian has verbally consented to: the potential risks and benefits of telemedicine (video visit) versus in person care; bill my insurance or make self-payment for services provided; and responsibility for payment of non-covered services.     Patient would like the video invitation sent by:  Send to e-mail at: [email or web address]    Mode of Communication:  Video Conference via [facility]    Distant Location (Provider):  Off-site    As the provider I attest to compliance with applicable laws and regulations related to telemedicine.    DATA  Interactive Complexity: No  Crisis: No        Progress Since Last Session (Related to Symptoms / Goals / Homework):   Symptoms:  Worsening, mood around 4/10 due to medical issues.    Homework: Completed in session      Episode of Care Goals: Minimal progress - PREPARATION (Decided to change -  considering how); Intervened by negotiating a change plan and determining options / strategies for behavior change, identifying triggers, exploring social supports, and working towards setting a date to begin behavior change     Current / Ongoing Stressors and Concerns:  Last session 7/26, pt reports the past weeks have been ok, dealing with some medical issues over the past month. PT had a couple interactions with son for longer times and they were able to talk about other things.      Treatment Objective(s) Addressed in This Session:   Increase interest, engagement, and pleasure in doing things       Intervention:   Motivational Interviewing    MI Intervention: Permission to raise concern or advise, Open-ended questions and Reflections: simple and complex     Change Talk Expressed by the Patient: Reasons to change Need to change    Provider Response to Change Talk: R - Reflected patient's change talk and S - Summarized patient's change talk statements     ASSESSMENT: Current Emotional / Mental Status (status of significant symptoms):   Risk status (Self / Other harm or suicidal ideation)   Patient denies current fears or concerns for personal safety.   Patient denies current or recent suicidal ideation or behaviors.   Patient denies current or recent homicidal ideation or behaviors.   Patient denies current or recent self injurious behavior or ideation.   Patient denies other safety concerns.   Patient reports there has been no change in risk factors since their last session.     Patient reports there has been no change in protective factors since their last session.     A safety and risk management plan has been developed including: Patient consented to co-developed safety plan on 4/5/23.  Safety and risk management plan was reviewed.   Patient agreed to use safety plan should any safety concerns arise.  A copy was made available to the patient.     Appearance:   Appropriate    Eye Contact:   Good    Psychomotor  Behavior: Normal    Attitude:   Cooperative    Orientation:   All   Speech    Rate / Production: Normal     Volume:  Normal    Mood:    Normal   Affect:    Appropriate    Thought Content:  Clear    Thought Form:  Coherent  Logical    Insight:    Good      Medication Review:   No changes to current psychiatric medication(s)     Medication Compliance:   Yes     Changes in Health Issues:   None reported     Chemical Use Review:   Substance Use: Chemical use reviewed, no active concerns identified      Tobacco Use: No current tobacco use.      Diagnosis:  MDD, moderate, recurrent    Collateral Reports Completed:   Not Applicable    PLAN: (Patient Tasks / Therapist Tasks / Other)  PT will work to engage in using assertive communication skills with family      Yoni Alex, [facility]     8/8/23                                                       ______________________________________________________________________    Individual Treatment Plan    Patient's Name: Valerie Sim  YOB: 1965    Date of Creation: 7/26/23  Date Treatment Plan Last Reviewed/Revised: 10/26/23    DSM5 Diagnoses: 296.32 (F33.1) Major Depressive Disorder, Recurrent Episode, Moderate _  Psychosocial / Contextual Factors:   PROMIS (reviewed every 90 days):     Referral / Collaboration:  Referral to another professional/service is not indicated at this time..    Anticipated number of session for this episode of care: 3-6 sessions  Anticipation frequency of session: Biweekly  Anticipated Duration of each session: 38-52 minutes  Treatment plan will be reviewed in 90 days or when goals have been changed.       MeasurableTreatment Goal(s) related to diagnosis / functional impairment(s)  Goal 1: Patient will engage in CBT skills for depression sx reduction    Objective #A (Patient Action)    Patient will Identify negative self-talk and behaviors: challenge core beliefs, myths, and actions.  Status Continued  7/26/23    Intervention(s)  Therapist will teach emotional regulation skills. CBT skills .      Patient has reviewed and agreed to the above plan.      Yoni Alex, [facility]  July 26, 2023

## 2023-08-21 ASSESSMENT — PATIENT HEALTH QUESTIONNAIRE - PHQ9
10. IF YOU CHECKED OFF ANY PROBLEMS, HOW DIFFICULT HAVE THESE PROBLEMS MADE IT FOR YOU TO DO YOUR WORK, TAKE CARE OF THINGS AT HOME, OR GET ALONG WITH OTHER PEOPLE: SOMEWHAT DIFFICULT
SUM OF ALL RESPONSES TO PHQ QUESTIONS 1-9: 13
SUM OF ALL RESPONSES TO PHQ QUESTIONS 1-9: 13
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
## 2023-08-22 ENCOUNTER — VIRTUAL VISIT (OUTPATIENT)
Dept: PSYCHIATRY | Facility: CLINIC | Age: 58
End: 2023-08-22
Attending: PSYCHIATRY & NEUROLOGY
Payer: COMMERCIAL

## 2023-08-22 DIAGNOSIS — F33.41 RECURRENT MAJOR DEPRESSIVE DISORDER, IN PARTIAL REMISSION (H): Primary | ICD-10-CM

## 2023-08-22 DIAGNOSIS — F11.90 UNCOMPLICATED OPIOID USE: ICD-10-CM

## 2023-08-22 DIAGNOSIS — F41.1 GAD (GENERALIZED ANXIETY DISORDER): ICD-10-CM

## 2023-08-22 DIAGNOSIS — F33.3 SEVERE RECURRENT MAJOR DEPRESSIVE DISORDER WITH PSYCHOTIC FEATURES (H): ICD-10-CM

## 2023-08-22 PROCEDURE — 99214 OFFICE O/P EST MOD 30 MIN: CPT | Mod: VID

## 2023-08-22 PROCEDURE — 90833 PSYTX W PT W E/M 30 MIN: CPT | Mod: VID

## 2023-08-22 RX ORDER — QUETIAPINE FUMARATE 50 MG/1
50 TABLET, FILM COATED ORAL AT BEDTIME
Qty: 75 TABLET | Refills: 3 | Status: SHIPPED | OUTPATIENT
Start: 2023-08-22 | End: 2023-11-14

## 2023-08-22 ASSESSMENT — PAIN SCALES - GENERAL: PAINLEVEL: SEVERE PAIN (7)

## 2023-08-22 NOTE — NURSING NOTE
Is the patient currently in the state of [place]? YES    Visit mode:VIDEO    If the visit is dropped, the patient can be reconnected by: VIDEO VISIT: Text to cell phone:   Telephone Information:   Mobile [phone]       Will anyone else be joining the visit? NO  (If patient encounters technical issues they should call [phone]    How would you like to obtain your AVS? MyChart    Are changes needed to the allergy or medication list? No    Reason for visit: PABLO CHOI

## 2023-08-22 NOTE — PATIENT INSTRUCTIONS
**For crisis resources, please see the information at the end of this document**   Patient Education    Thank you for coming to the [facility] [place] [facility].     Lab Testing:  If you had lab testing today and your results are reassuring or normal they will be mailed to you or sent through [employer] within 7 days. If the lab tests need quick action we will call you with the results. The phone number we will call with results is # [phone]. If this is not the best number please call our clinic and change the number.     Medication Refills:  If you need any refills please call your pharmacy and they will contact us. Our fax number for refills is [phone].   Three business days of notice are needed for general medication refill requests.   Five business days of notice are needed for controlled substance refill requests.   If you need to change to a different pharmacy, please contact the new pharmacy directly. The new pharmacy will help you get your medications transferred.     Contact Us:  Please call [phone] during business hours (8-5:00 M-F).   If you have medication related questions after clinic hours, or on the weekend, please call [phone].     Financial Assistance [phone]   Medical Records [phone]       MENTAL HEALTH CRISIS RESOURCES:  For a emergency help, please call 911 or go to the nearest Emergency Department.     Emergency Walk-In Options:   [facility] Unit @ [facility] ([place]): [phone] - Specialized mental health emergency area designed to be calming  [facility] ([place]): [phone]  [facility] ([place]): [phone]  [facility]): [phone]    [facility] Crisis Information:   [place]: [phone]  [place]: [phone]  [place] ([place]TH[place]) - Adult: [phone]     Child: [phone]  [place] - Adult: [phone]     Child: [phone]  [place]:  [phone]  List of all [facility] resources:   https://[place].gov/dhs/people-we-serve/adults/health-care/mental-health/resources/crisis-contacts.jsp    National Crisis Information:   Crisis Text Line: Text  [place]  to 482141  Suicide & Crisis Lifeline: 988  National Suicide Prevention Lifeline: 9-468-861-TALK (1-906.191.9040)       For online chat options, visit https://suicidepreventionlifeline.org/chat/  Poison Control Center: 1-095-395-6280  Trans Lifeline: 8-915-905-5113 - Hotline for transgender people of all ages  The [employer]: 6-547-620-3528 - Hotline for LGBT youth     For Non-Emergency Support:   Fast Tracker: Mental Health & Substance Use Disorder Resources -   https://www.[facility]ck[facility]n.org/

## 2023-08-23 ENCOUNTER — VIRTUAL VISIT (OUTPATIENT)
Dept: PSYCHOLOGY | Facility: CLINIC | Age: 58
End: 2023-08-23
Payer: COMMERCIAL

## 2023-08-23 DIAGNOSIS — F33.0 MILD EPISODE OF RECURRENT MAJOR DEPRESSIVE DISORDER (H): Primary | ICD-10-CM

## 2023-08-23 PROCEDURE — 90834 PSYTX W PT 45 MINUTES: CPT | Mod: VID | Performed by: MARRIAGE & FAMILY THERAPIST

## 2023-08-23 ASSESSMENT — PATIENT HEALTH QUESTIONNAIRE - PHQ9
SUM OF ALL RESPONSES TO PHQ QUESTIONS 1-9: 13
10. IF YOU CHECKED OFF ANY PROBLEMS, HOW DIFFICULT HAVE THESE PROBLEMS MADE IT FOR YOU TO DO YOUR WORK, TAKE CARE OF THINGS AT HOME, OR GET ALONG WITH OTHER PEOPLE: SOMEWHAT DIFFICULT
SUM OF ALL RESPONSES TO PHQ QUESTIONS 1-9: 13

## 2023-08-23 NOTE — PROGRESS NOTES
Answers submitted by the patient for this visit:  Patient Health Questionnaire (Submitted on 2023)  If you checked off any problems, how difficult have these problems made it for you to do your work, take care of things at home, or get along with other people?: Somewhat difficult  PHQ9 TOTAL SCORE: 13          [facility]                                     Progress Note    Patient Name: Valerie Sim  Date:  23         Service Type: Individual      Session Start Time: 8:02a Session End Time: 8:54a     Session Length:  52    Session #: 12    Attendees: Client and her son Jairo    Service Modality:  Video Visit:      Provider verified identity through the following two step process.  Patient provided:  Patient     Telemedicine Visit: The patient's condition can be safely assessed and treated via synchronous audio and visual telemedicine encounter.      Reason for Telemedicine Visit: Services only offered telehealth    Originating Site (Patient Location): Patient's home    Distant Site (Provider Location): Provider Remote Setting- Home Office    Consent:  The patient/guardian has verbally consented to: the potential risks and benefits of telemedicine (video visit) versus in person care; bill my insurance or make self-payment for services provided; and responsibility for payment of non-covered services.     Patient would like the video invitation sent by:  Send to e-mail at: [email or web address]    Mode of Communication:  Video Conference via [employer]    Distant Location (Provider):  Off-site    As the provider I attest to compliance with applicable laws and regulations related to telemedicine.    DATA  Interactive Complexity: No  Crisis: No        Progress Since Last Session (Related to Symptoms / Goals / Homework):   Symptoms: No change, mood around 5/10 overall     Homework: Completed in session      Episode of Care Goals: Minimal progress - PREPARATION (Decided to change - considering  how); Intervened by negotiating a change plan and determining options / strategies for behavior change, identifying triggers, exploring social supports, and working towards setting a date to begin behavior change     Current / Ongoing Stressors and Concerns:  Last session 8/8, pt feels the past couple weeks have been going well. Feels the family sessions are going well and her  is hearing her more often which is a positive change and she is seeing more action steps from him.          Treatment Objective(s) Addressed in This Session:   Increase interest, engagement, and pleasure in doing things       Intervention:   Motivational Interviewing    MI Intervention: Permission to raise concern or advise, Open-ended questions and Reflections: simple and complex     Change Talk Expressed by the Patient: Reasons to change Need to change    Provider Response to Change Talk: R - Reflected patient's change talk and S - Summarized patient's change talk statements     ASSESSMENT: Current Emotional / Mental Status (status of significant symptoms):   Risk status (Self / Other harm or suicidal ideation)   Patient denies current fears or concerns for personal safety.   Patient denies current or recent suicidal ideation or behaviors.   Patient denies current or recent homicidal ideation or behaviors.   Patient denies current or recent self injurious behavior or ideation.   Patient denies other safety concerns.   Patient reports there has been no change in risk factors since their last session.     Patient reports there has been no change in protective factors since their last session.     A safety and risk management plan has been developed including: Patient consented to co-developed safety plan on 4/5/23.  Safety and risk management plan was reviewed.   Patient agreed to use safety plan should any safety concerns arise.  A copy was made available to the patient.     Appearance:   Appropriate    Eye Contact:   Good     Psychomotor Behavior: Normal    Attitude:   Cooperative    Orientation:   All   Speech    Rate / Production: Normal     Volume:  Normal    Mood:    Normal   Affect:    Appropriate    Thought Content:  Clear    Thought Form:  Coherent  Logical    Insight:    Good      Medication Review:   No changes to current psychiatric medication(s)     Medication Compliance:   Yes     Changes in Health Issues:   None reported     Chemical Use Review:   Substance Use: Chemical use reviewed, no active concerns identified      Tobacco Use: No current tobacco use.      Diagnosis:  MDD, moderate, recurrent    Collateral Reports Completed:   Not Applicable    PLAN: (Patient Tasks / Therapist Tasks / Other)  PT will practice 1x daily healthy coping skills      Yoni Alex, SRAVANTHI     8/23/23                                                       ______________________________________________________________________    Individual Treatment Plan    Patient's Name: Valerie Sim  YOB: 1965    Date of Creation: 7/26/23  Date Treatment Plan Last Reviewed/Revised: 10/26/23    DSM5 Diagnoses: 296.32 (F33.1) Major Depressive Disorder, Recurrent Episode, Moderate _  Psychosocial / Contextual Factors:   PROMIS (reviewed every 90 days):     Referral / Collaboration:  Referral to another professional/service is not indicated at this time..    Anticipated number of session for this episode of care: 3-6 sessions  Anticipation frequency of session: Biweekly  Anticipated Duration of each session: 38-52 minutes  Treatment plan will be reviewed in 90 days or when goals have been changed.       MeasurableTreatment Goal(s) related to diagnosis / functional impairment(s)  Goal 1: Patient will engage in CBT skills for depression sx reduction    Objective #A (Patient Action)    Patient will Identify negative self-talk and behaviors: challenge core beliefs, myths, and actions.  Status Continued 7/26/23    Intervention(s)  Therapist  will teach emotional regulation skills. CBT skills .      Patient has reviewed and agreed to the above plan.      CAROLEE Mendez  July 26, 2023

## 2023-09-05 ENCOUNTER — VIRTUAL VISIT (OUTPATIENT)
Dept: PSYCHOLOGY | Facility: CLINIC | Age: 58
End: 2023-09-05
Payer: COMMERCIAL

## 2023-09-05 DIAGNOSIS — F41.1 GAD (GENERALIZED ANXIETY DISORDER): Primary | ICD-10-CM

## 2023-09-05 PROCEDURE — 90834 PSYTX W PT 45 MINUTES: CPT | Mod: VID | Performed by: MARRIAGE & FAMILY THERAPIST

## 2023-09-05 NOTE — PROGRESS NOTES
Answers submitted by the patient for this visit:  Patient Health Questionnaire (Submitted on 2023)  If you checked off any problems, how difficult have these problems made it for you to do your work, take care of things at home, or get along with other people?: Somewhat difficult  PHQ9 TOTAL SCORE: 13          [facility]                                     Progress Note    Patient Name: Valerie Sim  Date:  23         Service Type: Individual      Session Start Time: 3:00p Session End Time: 3:52p     Session Length:    52  Session #: 13    Attendees: Client and her son Jairo    Service Modality:  Video Visit:      Provider verified identity through the following two step process.  Patient provided:  Patient     Telemedicine Visit: The patient's condition can be safely assessed and treated via synchronous audio and visual telemedicine encounter.      Reason for Telemedicine Visit: Services only offered telehealth    Originating Site (Patient Location): Patient's home    Distant Site (Provider Location): Provider Remote Setting- Home Office    Consent:  The patient/guardian has verbally consented to: the potential risks and benefits of telemedicine (video visit) versus in person care; bill my insurance or make self-payment for services provided; and responsibility for payment of non-covered services.     Patient would like the video invitation sent by:  Send to e-mail at: @"[email or web address]    Mode of Communication:  Video Conference via Amwell    Distant Location (Provider):  Off-site    As the provider I attest to compliance with applicable laws and regulations related to telemedicine.    DATA  Interactive Complexity: No  Crisis: No        Progress Since Last Session (Related to Symptoms / Goals / Homework):   Symptoms: No change, mood at 4/10    Homework: Completed in session      Episode of Care Goals: Minimal progress - PREPARATION (Decided to change - considering how);  Intervened by negotiating a change plan and determining options / strategies for behavior change, identifying triggers, exploring social supports, and working towards setting a date to begin behavior change     Current / Ongoing Stressors and Concerns:  Last session 8/23, pt had a recent appt for her eye issues and will have some further ongoing tests to determine the problems. PT will also have a teeth procedure tomorrow. Things are home are moving forward and there        Treatment Objective(s) Addressed in This Session:   Increase interest, engagement, and pleasure in doing things       Intervention:   Motivational Interviewing    MI Intervention: Permission to raise concern or advise, Open-ended questions and Reflections: simple and complex     Change Talk Expressed by the Patient: Reasons to change Need to change    Provider Response to Change Talk: R - Reflected patient's change talk and S - Summarized patient's change talk statements     ASSESSMENT: Current Emotional / Mental Status (status of significant symptoms):   Risk status (Self / Other harm or suicidal ideation)   Patient denies current fears or concerns for personal safety.   Patient denies current or recent suicidal ideation or behaviors.   Patient denies current or recent homicidal ideation or behaviors.   Patient denies current or recent self injurious behavior or ideation.   Patient denies other safety concerns.   Patient reports there has been no change in risk factors since their last session.     Patient reports there has been no change in protective factors since their last session.     A safety and risk management plan has been developed including: Patient consented to co-developed safety plan on 4/5/23.  Safety and risk management plan was reviewed.   Patient agreed to use safety plan should any safety concerns arise.  A copy was made available to the patient.     Appearance:   Appropriate    Eye Contact:   Good    Psychomotor  Behavior: Normal    Attitude:   Cooperative    Orientation:   All   Speech    Rate / Production: Normal     Volume:  Normal    Mood:    Normal   Affect:    Appropriate    Thought Content:  Clear    Thought Form:  Coherent  Logical    Insight:    Good      Medication Review:   No changes to current psychiatric medication(s)     Medication Compliance:   Yes     Changes in Health Issues:   None reported     Chemical Use Review:   Substance Use: Chemical use reviewed, no active concerns identified      Tobacco Use: No current tobacco use.      Diagnosis:  MDD, moderate, recurrent    Collateral Reports Completed:   Not Applicable    PLAN: (Patient Tasks / Therapist Tasks / Other)  Pt will work to use assertive communication skills with family during session      Yoni Alex, [facility]        9/5/23                                                       ______________________________________________________________________    Individual Treatment Plan    Patient's Name: Valerie Sim  YOB: 1965    Date of Creation: 7/26/23  Date Treatment Plan Last Reviewed/Revised: 10/26/23    DSM5 Diagnoses: 296.32 (F33.1) Major Depressive Disorder, Recurrent Episode, Moderate _  Psychosocial / Contextual Factors:   PROMIS (reviewed every 90 days):     Referral / Collaboration:  Referral to another professional/service is not indicated at this time..    Anticipated number of session for this episode of care: 3-6 sessions  Anticipation frequency of session: Biweekly  Anticipated Duration of each session: 38-52 minutes  Treatment plan will be reviewed in 90 days or when goals have been changed.       MeasurableTreatment Goal(s) related to diagnosis / functional impairment(s)  Goal 1: Patient will engage in CBT skills for depression sx reduction    Objective #A (Patient Action)    Patient will Identify negative self-talk and behaviors: challenge core beliefs, myths, and actions.  Status Continued  7/26/23    Intervention(s)  Therapist will teach emotional regulation skills. CBT skills .      Patient has reviewed and agreed to the above plan.      Yoni Alex, CAROLEE  July 26, 2023

## 2023-09-13 ENCOUNTER — OFFICE VISIT (OUTPATIENT)
Dept: PHYSICAL MEDICINE AND REHAB | Facility: CLINIC | Age: 58
End: 2023-09-13
Payer: COMMERCIAL

## 2023-09-13 VITALS
HEART RATE: 80 BPM | SYSTOLIC BLOOD PRESSURE: 145 MMHG | OXYGEN SATURATION: 97 % | WEIGHT: 181.66 LBS | DIASTOLIC BLOOD PRESSURE: 88 MMHG | BODY MASS INDEX: 28.45 KG/M2

## 2023-09-13 DIAGNOSIS — G24.4 IDIOPATHIC OROFACIAL DYSTONIA: ICD-10-CM

## 2023-09-13 DIAGNOSIS — G43.719 CHRONIC MIGRAINE WITHOUT AURA, INTRACTABLE, WITHOUT STATUS MIGRAINOSUS: Primary | ICD-10-CM

## 2023-09-13 DIAGNOSIS — M62.89 HIGH-TONE PELVIC FLOOR DYSFUNCTION: ICD-10-CM

## 2023-09-13 PROCEDURE — 95874 GUIDE NERV DESTR NEEDLE EMG: CPT | Performed by: PHYSICAL MEDICINE & REHABILITATION

## 2023-09-13 PROCEDURE — 64615 CHEMODENERV MUSC MIGRAINE: CPT | Performed by: PHYSICAL MEDICINE & REHABILITATION

## 2023-09-13 RX ORDER — BUPIVACAINE HYDROCHLORIDE 2.5 MG/ML
5 INJECTION, SOLUTION EPIDURAL; INFILTRATION; INTRACAUDAL ONCE
Status: COMPLETED | OUTPATIENT
Start: 2023-09-13 | End: 2023-09-13

## 2023-09-13 RX ADMIN — BUPIVACAINE HYDROCHLORIDE 12.5 MG: 2.5 INJECTION, SOLUTION EPIDURAL; INFILTRATION; INTRACAUDAL at 13:42

## 2023-09-13 ASSESSMENT — PAIN SCALES - GENERAL: PAINLEVEL: SEVERE PAIN (6)

## 2023-09-13 NOTE — LETTER
9/13/2023       RE: Valerie Sim  [address]  [place] [address]     Dear Colleague,    Thank you for referring your patient, Valerie Sim, to the [facility] [place] at [facility]. Please see a copy of my visit note below.      [facility]     PM&R CLINIC NOTE  BOTULINUM TOXIN PROCEDURE      HPI  Chief Complaint   Patient presents with    Botox     For migraine     Valerie Sim is a 58 year old female with a history of chronic migraine headaches who presents to clinic for botulinum toxin injections for management of chronic migraine headaches and excessive jaw clenching.     SINCE LAST VISIT  Valerie Sim was last seen here in clinic on 7/7/2023, at which time she received 225 units of Botox.    Patient denies new medical diagnoses, illnesses, hospitalizations, emergency room visits, and injuries since the previous injection with botulinum neurotoxin. Her       RESPONSE TO PREVIOUS TREATMENT    Side effects:  She had difficulty physically holding her eyes open. Her ophthalmologist suggested that frontalis Botox was likely the cause and suggested less Botox into that area.      1.  Headache frequency during this injection cycle:  She has had approximately 12-30 migraine headache days over the last 10 weeks since her last Botox injections. This is compared to her baseline headache frequency of 30 severe headache days per month.     2.  Headache duration during this injection cycle:  Headache duration was between 2 hours and 8 weeks. Patient reports a few episodes of multiple day headaches during this injection cycle, however, the 8 week long headache was very atypical for her.     3.  Headache intensity during this injection cycle:    A.  5-6/10  =  Typical pain level.  B.  10/10  =  Worst pain level.  C.  4/10  =  Lowest pain level.    4.  Change in headache  "medication usage during this injection cycle:  (For Example:  Able to decrease use of oral pain medications.) No changes.       5.  ER Visits During This Injection Cycle:  None. She reports she used Relpax x6, Toradol x3, Zofran x8 and IM Toradol x5. She reports that Botox injections \"made the situation tolerable,\" which kept her out of the ER.     6.  Functional Performance:  Change in ADL's, social interaction, days lost from work, etc. Patient reports being able to more fully participate in social and family activities and responsibilities as headache symptoms have improved. She notes that she lost productivity about 30 days over the 10 week injection cycle.      PHYSICAL EXAM  VS: BP (!) 145/88 (BP Location: Right arm, Patient Position: Chair, Cuff Size: Adult Regular)   Pulse 80   Wt 82.4 kg (181 lb 10.5 oz)   SpO2 97%   BMI 28.45 kg/m     GEN: Pleasant and cooperative, in no acute distress  HEENT: No facial asymmetry    ALLERGIES  Allergies   Allergen Reactions    Fluoxetine Other (See Comments)     PN: LW Reaction: headache  PN: LW Reaction: headache  Migraine      Garlic Blisters, Cough, Dermatitis, Difficulty breathing, Headache, Hives, Itching, Nausea and Vomiting and Shortness Of Breath    Candesartan      Other reaction(s): Myalgia    Duloxetine      Other reaction(s): Headache  migraine    Iodine      Other reaction(s): Other (see comments)  PN: LW CM1: CONTRAST- iodine Reaction :    Metoclopramide      Other reaction(s): Headache  Caused increase in headaches    Perfume      Other reaction(s): Headache  head pain leading to migraines    Pregabalin      Other reaction(s): Headache, Myalgia    Trazodone Other (See Comments) and Unknown     Other reaction(s): Headache  Other reaction(s): Headache  Other reaction(s): Headache    Amitriptyline Hcl Other (See Comments)     Migraine      Candesartan Cilexetil-Hctz Muscle Pain (Myalgia)    Cyproheptadine Hcl      headaches    Desvenlafaxine      Migraine   " "   Diatrizoate Unknown     PN: LW CM1: CONTRAST- iodine Reaction :    Diazepam      Other reaction(s): Vestibular Toxicity  PN: LW Reaction: vertigo  PN: LW Reaction: vertigo    Duloxetine Hcl Other (See Comments)     Migraine      Gabapentin Other (See Comments)    Galcanezumab      Other reaction(s): Headache    Imipramine Other (See Comments)     Migraine      Metoprolol Other (See Comments) and Unknown     headache  headache  headache    Morphine Other (See Comments)     Patient reports seizure   Other reaction(s): Unknown  Seizure; 5/111/21 updated info: patient states she had a seizure while having a migraine headache years ago and is not sure if it was due to morphine. She has tolerated Dilaudid since then.      No Clinical Screening - See Comments      PN: LW FI1: lactose intolerance LW FI2: shellfish  PN: LW CM1: CONTRAST- iodine Reaction :  PN: LW Other1: -nka    Nortriptyline Other (See Comments)     Migraine      Phenergan Dm [Promethazine-Dm] Other (See Comments)     seizures    Pregabalin Muscle Pain (Myalgia)    Promethazine      PN: LW Reaction: minimal sizures    Venlafaxine Other (See Comments)     PN: LW Reaction: migraine  PN: LW Reaction: migraine  Migraine      Wellbutrin [Bupropion Hydrobromide] Other (See Comments)     Migraine      Compazine Anxiety    Dihydroergotamine Anxiety and Other (See Comments)     Cardiac symptoms    Droperidol Anxiety     PN: LW Reaction: agitation    Medroxyprogesterone Hives     PN: LW Reaction: nausea    Prochlorperazine Anxiety     PN: LW Reaction: \"can't sit still\"       CURRENT MEDICATIONS    Current Outpatient Medications:     aMILoride (MIDAMOR) 5 MG tablet, Take 7.5 mg by mouth daily, Disp: , Rfl:     aMILoride (MIDAMOR) 5 MG tablet, Take 7.5 mg by mouth daily 2.5MG in the AM and 5MG in the PM., Disp: , Rfl:     amLODIPine (NORVASC) 10 MG tablet, Take 10 mg by mouth daily, Disp: , Rfl:     amLODIPine (NORVASC) 10 MG tablet, Take 10 mg by mouth daily, Disp: " ", Rfl:     amphetamine-dextroamphetamine (ADDERALL) 20 MG tablet, Take 1 tablet (20 mg) by mouth 2 times daily (Patient taking differently: Take 20 mg by mouth 2 times daily Pt currently taking 10MG in the AM and 20MG in the PM.), Disp: 60 tablet, Rfl: 0    B Complex-Biotin-FA (B-COMPLEX PO), Take 1 tablet by mouth daily, Disp: , Rfl:     botulinum toxin type A (BOTOX) 100 units injection, Inject intramuscular. Every 18 weeks for pelvic floor spasms, Disp: , Rfl:     budesonide (RINOCORT AQUA) 32 MCG/ACT nasal spray, Spray 1 spray into both nostrils daily., Disp: , Rfl:     calcitRIOL (ROCALTROL) 0.25 MCG capsule, Take 0.25 mcg by mouth as needed, Disp: , Rfl:     Calcium-Magnesium-Vitamin D (CITRACAL SLOW RELEASE) 600- MG-MG-UNIT TB24, Take 600 mg by mouth 4 times daily, Disp: , Rfl:     docusate sodium (COLACE) 100 MG capsule, Take 1 capsule by mouth as needed for constipation, Disp: , Rfl:     eletriptan (RELPAX) 40 MG tablet, Take 40 mg by mouth as needed, Disp: , Rfl:     estradiol (ESTRACE) 0.1 MG/GM vaginal cream, , Disp: , Rfl:     fexofenadine (ALLEGRA) 180 MG tablet, Take 180 mg by mouth daily, Disp: , Rfl:     HYDROmorphone (DILAUDID) 2 MG tablet, Take 0.5-1 tablets (1-2 mg) by mouth every 3 hours as needed (headache) 20 tablets = 3 month supply., Disp: 24 tablet, Rfl: 0    ketorolac (TORADOL) 30 MG/ML injection, Inject 1 mL (30 mg) into the vein every 6 hours as needed for moderate pain, Disp: 6 mL, Rfl: 1    Magnesium Oxide 500 MG TABS, Take 500 mg by mouth 2 times daily, Disp: , Rfl:     naloxone (NARCAN) 4 MG/0.1ML nasal spray, Spray 1 spray (4 mg) into one nostril alternating nostrils as needed for opioid reversal every 2-3 minutes until assistance arrives, Disp: 0.2 mL, Rfl: 1    nebivolol (BYSTOLIC) 5 MG tablet, Take 5 mg by mouth daily Take twice a day totaling 10 mg., Disp: , Rfl:     Needle, Disp, 25G X 1\" MISC, 2 each as needed (with toradol) 90-day supply, Disp: 6 each, Rfl: 1    " ondansetron (ZOFRAN) 4 MG tablet, Take 1 tablet (4 mg) by mouth every 8 hours as needed for nausea, Disp: 30 tablet, Rfl: 1    order for DME, Equipment being ordered: Oxygen The patient has Cluster Headache and needs 10 liters/minute of high flow oxygen via nonrebreather mask prn headaches, Disp: 99 days, Rfl: 0    order for DME, Equipment being ordered: Oxygen and non-rebreather mask.   Use high flow oxygen (10-15 L/min) with non-rebreather mask, as needed for cluster headaches, Disp: 1 Units, Rfl: 11    potassium chloride ER (KLOR-CON M) 20 MEQ CR tablet, Take 1 tablet by mouth 2 times daily, Disp: , Rfl:     QUEtiapine (SEROQUEL) 50 MG tablet, Take 1 tablet (50 mg) by mouth At Bedtime, Disp: 75 tablet, Rfl: 3    Syringe, Disposable, (SYRINGE LUER SLIP) 1 ML MISC, 1 each as needed (with toradol/migraine), Disp: 6 each, Rfl: 1    valACYclovir (VALTREX) 1000 mg tablet, Take 1,000 mg by mouth as needed., Disp: , Rfl:     CycloSPORINE (RESTASIS OP), Apply to eye 2 times daily, Disp: , Rfl:        BOTULINUM NEUROTOXIN INJECTION PROCEDURES    VERIFICATION OF PATIENT IDENTIFICATION AND PROCEDURE     Initials   Patient Name SES   Patient  SES   Procedure Verified by: SES     Prior to the start of the procedure and with procedural staff participation, I verbally confirmed the patient s identity using two indicators, relevant allergies, that the procedure was appropriate and matched the consent or emergent situation, and that the correct equipment/implants were available. Immediately prior to starting the procedure I conducted the Time Out with the procedural staff and re-confirmed the patient s name, procedure, and site/side. (The Joint Commission universal protocol was followed.)  Yes    Sedation (Moderate or Deep): None    ABOVE ASSESSMENTS PERFORMED BY    Addie Malhotra MD      INDICATIONS FOR PROCEDURES  Valerie Sim is a 58 year old patient with pain and jaw clenching secondary to the diagnosis of chronic  migraine headaches and oromandibular dystonia. Her baseline symptoms have been recalcitrant to oral medications and conservative therapy.  She is here today for reinjection with Botox.    GOAL OF PROCEDURE  The goal of this procedure is to decrease pain and excessive jaw clenching due to chronic migraine headaches and bruxism.     TOTAL DOSE ADMINISTERED  Dose Administered:  225 units  Botox (Botulinum Toxin Type A)       2:1 Dilution   Unavoidable Drug Waste: Yes  Amount of drug waste (mL): 75 units Botox.  Reason for waste:  Single use vial  Diluent Used:  0.25% bupivacaine  Total Volume of Diluent Used:  4.5 ml  NDC #: Botox 100u ([address]-51)      CONSENT  The risks, benefits, and treatment options were discussed with Valerie Sim and she agreed to proceed.    Written consent was obtained by  ninoska .     EQUIPMENT USED  Needle-25mm stimulating/recording  Needle-30 gauge  EMG/NCS Machine    SKIN PREPARATION  Skin preparation was performed using an alcohol wipe.    GUIDANCE DESCRIPTION  Electro-myographic guidance was necessary throughout the neck and jaw portion of the procedure to accurately identify all areas of dystonic muscles while avoiding injection of non-dystonic muscles, neighboring nerves and nearby vascular structures.       AREA/MUSCLE INJECTED:  225 UNITS BOTOX = TOTAL DOSE, 2:1 DILUTION     1. FACIAL & SCALP MUSCLES: 85 UNITS BOTOX = TOTAL DOSE      Right Frontalis - 10 units of Botox in 2 site/s.  Left Frontalis - 10 units of Botox in 2 site/s.      Procerus - 5 units of Botox at 1 site/s.       Right  - 2.5 units of Botox in 1 site/s.  Left  - 2.5 units of Botox in 1 site/s.      Right Nasalis - 2.5 units of Botox at 1 site/s.           Left Nasalis - 2.5 units of Botox at 1 site/s.     Right Upper Occipitalis - 25 units of Botox at 5 site/s.   Left Upper Occipitalis - 25 units of Botox at 5 site/s.         2. JAW MUSCLES: 90 UNITS BOTOX = TOTAL DOSE     Right Masseter - 20  units of Botox at 2 site/s.   Left Masseter - 20 units of Botox at 2 site/s.      Right Temporalis - 25 units of Botox at 5 site/s.  Left Temporalis - 25 units of Botox at 5 site/s.          3. SHOULDER & NECK MUSCLES: 50 UNITS BOTOX = TOTAL DOSE      Right Levator Scapula - 10 units of Botox at 2 site/s (neck and scapular insertion).  Left Levator Scapula - 10 units of Botox at 2 site/s (neck and scapular insertion).      Right Upper Trapezius (mid & low lateral) - 10 units of Botox at 3 site/s.  Left Upper Trapezius (mid & low lateral) - 10 units of Botox at 3 site/s.             Right Pectoralis Minor - 5 units of Botox at 1 site/s.                     Left Pectoralis Minor - 5 units of Botox at 1 site/s.         RESPONSE TO PROCEDURE  Valerie Sim tolerated the procedure well and there were no immediate complications. She was allowed to recover for an appropriate period of time and was discharged home in stable condition.      ASSESSMENT AND PLAN   Botulinum toxin injections: Dose of Botox was decreased from 250 units to 225 units due to insurance limitations. She was also given trigger point injections and occipital nerve blocks for headaches and myofascial pain, which is used as an adjuvant to Botox treatment. Patient will continue to monitor response and report at next appointment.   Referrals: Urology referral placed, as she does have a history of high tone pelvic dysfunction for which she gets Botox. She would like to transfer her care regarding this issue to [facility].   Follow up: Valerie Sim was rescheduled for the next series of injections in 9 weeks, at which time we will evaluate response to today's injections. she may call the clinic prior with any questions or concerns prior to the next appointment.                 Again, thank you for allowing me to participate in the care of your patient.      Sincerely,    Addie Malhotra MD

## 2023-09-13 NOTE — PROGRESS NOTES
"  [facility]     PM&R CLINIC NOTE  BOTULINUM TOXIN PROCEDURE      HPI  Chief Complaint   Patient presents with    Botox     For migraine     Valerie Sim is a 58 year old female with a history of chronic migraine headaches who presents to clinic for botulinum toxin injections for management of chronic migraine headaches and excessive jaw clenching.     SINCE LAST VISIT  Valerie Sim was last seen here in clinic on 7/7/2023, at which time she received 225 units of Botox.    Patient denies new medical diagnoses, illnesses, hospitalizations, emergency room visits, and injuries since the previous injection with botulinum neurotoxin. Her       RESPONSE TO PREVIOUS TREATMENT    Side effects:  She had difficulty physically holding her eyes open. Her ophthalmologist suggested that frontalis Botox was likely the cause and suggested less Botox into that area.      1.  Headache frequency during this injection cycle:  She has had approximately 12-30 migraine headache days over the last 10 weeks since her last Botox injections. This is compared to her baseline headache frequency of 30 severe headache days per month.     2.  Headache duration during this injection cycle:  Headache duration was between 2 hours and 8 weeks. Patient reports a few episodes of multiple day headaches during this injection cycle, however, the 8 week long headache was very atypical for her.     3.  Headache intensity during this injection cycle:    A.  5-6/10  =  Typical pain level.  B.  10/10  =  Worst pain level.  C.  4/10  =  Lowest pain level.    4.  Change in headache medication usage during this injection cycle:  (For Example:  Able to decrease use of oral pain medications.) No changes.       5.  ER Visits During This Injection Cycle:  None. She reports she used Relpax x6, Toradol x3, Zofran x8 and IM Toradol x5. She reports that Botox injections \"made the situation tolerable,\" which kept her out of the ER. "     6.  Functional Performance:  Change in ADL's, social interaction, days lost from work, etc. Patient reports being able to more fully participate in social and family activities and responsibilities as headache symptoms have improved. She notes that she lost productivity about 30 days over the 10 week injection cycle.      PHYSICAL EXAM  VS: BP (!) 145/88 (BP Location: Right arm, Patient Position: Chair, Cuff Size: Adult Regular)   Pulse 80   Wt 82.4 kg (181 lb 10.5 oz)   SpO2 97%   BMI 28.45 kg/m     GEN: Pleasant and cooperative, in no acute distress  HEENT: No facial asymmetry    ALLERGIES  Allergies   Allergen Reactions    Fluoxetine Other (See Comments)     PN: LW Reaction: headache  PN: LW Reaction: headache  Migraine      Garlic Blisters, Cough, Dermatitis, Difficulty breathing, Headache, Hives, Itching, Nausea and Vomiting and Shortness Of Breath    Candesartan      Other reaction(s): Myalgia    Duloxetine      Other reaction(s): Headache  migraine    Iodine      Other reaction(s): Other (see comments)  PN: LW CM1: CONTRAST- iodine Reaction :    Metoclopramide      Other reaction(s): Headache  Caused increase in headaches    Perfume      Other reaction(s): Headache  head pain leading to migraines    Pregabalin      Other reaction(s): Headache, Myalgia    Trazodone Other (See Comments) and Unknown     Other reaction(s): Headache  Other reaction(s): Headache  Other reaction(s): Headache    Amitriptyline Hcl Other (See Comments)     Migraine      Candesartan Cilexetil-Hctz Muscle Pain (Myalgia)    Cyproheptadine Hcl      headaches    Desvenlafaxine      Migraine      Diatrizoate Unknown     PN: LW CM1: CONTRAST- iodine Reaction :    Diazepam      Other reaction(s): Vestibular Toxicity  PN: LW Reaction: vertigo  PN: LW Reaction: vertigo    Duloxetine Hcl Other (See Comments)     Migraine      Gabapentin Other (See Comments)    Galcanezumab      Other reaction(s): Headache    Imipramine Other (See  "Comments)     Migraine      Metoprolol Other (See Comments) and Unknown     headache  headache  headache    Morphine Other (See Comments)     Patient reports seizure   Other reaction(s): Unknown  Seizure; 5/111/21 updated info: patient states she had a seizure while having a migraine headache years ago and is not sure if it was due to morphine. She has tolerated Dilaudid since then.      No Clinical Screening - See Comments      PN: LW FI1: lactose intolerance LW FI2: shellfish  PN: LW CM1: CONTRAST- iodine Reaction :  PN: LW Other1: -nka    Nortriptyline Other (See Comments)     Migraine      Phenergan Dm [Promethazine-Dm] Other (See Comments)     seizures    Pregabalin Muscle Pain (Myalgia)    Promethazine      PN: LW Reaction: minimal sizures    Venlafaxine Other (See Comments)     PN: LW Reaction: migraine  PN: LW Reaction: migraine  Migraine      Wellbutrin [Bupropion Hydrobromide] Other (See Comments)     Migraine      Compazine Anxiety    Dihydroergotamine Anxiety and Other (See Comments)     Cardiac symptoms    Droperidol Anxiety     PN: LW Reaction: agitation    Medroxyprogesterone Hives     PN: LW Reaction: nausea    Prochlorperazine Anxiety     PN: LW Reaction: \"can't sit still\"       CURRENT MEDICATIONS    Current Outpatient Medications:     aMILoride (MIDAMOR) 5 MG tablet, Take 7.5 mg by mouth daily, Disp: , Rfl:     aMILoride (MIDAMOR) 5 MG tablet, Take 7.5 mg by mouth daily 2.5MG in the AM and 5MG in the PM., Disp: , Rfl:     amLODIPine (NORVASC) 10 MG tablet, Take 10 mg by mouth daily, Disp: , Rfl:     amLODIPine (NORVASC) 10 MG tablet, Take 10 mg by mouth daily, Disp: , Rfl:     amphetamine-dextroamphetamine (ADDERALL) 20 MG tablet, Take 1 tablet (20 mg) by mouth 2 times daily (Patient taking differently: Take 20 mg by mouth 2 times daily Pt currently taking 10MG in the AM and 20MG in the PM.), Disp: 60 tablet, Rfl: 0    B Complex-Biotin-FA (B-COMPLEX PO), Take 1 tablet by mouth daily, Disp: , Rfl: " "    botulinum toxin type A (BOTOX) 100 units injection, Inject intramuscular. Every 18 weeks for pelvic floor spasms, Disp: , Rfl:     budesonide (RINOCORT AQUA) 32 MCG/ACT nasal spray, Spray 1 spray into both nostrils daily., Disp: , Rfl:     calcitRIOL (ROCALTROL) 0.25 MCG capsule, Take 0.25 mcg by mouth as needed, Disp: , Rfl:     Calcium-Magnesium-Vitamin D (CITRACAL SLOW RELEASE) 600- MG-MG-UNIT TB24, Take 600 mg by mouth 4 times daily, Disp: , Rfl:     docusate sodium (COLACE) 100 MG capsule, Take 1 capsule by mouth as needed for constipation, Disp: , Rfl:     eletriptan (RELPAX) 40 MG tablet, Take 40 mg by mouth as needed, Disp: , Rfl:     estradiol (ESTRACE) 0.1 MG/GM vaginal cream, , Disp: , Rfl:     fexofenadine (ALLEGRA) 180 MG tablet, Take 180 mg by mouth daily, Disp: , Rfl:     HYDROmorphone (DILAUDID) 2 MG tablet, Take 0.5-1 tablets (1-2 mg) by mouth every 3 hours as needed (headache) 20 tablets = 3 month supply., Disp: 24 tablet, Rfl: 0    ketorolac (TORADOL) 30 MG/ML injection, Inject 1 mL (30 mg) into the vein every 6 hours as needed for moderate pain, Disp: 6 mL, Rfl: 1    Magnesium Oxide 500 MG TABS, Take 500 mg by mouth 2 times daily, Disp: , Rfl:     naloxone (NARCAN) 4 MG/0.1ML nasal spray, Spray 1 spray (4 mg) into one nostril alternating nostrils as needed for opioid reversal every 2-3 minutes until assistance arrives, Disp: 0.2 mL, Rfl: 1    nebivolol (BYSTOLIC) 5 MG tablet, Take 5 mg by mouth daily Take twice a day totaling 10 mg., Disp: , Rfl:     Needle, Disp, 25G X 1\" MISC, 2 each as needed (with toradol) 90-day supply, Disp: 6 each, Rfl: 1    ondansetron (ZOFRAN) 4 MG tablet, Take 1 tablet (4 mg) by mouth every 8 hours as needed for nausea, Disp: 30 tablet, Rfl: 1    order for DME, Equipment being ordered: Oxygen The patient has Cluster Headache and needs 10 liters/minute of high flow oxygen via nonrebreather mask prn headaches, Disp: 99 days, Rfl: 0    order for DME, Equipment " being ordered: Oxygen and non-rebreather mask.   Use high flow oxygen (10-15 L/min) with non-rebreather mask, as needed for cluster headaches, Disp: 1 Units, Rfl: 11    potassium chloride ER (KLOR-CON M) 20 MEQ CR tablet, Take 1 tablet by mouth 2 times daily, Disp: , Rfl:     QUEtiapine (SEROQUEL) 50 MG tablet, Take 1 tablet (50 mg) by mouth At Bedtime, Disp: 75 tablet, Rfl: 3    Syringe, Disposable, (SYRINGE LUER SLIP) 1 ML MISC, 1 each as needed (with toradol/migraine), Disp: 6 each, Rfl: 1    valACYclovir (VALTREX) 1000 mg tablet, Take 1,000 mg by mouth as needed., Disp: , Rfl:     CycloSPORINE (RESTASIS OP), Apply to eye 2 times daily, Disp: , Rfl:        BOTULINUM NEUROTOXIN INJECTION PROCEDURES    VERIFICATION OF PATIENT IDENTIFICATION AND PROCEDURE     Initials   Patient Name SES   Patient  SES   Procedure Verified by: SES     Prior to the start of the procedure and with procedural staff participation, I verbally confirmed the patient s identity using two indicators, relevant allergies, that the procedure was appropriate and matched the consent or emergent situation, and that the correct equipment/implants were available. Immediately prior to starting the procedure I conducted the Time Out with the procedural staff and re-confirmed the patient s name, procedure, and site/side. (The Joint Commission universal protocol was followed.)  Yes    Sedation (Moderate or Deep): None    ABOVE ASSESSMENTS PERFORMED BY    Addie Malhotra MD      INDICATIONS FOR PROCEDURES  Valerie Sim is a 58 year old patient with pain and jaw clenching secondary to the diagnosis of chronic migraine headaches and oromandibular dystonia. Her baseline symptoms have been recalcitrant to oral medications and conservative therapy.  She is here today for reinjection with Botox.    GOAL OF PROCEDURE  The goal of this procedure is to decrease pain and excessive jaw clenching due to chronic migraine headaches and bruxism.     TOTAL DOSE  ADMINISTERED  Dose Administered:  225 units  Botox (Botulinum Toxin Type A)       2:1 Dilution   Unavoidable Drug Waste: Yes  Amount of drug waste (mL): 75 units Botox.  Reason for waste:  Single use vial  Diluent Used:  0.25% bupivacaine  Total Volume of Diluent Used:  4.5 ml  NDC #: Botox 100u ([address]-03)      CONSENT  The risks, benefits, and treatment options were discussed with Valerie Sim and she agreed to proceed.    Written consent was obtained by  [facility] .     EQUIPMENT USED  Needle-25mm stimulating/recording  Needle-30 gauge  EMG/NCS Machine    SKIN PREPARATION  Skin preparation was performed using an alcohol wipe.    GUIDANCE DESCRIPTION  Electro-myographic guidance was necessary throughout the neck and jaw portion of the procedure to accurately identify all areas of dystonic muscles while avoiding injection of non-dystonic muscles, neighboring nerves and nearby vascular structures.       AREA/MUSCLE INJECTED:  225 UNITS BOTOX = TOTAL DOSE, 2:1 DILUTION     1. FACIAL & SCALP MUSCLES: 85 UNITS BOTOX = TOTAL DOSE      Right Frontalis - 10 units of Botox in 2 site/s.  Left Frontalis - 10 units of Botox in 2 site/s.      Procerus - 5 units of Botox at 1 site/s.       Right  - 2.5 units of Botox in 1 site/s.  Left  - 2.5 units of Botox in 1 site/s.      Right Nasalis - 2.5 units of Botox at 1 site/s.           Left Nasalis - 2.5 units of Botox at 1 site/s.     Right Upper Occipitalis - 25 units of Botox at 5 site/s.   Left Upper Occipitalis - 25 units of Botox at 5 site/s.         2. JAW MUSCLES: 90 UNITS BOTOX = TOTAL DOSE     Right Masseter - 20 units of Botox at 2 site/s.   Left Masseter - 20 units of Botox at 2 site/s.      Right Temporalis - 25 units of Botox at 5 site/s.  Left Temporalis - 25 units of Botox at 5 site/s.          3. SHOULDER & NECK MUSCLES: 50 UNITS BOTOX = TOTAL DOSE      Right Levator Scapula - 10 units of Botox at 2 site/s (neck and scapular insertion).  Left  Levator Scapula - 10 units of Botox at 2 site/s (neck and scapular insertion).      Right Upper Trapezius (mid & low lateral) - 10 units of Botox at 3 site/s.  Left Upper Trapezius (mid & low lateral) - 10 units of Botox at 3 site/s.             Right Pectoralis Minor - 5 units of Botox at 1 site/s.                     Left Pectoralis Minor - 5 units of Botox at 1 site/s.         RESPONSE TO PROCEDURE  Valerie Sim tolerated the procedure well and there were no immediate complications. She was allowed to recover for an appropriate period of time and was discharged home in stable condition.      ASSESSMENT AND PLAN   Botulinum toxin injections: Dose of Botox was decreased from 250 units to 225 units due to insurance limitations. She was also given trigger point injections and occipital nerve blocks for headaches and myofascial pain, which is used as an adjuvant to Botox treatment. Patient will continue to monitor response and report at next appointment.   Referrals: Urology referral placed, as she does have a history of high tone pelvic dysfunction for which she gets Botox. She would like to transfer her care regarding this issue to [facility].   Follow up: Valerie Sim was rescheduled for the next series of injections in 9 weeks, at which time we will evaluate response to today's injections. she may call the clinic prior with any questions or concerns prior to the next appointment.

## 2023-09-13 NOTE — NURSING NOTE
Chief Complaint   Patient presents with    Botox     For migraine     BP (!) 145/88 (BP Location: Right arm, Patient Position: Chair, Cuff Size: Adult Regular)   Pulse 80   Wt 82.4 kg (181 lb 10.5 oz)   SpO2 97%   BMI 28.45 kg/m      Mika Johns, EMT

## 2023-09-18 ENCOUNTER — TELEPHONE (OUTPATIENT)
Dept: DERMATOLOGY | Facility: CLINIC | Age: 58
End: 2023-09-18
Payer: COMMERCIAL

## 2023-09-18 NOTE — TELEPHONE ENCOUNTER
[facility] Call Center    Phone Message    May a detailed message be left on voicemail: yes     Reason for Call: Appointment Intake    Referring Provider Name: Addie Malhotra MD in [facility]  Diagnosis and/or Symptoms: High-tone pelvic floor dysfunction - Pelvic floor injections with botox for high tone pelvic floor dysfunction and pudendal neuralgia. Was previously getting Botox at [facility] and would like to switch care to [facility].    Per protocols send encounter for Botox. Please review and call pt back to schedule. Pt also wanted to add that pt has currently been seen with a Uro Gyn   And in the past at [place] with [facility] gyn.   Thanks     Action Taken: Message routed to:  Other: [facility]     Travel Screening: Not Applicable
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
## 2023-09-18 NOTE — TELEPHONE ENCOUNTER
Message sent to scheduling team with request to contact patient to assist in scheduling next available in person or video visit with Dr. Cotton.    Tiesha Dunham RN, BSN

## 2023-09-19 ENCOUNTER — VIRTUAL VISIT (OUTPATIENT)
Dept: PSYCHIATRY | Facility: CLINIC | Age: 58
End: 2023-09-19
Attending: PSYCHIATRY & NEUROLOGY
Payer: COMMERCIAL

## 2023-09-19 ENCOUNTER — TELEPHONE (OUTPATIENT)
Dept: PSYCHIATRY | Facility: CLINIC | Age: 58
End: 2023-09-19
Payer: COMMERCIAL

## 2023-09-19 DIAGNOSIS — F41.1 GENERALIZED ANXIETY DISORDER: ICD-10-CM

## 2023-09-19 DIAGNOSIS — F33.9 EPISODE OF RECURRENT MAJOR DEPRESSIVE DISORDER, UNSPECIFIED DEPRESSION EPISODE SEVERITY (H): Primary | ICD-10-CM

## 2023-09-19 PROCEDURE — 90833 PSYTX W PT W E/M 30 MIN: CPT | Mod: VID

## 2023-09-19 PROCEDURE — 99214 OFFICE O/P EST MOD 30 MIN: CPT | Mod: 24

## 2023-09-19 RX ORDER — ESTRADIOL 0.1 MG/G
CREAM VAGINAL
COMMUNITY
Start: 2019-01-13 | End: 2023-10-19

## 2023-09-19 ASSESSMENT — PATIENT HEALTH QUESTIONNAIRE - PHQ9
SUM OF ALL RESPONSES TO PHQ QUESTIONS 1-9: 17
10. IF YOU CHECKED OFF ANY PROBLEMS, HOW DIFFICULT HAVE THESE PROBLEMS MADE IT FOR YOU TO DO YOUR WORK, TAKE CARE OF THINGS AT HOME, OR GET ALONG WITH OTHER PEOPLE: SOMEWHAT DIFFICULT
SUM OF ALL RESPONSES TO PHQ QUESTIONS 1-9: 17

## 2023-09-19 NOTE — TELEPHONE ENCOUNTER
----- Message from Aleksandr Deutsch MD sent at 9/19/2023  1:09 PM CDT -----  Regarding: Check-in with outpatient provider and follow-up  Hi Team!     I was wondering if someone would be able to call Valerie's PCP to check on if he is taking over her lamotrigine and if they consulted with her before she resumed taking it. I see in the system that Dr. Leary prescribed the lamotrigine, but the dosing listed there is 100mg BID, but Valerie reported taking 100mg per day.     From Valerie's report, she didn't complete an up-titration of the lamotrigine and started it abruptly. Given that, would it also be possible for someone to reach out and check in on her this Thursday (has a difficult family therapy session planned) and ask about any rash or skin change, and then again in two weeks for any skin changes or rash? She didn't report any today after resuming it 2-3 weeks ago, but given the abrupt resumption Dr. Eddy agreed it was better to air on the side of caution.    Thanks all fore the help, very much appreciated!    Mauro,  Aleksandr    Follow Up:    [facility] - Dr. Meek Leary  [phone]      Writer called Dr. Leary's office, but was unable to speak with an RN. Left a message with the coordinator to inquire about the above. Writer was advised that provider is out of the office for today, but will be back in at 7:00AM tomorrow and stated he works tomorrow, Thursday and Friday until 10:50AM.

## 2023-09-19 NOTE — TELEPHONE ENCOUNTER
9/19 Called patient and left voicemail. Provided patient with [phone]. Patient to schedule a visit with Dr. Cotton in-person or virtually.     Kaycee mejia Procedure   Dermatology, Surgery, Urology  [facility]

## 2023-09-20 ENCOUNTER — VIRTUAL VISIT (OUTPATIENT)
Dept: PSYCHOLOGY | Facility: CLINIC | Age: 58
End: 2023-09-20
Payer: COMMERCIAL

## 2023-09-20 DIAGNOSIS — F41.1 GAD (GENERALIZED ANXIETY DISORDER): Primary | ICD-10-CM

## 2023-09-20 PROCEDURE — 90834 PSYTX W PT 45 MINUTES: CPT | Mod: VID | Performed by: MARRIAGE & FAMILY THERAPIST

## 2023-09-20 NOTE — PROGRESS NOTES
Answers submitted by the patient for this visit:  Patient Health Questionnaire (Submitted on 2023)  If you checked off any problems, how difficult have these problems made it for you to do your work, take care of things at home, or get along with other people?: Somewhat difficult  PHQ9 TOTAL SCORE: 17          [facility]                                     Progress Note    Patient Name: Valerie Sim  Date:  23         Service Type: Individual     Session Start Time: 8:02a Session End Time: 8:51a     Session Length:  49    Session #: 14    Attendees: Client and her son Jairo    Service Modality:  Video Visit:      Provider verified identity through the following two step process.  Patient provided:  Patient     Telemedicine Visit: The patient's condition can be safely assessed and treated via synchronous audio and visual telemedicine encounter.      Reason for Telemedicine Visit: Services only offered telehealth    Originating Site (Patient Location): Patient's home    Distant Site (Provider Location): Provider Remote Setting- Home Office    Consent:  The patient/guardian has verbally consented to: the potential risks and benefits of telemedicine (video visit) versus in person care; bill my insurance or make self-payment for services provided; and responsibility for payment of non-covered services.     Patient would like the video invitation sent by:  Send to e-mail at: [email or web address]    Mode of Communication:  Video Conference via Amwell    Distant Location (Provider):  Off-site    As the provider I attest to compliance with applicable laws and regulations related to telemedicine.    DATA  Interactive Complexity: No  Crisis: No        Progress Since Last Session (Related to Symptoms / Goals / Homework):   Symptoms: Improving,mood at 5/10  Homework: Completed in session      Episode of Care Goals: Minimal progress - PREPARATION (Decided to change - considering how); Intervened  by negotiating a change plan and determining options / strategies for behavior change, identifying triggers, exploring social supports, and working towards setting a date to begin behavior change     Current / Ongoing Stressors and Concerns:  Last session 9/5, pt has her upcoming family appt tomorrow and should be with all 4 family members. Hoping that the appt tomorrow goes well but does not feel she needs to apologize more.        Treatment Objective(s) Addressed in This Session:   Increase interest, engagement, and pleasure in doing things       Intervention:   Motivational Interviewing    MI Intervention: Permission to raise concern or advise, Open-ended questions and Reflections: simple and complex     Change Talk Expressed by the Patient: Reasons to change Need to change    Provider Response to Change Talk: R - Reflected patient's change talk and S - Summarized patient's change talk statements     ASSESSMENT: Current Emotional / Mental Status (status of significant symptoms):   Risk status (Self / Other harm or suicidal ideation)   Patient denies current fears or concerns for personal safety.   Patient denies current or recent suicidal ideation or behaviors.   Patient denies current or recent homicidal ideation or behaviors.   Patient denies current or recent self injurious behavior or ideation.   Patient denies other safety concerns.   Patient reports there has been no change in risk factors since their last session.     Patient reports there has been no change in protective factors since their last session.     A safety and risk management plan has been developed including: Patient consented to co-developed safety plan on 4/5/23.  Safety and risk management plan was reviewed.   Patient agreed to use safety plan should any safety concerns arise.  A copy was made available to the patient.     Appearance:   Appropriate    Eye Contact:   Good    Psychomotor Behavior: Normal    Attitude:   Cooperative     Orientation:   All   Speech    Rate / Production: Normal     Volume:  Normal    Mood:    Normal   Affect:    Appropriate    Thought Content:  Clear    Thought Form:  Coherent  Logical    Insight:    Good      Medication Review:   No changes to current psychiatric medication(s)     Medication Compliance:   Yes     Changes in Health Issues:   None reported     Chemical Use Review:   Substance Use: Chemical use reviewed, no active concerns identified      Tobacco Use: No current tobacco use.      Diagnosis:  MDD, moderate, recurrent    Collateral Reports Completed:   Not Applicable    PLAN: (Patient Tasks / Therapist Tasks / Other)  Pt will work to utilize 3 points of focus for family therapy meeting with kids      Yoni FORDEEvette Alex, [facility]        9/20/23                                                       ______________________________________________________________________    Individual Treatment Plan    Patient's Name: Valerie Sim  YOB: 1965    Date of Creation: 7/26/23  Date Treatment Plan Last Reviewed/Revised: 10/26/23    DSM5 Diagnoses: 296.32 (F33.1) Major Depressive Disorder, Recurrent Episode, Moderate _  Psychosocial / Contextual Factors:   PROMIS (reviewed every 90 days):     Referral / Collaboration:  Referral to another professional/service is not indicated at this time..    Anticipated number of session for this episode of care: 3-6 sessions  Anticipation frequency of session: Biweekly  Anticipated Duration of each session: 38-52 minutes  Treatment plan will be reviewed in 90 days or when goals have been changed.       MeasurableTreatment Goal(s) related to diagnosis / functional impairment(s)  Goal 1: Patient will engage in CBT skills for depression sx reduction    Objective #A (Patient Action)    Patient will Identify negative self-talk and behaviors: challenge core beliefs, myths, and actions.  Status Continued 7/26/23    Intervention(s)  Therapist will teach emotional  regulation skills. CBT skills .      Patient has reviewed and agreed to the above plan.      Yoni Alex, CAROLEE  July 26, 2023

## 2023-09-20 NOTE — TELEPHONE ENCOUNTER
Received call back from Shiloh at Dr. Leary's office. She stated Dr. Leary apologized for the misunderstanding and does not intend to take over the lamotrigine prescription or change the dose. Per Shiloh, the patient informed Dr. Leary that she needed the prescription and was unable to get ahold of the psychiatry clinic, so he ordered it without any planned titration schedule.

## 2023-09-20 NOTE — TELEPHONE ENCOUNTER
Amarisr missed call from Maria Del Carmen at [facility] with request to return call at [phone]. Writer attempted to return call and left a message with the  for a call back at the main clinic number.

## 2023-09-21 ENCOUNTER — TELEPHONE (OUTPATIENT)
Dept: PSYCHIATRY | Facility: CLINIC | Age: 58
End: 2023-09-21
Payer: COMMERCIAL

## 2023-09-21 DIAGNOSIS — F33.3 SEVERE RECURRENT MAJOR DEPRESSIVE DISORDER WITH PSYCHOTIC FEATURES (H): Primary | ICD-10-CM

## 2023-09-21 RX ORDER — LAMOTRIGINE 100 MG/1
100 TABLET ORAL DAILY
Qty: 90 TABLET | Refills: 0 | Status: SHIPPED | OUTPATIENT
Start: 2023-09-21 | End: 2023-11-14

## 2023-09-21 NOTE — TELEPHONE ENCOUNTER
----- Message from Aleksandr Deutsch MD sent at 9/19/2023  1:09 PM CDT -----  Regarding: Check-in with outpatient provider and follow-up  Hi Team!      I was wondering if someone would be able to call Valerie's PCP to check on if he is taking over her lamotrigine and if they consulted with her before she resumed taking it. I see in the system that Dr. Leary prescribed the lamotrigine, but the dosing listed there is 100mg BID, but Valerie reported taking 100mg per day.      From Valerie's report, she didn't complete an up-titration of the lamotrigine and started it abruptly. Given that, would it also be possible for someone to reach out and check in on her this Thursday (has a difficult family therapy session planned) and ask about any rash or skin change, and then again in two weeks for any skin changes or rash? She didn't report any today after resuming it 2-3 weeks ago, but given the abrupt resumption Dr. Eddy agreed it was better to air on the side of caution.     Thanks all fore the help, very much appreciated!     Best,  Aleksandr        See telephone encounter from 9/19/2023 for details of call to PCP office.     Per Dr. Deutsch regarding lamotrigine dosage:     100mg per day until the next appointment would be best if that is what she has been taking. Jumping right to 100mg isn't ideal, but if we can do any other changes as a gradual titration that would definitely be for the best.     Writer called patient to check in. She states she is doing okay today, but is anxious as her family therapy session is not until 2:00 today. Patient in agreement with a call back after 3:45 PM.     Patient confirms she has continued to take lamotrigine 100 mg daily since her last visit and agrees to remain at this dose. Patient denies any rash or skin changes. Patient denies any other medication side effects.She states she has not picked up a new lamotrigine prescription recently and agrees to have prescription sent by Dr. Leary discontinued  and and updated prescription sent in. Patient requests 90 day supply due to insurance.     Writer called patient's pharmacy and they confirmed that prescription dispensed on 9/18 was still waiting to be picked up. Writer requested this prescription be canceled.

## 2023-09-21 NOTE — TELEPHONE ENCOUNTER
Writer called patient to check in. Patient tearful during the call and states the therapy session did not go well. She states her kids had an opportunity to share what it was like growing up in their household and patient states they mentioned all the negative aspects of it, which was hard for patient. Patient also states they discussed on a scale of 0-10 how much hope they had for improvement in the future, and her children mostly rated this at a 6 out of 10. Patient shares they have another family therapy session in a week.     Patient reports she has has both individual therapy and trauma therapy and states she has a trauma therapy session tomorrow. Patient states that she tried to go to the library to decompress after the session, but reports the parking lot is being redone, so she was unable to go there. Patient states she is going to spend the evening with her  and service dog tonight.    Patient does endorse passive SI of things being better if she were not around, but denies any plan or intent.     Reviewed safety plan with patient and she reports she would call [facility] or present to the [facility] unit if she developed acute safety concerns.

## 2023-09-25 NOTE — TELEPHONE ENCOUNTER
9/25 2nd attempt. Called patient and left voicemail. Provided patient with [phone]. Patient to schedule a visit with Dr. Cotton in-person or virtually.      Kaycee mejia Procedure   Dermatology, Surgery, Urology  [facility]

## 2023-09-26 ENCOUNTER — VIRTUAL VISIT (OUTPATIENT)
Dept: PSYCHOLOGY | Facility: CLINIC | Age: 58
End: 2023-09-26
Payer: COMMERCIAL

## 2023-09-26 DIAGNOSIS — F41.1 GAD (GENERALIZED ANXIETY DISORDER): Primary | ICD-10-CM

## 2023-09-26 PROCEDURE — 90834 PSYTX W PT 45 MINUTES: CPT | Mod: VID | Performed by: MARRIAGE & FAMILY THERAPIST

## 2023-09-26 NOTE — PROGRESS NOTES
Answers submitted by the patient for this visit:  Patient Health Questionnaire (Submitted on 2023)  If you checked off any problems, how difficult have these problems made it for you to do your work, take care of things at home, or get along with other people?: Somewhat difficult  PHQ9 TOTAL SCORE: 17          [facility]                                     Progress Note    Patient Name: Valerie Sim  Date:  23         Service Type: Individual     Session Start Time: 8:02a Session End Time: 8:51a     Session Length:  49    Session #: 14    Attendees: Client and her son Jairo    Service Modality:  Video Visit:      Provider verified identity through the following two step process.  Patient provided:  Patient     Telemedicine Visit: The patient's condition can be safely assessed and treated via synchronous audio and visual telemedicine encounter.      Reason for Telemedicine Visit: Services only offered telehealth    Originating Site (Patient Location): Patient's home    Distant Site (Provider Location): Provider Remote Setting- Home Office    Consent:  The patient/guardian has verbally consented to: the potential risks and benefits of telemedicine (video visit) versus in person care; bill my insurance or make self-payment for services provided; and responsibility for payment of non-covered services.     Patient would like the video invitation sent by:  Send to e-mail at: [email or web address]    Mode of Communication:  Video Conference via Amwell    Distant Location (Provider):  Off-site    As the provider I attest to compliance with applicable laws and regulations related to telemedicine.    DATA  Interactive Complexity: No  Crisis: No        Progress Since Last Session (Related to Symptoms / Goals / Homework):   Symptoms: No change, mood at 5/10    Homework: Completed in session      Episode of Care Goals: Minimal progress - PREPARATION (Decided to change - considering how);  Intervened by negotiating a change plan and determining options / strategies for behavior change, identifying triggers, exploring social supports, and working towards setting a date to begin behavior change     Current / Ongoing Stressors and Concerns:  Last session 9/20, pt had first family therapy session with kids and it was difficult. Many previous stories brought up from last 30 years of pt's decisions and it was difficult to handle. PT skeptical around how well things can be moving forward based on how many negative experiences there have been.      Treatment Objective(s) Addressed in This Session:   Increase interest, engagement, and pleasure in doing things       Intervention:   Motivational Interviewing    MI Intervention: Permission to raise concern or advise, Open-ended questions and Reflections: simple and complex     Change Talk Expressed by the Patient: Reasons to change Need to change    Provider Response to Change Talk: R - Reflected patient's change talk and S - Summarized patient's change talk statements     ASSESSMENT: Current Emotional / Mental Status (status of significant symptoms):   Risk status (Self / Other harm or suicidal ideation)   Patient denies current fears or concerns for personal safety.   Patient denies current or recent suicidal ideation or behaviors.   Patient denies current or recent homicidal ideation or behaviors.   Patient denies current or recent self injurious behavior or ideation.   Patient denies other safety concerns.   Patient reports there has been no change in risk factors since their last session.     Patient reports there has been no change in protective factors since their last session.     A safety and risk management plan has been developed including: Patient consented to co-developed safety plan on 4/5/23.  Safety and risk management plan was reviewed.   Patient agreed to use safety plan should any safety concerns arise.  A copy was made available to the  patient.     Appearance:   Appropriate    Eye Contact:   Good    Psychomotor Behavior: Normal    Attitude:   Cooperative    Orientation:   All   Speech    Rate / Production: Normal     Volume:  Normal    Mood:    Normal   Affect:    Appropriate    Thought Content:  Clear    Thought Form:  Coherent  Logical    Insight:    Good      Medication Review:   No changes to current psychiatric medication(s)     Medication Compliance:   Yes     Changes in Health Issues:   None reported     Chemical Use Review:   Substance Use: Chemical use reviewed, no active concerns identified      Tobacco Use: No current tobacco use.      Diagnosis:  MDD, moderate, recurrent    Collateral Reports Completed:   Not Applicable    PLAN: (Patient Tasks / Therapist Tasks / Other)  Pt will work to engage in writing down questions for daughter      Yoni Alex, LM          9/26/23                                                    ______________________________________________________________________    Individual Treatment Plan    Patient's Name: Valerie Sim  YOB: 1965    Date of Creation: 7/26/23  Date Treatment Plan Last Reviewed/Revised: 10/26/23    DSM5 Diagnoses: 296.32 (F33.1) Major Depressive Disorder, Recurrent Episode, Moderate _  Psychosocial / Contextual Factors:   PROMIS (reviewed every 90 days):     Referral / Collaboration:  Referral to another professional/service is not indicated at this time..    Anticipated number of session for this episode of care: 3-6 sessions  Anticipation frequency of session: Biweekly  Anticipated Duration of each session: 38-52 minutes  Treatment plan will be reviewed in 90 days or when goals have been changed.       MeasurableTreatment Goal(s) related to diagnosis / functional impairment(s)  Goal 1: Patient will engage in CBT skills for depression sx reduction    Objective #A (Patient Action)    Patient will Identify negative self-talk and behaviors: challenge core beliefs,  myths, and actions.  Status Continued 7/26/23    Intervention(s)  Therapist will teach emotional regulation skills. CBT skills .      Patient has reviewed and agreed to the above plan.      CAROLEE Mendez  July 26, 2023

## 2023-10-04 NOTE — TELEPHONE ENCOUNTER
No call back received and no appointment scheduled. Letter with scheduling information sent to patient's home address on file.    Tiesha Dunham RN, BSN

## 2023-10-05 ENCOUNTER — VIRTUAL VISIT (OUTPATIENT)
Dept: PSYCHOLOGY | Facility: CLINIC | Age: 58
End: 2023-10-05
Payer: COMMERCIAL

## 2023-10-05 DIAGNOSIS — F41.1 GAD (GENERALIZED ANXIETY DISORDER): Primary | ICD-10-CM

## 2023-10-05 PROCEDURE — 90834 PSYTX W PT 45 MINUTES: CPT | Mod: VID | Performed by: MARRIAGE & FAMILY THERAPIST

## 2023-10-05 NOTE — TELEPHONE ENCOUNTER
"Writer called patient for a follow-up check in. Patient reports that she has been doing well. Patient states she saw her psychologist today and that was a good visit. She also states that she has been working on a 21 day yoga program that involves meditation, mantras and letting things go. Patient states this has helped with some of the disappointment she has felt during the family therapy sessions. She states that everyone showed up again for the most recent session, but states they have a \"long way to go.\" Patient also reports that her service dog she got in January has had a very positive impact on her symptom management.     Patient reports she is still taking lamotrigine 100 mg daily and denies any rash or skin changes. Patient denies any other concerns or side effects with this medication.     Patient denies any other questions or concerns at this time and will contact the clinic if there are any changes prior to her 10/17 appointment.   "

## 2023-10-05 NOTE — PROGRESS NOTES
Answers submitted by the patient for this visit:  Patient Health Questionnaire (Submitted on 2023)  If you checked off any problems, how difficult have these problems made it for you to do your work, take care of things at home, or get along with other people?: Somewhat difficult  PHQ9 TOTAL SCORE: 17          [facility]                                     Progress Note    Patient Name: Valerie Sim  Date:  10/5/23         Service Type: Individual     Session Start Time:11:30a      Session End Time: 12:20p     Session Length:  50    Session #: 15    Attendees: Client     Service Modality:  Video Visit:      Provider verified identity through the following two step process.  Patient provided:  Patient     Telemedicine Visit: The patient's condition can be safely assessed and treated via synchronous audio and visual telemedicine encounter.      Reason for Telemedicine Visit: Services only offered telehealth    Originating Site (Patient Location): Patient's home    Distant Site (Provider Location): Provider Remote Setting- Home Office    Consent:  The patient/guardian has verbally consented to: the potential risks and benefits of telemedicine (video visit) versus in person care; bill my insurance or make self-payment for services provided; and responsibility for payment of non-covered services.     Patient would like the video invitation sent by:  Send to e-mail at: [email or web address]    Mode of Communication:  Video Conference via Amwell    Distant Location (Provider):  Off-site    As the provider I attest to compliance with applicable laws and regulations related to telemedicine.    DATA  Interactive Complexity: No  Crisis: No        Progress Since Last Session (Related to Symptoms / Goals / Homework):   Symptoms: Improving, mood around 6/10    Homework: Completed in session      Episode of Care Goals: Minimal progress - PREPARATION (Decided to change - considering how); Intervened by  negotiating a change plan and determining options / strategies for behavior change, identifying triggers, exploring social supports, and working towards setting a date to begin behavior change     Current / Ongoing Stressors and Concerns:  Last session 9/26, pt reports the 2nd family session went better with kids and there was more balance with being able to communicate. Pt will have another appt in 3 weeks. Outside of family work, pt continues with her 21 day plan and working on self care activities.        Treatment Objective(s) Addressed in This Session:   Increase interest, engagement, and pleasure in doing things       Intervention:   Motivational Interviewing    MI Intervention: Permission to raise concern or advise, Open-ended questions and Reflections: simple and complex     Change Talk Expressed by the Patient: Reasons to change Need to change    Provider Response to Change Talk: R - Reflected patient's change talk and S - Summarized patient's change talk statements     ASSESSMENT: Current Emotional / Mental Status (status of significant symptoms):   Risk status (Self / Other harm or suicidal ideation)   Patient denies current fears or concerns for personal safety.   Patient denies current or recent suicidal ideation or behaviors.   Patient denies current or recent homicidal ideation or behaviors.   Patient denies current or recent self injurious behavior or ideation.   Patient denies other safety concerns.   Patient reports there has been no change in risk factors since their last session.     Patient reports there has been no change in protective factors since their last session.     A safety and risk management plan has been developed including: Patient consented to co-developed safety plan on 4/5/23.  Safety and risk management plan was reviewed.   Patient agreed to use safety plan should any safety concerns arise.  A copy was made available to the patient.     Appearance:   Appropriate    Eye  Contact:   Good    Psychomotor Behavior: Normal    Attitude:   Cooperative    Orientation:   All   Speech    Rate / Production: Normal     Volume:  Normal    Mood:    Normal   Affect:    Appropriate    Thought Content:  Clear    Thought Form:  Coherent  Logical    Insight:    Good      Medication Review:   No changes to current psychiatric medication(s)     Medication Compliance:   Yes     Changes in Health Issues:   None reported     Chemical Use Review:   Substance Use: Chemical use reviewed, no active concerns identified      Tobacco Use: No current tobacco use.      Diagnosis:  MDD, moderate, recurrent    Collateral Reports Completed:   Not Applicable    PLAN: (Patient Tasks / Therapist Tasks / Other)  Pt will continue to engage in 1x daily self care activities      Yoni Alex, [facility]          10/5/23                                                    ______________________________________________________________________    Individual Treatment Plan    Patient's Name: Valerie Sim  YOB: 1965    Date of Creation: 7/26/23  Date Treatment Plan Last Reviewed/Revised: 10/26/23    DSM5 Diagnoses: 296.32 (F33.1) Major Depressive Disorder, Recurrent Episode, Moderate _  Psychosocial / Contextual Factors:   PROMIS (reviewed every 90 days):     Referral / Collaboration:  Referral to another professional/service is not indicated at this time..    Anticipated number of session for this episode of care: 3-6 sessions  Anticipation frequency of session: Biweekly  Anticipated Duration of each session: 38-52 minutes  Treatment plan will be reviewed in 90 days or when goals have been changed.       MeasurableTreatment Goal(s) related to diagnosis / functional impairment(s)  Goal 1: Patient will engage in CBT skills for depression sx reduction    Objective #A (Patient Action)    Patient will Identify negative self-talk and behaviors: challenge core beliefs, myths, and actions.  Status Continued  7/26/23    Intervention(s)  Therapist will teach emotional regulation skills. CBT skills .      Patient has reviewed and agreed to the above plan.      Yoni Alex, CAROLEE  July 26, 2023

## 2023-10-10 DIAGNOSIS — G43.009 MIGRAINE WITHOUT AURA AND WITHOUT STATUS MIGRAINOSUS, NOT INTRACTABLE: Primary | ICD-10-CM

## 2023-10-10 RX ORDER — LIDOCAINE HYDROCHLORIDE 40 MG/ML
SOLUTION TOPICAL
Qty: 50 ML | Refills: 6 | Status: SHIPPED | OUTPATIENT
Start: 2023-10-10

## 2023-10-11 ENCOUNTER — OFFICE VISIT (OUTPATIENT)
Dept: PHYSICAL MEDICINE AND REHAB | Facility: CLINIC | Age: 58
End: 2023-10-11
Payer: COMMERCIAL

## 2023-10-11 VITALS
BODY MASS INDEX: 28.51 KG/M2 | HEART RATE: 80 BPM | DIASTOLIC BLOOD PRESSURE: 77 MMHG | OXYGEN SATURATION: 97 % | HEIGHT: 67 IN | SYSTOLIC BLOOD PRESSURE: 126 MMHG | WEIGHT: 181.66 LBS

## 2023-10-11 DIAGNOSIS — M54.81 BILATERAL OCCIPITAL NEURALGIA: ICD-10-CM

## 2023-10-11 DIAGNOSIS — M79.18 MYOFASCIAL PAIN: ICD-10-CM

## 2023-10-11 DIAGNOSIS — M54.81 OCCIPITAL NEURALGIA OF LEFT SIDE: Primary | ICD-10-CM

## 2023-10-11 PROCEDURE — 20552 NJX 1/MLT TRIGGER POINT 1/2: CPT | Performed by: PHYSICAL MEDICINE & REHABILITATION

## 2023-10-11 PROCEDURE — 64405 NJX AA&/STRD GR OCPL NRV: CPT | Mod: XS | Performed by: PHYSICAL MEDICINE & REHABILITATION

## 2023-10-11 RX ORDER — BUPIVACAINE HYDROCHLORIDE 2.5 MG/ML
10 INJECTION, SOLUTION EPIDURAL; INFILTRATION; INTRACAUDAL ONCE
Status: COMPLETED | OUTPATIENT
Start: 2023-10-11 | End: 2023-10-11

## 2023-10-11 RX ORDER — TRIAMCINOLONE ACETONIDE 40 MG/ML
40 INJECTION, SUSPENSION INTRA-ARTICULAR; INTRAMUSCULAR ONCE
Status: COMPLETED | OUTPATIENT
Start: 2023-10-11 | End: 2023-10-11

## 2023-10-11 RX ADMIN — TRIAMCINOLONE ACETONIDE 40 MG: 40 INJECTION, SUSPENSION INTRA-ARTICULAR; INTRAMUSCULAR at 16:06

## 2023-10-11 RX ADMIN — BUPIVACAINE HYDROCHLORIDE 25 MG: 2.5 INJECTION, SOLUTION EPIDURAL; INFILTRATION; INTRACAUDAL at 16:06

## 2023-10-11 NOTE — NURSING NOTE
"Chief Complaint   Patient presents with    Follow Up     ONB     /77 (BP Location: Right arm, Patient Position: Chair, Cuff Size: Adult Regular)   Pulse 80   Ht 1.702 m (5' 7.01\")   Wt 82.4 kg (181 lb 10.5 oz)   SpO2 97%   BMI 28.44 kg/m      Mika Johns, EMT  "

## 2023-10-11 NOTE — LETTER
10/11/2023       RE: Valerie Sim  [address]  [place] [address]     Dear Colleague,    Thank you for referring your patient, Valerie Sim, to the [facility] PHYSICAL MEDICINE AND REHABILITATION CLINIC [place] at [facility]. Please see a copy of my visit note below.        [facility]     PHYSICAL MEDICINE & REHABILITATION CLINIC NOTE  OCCIPITAL NERVE BLOCK & TRIGGER POINT INJECTIONS        Chief Complaint   Patient presents with    Follow Up     ONB       HPI:  Valerie Sim is a 58 year old female with a history of chronic migraine headaches who presents today for bilateral occipital nerve blocks with the goal of minimizing her occipital headaches.     She has a history of cluster headaches, which always seem to occur around this time of year. She is currently on day 6 of a cluster headache, which seems to be more on the left side of her head and neck region.       Current Outpatient Medications   Medication Sig Dispense Refill    aMILoride (MIDAMOR) 5 MG tablet Take 7.5 mg by mouth daily      aMILoride (MIDAMOR) 5 MG tablet Take 7.5 mg by mouth daily 2.5MG in the AM and 5MG in the PM.      amLODIPine (NORVASC) 10 MG tablet Take 10 mg by mouth daily      amLODIPine (NORVASC) 10 MG tablet Take 10 mg by mouth daily      amphetamine-dextroamphetamine (ADDERALL) 20 MG tablet Take 1 tablet (20 mg) by mouth 2 times daily (Patient taking differently: Take 20 mg by mouth 2 times daily Pt currently taking 10MG in the AM and 20MG in the PM.) 60 tablet 0    B Complex-Biotin-FA (B-COMPLEX PO) Take 1 tablet by mouth daily      botulinum toxin type A (BOTOX) 100 units injection Inject intramuscular. Every 18 weeks for pelvic floor spasms      budesonide (RINOCORT AQUA) 32 MCG/ACT nasal spray Spray 1 spray into both nostrils daily.      calcitRIOL (ROCALTROL) 0.25 MCG capsule Take 0.25 mcg by mouth as needed       "Calcium-Magnesium-Vitamin D (CITRACAL SLOW RELEASE) 600- MG-MG-UNIT TB24 Take 600 mg by mouth 4 times daily      docusate sodium (COLACE) 100 MG capsule Take 1 capsule by mouth as needed for constipation      eletriptan (RELPAX) 40 MG tablet Take 40 mg by mouth as needed      estradiol (ESTRACE) 0.1 MG/GM vaginal cream       estradiol (ESTRACE) 0.1 MG/GM vaginal cream       fexofenadine (ALLEGRA) 180 MG tablet Take 180 mg by mouth daily      HYDROmorphone (DILAUDID) 2 MG tablet Take 0.5-1 tablets (1-2 mg) by mouth every 3 hours as needed (headache) 20 tablets = 3 month supply. 24 tablet 0    ketorolac (TORADOL) 30 MG/ML injection Inject 1 mL (30 mg) into the vein every 6 hours as needed for moderate pain 6 mL 1    lamoTRIgine (LAMICTAL) 100 MG tablet Take 1 tablet (100 mg) by mouth daily 90 tablet 0    lidocaine (XYLOCAINE) 4 % external solution Spray 0.5 ml once in each nostril q 4-6 hours as needed for headache. lay down with head tilted back for 5 minutes after if preferred 50 mL 6    Magnesium Oxide 500 MG TABS Take 500 mg by mouth 2 times daily      naloxone (NARCAN) 4 MG/0.1ML nasal spray Spray 1 spray (4 mg) into one nostril alternating nostrils as needed for opioid reversal every 2-3 minutes until assistance arrives 0.2 mL 1    nebivolol (BYSTOLIC) 5 MG tablet Take 5 mg by mouth daily Take twice a day totaling 10 mg.      Needle, Disp, 25G X 1\" MISC 2 each as needed (with toradol) 90-day supply 6 each 1    ondansetron (ZOFRAN) 4 MG tablet Take 1 tablet (4 mg) by mouth every 8 hours as needed for nausea 30 tablet 1    order for DME Equipment being ordered: Oxygen  The patient has Cluster Headache and needs 10 liters/minute of high flow oxygen via nonrebreather mask prn headaches 99 days 0    order for DME Equipment being ordered: Oxygen and non-rebreather mask.     Use high flow oxygen (10-15 L/min) with non-rebreather mask, as needed for cluster headaches 1 Units 11    potassium chloride ER (KLOR-CON " M) 20 MEQ CR tablet Take 1 tablet by mouth 2 times daily      QUEtiapine (SEROQUEL) 50 MG tablet Take 1 tablet (50 mg) by mouth At Bedtime 75 tablet 3    Syringe, Disposable, (SYRINGE LUER SLIP) 1 ML MISC 1 each as needed (with toradol/migraine) 6 each 1    valACYclovir (VALTREX) 1000 mg tablet Take 1,000 mg by mouth as needed.      CycloSPORINE (RESTASIS OP) Apply to eye 2 times daily         Allergies   Allergen Reactions    Fluoxetine Other (See Comments)     PN: LW Reaction: headache  PN: LW Reaction: headache  Migraine      Garlic Blisters, Cough, Dermatitis, Difficulty breathing, Headache, Hives, Itching, Nausea and Vomiting and Shortness Of Breath    Candesartan      Other reaction(s): Myalgia    Duloxetine      Other reaction(s): Headache  migraine    Iodine      Other reaction(s): Other (see comments)  PN: LW CM1: CONTRAST- iodine Reaction :    Metoclopramide      Other reaction(s): Headache  Caused increase in headaches    Perfume      Other reaction(s): Headache  head pain leading to migraines    Pregabalin      Other reaction(s): Headache, Myalgia    Trazodone Other (See Comments) and Unknown     Other reaction(s): Headache  Other reaction(s): Headache  Other reaction(s): Headache    Amitriptyline Hcl Other (See Comments)     Migraine      Candesartan Cilexetil-Hctz Muscle Pain (Myalgia)    Cyproheptadine Hcl      headaches    Desvenlafaxine      Migraine      Diatrizoate Unknown     PN: LW CM1: CONTRAST- iodine Reaction :    Diazepam      Other reaction(s): Vestibular Toxicity  PN: LW Reaction: vertigo  PN: LW Reaction: vertigo    Duloxetine Hcl Other (See Comments)     Migraine      Gabapentin Other (See Comments)    Galcanezumab      Other reaction(s): Headache    Imipramine Other (See Comments)     Migraine      Metoprolol Other (See Comments) and Unknown     headache  headache  headache    Morphine Other (See Comments)     Patient reports seizure   Other reaction(s): Unknown  Seizure; 5/111/21  "updated info: patient states she had a seizure while having a migraine headache years ago and is not sure if it was due to morphine. She has tolerated Dilaudid since then.      No Clinical Screening - See Comments      PN: LW FI1: lactose intolerance LW FI2: shellfish  PN: LW CM1: CONTRAST- iodine Reaction :  PN: LW Other1: -nka    Nortriptyline Other (See Comments)     Migraine      Phenergan Dm [Promethazine-Dm] Other (See Comments)     seizures    Pregabalin Muscle Pain (Myalgia)    Promethazine      PN: LW Reaction: minimal sizures    Venlafaxine Other (See Comments)     PN: LW Reaction: migraine  PN: LW Reaction: migraine  Migraine      Wellbutrin [Bupropion Hydrobromide] Other (See Comments)     Migraine      Compazine Anxiety    Dihydroergotamine Anxiety and Other (See Comments)     Cardiac symptoms    Droperidol Anxiety     PN: LW Reaction: agitation    Medroxyprogesterone Hives     PN: LW Reaction: nausea    Prochlorperazine Anxiety     PN: LW Reaction: \"can't sit still\"         PHYSICAL EXAM:  VS: /77 (BP Location: Right arm, Patient Position: Chair, Cuff Size: Adult Regular)   Pulse 80   Ht 1.702 m (5' 7.01\")   Wt 82.4 kg (181 lb 10.5 oz)   SpO2 97%   BMI 28.44 kg/m     GEN: Patient is pleasant and cooperative  SKIN: No lesions or abrasions on exposed skin  NECK: Hypertonicity noted in left upper trapezius, rhomboid major and levator scapula muscles.       PROCEDURE: Left greater occipital nerve block. Trigger point injections.     Prior to the start of the procedure and with procedural staff participation, I verbally confirmed the patient s identity using two indicators, relevant allergies, that the procedure was appropriate and matched the consent or emergent situation, and that the correct equipment/implants were available. Immediately prior to starting the procedure I conducted the Time Out with the procedural staff and re-confirmed the patient s name, procedure, and site/side. (The Joint " Commission universal protocol was followed.)  Yes    Sedation (Moderate or Deep): None    Dru% lidocaine: 2 ml   0.25% bupivacaine: 10 ml   Kenalo mg = 1 ml      ONB: Area just inferior to insertion of the left superior trapezius insertion onto skull was cleansed with ChloraPrep. Needle was advanced anteriorly to base of skull then slightly withdrawn and injectate was injected in a fan-like distribution at different depths. A 5 ml mixture of 1% lidocaine, 0.25% bupivacaine and Kenalog was injected into the left side.     TPI: A 30-gauge 1-inch needle was used to inject a 8 ml mixture of the above medications into the left upper trapezius and levator scapula muscles for a total of 12 sites.        Valerie Sim tolerated the procedure well without any immediate complications. she was allowed to recover for an appropriate period of time and was discharged home in stable condition.  Patient will follow-up regarding response to this procedure.      ASSESSMENT/PLAN:  Valerie Sim is a 58 year old female who presents to clinic for bilateral occipital nerve blocks and trigger point injections to help manage intractable headache and myofascial neck pain. Patient tolerated the procedure well, and noted a reduction in pain following the procedure.     Follow up in clinic for regularly scheduled Botox injections or sooner if needed.         Again, thank you for allowing me to participate in the care of your patient.      Sincerely,    Addie Malhotra MD

## 2023-10-11 NOTE — PROGRESS NOTES
[facility]     PHYSICAL MEDICINE & REHABILITATION CLINIC NOTE  OCCIPITAL NERVE BLOCK & TRIGGER POINT INJECTIONS        Chief Complaint   Patient presents with    Follow Up     ONB       HPI:  Valerie Sim is a 58 year old female with a history of chronic migraine headaches who presents today for bilateral occipital nerve blocks with the goal of minimizing her occipital headaches.     She has a history of cluster headaches, which always seem to occur around this time of year. She is currently on day 6 of a cluster headache, which seems to be more on the left side of her head and neck region.       Current Outpatient Medications   Medication Sig Dispense Refill    aMILoride (MIDAMOR) 5 MG tablet Take 7.5 mg by mouth daily      aMILoride (MIDAMOR) 5 MG tablet Take 7.5 mg by mouth daily 2.5MG in the AM and 5MG in the PM.      amLODIPine (NORVASC) 10 MG tablet Take 10 mg by mouth daily      amLODIPine (NORVASC) 10 MG tablet Take 10 mg by mouth daily      amphetamine-dextroamphetamine (ADDERALL) 20 MG tablet Take 1 tablet (20 mg) by mouth 2 times daily (Patient taking differently: Take 20 mg by mouth 2 times daily Pt currently taking 10MG in the AM and 20MG in the PM.) 60 tablet 0    B Complex-Biotin-FA (B-COMPLEX PO) Take 1 tablet by mouth daily      botulinum toxin type A (BOTOX) 100 units injection Inject intramuscular. Every 18 weeks for pelvic floor spasms      budesonide (RINOCORT AQUA) 32 MCG/ACT nasal spray Spray 1 spray into both nostrils daily.      calcitRIOL (ROCALTROL) 0.25 MCG capsule Take 0.25 mcg by mouth as needed      Calcium-Magnesium-Vitamin D (CITRACAL SLOW RELEASE) 600- MG-MG-UNIT TB24 Take 600 mg by mouth 4 times daily      docusate sodium (COLACE) 100 MG capsule Take 1 capsule by mouth as needed for constipation      eletriptan (RELPAX) 40 MG tablet Take 40 mg by mouth as needed      estradiol (ESTRACE) 0.1 MG/GM vaginal cream       estradiol  "(ESTRACE) 0.1 MG/GM vaginal cream       fexofenadine (ALLEGRA) 180 MG tablet Take 180 mg by mouth daily      HYDROmorphone (DILAUDID) 2 MG tablet Take 0.5-1 tablets (1-2 mg) by mouth every 3 hours as needed (headache) 20 tablets = 3 month supply. 24 tablet 0    ketorolac (TORADOL) 30 MG/ML injection Inject 1 mL (30 mg) into the vein every 6 hours as needed for moderate pain 6 mL 1    lamoTRIgine (LAMICTAL) 100 MG tablet Take 1 tablet (100 mg) by mouth daily 90 tablet 0    lidocaine (XYLOCAINE) 4 % external solution Spray 0.5 ml once in each nostril q 4-6 hours as needed for headache. lay down with head tilted back for 5 minutes after if preferred 50 mL 6    Magnesium Oxide 500 MG TABS Take 500 mg by mouth 2 times daily      naloxone (NARCAN) 4 MG/0.1ML nasal spray Spray 1 spray (4 mg) into one nostril alternating nostrils as needed for opioid reversal every 2-3 minutes until assistance arrives 0.2 mL 1    nebivolol (BYSTOLIC) 5 MG tablet Take 5 mg by mouth daily Take twice a day totaling 10 mg.      Needle, Disp, 25G X 1\" MISC 2 each as needed (with toradol) 90-day supply 6 each 1    ondansetron (ZOFRAN) 4 MG tablet Take 1 tablet (4 mg) by mouth every 8 hours as needed for nausea 30 tablet 1    order for DME Equipment being ordered: Oxygen  The patient has Cluster Headache and needs 10 liters/minute of high flow oxygen via nonrebreather mask prn headaches 99 days 0    order for DME Equipment being ordered: Oxygen and non-rebreather mask.     Use high flow oxygen (10-15 L/min) with non-rebreather mask, as needed for cluster headaches 1 Units 11    potassium chloride ER (KLOR-CON M) 20 MEQ CR tablet Take 1 tablet by mouth 2 times daily      QUEtiapine (SEROQUEL) 50 MG tablet Take 1 tablet (50 mg) by mouth At Bedtime 75 tablet 3    Syringe, Disposable, (SYRINGE LUER SLIP) 1 ML MISC 1 each as needed (with toradol/migraine) 6 each 1    valACYclovir (VALTREX) 1000 mg tablet Take 1,000 mg by mouth as needed.      " CycloSPORINE (RESTASIS OP) Apply to eye 2 times daily         Allergies   Allergen Reactions    Fluoxetine Other (See Comments)     PN: LW Reaction: headache  PN: LW Reaction: headache  Migraine      Garlic Blisters, Cough, Dermatitis, Difficulty breathing, Headache, Hives, Itching, Nausea and Vomiting and Shortness Of Breath    Candesartan      Other reaction(s): Myalgia    Duloxetine      Other reaction(s): Headache  migraine    Iodine      Other reaction(s): Other (see comments)  PN: LW CM1: CONTRAST- iodine Reaction :    Metoclopramide      Other reaction(s): Headache  Caused increase in headaches    Perfume      Other reaction(s): Headache  head pain leading to migraines    Pregabalin      Other reaction(s): Headache, Myalgia    Trazodone Other (See Comments) and Unknown     Other reaction(s): Headache  Other reaction(s): Headache  Other reaction(s): Headache    Amitriptyline Hcl Other (See Comments)     Migraine      Candesartan Cilexetil-Hctz Muscle Pain (Myalgia)    Cyproheptadine Hcl      headaches    Desvenlafaxine      Migraine      Diatrizoate Unknown     PN: LW CM1: CONTRAST- iodine Reaction :    Diazepam      Other reaction(s): Vestibular Toxicity  PN: LW Reaction: vertigo  PN: LW Reaction: vertigo    Duloxetine Hcl Other (See Comments)     Migraine      Gabapentin Other (See Comments)    Galcanezumab      Other reaction(s): Headache    Imipramine Other (See Comments)     Migraine      Metoprolol Other (See Comments) and Unknown     headache  headache  headache    Morphine Other (See Comments)     Patient reports seizure   Other reaction(s): Unknown  Seizure; 5/111/21 updated info: patient states she had a seizure while having a migraine headache years ago and is not sure if it was due to morphine. She has tolerated Dilaudid since then.      No Clinical Screening - See Comments      PN: LW FI1: lactose intolerance LW FI2: shellfish  PN: LW CM1: CONTRAST- iodine Reaction :  PN: LW Other1: -nka     "Nortriptyline Other (See Comments)     Migraine      Phenergan Dm [Promethazine-Dm] Other (See Comments)     seizures    Pregabalin Muscle Pain (Myalgia)    Promethazine      PN: LW Reaction: minimal sizures    Venlafaxine Other (See Comments)     PN: LW Reaction: migraine  PN: LW Reaction: migraine  Migraine      Wellbutrin [Bupropion Hydrobromide] Other (See Comments)     Migraine      Compazine Anxiety    Dihydroergotamine Anxiety and Other (See Comments)     Cardiac symptoms    Droperidol Anxiety     PN: LW Reaction: agitation    Medroxyprogesterone Hives     PN: LW Reaction: nausea    Prochlorperazine Anxiety     PN: LW Reaction: \"can't sit still\"         PHYSICAL EXAM:  VS: /77 (BP Location: Right arm, Patient Position: Chair, Cuff Size: Adult Regular)   Pulse 80   Ht 1.702 m (5' 7.01\")   Wt 82.4 kg (181 lb 10.5 oz)   SpO2 97%   BMI 28.44 kg/m     GEN: Patient is pleasant and cooperative  SKIN: No lesions or abrasions on exposed skin  NECK: Hypertonicity noted in left upper trapezius, rhomboid major and levator scapula muscles.       PROCEDURE: Left greater occipital nerve block. Trigger point injections.     Prior to the start of the procedure and with procedural staff participation, I verbally confirmed the patient s identity using two indicators, relevant allergies, that the procedure was appropriate and matched the consent or emergent situation, and that the correct equipment/implants were available. Immediately prior to starting the procedure I conducted the Time Out with the procedural staff and re-confirmed the patient s name, procedure, and site/side. (The Joint Commission universal protocol was followed.)  Yes    Sedation (Moderate or Deep): None    Dru% lidocaine: 2 ml   0.25% bupivacaine: 10 ml   Kenalo mg = 1 ml      ONB: Area just inferior to insertion of the left superior trapezius insertion onto skull was cleansed with ChloraPrep. Needle was advanced anteriorly to base of " skull then slightly withdrawn and injectate was injected in a fan-like distribution at different depths. A 5 ml mixture of 1% lidocaine, 0.25% bupivacaine and Kenalog was injected into the left side.     TPI: A 30-gauge 1-inch needle was used to inject a 8 ml mixture of the above medications into the left upper trapezius and levator scapula muscles for a total of 12 sites.        Valerie Sim tolerated the procedure well without any immediate complications. she was allowed to recover for an appropriate period of time and was discharged home in stable condition.  Patient will follow-up regarding response to this procedure.      ASSESSMENT/PLAN:  Valerie Sim is a 58 year old female who presents to clinic for bilateral occipital nerve blocks and trigger point injections to help manage intractable headache and myofascial neck pain. Patient tolerated the procedure well, and noted a reduction in pain following the procedure.     Follow up in clinic for regularly scheduled Botox injections or sooner if needed.       Addie Malhotra MD

## 2023-10-15 ASSESSMENT — PATIENT HEALTH QUESTIONNAIRE - PHQ9
SUM OF ALL RESPONSES TO PHQ QUESTIONS 1-9: 11
10. IF YOU CHECKED OFF ANY PROBLEMS, HOW DIFFICULT HAVE THESE PROBLEMS MADE IT FOR YOU TO DO YOUR WORK, TAKE CARE OF THINGS AT HOME, OR GET ALONG WITH OTHER PEOPLE: SOMEWHAT DIFFICULT
SUM OF ALL RESPONSES TO PHQ QUESTIONS 1-9: 11

## 2023-10-16 ENCOUNTER — VIRTUAL VISIT (OUTPATIENT)
Dept: PSYCHOLOGY | Facility: CLINIC | Age: 58
End: 2023-10-16
Payer: COMMERCIAL

## 2023-10-16 DIAGNOSIS — F41.1 GAD (GENERALIZED ANXIETY DISORDER): Primary | ICD-10-CM

## 2023-10-16 PROCEDURE — 90834 PSYTX W PT 45 MINUTES: CPT | Mod: VID | Performed by: MARRIAGE & FAMILY THERAPIST

## 2023-10-16 NOTE — PROGRESS NOTES
Answers submitted by the patient for this visit:  Patient Health Questionnaire (Submitted on 10/15/2023)  If you checked off any problems, how difficult have these problems made it for you to do your work, take care of things at home, or get along with other people?: Somewhat difficult  PHQ9 TOTAL SCORE: 11          [facility]                                     Progress Note    Patient Name: Valerie Sim  Date:  10/16/23         Service Type: Individual     Session Start Time:  1:01p      Session End Time:  1:51p     Session Length:  50    Session #: 16    Attendees: Client     Service Modality:  Video Visit:      Provider verified identity through the following two step process.  Patient provided:  Patient     Telemedicine Visit: The patient's condition can be safely assessed and treated via synchronous audio and visual telemedicine encounter.      Reason for Telemedicine Visit: Services only offered telehealth    Originating Site (Patient Location): Patient's home    Distant Site (Provider Location): Provider Remote Setting- Home Office    Consent:  The patient/guardian has verbally consented to: the potential risks and benefits of telemedicine (video visit) versus in person care; bill my insurance or make self-payment for services provided; and responsibility for payment of non-covered services.     Patient would like the video invitation sent by:  Send to e-mail at: [email or web address]    Mode of Communication:  Video Conference via [facility]    Distant Location (Provider):  Off-site    As the provider I attest to compliance with applicable laws and regulations related to telemedicine.    DATA  Interactive Complexity: No  Crisis: No        Progress Since Last Session (Related to Symptoms / Goals / Homework):   Symptoms: No change, mood at 5/10    Homework: Completed in session      Episode of Care Goals: Minimal progress - PREPARATION (Decided to change - considering how); Intervened by  negotiating a change plan and determining options / strategies for behavior change, identifying triggers, exploring social supports, and working towards setting a date to begin behavior change     Current / Ongoing Stressors and Concerns:  Last session 10/5, pt was enjoying doing the daily yoga but overdid it to where she is experiencing some pain currently. Moving forward with yoga she will be able to continue with the amount but less straining on her body.        Treatment Objective(s) Addressed in This Session:   Increase interest, engagement, and pleasure in doing things       Intervention:   Motivational Interviewing    MI Intervention: Permission to raise concern or advise, Open-ended questions and Reflections: simple and complex     Change Talk Expressed by the Patient: Reasons to change Need to change    Provider Response to Change Talk: R - Reflected patient's change talk and S - Summarized patient's change talk statements     ASSESSMENT: Current Emotional / Mental Status (status of significant symptoms):   Risk status (Self / Other harm or suicidal ideation)   Patient denies current fears or concerns for personal safety.   Patient denies current or recent suicidal ideation or behaviors.   Patient denies current or recent homicidal ideation or behaviors.   Patient denies current or recent self injurious behavior or ideation.   Patient denies other safety concerns.   Patient reports there has been no change in risk factors since their last session.     Patient reports there has been no change in protective factors since their last session.     A safety and risk management plan has been developed including: Patient consented to co-developed safety plan on 4/5/23.  Safety and risk management plan was reviewed.   Patient agreed to use safety plan should any safety concerns arise.  A copy was made available to the patient.     Appearance:   Appropriate    Eye Contact:   Good    Psychomotor Behavior: Normal     Attitude:   Cooperative    Orientation:   All   Speech    Rate / Production: Normal     Volume:  Normal    Mood:    Normal   Affect:    Appropriate    Thought Content:  Clear    Thought Form:  Coherent  Logical    Insight:    Good      Medication Review:   No changes to current psychiatric medication(s)     Medication Compliance:   Yes     Changes in Health Issues:   None reported     Chemical Use Review:   Substance Use: Chemical use reviewed, no active concerns identified      Tobacco Use: No current tobacco use.      Diagnosis:  MDD, moderate, recurrent    Collateral Reports Completed:   Not Applicable    PLAN: (Patient Tasks / Therapist Tasks / Other)  Pt will work to engage in fewer yoga options to ease physical strain      Yoni Alex, LM          10/16/23                                             ______________________________________________________________________    Individual Treatment Plan    Patient's Name: Valerie Sim  YOB: 1965    Date of Creation: 10/16/23  Date Treatment Plan Last Reviewed/Revised: 1/16/24    DSM5 Diagnoses: 296.32 (F33.1) Major Depressive Disorder, Recurrent Episode, Moderate _  Psychosocial / Contextual Factors:   PROMIS (reviewed every 90 days):     Referral / Collaboration:  Referral to another professional/service is not indicated at this time..    Anticipated number of session for this episode of care: 3-6 sessions  Anticipation frequency of session: Biweekly  Anticipated Duration of each session: 38-52 minutes  Treatment plan will be reviewed in 90 days or when goals have been changed.       MeasurableTreatment Goal(s) related to diagnosis / functional impairment(s)  Goal 1: Patient will engage in CBT skills for depression sx reduction    Objective #A (Patient Action)    Patient will Identify negative self-talk and behaviors: challenge core beliefs, myths, and actions.  Status Continued 10/16/23    Intervention(s)  Therapist will teach  emotional regulation skills. CBT skills .      Patient has reviewed and agreed to the above plan.      Yoni Alex, CAROLEE 10/16/23
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
## 2023-10-17 ENCOUNTER — VIRTUAL VISIT (OUTPATIENT)
Dept: PSYCHIATRY | Facility: CLINIC | Age: 58
End: 2023-10-17
Attending: PSYCHIATRY & NEUROLOGY
Payer: COMMERCIAL

## 2023-10-17 DIAGNOSIS — F33.9 EPISODE OF RECURRENT MAJOR DEPRESSIVE DISORDER, UNSPECIFIED DEPRESSION EPISODE SEVERITY (H): Primary | ICD-10-CM

## 2023-10-17 DIAGNOSIS — F41.0 PANIC DISORDER WITHOUT AGORAPHOBIA: ICD-10-CM

## 2023-10-17 DIAGNOSIS — F41.1 GAD (GENERALIZED ANXIETY DISORDER): ICD-10-CM

## 2023-10-17 PROCEDURE — 90833 PSYTX W PT W E/M 30 MIN: CPT | Mod: VID

## 2023-10-17 PROCEDURE — 99214 OFFICE O/P EST MOD 30 MIN: CPT | Mod: VID

## 2023-10-17 ASSESSMENT — PAIN SCALES - GENERAL: PAINLEVEL: EXTREME PAIN (8)

## 2023-10-17 NOTE — NURSING NOTE
Is the patient currently in the state of [place]? YES    Visit mode:VIDEO    If the visit is dropped, the patient can be reconnected by: VIDEO VISIT: Text to cell phone:   Telephone Information:   Mobile [phone]       Will anyone else be joining the visit? NO  (If patient encounters technical issues they should call [phone]    How would you like to obtain your AVS? MyChart    Are changes needed to the allergy or medication list? Yes please see meds flagged for removal.    Reason for visit: PABLO CHOI

## 2023-10-17 NOTE — PATIENT INSTRUCTIONS
**For crisis resources, please see the information at the end of this document**   Patient Education    Thank you for coming to the [facility] [place] [facility].     Lab Testing:  If you had lab testing today and your results are reassuring or normal they will be mailed to you or sent through [email or web address] within 7 days. If the lab tests need quick action we will call you with the results. The phone number we will call with results is # [phone]. If this is not the best number please call our clinic and change the number.     Medication Refills:  If you need any refills please call your pharmacy and they will contact us. Our fax number for refills is [phone].   Three business days of notice are needed for general medication refill requests.   Five business days of notice are needed for controlled substance refill requests.   If you need to change to a different pharmacy, please contact the new pharmacy directly. The new pharmacy will help you get your medications transferred.     Contact Us:  Please call [phone] during business hours (8-5:00 M-F).   If you have medication related questions after clinic hours, or on the weekend, please call [phone].     Financial Assistance [phone]   Medical Records [phone]       MENTAL HEALTH CRISIS RESOURCES:  For a emergency help, please call 911 or go to the nearest Emergency Department.     Emergency Walk-In Options:   [facility] Unit @ [facility] ([place]): [phone] - Specialized mental health emergency area designed to be calming  [facility] ([place]): [phone]  [facility] ([place]): [phone]  [facility]): [phone]    [facility] Crisis Information:   [place]: [phone]  [place]: [phone]  [place] ([place]TH[place]) - Adult: [phone]     Child: [phone]  [place] - Adult: [phone]     Child: [phone]  [place]:  [phone]  List of all [facility] resources:   https://[place].gov/dhs/people-we-serve/adults/health-care/mental-health/resources/crisis-contacts.jsp    National Crisis Information:   Crisis Text Line: Text  [place]  to 858524  Suicide & Crisis Lifeline: 988  National Suicide Prevention Lifeline: 4-402-353-TALK (1-984.104.4193)       For online chat options, visit https://suicidepreventionlifeline.org/chat/  Poison Control Center: 1-525-559-9878  Trans Lifeline: 5-112-699-5859 - Hotline for transgender people of all ages  The [employer]: 2-278-069-2454 - Hotline for LGBT youth     For Non-Emergency Support:   Fast Tracker: Mental Health & Substance Use Disorder Resources -   https://www.[employer]goodn.org/

## 2023-10-26 DIAGNOSIS — G43.719 CHRONIC MIGRAINE WITHOUT AURA, INTRACTABLE, WITHOUT STATUS MIGRAINOSUS: Primary | ICD-10-CM

## 2023-10-27 ENCOUNTER — TELEPHONE (OUTPATIENT)
Dept: FAMILY MEDICINE | Facility: CLINIC | Age: 58
End: 2023-10-27
Payer: COMMERCIAL

## 2023-10-27 NOTE — TELEPHONE ENCOUNTER
Patient Quality Outreach    Patient is due for the following:   Physical Preventive Adult Physical      Topic Date Due    Hepatitis B Vaccine (1 of 3 - 3-dose series) Never done    Flu Vaccine (1) 09/01/2023    COVID-19 Vaccine (6 - 2023-24 season) 09/01/2023       Next Steps:   Schedule a Adult Preventative    Type of outreach:    Sent [employer] message.    Next Steps:  Reach out within 90 days via [employer].    Max number of attempts reached: No. Will try again in 90 days if patient still on fail list.    Questions for provider review:    None           Judy Lu MA

## 2023-10-31 ENCOUNTER — VIRTUAL VISIT (OUTPATIENT)
Dept: PSYCHOLOGY | Facility: CLINIC | Age: 58
End: 2023-10-31
Payer: COMMERCIAL

## 2023-10-31 ENCOUNTER — MYC MEDICAL ADVICE (OUTPATIENT)
Dept: PHYSICAL MEDICINE AND REHAB | Facility: CLINIC | Age: 58
End: 2023-10-31
Payer: COMMERCIAL

## 2023-10-31 DIAGNOSIS — F41.1 GAD (GENERALIZED ANXIETY DISORDER): Primary | ICD-10-CM

## 2023-10-31 PROCEDURE — 90834 PSYTX W PT 45 MINUTES: CPT | Mod: VID | Performed by: MARRIAGE & FAMILY THERAPIST

## 2023-10-31 ASSESSMENT — PATIENT HEALTH QUESTIONNAIRE - PHQ9
10. IF YOU CHECKED OFF ANY PROBLEMS, HOW DIFFICULT HAVE THESE PROBLEMS MADE IT FOR YOU TO DO YOUR WORK, TAKE CARE OF THINGS AT HOME, OR GET ALONG WITH OTHER PEOPLE: SOMEWHAT DIFFICULT
SUM OF ALL RESPONSES TO PHQ QUESTIONS 1-9: 16
SUM OF ALL RESPONSES TO PHQ QUESTIONS 1-9: 16

## 2023-10-31 NOTE — PROGRESS NOTES
Answers submitted by the patient for this visit:  Patient Health Questionnaire (Submitted on 10/31/2023)  If you checked off any problems, how difficult have these problems made it for you to do your work, take care of things at home, or get along with other people?: Somewhat difficult  PHQ9 TOTAL SCORE: 16          [facility]                                     Progress Note    Patient Name: Valerie Sim  Date:  10/31/23         Service Type: Individual     Session Start Time:  8:03a      Session End Time:  8:55a     Session Length:  52    Session #: 17    Attendees: Client     Service Modality:  Video Visit:      Provider verified identity through the following two step process.  Patient provided:  Patient     Telemedicine Visit: The patient's condition can be safely assessed and treated via synchronous audio and visual telemedicine encounter.      Reason for Telemedicine Visit: Services only offered telehealth    Originating Site (Patient Location): Patient's home    Distant Site (Provider Location): Provider Remote Setting- Home Office    Consent:  The patient/guardian has verbally consented to: the potential risks and benefits of telemedicine (video visit) versus in person care; bill my insurance or make self-payment for services provided; and responsibility for payment of non-covered services.     Patient would like the video invitation sent by:  Send to e-mail at: [email or web address]    Mode of Communication:  Video Conference via [employer]    Distant Location (Provider):  Off-site    As the provider I attest to compliance with applicable laws and regulations related to telemedicine.    DATA  Interactive Complexity: No  Crisis: No        Progress Since Last Session (Related to Symptoms / Goals / Homework):   Symptoms: No change, mood at 5/10    Homework: Completed in session      Episode of Care Goals: Minimal progress - PREPARATION (Decided to change - considering how); Intervened by  negotiating a change plan and determining options / strategies for behavior change, identifying triggers, exploring social supports, and working towards setting a date to begin behavior change     Current / Ongoing Stressors and Concerns:   Last session 10/16, pt notes having another family session which went ok but some ongoing challenges within the family and communication. Pt sees her  and daughter as being similar in personality with expressing less feelings. PT observed her daughter using more power in the meetings and her  being in denial which are not helping the situation and her son.   -PT continues with the yoga activities and incorporating other social activities which are positive. Depressive sx are showing up as more tearful       Treatment Objective(s) Addressed in This Session:   Increase interest, engagement, and pleasure in doing things       Intervention:   Motivational Interviewing    MI Intervention: Permission to raise concern or advise, Open-ended questions and Reflections: simple and complex     Change Talk Expressed by the Patient: Reasons to change Need to change    Provider Response to Change Talk: R - Reflected patient's change talk and S - Summarized patient's change talk statements     ASSESSMENT: Current Emotional / Mental Status (status of significant symptoms):   Risk status (Self / Other harm or suicidal ideation)   Patient denies current fears or concerns for personal safety.   Patient denies current or recent suicidal ideation or behaviors.   Patient denies current or recent homicidal ideation or behaviors.   Patient denies current or recent self injurious behavior or ideation.   Patient denies other safety concerns.   Patient reports there has been no change in risk factors since their last session.     Patient reports there has been no change in protective factors since their last session.     A safety and risk management plan has been developed including: Patient  consented to co-developed safety plan on 4/5/23.  Safety and risk management plan was reviewed.   Patient agreed to use safety plan should any safety concerns arise.  A copy was made available to the patient.     Appearance:   Appropriate    Eye Contact:   Good    Psychomotor Behavior: Normal    Attitude:   Cooperative    Orientation:   All   Speech    Rate / Production: Normal     Volume:  Normal    Mood:    Normal   Affect:    Appropriate    Thought Content:  Clear    Thought Form:  Coherent  Logical    Insight:    Good      Medication Review:   No changes to current psychiatric medication(s)     Medication Compliance:   Yes     Changes in Health Issues:   None reported     Chemical Use Review:   Substance Use: Chemical use reviewed, no active concerns identified      Tobacco Use: No current tobacco use.      Diagnosis:  MDD, moderate, recurrent    Collateral Reports Completed:   Not Applicable    PLAN: (Patient Tasks / Therapist Tasks / Other)  Pt will work to use assertive communication with family in therapy session      CAROLEE Mendez          10/31/23                                             ______________________________________________________________________    Individual Treatment Plan    Patient's Name: Valerie Sim  YOB: 1965    Date of Creation: 10/16/23  Date Treatment Plan Last Reviewed/Revised: 1/16/24    DSM5 Diagnoses: 296.32 (F33.1) Major Depressive Disorder, Recurrent Episode, Moderate _  Psychosocial / Contextual Factors:   PROMIS (reviewed every 90 days):     Referral / Collaboration:  Referral to another professional/service is not indicated at this time..    Anticipated number of session for this episode of care: 3-6 sessions  Anticipation frequency of session: Biweekly  Anticipated Duration of each session: 38-52 minutes  Treatment plan will be reviewed in 90 days or when goals have been changed.       MeasurableTreatment Goal(s) related to diagnosis /  functional impairment(s)  Goal 1: Patient will engage in CBT skills for depression sx reduction    Objective #A (Patient Action)    Patient will Identify negative self-talk and behaviors: challenge core beliefs, myths, and actions.  Status Continued 10/16/23    Intervention(s)  Therapist will teach emotional regulation skills. CBT skills .      Patient has reviewed and agreed to the above plan.      CAROLEE Mendez 10/16/23

## 2023-11-01 NOTE — NURSING NOTE
Chief Complaint   Patient presents with     Headache     [employer] RETURN BOTOX CHRONIC MIGRAINE     Botox       Donis Hercules, EMT  
None

## 2023-11-07 ENCOUNTER — VIRTUAL VISIT (OUTPATIENT)
Dept: PSYCHOLOGY | Facility: CLINIC | Age: 58
End: 2023-11-07
Payer: COMMERCIAL

## 2023-11-07 DIAGNOSIS — F41.1 GAD (GENERALIZED ANXIETY DISORDER): Primary | ICD-10-CM

## 2023-11-07 PROCEDURE — 90834 PSYTX W PT 45 MINUTES: CPT | Mod: VID | Performed by: MARRIAGE & FAMILY THERAPIST

## 2023-11-07 NOTE — PROGRESS NOTES
Answers submitted by the patient for this visit:  Patient Health Questionnaire (Submitted on 10/31/2023)  If you checked off any problems, how difficult have these problems made it for you to do your work, take care of things at home, or get along with other people?: Somewhat difficult  PHQ9 TOTAL SCORE: 16          M [facility]                                     Progress Note    Patient Name: Valerie Sim  Date:  23         Service Type: Individual     Session Start Time:  2:00p    Session End Time:  2:50p     Session Length:  50    Session #: 18    Attendees: Client     Service Modality:  Video Visit:      Provider verified identity through the following two step process.  Patient provided:  Patient     Telemedicine Visit: The patient's condition can be safely assessed and treated via synchronous audio and visual telemedicine encounter.      Reason for Telemedicine Visit: Services only offered telehealth    Originating Site (Patient Location): Patient's home    Distant Site (Provider Location): Provider Remote Setting- Home Office    Consent:  The patient/guardian has verbally consented to: the potential risks and benefits of telemedicine (video visit) versus in person care; bill my insurance or make self-payment for services provided; and responsibility for payment of non-covered services.     Patient would like the video invitation sent by:  Send to e-mail at: [email or web address]    Mode of Communication:  Video Conference via [facility]    Distant Location (Provider):  Off-site    As the provider I attest to compliance with applicable laws and regulations related to telemedicine.    DATA  Interactive Complexity: No  Crisis: No        Progress Since Last Session (Related to Symptoms / Goals / Homework):   Symptoms: Improving, mood around 7/10    Homework: Completed in session      Episode of Care Goals: Minimal progress - PREPARATION (Decided to change - considering how); Intervened by  negotiating a change plan and determining options / strategies for behavior change, identifying triggers, exploring social supports, and working towards setting a date to begin behavior change     Current / Ongoing Stressors and Concerns:  Last session 10/31, pt had a family session last week and it went ok, initially started good but felt her daughter is very stuck. PT has planned to spend xmas with friend in [place]. There has been a plan for pt and daughter to text 2x this evening only about things they are doing.       Goal: Circles of control exercise     Treatment Objective(s) Addressed in This Session:   Increase interest, engagement, and pleasure in doing things       Intervention:   Motivational Interviewing    MI Intervention: Permission to raise concern or advise, Open-ended questions and Reflections: simple and complex     Change Talk Expressed by the Patient: Reasons to change Need to change    Provider Response to Change Talk: R - Reflected patient's change talk and S - Summarized patient's change talk statements     ASSESSMENT: Current Emotional / Mental Status (status of significant symptoms):   Risk status (Self / Other harm or suicidal ideation)   Patient denies current fears or concerns for personal safety.   Patient denies current or recent suicidal ideation or behaviors.   Patient denies current or recent homicidal ideation or behaviors.   Patient denies current or recent self injurious behavior or ideation.   Patient denies other safety concerns.   Patient reports there has been no change in risk factors since their last session.     Patient reports there has been no change in protective factors since their last session.     A safety and risk management plan has been developed including: Patient consented to co-developed safety plan on 4/5/23.  Safety and risk management plan was reviewed.   Patient agreed to use safety plan should any safety concerns arise.  A copy was made available to the  patient.     Appearance:   Appropriate    Eye Contact:   Good    Psychomotor Behavior: Normal    Attitude:   Cooperative    Orientation:   All   Speech    Rate / Production: Normal     Volume:  Normal    Mood:    Normal   Affect:    Appropriate    Thought Content:  Clear    Thought Form:  Coherent  Logical    Insight:    Good      Medication Review:   No changes to current psychiatric medication(s)     Medication Compliance:   Yes     Changes in Health Issues:   None reported     Chemical Use Review:   Substance Use: Chemical use reviewed, no active concerns identified      Tobacco Use: No current tobacco use.      Diagnosis:  MDD, moderate, recurrent    Collateral Reports Completed:   Not Applicable    PLAN: (Patient Tasks / Therapist Tasks / Other)  Pt will work to engage in using circles of control exercise      Yoni Alex, [facility]           11/7/23                                             ______________________________________________________________________    Individual Treatment Plan    Patient's Name: Valerie Sim  YOB: 1965    Date of Creation: 10/16/23  Date Treatment Plan Last Reviewed/Revised: 1/16/24    DSM5 Diagnoses: 296.32 (F33.1) Major Depressive Disorder, Recurrent Episode, Moderate _  Psychosocial / Contextual Factors:   PROMIS (reviewed every 90 days):     Referral / Collaboration:  Referral to another professional/service is not indicated at this time..    Anticipated number of session for this episode of care: 3-6 sessions  Anticipation frequency of session: Biweekly  Anticipated Duration of each session: 38-52 minutes  Treatment plan will be reviewed in 90 days or when goals have been changed.       MeasurableTreatment Goal(s) related to diagnosis / functional impairment(s)  Goal 1: Patient will engage in CBT skills for depression sx reduction    Objective #A (Patient Action)    Patient will Identify negative self-talk and behaviors: challenge core beliefs, myths,  and actions.  Status Continued 10/16/23    Intervention(s)  Therapist will teach emotional regulation skills. CBT skills .      Patient has reviewed and agreed to the above plan.      Yoni Alex, CAROLEE 10/16/23
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
## 2023-11-14 ENCOUNTER — VIRTUAL VISIT (OUTPATIENT)
Dept: PSYCHIATRY | Facility: CLINIC | Age: 58
End: 2023-11-14
Attending: PSYCHIATRY & NEUROLOGY
Payer: COMMERCIAL

## 2023-11-14 DIAGNOSIS — F33.3 SEVERE RECURRENT MAJOR DEPRESSIVE DISORDER WITH PSYCHOTIC FEATURES (H): Primary | ICD-10-CM

## 2023-11-14 DIAGNOSIS — F41.1 GAD (GENERALIZED ANXIETY DISORDER): ICD-10-CM

## 2023-11-14 DIAGNOSIS — F41.0 PANIC DISORDER WITHOUT AGORAPHOBIA: ICD-10-CM

## 2023-11-14 PROCEDURE — 99214 OFFICE O/P EST MOD 30 MIN: CPT | Mod: VID

## 2023-11-14 RX ORDER — QUETIAPINE FUMARATE 50 MG/1
50 TABLET, FILM COATED ORAL AT BEDTIME
Qty: 60 TABLET | Refills: 3 | Status: SHIPPED | OUTPATIENT
Start: 2023-11-14 | End: 2024-01-29

## 2023-11-14 RX ORDER — LAMOTRIGINE 100 MG/1
50 TABLET ORAL 2 TIMES DAILY
Qty: 60 TABLET | Refills: 1 | Status: SHIPPED | OUTPATIENT
Start: 2023-11-14 | End: 2024-01-29

## 2023-11-14 ASSESSMENT — PATIENT HEALTH QUESTIONNAIRE - PHQ9: SUM OF ALL RESPONSES TO PHQ QUESTIONS 1-9: 11

## 2023-11-14 ASSESSMENT — PAIN SCALES - GENERAL: PAINLEVEL: SEVERE PAIN (7)

## 2023-11-14 NOTE — NURSING NOTE
Is the patient currently in the state of [place]? YES    Visit mode:VIDEO    If the visit is dropped, the patient can be reconnected by: VIDEO VISIT: Text to cell phone:   Telephone Information:   Mobile [phone]       Will anyone else be joining the visit? NO  (If patient encounters technical issues they should call [phone]    How would you like to obtain your AVS? MyChart    Are changes needed to the allergy or medication list? No  Patient denies any changes since echeck-in regarding medication and allergies and states all information entered during echeck-in remains accurate.     Reason for visit: RECHALISE CHOI

## 2023-11-14 NOTE — PATIENT INSTRUCTIONS
**For crisis resources, please see the information at the end of this document**   Patient Education    Thank you for coming to the [facility] [place] [facility].     Lab Testing:  If you had lab testing today and your results are reassuring or normal they will be mailed to you or sent through Swiftype within 7 days. If the lab tests need quick action we will call you with the results. The phone number we will call with results is # [phone]. If this is not the best number please call our clinic and change the number.     Medication Refills:  If you need any refills please call your pharmacy and they will contact us. Our fax number for refills is [phone].   Three business days of notice are needed for general medication refill requests.   Five business days of notice are needed for controlled substance refill requests.   If you need to change to a different pharmacy, please contact the new pharmacy directly. The new pharmacy will help you get your medications transferred.     Contact Us:  Please call [phone] during business hours (8-5:00 M-F).   If you have medication related questions after clinic hours, or on the weekend, please call [phone].     Financial Assistance [phone]   Medical Records [phone]       MENTAL HEALTH CRISIS RESOURCES:  For a emergency help, please call 911 or go to the nearest Emergency Department.     Emergency Walk-In Options:   EmPATH Unit @ [place] ([place]): [phone] - Specialized mental health emergency area designed to be calming  [facility] ([place]): [phone]  [facility] ([place]): [phone]  [facility]): [phone]    [facility] Crisis Information:   [place]: [phone]  [place]: [phone]  [place] ([place]) - Adult: [phone]     Child: [phone]  [place] - Adult: [phone]     Child: [phone]  [place]:  [phone]  List of all [facility] resources:   https://[place].gov/dhs/people-we-serve/adults/health-care/mental-health/resources/crisis-contacts.jsp    National Crisis Information:   Crisis Text Line: Text  [place]  to 252784  Suicide & Crisis Lifeline: 988  National Suicide Prevention Lifeline: 2-076-130-TALK (1-355.506.8630)       For online chat options, visit https://suicidepreventionlifeline.org/chat/  Poison Control Center: 4-264-686-0197  Trans Lifeline: 3-793-203-1133 - Hotline for transgender people of all ages  The Will Project: 8-425-700-2784 - Hotline for LGBT youth     For Non-Emergency Support:   Fast Tracker: Mental Health & Substance Use Disorder Resources -   https://www.[place].org/

## 2023-11-15 ENCOUNTER — OFFICE VISIT (OUTPATIENT)
Dept: PHYSICAL MEDICINE AND REHAB | Facility: CLINIC | Age: 58
End: 2023-11-15
Payer: COMMERCIAL

## 2023-11-15 VITALS
WEIGHT: 181.66 LBS | BODY MASS INDEX: 28.51 KG/M2 | HEART RATE: 88 BPM | SYSTOLIC BLOOD PRESSURE: 146 MMHG | DIASTOLIC BLOOD PRESSURE: 81 MMHG | OXYGEN SATURATION: 99 % | HEIGHT: 67 IN

## 2023-11-15 DIAGNOSIS — G43.719 CHRONIC MIGRAINE WITHOUT AURA, INTRACTABLE, WITHOUT STATUS MIGRAINOSUS: Primary | ICD-10-CM

## 2023-11-15 PROCEDURE — 95874 GUIDE NERV DESTR NEEDLE EMG: CPT | Performed by: PHYSICAL MEDICINE & REHABILITATION

## 2023-11-15 PROCEDURE — 64615 CHEMODENERV MUSC MIGRAINE: CPT | Performed by: PHYSICAL MEDICINE & REHABILITATION

## 2023-11-15 RX ORDER — BUPIVACAINE HYDROCHLORIDE 2.5 MG/ML
5 INJECTION, SOLUTION EPIDURAL; INFILTRATION; INTRACAUDAL ONCE
Status: COMPLETED | OUTPATIENT
Start: 2023-11-15 | End: 2023-11-15

## 2023-11-15 RX ADMIN — BUPIVACAINE HYDROCHLORIDE 12.5 MG: 2.5 INJECTION, SOLUTION EPIDURAL; INFILTRATION; INTRACAUDAL at 17:03

## 2023-11-15 NOTE — PROGRESS NOTES
"  [facility]     PM&R CLINIC NOTE  BOTULINUM TOXIN PROCEDURE      HPI  Chief Complaint   Patient presents with    Botox     return     Valerie Sim is a 58 year old female with a history of chronic migraine headaches who presents to clinic for botulinum toxin injections for management of chronic migraine headaches and excessive jaw clenching.     SINCE LAST VISIT  Valerie Sim was last seen for Botox injections on 9/13/2023, at which time she received 225 units of Botox. She came in for occipital nerve blocks on 10/11/2023 for intractable cluster headache.     Patient denies new medical diagnoses, illnesses, hospitalizations, emergency room visits, and injuries since the previous injection with botulinum neurotoxin. Her       RESPONSE TO PREVIOUS TREATMENT    Side effects:  None      1.  Headache frequency during this injection cycle:  She has had approximately 3 \"real\" migraine headache days over the last 9 weeks since her last Botox injections, however she did have a cluster headache which lasted for 20 days. This is compared to her baseline headache frequency of 30 severe headache days per month.     2.  Headache duration during this injection cycle:  Headache duration was between 3 hours and 2 days (excluding the 20 day cluster headache). Patient reports a few episodes of multiple day headaches during this injection cycle.     3.  Headache intensity during this injection cycle:    A.  5/10  =  Typical pain level.  B.  10/10  =  Worst pain level.  C.  4/10  =  Lowest pain level.    4.  Change in headache medication usage during this injection cycle:  (For Example:  Able to decrease use of oral pain medications.) No changes.       5.  ER Visits During This Injection Cycle:  None. She reports she used Imitrex x4, Toradol x2, Zofran x1 and IM Toradol x1. Lidocaine nasal spray is used intermittently for cluster headaches, which is found to be quite helpful.      6.  Functional " "Performance:  Change in ADL's, social interaction, days lost from work, etc. Patient reports being able to more fully participate in social and family activities and responsibilities as headache symptoms have improved. She is now changing her perspectives on pain expectations of severity and length after onset. She also started a variety of different yogas.       PHYSICAL EXAM  VS: BP (!) 146/81 (BP Location: Right arm, Patient Position: Chair, Cuff Size: Adult Large)   Pulse 88   Ht 1.702 m (5' 7.01\")   Wt 82.4 kg (181 lb 10.5 oz)   SpO2 99%   BMI 28.44 kg/m     GEN: Pleasant and cooperative, in no acute distress  HEENT: No facial asymmetry    ALLERGIES  Allergies   Allergen Reactions    Fluoxetine Other (See Comments)     PN: LW Reaction: headache  PN: LW Reaction: headache  Migraine      Garlic Blisters, Cough, Dermatitis, Difficulty breathing, Headache, Hives, Itching, Nausea and Vomiting and Shortness Of Breath    Candesartan      Other reaction(s): Myalgia    Duloxetine      Other reaction(s): Headache  migraine    Iodine      Other reaction(s): Other (see comments)  PN: LW CM1: CONTRAST- iodine Reaction :    Metoclopramide      Other reaction(s): Headache  Caused increase in headaches    Perfume      Other reaction(s): Headache  head pain leading to migraines    Pregabalin      Other reaction(s): Headache, Myalgia    Trazodone Other (See Comments) and Unknown     Other reaction(s): Headache  Other reaction(s): Headache  Other reaction(s): Headache    Amitriptyline Hcl Other (See Comments)     Migraine      Candesartan Cilexetil-Hctz Muscle Pain (Myalgia)    Cyproheptadine Hcl      headaches    Desvenlafaxine      Migraine      Diatrizoate Unknown     PN: LW CM1: CONTRAST- iodine Reaction :    Diazepam      Other reaction(s): Vestibular Toxicity  PN: LW Reaction: vertigo  PN: LW Reaction: vertigo    Duloxetine Hcl Other (See Comments)     Migraine      Gabapentin Other (See Comments)    Galcanezumab      " "Other reaction(s): Headache    Imipramine Other (See Comments)     Migraine      Metoprolol Other (See Comments) and Unknown     headache  headache  headache    Morphine Other (See Comments)     Patient reports seizure   Other reaction(s): Unknown  Seizure; 5/111/21 updated info: patient states she had a seizure while having a migraine headache years ago and is not sure if it was due to morphine. She has tolerated Dilaudid since then.      No Clinical Screening - See Comments      PN: LW FI1: lactose intolerance LW FI2: shellfish  PN: LW CM1: CONTRAST- iodine Reaction :  PN: LW Other1: -nka    Nortriptyline Other (See Comments)     Migraine      Phenergan Dm [Promethazine-Dm] Other (See Comments)     seizures    Pregabalin Muscle Pain (Myalgia)    Promethazine      PN: LW Reaction: minimal sizures    Venlafaxine Other (See Comments)     PN: LW Reaction: migraine  PN: LW Reaction: migraine  Migraine      Wellbutrin [Bupropion Hydrobromide] Other (See Comments)     Migraine      Compazine Anxiety    Dihydroergotamine Anxiety and Other (See Comments)     Cardiac symptoms    Droperidol Anxiety     PN: LW Reaction: agitation    Medroxyprogesterone Hives     PN: LW Reaction: nausea    Prochlorperazine Anxiety     PN: LW Reaction: \"can't sit still\"       CURRENT MEDICATIONS    Current Outpatient Medications:     aMILoride (MIDAMOR) 5 MG tablet, Take 7.5 mg by mouth daily 2.5MG in the AM and 5MG in the PM., Disp: , Rfl:     amLODIPine (NORVASC) 10 MG tablet, Take 10 mg by mouth daily, Disp: , Rfl:     amphetamine-dextroamphetamine (ADDERALL) 20 MG tablet, Take 1 tablet (20 mg) by mouth 2 times daily (Patient taking differently: Take 20 mg by mouth 2 times daily Pt currently taking 10MG in the AM and 20MG in the PM.), Disp: 60 tablet, Rfl: 0    B Complex-Biotin-FA (B-COMPLEX PO), Take 1 tablet by mouth daily, Disp: , Rfl:     botulinum toxin type A (BOTOX) 100 units injection, Inject intramuscular. Every 18 weeks for pelvic " "floor spasms, Disp: , Rfl:     budesonide (RINOCORT AQUA) 32 MCG/ACT nasal spray, Spray 1 spray into both nostrils daily., Disp: , Rfl:     calcitRIOL (ROCALTROL) 0.25 MCG capsule, Take 0.25 mcg by mouth as needed, Disp: , Rfl:     Calcium-Magnesium-Vitamin D (CITRACAL SLOW RELEASE) 600- MG-MG-UNIT TB24, Take 600 mg by mouth 4 times daily, Disp: , Rfl:     docusate sodium (COLACE) 100 MG capsule, Take 1 capsule by mouth as needed for constipation, Disp: , Rfl:     eletriptan (RELPAX) 40 MG tablet, Take 40 mg by mouth as needed, Disp: , Rfl:     estradiol (ESTRACE) 0.1 MG/GM vaginal cream, , Disp: , Rfl:     fexofenadine (ALLEGRA) 180 MG tablet, Take 180 mg by mouth daily, Disp: , Rfl:     HYDROmorphone (DILAUDID) 2 MG tablet, Take 0.5-1 tablets (1-2 mg) by mouth every 3 hours as needed (headache) 20 tablets = 3 month supply., Disp: 24 tablet, Rfl: 0    ketorolac (TORADOL) 30 MG/ML injection, Inject 1 mL (30 mg) into the vein every 6 hours as needed for moderate pain, Disp: 6 mL, Rfl: 1    lamoTRIgine (LAMICTAL) 100 MG tablet, Take 0.5 tablets (50 mg) by mouth 2 times daily, Disp: 60 tablet, Rfl: 1    lidocaine (XYLOCAINE) 4 % external solution, Spray 0.5 ml once in each nostril q 4-6 hours as needed for headache. lay down with head tilted back for 5 minutes after if preferred, Disp: 50 mL, Rfl: 6    Magnesium Oxide 500 MG TABS, Take 500 mg by mouth 2 times daily, Disp: , Rfl:     naloxone (NARCAN) 4 MG/0.1ML nasal spray, Spray 1 spray (4 mg) into one nostril alternating nostrils as needed for opioid reversal every 2-3 minutes until assistance arrives, Disp: 0.2 mL, Rfl: 1    nebivolol (BYSTOLIC) 5 MG tablet, Take 5 mg by mouth daily Take twice a day totaling 10 mg., Disp: , Rfl:     Needle, Disp, 25G X 1\" MISC, 2 each as needed (with toradol) 90-day supply, Disp: 6 each, Rfl: 1    ondansetron (ZOFRAN) 4 MG tablet, Take 1 tablet (4 mg) by mouth every 8 hours as needed for nausea, Disp: 30 tablet, Rfl: 1    " order for DME, Equipment being ordered: Oxygen The patient has Cluster Headache and needs 10 liters/minute of high flow oxygen via nonrebreather mask prn headaches, Disp: 99 days, Rfl: 0    order for DME, Equipment being ordered: Oxygen and non-rebreather mask.   Use high flow oxygen (10-15 L/min) with non-rebreather mask, as needed for cluster headaches, Disp: 1 Units, Rfl: 11    potassium chloride ER (KLOR-CON M) 20 MEQ CR tablet, Take 1 tablet by mouth 2 times daily, Disp: , Rfl:     QUEtiapine (SEROQUEL) 50 MG tablet, Take 1 tablet (50 mg) by mouth at bedtime, Disp: 60 tablet, Rfl: 3    Syringe, Disposable, (SYRINGE LUER SLIP) 1 ML MISC, 1 each as needed (with toradol/migraine), Disp: 6 each, Rfl: 1    valACYclovir (VALTREX) 1000 mg tablet, Take 1,000 mg by mouth as needed., Disp: , Rfl:     Current Facility-Administered Medications:     botulinum toxin type A (BOTOX) 100 units injection 225 Units, 225 Units, Intramuscular, See Admin Instructions, Roscoe, Addie Saeed MD       BOTULINUM NEUROTOXIN INJECTION PROCEDURES    VERIFICATION OF PATIENT IDENTIFICATION AND PROCEDURE     Initials   Patient Name SES   Patient  SES   Procedure Verified by: SES     Prior to the start of the procedure and with procedural staff participation, I verbally confirmed the patient s identity using two indicators, relevant allergies, that the procedure was appropriate and matched the consent or emergent situation, and that the correct equipment/implants were available. Immediately prior to starting the procedure I conducted the Time Out with the procedural staff and re-confirmed the patient s name, procedure, and site/side. (The Joint Commission universal protocol was followed.)  Yes    Sedation (Moderate or Deep): None    ABOVE ASSESSMENTS PERFORMED BY    Addie Malhotra MD      INDICATIONS FOR PROCEDURES  Valerie Sim is a 58 year old patient with pain and jaw clenching secondary to the diagnosis of chronic migraine  headaches and oromandibular dystonia. Her baseline symptoms have been recalcitrant to oral medications and conservative therapy.  She is here today for reinjection with Botox.    GOAL OF PROCEDURE  The goal of this procedure is to decrease pain and excessive jaw clenching due to chronic migraine headaches and bruxism.     TOTAL DOSE ADMINISTERED  Dose Administered:  225 units  Botox (Botulinum Toxin Type A)       2:1 Dilution   Unavoidable Drug Waste: Yes  Amount of drug waste (mL): 75 units Botox.  Reason for waste:  Single use vial  Diluent Used:  0.25% bupivacaine  Total Volume of Diluent Used:  4.5 ml  NDC #: Botox 100u ([address]-64)      CONSENT  The risks, benefits, and treatment options were discussed with Valerie Sim and she agreed to proceed.    Written consent was obtained by  ninoska .     EQUIPMENT USED  Needle-25mm stimulating/recording  Needle-30 gauge  EMG/NCS Machine    SKIN PREPARATION  Skin preparation was performed using an alcohol wipe.    GUIDANCE DESCRIPTION  Electro-myographic guidance was necessary throughout the neck and jaw portion of the procedure to accurately identify all areas of dystonic muscles while avoiding injection of non-dystonic muscles, neighboring nerves and nearby vascular structures.       AREA/MUSCLE INJECTED:  225 UNITS BOTOX = TOTAL DOSE, 2:1 DILUTION     1. FACIAL & SCALP MUSCLES: 85 UNITS BOTOX = TOTAL DOSE      Right Frontalis - 10 units of Botox in 2 site/s.  Left Frontalis - 10 units of Botox in 2 site/s.      Procerus - 5 units of Botox at 1 site/s.       Right  - 2.5 units of Botox in 1 site/s.  Left  - 2.5 units of Botox in 1 site/s.      Right Nasalis - 2.5 units of Botox at 1 site/s.           Left Nasalis - 2.5 units of Botox at 1 site/s.     Right Upper Occipitalis - 25 units of Botox at 5 site/s.   Left Upper Occipitalis - 25 units of Botox at 5 site/s.         2. JAW MUSCLES: 90 UNITS BOTOX = TOTAL DOSE     Right Masseter - 20 units of  Botox at 2 site/s.   Left Masseter - 20 units of Botox at 2 site/s.      Right Temporalis - 25 units of Botox at 5 site/s.  Left Temporalis - 25 units of Botox at 5 site/s.          3. SHOULDER & NECK MUSCLES: 50 UNITS BOTOX = TOTAL DOSE      Right Levator Scapula - 10 units of Botox at 2 site/s (neck and scapular insertion).  Left Levator Scapula - 10 units of Botox at 2 site/s (neck and scapular insertion).      Right Upper Trapezius (mid & low lateral) - 10 units of Botox at 3 site/s.  Left Upper Trapezius (mid & low lateral) - 10 units of Botox at 3 site/s.             Right Pectoralis Minor - 5 units of Botox at 1 site/s.                     Left Pectoralis Minor - 5 units of Botox at 1 site/s.         RESPONSE TO PROCEDURE  Valerie Sim tolerated the procedure well and there were no immediate complications. She was allowed to recover for an appropriate period of time and was discharged home in stable condition.      ASSESSMENT AND PLAN   Botulinum toxin injections: No changes made to Botox dose or distribution today. Patient will continue to monitor response to Botox and report at next appointment.  Referrals: None.   Follow up: Valerie Sim was rescheduled for the next series of injections in 9 weeks, at which time we will evaluate response to today's injections. she may call the clinic prior with any questions or concerns prior to the next appointment.

## 2023-11-15 NOTE — LETTER
"11/15/2023       RE: Valerie Sim  [address]  [facility] [address]     Dear Colleague,    Thank you for referring your patient, Valerie Sim, to the [facility] [place] [facility]. Please see a copy of my visit note below.      [facility]     PM&R CLINIC NOTE  BOTULINUM TOXIN PROCEDURE      HPI  Chief Complaint   Patient presents with    Botox     return     Valerie Sim is a 58 year old female with a history of chronic migraine headaches who presents to clinic for botulinum toxin injections for management of chronic migraine headaches and excessive jaw clenching.     SINCE LAST VISIT  Valerie Sim was last seen for Botox injections on 9/13/2023, at which time she received 225 units of Botox. She came in for occipital nerve blocks on 10/11/2023 for intractable cluster headache.     Patient denies new medical diagnoses, illnesses, hospitalizations, emergency room visits, and injuries since the previous injection with botulinum neurotoxin. Her       RESPONSE TO PREVIOUS TREATMENT    Side effects:  None      1.  Headache frequency during this injection cycle:  She has had approximately 3 \"real\" migraine headache days over the last 9 weeks since her last Botox injections, however she did have a cluster headache which lasted for 20 days. This is compared to her baseline headache frequency of 30 severe headache days per month.     2.  Headache duration during this injection cycle:  Headache duration was between 3 hours and 2 days (excluding the 20 day cluster headache). Patient reports a few episodes of multiple day headaches during this injection cycle.     3.  Headache intensity during this injection cycle:    A.  5/10  =  Typical pain level.  B.  10/10  =  Worst pain level.  C.  4/10  =  Lowest pain level.    4.  Change in headache medication usage during this " "injection cycle:  (For Example:  Able to decrease use of oral pain medications.) No changes.       5.  ER Visits During This Injection Cycle:  None. She reports she used Imitrex x4, Toradol x2, Zofran x1 and IM Toradol x1. Lidocaine nasal spray is used intermittently for cluster headaches, which is found to be quite helpful.      6.  Functional Performance:  Change in ADL's, social interaction, days lost from work, etc. Patient reports being able to more fully participate in social and family activities and responsibilities as headache symptoms have improved. She is now changing her perspectives on pain expectations of severity and length after onset. She also started a variety of different yogas.       PHYSICAL EXAM  VS: BP (!) 146/81 (BP Location: Right arm, Patient Position: Chair, Cuff Size: Adult Large)   Pulse 88   Ht 1.702 m (5' 7.01\")   Wt 82.4 kg (181 lb 10.5 oz)   SpO2 99%   BMI 28.44 kg/m     GEN: Pleasant and cooperative, in no acute distress  HEENT: No facial asymmetry    ALLERGIES  Allergies   Allergen Reactions    Fluoxetine Other (See Comments)     PN: LW Reaction: headache  PN: LW Reaction: headache  Migraine      Garlic Blisters, Cough, Dermatitis, Difficulty breathing, Headache, Hives, Itching, Nausea and Vomiting and Shortness Of Breath    Candesartan      Other reaction(s): Myalgia    Duloxetine      Other reaction(s): Headache  migraine    Iodine      Other reaction(s): Other (see comments)  PN: LW CM1: CONTRAST- iodine Reaction :    Metoclopramide      Other reaction(s): Headache  Caused increase in headaches    Perfume      Other reaction(s): Headache  head pain leading to migraines    Pregabalin      Other reaction(s): Headache, Myalgia    Trazodone Other (See Comments) and Unknown     Other reaction(s): Headache  Other reaction(s): Headache  Other reaction(s): Headache    Amitriptyline Hcl Other (See Comments)     Migraine      Candesartan Cilexetil-Hctz Muscle Pain (Myalgia)    " "Cyproheptadine Hcl      headaches    Desvenlafaxine      Migraine      Diatrizoate Unknown     PN: LW CM1: CONTRAST- iodine Reaction :    Diazepam      Other reaction(s): Vestibular Toxicity  PN: LW Reaction: vertigo  PN: LW Reaction: vertigo    Duloxetine Hcl Other (See Comments)     Migraine      Gabapentin Other (See Comments)    Galcanezumab      Other reaction(s): Headache    Imipramine Other (See Comments)     Migraine      Metoprolol Other (See Comments) and Unknown     headache  headache  headache    Morphine Other (See Comments)     Patient reports seizure   Other reaction(s): Unknown  Seizure; 5/111/21 updated info: patient states she had a seizure while having a migraine headache years ago and is not sure if it was due to morphine. She has tolerated Dilaudid since then.      No Clinical Screening - See Comments      PN: LW FI1: lactose intolerance LW FI2: shellfish  PN: LW CM1: CONTRAST- iodine Reaction :  PN: LW Other1: -nka    Nortriptyline Other (See Comments)     Migraine      Phenergan Dm [Promethazine-Dm] Other (See Comments)     seizures    Pregabalin Muscle Pain (Myalgia)    Promethazine      PN: LW Reaction: minimal sizures    Venlafaxine Other (See Comments)     PN: LW Reaction: migraine  PN: LW Reaction: migraine  Migraine      Wellbutrin [Bupropion Hydrobromide] Other (See Comments)     Migraine      Compazine Anxiety    Dihydroergotamine Anxiety and Other (See Comments)     Cardiac symptoms    Droperidol Anxiety     PN: LW Reaction: agitation    Medroxyprogesterone Hives     PN: LW Reaction: nausea    Prochlorperazine Anxiety     PN: LW Reaction: \"can't sit still\"       CURRENT MEDICATIONS    Current Outpatient Medications:     aMILoride (MIDAMOR) 5 MG tablet, Take 7.5 mg by mouth daily 2.5MG in the AM and 5MG in the PM., Disp: , Rfl:     amLODIPine (NORVASC) 10 MG tablet, Take 10 mg by mouth daily, Disp: , Rfl:     amphetamine-dextroamphetamine (ADDERALL) 20 MG tablet, Take 1 tablet (20 mg) " by mouth 2 times daily (Patient taking differently: Take 20 mg by mouth 2 times daily Pt currently taking 10MG in the AM and 20MG in the PM.), Disp: 60 tablet, Rfl: 0    B Complex-Biotin-FA (B-COMPLEX PO), Take 1 tablet by mouth daily, Disp: , Rfl:     botulinum toxin type A (BOTOX) 100 units injection, Inject intramuscular. Every 18 weeks for pelvic floor spasms, Disp: , Rfl:     budesonide (RINOCORT AQUA) 32 MCG/ACT nasal spray, Spray 1 spray into both nostrils daily., Disp: , Rfl:     calcitRIOL (ROCALTROL) 0.25 MCG capsule, Take 0.25 mcg by mouth as needed, Disp: , Rfl:     Calcium-Magnesium-Vitamin D (CITRACAL SLOW RELEASE) 600- MG-MG-UNIT TB24, Take 600 mg by mouth 4 times daily, Disp: , Rfl:     docusate sodium (COLACE) 100 MG capsule, Take 1 capsule by mouth as needed for constipation, Disp: , Rfl:     eletriptan (RELPAX) 40 MG tablet, Take 40 mg by mouth as needed, Disp: , Rfl:     estradiol (ESTRACE) 0.1 MG/GM vaginal cream, , Disp: , Rfl:     fexofenadine (ALLEGRA) 180 MG tablet, Take 180 mg by mouth daily, Disp: , Rfl:     HYDROmorphone (DILAUDID) 2 MG tablet, Take 0.5-1 tablets (1-2 mg) by mouth every 3 hours as needed (headache) 20 tablets = 3 month supply., Disp: 24 tablet, Rfl: 0    ketorolac (TORADOL) 30 MG/ML injection, Inject 1 mL (30 mg) into the vein every 6 hours as needed for moderate pain, Disp: 6 mL, Rfl: 1    lamoTRIgine (LAMICTAL) 100 MG tablet, Take 0.5 tablets (50 mg) by mouth 2 times daily, Disp: 60 tablet, Rfl: 1    lidocaine (XYLOCAINE) 4 % external solution, Spray 0.5 ml once in each nostril q 4-6 hours as needed for headache. lay down with head tilted back for 5 minutes after if preferred, Disp: 50 mL, Rfl: 6    Magnesium Oxide 500 MG TABS, Take 500 mg by mouth 2 times daily, Disp: , Rfl:     naloxone (NARCAN) 4 MG/0.1ML nasal spray, Spray 1 spray (4 mg) into one nostril alternating nostrils as needed for opioid reversal every 2-3 minutes until assistance arrives, Disp: 0.2  "mL, Rfl: 1    nebivolol (BYSTOLIC) 5 MG tablet, Take 5 mg by mouth daily Take twice a day totaling 10 mg., Disp: , Rfl:     Needle, Disp, 25G X 1\" MISC, 2 each as needed (with toradol) 90-day supply, Disp: 6 each, Rfl: 1    ondansetron (ZOFRAN) 4 MG tablet, Take 1 tablet (4 mg) by mouth every 8 hours as needed for nausea, Disp: 30 tablet, Rfl: 1    order for DME, Equipment being ordered: Oxygen The patient has Cluster Headache and needs 10 liters/minute of high flow oxygen via nonrebreather mask prn headaches, Disp: 99 days, Rfl: 0    order for DME, Equipment being ordered: Oxygen and non-rebreather mask.   Use high flow oxygen (10-15 L/min) with non-rebreather mask, as needed for cluster headaches, Disp: 1 Units, Rfl: 11    potassium chloride ER (KLOR-CON M) 20 MEQ CR tablet, Take 1 tablet by mouth 2 times daily, Disp: , Rfl:     QUEtiapine (SEROQUEL) 50 MG tablet, Take 1 tablet (50 mg) by mouth at bedtime, Disp: 60 tablet, Rfl: 3    Syringe, Disposable, (SYRINGE LUER SLIP) 1 ML MISC, 1 each as needed (with toradol/migraine), Disp: 6 each, Rfl: 1    valACYclovir (VALTREX) 1000 mg tablet, Take 1,000 mg by mouth as needed., Disp: , Rfl:     Current Facility-Administered Medications:     botulinum toxin type A (BOTOX) 100 units injection 225 Units, 225 Units, Intramuscular, See Admin Instructions, Standal, Addie Saeed MD       BOTULINUM NEUROTOXIN INJECTION PROCEDURES    VERIFICATION OF PATIENT IDENTIFICATION AND PROCEDURE     Initials   Patient Name SES   Patient  SES   Procedure Verified by: SES     Prior to the start of the procedure and with procedural staff participation, I verbally confirmed the patient s identity using two indicators, relevant allergies, that the procedure was appropriate and matched the consent or emergent situation, and that the correct equipment/implants were available. Immediately prior to starting the procedure I conducted the Time Out with the procedural staff and re-confirmed the " patient s name, procedure, and site/side. (The Joint Commission universal protocol was followed.)  Yes    Sedation (Moderate or Deep): None    ABOVE ASSESSMENTS PERFORMED BY    Addie Malhotra MD      INDICATIONS FOR PROCEDURES  Valerie Sim is a 58 year old patient with pain and jaw clenching secondary to the diagnosis of chronic migraine headaches and oromandibular dystonia. Her baseline symptoms have been recalcitrant to oral medications and conservative therapy.  She is here today for reinjection with Botox.    GOAL OF PROCEDURE  The goal of this procedure is to decrease pain and excessive jaw clenching due to chronic migraine headaches and bruxism.     TOTAL DOSE ADMINISTERED  Dose Administered:  225 units  Botox (Botulinum Toxin Type A)       2:1 Dilution   Unavoidable Drug Waste: Yes  Amount of drug waste (mL): 75 units Botox.  Reason for waste:  Single use vial  Diluent Used:  0.25% bupivacaine  Total Volume of Diluent Used:  4.5 ml  NDC #: Botox 100u ([address]-96)      CONSENT  The risks, benefits, and treatment options were discussed with Valerie Sim and she agreed to proceed.    Written consent was obtained by  [facility] .     EQUIPMENT USED  Needle-25mm stimulating/recording  Needle-30 gauge  EMG/NCS Machine    SKIN PREPARATION  Skin preparation was performed using an alcohol wipe.    GUIDANCE DESCRIPTION  Electro-myographic guidance was necessary throughout the neck and jaw portion of the procedure to accurately identify all areas of dystonic muscles while avoiding injection of non-dystonic muscles, neighboring nerves and nearby vascular structures.       AREA/MUSCLE INJECTED:  225 UNITS BOTOX = TOTAL DOSE, 2:1 DILUTION     1. FACIAL & SCALP MUSCLES: 85 UNITS BOTOX = TOTAL DOSE      Right Frontalis - 10 units of Botox in 2 site/s.  Left Frontalis - 10 units of Botox in 2 site/s.      Procerus - 5 units of Botox at 1 site/s.       Right  - 2.5 units of Botox in 1 site/s.  Left  -  2.5 units of Botox in 1 site/s.      Right Nasalis - 2.5 units of Botox at 1 site/s.           Left Nasalis - 2.5 units of Botox at 1 site/s.     Right Upper Occipitalis - 25 units of Botox at 5 site/s.   Left Upper Occipitalis - 25 units of Botox at 5 site/s.         2. JAW MUSCLES: 90 UNITS BOTOX = TOTAL DOSE     Right Masseter - 20 units of Botox at 2 site/s.   Left Masseter - 20 units of Botox at 2 site/s.      Right Temporalis - 25 units of Botox at 5 site/s.  Left Temporalis - 25 units of Botox at 5 site/s.          3. SHOULDER & NECK MUSCLES: 50 UNITS BOTOX = TOTAL DOSE      Right Levator Scapula - 10 units of Botox at 2 site/s (neck and scapular insertion).  Left Levator Scapula - 10 units of Botox at 2 site/s (neck and scapular insertion).      Right Upper Trapezius (mid & low lateral) - 10 units of Botox at 3 site/s.  Left Upper Trapezius (mid & low lateral) - 10 units of Botox at 3 site/s.             Right Pectoralis Minor - 5 units of Botox at 1 site/s.                     Left Pectoralis Minor - 5 units of Botox at 1 site/s.         RESPONSE TO PROCEDURE  Valerie Sim tolerated the procedure well and there were no immediate complications. She was allowed to recover for an appropriate period of time and was discharged home in stable condition.      ASSESSMENT AND PLAN   Botulinum toxin injections: No changes made to Botox dose or distribution today. Patient will continue to monitor response to Botox and report at next appointment.  Referrals: None.   Follow up: Valerie Sim was rescheduled for the next series of injections in 9 weeks, at which time we will evaluate response to today's injections. she may call the clinic prior with any questions or concerns prior to the next appointment.       Again, thank you for allowing me to participate in the care of your patient.      Sincerely,    Addie Malhotra MD

## 2023-11-24 ENCOUNTER — TELEPHONE (OUTPATIENT)
Dept: PSYCHIATRY | Facility: CLINIC | Age: 58
End: 2023-11-24
Payer: COMMERCIAL

## 2023-12-03 DIAGNOSIS — G43.009 MIGRAINE WITHOUT AURA AND WITHOUT STATUS MIGRAINOSUS, NOT INTRACTABLE: ICD-10-CM

## 2023-12-03 ASSESSMENT — ANXIETY QUESTIONNAIRES
4. TROUBLE RELAXING: MORE THAN HALF THE DAYS
GAD7 TOTAL SCORE: 10
GAD7 TOTAL SCORE: 10
6. BECOMING EASILY ANNOYED OR IRRITABLE: SEVERAL DAYS
7. FEELING AFRAID AS IF SOMETHING AWFUL MIGHT HAPPEN: MORE THAN HALF THE DAYS
3. WORRYING TOO MUCH ABOUT DIFFERENT THINGS: SEVERAL DAYS
2. NOT BEING ABLE TO STOP OR CONTROL WORRYING: SEVERAL DAYS
5. BEING SO RESTLESS THAT IT IS HARD TO SIT STILL: MORE THAN HALF THE DAYS
1. FEELING NERVOUS, ANXIOUS, OR ON EDGE: SEVERAL DAYS
IF YOU CHECKED OFF ANY PROBLEMS ON THIS QUESTIONNAIRE, HOW DIFFICULT HAVE THESE PROBLEMS MADE IT FOR YOU TO DO YOUR WORK, TAKE CARE OF THINGS AT HOME, OR GET ALONG WITH OTHER PEOPLE: SOMEWHAT DIFFICULT

## 2023-12-03 ASSESSMENT — PATIENT HEALTH QUESTIONNAIRE - PHQ9
SUM OF ALL RESPONSES TO PHQ QUESTIONS 1-9: 15
10. IF YOU CHECKED OFF ANY PROBLEMS, HOW DIFFICULT HAVE THESE PROBLEMS MADE IT FOR YOU TO DO YOUR WORK, TAKE CARE OF THINGS AT HOME, OR GET ALONG WITH OTHER PEOPLE: SOMEWHAT DIFFICULT
SUM OF ALL RESPONSES TO PHQ QUESTIONS 1-9: 15

## 2023-12-04 ENCOUNTER — VIRTUAL VISIT (OUTPATIENT)
Dept: PSYCHOLOGY | Facility: CLINIC | Age: 58
End: 2023-12-04
Payer: COMMERCIAL

## 2023-12-04 DIAGNOSIS — F33.0 MILD EPISODE OF RECURRENT MAJOR DEPRESSIVE DISORDER (H): Primary | ICD-10-CM

## 2023-12-04 PROCEDURE — 90834 PSYTX W PT 45 MINUTES: CPT | Mod: VID | Performed by: MARRIAGE & FAMILY THERAPIST

## 2023-12-04 NOTE — PROGRESS NOTES
Answers submitted by the patient for this visit:  Patient Health Questionnaire (Submitted on 12/3/2023)  If you checked off any problems, how difficult have these problems made it for you to do your work, take care of things at home, or get along with other people?: Somewhat difficult  PHQ9 TOTAL SCORE: 15  MAHAMED-7 (Submitted on 12/3/2023)  MAHAMED 7 TOTAL SCORE: 10          [facility]                                     Progress Note    Patient Name: Valerie Sim  Date:  23         Service Type: Individual    Session Start Time:  2:02p    Session End Time:  2:54p    Session Length:  52    Session #: 19    Attendees: Client     Service Modality:  Video Visit:      Provider verified identity through the following two step process.  Patient provided:  Patient     Telemedicine Visit: The patient's condition can be safely assessed and treated via synchronous audio and visual telemedicine encounter.      Reason for Telemedicine Visit: Services only offered telehealth    Originating Site (Patient Location): Patient's home    Distant Site (Provider Location): Provider Remote Setting- Home Office    Consent:  The patient/guardian has verbally consented to: the potential risks and benefits of telemedicine (video visit) versus in person care; bill my insurance or make self-payment for services provided; and responsibility for payment of non-covered services.     Patient would like the video invitation sent by:  Send to e-mail at: [email or web address]    Mode of Communication:  Video Conference via [employer]well    Distant Location (Provider):  Off-site    As the provider I attest to compliance with applicable laws and regulations related to telemedicine.    DATA  Interactive Complexity: No  Crisis: No        Progress Since Last Session (Related to Symptoms / Goals / Homework):   Symptoms: No change, mood at 6/10    Homework: Completed in session      Episode of Care Goals: Minimal progress - PREPARATION  (Decided to change - considering how); Intervened by negotiating a change plan and determining options / strategies for behavior change, identifying triggers, exploring social supports, and working towards setting a date to begin behavior change     Current / Ongoing Stressors and Concerns:  Last session 11/7, pt had a positive holiday with son and it was tolerable. Pt has had 1 further family appt and will have another this week, but the last one was the other 3 family members which went well. Pt texted her daughter 3x recently which was acceptable but not greatly moving. Pt continues to engage in her self care activities with flor grace.        Treatment Objective(s) Addressed in This Session:   Increase interest, engagement, and pleasure in doing things       Intervention:   Motivational Interviewing    MI Intervention: Permission to raise concern or advise, Open-ended questions and Reflections: simple and complex     Change Talk Expressed by the Patient: Reasons to change Need to change    Provider Response to Change Talk: R - Reflected patient's change talk and S - Summarized patient's change talk statements     ASSESSMENT: Current Emotional / Mental Status (status of significant symptoms):   Risk status (Self / Other harm or suicidal ideation)   Patient denies current fears or concerns for personal safety.   Patient denies current or recent suicidal ideation or behaviors.   Patient denies current or recent homicidal ideation or behaviors.   Patient denies current or recent self injurious behavior or ideation.   Patient denies other safety concerns.   Patient reports there has been no change in risk factors since their last session.     Patient reports there has been no change in protective factors since their last session.     A safety and risk management plan has been developed including: Patient consented to co-developed safety plan on 4/5/23.  Safety and risk management plan was reviewed.   Patient  agreed to use safety plan should any safety concerns arise.  A copy was made available to the patient.     Appearance:   Appropriate    Eye Contact:   Good    Psychomotor Behavior: Normal    Attitude:   Cooperative    Orientation:   All   Speech    Rate / Production: Normal     Volume:  Normal    Mood:    Normal   Affect:    Appropriate    Thought Content:  Clear    Thought Form:  Coherent  Logical    Insight:    Good      Medication Review:   No changes to current psychiatric medication(s)     Medication Compliance:   Yes     Changes in Health Issues:   None reported     Chemical Use Review:   Substance Use: Chemical use reviewed, no active concerns identified      Tobacco Use: No current tobacco use.      Diagnosis:  MDD, moderate, recurrent    Collateral Reports Completed:   Not Applicable    PLAN: (Patient Tasks / Therapist Tasks / Other)  Pt will work to engage in assertive communication with family      Yoni LANDA Romeliawicho, LMFT           12/4/23                                             ______________________________________________________________________    Individual Treatment Plan    Patient's Name: Valerie Sim  YOB: 1965    Date of Creation: 10/16/23  Date Treatment Plan Last Reviewed/Revised: 1/16/24    DSM5 Diagnoses: 296.32 (F33.1) Major Depressive Disorder, Recurrent Episode, Moderate _  Psychosocial / Contextual Factors:   PROMIS (reviewed every 90 days):     Referral / Collaboration:  Referral to another professional/service is not indicated at this time..    Anticipated number of session for this episode of care: 3-6 sessions  Anticipation frequency of session: Biweekly  Anticipated Duration of each session: 38-52 minutes  Treatment plan will be reviewed in 90 days or when goals have been changed.       MeasurableTreatment Goal(s) related to diagnosis / functional impairment(s)  Goal 1: Patient will engage in CBT skills for depression sx reduction    Objective #A (Patient  Action)    Patient will Identify negative self-talk and behaviors: challenge core beliefs, myths, and actions.  Status Continued 10/16/23    Intervention(s)  Therapist will teach emotional regulation skills. CBT skills .      Patient has reviewed and agreed to the above plan.      Yoni Alex, CAROLEE 10/16/23

## 2023-12-04 NOTE — TELEPHONE ENCOUNTER
Rx Authorization:  Requested Medication/ Dose ondansetron HCL 4 mg tablet (ZOFRAN)  Date last refill ordered: 5/8/23  Quantity ordered:   # refills:1  Date of last clinic visit with ordering provider:   Date of next clinic visit with ordering provider:  All pertinent protocol data (lab date/result):   Include pertinent information from patients message:

## 2023-12-05 RX ORDER — ONDANSETRON 4 MG/1
4 TABLET, FILM COATED ORAL EVERY 8 HOURS PRN
Qty: 30 TABLET | Refills: 1 | Status: SHIPPED | OUTPATIENT
Start: 2023-12-05

## 2023-12-27 ENCOUNTER — VIRTUAL VISIT (OUTPATIENT)
Dept: PSYCHIATRY | Facility: CLINIC | Age: 58
End: 2023-12-27
Payer: COMMERCIAL

## 2023-12-27 VITALS — HEIGHT: 67 IN | WEIGHT: 167 LBS | BODY MASS INDEX: 26.21 KG/M2

## 2023-12-27 DIAGNOSIS — F33.2 SEVERE EPISODE OF RECURRENT MAJOR DEPRESSIVE DISORDER, WITHOUT PSYCHOTIC FEATURES (H): Primary | ICD-10-CM

## 2023-12-27 ASSESSMENT — PATIENT HEALTH QUESTIONNAIRE - PHQ9: SUM OF ALL RESPONSES TO PHQ QUESTIONS 1-9: 18

## 2023-12-27 ASSESSMENT — PAIN SCALES - GENERAL: PAINLEVEL: MODERATE PAIN (5)
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
## 2023-12-27 NOTE — NURSING NOTE
"Patient reviewed medications and allergies in Mychart during e-check in and said everything looked correct.      Patient scored 18 on PHQ-9, #9 1=Several Days.      Depression Response    Patient completed the PHQ-9 assessment for depression and scored >9? Yes  Question 9 on the PHQ-9 was positive for suicidality? Yes  Does patient have current mental health provider? Yes    Is this a virtual visit? Yes   Does patient have suicidal ideation (positive question 9)? Yes, patient has a Mental Health Provider.    I personally notified the following: visit provider by putting the PHQ-9 score in the MSG Column for provider awareness.               Is the patient currently in the state of [place]? YES    Visit mode:VIDEO    If the visit is dropped, the patient can be reconnected by: VIDEO VISIT: Send to e-mail at: [email or web address]    Will anyone else be joining the visit? NO  (If patient encounters technical issues they should call [phone])    How would you like to obtain your AVS? MyChart    Are changes needed to the allergy or medication list? Patient reviewed medications and allergies in Mychart during e-check in and said everything looked correct.    Reason for visit: RECHECK (Patient said, \" follow up, where to go from her, Wondering about any trials or anything for Psilocybin since past appts talked about Ketamine and other options.\" )      Larissa Velasco VVF           "

## 2024-01-02 ENCOUNTER — VIRTUAL VISIT (OUTPATIENT)
Dept: PSYCHOLOGY | Facility: CLINIC | Age: 59
End: 2024-01-02
Payer: COMMERCIAL

## 2024-01-02 DIAGNOSIS — F41.1 GAD (GENERALIZED ANXIETY DISORDER): Primary | ICD-10-CM

## 2024-01-02 PROCEDURE — 90834 PSYTX W PT 45 MINUTES: CPT | Mod: 95 | Performed by: MARRIAGE & FAMILY THERAPIST

## 2024-01-02 ASSESSMENT — ANXIETY QUESTIONNAIRES
1. FEELING NERVOUS, ANXIOUS, OR ON EDGE: NEARLY EVERY DAY
GAD7 TOTAL SCORE: 12
GAD7 TOTAL SCORE: 12
8. IF YOU CHECKED OFF ANY PROBLEMS, HOW DIFFICULT HAVE THESE MADE IT FOR YOU TO DO YOUR WORK, TAKE CARE OF THINGS AT HOME, OR GET ALONG WITH OTHER PEOPLE?: SOMEWHAT DIFFICULT
5. BEING SO RESTLESS THAT IT IS HARD TO SIT STILL: NEARLY EVERY DAY
6. BECOMING EASILY ANNOYED OR IRRITABLE: SEVERAL DAYS
3. WORRYING TOO MUCH ABOUT DIFFERENT THINGS: SEVERAL DAYS
7. FEELING AFRAID AS IF SOMETHING AWFUL MIGHT HAPPEN: NOT AT ALL
GAD7 TOTAL SCORE: 12
2. NOT BEING ABLE TO STOP OR CONTROL WORRYING: SEVERAL DAYS
IF YOU CHECKED OFF ANY PROBLEMS ON THIS QUESTIONNAIRE, HOW DIFFICULT HAVE THESE PROBLEMS MADE IT FOR YOU TO DO YOUR WORK, TAKE CARE OF THINGS AT HOME, OR GET ALONG WITH OTHER PEOPLE: SOMEWHAT DIFFICULT
4. TROUBLE RELAXING: NEARLY EVERY DAY
7. FEELING AFRAID AS IF SOMETHING AWFUL MIGHT HAPPEN: NOT AT ALL

## 2024-01-02 ASSESSMENT — PATIENT HEALTH QUESTIONNAIRE - PHQ9
10. IF YOU CHECKED OFF ANY PROBLEMS, HOW DIFFICULT HAVE THESE PROBLEMS MADE IT FOR YOU TO DO YOUR WORK, TAKE CARE OF THINGS AT HOME, OR GET ALONG WITH OTHER PEOPLE: SOMEWHAT DIFFICULT
SUM OF ALL RESPONSES TO PHQ QUESTIONS 1-9: 17
SUM OF ALL RESPONSES TO PHQ QUESTIONS 1-9: 17

## 2024-01-02 NOTE — PROGRESS NOTES
Answers submitted by the patient for this visit:  Patient Health Questionnaire (Submitted on 2024)  If you checked off any problems, how difficult have these problems made it for you to do your work, take care of things at home, or get along with other people?: Somewhat difficult  PHQ9 TOTAL SCORE: 17  MAHAMED-7 (Submitted on 2024)  MAHAMED 7 TOTAL SCORE: 12          [facility]                                     Progress Note    Patient Name: Valerie Sim  Date:  24         Service Type: Individual    Session Start Time:  8:02a    Session End Time: 8:54a    Session Length:  52    Session #: 20    Attendees: Client     Service Modality:  Video Visit:      Provider verified identity through the following two step process.  Patient provided:  Patient     Telemedicine Visit: The patient's condition can be safely assessed and treated via synchronous audio and visual telemedicine encounter.      Reason for Telemedicine Visit: Services only offered telehealth    Originating Site (Patient Location): Patient's home    Distant Site (Provider Location): Provider Remote Setting- Home Office    Consent:  The patient/guardian has verbally consented to: the potential risks and benefits of telemedicine (video visit) versus in person care; bill my insurance or make self-payment for services provided; and responsibility for payment of non-covered services.     Patient would like the video invitation sent by:  Send to e-mail at: [email or web address]    Mode of Communication:  Video Conference via [employer]    Distant Location (Provider):  Off-site    As the provider I attest to compliance with applicable laws and regulations related to telemedicine.    DATA  Interactive Complexity: No  Crisis: No        Progress Since Last Session (Related to Symptoms / Goals / Homework):   Symptoms: No change, mood at 5/10    Homework: Completed in session      Episode of Care Goals: Minimal progress - PREPARATION (Decided  to change - considering how); Intervened by negotiating a change plan and determining options / strategies for behavior change, identifying triggers, exploring social supports, and working towards setting a date to begin behavior change     Current / Ongoing Stressors and Concerns:  Last session 12/4, pt reports having an initial good time in [place] but tested positive for COVID and had to return home soon after. Once home, her sx were not great but resolved quickly and much better currently. However, there was little contact from family inquiring about her illness. Last family session went ok, made a new decision to text with daughter 2x on Tuesday which went well initially.   -       Treatment Objective(s) Addressed in This Session:   Increase interest, engagement, and pleasure in doing things       Intervention:   Motivational Interviewing    MI Intervention: Permission to raise concern or advise, Open-ended questions and Reflections: simple and complex     Change Talk Expressed by the Patient: Reasons to change Need to change    Provider Response to Change Talk: R - Reflected patient's change talk and S - Summarized patient's change talk statements     ASSESSMENT: Current Emotional / Mental Status (status of significant symptoms):   Risk status (Self / Other harm or suicidal ideation)   Patient denies current fears or concerns for personal safety.   Patient denies current or recent suicidal ideation or behaviors.   Patient denies current or recent homicidal ideation or behaviors.   Patient denies current or recent self injurious behavior or ideation.   Patient denies other safety concerns.   Patient reports there has been no change in risk factors since their last session.     Patient reports there has been no change in protective factors since their last session.     A safety and risk management plan has been developed including: Patient consented to co-developed safety plan on 4/5/23.  Safety and risk  management plan was reviewed.   Patient agreed to use safety plan should any safety concerns arise.  A copy was made available to the patient.     Appearance:   Appropriate    Eye Contact:   Good    Psychomotor Behavior: Normal    Attitude:   Cooperative    Orientation:   All   Speech    Rate / Production: Normal     Volume:  Normal    Mood:    Normal   Affect:    Appropriate    Thought Content:  Clear    Thought Form:  Coherent  Logical    Insight:    Good      Medication Review:   No changes to current psychiatric medication(s)     Medication Compliance:   Yes     Changes in Health Issues:   None reported     Chemical Use Review:   Substance Use: Chemical use reviewed, no active concerns identified      Tobacco Use: No current tobacco use.      Diagnosis:  MDD, moderate, recurrent    Collateral Reports Completed:   Not Applicable    PLAN: (Patient Tasks / Therapist Tasks / Other)  Pt will work to use assertive communication skills with       Yoni LANDA Isai, LMFT         1/2/24                                               ______________________________________________________________________    Individual Treatment Plan    Patient's Name: Valerie Sim  YOB: 1965    Date of Creation: 10/16/23  Date Treatment Plan Last Reviewed/Revised: 1/16/24    DSM5 Diagnoses: 296.32 (F33.1) Major Depressive Disorder, Recurrent Episode, Moderate _  Psychosocial / Contextual Factors:   PROMIS (reviewed every 90 days):     Referral / Collaboration:  Referral to another professional/service is not indicated at this time..    Anticipated number of session for this episode of care: 3-6 sessions  Anticipation frequency of session: Biweekly  Anticipated Duration of each session: 38-52 minutes  Treatment plan will be reviewed in 90 days or when goals have been changed.       MeasurableTreatment Goal(s) related to diagnosis / functional impairment(s)  Goal 1: Patient will engage in CBT skills for depression  sx reduction    Objective #A (Patient Action)    Patient will Identify negative self-talk and behaviors: challenge core beliefs, myths, and actions.  Status Continued 10/16/23    Intervention(s)  Therapist will teach emotional regulation skills. CBT skills .      Patient has reviewed and agreed to the above plan.      CAROLEE Mendez 10/16/23

## 2024-01-08 ENCOUNTER — TELEPHONE (OUTPATIENT)
Dept: UROLOGY | Facility: CLINIC | Age: 59
End: 2024-01-08
Payer: COMMERCIAL

## 2024-01-08 NOTE — TELEPHONE ENCOUNTER
1/8 Called patient and left voicemail. Provided patient with [phone] as a call back number.     Patient needs to schedule Botox Consult with Dr. Cotton. Can be completed in-person or virtually (patient preference) at either location- [place] or [facility].   - Please inform patient that first appointment is CONSULT only. Once consult is completed, patient may schedule botox at the [facility] clinic.     Kaycee mejia Complex   Dermatology, Surgery, Urology  [facility]

## 2024-01-08 NOTE — TELEPHONE ENCOUNTER
[facility] Call Center    Phone Message    May a detailed message be left on voicemail: yes     Reason for Call: Other: Patient has a referral, states that she needs botox and was called by our clinic on 09/25 to set something up with Dr. Cotton. Writer cannot do this, please call pt back asap to discuss- [facility] is preferred but can go [place] [facility] if needed     Action Taken: Message routed to:  Other: uro    Travel Screening: Not Applicable

## 2024-01-09 NOTE — TELEPHONE ENCOUNTER
Patient returned call and spoke with call center. Patient is scheduled to see Dr. Lula buchanan on 1/17/24.     Tiesha Dunham RN, BSN

## 2024-01-10 NOTE — TELEPHONE ENCOUNTER
Action 01/10/24  ALESSANDRO 4:52 PM    Action Taken  Imaging requests faxed to [facility] and [facility].     Action 24  ALESSANDRO 11:44 AM    Action Taken  Imaging resolved from [facility]. 2nd imaging request faxed to [facility].     Action January 15, 2024 jtv 12:05 PM    Action Taken CSS received and resolved images from [facility].      MEDICAL RECORDS REQUEST   [facility]  Urology Clinic  [address] [place] [id number]  PHONE: [phone]  Fax: [phone]        FUTURE VISIT INFORMATION                                                   Valerie J Roney, : 1965 scheduled for future visit at [facility]    APPOINTMENT INFORMATION:  Date: 2024  Provider:  Brenda Cotton MD  Reason for Visit/Diagnosis: botox consult    REFERRAL INFORMATION:  Referring provider:  Addie Malhotra MD  Specialty: [facility]  Referring providers clinic:  [facility]    RECORDS REQUESTED FOR VISIT                                                     NOTES  STATUS/DETAILS   OFFICE NOTE from referring provider  yes - 11/15/23   OFFICE NOTE from other specialist  yes - [facility]: 11/10/23, 23, 3/2/23, 10/27/22, 22   MEDICATION LIST  yes   LABS     URINALYSIS (UA)  yes - 22   FEMALE PELVIC PAIN     CT ABDOMEN/PELVIS (IMAGES & REPORT)  yes - [facility]: 23, 22, 22, 10/28/21   TRANSVAGINAL/PELVIC ULTRASOUND (IMAGES & REPORT) PACS yes - [facility]: 23 - US RENAL     PRE-VISIT CHECKLIST      Joint diagnostic appointment coordinated correctly          (ensure right order & amount of time) Yes   RECORD COLLECTION COMPLETE yes

## 2024-01-12 ENCOUNTER — PRE VISIT (OUTPATIENT)
Dept: UROLOGY | Facility: CLINIC | Age: 59
End: 2024-01-12
Payer: COMMERCIAL

## 2024-01-16 ENCOUNTER — VIRTUAL VISIT (OUTPATIENT)
Dept: PSYCHOLOGY | Facility: CLINIC | Age: 59
End: 2024-01-16
Payer: COMMERCIAL

## 2024-01-16 DIAGNOSIS — F41.1 GAD (GENERALIZED ANXIETY DISORDER): Primary | ICD-10-CM

## 2024-01-16 PROCEDURE — 90834 PSYTX W PT 45 MINUTES: CPT | Mod: 95 | Performed by: MARRIAGE & FAMILY THERAPIST

## 2024-01-16 NOTE — PROGRESS NOTES
Answers submitted by the patient for this visit:  Patient Health Questionnaire (Submitted on 1/15/2024)  If you checked off any problems, how difficult have these problems made it for you to do your work, take care of things at home, or get along with other people?: Somewhat difficult  PHQ9 TOTAL SCORE: 11    Answers submitted by the patient for this visit:  Patient Health Questionnaire (Submitted on 2024)  If you checked off any problems, how difficult have these problems made it for you to do your work, take care of things at home, or get along with other people?: Somewhat difficult  PHQ9 TOTAL SCORE: 17  MAHAMED-7 (Submitted on 2024)  MAHAMED 7 TOTAL SCORE: 12          [facility]                                     Progress Note    Patient Name: Valerie Sim  Date:  24         Service Type: Individual    Session Start Time:  8:01a    Session End Time: 8:53a    Session Length:  52    Session #: 21    Attendees: Client     Service Modality:  Video Visit:      Provider verified identity through the following two step process.  Patient provided:  Patient     Telemedicine Visit: The patient's condition can be safely assessed and treated via synchronous audio and visual telemedicine encounter.      Reason for Telemedicine Visit: Services only offered telehealth    Originating Site (Patient Location): Patient's home    Distant Site (Provider Location): Provider Remote Setting- Home Office    Consent:  The patient/guardian has verbally consented to: the potential risks and benefits of telemedicine (video visit) versus in person care; bill my insurance or make self-payment for services provided; and responsibility for payment of non-covered services.     Patient would like the video invitation sent by:  Send to e-mail at: [email or web address]    Mode of Communication:  Video Conference via [facility]    Distant Location (Provider):  Off-site    As the provider I attest to compliance with  applicable laws and regulations related to telemedicine.    DATA  Interactive Complexity: No  Crisis: No        Progress Since Last Session (Related to Symptoms / Goals / Homework):   Symptoms: No change, mood at 6/10    Homework: Completed in session      Episode of Care Goals: Minimal progress - PREPARATION (Decided to change - considering how); Intervened by negotiating a change plan and determining options / strategies for behavior change, identifying triggers, exploring social supports, and working towards setting a date to begin behavior change     Current / Ongoing Stressors and Concerns:  Last session 1/2, things have been going ok, no family sessions until Feb. Pt currently dealing with some health issues that keep occurring, but not needing hospital attention. Working to continue communication with daughter which has been hit or miss. PT is working to still engage in healthy activities even with health issues.   -       Treatment Objective(s) Addressed in This Session:   Increase interest, engagement, and pleasure in doing things       Intervention:   Motivational Interviewing    MI Intervention: Permission to raise concern or advise, Open-ended questions and Reflections: simple and complex     Change Talk Expressed by the Patient: Reasons to change Need to change    Provider Response to Change Talk: R - Reflected patient's change talk and S - Summarized patient's change talk statements     ASSESSMENT: Current Emotional / Mental Status (status of significant symptoms):   Risk status (Self / Other harm or suicidal ideation)   Patient denies current fears or concerns for personal safety.   Patient denies current or recent suicidal ideation or behaviors.   Patient denies current or recent homicidal ideation or behaviors.   Patient denies current or recent self injurious behavior or ideation.   Patient denies other safety concerns.   Patient reports there has been no change in risk factors since their last  session.     Patient reports there has been no change in protective factors since their last session.     A safety and risk management plan has been developed including: Patient consented to co-developed safety plan on 4/5/23.  Safety and risk management plan was reviewed.   Patient agreed to use safety plan should any safety concerns arise.  A copy was made available to the patient.     Appearance:   Appropriate    Eye Contact:   Good    Psychomotor Behavior: Normal    Attitude:   Cooperative    Orientation:   All   Speech    Rate / Production: Normal     Volume:  Normal    Mood:    Normal   Affect:    Appropriate    Thought Content:  Clear    Thought Form:  Coherent  Logical    Insight:    Good      Medication Review:   No changes to current psychiatric medication(s)     Medication Compliance:   Yes     Changes in Health Issues:   None reported     Chemical Use Review:   Substance Use: Chemical use reviewed, no active concerns identified      Tobacco Use: No current tobacco use.      Diagnosis:  MDD, moderate, recurrent    Collateral Reports Completed:   Not Applicable    PLAN: (Patient Tasks / Therapist Tasks / Other)  PT will work to engage in using healthy coping skills      Yoni Alex, LMFT         1/16/24                                             ______________________________________________________________________    Individual Treatment Plan    Patient's Name: Valerie Sim  YOB: 1965    Date of Creation: 1/16/24  Date Treatment Plan Last Reviewed/Revised: 4/16/24    DSM5 Diagnoses: 296.32 (F33.1) Major Depressive Disorder, Recurrent Episode, Moderate _  Psychosocial / Contextual Factors:   PROMIS (reviewed every 90 days):     Referral / Collaboration:  Referral to another professional/service is not indicated at this time..    Anticipated number of session for this episode of care: 3-6 sessions  Anticipation frequency of session: Biweekly  Anticipated Duration of each session:  38-52 minutes  Treatment plan will be reviewed in 90 days or when goals have been changed.       MeasurableTreatment Goal(s) related to diagnosis / functional impairment(s)  Goal 1: Patient will engage in CBT skills for depression sx reduction    Objective #A (Patient Action)    Patient will Identify negative self-talk and behaviors: challenge core beliefs, myths, and actions.  Status Continued 1/16/24    Intervention(s)  Therapist will teach emotional regulation skills. CBT skills .      Patient has reviewed and agreed to the above plan.      CAROLEE Mendez 1/16/24

## 2024-01-17 ENCOUNTER — PRE VISIT (OUTPATIENT)
Dept: UROLOGY | Facility: CLINIC | Age: 59
End: 2024-01-17

## 2024-01-23 ENCOUNTER — TELEPHONE (OUTPATIENT)
Dept: PHYSICAL MEDICINE AND REHAB | Facility: CLINIC | Age: 59
End: 2024-01-23
Payer: COMMERCIAL

## 2024-01-24 ENCOUNTER — OFFICE VISIT (OUTPATIENT)
Dept: PHYSICAL MEDICINE AND REHAB | Facility: CLINIC | Age: 59
End: 2024-01-24
Payer: COMMERCIAL

## 2024-01-24 DIAGNOSIS — M54.81 OCCIPITAL NEURALGIA OF LEFT SIDE: Primary | ICD-10-CM

## 2024-01-24 DIAGNOSIS — M54.81 BILATERAL OCCIPITAL NEURALGIA: ICD-10-CM

## 2024-01-24 PROCEDURE — 64405 NJX AA&/STRD GR OCPL NRV: CPT | Mod: XS | Performed by: PHYSICAL MEDICINE & REHABILITATION

## 2024-01-24 PROCEDURE — 64615 CHEMODENERV MUSC MIGRAINE: CPT | Performed by: PHYSICAL MEDICINE & REHABILITATION

## 2024-01-24 PROCEDURE — 95874 GUIDE NERV DESTR NEEDLE EMG: CPT | Performed by: PHYSICAL MEDICINE & REHABILITATION

## 2024-01-24 RX ORDER — BUPIVACAINE HYDROCHLORIDE 5 MG/ML
10 INJECTION, SOLUTION EPIDURAL; INTRACAUDAL ONCE
Status: COMPLETED | OUTPATIENT
Start: 2024-01-24 | End: 2024-01-24

## 2024-01-24 RX ORDER — TRIAMCINOLONE ACETONIDE 40 MG/ML
40 INJECTION, SUSPENSION INTRA-ARTICULAR; INTRAMUSCULAR ONCE
Status: COMPLETED | OUTPATIENT
Start: 2024-01-24 | End: 2024-01-24

## 2024-01-24 RX ADMIN — TRIAMCINOLONE ACETONIDE 40 MG: 40 INJECTION, SUSPENSION INTRA-ARTICULAR; INTRAMUSCULAR at 17:02

## 2024-01-24 RX ADMIN — BUPIVACAINE HYDROCHLORIDE 50 MG: 5 INJECTION, SOLUTION EPIDURAL; INTRACAUDAL at 17:02

## 2024-01-24 NOTE — LETTER
1/24/2024       RE: Valerie Sim  [address]  [place] [address]       Dear Colleague,    Thank you for referring your patient, Valerie Sim, to the [facility]IN[facility] [place] at [facility]. Please see a copy of my visit note below.      [facility]     PM&R CLINIC NOTE  BOTULINUM TOXIN PROCEDURE      HPI  No chief complaint on file.    Valerie Sim is a 58 year old female with a history of chronic migraine headaches who presents to clinic for botulinum toxin injections for management of chronic migraine headaches and excessive jaw clenching.     SINCE LAST VISIT  Valerie Sim was last seen for Botox injections on 11/15/2023, at which time she received 225 units of Botox.    Patient denies new medical diagnoses, illnesses, hospitalizations, emergency room visits, and injuries since the previous injection with botulinum neurotoxin.     She did test positive for COVID on 12/22/2023 while she was in [place], and ended up flying back on day two. She ended up getting better on her own, and did not require any medical care.       RESPONSE TO PREVIOUS TREATMENT    Side effects:  None      1.  Headache frequency during this injection cycle:  20-30 headache days per month, but only 1-2 severe migraine headaches throughout the injection cycle. This is compared to her baseline headache frequency of 30 severe headache days per month.     2.  Headache duration during this injection cycle:  Headache duration was between 3 hours and 7 days (which is ongoing to date). Patient reports a few episodes of multiple day headaches during this injection cycle, and she is currently on day 7 of a migraine headache.     3.  Headache intensity during this injection cycle:    A.  6/10  =  Typical pain level.  B.  10/10  =  Worst pain level.  C.  0/10  =  Lowest pain level.    4.  Change in headache  medication usage during this injection cycle:  (For Example:  Able to decrease use of oral pain medications.) No changes.       5.  ER Visits During This Injection Cycle:  None. She reports she used Imitrex x4, Toradol x2, Zofran x1 and IM Toradol x1. Lidocaine nasal spray is used intermittently for cluster headaches, which is found to be quite helpful.      6.  Functional Performance:  Change in ADL's, social interaction, days lost from work, etc. Patient reports being able to more fully participate in social and family activities and responsibilities as headache symptoms have improved. She is now changing her perspectives on pain expectations of severity and length after onset. She also started a variety of different yogas.       PHYSICAL EXAM  VS: There were no vitals taken for this visit.   GEN: Pleasant and cooperative, in no acute distress  HEENT: No facial asymmetry    ALLERGIES  Allergies   Allergen Reactions    Fluoxetine Other (See Comments)     PN: LW Reaction: headache  PN: LW Reaction: headache  Migraine      Garlic Blisters, Cough, Dermatitis, Difficulty breathing, Headache, Hives, Itching, Nausea and Vomiting and Shortness Of Breath    Candesartan      Other reaction(s): Myalgia    Duloxetine      Other reaction(s): Headache  migraine    Iodine      Other reaction(s): Other (see comments)  PN: LW CM1: CONTRAST- iodine Reaction :    Metoclopramide      Other reaction(s): Headache  Caused increase in headaches    Perfume      Other reaction(s): Headache  head pain leading to migraines    Pregabalin      Other reaction(s): Headache, Myalgia    Trazodone Other (See Comments) and Unknown     Other reaction(s): Headache  Other reaction(s): Headache  Other reaction(s): Headache    Amitriptyline Hcl Other (See Comments)     Migraine      Candesartan Cilexetil-Hctz Muscle Pain (Myalgia)    Cyproheptadine Hcl      headaches    Desvenlafaxine      Migraine      Diatrizoate Unknown     PN: LW CM1: CONTRAST-  "iodine Reaction :    Diazepam      Other reaction(s): Vestibular Toxicity  PN: LW Reaction: vertigo  PN: LW Reaction: vertigo    Duloxetine Hcl Other (See Comments)     Migraine      Gabapentin Other (See Comments)    Galcanezumab      Other reaction(s): Headache    Imipramine Other (See Comments)     Migraine      Metoprolol Other (See Comments) and Unknown     headache  headache  headache    Morphine Other (See Comments)     Patient reports seizure   Other reaction(s): Unknown  Seizure; 5/111/21 updated info: patient states she had a seizure while having a migraine headache years ago and is not sure if it was due to morphine. She has tolerated Dilaudid since then.      No Clinical Screening - See Comments      PN: LW FI1: lactose intolerance LW FI2: shellfish  PN: LW CM1: CONTRAST- iodine Reaction :  PN: LW Other1: -nka    Nortriptyline Other (See Comments)     Migraine      Phenergan Dm [Promethazine-Dm] Other (See Comments)     seizures    Pregabalin Muscle Pain (Myalgia)    Promethazine      PN: LW Reaction: minimal sizures    Venlafaxine Other (See Comments)     PN: LW Reaction: migraine  PN: LW Reaction: migraine  Migraine      Wellbutrin [Bupropion Hydrobromide] Other (See Comments)     Migraine      Compazine Anxiety    Dihydroergotamine Anxiety and Other (See Comments)     Cardiac symptoms    Droperidol Anxiety     PN: LW Reaction: agitation    Medroxyprogesterone Hives     PN: LW Reaction: nausea    Prochlorperazine Anxiety     PN: LW Reaction: \"can't sit still\"       CURRENT MEDICATIONS    Current Outpatient Medications:     aMILoride (MIDAMOR) 5 MG tablet, Take 7.5 mg by mouth daily 2.5MG in the AM and 5MG in the PM., Disp: , Rfl:     amLODIPine (NORVASC) 10 MG tablet, Take 10 mg by mouth daily, Disp: , Rfl:     amphetamine-dextroamphetamine (ADDERALL) 20 MG tablet, Take 1 tablet (20 mg) by mouth 2 times daily (Patient taking differently: Take 20 mg by mouth 2 times daily Pt currently taking 10MG in the " AM and 20MG in the PM.), Disp: 60 tablet, Rfl: 0    B Complex-Biotin-FA (B-COMPLEX PO), Take 1 tablet by mouth daily, Disp: , Rfl:     botulinum toxin type A (BOTOX) 100 units injection, Inject intramuscular. Every 18 weeks for pelvic floor spasms, Disp: , Rfl:     budesonide (RINOCORT AQUA) 32 MCG/ACT nasal spray, Spray 1 spray into both nostrils daily., Disp: , Rfl:     calcitRIOL (ROCALTROL) 0.25 MCG capsule, Take 0.25 mcg by mouth as needed, Disp: , Rfl:     Calcium-Magnesium-Vitamin D (CITRACAL SLOW RELEASE) 600- MG-MG-UNIT TB24, Take 600 mg by mouth 4 times daily, Disp: , Rfl:     docusate sodium (COLACE) 100 MG capsule, Take 1 capsule by mouth as needed for constipation, Disp: , Rfl:     eletriptan (RELPAX) 40 MG tablet, Take 40 mg by mouth as needed, Disp: , Rfl:     estradiol (ESTRACE) 0.1 MG/GM vaginal cream, , Disp: , Rfl:     fexofenadine (ALLEGRA) 180 MG tablet, Take 180 mg by mouth daily, Disp: , Rfl:     HYDROmorphone (DILAUDID) 2 MG tablet, Take 0.5-1 tablets (1-2 mg) by mouth every 3 hours as needed (headache) 20 tablets = 3 month supply., Disp: 24 tablet, Rfl: 0    ketorolac (TORADOL) 30 MG/ML injection, Inject 1 mL (30 mg) into the vein every 6 hours as needed for moderate pain, Disp: 6 mL, Rfl: 1    lamoTRIgine (LAMICTAL) 100 MG tablet, Take 0.5 tablets (50 mg) by mouth 2 times daily, Disp: 60 tablet, Rfl: 1    lidocaine (XYLOCAINE) 4 % external solution, Spray 0.5 ml once in each nostril q 4-6 hours as needed for headache. lay down with head tilted back for 5 minutes after if preferred, Disp: 50 mL, Rfl: 6    Magnesium Oxide 500 MG TABS, Take 500 mg by mouth 2 times daily, Disp: , Rfl:     naloxone (NARCAN) 4 MG/0.1ML nasal spray, Spray 1 spray (4 mg) into one nostril alternating nostrils as needed for opioid reversal every 2-3 minutes until assistance arrives, Disp: 0.2 mL, Rfl: 1    nebivolol (BYSTOLIC) 5 MG tablet, Take 5 mg by mouth daily Take twice a day totaling 10 mg., Disp: , Rfl:  "    Needle, Disp, 25G X 1\" MISC, 2 each as needed (with toradol) 90-day supply, Disp: 6 each, Rfl: 1    ondansetron (ZOFRAN) 4 MG tablet, Take 1 tablet (4 mg) by mouth every 8 hours as needed for nausea, Disp: 30 tablet, Rfl: 1    order for DME, Equipment being ordered: Oxygen The patient has Cluster Headache and needs 10 liters/minute of high flow oxygen via nonrebreather mask prn headaches, Disp: 99 days, Rfl: 0    order for DME, Equipment being ordered: Oxygen and non-rebreather mask.   Use high flow oxygen (10-15 L/min) with non-rebreather mask, as needed for cluster headaches, Disp: 1 Units, Rfl: 11    potassium chloride ER (KLOR-CON M) 20 MEQ CR tablet, Take 1 tablet by mouth 2 times daily, Disp: , Rfl:     QUEtiapine (SEROQUEL) 50 MG tablet, Take 1 tablet (50 mg) by mouth at bedtime, Disp: 60 tablet, Rfl: 3    Syringe, Disposable, (SYRINGE LUER SLIP) 1 ML MISC, 1 each as needed (with toradol/migraine), Disp: 6 each, Rfl: 1    valACYclovir (VALTREX) 1000 mg tablet, Take 1,000 mg by mouth as needed., Disp: , Rfl:     Current Facility-Administered Medications:     botulinum toxin type A (BOTOX) 100 units injection 225 Units, 225 Units, Intramuscular, See Admin Instructions, Standal, Addie Saeed MD, 225 Units at 11/15/23 1702       BOTULINUM NEUROTOXIN INJECTION PROCEDURES    VERIFICATION OF PATIENT IDENTIFICATION AND PROCEDURE     Initials   Patient Name SES   Patient  SES   Procedure Verified by: SES     Prior to the start of the procedure and with procedural staff participation, I verbally confirmed the patient s identity using two indicators, relevant allergies, that the procedure was appropriate and matched the consent or emergent situation, and that the correct equipment/implants were available. Immediately prior to starting the procedure I conducted the Time Out with the procedural staff and re-confirmed the patient s name, procedure, and site/side. (The Joint Commission universal protocol was " followed.)  Yes    Sedation (Moderate or Deep): None    ABOVE ASSESSMENTS PERFORMED BY    Addie Malhotra MD      INDICATIONS FOR PROCEDURES  Valerie Sim is a 58 year old patient with pain and jaw clenching secondary to the diagnosis of chronic migraine headaches and oromandibular dystonia. Her baseline symptoms have been recalcitrant to oral medications and conservative therapy.  She is here today for reinjection with Botox.    GOAL OF PROCEDURE  The goal of this procedure is to decrease pain and excessive jaw clenching due to chronic migraine headaches and bruxism.     TOTAL DOSE ADMINISTERED  Dose Administered:  225 units  Botox (Botulinum Toxin Type A)       2:1 Dilution   Unavoidable Drug Waste: Yes  Amount of drug waste (mL): 75 units Botox.  Reason for waste:  Single use vial  Diluent Used:  0.5% bupivacaine  Total Volume of Diluent Used:  4.5 ml  NDC #: Botox 100u ([address]-76)      CONSENT  The risks, benefits, and treatment options were discussed with Valerie Sim and she agreed to proceed.    Written consent was obtained by  [facility] .     EQUIPMENT USED  Needle-25mm stimulating/recording  Needle-30 gauge  EMG/NCS Machine    SKIN PREPARATION  Skin preparation was performed using an alcohol wipe.    GUIDANCE DESCRIPTION  Electro-myographic guidance was necessary throughout the neck and jaw portion of the procedure to accurately identify all areas of dystonic muscles while avoiding injection of non-dystonic muscles, neighboring nerves and nearby vascular structures.       AREA/MUSCLE INJECTED:  225 UNITS BOTOX = TOTAL DOSE, 2:1 DILUTION     1. FACIAL & SCALP MUSCLES: 85 UNITS BOTOX = TOTAL DOSE      Right Frontalis - 10 units of Botox in 2 site/s.  Left Frontalis - 10 units of Botox in 2 site/s.      Procerus - 5 units of Botox at 1 site/s.       Right  - 2.5 units of Botox in 1 site/s.  Left  - 2.5 units of Botox in 1 site/s.      Right Nasalis - 2.5 units of Botox at 1 site/s.            Left Nasalis - 2.5 units of Botox at 1 site/s.     Right Upper Occipitalis - 25 units of Botox at 5 site/s.   Left Upper Occipitalis - 25 units of Botox at 5 site/s.         2. JAW MUSCLES: 90 UNITS BOTOX = TOTAL DOSE     Right Masseter - 20 units of Botox at 2 site/s.   Left Masseter - 20 units of Botox at 2 site/s.      Right Temporalis - 25 units of Botox at 5 site/s.  Left Temporalis - 25 units of Botox at 5 site/s.          3. SHOULDER & NECK MUSCLES: 50 UNITS BOTOX = TOTAL DOSE      Right Levator Scapula - 10 units of Botox at 2 site/s (neck and scapular insertion).  Left Levator Scapula - 10 units of Botox at 2 site/s (neck and scapular insertion).      Right Upper Trapezius (mid & low lateral) - 10 units of Botox at 3 site/s.  Left Upper Trapezius (mid & low lateral) - 10 units of Botox at 3 site/s.             Right Pectoralis Minor - 5 units of Botox at 1 site/s.                     Left Pectoralis Minor - 5 units of Botox at 1 site/s.       RIGHT AND LEFT GREATER AND LESSER OCCIPITAL NERVE BLOCK     Prior to the start of the procedure and with procedural staff participation, I verbally confirmed the patient s identity using two indicators, relevant allergies, that the procedure was appropriate and matched the consent or emergent situation, and that the correct equipment/implants were available. Immediately prior to starting the procedure I conducted the Time Out with the procedural staff and re-confirmed the patient s name, procedure, and site/side. (The Joint Commission universal protocol was followed.)  Yes    Sedation (Moderate or Deep): None    Dru% lidocaine: 2 ml  0.5% bupivacaine: 7 ml   Kenalo mg = 1 ml      Area just inferior to insertion of the right and left superior trapezius insertion onto skull was cleansed with ChloraPrep. Needle was advanced anteriorly to base of skull then slightly withdrawn and injectate was injected in a fan-like distribution at different depths. A 10 ml  mixture of 1% lidocaine, 0.5% bupivacaine and Kenalog was divided into two 5 ml syringes. Total injection volume = 5 ml per side.     Valerie Sim tolerated the procedure well without any immediate complications. she was allowed to recover for an appropriate period of time and was discharged home in stable condition.  Patient will follow-up regarding response to this procedure.      RESPONSE TO PROCEDURE  Valerie Sim tolerated the procedure well and there were no immediate complications. She was allowed to recover for an appropriate period of time and was discharged home in stable condition.      ASSESSMENT AND PLAN   Botulinum toxin injections: No changes made to Botox dose or distribution today. Patient will continue to monitor response to Botox and report at next appointment.  Referrals: None.   Follow up: Valerie Sim was rescheduled for the next series of injections in 9 weeks, at which time we will evaluate response to today's injections. she may call the clinic prior with any questions or concerns prior to the next appointment.         Again, thank you for allowing me to participate in the care of your patient.      Sincerely,    Addie Malhotra MD

## 2024-01-24 NOTE — PROGRESS NOTES
[facility]     PM&R CLINIC NOTE  BOTULINUM TOXIN PROCEDURE      HPI  No chief complaint on file.    Valerie Sim is a 58 year old female with a history of chronic migraine headaches who presents to clinic for botulinum toxin injections for management of chronic migraine headaches and excessive jaw clenching.     SINCE LAST VISIT  Valerie Sim was last seen for Botox injections on 11/15/2023, at which time she received 225 units of Botox.    Patient denies new medical diagnoses, illnesses, hospitalizations, emergency room visits, and injuries since the previous injection with botulinum neurotoxin.     She did test positive for COVID on 12/22/2023 while she was in [place], and ended up flying back on day two. She ended up getting better on her own, and did not require any medical care.       RESPONSE TO PREVIOUS TREATMENT    Side effects:  None      1.  Headache frequency during this injection cycle:  20-30 headache days per month, but only 1-2 severe migraine headaches throughout the injection cycle. This is compared to her baseline headache frequency of 30 severe headache days per month.     2.  Headache duration during this injection cycle:  Headache duration was between 3 hours and 7 days (which is ongoing to date). Patient reports a few episodes of multiple day headaches during this injection cycle, and she is currently on day 7 of a migraine headache.     3.  Headache intensity during this injection cycle:    A.  6/10  =  Typical pain level.  B.  10/10  =  Worst pain level.  C.  0/10  =  Lowest pain level.    4.  Change in headache medication usage during this injection cycle:  (For Example:  Able to decrease use of oral pain medications.) No changes.       5.  ER Visits During This Injection Cycle:  None. She reports she used Imitrex x4, Toradol x2, Zofran x1 and IM Toradol x1. Lidocaine nasal spray is used intermittently for cluster headaches, which is found to be quite  helpful.      6.  Functional Performance:  Change in ADL's, social interaction, days lost from work, etc. Patient reports being able to more fully participate in social and family activities and responsibilities as headache symptoms have improved. She is now changing her perspectives on pain expectations of severity and length after onset. She also started a variety of different yogas.       PHYSICAL EXAM  VS: There were no vitals taken for this visit.   GEN: Pleasant and cooperative, in no acute distress  HEENT: No facial asymmetry    ALLERGIES  Allergies   Allergen Reactions    Fluoxetine Other (See Comments)     PN: LW Reaction: headache  PN: LW Reaction: headache  Migraine      Garlic Blisters, Cough, Dermatitis, Difficulty breathing, Headache, Hives, Itching, Nausea and Vomiting and Shortness Of Breath    Candesartan      Other reaction(s): Myalgia    Duloxetine      Other reaction(s): Headache  migraine    Iodine      Other reaction(s): Other (see comments)  PN: LW CM1: CONTRAST- iodine Reaction :    Metoclopramide      Other reaction(s): Headache  Caused increase in headaches    Perfume      Other reaction(s): Headache  head pain leading to migraines    Pregabalin      Other reaction(s): Headache, Myalgia    Trazodone Other (See Comments) and Unknown     Other reaction(s): Headache  Other reaction(s): Headache  Other reaction(s): Headache    Amitriptyline Hcl Other (See Comments)     Migraine      Candesartan Cilexetil-Hctz Muscle Pain (Myalgia)    Cyproheptadine Hcl      headaches    Desvenlafaxine      Migraine      Diatrizoate Unknown     PN: LW CM1: CONTRAST- iodine Reaction :    Diazepam      Other reaction(s): Vestibular Toxicity  PN: LW Reaction: vertigo  PN: LW Reaction: vertigo    Duloxetine Hcl Other (See Comments)     Migraine      Gabapentin Other (See Comments)    Galcanezumab      Other reaction(s): Headache    Imipramine Other (See Comments)     Migraine      Metoprolol Other (See Comments) and  "Unknown     headache  headache  headache    Morphine Other (See Comments)     Patient reports seizure   Other reaction(s): Unknown  Seizure; 5/111/21 updated info: patient states she had a seizure while having a migraine headache years ago and is not sure if it was due to morphine. She has tolerated Dilaudid since then.      No Clinical Screening - See Comments      PN: LW FI1: lactose intolerance LW FI2: shellfish  PN: LW CM1: CONTRAST- iodine Reaction :  PN: LW Other1: -nka    Nortriptyline Other (See Comments)     Migraine      Phenergan Dm [Promethazine-Dm] Other (See Comments)     seizures    Pregabalin Muscle Pain (Myalgia)    Promethazine      PN: LW Reaction: minimal sizures    Venlafaxine Other (See Comments)     PN: LW Reaction: migraine  PN: LW Reaction: migraine  Migraine      Wellbutrin [Bupropion Hydrobromide] Other (See Comments)     Migraine      Compazine Anxiety    Dihydroergotamine Anxiety and Other (See Comments)     Cardiac symptoms    Droperidol Anxiety     PN: LW Reaction: agitation    Medroxyprogesterone Hives     PN: LW Reaction: nausea    Prochlorperazine Anxiety     PN: LW Reaction: \"can't sit still\"       CURRENT MEDICATIONS    Current Outpatient Medications:     aMILoride (MIDAMOR) 5 MG tablet, Take 7.5 mg by mouth daily 2.5MG in the AM and 5MG in the PM., Disp: , Rfl:     amLODIPine (NORVASC) 10 MG tablet, Take 10 mg by mouth daily, Disp: , Rfl:     amphetamine-dextroamphetamine (ADDERALL) 20 MG tablet, Take 1 tablet (20 mg) by mouth 2 times daily (Patient taking differently: Take 20 mg by mouth 2 times daily Pt currently taking 10MG in the AM and 20MG in the PM.), Disp: 60 tablet, Rfl: 0    B Complex-Biotin-FA (B-COMPLEX PO), Take 1 tablet by mouth daily, Disp: , Rfl:     botulinum toxin type A (BOTOX) 100 units injection, Inject intramuscular. Every 18 weeks for pelvic floor spasms, Disp: , Rfl:     budesonide (RINOCORT AQUA) 32 MCG/ACT nasal spray, Spray 1 spray into both nostrils " "daily., Disp: , Rfl:     calcitRIOL (ROCALTROL) 0.25 MCG capsule, Take 0.25 mcg by mouth as needed, Disp: , Rfl:     Calcium-Magnesium-Vitamin D (CITRACAL SLOW RELEASE) 600- MG-MG-UNIT TB24, Take 600 mg by mouth 4 times daily, Disp: , Rfl:     docusate sodium (COLACE) 100 MG capsule, Take 1 capsule by mouth as needed for constipation, Disp: , Rfl:     eletriptan (RELPAX) 40 MG tablet, Take 40 mg by mouth as needed, Disp: , Rfl:     estradiol (ESTRACE) 0.1 MG/GM vaginal cream, , Disp: , Rfl:     fexofenadine (ALLEGRA) 180 MG tablet, Take 180 mg by mouth daily, Disp: , Rfl:     HYDROmorphone (DILAUDID) 2 MG tablet, Take 0.5-1 tablets (1-2 mg) by mouth every 3 hours as needed (headache) 20 tablets = 3 month supply., Disp: 24 tablet, Rfl: 0    ketorolac (TORADOL) 30 MG/ML injection, Inject 1 mL (30 mg) into the vein every 6 hours as needed for moderate pain, Disp: 6 mL, Rfl: 1    lamoTRIgine (LAMICTAL) 100 MG tablet, Take 0.5 tablets (50 mg) by mouth 2 times daily, Disp: 60 tablet, Rfl: 1    lidocaine (XYLOCAINE) 4 % external solution, Spray 0.5 ml once in each nostril q 4-6 hours as needed for headache. lay down with head tilted back for 5 minutes after if preferred, Disp: 50 mL, Rfl: 6    Magnesium Oxide 500 MG TABS, Take 500 mg by mouth 2 times daily, Disp: , Rfl:     naloxone (NARCAN) 4 MG/0.1ML nasal spray, Spray 1 spray (4 mg) into one nostril alternating nostrils as needed for opioid reversal every 2-3 minutes until assistance arrives, Disp: 0.2 mL, Rfl: 1    nebivolol (BYSTOLIC) 5 MG tablet, Take 5 mg by mouth daily Take twice a day totaling 10 mg., Disp: , Rfl:     Needle, Disp, 25G X 1\" MISC, 2 each as needed (with toradol) 90-day supply, Disp: 6 each, Rfl: 1    ondansetron (ZOFRAN) 4 MG tablet, Take 1 tablet (4 mg) by mouth every 8 hours as needed for nausea, Disp: 30 tablet, Rfl: 1    order for DME, Equipment being ordered: Oxygen The patient has Cluster Headache and needs 10 liters/minute of high flow " oxygen via nonrebreather mask prn headaches, Disp: 99 days, Rfl: 0    order for DME, Equipment being ordered: Oxygen and non-rebreather mask.   Use high flow oxygen (10-15 L/min) with non-rebreather mask, as needed for cluster headaches, Disp: 1 Units, Rfl: 11    potassium chloride ER (KLOR-CON M) 20 MEQ CR tablet, Take 1 tablet by mouth 2 times daily, Disp: , Rfl:     QUEtiapine (SEROQUEL) 50 MG tablet, Take 1 tablet (50 mg) by mouth at bedtime, Disp: 60 tablet, Rfl: 3    Syringe, Disposable, (SYRINGE LUER SLIP) 1 ML MISC, 1 each as needed (with toradol/migraine), Disp: 6 each, Rfl: 1    valACYclovir (VALTREX) 1000 mg tablet, Take 1,000 mg by mouth as needed., Disp: , Rfl:     Current Facility-Administered Medications:     botulinum toxin type A (BOTOX) 100 units injection 225 Units, 225 Units, Intramuscular, See Admin Instructions, Addie Malhotra MD, 225 Units at 11/15/23 1702       BOTULINUM NEUROTOXIN INJECTION PROCEDURES    VERIFICATION OF PATIENT IDENTIFICATION AND PROCEDURE     Initials   Patient Name SES   Patient  SES   Procedure Verified by: SES     Prior to the start of the procedure and with procedural staff participation, I verbally confirmed the patient s identity using two indicators, relevant allergies, that the procedure was appropriate and matched the consent or emergent situation, and that the correct equipment/implants were available. Immediately prior to starting the procedure I conducted the Time Out with the procedural staff and re-confirmed the patient s name, procedure, and site/side. (The Joint Commission universal protocol was followed.)  Yes    Sedation (Moderate or Deep): None    ABOVE ASSESSMENTS PERFORMED BY    Addie Malhotra MD      INDICATIONS FOR PROCEDURES  Valerie Sim is a 58 year old patient with pain and jaw clenching secondary to the diagnosis of chronic migraine headaches and oromandibular dystonia. Her baseline symptoms have been recalcitrant to oral  medications and conservative therapy.  She is here today for reinjection with Botox.    GOAL OF PROCEDURE  The goal of this procedure is to decrease pain and excessive jaw clenching due to chronic migraine headaches and bruxism.     TOTAL DOSE ADMINISTERED  Dose Administered:  225 units  Botox (Botulinum Toxin Type A)       2:1 Dilution   Unavoidable Drug Waste: Yes  Amount of drug waste (mL): 75 units Botox.  Reason for waste:  Single use vial  Diluent Used:  0.5% bupivacaine  Total Volume of Diluent Used:  4.5 ml  NDC #: Botox 100u ([address]-42)      CONSENT  The risks, benefits, and treatment options were discussed with Valerie Sim and she agreed to proceed.    Written consent was obtained by  ninoska .     EQUIPMENT USED  Needle-25mm stimulating/recording  Needle-30 gauge  EMG/NCS Machine    SKIN PREPARATION  Skin preparation was performed using an alcohol wipe.    GUIDANCE DESCRIPTION  Electro-myographic guidance was necessary throughout the neck and jaw portion of the procedure to accurately identify all areas of dystonic muscles while avoiding injection of non-dystonic muscles, neighboring nerves and nearby vascular structures.       AREA/MUSCLE INJECTED:  225 UNITS BOTOX = TOTAL DOSE, 2:1 DILUTION     1. FACIAL & SCALP MUSCLES: 85 UNITS BOTOX = TOTAL DOSE      Right Frontalis - 10 units of Botox in 2 site/s.  Left Frontalis - 10 units of Botox in 2 site/s.      Procerus - 5 units of Botox at 1 site/s.       Right  - 2.5 units of Botox in 1 site/s.  Left  - 2.5 units of Botox in 1 site/s.      Right Nasalis - 2.5 units of Botox at 1 site/s.           Left Nasalis - 2.5 units of Botox at 1 site/s.     Right Upper Occipitalis - 25 units of Botox at 5 site/s.   Left Upper Occipitalis - 25 units of Botox at 5 site/s.         2. JAW MUSCLES: 90 UNITS BOTOX = TOTAL DOSE     Right Masseter - 20 units of Botox at 2 site/s.   Left Masseter - 20 units of Botox at 2 site/s.      Right Temporalis - 25  units of Botox at 5 site/s.  Left Temporalis - 25 units of Botox at 5 site/s.          3. SHOULDER & NECK MUSCLES: 50 UNITS BOTOX = TOTAL DOSE      Right Levator Scapula - 10 units of Botox at 2 site/s (neck and scapular insertion).  Left Levator Scapula - 10 units of Botox at 2 site/s (neck and scapular insertion).      Right Upper Trapezius (mid & low lateral) - 10 units of Botox at 3 site/s.  Left Upper Trapezius (mid & low lateral) - 10 units of Botox at 3 site/s.             Right Pectoralis Minor - 5 units of Botox at 1 site/s.                     Left Pectoralis Minor - 5 units of Botox at 1 site/s.       RIGHT AND LEFT GREATER AND LESSER OCCIPITAL NERVE BLOCK     Prior to the start of the procedure and with procedural staff participation, I verbally confirmed the patient s identity using two indicators, relevant allergies, that the procedure was appropriate and matched the consent or emergent situation, and that the correct equipment/implants were available. Immediately prior to starting the procedure I conducted the Time Out with the procedural staff and re-confirmed the patient s name, procedure, and site/side. (The Joint Commission universal protocol was followed.)  Yes    Sedation (Moderate or Deep): None    Dru% lidocaine: 2 ml  0.5% bupivacaine: 7 ml   Kenalo mg = 1 ml      Area just inferior to insertion of the right and left superior trapezius insertion onto skull was cleansed with ChloraPrep. Needle was advanced anteriorly to base of skull then slightly withdrawn and injectate was injected in a fan-like distribution at different depths. A 10 ml mixture of 1% lidocaine, 0.5% bupivacaine and Kenalog was divided into two 5 ml syringes. Total injection volume = 5 ml per side.     Valerie Sim tolerated the procedure well without any immediate complications. she was allowed to recover for an appropriate period of time and was discharged home in stable condition.  Patient will follow-up  regarding response to this procedure.      RESPONSE TO PROCEDURE  Valerie Sim tolerated the procedure well and there were no immediate complications. She was allowed to recover for an appropriate period of time and was discharged home in stable condition.      ASSESSMENT AND PLAN   Botulinum toxin injections: No changes made to Botox dose or distribution today. Patient will continue to monitor response to Botox and report at next appointment.  Referrals: None.   Follow up: Valerie Sim was rescheduled for the next series of injections in 9 weeks, at which time we will evaluate response to today's injections. she may call the clinic prior with any questions or concerns prior to the next appointment.

## 2024-01-28 ASSESSMENT — ANXIETY QUESTIONNAIRES
GAD7 TOTAL SCORE: 8
8. IF YOU CHECKED OFF ANY PROBLEMS, HOW DIFFICULT HAVE THESE MADE IT FOR YOU TO DO YOUR WORK, TAKE CARE OF THINGS AT HOME, OR GET ALONG WITH OTHER PEOPLE?: SOMEWHAT DIFFICULT
1. FEELING NERVOUS, ANXIOUS, OR ON EDGE: SEVERAL DAYS
IF YOU CHECKED OFF ANY PROBLEMS ON THIS QUESTIONNAIRE, HOW DIFFICULT HAVE THESE PROBLEMS MADE IT FOR YOU TO DO YOUR WORK, TAKE CARE OF THINGS AT HOME, OR GET ALONG WITH OTHER PEOPLE: SOMEWHAT DIFFICULT
4. TROUBLE RELAXING: MORE THAN HALF THE DAYS
7. FEELING AFRAID AS IF SOMETHING AWFUL MIGHT HAPPEN: SEVERAL DAYS
GAD7 TOTAL SCORE: 8
5. BEING SO RESTLESS THAT IT IS HARD TO SIT STILL: SEVERAL DAYS
2. NOT BEING ABLE TO STOP OR CONTROL WORRYING: SEVERAL DAYS
6. BECOMING EASILY ANNOYED OR IRRITABLE: SEVERAL DAYS
GAD7 TOTAL SCORE: 8
3. WORRYING TOO MUCH ABOUT DIFFERENT THINGS: SEVERAL DAYS
7. FEELING AFRAID AS IF SOMETHING AWFUL MIGHT HAPPEN: SEVERAL DAYS

## 2024-01-29 ENCOUNTER — VIRTUAL VISIT (OUTPATIENT)
Dept: PSYCHIATRY | Facility: CLINIC | Age: 59
End: 2024-01-29
Attending: PSYCHIATRY & NEUROLOGY
Payer: COMMERCIAL

## 2024-01-29 DIAGNOSIS — F41.1 GAD (GENERALIZED ANXIETY DISORDER): ICD-10-CM

## 2024-01-29 DIAGNOSIS — F41.0 PANIC DISORDER WITHOUT AGORAPHOBIA: ICD-10-CM

## 2024-01-29 DIAGNOSIS — F33.3 SEVERE RECURRENT MAJOR DEPRESSIVE DISORDER WITH PSYCHOTIC FEATURES (H): Primary | ICD-10-CM

## 2024-01-29 PROCEDURE — 99214 OFFICE O/P EST MOD 30 MIN: CPT | Mod: 95

## 2024-01-29 PROCEDURE — 90833 PSYTX W PT W E/M 30 MIN: CPT | Mod: 95

## 2024-01-29 RX ORDER — QUETIAPINE FUMARATE 50 MG/1
50 TABLET, FILM COATED ORAL AT BEDTIME
Qty: 60 TABLET | Refills: 2 | Status: SHIPPED | OUTPATIENT
Start: 2024-01-29 | End: 2024-05-01

## 2024-01-29 RX ORDER — LAMOTRIGINE 100 MG/1
150 TABLET ORAL DAILY
Qty: 45 TABLET | Refills: 2 | Status: SHIPPED | OUTPATIENT
Start: 2024-01-29 | End: 2024-06-24

## 2024-01-29 NOTE — NURSING NOTE
Is the patient currently in the state of [place]? YES    Visit mode:VIDEO    If the visit is dropped, the patient can be reconnected by: VIDEO VISIT: Text to cell phone:   Telephone Information:   Mobile [phone]       Will anyone else be joining the visit? NO  (If patient encounters technical issues they should call [phone]    How would you like to obtain your AVS? MyChart    Are changes needed to the allergy or medication list? No    Reason for visit: No chief complaint on file.    Coleen LEALF
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
## 2024-02-19 ENCOUNTER — VIRTUAL VISIT (OUTPATIENT)
Dept: PSYCHOLOGY | Facility: CLINIC | Age: 59
End: 2024-02-19
Payer: COMMERCIAL

## 2024-02-19 DIAGNOSIS — F41.1 GAD (GENERALIZED ANXIETY DISORDER): Primary | ICD-10-CM

## 2024-02-19 PROCEDURE — 90834 PSYTX W PT 45 MINUTES: CPT | Mod: 95 | Performed by: MARRIAGE & FAMILY THERAPIST

## 2024-02-19 NOTE — PROGRESS NOTES
[facility]                                     Progress Note    Patient Name: Valerie Sim  Date:  24         Service Type: Individual    Session Start Time:  4:02p   Session End Time 4:52p    Session Length:  50    Session #: 22    Attendees: Client     Service Modality:  Video Visit:      Provider verified identity through the following two step process.  Patient provided:  Patient     Telemedicine Visit: The patient's condition can be safely assessed and treated via synchronous audio and visual telemedicine encounter.      Reason for Telemedicine Visit: Services only offered telehealth    Originating Site (Patient Location): Patient's home    Distant Site (Provider Location): Provider Remote Setting- Home Office    Consent:  The patient/guardian has verbally consented to: the potential risks and benefits of telemedicine (video visit) versus in person care; bill my insurance or make self-payment for services provided; and responsibility for payment of non-covered services.     Patient would like the video invitation sent by:  Send to e-mail at: [email or web address]    Mode of Communication:  Video Conference via [employer]    Distant Location (Provider):  Off-site    As the provider I attest to compliance with applicable laws and regulations related to telemedicine.    DATA  Interactive Complexity: No  Crisis: No        Progress Since Last Session (Related to Symptoms / Goals / Homework):   Symptoms: NO change, mood around 6/10 over the past couple weeks    Homework: Completed in session      Episode of Care Goals: Minimal progress - PREPARATION (Decided to change - considering how); Intervened by negotiating a change plan and determining options / strategies for behavior change, identifying triggers, exploring social supports, and working towards setting a date to begin behavior change     Current / Ongoing Stressors and Concerns:  Last session ,  having surgery  tomorrow, hopeful recovery. Had 1 family session this month, disclosed to kids she was having migraine and seizure activity, which they took well but unclear on what she needs to disclose in the future.        Treatment Objective(s) Addressed in This Session:   Increase interest, engagement, and pleasure in doing things       Intervention:   Motivational Interviewing    MI Intervention: Permission to raise concern or advise, Open-ended questions and Reflections: simple and complex     Change Talk Expressed by the Patient: Reasons to change Need to change    Provider Response to Change Talk: R - Reflected patient's change talk and S - Summarized patient's change talk statements     ASSESSMENT: Current Emotional / Mental Status (status of significant symptoms):   Risk status (Self / Other harm or suicidal ideation)   Patient denies current fears or concerns for personal safety.   Patient denies current or recent suicidal ideation or behaviors.   Patient denies current or recent homicidal ideation or behaviors.   Patient denies current or recent self injurious behavior or ideation.   Patient denies other safety concerns.   Patient reports there has been no change in risk factors since their last session.     Patient reports there has been no change in protective factors since their last session.     A safety and risk management plan has been developed including: Patient consented to co-developed safety plan on 4/5/23.  Safety and risk management plan was reviewed.   Patient agreed to use safety plan should any safety concerns arise.  A copy was made available to the patient.     Appearance:   Appropriate    Eye Contact:   Good    Psychomotor Behavior: Normal    Attitude:   Cooperative    Orientation:   All   Speech    Rate / Production: Normal     Volume:  Normal    Mood:    Normal   Affect:    Appropriate    Thought Content:  Clear    Thought Form:  Coherent  Logical    Insight:    Good      Medication Review:   No  changes to current psychiatric medication(s)     Medication Compliance:   Yes     Changes in Health Issues:   None reported     Chemical Use Review:   Substance Use: Chemical use reviewed, no active concerns identified      Tobacco Use: No current tobacco use.      Diagnosis:  MDD, moderate, recurrent    Collateral Reports Completed:   Not Applicable    PLAN: (Patient Tasks / Therapist Tasks / Other)  PT will use healthy coping skills 1x daily      CAROLEE Mendez         2/19/24                                             ______________________________________________________________________    Individual Treatment Plan    Patient's Name: Valerie Sim  YOB: 1965    Date of Creation: 1/16/24  Date Treatment Plan Last Reviewed/Revised: 4/16/24    DSM5 Diagnoses: 296.32 (F33.1) Major Depressive Disorder, Recurrent Episode, Moderate _  Psychosocial / Contextual Factors:   PROMIS (reviewed every 90 days):     Referral / Collaboration:  Referral to another professional/service is not indicated at this time..    Anticipated number of session for this episode of care: 3-6 sessions  Anticipation frequency of session: Biweekly  Anticipated Duration of each session: 38-52 minutes  Treatment plan will be reviewed in 90 days or when goals have been changed.       MeasurableTreatment Goal(s) related to diagnosis / functional impairment(s)  Goal 1: Patient will engage in CBT skills for depression sx reduction    Objective #A (Patient Action)    Patient will Identify negative self-talk and behaviors: challenge core beliefs, myths, and actions.  Status Continued 1/16/24    Intervention(s)  Therapist will teach emotional regulation skills. CBT skills .      Patient has reviewed and agreed to the above plan.      CAROLEE Mendez 1/16/24

## 2024-02-26 ASSESSMENT — MIGRAINE DISABILITY ASSESSMENT (MIDAS)
HOW MANY DAYS DID YOU NOT DO HOUSEWORK BECAUSE OF HEADACHES: 5
HOW OFTEN WERE SOCIAL ACTIVITIES MISSED DUE TO HEADACHES: 3
HOW MANY DAYS WAS YOUR PRODUCTIVITY CUT IN HALF BECAUSE OF HEADACHES: 5
ON A SCALE FROM 0-10 ON AVERAGE HOW PAINFUL WERE HEADACHES: 10
HOW MANY DAYS IN THE PAST 3 MONTHS HAVE YOU HAD A HEADACHE: 90
HOW MANY DAYS WAS HOUSEWORK PRODUCTIVITY CUT IN HALF DUE TO HEADACHES: 5
HOW MANY DAYS DID YOU MISS WORK OR SCHOOL BECAUSE OF HEADACHES: 11
TOTAL SCORE: 29

## 2024-02-26 ASSESSMENT — HEADACHE IMPACT TEST (HIT 6)
HOW OFTEN DO HEADACHES LIMIT YOUR DAILY ACTIVITIES: SOMETIMES
WHEN YOU HAVE HEADACHES HOW OFTEN IS THE PAIN SEVERE: SOMETIMES
HOW OFTEN DID HEADACHS LIMIT CONCENTRATION ON WORK OR DAILY ACTIVITY: SOMETIMES
HOW OFTEN HAVE YOU FELT TOO TIRED TO WORK BECAUSE OF YOUR HEADACHES: SOMETIMES
HOW OFTEN HAVE YOU FELT FED UP OR IRRITATED BECAUSE OF YOUR HEADACHES: SOMETIMES
WHEN YOU HAVE A HEADACHE HOW OFTEN DO YOU WISH YOU COULD LIE DOWN: SOMETIMES
HIT6 TOTAL SCORE: 60

## 2024-02-27 ENCOUNTER — OFFICE VISIT (OUTPATIENT)
Dept: NEUROLOGY | Facility: CLINIC | Age: 59
End: 2024-02-27
Payer: COMMERCIAL

## 2024-02-27 VITALS
HEART RATE: 88 BPM | SYSTOLIC BLOOD PRESSURE: 144 MMHG | BODY MASS INDEX: 27.77 KG/M2 | DIASTOLIC BLOOD PRESSURE: 84 MMHG | WEIGHT: 177.3 LBS | OXYGEN SATURATION: 98 %

## 2024-02-27 DIAGNOSIS — H57.9 EYE SYMPTOMS: Primary | ICD-10-CM

## 2024-02-27 DIAGNOSIS — H53.2 DOUBLE VISION: ICD-10-CM

## 2024-02-27 DIAGNOSIS — G43.709 CHRONIC MIGRAINE WITHOUT AURA WITHOUT STATUS MIGRAINOSUS, NOT INTRACTABLE: ICD-10-CM

## 2024-02-27 DIAGNOSIS — G43.009 MIGRAINE WITHOUT AURA AND WITHOUT STATUS MIGRAINOSUS, NOT INTRACTABLE: ICD-10-CM

## 2024-02-27 DIAGNOSIS — G44.019 EPISODIC CLUSTER HEADACHE, NOT INTRACTABLE: ICD-10-CM

## 2024-02-27 PROCEDURE — 99213 OFFICE O/P EST LOW 20 MIN: CPT | Performed by: NURSE PRACTITIONER

## 2024-02-27 ASSESSMENT — PATIENT HEALTH QUESTIONNAIRE - PHQ9: SUM OF ALL RESPONSES TO PHQ QUESTIONS 1-9: 8

## 2024-02-27 ASSESSMENT — PAIN SCALES - GENERAL: PAINLEVEL: SEVERE PAIN (6)

## 2024-02-27 NOTE — PATIENT INSTRUCTIONS
Plan:  Updated brain MRI for new eye symptoms   Return to Eye Clinic if symptoms more frequent and lasting longer or new symptoms.   Neck symptoms -discuss with Dr Malhotra.   Continue with Botox and ONB -already established with Dr Malhotra  Rescue treatment -ketorolac as needed and refills provided-including syringe and needles 25G-1 in, eletriptan as needed  Ondansetron as needed for nausea   Oxygen for cluster treatment   Follow up in 9-12 months if stable  as needed if headaches were getting worse

## 2024-02-27 NOTE — PROGRESS NOTES
ASSESSMENT AND PLAN:  Left eye symptoms for 20-30 min -crosses inward and double vision   Ophthalmology evaluation-[facility] -no structural problems   History of chronic migraines and cluster headaches controlled with treatment   Plan:  Updated brain MRI for new eye symptoms   Return to Eye Clinic if symptoms more frequent and lasting longer or new symptoms.   Neck symptoms -discuss with Dr Malhotra.   Continue with Botox and ONB -already established with Dr Malhotra  Rescue treatment -ketorolac as needed and refills provided-including syringe and needles 25G-1 in, eletriptan as needed  Ondansetron as needed for nausea   Oxygen for cluster treatment   Follow up in 9-12 months if stable  as needed if headaches were getting worse     Subjective   Valerie is a 58 year old, presenting for the following health issues:  RECHECK (Return headache)    Headaches coming from the neck -lasted for a week and was intense. Recently started doing myofascial massage -has been doing better  Headaches with seizure like activities for a long time -with involuntary movements and rapid eye movements for years and would not be able to talk than. New things in the last few month is the left eye symptoms  -However being able to see double and talk to her  and eye tired from going inward. Physically painful -only left eye pain afterwards and always a left eye and diplopia-unknown binocular or monocular. Duration 20-30 minutes for a spell and physical eye pain. Frequency about once per month. Eye pain aftewards and takes 2-3 days to pain to subside.   Had an eye exam and structurally no changes.   Lidocaine nasal spray helps with with cluster headaches. Oxygen for clusters help. Clusters usually in the fall -last cluster beginning of October 4-15 and gone. Starts as a sneeze initially in the middle of the night and left sided nasal drainage -typically on the left side. Up to 3-4 hours and comes back day after day the same time.    Botox, ONB and TPs -with Dr Malhotra and ED visits dropped from 13 to none since starting Botox. No longer high end headache.   Uses ketorolac inj infrequently and uses as needed   Ondansetron for nausea as needed   Eletriptan as needed and works. Use 4 tablets in 9 weeks.       Headache Tx  Emgality -was terrible and triggered migraine and whole body response   Botox q9 weeks   ONB every 18 weeks   Sumatriptan -  Relpax works better than sumatriptan   Verapamil-side effects  Divalproex-hair loss and constipation  Lamictal 100 mg BID and works better   Indomethacin-helped with postcoital headaches helped  Prednisone-as needed -no side effects   Topiramate-renal stones  Amitriptyline and SSRIs -side effects -worsening headaches   Oxygen helps with cluster and can help with other   zofran -helps as needed   nebivolol   Ketorolac IM-uses once or two times per month       Data reviewed    IMPRESSION:  1. No definite evidence of an acute infarct.  2. Unremarkable MRI of the brain.  3. No significant interval change from 12/18/2018 except for mild interval progression in the inflammatory changes within the paranasal sinuses.     Objective    BP (!) 144/84 (BP Location: Right arm, Patient Position: Sitting, Cuff Size: Adult Regular)   Pulse 88   Wt 80.4 kg (177 lb 4.8 oz)   SpO2 98%   BMI 27.77 kg/m    Body mass index is 27.77 kg/m .  Physical Exam   Last Patient-Answered HIT-6 Questionnaire      2/26/2024     8:19 AM   HIT-6   When you have headaches, how often is the pain severe 10   How often do headaches limit your ability to do usual daily activities including household work, work, school, or social activities? 10   When you have a headache, how often do you wish you could lie down? 10   In the past 4 weeks, how often have you felt too tired to do work or daily activities because of your headaches 10   In the past 4 weeks, how often have you felt fed up or irritated because of your headaches 10   In the past 4  weeks, how often did headaches limit your ability to concentrate on work or daily activities 10   HIT-6 Total Score 60       GENERAL: alert and no distress  EYES: Eyes grossly normal to inspection, PERRL and conjunctivae and sclerae normal  NEURO: Normal strength and tone, mentation intact and speech normal  PSYCH: mentation appears normal, affect normal    I discussed all my recommendations with Valerie Sim who verbalizes understanding and comfortable with the plan.     21 minutes spent on the date of the encounter doing face to face visit, chart  review, exam, results review,  meds review, treatment plan, documentation and further activities as noted above    ADORE Dwyer, CNP [facility]  Headache certified  [facility]

## 2024-02-27 NOTE — LETTER
2/27/2024       RE: Valerie Sim  [address]  [place] [address]     Dear Colleague,    Thank you for referring your patient, Valerie Sim, to the [facility] [place] [facility]. Please see a copy of my visit note below.    ASSESSMENT AND PLAN:  Left eye symptoms for 20-30 min -crosses inward and double vision   Ophthalmology evaluation-[facility] -no structural problems   History of chronic migraines and cluster headaches controlled with treatment   Plan:  Updated brain MRI for new eye symptoms   Return to Eye Clinic if symptoms more frequent and lasting longer or new symptoms.   Neck symptoms -discuss with Dr Malhotra.   Continue with Botox and ONB -already established with Dr Malhotra  Rescue treatment -ketorolac as needed and refills provided-including syringe and needles 25G-1 in, eletriptan as needed  Ondansetron as needed for nausea   Oxygen for cluster treatment   Follow up in 9-12 months if stable  as needed if headaches were getting worse     Subjective  Valerie is a 58 year old, presenting for the following health issues:  RECHECK (Return headache)    Headaches coming from the neck -lasted for a week and was intense. Recently started doing myofascial massage -has been doing better  Headaches with seizure like activities for a long time -with involuntary movements and rapid eye movements for years and would not be able to talk than. New things in the last few month is the left eye symptoms  -However being able to see double and talk to her  and eye tired from going inward. Physically painful -only left eye pain afterwards and always a left eye and diplopia-unknown binocular or monocular. Duration 20-30 minutes for a spell and physical eye pain. Frequency about once per month. Eye pain aftewards and takes 2-3 days to pain to subside.   Had an eye exam and structurally no changes.   Lidocaine nasal spray  helps with with cluster headaches. Oxygen for clusters help. Clusters usually in the fall -last cluster beginning of October 4-15 and gone. Starts as a sneeze initially in the middle of the night and left sided nasal drainage -typically on the left side. Up to 3-4 hours and comes back day after day the same time.   Botox, ONB and TPs -with Dr Malhotra and ED visits dropped from 13 to none since starting Botox. No longer high end headache.   Uses ketorolac inj infrequently and uses as needed   Ondansetron for nausea as needed   Eletriptan as needed and works. Use 4 tablets in 9 weeks.       Headache Tx  Emgality -was terrible and triggered migraine and whole body response   Botox q9 weeks   ONB every 18 weeks   Sumatriptan -  Relpax works better than sumatriptan   Verapamil-side effects  Divalproex-hair loss and constipation  Lamictal 100 mg BID and works better   Indomethacin-helped with postcoital headaches helped  Prednisone-as needed -no side effects   Topiramate-renal stones  Amitriptyline and SSRIs -side effects -worsening headaches   Oxygen helps with cluster and can help with other   zofran -helps as needed   nebivolol   Ketorolac IM-uses once or two times per month       Data reviewed    IMPRESSION:  1. No definite evidence of an acute infarct.  2. Unremarkable MRI of the brain.  3. No significant interval change from 12/18/2018 except for mild interval progression in the inflammatory changes within the paranasal sinuses.     Objective   BP (!) 144/84 (BP Location: Right arm, Patient Position: Sitting, Cuff Size: Adult Regular)   Pulse 88   Wt 80.4 kg (177 lb 4.8 oz)   SpO2 98%   BMI 27.77 kg/m    Body mass index is 27.77 kg/m .  Physical Exam   Last Patient-Answered HIT-6 Questionnaire      2/26/2024     8:19 AM   HIT-6   When you have headaches, how often is the pain severe 10   How often do headaches limit your ability to do usual daily activities including household work, work, school, or social  activities? 10   When you have a headache, how often do you wish you could lie down? 10   In the past 4 weeks, how often have you felt too tired to do work or daily activities because of your headaches 10   In the past 4 weeks, how often have you felt fed up or irritated because of your headaches 10   In the past 4 weeks, how often did headaches limit your ability to concentrate on work or daily activities 10   HIT-6 Total Score 60       GENERAL: alert and no distress  EYES: Eyes grossly normal to inspection, PERRL and conjunctivae and sclerae normal  NEURO: Normal strength and tone, mentation intact and speech normal  PSYCH: mentation appears normal, affect normal    I discussed all my recommendations with Valerie Sim who verbalizes understanding and comfortable with the plan.     21 minutes spent on the date of the encounter doing face to face visit, chart  review, exam, results review,  meds review, treatment plan, documentation and further activities as noted above    ADORE Dwyer, CNP [facility]  Headache certified  [facility]      Again, thank you for allowing me to participate in the care of your patient.      Sincerely,    ADORE Etienne CNP

## 2024-03-11 ENCOUNTER — VIRTUAL VISIT (OUTPATIENT)
Dept: PSYCHOLOGY | Facility: CLINIC | Age: 59
End: 2024-03-11
Payer: COMMERCIAL

## 2024-03-11 DIAGNOSIS — F41.1 GAD (GENERALIZED ANXIETY DISORDER): Primary | ICD-10-CM

## 2024-03-11 PROCEDURE — 90834 PSYTX W PT 45 MINUTES: CPT | Mod: 95 | Performed by: MARRIAGE & FAMILY THERAPIST

## 2024-03-11 NOTE — PROGRESS NOTES
Answers submitted by the patient for this visit:  Patient Health Questionnaire (Submitted on 3/11/2024)  If you checked off any problems, how difficult have these problems made it for you to do your work, take care of things at home, or get along with other people?: Somewhat difficult  PHQ9 TOTAL SCORE: 11          [facility]                                     Progress Note    Patient Name: Valerie Sim  Date:  3/11/24         Service Type: Individual  Session Start Time:    8:04a         Session End Time: 8:54a    Session Length:  50    Session #: 23    Attendees: Client     Service Modality:  Video Visit:      Provider verified identity through the following two step process.  Patient provided:  Patient     Telemedicine Visit: The patient's condition can be safely assessed and treated via synchronous audio and visual telemedicine encounter.      Reason for Telemedicine Visit: Services only offered telehealth    Originating Site (Patient Location): Patient's home    Distant Site (Provider Location): Provider Remote Setting- Home Office    Consent:  The patient/guardian has verbally consented to: the potential risks and benefits of telemedicine (video visit) versus in person care; bill my insurance or make self-payment for services provided; and responsibility for payment of non-covered services.     Patient would like the video invitation sent by:  Send to e-mail at: [email or web address]    Mode of Communication:  Video Conference via [facility]    Distant Location (Provider):  Off-site    As the provider I attest to compliance with applicable laws and regulations related to telemedicine.    DATA  Interactive Complexity: No  Crisis: No        Progress Since Last Session (Related to Symptoms / Goals / Homework):   Symptoms: NO change, mood at 5/10    Homework: Completed in session      Episode of Care Goals: Minimal progress - PREPARATION (Decided to change - considering how); Intervened by  negotiating a change plan and determining options / strategies for behavior change, identifying triggers, exploring social supports, and working towards setting a date to begin behavior change     Current / Ongoing Stressors and Concerns:  Last session 2/19, overall things have been going ok. Did get some messages from daughter about an upcoming shower along with her joining for a family dinner this month which are huge steps.        Treatment Objective(s) Addressed in This Session:   Increase interest, engagement, and pleasure in doing things       Intervention:   Motivational Interviewing    MI Intervention: Permission to raise concern or advise, Open-ended questions and Reflections: simple and complex     Change Talk Expressed by the Patient: Reasons to change Need to change    Provider Response to Change Talk: R - Reflected patient's change talk and S - Summarized patient's change talk statements     ASSESSMENT: Current Emotional / Mental Status (status of significant symptoms):   Risk status (Self / Other harm or suicidal ideation)   Patient denies current fears or concerns for personal safety.   Patient denies current or recent suicidal ideation or behaviors.   Patient denies current or recent homicidal ideation or behaviors.   Patient denies current or recent self injurious behavior or ideation.   Patient denies other safety concerns.   Patient reports there has been no change in risk factors since their last session.     Patient reports there has been no change in protective factors since their last session.     A safety and risk management plan has been developed including: Patient consented to co-developed safety plan on 4/5/23.  Safety and risk management plan was reviewed.   Patient agreed to use safety plan should any safety concerns arise.  A copy was made available to the patient.     Appearance:   Appropriate    Eye Contact:   Good    Psychomotor Behavior: Normal    Attitude:   Cooperative     Orientation:   All   Speech    Rate / Production: Normal     Volume:  Normal    Mood:    Normal   Affect:    Appropriate    Thought Content:  Clear    Thought Form:  Coherent  Logical    Insight:    Good      Medication Review:   No changes to current psychiatric medication(s)     Medication Compliance:   Yes     Changes in Health Issues:   None reported     Chemical Use Review:   Substance Use: Chemical use reviewed, no active concerns identified      Tobacco Use: No current tobacco use.      Diagnosis:  MDD, moderate, recurrent    Collateral Reports Completed:   Not Applicable    PLAN: (Patient Tasks / Therapist Tasks / Other)  PT will work to engage in assertive communication with daughter      Yoni Alex, [facility]        3/11/24                                             ______________________________________________________________________    Individual Treatment Plan    Patient's Name: Valerie Sim  YOB: 1965    Date of Creation: 1/16/24  Date Treatment Plan Last Reviewed/Revised: 4/16/24    DSM5 Diagnoses: 296.32 (F33.1) Major Depressive Disorder, Recurrent Episode, Moderate _  Psychosocial / Contextual Factors:   PROMIS (reviewed every 90 days):     Referral / Collaboration:  Referral to another professional/service is not indicated at this time..    Anticipated number of session for this episode of care: 3-6 sessions  Anticipation frequency of session: Biweekly  Anticipated Duration of each session: 38-52 minutes  Treatment plan will be reviewed in 90 days or when goals have been changed.       MeasurableTreatment Goal(s) related to diagnosis / functional impairment(s)  Goal 1: Patient will engage in CBT skills for depression sx reduction    Objective #A (Patient Action)    Patient will Identify negative self-talk and behaviors: challenge core beliefs, myths, and actions.  Status Continued 1/16/24    Intervention(s)  Therapist will teach emotional regulation skills. CBT skills  .      Patient has reviewed and agreed to the above plan.      Yoni Alex, SRAVANTHIFT 1/16/24

## 2024-03-21 ENCOUNTER — MYC MEDICAL ADVICE (OUTPATIENT)
Dept: PHYSICAL MEDICINE AND REHAB | Facility: CLINIC | Age: 59
End: 2024-03-21
Payer: COMMERCIAL

## 2024-03-22 ENCOUNTER — ANCILLARY PROCEDURE (OUTPATIENT)
Dept: MRI IMAGING | Facility: CLINIC | Age: 59
End: 2024-03-22
Attending: NURSE PRACTITIONER
Payer: COMMERCIAL

## 2024-03-22 ENCOUNTER — TELEPHONE (OUTPATIENT)
Dept: FAMILY MEDICINE | Facility: CLINIC | Age: 59
End: 2024-03-22

## 2024-03-22 DIAGNOSIS — H53.2 DOUBLE VISION: ICD-10-CM

## 2024-03-22 PROCEDURE — A9585 GADOBUTROL INJECTION: HCPCS | Performed by: RADIOLOGY

## 2024-03-22 PROCEDURE — 70553 MRI BRAIN STEM W/O & W/DYE: CPT | Mod: GC | Performed by: RADIOLOGY

## 2024-03-22 RX ORDER — GADOBUTROL 604.72 MG/ML
10 INJECTION INTRAVENOUS ONCE
Status: COMPLETED | OUTPATIENT
Start: 2024-03-22 | End: 2024-03-22

## 2024-03-22 RX ADMIN — GADOBUTROL 8 ML: 604.72 INJECTION INTRAVENOUS at 09:19

## 2024-03-22 NOTE — RESULT ENCOUNTER NOTE
Jaron Padilla,   Your recent brain MRI results with and without contrast were reviewed and no abnormal acute findings at this time to explain your symptoms.  Follow-up as discussed.  I will be happy to review your images to review at next follow-up visit.  Take care,  Liu

## 2024-03-22 NOTE — TELEPHONE ENCOUNTER
Patient Quality Outreach    Patient is due for the following:   Hypertension -  Hypertension follow-up visit  Physical Preventive Adult Physical      Topic Date Due    Hepatitis B Vaccine (1 of 3 - 19+ 3-dose series) Never done       Next Steps:   Schedule a Adult Preventative    Type of outreach:    Sent [employer] message.      Questions for provider review:    None           Yoni Rivera MA

## 2024-03-25 ENCOUNTER — VIRTUAL VISIT (OUTPATIENT)
Dept: PSYCHIATRY | Facility: CLINIC | Age: 59
End: 2024-03-25
Attending: PSYCHIATRY & NEUROLOGY
Payer: COMMERCIAL

## 2024-03-25 VITALS — BODY MASS INDEX: 27.78 KG/M2 | WEIGHT: 177 LBS | HEIGHT: 67 IN

## 2024-03-25 DIAGNOSIS — F41.1 GAD (GENERALIZED ANXIETY DISORDER): ICD-10-CM

## 2024-03-25 DIAGNOSIS — F41.0 PANIC DISORDER WITHOUT AGORAPHOBIA: ICD-10-CM

## 2024-03-25 DIAGNOSIS — F33.1 MODERATE EPISODE OF RECURRENT MAJOR DEPRESSIVE DISORDER (H): Primary | ICD-10-CM

## 2024-03-25 PROCEDURE — 90833 PSYTX W PT W E/M 30 MIN: CPT | Mod: 95

## 2024-03-25 PROCEDURE — 99214 OFFICE O/P EST MOD 30 MIN: CPT | Mod: 95

## 2024-03-25 ASSESSMENT — PAIN SCALES - GENERAL: PAINLEVEL: MODERATE PAIN (5)

## 2024-03-25 NOTE — NURSING NOTE
Is the patient currently in the state of [place]? YES    Visit mode:VIDEO    If the visit is dropped, the patient can be reconnected by: VIDEO VISIT: Text to cell phone:   Telephone Information:   Mobile [phone]       Will anyone else be joining the visit? NO  (If patient encounters technical issues they should call [phone])    How would you like to obtain your AVS? MyChart    Are changes needed to the allergy or medication list? Yes Lamotrigine- Pt taking 100mg daily, not 150mg. Please change this on the medication list.      Reason for visit: RECHECK    Brook CHOI

## 2024-03-25 NOTE — PATIENT INSTRUCTIONS
**For crisis resources, please see the information at the end of this document**   Patient Education    Thank you for coming to the [facility].     Lab Testing:  If you had lab testing today and your results are reassuring or normal they will be mailed to you or sent through [employer] within 7 days. If the lab tests need quick action we will call you with the results. The phone number we will call with results is # [phone]. If this is not the best number please call our clinic and change the number.     Medication Refills:  If you need any refills please call your pharmacy and they will contact us. Our fax number for refills is [phone].   Three business days of notice are needed for general medication refill requests.   Five business days of notice are needed for controlled substance refill requests.   If you need to change to a different pharmacy, please contact the new pharmacy directly. The new pharmacy will help you get your medications transferred.     Contact Us:  Please call [phone] during business hours (8-5:00 M-F).   If you have medication related questions after clinic hours, or on the weekend, please call [phone].     Financial Assistance [phone]   Medical Records [phone]       MENTAL HEALTH CRISIS RESOURCES:  For a emergency help, please call 911 or go to the nearest Emergency Department.     Emergency Walk-In Options:   [facility] Unit @ [facility] ([place]): [phone] - Specialized mental health emergency area designed to be calming  [facility] ([place]): [phone]  [facility] ([place]): [phone]  [facility]): [phone]    [facility] Crisis Information:   [place]: [phone]  [place]: [phone]  [place] ([place]) - Adult: [phone]     Child: [phone]  [place] - Adult: [phone]     Child: [phone]  [place]:  [phone]  List of all [facility] resources:   https://[place].gov/dhs/people-we-serve/adults/health-care/mental-health/resources/crisis-contacts.jsp    National Crisis Information:   Crisis Text Line: Text  [place]  to 678555  Suicide & Crisis Lifeline: 988  National Suicide Prevention Lifeline: 7-629-451-TALK (1-669.899.7099)       For online chat options, visit https://suicidepreventionlifeline.org/chat/  Poison Control Center: 1-843-529-5432  Trans Lifeline: 5-917-833-2355 - Hotline for transgender people of all ages  [employer]: 5-543-105-9741 - Hotline for LGBT youth     For Non-Emergency Support:   Fast Tracker: Mental Health & Substance Use Disorder Resources -   https://www.FanchimpckWhyteboardn.org/
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
## 2024-04-01 ENCOUNTER — TELEPHONE (OUTPATIENT)
Dept: PHYSICAL MEDICINE AND REHAB | Facility: CLINIC | Age: 59
End: 2024-04-01
Payer: COMMERCIAL

## 2024-04-03 ENCOUNTER — OFFICE VISIT (OUTPATIENT)
Dept: PHYSICAL MEDICINE AND REHAB | Facility: CLINIC | Age: 59
End: 2024-04-03
Payer: COMMERCIAL

## 2024-04-03 VITALS — RESPIRATION RATE: 16 BRPM

## 2024-04-03 DIAGNOSIS — G43.719 CHRONIC MIGRAINE WITHOUT AURA, INTRACTABLE, WITHOUT STATUS MIGRAINOSUS: Primary | ICD-10-CM

## 2024-04-03 PROCEDURE — 95874 GUIDE NERV DESTR NEEDLE EMG: CPT | Performed by: PHYSICAL MEDICINE & REHABILITATION

## 2024-04-03 PROCEDURE — 64615 CHEMODENERV MUSC MIGRAINE: CPT | Performed by: PHYSICAL MEDICINE & REHABILITATION

## 2024-04-03 RX ORDER — BUPIVACAINE HYDROCHLORIDE 2.5 MG/ML
10 INJECTION, SOLUTION EPIDURAL; INFILTRATION; INTRACAUDAL ONCE
Status: COMPLETED | OUTPATIENT
Start: 2024-04-03 | End: 2024-04-03

## 2024-04-03 RX ADMIN — BUPIVACAINE HYDROCHLORIDE 25 MG: 2.5 INJECTION, SOLUTION EPIDURAL; INFILTRATION; INTRACAUDAL at 16:06

## 2024-04-03 ASSESSMENT — PAIN SCALES - GENERAL: PAINLEVEL: SEVERE PAIN (7)

## 2024-04-03 NOTE — LETTER
4/3/2024       RE: Valerie Sim  [address]  [place] [address]     Dear Colleague,    Thank you for referring your patient, Valerie Sim, to the [facility] [place] at [facility]. Please see a copy of my visit note below.      [facility]     PM&R CLINIC NOTE  BOTULINUM TOXIN PROCEDURE      HPI  Chief Complaint   Patient presents with    Procedure     ONB and TPI     Valerie Sim is a 59 year old female with a history of chronic migraine headaches who presents to clinic for botulinum toxin injections for management of chronic migraine headaches and excessive jaw clenching.     SINCE LAST VISIT  Valerie Sim was last seen for Botox injections on 1/24/2024, at which time she received 225 units of Botox.    Patient denies new medical diagnoses, illnesses, hospitalizations, emergency room visits, and injuries since the previous injection with botulinum neurotoxin.       RESPONSE TO PREVIOUS TREATMENT    Side effects:  None      1.  Headache frequency during this injection cycle:  5 migraine headache days per month throughout the injection cycle. This is compared to her baseline headache frequency of 30 severe headache days per month.     2.  Headache duration during this injection cycle:  Headache duration was between 4 hours and 4 days. Patient reports a few episodes of multiple day headaches during this injection cycle.     3.  Headache intensity during this injection cycle:    A.  5/10  =  Typical pain level.  B.  10/10  =  Worst pain level.  C.  0/10  =  Lowest pain level.    4.  Change in headache medication usage during this injection cycle:  (For Example:  Able to decrease use of oral pain medications.) No changes.       5.  ER Visits During This Injection Cycle:  She uses Relpax, Toradol and Zofran as needed for migraine headaches, and oxygen occasionally  for cluster headaches. Lidocaine nasal spray is used intermittently for cluster headaches, which is found to be quite helpful.      6.  Functional Performance:  Change in ADL's, social interaction, days lost from work, etc. Patient reports being able to more fully participate in social and family activities and responsibilities as headache symptoms have improved. She is now changing her perspectives on pain expectations of severity and length after onset. She also started a variety of different yogas.       PHYSICAL EXAM  VS: Resp 16    GEN: Pleasant and cooperative, in no acute distress  HEENT: No facial asymmetry    ALLERGIES  Allergies   Allergen Reactions    Fluoxetine Other (See Comments)     PN: LW Reaction: headache  PN: LW Reaction: headache  Migraine      Garlic Blisters, Cough, Dermatitis, Difficulty breathing, Headache, Hives, Itching, Nausea and Vomiting and Shortness Of Breath    Candesartan      Other reaction(s): Myalgia    Duloxetine      Other reaction(s): Headache  migraine    Iodine      Other reaction(s): Other (see comments)  PN: LW CM1: CONTRAST- iodine Reaction :    Metoclopramide      Other reaction(s): Headache  Caused increase in headaches    Perfume      Other reaction(s): Headache  head pain leading to migraines    Pregabalin      Other reaction(s): Headache, Myalgia    Trazodone Other (See Comments) and Unknown     Other reaction(s): Headache  Other reaction(s): Headache  Other reaction(s): Headache    Amitriptyline Hcl Other (See Comments)     Migraine      Candesartan Cilexetil-Hctz Muscle Pain (Myalgia)    Cyproheptadine Hcl      headaches    Desvenlafaxine      Migraine      Diatrizoate Unknown     PN: LW CM1: CONTRAST- iodine Reaction :    Diazepam      Other reaction(s): Vestibular Toxicity  PN: LW Reaction: vertigo  PN: LW Reaction: vertigo    Duloxetine Hcl Other (See Comments)     Migraine      Gabapentin Other (See Comments)    Galcanezumab      Other reaction(s): Headache     "Imipramine Other (See Comments)     Migraine      Metoprolol Other (See Comments) and Unknown     headache  headache  headache    Morphine Other (See Comments)     Patient reports seizure   Other reaction(s): Unknown  Seizure; 5/111/21 updated info: patient states she had a seizure while having a migraine headache years ago and is not sure if it was due to morphine. She has tolerated Dilaudid since then.      No Clinical Screening - See Comments      PN: LW FI1: lactose intolerance LW FI2: shellfish  PN: LW CM1: CONTRAST- iodine Reaction :  PN: LW Other1: -nka    Nortriptyline Other (See Comments)     Migraine      Phenergan Dm [Promethazine-Dm] Other (See Comments)     seizures    Pregabalin Muscle Pain (Myalgia)    Promethazine      PN: LW Reaction: minimal sizures    Venlafaxine Other (See Comments)     PN: LW Reaction: migraine  PN: LW Reaction: migraine  Migraine      Wellbutrin [Bupropion Hydrobromide] Other (See Comments)     Migraine      Compazine Anxiety    Dihydroergotamine Anxiety and Other (See Comments)     Cardiac symptoms    Droperidol Anxiety     PN: LW Reaction: agitation    Medroxyprogesterone Hives     PN: LW Reaction: nausea    Prochlorperazine Anxiety     PN: LW Reaction: \"can't sit still\"       CURRENT MEDICATIONS    Current Outpatient Medications:     aMILoride (MIDAMOR) 5 MG tablet, Take 7.5 mg by mouth daily 2.5MG in the AM and 5MG in the PM., Disp: , Rfl:     amLODIPine (NORVASC) 10 MG tablet, Take 10 mg by mouth daily, Disp: , Rfl:     amphetamine-dextroamphetamine (ADDERALL) 20 MG tablet, Take 1 tablet (20 mg) by mouth 2 times daily (Patient taking differently: Take 20 mg by mouth 2 times daily Pt currently taking 10MG in the AM and 20MG in the PM.), Disp: 60 tablet, Rfl: 0    B Complex-Biotin-FA (B-COMPLEX PO), Take 1 tablet by mouth daily, Disp: , Rfl:     botulinum toxin type A (BOTOX) 100 units injection, Inject intramuscular. Every 18 weeks for pelvic floor spasms, Disp: , Rfl:     " "budesonide (RINOCORT AQUA) 32 MCG/ACT nasal spray, Spray 1 spray into both nostrils daily., Disp: , Rfl:     calcitRIOL (ROCALTROL) 0.25 MCG capsule, Take 0.25 mcg by mouth as needed, Disp: , Rfl:     Calcium-Magnesium-Vitamin D (CITRACAL SLOW RELEASE) 600- MG-MG-UNIT TB24, Take 600 mg by mouth 4 times daily, Disp: , Rfl:     docusate sodium (COLACE) 100 MG capsule, Take 1 capsule by mouth as needed for constipation, Disp: , Rfl:     eletriptan (RELPAX) 40 MG tablet, Take 40 mg by mouth as needed, Disp: , Rfl:     estradiol (ESTRACE) 0.1 MG/GM vaginal cream, , Disp: , Rfl:     fexofenadine (ALLEGRA) 180 MG tablet, Take 180 mg by mouth daily, Disp: , Rfl:     HYDROmorphone (DILAUDID) 2 MG tablet, Take 0.5-1 tablets (1-2 mg) by mouth every 3 hours as needed (headache) 20 tablets = 3 month supply., Disp: 24 tablet, Rfl: 0    ketorolac (TORADOL) 30 MG/ML injection, Inject 1 mL (30 mg) into the vein every 6 hours as needed for moderate pain, Disp: 6 mL, Rfl: 1    lidocaine (XYLOCAINE) 4 % external solution, Spray 0.5 ml once in each nostril q 4-6 hours as needed for headache. lay down with head tilted back for 5 minutes after if preferred, Disp: 50 mL, Rfl: 6    Magnesium Oxide 500 MG TABS, Take 500 mg by mouth 2 times daily, Disp: , Rfl:     naloxone (NARCAN) 4 MG/0.1ML nasal spray, Spray 1 spray (4 mg) into one nostril alternating nostrils as needed for opioid reversal every 2-3 minutes until assistance arrives, Disp: 0.2 mL, Rfl: 1    nebivolol (BYSTOLIC) 5 MG tablet, Take 5 mg by mouth daily Take twice a day totaling 10 mg., Disp: , Rfl:     Needle, Disp, 25G X 1\" MISC, 2 each as needed (with toradol) 90-day supply, Disp: 6 each, Rfl: 1    ondansetron (ZOFRAN) 4 MG tablet, Take 1 tablet (4 mg) by mouth every 8 hours as needed for nausea, Disp: 30 tablet, Rfl: 1    order for DME, Equipment being ordered: Oxygen The patient has Cluster Headache and needs 10 liters/minute of high flow oxygen via nonrebreather mask " prn headaches, Disp: 99 days, Rfl: 0    order for DME, Equipment being ordered: Oxygen and non-rebreather mask.   Use high flow oxygen (10-15 L/min) with non-rebreather mask, as needed for cluster headaches, Disp: 1 Units, Rfl: 11    potassium chloride ER (KLOR-CON M) 20 MEQ CR tablet, Take 1 tablet by mouth 2 times daily, Disp: , Rfl:     QUEtiapine (SEROQUEL) 50 MG tablet, Take 1 tablet (50 mg) by mouth at bedtime, Disp: 60 tablet, Rfl: 2    Syringe, Disposable, (SYRINGE LUER SLIP) 1 ML MISC, 1 each as needed (with toradol/migraine), Disp: 6 each, Rfl: 1    valACYclovir (VALTREX) 1000 mg tablet, Take 1,000 mg by mouth as needed., Disp: , Rfl:     lamoTRIgine (LAMICTAL) 100 MG tablet, Take 1.5 tablets (150 mg) by mouth daily, Disp: 45 tablet, Rfl: 2    Current Facility-Administered Medications:     botulinum toxin type A (BOTOX) 100 units injection 225 Units, 225 Units, Intramuscular, See Admin Instructions, [place], Addie Saeed MD, 225 Units at 24 1534       BOTULINUM NEUROTOXIN INJECTION PROCEDURES    VERIFICATION OF PATIENT IDENTIFICATION AND PROCEDURE     Initials   Patient Name SES   Patient  SES   Procedure Verified by: SES     Prior to the start of the procedure and with procedural staff participation, I verbally confirmed the patient s identity using two indicators, relevant allergies, that the procedure was appropriate and matched the consent or emergent situation, and that the correct equipment/implants were available. Immediately prior to starting the procedure I conducted the Time Out with the procedural staff and re-confirmed the patient s name, procedure, and site/side. (The Joint Commission universal protocol was followed.)  Yes    Sedation (Moderate or Deep): None    ABOVE ASSESSMENTS PERFORMED BY    Addie Malhotra MD      INDICATIONS FOR PROCEDURES  Valerie Sim is a 59 year old patient with pain and jaw clenching secondary to the diagnosis of chronic migraine headaches and  oromandibular dystonia. Her baseline symptoms have been recalcitrant to oral medications and conservative therapy.  She is here today for reinjection with Botox.    GOAL OF PROCEDURE  The goal of this procedure is to decrease pain and excessive jaw clenching due to chronic migraine headaches and bruxism.     TOTAL DOSE ADMINISTERED  Dose Administered:  225 units  Botox (Botulinum Toxin Type A)       2:1 Dilution   Unavoidable Drug Waste: Yes  Amount of drug waste (mL): 75 units Botox.  Reason for waste:  Single use vial  Diluent Used:  0.5% bupivacaine  Total Volume of Diluent Used:  4.5 ml  NDC #: Botox 100u ([address]-39)      CONSENT  The risks, benefits, and treatment options were discussed with Valerie Sim and she agreed to proceed.    Written consent was obtained by  ninoska .     EQUIPMENT USED  Needle-25mm stimulating/recording  Needle-30 gauge  EMG/NCS Machine    SKIN PREPARATION  Skin preparation was performed using an alcohol wipe.    GUIDANCE DESCRIPTION  Electro-myographic guidance was necessary throughout the neck and jaw portion of the procedure to accurately identify all areas of dystonic muscles while avoiding injection of non-dystonic muscles, neighboring nerves and nearby vascular structures.       AREA/MUSCLE INJECTED:  225 UNITS BOTOX = TOTAL DOSE, 2:1 DILUTION     1. FACIAL & SCALP MUSCLES: 85 UNITS BOTOX = TOTAL DOSE      Right Frontalis - 10 units of Botox in 2 site/s.  Left Frontalis - 10 units of Botox in 2 site/s.      Procerus - 5 units of Botox at 1 site/s.       Right  - 2.5 units of Botox in 1 site/s.  Left  - 2.5 units of Botox in 1 site/s.      Right Nasalis - 2.5 units of Botox at 1 site/s.           Left Nasalis - 2.5 units of Botox at 1 site/s.     Right Upper Occipitalis - 25 units of Botox at 5 site/s.   Left Upper Occipitalis - 25 units of Botox at 5 site/s.         2. JAW MUSCLES: 90 UNITS BOTOX = TOTAL DOSE     Right Masseter - 20 units of Botox at 2  site/s.   Left Masseter - 20 units of Botox at 2 site/s.      Right Temporalis - 25 units of Botox at 5 site/s.  Left Temporalis - 25 units of Botox at 5 site/s.          3. SHOULDER & NECK MUSCLES: 50 UNITS BOTOX = TOTAL DOSE      Right Levator Scapula - 10 units of Botox at 2 site/s (neck and scapular insertion).  Left Levator Scapula - 10 units of Botox at 2 site/s (neck and scapular insertion).      Right Upper Trapezius (mid & low lateral) - 10 units of Botox at 3 site/s.  Left Upper Trapezius (mid & low lateral) - 10 units of Botox at 3 site/s.             Right Pectoralis Minor - 5 units of Botox at 1 site/s.                     Left Pectoralis Minor - 5 units of Botox at 1 site/s.       RESPONSE TO PROCEDURE  Valerie Sim tolerated the procedure well and there were no immediate complications. She was allowed to recover for an appropriate period of time and was discharged home in stable condition.    ASSESSMENT AND PLAN   Botulinum toxin injections: No changes made to Botox dose or distribution today. Patient will continue to monitor response to Botox and report at next appointment.  Referrals: None.   Follow up: Valerie Sim was rescheduled for the next series of injections in 9 weeks, at which time we will evaluate response to today's injections. she may call the clinic prior with any questions or concerns prior to the next appointment.       Again, thank you for allowing me to participate in the care of your patient.      Sincerely,    Addie Malhotra MD

## 2024-04-03 NOTE — PROGRESS NOTES
[facility]     PM&R CLINIC NOTE  BOTULINUM TOXIN PROCEDURE      HPI  Chief Complaint   Patient presents with    Procedure     ONB and TPI     Valerie Sim is a 59 year old female with a history of chronic migraine headaches who presents to clinic for botulinum toxin injections for management of chronic migraine headaches and excessive jaw clenching.     SINCE LAST VISIT  Valerie Sim was last seen for Botox injections on 1/24/2024, at which time she received 225 units of Botox.    Patient denies new medical diagnoses, illnesses, hospitalizations, emergency room visits, and injuries since the previous injection with botulinum neurotoxin.       RESPONSE TO PREVIOUS TREATMENT    Side effects:  None      1.  Headache frequency during this injection cycle:  5 migraine headache days per month throughout the injection cycle. This is compared to her baseline headache frequency of 30 severe headache days per month.     2.  Headache duration during this injection cycle:  Headache duration was between 4 hours and 4 days. Patient reports a few episodes of multiple day headaches during this injection cycle.     3.  Headache intensity during this injection cycle:    A.  5/10  =  Typical pain level.  B.  10/10  =  Worst pain level.  C.  0/10  =  Lowest pain level.    4.  Change in headache medication usage during this injection cycle:  (For Example:  Able to decrease use of oral pain medications.) No changes.       5.  ER Visits During This Injection Cycle:  She uses Relpax, Toradol and Zofran as needed for migraine headaches, and oxygen occasionally for cluster headaches. Lidocaine nasal spray is used intermittently for cluster headaches, which is found to be quite helpful.      6.  Functional Performance:  Change in ADL's, social interaction, days lost from work, etc. Patient reports being able to more fully participate in social and family activities and responsibilities as headache  symptoms have improved. She is now changing her perspectives on pain expectations of severity and length after onset. She also started a variety of different yogas.       PHYSICAL EXAM  VS: Resp 16    GEN: Pleasant and cooperative, in no acute distress  HEENT: No facial asymmetry    ALLERGIES  Allergies   Allergen Reactions    Fluoxetine Other (See Comments)     PN: LW Reaction: headache  PN: LW Reaction: headache  Migraine      Garlic Blisters, Cough, Dermatitis, Difficulty breathing, Headache, Hives, Itching, Nausea and Vomiting and Shortness Of Breath    Candesartan      Other reaction(s): Myalgia    Duloxetine      Other reaction(s): Headache  migraine    Iodine      Other reaction(s): Other (see comments)  PN: LW CM1: CONTRAST- iodine Reaction :    Metoclopramide      Other reaction(s): Headache  Caused increase in headaches    Perfume      Other reaction(s): Headache  head pain leading to migraines    Pregabalin      Other reaction(s): Headache, Myalgia    Trazodone Other (See Comments) and Unknown     Other reaction(s): Headache  Other reaction(s): Headache  Other reaction(s): Headache    Amitriptyline Hcl Other (See Comments)     Migraine      Candesartan Cilexetil-Hctz Muscle Pain (Myalgia)    Cyproheptadine Hcl      headaches    Desvenlafaxine      Migraine      Diatrizoate Unknown     PN: LW CM1: CONTRAST- iodine Reaction :    Diazepam      Other reaction(s): Vestibular Toxicity  PN: LW Reaction: vertigo  PN: LW Reaction: vertigo    Duloxetine Hcl Other (See Comments)     Migraine      Gabapentin Other (See Comments)    Galcanezumab      Other reaction(s): Headache    Imipramine Other (See Comments)     Migraine      Metoprolol Other (See Comments) and Unknown     headache  headache  headache    Morphine Other (See Comments)     Patient reports seizure   Other reaction(s): Unknown  Seizure; 5/111/21 updated info: patient states she had a seizure while having a migraine headache years ago and is not sure  "if it was due to morphine. She has tolerated Dilaudid since then.      No Clinical Screening - See Comments      PN: LW FI1: lactose intolerance LW FI2: shellfish  PN: LW CM1: CONTRAST- iodine Reaction :  PN: LW Other1: -nka    Nortriptyline Other (See Comments)     Migraine      Phenergan Dm [Promethazine-Dm] Other (See Comments)     seizures    Pregabalin Muscle Pain (Myalgia)    Promethazine      PN: LW Reaction: minimal sizures    Venlafaxine Other (See Comments)     PN: LW Reaction: migraine  PN: LW Reaction: migraine  Migraine      Wellbutrin [Bupropion Hydrobromide] Other (See Comments)     Migraine      Compazine Anxiety    Dihydroergotamine Anxiety and Other (See Comments)     Cardiac symptoms    Droperidol Anxiety     PN: LW Reaction: agitation    Medroxyprogesterone Hives     PN: LW Reaction: nausea    Prochlorperazine Anxiety     PN: LW Reaction: \"can't sit still\"       CURRENT MEDICATIONS    Current Outpatient Medications:     aMILoride (MIDAMOR) 5 MG tablet, Take 7.5 mg by mouth daily 2.5MG in the AM and 5MG in the PM., Disp: , Rfl:     amLODIPine (NORVASC) 10 MG tablet, Take 10 mg by mouth daily, Disp: , Rfl:     amphetamine-dextroamphetamine (ADDERALL) 20 MG tablet, Take 1 tablet (20 mg) by mouth 2 times daily (Patient taking differently: Take 20 mg by mouth 2 times daily Pt currently taking 10MG in the AM and 20MG in the PM.), Disp: 60 tablet, Rfl: 0    B Complex-Biotin-FA (B-COMPLEX PO), Take 1 tablet by mouth daily, Disp: , Rfl:     botulinum toxin type A (BOTOX) 100 units injection, Inject intramuscular. Every 18 weeks for pelvic floor spasms, Disp: , Rfl:     budesonide (RINOCORT AQUA) 32 MCG/ACT nasal spray, Spray 1 spray into both nostrils daily., Disp: , Rfl:     calcitRIOL (ROCALTROL) 0.25 MCG capsule, Take 0.25 mcg by mouth as needed, Disp: , Rfl:     Calcium-Magnesium-Vitamin D (CITRACAL SLOW RELEASE) 600- MG-MG-UNIT TB24, Take 600 mg by mouth 4 times daily, Disp: , Rfl:     docusate " "sodium (COLACE) 100 MG capsule, Take 1 capsule by mouth as needed for constipation, Disp: , Rfl:     eletriptan (RELPAX) 40 MG tablet, Take 40 mg by mouth as needed, Disp: , Rfl:     estradiol (ESTRACE) 0.1 MG/GM vaginal cream, , Disp: , Rfl:     fexofenadine (ALLEGRA) 180 MG tablet, Take 180 mg by mouth daily, Disp: , Rfl:     HYDROmorphone (DILAUDID) 2 MG tablet, Take 0.5-1 tablets (1-2 mg) by mouth every 3 hours as needed (headache) 20 tablets = 3 month supply., Disp: 24 tablet, Rfl: 0    ketorolac (TORADOL) 30 MG/ML injection, Inject 1 mL (30 mg) into the vein every 6 hours as needed for moderate pain, Disp: 6 mL, Rfl: 1    lidocaine (XYLOCAINE) 4 % external solution, Spray 0.5 ml once in each nostril q 4-6 hours as needed for headache. lay down with head tilted back for 5 minutes after if preferred, Disp: 50 mL, Rfl: 6    Magnesium Oxide 500 MG TABS, Take 500 mg by mouth 2 times daily, Disp: , Rfl:     naloxone (NARCAN) 4 MG/0.1ML nasal spray, Spray 1 spray (4 mg) into one nostril alternating nostrils as needed for opioid reversal every 2-3 minutes until assistance arrives, Disp: 0.2 mL, Rfl: 1    nebivolol (BYSTOLIC) 5 MG tablet, Take 5 mg by mouth daily Take twice a day totaling 10 mg., Disp: , Rfl:     Needle, Disp, 25G X 1\" MISC, 2 each as needed (with toradol) 90-day supply, Disp: 6 each, Rfl: 1    ondansetron (ZOFRAN) 4 MG tablet, Take 1 tablet (4 mg) by mouth every 8 hours as needed for nausea, Disp: 30 tablet, Rfl: 1    order for DME, Equipment being ordered: Oxygen The patient has Cluster Headache and needs 10 liters/minute of high flow oxygen via nonrebreather mask prn headaches, Disp: 99 days, Rfl: 0    order for DME, Equipment being ordered: Oxygen and non-rebreather mask.   Use high flow oxygen (10-15 L/min) with non-rebreather mask, as needed for cluster headaches, Disp: 1 Units, Rfl: 11    potassium chloride ER (KLOR-CON M) 20 MEQ CR tablet, Take 1 tablet by mouth 2 times daily, Disp: , Rfl:     " QUEtiapine (SEROQUEL) 50 MG tablet, Take 1 tablet (50 mg) by mouth at bedtime, Disp: 60 tablet, Rfl: 2    Syringe, Disposable, (SYRINGE LUER SLIP) 1 ML MISC, 1 each as needed (with toradol/migraine), Disp: 6 each, Rfl: 1    valACYclovir (VALTREX) 1000 mg tablet, Take 1,000 mg by mouth as needed., Disp: , Rfl:     lamoTRIgine (LAMICTAL) 100 MG tablet, Take 1.5 tablets (150 mg) by mouth daily, Disp: 45 tablet, Rfl: 2    Current Facility-Administered Medications:     botulinum toxin type A (BOTOX) 100 units injection 225 Units, 225 Units, Intramuscular, See Admin Instructions, Addie Malhotra MD, 225 Units at 24 1534       BOTULINUM NEUROTOXIN INJECTION PROCEDURES    VERIFICATION OF PATIENT IDENTIFICATION AND PROCEDURE     Initials   Patient Name SES   Patient  SES   Procedure Verified by: SES     Prior to the start of the procedure and with procedural staff participation, I verbally confirmed the patient s identity using two indicators, relevant allergies, that the procedure was appropriate and matched the consent or emergent situation, and that the correct equipment/implants were available. Immediately prior to starting the procedure I conducted the Time Out with the procedural staff and re-confirmed the patient s name, procedure, and site/side. (The Joint Commission universal protocol was followed.)  Yes    Sedation (Moderate or Deep): None    ABOVE ASSESSMENTS PERFORMED BY    Addie Malhotra MD      INDICATIONS FOR PROCEDURES  Valerie Sim is a 59 year old patient with pain and jaw clenching secondary to the diagnosis of chronic migraine headaches and oromandibular dystonia. Her baseline symptoms have been recalcitrant to oral medications and conservative therapy.  She is here today for reinjection with Botox.    GOAL OF PROCEDURE  The goal of this procedure is to decrease pain and excessive jaw clenching due to chronic migraine headaches and bruxism.     TOTAL DOSE ADMINISTERED  Dose  Administered:  225 units  Botox (Botulinum Toxin Type A)       2:1 Dilution   Unavoidable Drug Waste: Yes  Amount of drug waste (mL): 75 units Botox.  Reason for waste:  Single use vial  Diluent Used:  0.5% bupivacaine  Total Volume of Diluent Used:  4.5 ml  NDC #: Botox 100u ([address]-75)      CONSENT  The risks, benefits, and treatment options were discussed with Valerie Sim and she agreed to proceed.    Written consent was obtained by  [facility] .     EQUIPMENT USED  Needle-25mm stimulating/recording  Needle-30 gauge  EMG/NCS Machine    SKIN PREPARATION  Skin preparation was performed using an alcohol wipe.    GUIDANCE DESCRIPTION  Electro-myographic guidance was necessary throughout the neck and jaw portion of the procedure to accurately identify all areas of dystonic muscles while avoiding injection of non-dystonic muscles, neighboring nerves and nearby vascular structures.       AREA/MUSCLE INJECTED:  225 UNITS BOTOX = TOTAL DOSE, 2:1 DILUTION     1. FACIAL & SCALP MUSCLES: 85 UNITS BOTOX = TOTAL DOSE      Right Frontalis - 10 units of Botox in 2 site/s.  Left Frontalis - 10 units of Botox in 2 site/s.      Procerus - 5 units of Botox at 1 site/s.       Right  - 2.5 units of Botox in 1 site/s.  Left  - 2.5 units of Botox in 1 site/s.      Right Nasalis - 2.5 units of Botox at 1 site/s.           Left Nasalis - 2.5 units of Botox at 1 site/s.     Right Upper Occipitalis - 25 units of Botox at 5 site/s.   Left Upper Occipitalis - 25 units of Botox at 5 site/s.         2. JAW MUSCLES: 90 UNITS BOTOX = TOTAL DOSE     Right Masseter - 20 units of Botox at 2 site/s.   Left Masseter - 20 units of Botox at 2 site/s.      Right Temporalis - 25 units of Botox at 5 site/s.  Left Temporalis - 25 units of Botox at 5 site/s.          3. SHOULDER & NECK MUSCLES: 50 UNITS BOTOX = TOTAL DOSE      Right Levator Scapula - 10 units of Botox at 2 site/s (neck and scapular insertion).  Left Levator Scapula - 10  units of Botox at 2 site/s (neck and scapular insertion).      Right Upper Trapezius (mid & low lateral) - 10 units of Botox at 3 site/s.  Left Upper Trapezius (mid & low lateral) - 10 units of Botox at 3 site/s.             Right Pectoralis Minor - 5 units of Botox at 1 site/s.                     Left Pectoralis Minor - 5 units of Botox at 1 site/s.       RESPONSE TO PROCEDURE  Valerie Sim tolerated the procedure well and there were no immediate complications. She was allowed to recover for an appropriate period of time and was discharged home in stable condition.      ASSESSMENT AND PLAN   Botulinum toxin injections: No changes made to Botox dose or distribution today. Patient will continue to monitor response to Botox and report at next appointment.  Referrals: None.   Follow up: Valerie Sim was rescheduled for the next series of injections in 9 weeks, at which time we will evaluate response to today's injections. she may call the clinic prior with any questions or concerns prior to the next appointment.

## 2024-04-06 ASSESSMENT — ANXIETY QUESTIONNAIRES
GAD7 TOTAL SCORE: 9
4. TROUBLE RELAXING: MORE THAN HALF THE DAYS
GAD7 TOTAL SCORE: 9
GAD7 TOTAL SCORE: 9
8. IF YOU CHECKED OFF ANY PROBLEMS, HOW DIFFICULT HAVE THESE MADE IT FOR YOU TO DO YOUR WORK, TAKE CARE OF THINGS AT HOME, OR GET ALONG WITH OTHER PEOPLE?: SOMEWHAT DIFFICULT
6. BECOMING EASILY ANNOYED OR IRRITABLE: SEVERAL DAYS
7. FEELING AFRAID AS IF SOMETHING AWFUL MIGHT HAPPEN: NOT AT ALL
7. FEELING AFRAID AS IF SOMETHING AWFUL MIGHT HAPPEN: NOT AT ALL
3. WORRYING TOO MUCH ABOUT DIFFERENT THINGS: SEVERAL DAYS
5. BEING SO RESTLESS THAT IT IS HARD TO SIT STILL: MORE THAN HALF THE DAYS
IF YOU CHECKED OFF ANY PROBLEMS ON THIS QUESTIONNAIRE, HOW DIFFICULT HAVE THESE PROBLEMS MADE IT FOR YOU TO DO YOUR WORK, TAKE CARE OF THINGS AT HOME, OR GET ALONG WITH OTHER PEOPLE: SOMEWHAT DIFFICULT
1. FEELING NERVOUS, ANXIOUS, OR ON EDGE: MORE THAN HALF THE DAYS
2. NOT BEING ABLE TO STOP OR CONTROL WORRYING: SEVERAL DAYS

## 2024-04-08 ENCOUNTER — VIRTUAL VISIT (OUTPATIENT)
Dept: PSYCHOLOGY | Facility: CLINIC | Age: 59
End: 2024-04-08
Payer: COMMERCIAL

## 2024-04-08 DIAGNOSIS — F41.1 GAD (GENERALIZED ANXIETY DISORDER): Primary | ICD-10-CM

## 2024-04-08 PROCEDURE — 90834 PSYTX W PT 45 MINUTES: CPT | Mod: 95 | Performed by: MARRIAGE & FAMILY THERAPIST

## 2024-04-08 NOTE — PROGRESS NOTES
Answers submitted by the patient for this visit:  Patient Health Questionnaire (Submitted on 2024)  If you checked off any problems, how difficult have these problems made it for you to do your work, take care of things at home, or get along with other people?: Very difficult  PHQ9 TOTAL SCORE: 17  MAHAMED-7 (Submitted on 2024)  MAHAMED 7 TOTAL SCORE: 9        [facility]                                     Progress Note    Patient Name: Valerie Sim  Date:  24         Service Type: Individual  Session Start Time:    8:04a         Session End Time: 8:54a    Session Length:  50    Session #: 24    Attendees: Client     Service Modality:  Video Visit:      Provider verified identity through the following two step process.  Patient provided:  Patient     Telemedicine Visit: The patient's condition can be safely assessed and treated via synchronous audio and visual telemedicine encounter.      Reason for Telemedicine Visit: Services only offered telehealth    Originating Site (Patient Location): Patient's home    Distant Site (Provider Location): Provider Remote Setting- Home Office    Consent:  The patient/guardian has verbally consented to: the potential risks and benefits of telemedicine (video visit) versus in person care; bill my insurance or make self-payment for services provided; and responsibility for payment of non-covered services.     Patient would like the video invitation sent by:  Send to e-mail at: [email or web address]    Mode of Communication:  Video Conference via [employer]    Distant Location (Provider):  Off-site    As the provider I attest to compliance with applicable laws and regulations related to telemedicine.    DATA  Interactive Complexity: No  Crisis: No        Progress Since Last Session (Related to Symptoms / Goals / Homework):   Symptoms: No change    Homework: Completed in session      Episode of Care Goals: Minimal progress - PREPARATION (Decided to change -  considering how); Intervened by negotiating a change plan and determining options / strategies for behavior change, identifying triggers, exploring social supports, and working towards setting a date to begin behavior change     Current / Ongoing Stressors and Concerns:  Last session 3/11, pt notes the past few weeks have been up and down        Treatment Objective(s) Addressed in This Session:   Increase interest, engagement, and pleasure in doing things       Intervention:   Motivational Interviewing    MI Intervention: Permission to raise concern or advise, Open-ended questions and Reflections: simple and complex     Change Talk Expressed by the Patient: Reasons to change Need to change    Provider Response to Change Talk: R - Reflected patient's change talk and S - Summarized patient's change talk statements     ASSESSMENT: Current Emotional / Mental Status (status of significant symptoms):   Risk status (Self / Other harm or suicidal ideation)   Patient denies current fears or concerns for personal safety.   Patient denies current or recent suicidal ideation or behaviors.   Patient denies current or recent homicidal ideation or behaviors.   Patient denies current or recent self injurious behavior or ideation.   Patient denies other safety concerns.   Patient reports there has been no change in risk factors since their last session.     Patient reports there has been no change in protective factors since their last session.     A safety and risk management plan has been developed including: Patient consented to co-developed safety plan on 4/5/23.  Safety and risk management plan was reviewed.   Patient agreed to use safety plan should any safety concerns arise.  A copy was made available to the patient.     Appearance:   Appropriate    Eye Contact:   Good    Psychomotor Behavior: Normal    Attitude:   Cooperative    Orientation:   All   Speech    Rate / Production: Normal     Volume:  Normal     Mood:    Normal   Affect:    Appropriate    Thought Content:  Clear    Thought Form:  Coherent  Logical    Insight:    Good      Medication Review:   No changes to current psychiatric medication(s)     Medication Compliance:   Yes     Changes in Health Issues:   None reported     Chemical Use Review:   Substance Use: Chemical use reviewed, no active concerns identified      Tobacco Use: No current tobacco use.      Diagnosis:  MDD, moderate, recurrent    Collateral Reports Completed:   Not Applicable    PLAN: (Patient Tasks / Therapist Tasks / Other)  PT will engage in writing down questions for upcoming shower  CAROLEE Mendez     4/8/24                                                ______________________________________________________________________    Individual Treatment Plan    Patient's Name: Valerie Sim  YOB: 1965    Date of Creation: 1/16/24  Date Treatment Plan Last Reviewed/Revised: 4/16/24    DSM5 Diagnoses: 296.32 (F33.1) Major Depressive Disorder, Recurrent Episode, Moderate _  Psychosocial / Contextual Factors:   PROMIS (reviewed every 90 days):     Referral / Collaboration:  Referral to another professional/service is not indicated at this time..    Anticipated number of session for this episode of care: 3-6 sessions  Anticipation frequency of session: Biweekly  Anticipated Duration of each session: 38-52 minutes  Treatment plan will be reviewed in 90 days or when goals have been changed.       MeasurableTreatment Goal(s) related to diagnosis / functional impairment(s)  Goal 1: Patient will engage in CBT skills for depression sx reduction    Objective #A (Patient Action)    Patient will Identify negative self-talk and behaviors: challenge core beliefs, myths, and actions.  Status Continued 1/16/24    Intervention(s)  Therapist will teach emotional regulation skills. CBT skills .      Patient has reviewed and agreed to the above plan.      Yoni Alex  LMFT 1/16/24

## 2024-04-18 ENCOUNTER — MYC MEDICAL ADVICE (OUTPATIENT)
Dept: PHYSICAL MEDICINE AND REHAB | Facility: CLINIC | Age: 59
End: 2024-04-18
Payer: COMMERCIAL

## 2024-04-24 ENCOUNTER — OFFICE VISIT (OUTPATIENT)
Dept: PHYSICAL MEDICINE AND REHAB | Facility: CLINIC | Age: 59
End: 2024-04-24
Payer: COMMERCIAL

## 2024-04-24 DIAGNOSIS — M79.18 MYOFASCIAL PAIN: ICD-10-CM

## 2024-04-24 DIAGNOSIS — G43.719 CHRONIC MIGRAINE WITHOUT AURA, INTRACTABLE, WITHOUT STATUS MIGRAINOSUS: Primary | ICD-10-CM

## 2024-04-24 DIAGNOSIS — M54.81 BILATERAL OCCIPITAL NEURALGIA: ICD-10-CM

## 2024-04-24 PROCEDURE — 20553 NJX 1/MLT TRIGGER POINTS 3/>: CPT | Performed by: PHYSICAL MEDICINE & REHABILITATION

## 2024-04-24 PROCEDURE — 64405 NJX AA&/STRD GR OCPL NRV: CPT | Mod: XS | Performed by: PHYSICAL MEDICINE & REHABILITATION

## 2024-04-24 RX ORDER — TRIAMCINOLONE ACETONIDE 40 MG/ML
40 INJECTION, SUSPENSION INTRA-ARTICULAR; INTRAMUSCULAR ONCE
Status: COMPLETED | OUTPATIENT
Start: 2024-04-24 | End: 2024-04-24

## 2024-04-24 RX ORDER — BUPIVACAINE HYDROCHLORIDE 2.5 MG/ML
10 INJECTION, SOLUTION EPIDURAL; INFILTRATION; INTRACAUDAL ONCE
Status: COMPLETED | OUTPATIENT
Start: 2024-04-24 | End: 2024-04-24

## 2024-04-24 RX ADMIN — TRIAMCINOLONE ACETONIDE 40 MG: 40 INJECTION, SUSPENSION INTRA-ARTICULAR; INTRAMUSCULAR at 17:44

## 2024-04-24 RX ADMIN — BUPIVACAINE HYDROCHLORIDE 25 MG: 2.5 INJECTION, SOLUTION EPIDURAL; INFILTRATION; INTRACAUDAL at 17:38

## 2024-04-24 NOTE — PROGRESS NOTES
[facility]     PHYSICAL MEDICINE & REHABILITATION CLINIC NOTE  OCCIPITAL NERVE BLOCK & TRIGGER POINT INJECTIONS        No chief complaint on file.      HPI:  Valerie Sim is a 59 year old female with a history of chronic migraine headaches who presents today for bilateral occipital nerve blocks and trigger point injections with the goal of minimizing her occipital headaches and myofascial pain as main triggers for her migraine headaches.     She is on botox injections for the management of her migraine headaches and had the last round of injections on 4/3/24.    She unfortunately had a flare up of her migraine headaches and neck pain after the injections. It has improved since then but still severe at the base of skull and a focal area in the right side of her neck. Looking downward or tilting up exacerbated the pain. Her vision was blurry due to severe pain. No red flags per ROS.         Current Outpatient Medications   Medication Sig Dispense Refill    aMILoride (MIDAMOR) 5 MG tablet Take 7.5 mg by mouth daily 2.5MG in the AM and 5MG in the PM.      amLODIPine (NORVASC) 10 MG tablet Take 10 mg by mouth daily      amphetamine-dextroamphetamine (ADDERALL) 20 MG tablet Take 1 tablet (20 mg) by mouth 2 times daily (Patient taking differently: Take 20 mg by mouth 2 times daily Pt currently taking 10MG in the AM and 20MG in the PM.) 60 tablet 0    B Complex-Biotin-FA (B-COMPLEX PO) Take 1 tablet by mouth daily      botulinum toxin type A (BOTOX) 100 units injection Inject intramuscular. Every 18 weeks for pelvic floor spasms      budesonide (RINOCORT AQUA) 32 MCG/ACT nasal spray Spray 1 spray into both nostrils daily.      calcitRIOL (ROCALTROL) 0.25 MCG capsule Take 0.25 mcg by mouth as needed      Calcium-Magnesium-Vitamin D (CITRACAL SLOW RELEASE) 600- MG-MG-UNIT TB24 Take 600 mg by mouth 4 times daily      docusate sodium (COLACE) 100 MG capsule Take 1 capsule by mouth  "as needed for constipation      eletriptan (RELPAX) 40 MG tablet Take 40 mg by mouth as needed      estradiol (ESTRACE) 0.1 MG/GM vaginal cream       fexofenadine (ALLEGRA) 180 MG tablet Take 180 mg by mouth daily      HYDROmorphone (DILAUDID) 2 MG tablet Take 0.5-1 tablets (1-2 mg) by mouth every 3 hours as needed (headache) 20 tablets = 3 month supply. 24 tablet 0    ketorolac (TORADOL) 30 MG/ML injection Inject 1 mL (30 mg) into the vein every 6 hours as needed for moderate pain 6 mL 1    lamoTRIgine (LAMICTAL) 100 MG tablet Take 1.5 tablets (150 mg) by mouth daily 45 tablet 2    lidocaine (XYLOCAINE) 4 % external solution Spray 0.5 ml once in each nostril q 4-6 hours as needed for headache. lay down with head tilted back for 5 minutes after if preferred 50 mL 6    Magnesium Oxide 500 MG TABS Take 500 mg by mouth 2 times daily      naloxone (NARCAN) 4 MG/0.1ML nasal spray Spray 1 spray (4 mg) into one nostril alternating nostrils as needed for opioid reversal every 2-3 minutes until assistance arrives 0.2 mL 1    nebivolol (BYSTOLIC) 5 MG tablet Take 5 mg by mouth daily Take twice a day totaling 10 mg.      Needle, Disp, 25G X 1\" MISC 2 each as needed (with toradol) 90-day supply 6 each 1    ondansetron (ZOFRAN) 4 MG tablet Take 1 tablet (4 mg) by mouth every 8 hours as needed for nausea 30 tablet 1    order for DME Equipment being ordered: Oxygen  The patient has Cluster Headache and needs 10 liters/minute of high flow oxygen via nonrebreather mask prn headaches 99 days 0    order for DME Equipment being ordered: Oxygen and non-rebreather mask.     Use high flow oxygen (10-15 L/min) with non-rebreather mask, as needed for cluster headaches 1 Units 11    potassium chloride ER (KLOR-CON M) 20 MEQ CR tablet Take 1 tablet by mouth 2 times daily      QUEtiapine (SEROQUEL) 50 MG tablet Take 1 tablet (50 mg) by mouth at bedtime 60 tablet 2    Syringe, Disposable, (SYRINGE LUER SLIP) 1 ML MISC 1 each as needed (with " toradol/migraine) 6 each 1    valACYclovir (VALTREX) 1000 mg tablet Take 1,000 mg by mouth as needed.         Allergies   Allergen Reactions    Fluoxetine Other (See Comments)     PN: LW Reaction: headache  PN: LW Reaction: headache  Migraine      Garlic Blisters, Cough, Dermatitis, Difficulty breathing, Headache, Hives, Itching, Nausea and Vomiting and Shortness Of Breath    Candesartan      Other reaction(s): Myalgia    Duloxetine      Other reaction(s): Headache  migraine    Iodine      Other reaction(s): Other (see comments)  PN: LW CM1: CONTRAST- iodine Reaction :    Metoclopramide      Other reaction(s): Headache  Caused increase in headaches    Perfume      Other reaction(s): Headache  head pain leading to migraines    Pregabalin      Other reaction(s): Headache, Myalgia    Trazodone Other (See Comments) and Unknown     Other reaction(s): Headache  Other reaction(s): Headache  Other reaction(s): Headache    Amitriptyline Hcl Other (See Comments)     Migraine      Candesartan Cilexetil-Hctz Muscle Pain (Myalgia)    Cyproheptadine Hcl      headaches    Desvenlafaxine      Migraine      Diatrizoate Unknown     PN: LW CM1: CONTRAST- iodine Reaction :    Diazepam      Other reaction(s): Vestibular Toxicity  PN: LW Reaction: vertigo  PN: LW Reaction: vertigo    Duloxetine Hcl Other (See Comments)     Migraine      Gabapentin Other (See Comments)    Galcanezumab      Other reaction(s): Headache    Imipramine Other (See Comments)     Migraine      Metoprolol Other (See Comments) and Unknown     headache  headache  headache    Morphine Other (See Comments)     Patient reports seizure   Other reaction(s): Unknown  Seizure; 5/111/21 updated info: patient states she had a seizure while having a migraine headache years ago and is not sure if it was due to morphine. She has tolerated Dilaudid since then.      No Clinical Screening - See Comments      PN: LW FI1: lactose intolerance LW FI2: shellfish  PN: LW CM1: CONTRAST-  "iodine Reaction :  PN: LW Other1: -nka    Nortriptyline Other (See Comments)     Migraine      Phenergan Dm [Promethazine-Dm] Other (See Comments)     seizures    Pregabalin Muscle Pain (Myalgia)    Promethazine      PN: LW Reaction: minimal sizures    Venlafaxine Other (See Comments)     PN: LW Reaction: migraine  PN: LW Reaction: migraine  Migraine      Wellbutrin [Bupropion Hydrobromide] Other (See Comments)     Migraine      Compazine Anxiety    Dihydroergotamine Anxiety and Other (See Comments)     Cardiac symptoms    Droperidol Anxiety     PN: LW Reaction: agitation    Medroxyprogesterone Hives     PN: LW Reaction: nausea    Prochlorperazine Anxiety     PN: LW Reaction: \"can't sit still\"         PHYSICAL EXAM:  VS: There were no vitals taken for this visit.   GEN: Patient is pleasant and cooperative  SKIN: No lesions or abrasions on exposed skin  NECK: significant tenderness to palpation at neck and occipital area      PROCEDURE: bilateral greater occipital nerve block. Trigger point injections.     Prior to the start of the procedure and with procedural staff participation, I verbally confirmed the patient s identity using two indicators, relevant allergies, that the procedure was appropriate and matched the consent or emergent situation, and that the correct equipment/implants were available. Immediately prior to starting the procedure I conducted the Time Out with the procedural staff and re-confirmed the patient s name, procedure, and site/side. (The Joint Commission universal protocol was followed.)  Yes    Sedation (Moderate or Deep): None    Dru% lidocaine: 2 ml   0.25% bupivacaine: 10 ml   [place] mg = 1 ml      ONB: Area just inferior to insertion of the left superior trapezius insertion onto skull was cleansed with ChloraPrep. Needle was advanced anteriorly to base of skull then slightly withdrawn and injectate was injected in a fan-like distribution at different depths. A 3 ml of the above " mixture was injected on each side for the total of 6ml.    TPI: A 30-gauge 1-inch needle was used to inject a 7 ml mixture of the above medications into the below areas bilaterally.   --splenius capitis  --splenius cervicis   --trap  --levator at scap insertion   --rectus capitis   --rhomboids     Valerie Sim tolerated the procedure well without any immediate complications. she was allowed to recover for an appropriate period of time and was discharged home in stable condition.  Patient will follow-up regarding response to this procedure.      ASSESSMENT/PLAN:  Valerie Sim is a 59 year old female who presents to clinic for bilateral occipital nerve blocks and trigger point injections to help manage intractable headache and myofascial neck pain. Patient tolerated the procedure well, and noted a reduction in pain following the procedure.     Follow up in clinic for regularly scheduled Botox injections or sooner if needed.       Siobhan Mata MD  Physical Medicine & Rehabilitation

## 2024-04-24 NOTE — LETTER
4/24/2024       RE: Valerie Sim  [address]  [place] [address]       Dear Colleague,    Thank you for referring your patient, Valerie Sim, to the [facility] PHYSICAL MEDICINE AND REHABILITATION CLINIC [place] at [facility]. Please see a copy of my visit note below.        [facility]     PHYSICAL MEDICINE & REHABILITATION CLINIC NOTE  OCCIPITAL NERVE BLOCK & TRIGGER POINT INJECTIONS        No chief complaint on file.      HPI:  Valerie Sim is a 59 year old female with a history of chronic migraine headaches who presents today for bilateral occipital nerve blocks and trigger point injections with the goal of minimizing her occipital headaches and myofascial pain as main triggers for her migraine headaches.     She is on botox injections for the management of her migraine headaches and had the last round of injections on 4/3/24.    She unfortunately had a flare up of her migraine headaches and neck pain after the injections. It has improved since then but still severe at the base of skull and a focal area in the right side of her neck. Looking downward or tilting up exacerbated the pain. Her vision was blurry due to severe pain. No red flags per ROS.         Current Outpatient Medications   Medication Sig Dispense Refill    aMILoride (MIDAMOR) 5 MG tablet Take 7.5 mg by mouth daily 2.5MG in the AM and 5MG in the PM.      amLODIPine (NORVASC) 10 MG tablet Take 10 mg by mouth daily      amphetamine-dextroamphetamine (ADDERALL) 20 MG tablet Take 1 tablet (20 mg) by mouth 2 times daily (Patient taking differently: Take 20 mg by mouth 2 times daily Pt currently taking 10MG in the AM and 20MG in the PM.) 60 tablet 0    B Complex-Biotin-FA (B-COMPLEX PO) Take 1 tablet by mouth daily      botulinum toxin type A (BOTOX) 100 units injection Inject intramuscular. Every 18 weeks for pelvic floor spasms       "budesonide (RINOCORT AQUA) 32 MCG/ACT nasal spray Spray 1 spray into both nostrils daily.      calcitRIOL (ROCALTROL) 0.25 MCG capsule Take 0.25 mcg by mouth as needed      Calcium-Magnesium-Vitamin D (CITRACAL SLOW RELEASE) 600- MG-MG-UNIT TB24 Take 600 mg by mouth 4 times daily      docusate sodium (COLACE) 100 MG capsule Take 1 capsule by mouth as needed for constipation      eletriptan (RELPAX) 40 MG tablet Take 40 mg by mouth as needed      estradiol (ESTRACE) 0.1 MG/GM vaginal cream       fexofenadine (ALLEGRA) 180 MG tablet Take 180 mg by mouth daily      HYDROmorphone (DILAUDID) 2 MG tablet Take 0.5-1 tablets (1-2 mg) by mouth every 3 hours as needed (headache) 20 tablets = 3 month supply. 24 tablet 0    ketorolac (TORADOL) 30 MG/ML injection Inject 1 mL (30 mg) into the vein every 6 hours as needed for moderate pain 6 mL 1    lamoTRIgine (LAMICTAL) 100 MG tablet Take 1.5 tablets (150 mg) by mouth daily 45 tablet 2    lidocaine (XYLOCAINE) 4 % external solution Spray 0.5 ml once in each nostril q 4-6 hours as needed for headache. lay down with head tilted back for 5 minutes after if preferred 50 mL 6    Magnesium Oxide 500 MG TABS Take 500 mg by mouth 2 times daily      naloxone (NARCAN) 4 MG/0.1ML nasal spray Spray 1 spray (4 mg) into one nostril alternating nostrils as needed for opioid reversal every 2-3 minutes until assistance arrives 0.2 mL 1    nebivolol (BYSTOLIC) 5 MG tablet Take 5 mg by mouth daily Take twice a day totaling 10 mg.      Needle, Disp, 25G X 1\" MISC 2 each as needed (with toradol) 90-day supply 6 each 1    ondansetron (ZOFRAN) 4 MG tablet Take 1 tablet (4 mg) by mouth every 8 hours as needed for nausea 30 tablet 1    order for DME Equipment being ordered: Oxygen  The patient has Cluster Headache and needs 10 liters/minute of high flow oxygen via nonrebreather mask prn headaches 99 days 0    order for DME Equipment being ordered: Oxygen and non-rebreather mask.     Use high flow " oxygen (10-15 L/min) with non-rebreather mask, as needed for cluster headaches 1 Units 11    potassium chloride ER (KLOR-CON M) 20 MEQ CR tablet Take 1 tablet by mouth 2 times daily      QUEtiapine (SEROQUEL) 50 MG tablet Take 1 tablet (50 mg) by mouth at bedtime 60 tablet 2    Syringe, Disposable, (SYRINGE LUER SLIP) 1 ML MISC 1 each as needed (with toradol/migraine) 6 each 1    valACYclovir (VALTREX) 1000 mg tablet Take 1,000 mg by mouth as needed.         Allergies   Allergen Reactions    Fluoxetine Other (See Comments)     PN: LW Reaction: headache  PN: LW Reaction: headache  Migraine      Garlic Blisters, Cough, Dermatitis, Difficulty breathing, Headache, Hives, Itching, Nausea and Vomiting and Shortness Of Breath    Candesartan      Other reaction(s): Myalgia    Duloxetine      Other reaction(s): Headache  migraine    Iodine      Other reaction(s): Other (see comments)  PN: LW CM1: CONTRAST- iodine Reaction :    Metoclopramide      Other reaction(s): Headache  Caused increase in headaches    Perfume      Other reaction(s): Headache  head pain leading to migraines    Pregabalin      Other reaction(s): Headache, Myalgia    Trazodone Other (See Comments) and Unknown     Other reaction(s): Headache  Other reaction(s): Headache  Other reaction(s): Headache    Amitriptyline Hcl Other (See Comments)     Migraine      Candesartan Cilexetil-Hctz Muscle Pain (Myalgia)    Cyproheptadine Hcl      headaches    Desvenlafaxine      Migraine      Diatrizoate Unknown     PN: LW CM1: CONTRAST- iodine Reaction :    Diazepam      Other reaction(s): Vestibular Toxicity  PN: LW Reaction: vertigo  PN: LW Reaction: vertigo    Duloxetine Hcl Other (See Comments)     Migraine      Gabapentin Other (See Comments)    Galcanezumab      Other reaction(s): Headache    Imipramine Other (See Comments)     Migraine      Metoprolol Other (See Comments) and Unknown     headache  headache  headache    Morphine Other (See Comments)     Patient  "reports seizure   Other reaction(s): Unknown  Seizure;  updated info: patient states she had a seizure while having a migraine headache years ago and is not sure if it was due to morphine. She has tolerated Dilaudid since then.      No Clinical Screening - See Comments      PN: LW FI1: lactose intolerance LW FI2: shellfish  PN: LW CM1: CONTRAST- iodine Reaction :  PN: LW Other1: -nka    Nortriptyline Other (See Comments)     Migraine      Phenergan Dm [Promethazine-Dm] Other (See Comments)     seizures    Pregabalin Muscle Pain (Myalgia)    Promethazine      PN: LW Reaction: minimal sizures    Venlafaxine Other (See Comments)     PN: LW Reaction: migraine  PN: LW Reaction: migraine  Migraine      Wellbutrin [Bupropion Hydrobromide] Other (See Comments)     Migraine      Compazine Anxiety    Dihydroergotamine Anxiety and Other (See Comments)     Cardiac symptoms    Droperidol Anxiety     PN: LW Reaction: agitation    Medroxyprogesterone Hives     PN: LW Reaction: nausea    Prochlorperazine Anxiety     PN: LW Reaction: \"can't sit still\"         PHYSICAL EXAM:  VS: There were no vitals taken for this visit.   GEN: Patient is pleasant and cooperative  SKIN: No lesions or abrasions on exposed skin  NECK: significant tenderness to palpation at neck and occipital area      PROCEDURE: bilateral greater occipital nerve block. Trigger point injections.     Prior to the start of the procedure and with procedural staff participation, I verbally confirmed the patient s identity using two indicators, relevant allergies, that the procedure was appropriate and matched the consent or emergent situation, and that the correct equipment/implants were available. Immediately prior to starting the procedure I conducted the Time Out with the procedural staff and re-confirmed the patient s name, procedure, and site/side. (The Joint Commission universal protocol was followed.)  Yes    Sedation (Moderate or Deep): None    Dru% " lidocaine: 2 ml   0.25% bupivacaine: 10 ml   [place] mg = 1 ml      ONB: Area just inferior to insertion of the left superior trapezius insertion onto skull was cleansed with ChloraPrep. Needle was advanced anteriorly to base of skull then slightly withdrawn and injectate was injected in a fan-like distribution at different depths. A 3 ml of the above mixture was injected on each side for the total of 6ml.    TPI: A 30-gauge 1-inch needle was used to inject a 7 ml mixture of the above medications into the below areas bilaterally.   --splenius capitis  --splenius cervicis   --trap  --levator at scap insertion   --rectus capitis   --rhomboids     Valerie Sim tolerated the procedure well without any immediate complications. she was allowed to recover for an appropriate period of time and was discharged home in stable condition.  Patient will follow-up regarding response to this procedure.      ASSESSMENT/PLAN:  Valerie Sim is a 59 year old female who presents to clinic for bilateral occipital nerve blocks and trigger point injections to help manage intractable headache and myofascial neck pain. Patient tolerated the procedure well, and noted a reduction in pain following the procedure.     Follow up in clinic for regularly scheduled Botox injections or sooner if needed.           Again, thank you for allowing me to participate in the care of your patient.      Sincerely,    Siobhan Mata MD

## 2024-04-29 ASSESSMENT — PATIENT HEALTH QUESTIONNAIRE - PHQ9
10. IF YOU CHECKED OFF ANY PROBLEMS, HOW DIFFICULT HAVE THESE PROBLEMS MADE IT FOR YOU TO DO YOUR WORK, TAKE CARE OF THINGS AT HOME, OR GET ALONG WITH OTHER PEOPLE: SOMEWHAT DIFFICULT
SUM OF ALL RESPONSES TO PHQ QUESTIONS 1-9: 14
SUM OF ALL RESPONSES TO PHQ QUESTIONS 1-9: 14
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
## 2024-04-30 ENCOUNTER — VIRTUAL VISIT (OUTPATIENT)
Dept: PSYCHIATRY | Facility: CLINIC | Age: 59
End: 2024-04-30
Attending: PSYCHIATRY & NEUROLOGY
Payer: COMMERCIAL

## 2024-04-30 ENCOUNTER — VIRTUAL VISIT (OUTPATIENT)
Dept: PSYCHOLOGY | Facility: CLINIC | Age: 59
End: 2024-04-30
Payer: COMMERCIAL

## 2024-04-30 VITALS — WEIGHT: 165 LBS | BODY MASS INDEX: 25.9 KG/M2 | HEIGHT: 67 IN

## 2024-04-30 DIAGNOSIS — F41.1 GAD (GENERALIZED ANXIETY DISORDER): Primary | ICD-10-CM

## 2024-04-30 DIAGNOSIS — F33.3 SEVERE RECURRENT MAJOR DEPRESSIVE DISORDER WITH PSYCHOTIC FEATURES (H): ICD-10-CM

## 2024-04-30 PROCEDURE — 99215 OFFICE O/P EST HI 40 MIN: CPT | Mod: 95 | Performed by: STUDENT IN AN ORGANIZED HEALTH CARE EDUCATION/TRAINING PROGRAM

## 2024-04-30 PROCEDURE — 90833 PSYTX W PT W E/M 30 MIN: CPT | Mod: 95 | Performed by: STUDENT IN AN ORGANIZED HEALTH CARE EDUCATION/TRAINING PROGRAM

## 2024-04-30 PROCEDURE — 90834 PSYTX W PT 45 MINUTES: CPT | Mod: 95 | Performed by: MARRIAGE & FAMILY THERAPIST

## 2024-04-30 RX ORDER — MUPIROCIN 20 MG/G
OINTMENT TOPICAL 3 TIMES DAILY
COMMUNITY

## 2024-04-30 RX ORDER — KETOCONAZOLE 20 MG/ML
SHAMPOO TOPICAL
COMMUNITY
Start: 2024-04-26

## 2024-04-30 ASSESSMENT — PAIN SCALES - GENERAL: PAINLEVEL: MODERATE PAIN (5)

## 2024-04-30 NOTE — PATIENT INSTRUCTIONS
It was nice seeing you today     Treatment Plan Today:      1) Medications-              -Increase quetiapine to 75mg daily at bedtime   -Continue lamotrigine 100mg daily     2) Please return to clinic on 5/21//24 with Dr Deutsch     3) Crisis numbers are below and clinic after hours number is [phone]      **For crisis resources, please see the information at the end of this document**   Patient Education    Thank you for coming to the [facility] [place] [facility].     Lab Testing:  If you had lab testing today and your results are reassuring or normal they will be mailed to you or sent through [employer] within 7 days. If the lab tests need quick action we will call you with the results. The phone number we will call with results is # [phone]. If this is not the best number please call our clinic and change the number.     Medication Refills:  If you need any refills please call your pharmacy and they will contact us. Our fax number for refills is [phone].   Three business days of notice are needed for general medication refill requests.   Five business days of notice are needed for controlled substance refill requests.   If you need to change to a different pharmacy, please contact the new pharmacy directly. The new pharmacy will help you get your medications transferred.     Contact Us:  Please call [phone] during business hours (8-5:00 M-F).   If you have medication related questions after clinic hours, or on the weekend, please call [phone].     Financial Assistance [phone]   Medical Records [phone]       MENTAL HEALTH CRISIS RESOURCES:  For a emergency help, please call 911 or go to the nearest Emergency Department.     Emergency Walk-In Options:   EmPATH Unit @ [facility] ([place]): [phone] - Specialized mental health emergency area designed to be calming  [facility] ([place]): [phone]  [facility] ([place]): [phone]  [facility] ([place]): [phone]    [facility] Crisis Information:   [place]: [phone]  [place]: [phone]  Davi (MARTHA) - Adult: [phone]     Child: [phone]  Luís - Adult: [phone]     Child: [phone]  [place]: [phone]  List of all [facility] resources:   https://[place].[facility]/dhs/people-we-serve/adults/health-care/mental-health/resources/crisis-contacts.jsp    National Crisis Information:   Crisis Text Line: Text  [place]  to 104147  Suicide & Crisis Lifeline: 988  National Suicide Prevention Lifeline: 0-913-849-TALK (1-999.741.1952)       For online chat options, visit https://suicidepreventionlifeline.org/chat/  Poison Control Center: 1-193.891.5707  Trans Lifeline: 1-197.116.2734 - Hotline for transgender people of all ages  [employer]: 6-961-329-3803 - Hotline for LGBT youth     For Non-Emergency Support:   Fast Tracker: Mental Health & Substance Use Disorder Resources -   https://www.[employer].org/

## 2024-04-30 NOTE — NURSING NOTE
Is the patient currently in the state of [place]? YES    Visit mode:VIDEO    If the visit is dropped, the patient can be reconnected by: VIDEO VISIT: Text to cell phone:   Telephone Information:   Mobile [phone]       Will anyone else be joining the visit? NO  (If patient encounters technical issues they should call [phone])    How would you like to obtain your AVS? MyChart    Are changes needed to the allergy or medication list? No    Are refills needed on medications prescribed by this physician? NO    Reason for visit: RECHECK    Juany CHOI

## 2024-04-30 NOTE — PROGRESS NOTES
Answers submitted by the patient for this visit:  Patient Health Questionnaire (Submitted on 2024)  If you checked off any problems, how difficult have these problems made it for you to do your work, take care of things at home, or get along with other people?: Somewhat difficult  PHQ9 TOTAL SCORE: 14        [facility]                                     Progress Note    Patient Name: Valerie Sim  Date:  24         Service Type: Individual  Session Start Time:    8:03a      Session End Time: 8:55a    Session Length:  52    Session #: 25    Attendees: Client     Service Modality:  Video Visit:      Provider verified identity through the following two step process.  Patient provided:  Patient     Telemedicine Visit: The patient's condition can be safely assessed and treated via synchronous audio and visual telemedicine encounter.      Reason for Telemedicine Visit: Services only offered telehealth    Originating Site (Patient Location): Patient's home    Distant Site (Provider Location): Provider Remote Setting- Home Office    Consent:  The patient/guardian has verbally consented to: the potential risks and benefits of telemedicine (video visit) versus in person care; bill my insurance or make self-payment for services provided; and responsibility for payment of non-covered services.     Patient would like the video invitation sent by:  Send to e-mail at: [email or web address]    Mode of Communication:  [facility] Conference [facility]    Distant Location (Provider):  Off-site    As the provider I attest to compliance with applicable laws and regulations related to telemedicine.    DATA  Interactive Complexity: No  Crisis: No        Progress Since Last Session (Related to Symptoms / Goals / Homework):   Symptoms: No change    Homework: Completed in session      Episode of Care Goals: Minimal progress - PREPARATION (Decided to change - considering how); Intervened by negotiating a change  plan and determining options / strategies for behavior change, identifying triggers, exploring social supports, and working towards setting a date to begin behavior change     Current / Ongoing Stressors and Concerns:  Last session 4/8, the wedding shower went very well, was able to spend some time with daughter and get a hug. However, there was a text from daughter after which was seen as negative.        Treatment Objective(s) Addressed in This Session:   Increase interest, engagement, and pleasure in doing things       Intervention:   Motivational Interviewing    MI Intervention: Permission to raise concern or advise, Open-ended questions and Reflections: simple and complex     Change Talk Expressed by the Patient: Reasons to change Need to change    Provider Response to Change Talk: R - Reflected patient's change talk and S - Summarized patient's change talk statements     ASSESSMENT: Current Emotional / Mental Status (status of significant symptoms):   Risk status (Self / Other harm or suicidal ideation)   Patient denies current fears or concerns for personal safety.   Patient denies current or recent suicidal ideation or behaviors.   Patient denies current or recent homicidal ideation or behaviors.   Patient denies current or recent self injurious behavior or ideation.   Patient denies other safety concerns.   Patient reports there has been no change in risk factors since their last session.     Patient reports there has been no change in protective factors since their last session.     A safety and risk management plan has been developed including: Patient consented to co-developed safety plan on 4/5/23.  Safety and risk management plan was reviewed.   Patient agreed to use safety plan should any safety concerns arise.  A copy was made available to the patient.     Appearance:   Appropriate    Eye Contact:   Good    Psychomotor Behavior: Normal    Attitude:   Cooperative    Orientation:   All   Speech    Rate  / Production: Normal     Volume:  Normal    Mood:    Normal   Affect:    Appropriate    Thought Content:  Clear    Thought Form:  Coherent  Logical    Insight:    Good      Medication Review:   No changes to current psychiatric medication(s)     Medication Compliance:   Yes     Changes in Health Issues:   None reported     Chemical Use Review:   Substance Use: Chemical use reviewed, no active concerns identified      Tobacco Use: No current tobacco use.      Diagnosis:  MDD, moderate, recurrent    Collateral Reports Completed:   Not Applicable    PLAN: (Patient Tasks / Therapist Tasks / Other)  PT will engage in assertive communication    Yoni Alex, [facility]     4/30/24                                             ______________________________________________________________________    Individual Treatment Plan    Patient's Name: Valerie Sim  YOB: 1965    Date of Creation: 4/30/24  Date Treatment Plan Last Reviewed/Revised: 7/30/24    DSM5 Diagnoses: 296.32 (F33.1) Major Depressive Disorder, Recurrent Episode, Moderate _  Psychosocial / Contextual Factors:   PROMIS (reviewed every 90 days):     Referral / Collaboration:  Referral to another professional/service is not indicated at this time..    Anticipated number of session for this episode of care: 3-6 sessions  Anticipation frequency of session: Biweekly  Anticipated Duration of each session: 38-52 minutes  Treatment plan will be reviewed in 90 days or when goals have been changed.       MeasurableTreatment Goal(s) related to diagnosis / functional impairment(s)  Goal 1: Patient will engage in CBT skills for depression sx reduction    Objective #A (Patient Action)    Patient will Identify negative self-talk and behaviors: challenge core beliefs, myths, and actions.  Status Continued 4/30/24    Intervention(s)  Therapist will teach emotional regulation skills. CBT skills .      Patient has reviewed and agreed to the above  plan.      Yoni Alex, [facility] 4/30/24

## 2024-05-01 RX ORDER — QUETIAPINE FUMARATE 50 MG/1
75 TABLET, FILM COATED ORAL AT BEDTIME
Qty: 45 TABLET | Refills: 0 | Status: SHIPPED | OUTPATIENT
Start: 2024-05-01 | End: 2024-06-24

## 2024-05-13 ASSESSMENT — ANXIETY QUESTIONNAIRES
GAD7 TOTAL SCORE: 9
7. FEELING AFRAID AS IF SOMETHING AWFUL MIGHT HAPPEN: SEVERAL DAYS
6. BECOMING EASILY ANNOYED OR IRRITABLE: SEVERAL DAYS
1. FEELING NERVOUS, ANXIOUS, OR ON EDGE: MORE THAN HALF THE DAYS
4. TROUBLE RELAXING: MORE THAN HALF THE DAYS
5. BEING SO RESTLESS THAT IT IS HARD TO SIT STILL: SEVERAL DAYS
IF YOU CHECKED OFF ANY PROBLEMS ON THIS QUESTIONNAIRE, HOW DIFFICULT HAVE THESE PROBLEMS MADE IT FOR YOU TO DO YOUR WORK, TAKE CARE OF THINGS AT HOME, OR GET ALONG WITH OTHER PEOPLE: SOMEWHAT DIFFICULT
3. WORRYING TOO MUCH ABOUT DIFFERENT THINGS: SEVERAL DAYS
8. IF YOU CHECKED OFF ANY PROBLEMS, HOW DIFFICULT HAVE THESE MADE IT FOR YOU TO DO YOUR WORK, TAKE CARE OF THINGS AT HOME, OR GET ALONG WITH OTHER PEOPLE?: SOMEWHAT DIFFICULT
2. NOT BEING ABLE TO STOP OR CONTROL WORRYING: SEVERAL DAYS
7. FEELING AFRAID AS IF SOMETHING AWFUL MIGHT HAPPEN: SEVERAL DAYS
GAD7 TOTAL SCORE: 9
GAD7 TOTAL SCORE: 9

## 2024-05-16 ENCOUNTER — VIRTUAL VISIT (OUTPATIENT)
Dept: PSYCHOLOGY | Facility: CLINIC | Age: 59
End: 2024-05-16
Payer: COMMERCIAL

## 2024-05-16 DIAGNOSIS — F41.1 GAD (GENERALIZED ANXIETY DISORDER): Primary | ICD-10-CM

## 2024-05-16 PROCEDURE — 90834 PSYTX W PT 45 MINUTES: CPT | Mod: 95 | Performed by: MARRIAGE & FAMILY THERAPIST

## 2024-05-16 ASSESSMENT — PATIENT HEALTH QUESTIONNAIRE - PHQ9
SUM OF ALL RESPONSES TO PHQ QUESTIONS 1-9: 12
10. IF YOU CHECKED OFF ANY PROBLEMS, HOW DIFFICULT HAVE THESE PROBLEMS MADE IT FOR YOU TO DO YOUR WORK, TAKE CARE OF THINGS AT HOME, OR GET ALONG WITH OTHER PEOPLE: SOMEWHAT DIFFICULT
SUM OF ALL RESPONSES TO PHQ QUESTIONS 1-9: 12

## 2024-05-16 NOTE — PROGRESS NOTES
Answers submitted by the patient for this visit:  Patient Health Questionnaire (Submitted on 2024)  If you checked off any problems, how difficult have these problems made it for you to do your work, take care of things at home, or get along with other people?: Somewhat difficult  PHQ9 TOTAL SCORE: 12  MAHAMED-7 (Submitted on 2024)  MAHAMED 7 TOTAL SCORE: 9        [facility]                                     Progress Note    Patient Name: Valerie Sim  Date:  24         Service Type: Individual  Session Start Time:    8:05a    Session End Time: 8:50a    Session Length:  45  Session #: 26    Attendees: Client     Service Modality:  Video Visit:      Provider verified identity through the following two step process.  Patient provided:  Patient     Telemedicine Visit: The patient's condition can be safely assessed and treated via synchronous audio and visual telemedicine encounter.      Reason for Telemedicine Visit: Services only offered telehealth    Originating Site (Patient Location): Patient's home    Distant Site (Provider Location): Provider Remote Setting- Home Office    Consent:  The patient/guardian has verbally consented to: the potential risks and benefits of telemedicine (video visit) versus in person care; bill my insurance or make self-payment for services provided; and responsibility for payment of non-covered services.     Patient would like the video invitation sent by:  Send to e-mail at: [email or web address]    Mode of Communication:  Video Conference via [employer]    Distant Location (Provider):  Off-site    As the provider I attest to compliance with applicable laws and regulations related to telemedicine.    DATA  Interactive Complexity: No  Crisis: No        Progress Since Last Session (Related to Symptoms / Goals / Homework):   Symptoms: NO change, mood around 710    Homework: Completed in session      Episode of Care Goals: Minimal progress - PREPARATION  (Decided to change - considering how); Intervened by negotiating a change plan and determining options / strategies for behavior change, identifying triggers, exploring social supports, and working towards setting a date to begin behavior change     Current / Ongoing Stressors and Concerns:  Last session 4/30, wedding upcoming this weekend and things are going well with daughter.   -Trauma focused yoga     Treatment Objective(s) Addressed in This Session:   Increase interest, engagement, and pleasure in doing things       Intervention:   Motivational Interviewing    MI Intervention: Permission to raise concern or advise, Open-ended questions and Reflections: simple and complex     Change Talk Expressed by the Patient: Reasons to change Need to change    Provider Response to Change Talk: R - Reflected patient's change talk and S - Summarized patient's change talk statements     ASSESSMENT: Current Emotional / Mental Status (status of significant symptoms):   Risk status (Self / Other harm or suicidal ideation)   Patient denies current fears or concerns for personal safety.   Patient denies current or recent suicidal ideation or behaviors.   Patient denies current or recent homicidal ideation or behaviors.   Patient denies current or recent self injurious behavior or ideation.   Patient denies other safety concerns.   Patient reports there has been no change in risk factors since their last session.     Patient reports there has been no change in protective factors since their last session.     A safety and risk management plan has been developed including: Patient consented to co-developed safety plan on 4/5/23.  Safety and risk management plan was reviewed.   Patient agreed to use safety plan should any safety concerns arise.  A copy was made available to the patient.     Appearance:   Appropriate    Eye Contact:   Good    Psychomotor Behavior: Normal    Attitude:   Cooperative    Orientation:   All   Speech    Rate  / Production: Normal     Volume:  Normal    Mood:    Normal   Affect:    Appropriate    Thought Content:  Clear    Thought Form:  Coherent  Logical    Insight:    Good      Medication Review:   No changes to current psychiatric medication(s)     Medication Compliance:   Yes     Changes in Health Issues:   None reported     Chemical Use Review:   Substance Use: Chemical use reviewed, no active concerns identified      Tobacco Use: No current tobacco use.      Diagnosis:  MDD, moderate, recurrent    Collateral Reports Completed:   Not Applicable    PLAN: (Patient Tasks / Therapist Tasks / Other)  Assertive communication at [facility]    CAROLEE Mendez     5/16/24                                             ______________________________________________________________________    Individual Treatment Plan    Patient's Name: Valerie Sim  YOB: 1965    Date of Creation: 4/30/24  Date Treatment Plan Last Reviewed/Revised: 7/30/24    DSM5 Diagnoses: 296.32 (F33.1) Major Depressive Disorder, Recurrent Episode, Moderate _  Psychosocial / Contextual Factors:   PROMIS (reviewed every 90 days):     Referral / Collaboration:  Referral to another professional/service is not indicated at this time..    Anticipated number of session for this episode of care: 3-6 sessions  Anticipation frequency of session: Biweekly  Anticipated Duration of each session: 38-52 minutes  Treatment plan will be reviewed in 90 days or when goals have been changed.       MeasurableTreatment Goal(s) related to diagnosis / functional impairment(s)  Goal 1: Patient will engage in CBT skills for depression sx reduction    Objective #A (Patient Action)    Patient will Identify negative self-talk and behaviors: challenge core beliefs, myths, and actions.  Status Continued 4/30/24    Intervention(s)  Therapist will teach emotional regulation skills. CBT skills .      Patient has reviewed and agreed to the above plan.      Yoni LANDA  CAROLEE Alex 4/30/24

## 2024-05-22 ENCOUNTER — MYC MEDICAL ADVICE (OUTPATIENT)
Dept: PSYCHIATRY | Facility: CLINIC | Age: 59
End: 2024-05-22
Payer: COMMERCIAL

## 2024-05-22 DIAGNOSIS — F07.81 POST CONCUSSION SYNDROME: ICD-10-CM

## 2024-05-22 NOTE — TELEPHONE ENCOUNTER
Last seen: 04/30/2024  RTC: 3 weeks  Cancel: 1  No-show: 0  Next appt: 06/24/2024     Incoming refill from Patient via [employer]    Medication requested:   Pending Prescriptions:                       Disp   Refills    amphetamine-dextroamphetamine (ADDERALL) *60 tab*0            Sig: Take 1 tablet (20 mg) by mouth 2 times daily Pt           currently taking 10MG in the AM and 20MG in the           PM.        Last refill per       From chart note:   Adderall 10mg QAM and 20mg QPM      Medication unable to be refilled by RN due to criteria not met as indicated.                 []Eligibility - not seen in the last year              []Supervision - no future appointment              [x]Compliance - no shows, cancellations or lapse in therapy              []Verification - order discrepancy              [x]Controlled medication              []Medication not included in policy              []90-day supply request              [x]Other:

## 2024-05-23 RX ORDER — DEXTROAMPHETAMINE SACCHARATE, AMPHETAMINE ASPARTATE, DEXTROAMPHETAMINE SULFATE AND AMPHETAMINE SULFATE 5; 5; 5; 5 MG/1; MG/1; MG/1; MG/1
20 TABLET ORAL 2 TIMES DAILY
Qty: 60 TABLET | Refills: 0 | OUTPATIENT
Start: 2024-05-23

## 2024-06-01 ENCOUNTER — HEALTH MAINTENANCE LETTER (OUTPATIENT)
Age: 59
End: 2024-06-01

## 2024-06-03 ENCOUNTER — VIRTUAL VISIT (OUTPATIENT)
Dept: PSYCHOLOGY | Facility: CLINIC | Age: 59
End: 2024-06-03
Payer: COMMERCIAL

## 2024-06-03 DIAGNOSIS — F41.1 GAD (GENERALIZED ANXIETY DISORDER): Primary | ICD-10-CM

## 2024-06-03 PROCEDURE — 90834 PSYTX W PT 45 MINUTES: CPT | Mod: 95 | Performed by: MARRIAGE & FAMILY THERAPIST

## 2024-06-03 NOTE — PROGRESS NOTES
[facility]                                     Progress Note    Patient Name: Valerie Sim  Date:  6/3/24         Service Type: Individual  Session Start Time:    8:02a   Session End Time:  8:54a    Session Length:  52  Session #: 27    Attendees: Client     Service Modality:  Video Visit:      Provider verified identity through the following two step process.  Patient provided:  Patient     Telemedicine Visit: The patient's condition can be safely assessed and treated via synchronous audio and visual telemedicine encounter.      Reason for Telemedicine Visit: Services only offered telehealth    Originating Site (Patient Location): Patient's home    Distant Site (Provider Location): Provider Remote Setting- Home Office    Consent:  The patient/guardian has verbally consented to: the potential risks and benefits of telemedicine (video visit) versus in person care; bill my insurance or make self-payment for services provided; and responsibility for payment of non-covered services.     Patient would like the video invitation sent by:  Send to e-mail at: [email or web address]    Mode of Communication:  Video Conference via [employer]    Distant Location (Provider):  Off-site    As the provider I attest to compliance with applicable laws and regulations related to telemedicine.    DATA  Interactive Complexity: No  Crisis: No        Progress Since Last Session (Related to Symptoms / Goals / Homework):   Symptoms: NO change    Homework: Completed in session      Episode of Care Goals: Minimal progress - PREPARATION (Decided to change - considering how); Intervened by negotiating a change plan and determining options / strategies for behavior change, identifying triggers, exploring social supports, and working towards setting a date to begin behavior change     Current / Ongoing Stressors and Concerns:  Last session , successful wedding events     Treatment Objective(s) Addressed in This  Session:   Increase interest, engagement, and pleasure in doing things       Intervention:   Motivational Interviewing    MI Intervention: Permission to raise concern or advise, Open-ended questions and Reflections: simple and complex     Change Talk Expressed by the Patient: Reasons to change Need to change    Provider Response to Change Talk: R - Reflected patient's change talk and S - Summarized patient's change talk statements     ASSESSMENT: Current Emotional / Mental Status (status of significant symptoms):   Risk status (Self / Other harm or suicidal ideation)   Patient denies current fears or concerns for personal safety.   Patient denies current or recent suicidal ideation or behaviors.   Patient denies current or recent homicidal ideation or behaviors.   Patient denies current or recent self injurious behavior or ideation.   Patient denies other safety concerns.   Patient reports there has been no change in risk factors since their last session.     Patient reports there has been no change in protective factors since their last session.     A safety and risk management plan has been developed including: Patient consented to co-developed safety plan on 4/5/23.  Safety and risk management plan was reviewed.   Patient agreed to use safety plan should any safety concerns arise.  A copy was made available to the patient.     Appearance:   Appropriate    Eye Contact:   Good    Psychomotor Behavior: Normal    Attitude:   Cooperative    Orientation:   All   Speech    Rate / Production: Normal     Volume:  Normal    Mood:    Normal   Affect:    Appropriate    Thought Content:  Clear    Thought Form:  Coherent  Logical    Insight:    Good      Medication Review:   No changes to current psychiatric medication(s)     Medication Compliance:   Yes     Changes in Health Issues:   None reported     Chemical Use Review:   Substance Use: Chemical use reviewed, no active concerns identified      Tobacco Use: No current  tobacco use.      Diagnosis:  MDD, moderate, recurrent    Collateral Reports Completed:   Not Applicable    PLAN: (Patient Tasks / Therapist Tasks / Other)  Healthy communication with daughter    Yoni LANDA CAROLEE Alex    6/3/24                                             ______________________________________________________________________    Individual Treatment Plan    Patient's Name: Valerie Sim  YOB: 1965    Date of Creation: 4/30/24  Date Treatment Plan Last Reviewed/Revised: 7/30/24    DSM5 Diagnoses: 296.32 (F33.1) Major Depressive Disorder, Recurrent Episode, Moderate _  Psychosocial / Contextual Factors:   PROMIS (reviewed every 90 days):     Referral / Collaboration:  Referral to another professional/service is not indicated at this time..    Anticipated number of session for this episode of care: 3-6 sessions  Anticipation frequency of session: Biweekly  Anticipated Duration of each session: 38-52 minutes  Treatment plan will be reviewed in 90 days or when goals have been changed.       MeasurableTreatment Goal(s) related to diagnosis / functional impairment(s)  Goal 1: Patient will engage in CBT skills for depression sx reduction    Objective #A (Patient Action)    Patient will Identify negative self-talk and behaviors: challenge core beliefs, myths, and actions.  Status Continued 4/30/24    Intervention(s)  Therapist will teach emotional regulation skills. CBT skills .      Patient has reviewed and agreed to the above plan.      Yoni CAROLEE Hearn 4/30/24

## 2024-06-05 ENCOUNTER — OFFICE VISIT (OUTPATIENT)
Dept: PHYSICAL MEDICINE AND REHAB | Facility: CLINIC | Age: 59
End: 2024-06-05
Payer: COMMERCIAL

## 2024-06-05 DIAGNOSIS — G43.719 CHRONIC MIGRAINE WITHOUT AURA, INTRACTABLE, WITHOUT STATUS MIGRAINOSUS: Primary | ICD-10-CM

## 2024-06-05 PROCEDURE — 64615 CHEMODENERV MUSC MIGRAINE: CPT | Performed by: PHYSICAL MEDICINE & REHABILITATION

## 2024-06-05 PROCEDURE — 95874 GUIDE NERV DESTR NEEDLE EMG: CPT | Performed by: PHYSICAL MEDICINE & REHABILITATION

## 2024-06-05 RX ORDER — BUPIVACAINE HYDROCHLORIDE 5 MG/ML
10 INJECTION, SOLUTION EPIDURAL; INTRACAUDAL ONCE
Status: COMPLETED | OUTPATIENT
Start: 2024-06-05 | End: 2024-06-05

## 2024-06-05 RX ADMIN — BUPIVACAINE HYDROCHLORIDE 50 MG: 5 INJECTION, SOLUTION EPIDURAL; INTRACAUDAL at 13:44

## 2024-06-05 NOTE — LETTER
6/5/2024       RE: Valerie Sim  [address]  [place] [address]     Dear Colleague,    Thank you for referring your patient, Valerie Sim, to the [facility] PHYSICAL MEDICINE AND REHABILITATION CLINIC [place] at [facility]. Please see a copy of my visit note below.      [facility]     PM&R CLINIC NOTE  BOTULINUM TOXIN PROCEDURE      HPI  No chief complaint on file.    Valerie Sim is a 59 year old female with a history of chronic migraine headaches who presents to clinic for botulinum toxin injections for management of chronic migraine headaches and excessive jaw clenching.     SINCE LAST VISIT  Valerie Sim was last seen for Botox injections on 4/3/2024, at which time she received 225 units of Botox, and she was then seen on 4/24/2024 for trigger point injections, which seemed to help significantly.     Patient denies new medical diagnoses, illnesses, hospitalizations, emergency room visits, and injuries since the previous injection with botulinum neurotoxin.       RESPONSE TO PREVIOUS TREATMENT    Side effects:  None      1.  Headache frequency during this injection cycle:  5-10 migraine headache days per month throughout the injection cycle. This is compared to her baseline headache frequency of 30 severe headache days per month.     2.  Headache duration during this injection cycle:  Headache duration was between 4 hours and 4 days. Patient reports a few episodes of multiple day headaches during this injection cycle.     3.  Headache intensity during this injection cycle:    A.  5-8/10  =  Typical pain level.  B.  10/10  =  Worst pain level.  C.  4/10  =  Lowest pain level.    4.  Change in headache medication usage during this injection cycle:  (For Example:  Able to decrease use of oral pain medications.) No changes.       5.  ER Visits During This Injection Cycle:  She uses Relpax, Toradol and  Zofran as needed for migraine headaches, and oxygen occasionally for cluster headaches. Lidocaine nasal spray is used intermittently for cluster headaches, which is found to be quite helpful.      6.  Functional Performance:  Change in ADL's, social interaction, days lost from work, etc. Patient reports being able to more fully participate in social and family activities and responsibilities as headache symptoms have improved. She is now changing her perspectives on pain expectations of severity and length after onset. She also started a variety of different yogas.       PHYSICAL EXAM  VS: There were no vitals taken for this visit.   GEN: Pleasant and cooperative, in no acute distress  HEENT: No facial asymmetry    ALLERGIES  Allergies   Allergen Reactions    Fluoxetine Other (See Comments)     PN: LW Reaction: headache  PN: LW Reaction: headache  Migraine      Garlic Blisters, Cough, Dermatitis, Difficulty breathing, Headache, Hives, Itching, Nausea and Vomiting and Shortness Of Breath    Candesartan      Other reaction(s): Myalgia    Duloxetine      Other reaction(s): Headache  migraine    Iodine      Other reaction(s): Other (see comments)  PN: LW CM1: CONTRAST- iodine Reaction :    Metoclopramide      Other reaction(s): Headache  Caused increase in headaches    Perfume      Other reaction(s): Headache  head pain leading to migraines    Pregabalin      Other reaction(s): Headache, Myalgia    Trazodone Other (See Comments) and Unknown     Other reaction(s): Headache  Other reaction(s): Headache  Other reaction(s): Headache    Amitriptyline Hcl Other (See Comments)     Migraine      Candesartan Cilexetil-Hctz Muscle Pain (Myalgia)    Cyproheptadine Hcl      headaches    Desvenlafaxine      Migraine      Diatrizoate Unknown     PN: LW CM1: CONTRAST- iodine Reaction :    Diazepam      Other reaction(s): Vestibular Toxicity  PN: LW Reaction: vertigo  PN: LW Reaction: vertigo    Duloxetine Hcl Other (See Comments)      "Migraine      Gabapentin Other (See Comments)    Galcanezumab      Other reaction(s): Headache    Imipramine Other (See Comments)     Migraine      Metoprolol Other (See Comments) and Unknown     headache  headache  headache    Morphine Other (See Comments)     Patient reports seizure   Other reaction(s): Unknown  Seizure; 5/111/21 updated info: patient states she had a seizure while having a migraine headache years ago and is not sure if it was due to morphine. She has tolerated Dilaudid since then.      No Clinical Screening - See Comments      PN: LW FI1: lactose intolerance LW FI2: shellfish  PN: LW CM1: CONTRAST- iodine Reaction :  PN: LW Other1: -nka    Nortriptyline Other (See Comments)     Migraine      Phenergan Dm [Promethazine-Dm] Other (See Comments)     seizures    Pregabalin Muscle Pain (Myalgia)    Promethazine      PN: LW Reaction: minimal sizures    Venlafaxine Other (See Comments)     PN: LW Reaction: migraine  PN: LW Reaction: migraine  Migraine      Wellbutrin [Bupropion Hydrobromide] Other (See Comments)     Migraine      Compazine Anxiety    Dihydroergotamine Anxiety and Other (See Comments)     Cardiac symptoms    Droperidol Anxiety     PN: LW Reaction: agitation    Medroxyprogesterone Hives     PN: LW Reaction: nausea    Prochlorperazine Anxiety     PN: LW Reaction: \"can't sit still\"       CURRENT MEDICATIONS    Current Outpatient Medications:     aMILoride (MIDAMOR) 5 MG tablet, Take 7.5 mg by mouth daily 2.5MG in the AM and 5MG in the PM., Disp: , Rfl:     amLODIPine (NORVASC) 10 MG tablet, Take 10 mg by mouth daily, Disp: , Rfl:     amphetamine-dextroamphetamine (ADDERALL) 20 MG tablet, Take 1 tablet (20 mg) by mouth 2 times daily (Patient taking differently: Take 20 mg by mouth 2 times daily Pt currently taking 10MG in the AM and 20MG in the PM.), Disp: 60 tablet, Rfl: 0    B Complex-Biotin-FA (B-COMPLEX PO), Take 1 tablet by mouth daily, Disp: , Rfl:     botulinum toxin type A (BOTOX) " 100 units injection, Inject intramuscular. Every 18 weeks for pelvic floor spasms, Disp: , Rfl:     budesonide (RINOCORT AQUA) 32 MCG/ACT nasal spray, Spray 1 spray into both nostrils daily., Disp: , Rfl:     calcitRIOL (ROCALTROL) 0.25 MCG capsule, Take 0.25 mcg by mouth as needed, Disp: , Rfl:     Calcium-Magnesium-Vitamin D (CITRACAL SLOW RELEASE) 600- MG-MG-UNIT TB24, Take 600 mg by mouth 4 times daily, Disp: , Rfl:     docusate sodium (COLACE) 100 MG capsule, Take 1 capsule by mouth as needed for constipation, Disp: , Rfl:     eletriptan (RELPAX) 40 MG tablet, Take 40 mg by mouth as needed, Disp: , Rfl:     estradiol (ESTRACE) 0.1 MG/GM vaginal cream, , Disp: , Rfl:     fexofenadine (ALLEGRA) 180 MG tablet, Take 180 mg by mouth daily, Disp: , Rfl:     HYDROmorphone (DILAUDID) 2 MG tablet, Take 0.5-1 tablets (1-2 mg) by mouth every 3 hours as needed (headache) 20 tablets = 3 month supply., Disp: 24 tablet, Rfl: 0    ketoconazole (NIZORAL) 2 % external shampoo, Apply topically to affected area(s) once daily if needed for Dry Scalp (itching and flaking scalp). Lather on damp scalp, leave on for 5min, then rinse with water., Disp: , Rfl:     ketorolac (TORADOL) 30 MG/ML injection, Inject 1 mL (30 mg) into the vein every 6 hours as needed for moderate pain, Disp: 6 mL, Rfl: 1    lamoTRIgine (LAMICTAL) 100 MG tablet, Take 1.5 tablets (150 mg) by mouth daily, Disp: 45 tablet, Rfl: 2    lidocaine (XYLOCAINE) 4 % external solution, Spray 0.5 ml once in each nostril q 4-6 hours as needed for headache. lay down with head tilted back for 5 minutes after if preferred, Disp: 50 mL, Rfl: 6    Magnesium Oxide 500 MG TABS, Take 500 mg by mouth 2 times daily, Disp: , Rfl:     mupirocin (BACTROBAN) 2 % external ointment, Apply topically 3 times daily, Disp: , Rfl:     naloxone (NARCAN) 4 MG/0.1ML nasal spray, Spray 1 spray (4 mg) into one nostril alternating nostrils as needed for opioid reversal every 2-3 minutes until  "assistance arrives, Disp: 0.2 mL, Rfl: 1    nebivolol (BYSTOLIC) 5 MG tablet, Take 5 mg by mouth daily Take twice a day totaling 10 mg., Disp: , Rfl:     Needle, Disp, 25G X 1\" MISC, 2 each as needed (with toradol) 90-day supply, Disp: 6 each, Rfl: 1    ondansetron (ZOFRAN) 4 MG tablet, Take 1 tablet (4 mg) by mouth every 8 hours as needed for nausea, Disp: 30 tablet, Rfl: 1    order for DME, Equipment being ordered: Oxygen The patient has Cluster Headache and needs 10 liters/minute of high flow oxygen via nonrebreather mask prn headaches, Disp: 99 days, Rfl: 0    order for DME, Equipment being ordered: Oxygen and non-rebreather mask.   Use high flow oxygen (10-15 L/min) with non-rebreather mask, as needed for cluster headaches, Disp: 1 Units, Rfl: 11    potassium chloride ER (KLOR-CON M) 20 MEQ CR tablet, Take 1 tablet by mouth 2 times daily, Disp: , Rfl:     QUEtiapine (SEROQUEL) 50 MG tablet, Take 1.5 tablets (75 mg) by mouth at bedtime, Disp: 45 tablet, Rfl: 0    Syringe, Disposable, (SYRINGE LUER SLIP) 1 ML MISC, 1 each as needed (with toradol/migraine), Disp: 6 each, Rfl: 1    valACYclovir (VALTREX) 1000 mg tablet, Take 1,000 mg by mouth as needed., Disp: , Rfl:     Current Facility-Administered Medications:     botulinum toxin type A (BOTOX) 100 units injection 225 Units, 225 Units, Intramuscular, See Admin Instructions, Standal, Addie Saeed MD, 225 Units at 24 0911    bupivacaine (MARCAINE) 0.5% preservative free injection, 10 mL, Other, Once,        BOTULINUM NEUROTOXIN INJECTION PROCEDURES    VERIFICATION OF PATIENT IDENTIFICATION AND PROCEDURE     Initials   Patient Name SES   Patient  SES   Procedure Verified by: SES     Prior to the start of the procedure and with procedural staff participation, I verbally confirmed the patient s identity using two indicators, relevant allergies, that the procedure was appropriate and matched the consent or emergent situation, and that the correct " equipment/implants were available. Immediately prior to starting the procedure I conducted the Time Out with the procedural staff and re-confirmed the patient s name, procedure, and site/side. (The Joint Commission universal protocol was followed.)  Yes    Sedation (Moderate or Deep): None    ABOVE ASSESSMENTS PERFORMED BY    Addie Malhotra MD      INDICATIONS FOR PROCEDURES  Valerie Sim is a 59 year old patient with pain and jaw clenching secondary to the diagnosis of chronic migraine headaches and oromandibular dystonia. Her baseline symptoms have been recalcitrant to oral medications and conservative therapy.  She is here today for reinjection with Botox.    GOAL OF PROCEDURE  The goal of this procedure is to decrease pain and excessive jaw clenching due to chronic migraine headaches and bruxism.     TOTAL DOSE ADMINISTERED  Dose Administered:  225 units  Botox (Botulinum Toxin Type A)       2:1 Dilution   Unavoidable Drug Waste: Yes  Amount of drug waste (mL): 75 units Botox.  Reason for waste:  Single use vial  Diluent Used:  0.5% bupivacaine  Total Volume of Diluent Used:  4.5 ml  NDC #: Botox 100u ([address]-30)      CONSENT  The risks, benefits, and treatment options were discussed with Valerie Sim and she agreed to proceed.    Written consent was obtained by  [facility] .     EQUIPMENT USED  Needle-25mm stimulating/recording  Needle-30 gauge  EMG/NCS Machine    SKIN PREPARATION  Skin preparation was performed using an alcohol wipe.    GUIDANCE DESCRIPTION  Electro-myographic guidance was necessary throughout the neck and jaw portion of the procedure to accurately identify all areas of dystonic muscles while avoiding injection of non-dystonic muscles, neighboring nerves and nearby vascular structures.     AREA/MUSCLE INJECTED:  225 UNITS BOTOX = TOTAL DOSE, 2:1 DILUTION     1. FACIAL & SCALP MUSCLES: 85 UNITS BOTOX = TOTAL DOSE      Right Frontalis - 10 units of Botox in 2 site/s.  Left Frontalis - 10  units of Botox in 2 site/s.      Procerus - 5 units of Botox at 1 site/s.       Right  - 2.5 units of Botox in 1 site/s.  Left  - 2.5 units of Botox in 1 site/s.      Right Nasalis - 2.5 units of Botox at 1 site/s.           Left Nasalis - 2.5 units of Botox at 1 site/s.     Right Upper Occipitalis - 25 units of Botox at 5 site/s.   Left Upper Occipitalis - 25 units of Botox at 5 site/s.         2. JAW MUSCLES: 90 UNITS BOTOX = TOTAL DOSE     Right Masseter - 20 units of Botox at 2 site/s.   Left Masseter - 20 units of Botox at 2 site/s.      Right Temporalis - 25 units of Botox at 5 site/s.  Left Temporalis - 25 units of Botox at 5 site/s.          3. SHOULDER & NECK MUSCLES: 50 UNITS BOTOX = TOTAL DOSE      Right Levator Scapula - 10 units of Botox at 2 site/s (neck and scapular insertion).  Left Levator Scapula - 10 units of Botox at 2 site/s (neck and scapular insertion).      Right Upper Trapezius (mid & low lateral) - 10 units of Botox at 3 site/s.  Left Upper Trapezius (mid & low lateral) - 10 units of Botox at 3 site/s.             Right Pectoralis Minor - 5 units of Botox at 1 site/s.                     Left Pectoralis Minor - 5 units of Botox at 1 site/s.     RESPONSE TO PROCEDURE  Valerie Sim tolerated the procedure well and there were no immediate complications. She was allowed to recover for an appropriate period of time and was discharged home in stable condition.    ASSESSMENT AND PLAN   Botulinum toxin injections: No changes made to Botox dose or distribution today. Patient will continue to monitor response to Botox and report at next appointment.  Referrals: None.   Follow up: Valerie Sim was rescheduled for the next series of injections in 9 weeks, at which time we will evaluate response to today's injections. she may call the clinic prior with any questions or concerns prior to the next appointment.       Again, thank you for allowing me to participate in the care of your  patient.      Sincerely,    Addie Malhotra MD

## 2024-06-05 NOTE — PROGRESS NOTES
[facility]     PM&R CLINIC NOTE  BOTULINUM TOXIN PROCEDURE      HPI  No chief complaint on file.    Valerie Sim is a 59 year old female with a history of chronic migraine headaches who presents to clinic for botulinum toxin injections for management of chronic migraine headaches and excessive jaw clenching.     SINCE LAST VISIT  Valerie Sim was last seen for Botox injections on 4/3/2024, at which time she received 225 units of Botox, and she was then seen on 4/24/2024 for trigger point injections, which seemed to help significantly.     Patient denies new medical diagnoses, illnesses, hospitalizations, emergency room visits, and injuries since the previous injection with botulinum neurotoxin.       RESPONSE TO PREVIOUS TREATMENT    Side effects:  None      1.  Headache frequency during this injection cycle:  5-10 migraine headache days per month throughout the injection cycle. This is compared to her baseline headache frequency of 30 severe headache days per month.     2.  Headache duration during this injection cycle:  Headache duration was between 4 hours and 4 days. Patient reports a few episodes of multiple day headaches during this injection cycle.     3.  Headache intensity during this injection cycle:    A.  5-8/10  =  Typical pain level.  B.  10/10  =  Worst pain level.  C.  4/10  =  Lowest pain level.    4.  Change in headache medication usage during this injection cycle:  (For Example:  Able to decrease use of oral pain medications.) No changes.       5.  ER Visits During This Injection Cycle:  She uses Relpax, Toradol and Zofran as needed for migraine headaches, and oxygen occasionally for cluster headaches. Lidocaine nasal spray is used intermittently for cluster headaches, which is found to be quite helpful.      6.  Functional Performance:  Change in ADL's, social interaction, days lost from work, etc. Patient reports being able to more fully participate in  social and family activities and responsibilities as headache symptoms have improved. She is now changing her perspectives on pain expectations of severity and length after onset. She also started a variety of different yogas.       PHYSICAL EXAM  VS: There were no vitals taken for this visit.   GEN: Pleasant and cooperative, in no acute distress  HEENT: No facial asymmetry    ALLERGIES  Allergies   Allergen Reactions    Fluoxetine Other (See Comments)     PN: LW Reaction: headache  PN: LW Reaction: headache  Migraine      Garlic Blisters, Cough, Dermatitis, Difficulty breathing, Headache, Hives, Itching, Nausea and Vomiting and Shortness Of Breath    Candesartan      Other reaction(s): Myalgia    Duloxetine      Other reaction(s): Headache  migraine    Iodine      Other reaction(s): Other (see comments)  PN: LW CM1: CONTRAST- iodine Reaction :    Metoclopramide      Other reaction(s): Headache  Caused increase in headaches    Perfume      Other reaction(s): Headache  head pain leading to migraines    Pregabalin      Other reaction(s): Headache, Myalgia    Trazodone Other (See Comments) and Unknown     Other reaction(s): Headache  Other reaction(s): Headache  Other reaction(s): Headache    Amitriptyline Hcl Other (See Comments)     Migraine      Candesartan Cilexetil-Hctz Muscle Pain (Myalgia)    Cyproheptadine Hcl      headaches    Desvenlafaxine      Migraine      Diatrizoate Unknown     PN: LW CM1: CONTRAST- iodine Reaction :    Diazepam      Other reaction(s): Vestibular Toxicity  PN: LW Reaction: vertigo  PN: LW Reaction: vertigo    Duloxetine Hcl Other (See Comments)     Migraine      Gabapentin Other (See Comments)    Galcanezumab      Other reaction(s): Headache    Imipramine Other (See Comments)     Migraine      Metoprolol Other (See Comments) and Unknown     headache  headache  headache    Morphine Other (See Comments)     Patient reports seizure   Other reaction(s): Unknown  Seizure; 5/111/21 updated  "info: patient states she had a seizure while having a migraine headache years ago and is not sure if it was due to morphine. She has tolerated Dilaudid since then.      No Clinical Screening - See Comments      PN: LW FI1: lactose intolerance LW FI2: shellfish  PN: LW CM1: CONTRAST- iodine Reaction :  PN: LW Other1: -nka    Nortriptyline Other (See Comments)     Migraine      Phenergan Dm [Promethazine-Dm] Other (See Comments)     seizures    Pregabalin Muscle Pain (Myalgia)    Promethazine      PN: LW Reaction: minimal sizures    Venlafaxine Other (See Comments)     PN: LW Reaction: migraine  PN: LW Reaction: migraine  Migraine      Wellbutrin [Bupropion Hydrobromide] Other (See Comments)     Migraine      Compazine Anxiety    Dihydroergotamine Anxiety and Other (See Comments)     Cardiac symptoms    Droperidol Anxiety     PN: LW Reaction: agitation    Medroxyprogesterone Hives     PN: LW Reaction: nausea    Prochlorperazine Anxiety     PN: LW Reaction: \"can't sit still\"       CURRENT MEDICATIONS    Current Outpatient Medications:     aMILoride (MIDAMOR) 5 MG tablet, Take 7.5 mg by mouth daily 2.5MG in the AM and 5MG in the PM., Disp: , Rfl:     amLODIPine (NORVASC) 10 MG tablet, Take 10 mg by mouth daily, Disp: , Rfl:     amphetamine-dextroamphetamine (ADDERALL) 20 MG tablet, Take 1 tablet (20 mg) by mouth 2 times daily (Patient taking differently: Take 20 mg by mouth 2 times daily Pt currently taking 10MG in the AM and 20MG in the PM.), Disp: 60 tablet, Rfl: 0    B Complex-Biotin-FA (B-COMPLEX PO), Take 1 tablet by mouth daily, Disp: , Rfl:     botulinum toxin type A (BOTOX) 100 units injection, Inject intramuscular. Every 18 weeks for pelvic floor spasms, Disp: , Rfl:     budesonide (RINOCORT AQUA) 32 MCG/ACT nasal spray, Spray 1 spray into both nostrils daily., Disp: , Rfl:     calcitRIOL (ROCALTROL) 0.25 MCG capsule, Take 0.25 mcg by mouth as needed, Disp: , Rfl:     Calcium-Magnesium-Vitamin D (CITRACAL SLOW " "RELEASE) 600- MG-MG-UNIT TB24, Take 600 mg by mouth 4 times daily, Disp: , Rfl:     docusate sodium (COLACE) 100 MG capsule, Take 1 capsule by mouth as needed for constipation, Disp: , Rfl:     eletriptan (RELPAX) 40 MG tablet, Take 40 mg by mouth as needed, Disp: , Rfl:     estradiol (ESTRACE) 0.1 MG/GM vaginal cream, , Disp: , Rfl:     fexofenadine (ALLEGRA) 180 MG tablet, Take 180 mg by mouth daily, Disp: , Rfl:     HYDROmorphone (DILAUDID) 2 MG tablet, Take 0.5-1 tablets (1-2 mg) by mouth every 3 hours as needed (headache) 20 tablets = 3 month supply., Disp: 24 tablet, Rfl: 0    ketoconazole (NIZORAL) 2 % external shampoo, Apply topically to affected area(s) once daily if needed for Dry Scalp (itching and flaking scalp). Lather on damp scalp, leave on for 5min, then rinse with water., Disp: , Rfl:     ketorolac (TORADOL) 30 MG/ML injection, Inject 1 mL (30 mg) into the vein every 6 hours as needed for moderate pain, Disp: 6 mL, Rfl: 1    lamoTRIgine (LAMICTAL) 100 MG tablet, Take 1.5 tablets (150 mg) by mouth daily, Disp: 45 tablet, Rfl: 2    lidocaine (XYLOCAINE) 4 % external solution, Spray 0.5 ml once in each nostril q 4-6 hours as needed for headache. lay down with head tilted back for 5 minutes after if preferred, Disp: 50 mL, Rfl: 6    Magnesium Oxide 500 MG TABS, Take 500 mg by mouth 2 times daily, Disp: , Rfl:     mupirocin (BACTROBAN) 2 % external ointment, Apply topically 3 times daily, Disp: , Rfl:     naloxone (NARCAN) 4 MG/0.1ML nasal spray, Spray 1 spray (4 mg) into one nostril alternating nostrils as needed for opioid reversal every 2-3 minutes until assistance arrives, Disp: 0.2 mL, Rfl: 1    nebivolol (BYSTOLIC) 5 MG tablet, Take 5 mg by mouth daily Take twice a day totaling 10 mg., Disp: , Rfl:     Needle, Disp, 25G X 1\" MISC, 2 each as needed (with toradol) 90-day supply, Disp: 6 each, Rfl: 1    ondansetron (ZOFRAN) 4 MG tablet, Take 1 tablet (4 mg) by mouth every 8 hours as needed for " nausea, Disp: 30 tablet, Rfl: 1    order for DME, Equipment being ordered: Oxygen The patient has Cluster Headache and needs 10 liters/minute of high flow oxygen via nonrebreather mask prn headaches, Disp: 99 days, Rfl: 0    order for DME, Equipment being ordered: Oxygen and non-rebreather mask.   Use high flow oxygen (10-15 L/min) with non-rebreather mask, as needed for cluster headaches, Disp: 1 Units, Rfl: 11    potassium chloride ER (KLOR-CON M) 20 MEQ CR tablet, Take 1 tablet by mouth 2 times daily, Disp: , Rfl:     QUEtiapine (SEROQUEL) 50 MG tablet, Take 1.5 tablets (75 mg) by mouth at bedtime, Disp: 45 tablet, Rfl: 0    Syringe, Disposable, (SYRINGE LUER SLIP) 1 ML MISC, 1 each as needed (with toradol/migraine), Disp: 6 each, Rfl: 1    valACYclovir (VALTREX) 1000 mg tablet, Take 1,000 mg by mouth as needed., Disp: , Rfl:     Current Facility-Administered Medications:     botulinum toxin type A (BOTOX) 100 units injection 225 Units, 225 Units, Intramuscular, See Admin Instructions, Ros[place], Addie Saeed MD, 225 Units at 24 0911    bupivacaine (MARCAINE) 0.5% preservative free injection, 10 mL, Other, Once,        BOTULINUM NEUROTOXIN INJECTION PROCEDURES    VERIFICATION OF PATIENT IDENTIFICATION AND PROCEDURE     Initials   Patient Name SES   Patient  SES   Procedure Verified by: SES     Prior to the start of the procedure and with procedural staff participation, I verbally confirmed the patient s identity using two indicators, relevant allergies, that the procedure was appropriate and matched the consent or emergent situation, and that the correct equipment/implants were available. Immediately prior to starting the procedure I conducted the Time Out with the procedural staff and re-confirmed the patient s name, procedure, and site/side. (The Joint Commission universal protocol was followed.)  Yes    Sedation (Moderate or Deep): None    ABOVE ASSESSMENTS PERFORMED BY    Addie Malhotra  MD      INDICATIONS FOR PROCEDURES  Valerie Sim is a 59 year old patient with pain and jaw clenching secondary to the diagnosis of chronic migraine headaches and oromandibular dystonia. Her baseline symptoms have been recalcitrant to oral medications and conservative therapy.  She is here today for reinjection with Botox.    GOAL OF PROCEDURE  The goal of this procedure is to decrease pain and excessive jaw clenching due to chronic migraine headaches and bruxism.     TOTAL DOSE ADMINISTERED  Dose Administered:  225 units  Botox (Botulinum Toxin Type A)       2:1 Dilution   Unavoidable Drug Waste: Yes  Amount of drug waste (mL): 75 units Botox.  Reason for waste:  Single use vial  Diluent Used:  0.5% bupivacaine  Total Volume of Diluent Used:  4.5 ml  NDC #: Botox 100u ([address]-74)      CONSENT  The risks, benefits, and treatment options were discussed with Valerie Sim and she agreed to proceed.    Written consent was obtained by  ninoska .     EQUIPMENT USED  Needle-25mm stimulating/recording  Needle-30 gauge  EMG/NCS Machine    SKIN PREPARATION  Skin preparation was performed using an alcohol wipe.    GUIDANCE DESCRIPTION  Electro-myographic guidance was necessary throughout the neck and jaw portion of the procedure to accurately identify all areas of dystonic muscles while avoiding injection of non-dystonic muscles, neighboring nerves and nearby vascular structures.       AREA/MUSCLE INJECTED:  225 UNITS BOTOX = TOTAL DOSE, 2:1 DILUTION     1. FACIAL & SCALP MUSCLES: 85 UNITS BOTOX = TOTAL DOSE      Right Frontalis - 10 units of Botox in 2 site/s.  Left Frontalis - 10 units of Botox in 2 site/s.      Procerus - 5 units of Botox at 1 site/s.       Right  - 2.5 units of Botox in 1 site/s.  Left  - 2.5 units of Botox in 1 site/s.      Right Nasalis - 2.5 units of Botox at 1 site/s.           Left Nasalis - 2.5 units of Botox at 1 site/s.     Right Upper Occipitalis - 25 units of Botox at 5  site/s.   Left Upper Occipitalis - 25 units of Botox at 5 site/s.         2. JAW MUSCLES: 90 UNITS BOTOX = TOTAL DOSE     Right Masseter - 20 units of Botox at 2 site/s.   Left Masseter - 20 units of Botox at 2 site/s.      Right Temporalis - 25 units of Botox at 5 site/s.  Left Temporalis - 25 units of Botox at 5 site/s.          3. SHOULDER & NECK MUSCLES: 50 UNITS BOTOX = TOTAL DOSE      Right Levator Scapula - 10 units of Botox at 2 site/s (neck and scapular insertion).  Left Levator Scapula - 10 units of Botox at 2 site/s (neck and scapular insertion).      Right Upper Trapezius (mid & low lateral) - 10 units of Botox at 3 site/s.  Left Upper Trapezius (mid & low lateral) - 10 units of Botox at 3 site/s.             Right Pectoralis Minor - 5 units of Botox at 1 site/s.                     Left Pectoralis Minor - 5 units of Botox at 1 site/s.       RESPONSE TO PROCEDURE  Valerie Sim tolerated the procedure well and there were no immediate complications. She was allowed to recover for an appropriate period of time and was discharged home in stable condition.      ASSESSMENT AND PLAN   Botulinum toxin injections: No changes made to Botox dose or distribution today. Patient will continue to monitor response to Botox and report at next appointment.  Referrals: None.   Follow up: Valerie Sim was rescheduled for the next series of injections in 9 weeks, at which time we will evaluate response to today's injections. she may call the clinic prior with any questions or concerns prior to the next appointment.

## 2024-06-24 ENCOUNTER — VIRTUAL VISIT (OUTPATIENT)
Dept: PSYCHIATRY | Facility: CLINIC | Age: 59
End: 2024-06-24
Attending: PSYCHIATRY & NEUROLOGY
Payer: COMMERCIAL

## 2024-06-24 VITALS — HEIGHT: 67 IN | BODY MASS INDEX: 26.37 KG/M2 | WEIGHT: 168 LBS

## 2024-06-24 DIAGNOSIS — F33.3 SEVERE RECURRENT MAJOR DEPRESSIVE DISORDER WITH PSYCHOTIC FEATURES (H): Primary | ICD-10-CM

## 2024-06-24 DIAGNOSIS — F41.1 GAD (GENERALIZED ANXIETY DISORDER): ICD-10-CM

## 2024-06-24 PROCEDURE — 99214 OFFICE O/P EST MOD 30 MIN: CPT | Mod: 95

## 2024-06-24 PROCEDURE — 90833 PSYTX W PT W E/M 30 MIN: CPT | Mod: 95

## 2024-06-24 RX ORDER — QUETIAPINE FUMARATE 50 MG/1
50 TABLET, FILM COATED ORAL AT BEDTIME
Qty: 30 TABLET | Refills: 2 | Status: SHIPPED | OUTPATIENT
Start: 2024-06-24 | End: 2024-08-13

## 2024-06-24 RX ORDER — LAMOTRIGINE 100 MG/1
100 TABLET ORAL DAILY
Qty: 30 TABLET | Refills: 2 | Status: SHIPPED | OUTPATIENT
Start: 2024-06-24 | End: 2024-08-13

## 2024-06-24 ASSESSMENT — PATIENT HEALTH QUESTIONNAIRE - PHQ9
SUM OF ALL RESPONSES TO PHQ QUESTIONS 1-9: 10
SUM OF ALL RESPONSES TO PHQ QUESTIONS 1-9: 10
10. IF YOU CHECKED OFF ANY PROBLEMS, HOW DIFFICULT HAVE THESE PROBLEMS MADE IT FOR YOU TO DO YOUR WORK, TAKE CARE OF THINGS AT HOME, OR GET ALONG WITH OTHER PEOPLE: SOMEWHAT DIFFICULT

## 2024-06-24 ASSESSMENT — PAIN SCALES - GENERAL: PAINLEVEL: MODERATE PAIN (4)

## 2024-06-24 NOTE — PROGRESS NOTES
"Virtual Visit Details    Type of service:  Video Visit     Originating Location (pt. Location): {video visit patient location:000746::\"Home\"}  {PROVIDER LOCATION On-site should be selected for visits conducted from your clinic location or adjoining [facility] hospital, academic office, or other nearby [facility] building. Off-site should be selected for all other provider locations, including home:847411}  Distant Location (provider location):  {virtual location provider:316806}  Platform used for Video Visit: {Virtual Visit Platforms:507325::\""[employer]\"}  Answers submitted by the patient for this visit:  Patient Health Questionnaire (Submitted on 6/24/2024)  If you checked off any problems, how difficult have these problems made it for you to do your work, take care of things at home, or get along with other people?: Somewhat difficult  PHQ9 TOTAL SCORE: 10    "

## 2024-06-24 NOTE — PATIENT INSTRUCTIONS
**For crisis resources, please see the information at the end of this document**   Patient Education    Thank you for coming to the [facility] [place] [facility].     Lab Testing:  If you had lab testing today and your results are reassuring or normal they will be mailed to you or sent through [employer] within 7 days. If the lab tests need quick action we will call you with the results. The phone number we will call with results is # [phone]. If this is not the best number please call our clinic and change the number.     Medication Refills:  If you need any refills please call your pharmacy and they will contact us. Our fax number for refills is [phone].   Three business days of notice are needed for general medication refill requests.   Five business days of notice are needed for controlled substance refill requests.   If you need to change to a different pharmacy, please contact the new pharmacy directly. The new pharmacy will help you get your medications transferred.     Contact Us:  Please call [phone] during business hours (8-5:00 M-F).   If you have medication related questions after clinic hours, or on the weekend, please call [phone].     Financial Assistance [phone]   Medical Records [phone]       MENTAL HEALTH CRISIS RESOURCES:  For a emergency help, please call 911 or go to the nearest Emergency Department.     Emergency Walk-In Options:   EmPATH Unit @ [facility] ([place]): [phone] - Specialized mental health emergency area designed to be calming  [facility] ([place]): [phone]  [facility] ([place]): [phone]  [facility]): [phone]    [facility] Crisis Information:   [place]: [phone]  [place]: [phone]  [place] ([place]) - Adult: [phone]     Child: [phone]  [place] - Adult: [phone]     Child: [phone]  [place]:  [phone]  List of all [facility] resources:   https://[place].gov/dhs/people-we-serve/adults/health-care/mental-health/resources/crisis-contacts.jsp    National Crisis Information:   Crisis Text Line: Text  [place]  to 484096  Suicide & Crisis Lifeline: 988  National Suicide Prevention Lifeline: 5-127-466-TALK (1-156.927.9959)       For online chat options, visit https://suicidepreventionlifeline.org/chat/  Poison Control Center: 2-460-736-7726  Trans Lifeline: 9-691-811-0401 - Hotline for transgender people of all ages  The [employer] Project: 4-685-088-7007 - Hotline for LGBT youth     For Non-Emergency Support:   Fast Tracker: Mental Health & Substance Use Disorder Resources -   https://www.[employer].org/

## 2024-06-24 NOTE — NURSING NOTE
Is the patient currently in the state of [place]? YES    Visit mode:VIDEO    If the visit is dropped, the patient can be reconnected by: VIDEO VISIT: Text to cell phone:   Telephone Information:   Mobile [phone]       Will anyone else be joining the visit? NO  (If patient encounters technical issues they should call [phone])    How would you like to obtain your AVS? MyChart    Are changes needed to the allergy or medication list? Yes Lamotrigine   100mg, 1 tab at bedtime. Possible refills needed.     Are refills needed on medications prescribed by this physician? YES    Reason for visit: RECHECK    Brook CHOI
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
## 2024-07-14 ASSESSMENT — ANXIETY QUESTIONNAIRES
GAD7 TOTAL SCORE: 6
7. FEELING AFRAID AS IF SOMETHING AWFUL MIGHT HAPPEN: NOT AT ALL
1. FEELING NERVOUS, ANXIOUS, OR ON EDGE: SEVERAL DAYS
4. TROUBLE RELAXING: SEVERAL DAYS
IF YOU CHECKED OFF ANY PROBLEMS ON THIS QUESTIONNAIRE, HOW DIFFICULT HAVE THESE PROBLEMS MADE IT FOR YOU TO DO YOUR WORK, TAKE CARE OF THINGS AT HOME, OR GET ALONG WITH OTHER PEOPLE: SOMEWHAT DIFFICULT
5. BEING SO RESTLESS THAT IT IS HARD TO SIT STILL: SEVERAL DAYS
GAD7 TOTAL SCORE: 6
8. IF YOU CHECKED OFF ANY PROBLEMS, HOW DIFFICULT HAVE THESE MADE IT FOR YOU TO DO YOUR WORK, TAKE CARE OF THINGS AT HOME, OR GET ALONG WITH OTHER PEOPLE?: SOMEWHAT DIFFICULT
GAD7 TOTAL SCORE: 6
7. FEELING AFRAID AS IF SOMETHING AWFUL MIGHT HAPPEN: NOT AT ALL
3. WORRYING TOO MUCH ABOUT DIFFERENT THINGS: SEVERAL DAYS
6. BECOMING EASILY ANNOYED OR IRRITABLE: SEVERAL DAYS
2. NOT BEING ABLE TO STOP OR CONTROL WORRYING: SEVERAL DAYS

## 2024-07-15 ENCOUNTER — VIRTUAL VISIT (OUTPATIENT)
Dept: PSYCHOLOGY | Facility: CLINIC | Age: 59
End: 2024-07-15
Payer: COMMERCIAL

## 2024-07-15 DIAGNOSIS — F41.1 GAD (GENERALIZED ANXIETY DISORDER): Primary | ICD-10-CM

## 2024-07-15 PROCEDURE — 90834 PSYTX W PT 45 MINUTES: CPT | Mod: 95 | Performed by: MARRIAGE & FAMILY THERAPIST

## 2024-07-15 NOTE — PROGRESS NOTES
Answers submitted by the patient for this visit:  Patient Health Questionnaire (Submitted on 2024)  If you checked off any problems, how difficult have these problems made it for you to do your work, take care of things at home, or get along with other people?: Somewhat difficult  PHQ9 TOTAL SCORE: 10  MAHAMED-7 (Submitted on 2024)  MAHAMED 7 TOTAL SCORE: 6        [facility]                                     Progress Note    Patient Name: Valerie Sim  Date:  7/15/24         Service Type: Individual  Session Start Time: 8:00a    Session End Time:  8:50a    Session Length:  50  Session #: 28    Attendees: Client     Service Modality:  Video Visit:      Provider verified identity through the following two step process.  Patient provided:  Patient     Telemedicine Visit: The patient's condition can be safely assessed and treated via synchronous audio and visual telemedicine encounter.      Reason for Telemedicine Visit: Services only offered telehealth    Originating Site (Patient Location): Patient's home    Distant Site (Provider Location): Provider Remote Setting- Home Office    Consent:  The patient/guardian has verbally consented to: the potential risks and benefits of telemedicine (video visit) versus in person care; bill my insurance or make self-payment for services provided; and responsibility for payment of non-covered services.     Patient would like the video invitation sent by:  Send to e-mail at: [email or web address]    Mode of Communication:  Video Conference via Amwell    Distant Location (Provider):  Off-site    As the provider I attest to compliance with applicable laws and regulations related to telemedicine.    DATA  Interactive Complexity: No  Crisis: No        Progress Since Last Session (Related to Symptoms / Goals / Homework):   Symptoms: Improving, mood around 7/10    Homework: Completed in session      Episode of Care Goals: Minimal progress - PREPARATION (Decided  to change - considering how); Intervened by negotiating a change plan and determining options / strategies for behavior change, identifying triggers, exploring social supports, and working towards setting a date to begin behavior change     Current / Ongoing Stressors and Concerns:  Last session 6/3, overall things have been going pretty well, spent some time with kids at family cabin which was positive.  Physical health continues to move in a positive direction, engaging in new yoga form.        Treatment Objective(s) Addressed in This Session:   Increase interest, engagement, and pleasure in doing things       Intervention:   Motivational Interviewing    MI Intervention: Permission to raise concern or advise, Open-ended questions and Reflections: simple and complex     Change Talk Expressed by the Patient: Reasons to change Need to change    Provider Response to Change Talk: R - Reflected patient's change talk and S - Summarized patient's change talk statements     ASSESSMENT: Current Emotional / Mental Status (status of significant symptoms):   Risk status (Self / Other harm or suicidal ideation)   Patient denies current fears or concerns for personal safety.   Patient denies current or recent suicidal ideation or behaviors.   Patient denies current or recent homicidal ideation or behaviors.   Patient denies current or recent self injurious behavior or ideation.   Patient denies other safety concerns.   Patient reports there has been no change in risk factors since their last session.     Patient reports there has been no change in protective factors since their last session.     A safety and risk management plan has been developed including: Patient consented to co-developed safety plan on 4/5/23.  Safety and risk management plan was reviewed.   Patient agreed to use safety plan should any safety concerns arise.  A copy was made available to the patient.     Appearance:   Appropriate    Eye Contact:   Good     Psychomotor Behavior: Normal    Attitude:   Cooperative    Orientation:   All   Speech    Rate / Production: Normal     Volume:  Normal    Mood:    Normal   Affect:    Appropriate    Thought Content:  Clear    Thought Form:  Coherent  Logical    Insight:    Good      Medication Review:   No changes to current psychiatric medication(s)     Medication Compliance:   Yes     Changes in Health Issues:   None reported     Chemical Use Review:   Substance Use: Chemical use reviewed, no active concerns identified      Tobacco Use: No current tobacco use.      Diagnosis:  MDD, moderate, recurrent    Collateral Reports Completed:   Not Applicable    PLAN: (Patient Tasks / Therapist Tasks / Other)  Healthy coping skills    Yoni Alex, [facility]   7/15/24                                             ______________________________________________________________________    Individual Treatment Plan    Patient's Name: Valerie Sim  YOB: 1965    Date of Creation: 4/30/24  Date Treatment Plan Last Reviewed/Revised: 7/30/24    DSM5 Diagnoses: 296.32 (F33.1) Major Depressive Disorder, Recurrent Episode, Moderate _  Psychosocial / Contextual Factors:   PROMIS (reviewed every 90 days):     Referral / Collaboration:  Referral to another professional/service is not indicated at this time..    Anticipated number of session for this episode of care: 3-6 sessions  Anticipation frequency of session: Biweekly  Anticipated Duration of each session: 38-52 minutes  Treatment plan will be reviewed in 90 days or when goals have been changed.       MeasurableTreatment Goal(s) related to diagnosis / functional impairment(s)  Goal 1: Patient will engage in CBT skills for depression sx reduction    Objective #A (Patient Action)    Patient will Identify negative self-talk and behaviors: challenge core beliefs, myths, and actions.  Status Continued 4/30/24    Intervention(s)  Therapist will teach emotional regulation skills. CBT  skills .      Patient has reviewed and agreed to the above plan.      Yoni Alex, CAROLEE 4/30/24

## 2024-07-26 NOTE — LETTER
Ms.Julie YEE Sadiekory  [address]  [place] [facility] [address]        October 4, 2023    Dear ,    We received your appointment request and have attempted to reach you by phone. At your convenience, please contact our Urology Clinic at [phone] to schedule a virtual or in person appointment with Dr. Cotton.      Sincerely,      Dr. Cotton's Office        
[FreeTextEntry1] : Plan:  Exam. ([id number]).  We had a lengthy discussion concerning her condition.  She was instructed to continue to wear the boot at all times except for sleeping and bathing.  She is to continue her Voltaren as needed.  A nail biopsy was performed of the affected toenails with a specimen sent to the lab. ([id number]).  The results will be reviewed when available.  Patient is requesting topical treatment. Patient to return:  2 weeks.

## 2024-08-07 ENCOUNTER — OFFICE VISIT (OUTPATIENT)
Dept: PHYSICAL MEDICINE AND REHAB | Facility: CLINIC | Age: 59
End: 2024-08-07
Payer: COMMERCIAL

## 2024-08-07 VITALS
HEART RATE: 80 BPM | OXYGEN SATURATION: 97 % | SYSTOLIC BLOOD PRESSURE: 128 MMHG | DIASTOLIC BLOOD PRESSURE: 79 MMHG | RESPIRATION RATE: 16 BRPM

## 2024-08-07 DIAGNOSIS — G43.719 CHRONIC MIGRAINE WITHOUT AURA, INTRACTABLE, WITHOUT STATUS MIGRAINOSUS: Primary | ICD-10-CM

## 2024-08-07 DIAGNOSIS — M79.18 MYOFASCIAL PAIN: ICD-10-CM

## 2024-08-07 DIAGNOSIS — M54.81 BILATERAL OCCIPITAL NEURALGIA: ICD-10-CM

## 2024-08-07 PROCEDURE — 64615 CHEMODENERV MUSC MIGRAINE: CPT | Performed by: PHYSICAL MEDICINE & REHABILITATION

## 2024-08-07 PROCEDURE — 95874 GUIDE NERV DESTR NEEDLE EMG: CPT | Performed by: PHYSICAL MEDICINE & REHABILITATION

## 2024-08-07 PROCEDURE — 64405 NJX AA&/STRD GR OCPL NRV: CPT | Mod: XS | Performed by: PHYSICAL MEDICINE & REHABILITATION

## 2024-08-07 RX ORDER — TRIAMCINOLONE ACETONIDE 40 MG/ML
40 INJECTION, SUSPENSION INTRA-ARTICULAR; INTRAMUSCULAR ONCE
Status: COMPLETED | OUTPATIENT
Start: 2024-08-07 | End: 2024-08-07

## 2024-08-07 RX ORDER — BUPIVACAINE HYDROCHLORIDE 5 MG/ML
6 INJECTION, SOLUTION EPIDURAL; INTRACAUDAL ONCE
Status: COMPLETED | OUTPATIENT
Start: 2024-08-07 | End: 2024-08-07

## 2024-08-07 RX ORDER — BUPIVACAINE HYDROCHLORIDE 2.5 MG/ML
10 INJECTION, SOLUTION EPIDURAL; INFILTRATION; INTRACAUDAL ONCE
Status: COMPLETED | OUTPATIENT
Start: 2024-08-07 | End: 2024-08-07

## 2024-08-07 RX ADMIN — TRIAMCINOLONE ACETONIDE 40 MG: 40 INJECTION, SUSPENSION INTRA-ARTICULAR; INTRAMUSCULAR at 12:16

## 2024-08-07 RX ADMIN — BUPIVACAINE HYDROCHLORIDE 30 MG: 5 INJECTION, SOLUTION EPIDURAL; INTRACAUDAL at 11:47

## 2024-08-07 RX ADMIN — BUPIVACAINE HYDROCHLORIDE 25 MG: 2.5 INJECTION, SOLUTION EPIDURAL; INFILTRATION; INTRACAUDAL at 11:47

## 2024-08-07 ASSESSMENT — PAIN SCALES - GENERAL: PAINLEVEL: SEVERE PAIN (7)

## 2024-08-07 NOTE — LETTER
8/7/2024       RE: Valerie Sim  [address]  [place] [address]     Dear Colleague,    Thank you for referring your patient, Valerie Sim, to the [facility] PH[facility]ICAL MEDICINE AND [facility]HABILITATION CLINIC [place] at [facility]. Please see a copy of my visit note below.      [facility]     PM&R CLINIC NOTE  BOTULINUM TOXIN PROCEDURE      HPI  Chief Complaint   Patient presents with     Procedure     botox     Valerie Sim is a 59 year old female with a history of chronic migraine headaches who presents to clinic for botulinum toxin injections for management of chronic migraine headaches and excessive jaw clenching.     SINCE LAST VISIT  Valerie Sim was last seen for Botox injections on 6/5/2024, at which time she received 225 units of Botox. She reports that she recently had pelvic floor Botox and pudendal nerve blocks before the migraine Botox/blocks and it has made a positive impact on her pain relief in her shoulders, neck and headaches.     Patient denies new medical diagnoses, illnesses, hospitalizations, emergency room visits, and injuries since the previous injection with botulinum neurotoxin.       RESPONSE TO PREVIOUS TREATMENT    Side effects:  None      1.  Headache frequency during this injection cycle:  5-10 migraine headache days per month throughout the injection cycle. This is compared to her baseline headache frequency of 30 severe headache days per month.     2.  Headache duration during this injection cycle:  Headache duration was between 4 hours and 4 days. Patient reports a few episodes of multiple day headaches during this injection cycle.     3.  Headache intensity during this injection cycle:    A.  5-8/10  =  Typical pain level.  B.  10/10  =  Worst pain level.  C.  4/10  =  Lowest pain level.    4.  Change in headache medication usage during this injection cycle:  (For Example:   Able to decrease use of oral pain medications.) No changes.       5.  ER Visits During This Injection Cycle:  She uses Relpax, Toradol and Zofran as needed for migraine headaches, and oxygen occasionally for cluster headaches. Lidocaine nasal spray is used intermittently for cluster headaches, which is found to be quite helpful.      6.  Functional Performance:  Change in ADL's, social interaction, days lost from work, etc. Patient reports being able to more fully participate in social and family activities and responsibilities as headache symptoms have improved. She is now changing her perspectives on pain expectations of severity and length after onset. She also started a variety of different yogas.       PHYSICAL EXAM  VS: /79   Pulse 80   Resp 16   SpO2 97%    GEN: Pleasant and cooperative, in no acute distress  HEENT: No facial asymmetry    ALLERGIES  Allergies   Allergen Reactions     Fluoxetine Other (See Comments)     PN: LW Reaction: headache  PN: LW Reaction: headache  Migraine       Garlic Blisters, Cough, Dermatitis, Difficulty breathing, Headache, Hives, Itching, Nausea and Vomiting and Shortness Of Breath     Candesartan      Other reaction(s): Myalgia     Duloxetine      Other reaction(s): Headache  migraine     Iodine      Other reaction(s): Other (see comments)  PN: LW CM1: CONTRAST- iodine Reaction :     Metoclopramide      Other reaction(s): Headache  Caused increase in headaches     Perfume      Other reaction(s): Headache  head pain leading to migraines     Pregabalin      Other reaction(s): Headache, Myalgia     Trazodone Other (See Comments) and Unknown     Other reaction(s): Headache  Other reaction(s): Headache  Other reaction(s): Headache     Amitriptyline Hcl Other (See Comments)     Migraine       Candesartan Cilexetil-Hctz Muscle Pain (Myalgia)     Cyproheptadine Hcl      headaches     Desvenlafaxine      Migraine       Diatrizoate Unknown     PN: LW CM1: CONTRAST- iodine  "Reaction :     Diazepam      Other reaction(s): Vestibular Toxicity  PN: LW Reaction: vertigo  PN: LW Reaction: vertigo     Duloxetine Hcl Other (See Comments)     Migraine       Gabapentin Other (See Comments)     Galcanezumab      Other reaction(s): Headache     Imipramine Other (See Comments)     Migraine       Metoprolol Other (See Comments) and Unknown     headache  headache  headache     Morphine Other (See Comments)     Patient reports seizure   Other reaction(s): Unknown  Seizure; 5/111/21 updated info: patient states she had a seizure while having a migraine headache years ago and is not sure if it was due to morphine. She has tolerated Dilaudid since then.       No Clinical Screening - See Comments      PN: LW FI1: lactose intolerance LW FI2: shellfish  PN: LW CM1: CONTRAST- iodine Reaction :  PN: LW Other1: -nka     Nortriptyline Other (See Comments)     Migraine       Phenergan Dm [Promethazine-Dm] Other (See Comments)     seizures     Pregabalin Muscle Pain (Myalgia)     Promethazine      PN: LW Reaction: minimal sizures     Venlafaxine Other (See Comments)     PN: LW Reaction: migraine  PN: LW Reaction: migraine  Migraine       Wellbutrin [Bupropion Hydrobromide] Other (See Comments)     Migraine       Compazine Anxiety     Dihydroergotamine Anxiety and Other (See Comments)     Cardiac symptoms     Droperidol Anxiety     PN: LW Reaction: agitation     Medroxyprogesterone Hives     PN: LW Reaction: nausea     Prochlorperazine Anxiety     PN: LW Reaction: \"can't sit still\"       CURRENT MEDICATIONS    Current Outpatient Medications:      aMILoride (MIDAMOR) 5 MG tablet, Take 7.5 mg by mouth daily 2.5MG in the AM and 5MG in the PM., Disp: , Rfl:      amLODIPine (NORVASC) 10 MG tablet, Take 10 mg by mouth daily, Disp: , Rfl:      amphetamine-dextroamphetamine (ADDERALL) 20 MG tablet, Take 1 tablet (20 mg) by mouth 2 times daily, Disp: 60 tablet, Rfl: 0     B Complex-Biotin-FA (B-COMPLEX PO), Take 1 tablet " by mouth daily, Disp: , Rfl:      botulinum toxin type A (BOTOX) 100 units injection, Inject intramuscular. Every 18 weeks for pelvic floor spasms, Disp: , Rfl:      budesonide (RINOCORT AQUA) 32 MCG/ACT nasal spray, Spray 1 spray into both nostrils daily., Disp: , Rfl:      calcitRIOL (ROCALTROL) 0.25 MCG capsule, Take 0.25 mcg by mouth as needed, Disp: , Rfl:      Calcium-Magnesium-Vitamin D (CITRACAL SLOW RELEASE) 600- MG-MG-UNIT TB24, Take 600 mg by mouth 4 times daily, Disp: , Rfl:      docusate sodium (COLACE) 100 MG capsule, Take 1 capsule by mouth as needed for constipation, Disp: , Rfl:      eletriptan (RELPAX) 40 MG tablet, Take 40 mg by mouth as needed, Disp: , Rfl:      estradiol (ESTRACE) 0.1 MG/GM vaginal cream, , Disp: , Rfl:      fexofenadine (ALLEGRA) 180 MG tablet, Take 180 mg by mouth daily, Disp: , Rfl:      HYDROmorphone (DILAUDID) 2 MG tablet, Take 0.5-1 tablets (1-2 mg) by mouth every 3 hours as needed (headache) 20 tablets = 3 month supply., Disp: 24 tablet, Rfl: 0     ketoconazole (NIZORAL) 2 % external shampoo, Apply topically to affected area(s) once daily if needed for Dry Scalp (itching and flaking scalp). Lather on damp scalp, leave on for 5min, then rinse with water., Disp: , Rfl:      ketorolac (TORADOL) 30 MG/ML injection, Inject 1 mL (30 mg) into the vein every 6 hours as needed for moderate pain, Disp: 6 mL, Rfl: 1     lamoTRIgine (LAMICTAL) 100 MG tablet, Take 1 tablet (100 mg) by mouth daily, Disp: 30 tablet, Rfl: 2     lidocaine (XYLOCAINE) 4 % external solution, Spray 0.5 ml once in each nostril q 4-6 hours as needed for headache. lay down with head tilted back for 5 minutes after if preferred, Disp: 50 mL, Rfl: 6     Magnesium Oxide 500 MG TABS, Take 500 mg by mouth 2 times daily, Disp: , Rfl:      mupirocin (BACTROBAN) 2 % external ointment, Apply topically 3 times daily, Disp: , Rfl:      naloxone (NARCAN) 4 MG/0.1ML nasal spray, Spray 1 spray (4 mg) into one nostril  "alternating nostrils as needed for opioid reversal every 2-3 minutes until assistance arrives, Disp: 0.2 mL, Rfl: 1     nebivolol (BYSTOLIC) 5 MG tablet, Take 5 mg by mouth daily Take twice a day totaling 10 mg., Disp: , Rfl:      Needle, Disp, 25G X 1\" MISC, 2 each as needed (with toradol) 90-day supply, Disp: 6 each, Rfl: 1     ondansetron (ZOFRAN) 4 MG tablet, Take 1 tablet (4 mg) by mouth every 8 hours as needed for nausea, Disp: 30 tablet, Rfl: 1     order for DME, Equipment being ordered: Oxygen The patient has Cluster Headache and needs 10 liters/minute of high flow oxygen via nonrebreather mask prn headaches, Disp: 99 days, Rfl: 0     order for DME, Equipment being ordered: Oxygen and non-rebreather mask.   Use high flow oxygen (10-15 L/min) with non-rebreather mask, as needed for cluster headaches, Disp: 1 Units, Rfl: 11     potassium chloride ER (KLOR-CON M) 20 MEQ CR tablet, Take 1 tablet by mouth 2 times daily, Disp: , Rfl:      QUEtiapine (SEROQUEL) 50 MG tablet, Take 1 tablet (50 mg) by mouth at bedtime, Disp: 30 tablet, Rfl: 2     Syringe, Disposable, (SYRINGE LUER SLIP) 1 ML MISC, 1 each as needed (with toradol/migraine), Disp: 6 each, Rfl: 1     valACYclovir (VALTREX) 1000 mg tablet, Take 1,000 mg by mouth as needed., Disp: , Rfl:     Current Facility-Administered Medications:      botulinum toxin type A (BOTOX) 100 units injection 225 Units, 225 Units, Intramuscular, See Admin Instructions, Standal, Addie Saeed MD, 225 Units at 24 1345       BOTULINUM NEUROTOXIN INJECTION PROCEDURES    VERIFICATION OF PATIENT IDENTIFICATION AND PROCEDURE     Initials   Patient Name SES   Patient  SES   Procedure Verified by: SES     Prior to the start of the procedure and with procedural staff participation, I verbally confirmed the patient s identity using two indicators, relevant allergies, that the procedure was appropriate and matched the consent or emergent situation, and that the correct " equipment/implants were available. Immediately prior to starting the procedure I conducted the Time Out with the procedural staff and re-confirmed the patient s name, procedure, and site/side. (The Joint Commission universal protocol was followed.)  Yes    Sedation (Moderate or Deep): None    ABOVE ASSESSMENTS PERFORMED BY    Addie Malhotra MD      INDICATIONS FOR PROCEDURES  Valerie Sim is a 59 year old patient with pain and jaw clenching secondary to the diagnosis of chronic migraine headaches and oromandibular dystonia. Her baseline symptoms have been recalcitrant to oral medications and conservative therapy.  She is here today for reinjection with Botox.    GOAL OF PROCEDURE  The goal of this procedure is to decrease pain and excessive jaw clenching due to chronic migraine headaches and bruxism.     TOTAL DOSE ADMINISTERED  Dose Administered:  225 units  Botox (Botulinum Toxin Type A)       2:1 Dilution   Unavoidable Drug Waste: Yes  Amount of drug waste (mL): 75 units Botox.  Reason for waste:  Single use vial  Diluent Used:  0.5% bupivacaine  Total Volume of Diluent Used:  4.5 ml  NDC #: Botox 100u ([address]-12)      CONSENT  The risks, benefits, and treatment options were discussed with Valerie Sim and she agreed to proceed.    Written consent was obtained by  [facility] .     EQUIPMENT USED  Needle-25mm stimulating/recording  Needle-30 gauge  EMG/NCS Machine    SKIN PREPARATION  Skin preparation was performed using an alcohol wipe.    GUIDANCE DESCRIPTION  Electro-myographic guidance was necessary throughout the neck and jaw portion of the procedure to accurately identify all areas of dystonic muscles while avoiding injection of non-dystonic muscles, neighboring nerves and nearby vascular structures.       AREA/MUSCLE INJECTED:  225 UNITS BOTOX = TOTAL DOSE, 2:1 DILUTION     1. FACIAL & SCALP MUSCLES: 85 UNITS BOTOX = TOTAL DOSE      Right Frontalis - 10 units of Botox in 2 site/s.  Left Frontalis - 10  units of Botox in 2 site/s.      Procerus - 5 units of Botox at 1 site/s.       Right  - 2.5 units of Botox in 1 site/s.  Left  - 2.5 units of Botox in 1 site/s.      Right Nasalis - 2.5 units of Botox at 1 site/s.           Left Nasalis - 2.5 units of Botox at 1 site/s.     Right Upper Occipitalis - 25 units of Botox at 5 site/s.   Left Upper Occipitalis - 25 units of Botox at 5 site/s.         2. JAW MUSCLES: 90 UNITS BOTOX = TOTAL DOSE     Right Masseter - 20 units of Botox at 2 site/s.   Left Masseter - 20 units of Botox at 2 site/s.      Right Temporalis - 25 units of Botox at 5 site/s.  Left Temporalis - 25 units of Botox at 5 site/s.          3. SHOULDER & NECK MUSCLES: 50 UNITS BOTOX = TOTAL DOSE      Right Levator Scapula - 10 units of Botox at 2 site/s (neck and scapular insertion).  Left Levator Scapula - 10 units of Botox at 2 site/s (neck and scapular insertion).      Right Upper Trapezius (mid & low lateral) - 10 units of Botox at 3 site/s.  Left Upper Trapezius (mid & low lateral) - 10 units of Botox at 3 site/s.             Right Pectoralis Minor - 5 units of Botox at 1 site/s.                     Left Pectoralis Minor - 5 units of Botox at 1 site/s.       PROCEDURE: bilateral greater occipital nerve block. Trigger point injections.     Prior to the start of the procedure and with procedural staff participation, I verbally confirmed the patient s identity using two indicators, relevant allergies, that the procedure was appropriate and matched the consent or emergent situation, and that the correct equipment/implants were available. Immediately prior to starting the procedure I conducted the Time Out with the procedural staff and re-confirmed the patient s name, procedure, and site/side. (The Joint Commission universal protocol was followed.)  Yes    Sedation (Moderate or Deep): None    Dru% lidocaine: 2 ml   0.25% bupivacaine: 7 ml   [place] mg = 1 ml    ONB: Area just  inferior to insertion of the left superior trapezius insertion onto skull was cleansed with ChloraPrep. Needle was advanced anteriorly to base of skull then slightly withdrawn and injectate was injected in a fan-like distribution at different depths. A 10 ml of the above mixture was injected on each side for the total of 5 ml per side.      RESPONSE TO PROCEDURE  Valerie Sim tolerated the procedure well and there were no immediate complications. She was allowed to recover for an appropriate period of time and was discharged home in stable condition.      ASSESSMENT AND PLAN   Botulinum toxin injections: No changes made to Botox dose or distribution today. Occipital nerve blocks were administered to help with occipital headaches/occipital neuralgia. Patient will continue to monitor response and report at next appointment.  Referrals: None.   Follow up: Valerie Sim was rescheduled for the next series of injections in 9 weeks, at which time we will evaluate response to today's injections. She may call the clinic prior with any questions or concerns prior to the next appointment.     Addie Malhotra MD       Again, thank you for allowing me to participate in the care of your patient.      Sincerely,    Addie Malhotra MD

## 2024-08-07 NOTE — PROGRESS NOTES
[facility]     PM&R CLINIC NOTE  BOTULINUM TOXIN PROCEDURE      HPI  Chief Complaint   Patient presents with    Procedure     botox     Valerie Sim is a 59 year old female with a history of chronic migraine headaches who presents to clinic for botulinum toxin injections for management of chronic migraine headaches and excessive jaw clenching.     SINCE LAST VISIT  Valerie Sim was last seen for Botox injections on 6/5/2024, at which time she received 225 units of Botox. She reports that she recently had pelvic floor Botox and pudendal nerve blocks before the migraine Botox/blocks and it has made a positive impact on her pain relief in her shoulders, neck and headaches.     Patient denies new medical diagnoses, illnesses, hospitalizations, emergency room visits, and injuries since the previous injection with botulinum neurotoxin.       RESPONSE TO PREVIOUS TREATMENT    Side effects:  None      1.  Headache frequency during this injection cycle:  5-10 migraine headache days per month throughout the injection cycle. This is compared to her baseline headache frequency of 30 severe headache days per month.     2.  Headache duration during this injection cycle:  Headache duration was between 4 hours and 4 days. Patient reports a few episodes of multiple day headaches during this injection cycle.     3.  Headache intensity during this injection cycle:    A.  5-8/10  =  Typical pain level.  B.  10/10  =  Worst pain level.  C.  4/10  =  Lowest pain level.    4.  Change in headache medication usage during this injection cycle:  (For Example:  Able to decrease use of oral pain medications.) No changes.       5.  ER Visits During This Injection Cycle:  She uses Relpax, Toradol and Zofran as needed for migraine headaches, and oxygen occasionally for cluster headaches. Lidocaine nasal spray is used intermittently for cluster headaches, which is found to be quite helpful.      6.   Functional Performance:  Change in ADL's, social interaction, days lost from work, etc. Patient reports being able to more fully participate in social and family activities and responsibilities as headache symptoms have improved. She is now changing her perspectives on pain expectations of severity and length after onset. She also started a variety of different yogas.       PHYSICAL EXAM  VS: /79   Pulse 80   Resp 16   SpO2 97%    GEN: Pleasant and cooperative, in no acute distress  HEENT: No facial asymmetry    ALLERGIES  Allergies   Allergen Reactions    Fluoxetine Other (See Comments)     PN: LW Reaction: headache  PN: LW Reaction: headache  Migraine      Garlic Blisters, Cough, Dermatitis, Difficulty breathing, Headache, Hives, Itching, Nausea and Vomiting and Shortness Of Breath    Candesartan      Other reaction(s): Myalgia    Duloxetine      Other reaction(s): Headache  migraine    Iodine      Other reaction(s): Other (see comments)  PN: LW CM1: CONTRAST- iodine Reaction :    Metoclopramide      Other reaction(s): Headache  Caused increase in headaches    Perfume      Other reaction(s): Headache  head pain leading to migraines    Pregabalin      Other reaction(s): Headache, Myalgia    Trazodone Other (See Comments) and Unknown     Other reaction(s): Headache  Other reaction(s): Headache  Other reaction(s): Headache    Amitriptyline Hcl Other (See Comments)     Migraine      Candesartan Cilexetil-Hctz Muscle Pain (Myalgia)    Cyproheptadine Hcl      headaches    Desvenlafaxine      Migraine      Diatrizoate Unknown     PN: LW CM1: CONTRAST- iodine Reaction :    Diazepam      Other reaction(s): Vestibular Toxicity  PN: LW Reaction: vertigo  PN: LW Reaction: vertigo    Duloxetine Hcl Other (See Comments)     Migraine      Gabapentin Other (See Comments)    Galcanezumab      Other reaction(s): Headache    Imipramine Other (See Comments)     Migraine      Metoprolol Other (See Comments) and Unknown      "headache  headache  headache    Morphine Other (See Comments)     Patient reports seizure   Other reaction(s): Unknown  Seizure; 5/111/21 updated info: patient states she had a seizure while having a migraine headache years ago and is not sure if it was due to morphine. She has tolerated Dilaudid since then.      No Clinical Screening - See Comments      PN: LW FI1: lactose intolerance LW FI2: shellfish  PN: LW CM1: CONTRAST- iodine Reaction :  PN: LW Other1: -nka    Nortriptyline Other (See Comments)     Migraine      Phenergan Dm [Promethazine-Dm] Other (See Comments)     seizures    Pregabalin Muscle Pain (Myalgia)    Promethazine      PN: LW Reaction: minimal sizures    Venlafaxine Other (See Comments)     PN: LW Reaction: migraine  PN: LW Reaction: migraine  Migraine      Wellbutrin [Bupropion Hydrobromide] Other (See Comments)     Migraine      Compazine Anxiety    Dihydroergotamine Anxiety and Other (See Comments)     Cardiac symptoms    Droperidol Anxiety     PN: LW Reaction: agitation    Medroxyprogesterone Hives     PN: LW Reaction: nausea    Prochlorperazine Anxiety     PN: LW Reaction: \"can't sit still\"       CURRENT MEDICATIONS    Current Outpatient Medications:     aMILoride (MIDAMOR) 5 MG tablet, Take 7.5 mg by mouth daily 2.5MG in the AM and 5MG in the PM., Disp: , Rfl:     amLODIPine (NORVASC) 10 MG tablet, Take 10 mg by mouth daily, Disp: , Rfl:     amphetamine-dextroamphetamine (ADDERALL) 20 MG tablet, Take 1 tablet (20 mg) by mouth 2 times daily, Disp: 60 tablet, Rfl: 0    B Complex-Biotin-FA (B-COMPLEX PO), Take 1 tablet by mouth daily, Disp: , Rfl:     botulinum toxin type A (BOTOX) 100 units injection, Inject intramuscular. Every 18 weeks for pelvic floor spasms, Disp: , Rfl:     budesonide (RINOCORT AQUA) 32 MCG/ACT nasal spray, Spray 1 spray into both nostrils daily., Disp: , Rfl:     calcitRIOL (ROCALTROL) 0.25 MCG capsule, Take 0.25 mcg by mouth as needed, Disp: , Rfl:     " "Calcium-Magnesium-Vitamin D (CITRACAL SLOW RELEASE) 600- MG-MG-UNIT TB24, Take 600 mg by mouth 4 times daily, Disp: , Rfl:     docusate sodium (COLACE) 100 MG capsule, Take 1 capsule by mouth as needed for constipation, Disp: , Rfl:     eletriptan (RELPAX) 40 MG tablet, Take 40 mg by mouth as needed, Disp: , Rfl:     estradiol (ESTRACE) 0.1 MG/GM vaginal cream, , Disp: , Rfl:     fexofenadine (ALLEGRA) 180 MG tablet, Take 180 mg by mouth daily, Disp: , Rfl:     HYDROmorphone (DILAUDID) 2 MG tablet, Take 0.5-1 tablets (1-2 mg) by mouth every 3 hours as needed (headache) 20 tablets = 3 month supply., Disp: 24 tablet, Rfl: 0    ketoconazole (NIZORAL) 2 % external shampoo, Apply topically to affected area(s) once daily if needed for Dry Scalp (itching and flaking scalp). Lather on damp scalp, leave on for 5min, then rinse with water., Disp: , Rfl:     ketorolac (TORADOL) 30 MG/ML injection, Inject 1 mL (30 mg) into the vein every 6 hours as needed for moderate pain, Disp: 6 mL, Rfl: 1    lamoTRIgine (LAMICTAL) 100 MG tablet, Take 1 tablet (100 mg) by mouth daily, Disp: 30 tablet, Rfl: 2    lidocaine (XYLOCAINE) 4 % external solution, Spray 0.5 ml once in each nostril q 4-6 hours as needed for headache. lay down with head tilted back for 5 minutes after if preferred, Disp: 50 mL, Rfl: 6    Magnesium Oxide 500 MG TABS, Take 500 mg by mouth 2 times daily, Disp: , Rfl:     mupirocin (BACTROBAN) 2 % external ointment, Apply topically 3 times daily, Disp: , Rfl:     naloxone (NARCAN) 4 MG/0.1ML nasal spray, Spray 1 spray (4 mg) into one nostril alternating nostrils as needed for opioid reversal every 2-3 minutes until assistance arrives, Disp: 0.2 mL, Rfl: 1    nebivolol (BYSTOLIC) 5 MG tablet, Take 5 mg by mouth daily Take twice a day totaling 10 mg., Disp: , Rfl:     Needle, Disp, 25G X 1\" MISC, 2 each as needed (with toradol) 90-day supply, Disp: 6 each, Rfl: 1    ondansetron (ZOFRAN) 4 MG tablet, Take 1 tablet (4 " mg) by mouth every 8 hours as needed for nausea, Disp: 30 tablet, Rfl: 1    order for DME, Equipment being ordered: Oxygen The patient has Cluster Headache and needs 10 liters/minute of high flow oxygen via nonrebreather mask prn headaches, Disp: 99 days, Rfl: 0    order for DME, Equipment being ordered: Oxygen and non-rebreather mask.   Use high flow oxygen (10-15 L/min) with non-rebreather mask, as needed for cluster headaches, Disp: 1 Units, Rfl: 11    potassium chloride ER (KLOR-CON M) 20 MEQ CR tablet, Take 1 tablet by mouth 2 times daily, Disp: , Rfl:     QUEtiapine (SEROQUEL) 50 MG tablet, Take 1 tablet (50 mg) by mouth at bedtime, Disp: 30 tablet, Rfl: 2    Syringe, Disposable, (SYRINGE LUER SLIP) 1 ML MISC, 1 each as needed (with toradol/migraine), Disp: 6 each, Rfl: 1    valACYclovir (VALTREX) 1000 mg tablet, Take 1,000 mg by mouth as needed., Disp: , Rfl:     Current Facility-Administered Medications:     botulinum toxin type A (BOTOX) 100 units injection 225 Units, 225 Units, Intramuscular, See Admin Instructions, [place], Addie Saeed MD, 225 Units at 24 1345       BOTULINUM NEUROTOXIN INJECTION PROCEDURES    VERIFICATION OF PATIENT IDENTIFICATION AND PROCEDURE     Initials   Patient Name SES   Patient  SES   Procedure Verified by: SES     Prior to the start of the procedure and with procedural staff participation, I verbally confirmed the patient s identity using two indicators, relevant allergies, that the procedure was appropriate and matched the consent or emergent situation, and that the correct equipment/implants were available. Immediately prior to starting the procedure I conducted the Time Out with the procedural staff and re-confirmed the patient s name, procedure, and site/side. (The Joint Commission universal protocol was followed.)  Yes    Sedation (Moderate or Deep): None    ABOVE ASSESSMENTS PERFORMED BY    Addie Malhotra MD      INDICATIONS FOR PROCEDURES  Valerie Sim  is a 59 year old patient with pain and jaw clenching secondary to the diagnosis of chronic migraine headaches and oromandibular dystonia. Her baseline symptoms have been recalcitrant to oral medications and conservative therapy.  She is here today for reinjection with Botox.    GOAL OF PROCEDURE  The goal of this procedure is to decrease pain and excessive jaw clenching due to chronic migraine headaches and bruxism.     TOTAL DOSE ADMINISTERED  Dose Administered:  225 units  Botox (Botulinum Toxin Type A)       2:1 Dilution   Unavoidable Drug Waste: Yes  Amount of drug waste (mL): 75 units Botox.  Reason for waste:  Single use vial  Diluent Used:  0.5% bupivacaine  Total Volume of Diluent Used:  4.5 ml  NDC #: Botox 100u ([address]-59)      CONSENT  The risks, benefits, and treatment options were discussed with Valerie Sim and she agreed to proceed.    Written consent was obtained by  [facility] .     EQUIPMENT USED  Needle-25mm stimulating/recording  Needle-30 gauge  EMG/NCS Machine    SKIN PREPARATION  Skin preparation was performed using an alcohol wipe.    GUIDANCE DESCRIPTION  Electro-myographic guidance was necessary throughout the neck and jaw portion of the procedure to accurately identify all areas of dystonic muscles while avoiding injection of non-dystonic muscles, neighboring nerves and nearby vascular structures.       AREA/MUSCLE INJECTED:  225 UNITS BOTOX = TOTAL DOSE, 2:1 DILUTION     1. FACIAL & SCALP MUSCLES: 85 UNITS BOTOX = TOTAL DOSE      Right Frontalis - 10 units of Botox in 2 site/s.  Left Frontalis - 10 units of Botox in 2 site/s.      Procerus - 5 units of Botox at 1 site/s.       Right  - 2.5 units of Botox in 1 site/s.  Left  - 2.5 units of Botox in 1 site/s.      Right Nasalis - 2.5 units of Botox at 1 site/s.           Left Nasalis - 2.5 units of Botox at 1 site/s.     Right Upper Occipitalis - 25 units of Botox at 5 site/s.   Left Upper Occipitalis - 25 units of Botox  at 5 site/s.         2. JAW MUSCLES: 90 UNITS BOTOX = TOTAL DOSE     Right Masseter - 20 units of Botox at 2 site/s.   Left Masseter - 20 units of Botox at 2 site/s.      Right Temporalis - 25 units of Botox at 5 site/s.  Left Temporalis - 25 units of Botox at 5 site/s.          3. SHOULDER & NECK MUSCLES: 50 UNITS BOTOX = TOTAL DOSE      Right Levator Scapula - 10 units of Botox at 2 site/s (neck and scapular insertion).  Left Levator Scapula - 10 units of Botox at 2 site/s (neck and scapular insertion).      Right Upper Trapezius (mid & low lateral) - 10 units of Botox at 3 site/s.  Left Upper Trapezius (mid & low lateral) - 10 units of Botox at 3 site/s.             Right Pectoralis Minor - 5 units of Botox at 1 site/s.                     Left Pectoralis Minor - 5 units of Botox at 1 site/s.       PROCEDURE: Bilateral greater occipital nerve blocks.      Prior to the start of the procedure and with procedural staff participation, I verbally confirmed the patient s identity using two indicators, relevant allergies, that the procedure was appropriate and matched the consent or emergent situation, and that the correct equipment/implants were available. Immediately prior to starting the procedure I conducted the Time Out with the procedural staff and re-confirmed the patient s name, procedure, and site/side. (The Joint Commission universal protocol was followed.)  Yes    Sedation (Moderate or Deep): None    Dru% lidocaine: 2 ml   0.25% bupivacaine: 7 ml   [place] mg = 1 ml      Area just inferior to insertion of the right and left superior trapezius insertion onto skull was cleansed with ChloraPrep. Needle was advanced anteriorly to base of skull then slightly withdrawn and injectate was injected in a fan-like distribution at different depths. A 10 ml mixture of 1% lidocaine, 0.5% bupivacaine and Kenalog was divided into two 5 ml syringes. Total injection volume = 5 ml per side.       RESPONSE TO  PROCEDURE  Valerie Sim tolerated the procedure well and there were no immediate complications. She was allowed to recover for an appropriate period of time and was discharged home in stable condition.      ASSESSMENT AND PLAN   Botulinum toxin injections: No changes made to Botox dose or distribution today. Occipital nerve blocks were administered to help with occipital headaches/occipital neuralgia. Patient will continue to monitor response and report at next appointment.  Referrals: None.   Follow up: Valerie Sim was rescheduled for the next series of injections in 9 weeks, at which time we will evaluate response to today's injections. She may call the clinic prior with any questions or concerns prior to the next appointment.     Addie Malhotra MD
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
## 2024-08-13 ENCOUNTER — VIRTUAL VISIT (OUTPATIENT)
Dept: PSYCHIATRY | Facility: CLINIC | Age: 59
End: 2024-08-13
Attending: PSYCHIATRY & NEUROLOGY
Payer: COMMERCIAL

## 2024-08-13 DIAGNOSIS — F33.1 MODERATE EPISODE OF RECURRENT MAJOR DEPRESSIVE DISORDER (H): Primary | ICD-10-CM

## 2024-08-13 DIAGNOSIS — F07.81 POST CONCUSSION SYNDROME: ICD-10-CM

## 2024-08-13 DIAGNOSIS — F41.1 GENERALIZED ANXIETY DISORDER: ICD-10-CM

## 2024-08-13 PROCEDURE — 90833 PSYTX W PT W E/M 30 MIN: CPT | Mod: 95

## 2024-08-13 PROCEDURE — 99214 OFFICE O/P EST MOD 30 MIN: CPT | Mod: 95

## 2024-08-13 RX ORDER — LAMOTRIGINE 100 MG/1
100 TABLET ORAL DAILY
Qty: 30 TABLET | Refills: 2 | Status: SHIPPED | OUTPATIENT
Start: 2024-08-13 | End: 2024-10-01

## 2024-08-13 RX ORDER — QUETIAPINE FUMARATE 50 MG/1
50 TABLET, FILM COATED ORAL AT BEDTIME
Qty: 30 TABLET | Refills: 2 | Status: SHIPPED | OUTPATIENT
Start: 2024-08-13 | End: 2024-10-01

## 2024-08-13 ASSESSMENT — ANXIETY QUESTIONNAIRES
GAD7 TOTAL SCORE: 8
6. BECOMING EASILY ANNOYED OR IRRITABLE: SEVERAL DAYS
IF YOU CHECKED OFF ANY PROBLEMS ON THIS QUESTIONNAIRE, HOW DIFFICULT HAVE THESE PROBLEMS MADE IT FOR YOU TO DO YOUR WORK, TAKE CARE OF THINGS AT HOME, OR GET ALONG WITH OTHER PEOPLE: SOMEWHAT DIFFICULT
5. BEING SO RESTLESS THAT IT IS HARD TO SIT STILL: SEVERAL DAYS
4. TROUBLE RELAXING: MORE THAN HALF THE DAYS
1. FEELING NERVOUS, ANXIOUS, OR ON EDGE: MORE THAN HALF THE DAYS
7. FEELING AFRAID AS IF SOMETHING AWFUL MIGHT HAPPEN: NOT AT ALL
2. NOT BEING ABLE TO STOP OR CONTROL WORRYING: SEVERAL DAYS
3. WORRYING TOO MUCH ABOUT DIFFERENT THINGS: SEVERAL DAYS
GAD7 TOTAL SCORE: 8
8. IF YOU CHECKED OFF ANY PROBLEMS, HOW DIFFICULT HAVE THESE MADE IT FOR YOU TO DO YOUR WORK, TAKE CARE OF THINGS AT HOME, OR GET ALONG WITH OTHER PEOPLE?: SOMEWHAT DIFFICULT
7. FEELING AFRAID AS IF SOMETHING AWFUL MIGHT HAPPEN: NOT AT ALL
GAD7 TOTAL SCORE: 8

## 2024-08-13 ASSESSMENT — PAIN SCALES - GENERAL: PAINLEVEL: SEVERE PAIN (7)

## 2024-08-13 NOTE — PATIENT INSTRUCTIONS
**For crisis resources, please see the information at the end of this document**   Patient Education    Thank you for coming to the [facility] [place] [facility].     Lab Testing:  If you had lab testing today and your results are reassuring or normal they will be mailed to you or sent through [employer] within 7 days. If the lab tests need quick action we will call you with the results. The phone number we will call with results is # [phone]. If this is not the best number please call our clinic and change the number.     Medication Refills:  If you need any refills please call your pharmacy and they will contact us. Our fax number for refills is [phone].   Three business days of notice are needed for general medication refill requests.   Five business days of notice are needed for controlled substance refill requests.   If you need to change to a different pharmacy, please contact the new pharmacy directly. The new pharmacy will help you get your medications transferred.     Contact Us:  Please call [phone] during business hours (8-5:00 M-F).   If you have medication related questions after clinic hours, or on the weekend, please call [phone].     Financial Assistance [phone]   Medical Records [phone]       MENTAL HEALTH CRISIS RESOURCES:  For a emergency help, please call 911 or go to the nearest Emergency Department.     Emergency Walk-In Options:   Em[facility] Unit @ [facility] ([place]): [phone] - Specialized mental health emergency area designed to be calming  [facility] ([place]): [phone]  [facility] ([place]): [phone]  [facility]): [phone]    [facility] Crisis Information:   [place]: [phone]  [place]: [phone]  [place] (MARTH[place]) - Adult: [phone]     Child: [phone]  [place] - Adult: [phone]     Child: [phone]  [place]:  [phone]  List of all [facility] resources:   https://[place].gov/dhs/people-we-serve/adults/health-care/mental-health/resources/crisis-contacts.jsp    National Crisis Information:   Crisis Text Line: Text  [place]  to 398062  Suicide & Crisis Lifeline: 988  National Suicide Prevention Lifeline: 3-366-833-TALK (1-120.885.2519)       For online chat options, visit https://suicidepreventionlifeline.org/chat/  Poison Control Center: 3-703-816-4314  Trans Lifeline: 5-076-944-3044 - Hotline for transgender people of all ages  The Will Project: 1-249-235-8041 - Hotline for LGBT youth     For Non-Emergency Support:   Fast Tracker: Mental Health & Substance Use Disorder Resources -   https://www.[employer].org/

## 2024-08-13 NOTE — NURSING NOTE
Current patient location: [address] [place] [address] N  [facility] [address]    Is the patient currently in the state of [place]? YES    Visit mode:VIDEO    If the visit is dropped, the patient can be reconnected by: VIDEO VISIT: Send to e-mail at: [email or web address] Left    Will anyone else be joining the visit? NO  (If patient encounters technical issues they should call [phone]    How would you like to obtain your AVS? MyChart    Are changes needed to the allergy or medication list? No    Are refills needed on medications prescribed by this physician? NO    Rooming Documentation:  Assigned questionnaire(s) completed      Reason for visit: PABLO LEALF

## 2024-08-19 ENCOUNTER — TRANSFERRED RECORDS (OUTPATIENT)
Dept: HEALTH INFORMATION MANAGEMENT | Facility: CLINIC | Age: 59
End: 2024-08-19

## 2024-08-22 ENCOUNTER — OFFICE VISIT (OUTPATIENT)
Dept: FAMILY MEDICINE | Facility: CLINIC | Age: 59
End: 2024-08-22
Payer: COMMERCIAL

## 2024-08-22 VITALS
TEMPERATURE: 97.9 F | OXYGEN SATURATION: 98 % | HEIGHT: 67 IN | HEART RATE: 91 BPM | DIASTOLIC BLOOD PRESSURE: 74 MMHG | WEIGHT: 174 LBS | RESPIRATION RATE: 16 BRPM | SYSTOLIC BLOOD PRESSURE: 129 MMHG | BODY MASS INDEX: 27.31 KG/M2

## 2024-08-22 DIAGNOSIS — I10 ESSENTIAL HYPERTENSION: ICD-10-CM

## 2024-08-22 DIAGNOSIS — M62.89 PELVIC FLOOR DYSFUNCTION: ICD-10-CM

## 2024-08-22 DIAGNOSIS — G89.4 CHRONIC PAIN SYNDROME: ICD-10-CM

## 2024-08-22 DIAGNOSIS — S06.9X1S TRAUMATIC BRAIN INJURY, WITH LOSS OF CONSCIOUSNESS OF 30 MINUTES OR LESS, SEQUELA (H): ICD-10-CM

## 2024-08-22 DIAGNOSIS — Z98.890 HISTORY OF PARATHYROIDECTOMY: ICD-10-CM

## 2024-08-22 DIAGNOSIS — F41.1 GAD (GENERALIZED ANXIETY DISORDER): Primary | ICD-10-CM

## 2024-08-22 DIAGNOSIS — F33.0 MILD EPISODE OF RECURRENT MAJOR DEPRESSIVE DISORDER (H): ICD-10-CM

## 2024-08-22 DIAGNOSIS — F07.81 POSTCONCUSSION SYNDROME: ICD-10-CM

## 2024-08-22 DIAGNOSIS — Z90.89 HISTORY OF PARATHYROIDECTOMY: ICD-10-CM

## 2024-08-22 DIAGNOSIS — R41.3 MEMORY DIFFICULTY: ICD-10-CM

## 2024-08-22 DIAGNOSIS — F11.90 CHRONIC, CONTINUOUS USE OF OPIOIDS: ICD-10-CM

## 2024-08-22 PROCEDURE — 99214 OFFICE O/P EST MOD 30 MIN: CPT | Performed by: INTERNAL MEDICINE

## 2024-08-22 ASSESSMENT — PAIN SCALES - GENERAL: PAINLEVEL: SEVERE PAIN (6)

## 2024-08-22 NOTE — PATIENT INSTRUCTIONS
At [facility], we strive to deliver an exceptional experience to you, every time we see you. If you receive a survey, please let us know what we are doing well and/or what we could improve upon, as we do value your feedback.  If you have MyChart, you can expect to receive results automatically within 24 hours of their completion.  Your provider will send a note interpreting your results as well.   If you do not have MyChart, you should receive your results in about a week by mail.    Your care team:                            Family Medicine Internal Medicine   MD Jorge Ruiz, MD Oxana Bunch, MD Jeronimo Amaral, MD Constanza Verduzco, PADoreneC    Ruel Garcia, MD Pediatrics   Rosanna Ghosh, MD Sallie Chong, MD Alba Crooks, APRN CNP Niru Powell APRN CNP   MD Moraima Rojas, MD Jaymie Parra, CNP     Marco Conrad, CNP Same-Day Provider (No follow-up visits)   ADORE Heredia, DNP Sharlene Jose, ADORE Rosa, FNP, BC ABIODUN LopezC     Clinic hours: Monday - Thursday 7 am-6 pm; Fridays 7 am-5 pm.   Urgent care: Monday - Friday 10 am- 8 pm; Saturday and Sunday 9 am-5 pm.    Clinic: [phone]       [facility]: Monday - Thursday 8 am - 7 pm; Friday 8 am - 6 pm  [facility]: [phone]
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
## 2024-08-22 NOTE — LETTER
Opioid / Opioid Plus Controlled Substance Agreement    This is an agreement between you and your provider about the safe and appropriate use of controlled substance/opioids prescribed by your care team. Controlled substances are medicines that can cause physical and mental dependence (abuse).    There are strict laws about having and using these medicines. We here at [facility] are committing to working with you in your efforts to get better. To support you in this work, we ll help you schedule regular office appointments for medicine refills. If we must cancel or change your appointment for any reason, we ll make sure you have enough medicine to last until your next appointment.     As a Provider, I will:  Listen carefully to your concerns and treat you with respect.   Recommend a treatment plan that I believe is in your best interest. This plan may involve therapies other than opioid pain medication.   Talk with you often about the possible benefits, and the risk of harm of any medicine that we prescribe for you.   Provide a plan on how to taper (discontinue or go off) using this medicine if the decision is made to stop its use.    As a Patient, I understand that opioid(s):   Are a controlled substance prescribed by my care team to help me function or work and manage my condition(s).   Are strong medicines and can cause serious side effects such as:  Drowsiness, which can seriously affect my driving ability  A lower breathing rate, enough to cause death  Harm to my thinking ability   Depression   Abuse of and addiction to this medicine  Need to be taken exactly as prescribed. Combining opioids with certain medicines or chemicals (such as illegal drugs, sedatives, sleeping pills, and benzodiazepines) can be dangerous or even fatal. If I stop opioids suddenly, I may have severe withdrawal symptoms.  Do not work for all types of pain nor for all patients. If they re not helpful, I may be asked to stop  them.        The risks, benefits and side effects of these medicine(s) were explained to me. I agree that:  I will take part in other treatments as advised by my care team. This may be psychiatry or counseling, physical therapy, behavioral therapy, group treatment or a referral to a specialist.     I will keep all my appointments. I understand that this is part of the monitoring of opioids. My care team may require an office visit for EVERY opioid/controlled substance refill. If I miss appointments or don t follow instructions, my care team may stop my medicine.    I will take my medicines as prescribed. I will not change the dose or schedule unless my care team tells me to. There will be no refills if I run out early.     I may be asked to come to the clinic and complete a urine drug test or complete a pill count at any time. If I don t give a urine sample or participate in a pill count, the care team may stop my medicine.    I will only receive prescriptions from this clinic for chronic pain. If I am treated by another provider for acute pain issues, I will tell them that I am taking opioid pain medication for chronic pain and that I have a treatment agreement with this provider. I will inform my [facility] care team within one business day if I am given a prescription for any pain medication by another healthcare provider. My [facility] care team can contact other providers and pharmacists about my use of any medicines.    It is up to me to make sure that I don t run out of my medicines on weekends or holidays. If my care team is willing to refill my opioid prescription without a visit, I must request refills only during office hours. Refills may take up to 3 business days to process. I will use one pharmacy to fill all my opioid and other controlled substance prescriptions. I will notify the clinic about any changes to my insurance or medication availability.    I am responsible for my  prescriptions. If the medicine/prescription is lost, stolen or destroyed, it will not be replaced. I also agree not to share controlled substance medicines with anyone.    I am aware I should not use any illegal or recreational drugs. I agree not to drink alcohol unless my care team says I can.       If I enroll in the [place] [facility] Cannabis program, I will tell my care team prior to my next refill.     I will tell my care team right away if I become pregnant, have a new medical problem treated outside of my regular clinic, or have a change in my medications.    I understand that this medicine can affect my thinking, judgment and reaction time. Alcohol and drugs affect the brain and body, which can affect the safety of my driving. Being under the influence of alcohol or drugs can affect my decision-making, behaviors, personal safety, and the safety of others. Driving while impaired (DWI) can occur if a person is driving, operating, or in physical control of a car, motorcycle, boat, snowmobile, ATV, motorbike, off-road vehicle, or any other motor vehicle ([place] Statute 169A.20). I understand the risk if I choose to drive or operate any vehicle or machinery.    I understand that if I do not follow any of the conditions above, my prescriptions or treatment may be stopped or changed.          Opioids  What You Need to Know    What are opioids?   Opioids are pain medicines that must be prescribed by a doctor. They are also known as narcotics.     Examples are:   morphine (MS Contin, Melly)  oxycodone (Oxycontin)  oxycodone and acetaminophen (Percocet)  hydrocodone and acetaminophen (Vicodin, Norco)   fentanyl patch (Duragesic)   hydromorphone (Dilaudid)   methadone  codeine (Tylenol #3)     What do opioids do well?   Opioids are best for severe short-term pain such as after a surgery or injury. They may work well for cancer pain. They may help some people with long-lasting (chronic) pain.     What do opioids NOT do  well?   Opioids never get rid of pain entirely, and they don t work well for most patients with chronic pain. Opioids don t reduce swelling, one of the causes of pain.                                    Other ways to manage chronic pain and improve function include:     Treat the health problem that may be causing pain  Anti-inflammation medicines, which reduce swelling and tenderness, such as ibuprofen (Advil, Motrin) or naproxen (Aleve)  Acetaminophen (Tylenol)  Antidepressants and anti-seizure medicines, especially for nerve pain  Topical treatments such as patches or creams  Injections or nerve blocks  Chiropractic or osteopathic treatment  Acupuncture, massage, deep breathing, meditation, visual imagery, aromatherapy  Use heat or ice at the pain site  Physical therapy   Exercise  Stop smoking  Take part in therapy       Risks and side effects     Talk to your doctor before you start or decide to keep taking opioids. Possible side effects include:    Lowering your breathing rate enough to cause death  Overdose, including death, especially if taking higher than prescribed doses  Worse depression symptoms; less pleasure in things you usually enjoy  Feeling tired or sluggish  Slower thoughts or cloudy thinking  Being more sensitive to pain over time; pain is harder to control  Trouble sleeping or restless sleep  Changes in hormone levels (for example, less testosterone)  Changes in sex drive or ability to have sex  Constipation  Unsafe driving  Itching and sweating  Dizziness  Nausea, throwing up and dry mouth    What else should I know about opioids?    Opioids may lead to dependence, tolerance, or addiction.    Dependence means that if you stop or reduce the medicine too quickly, you will have withdrawal symptoms. These include loose poop (diarrhea), jitters, flu-like symptoms, nervousness and tremors. Dependence is not the same as addiction.                     Tolerance means needing higher doses over time to  get the same effect. This may increase the chance of serious side effects.    Addiction is when people improperly use a substance that harms their body, their mind or their relations with others. Use of opiates can cause a relapse of addiction if you have a history of drug or alcohol abuse.    People who have used opioids for a long time may have a lower quality of life, worse depression, higher levels of pain and more visits to doctors.    You can overdose on opioids. Take these steps to lower your risk of overdose:    Recognize the signs:  Signs of overdose include decrease or loss of consciousness (blackout), slowed breathing, trouble waking up and blue lips. If someone is worried about overdose, they should call 911.    Talk to your doctor about Narcan (naloxone).   If you are at risk for overdose, you may be given a prescription for Narcan. This medicine very quickly reverses the effects of opioids.   If you overdose, a friend or family member can give you Narcan while waiting for the ambulance. They need to know the signs of overdose and how to give Narcan.     Don't use alcohol or street drugs.   Taking them with opioids can cause death.    Do not take any of these medicines unless your doctor says it s OK. Taking these with opioids can cause death:  Benzodiazepines, such as lorazepam (Ativan), alprazolam (Xanax) or diazepam (Valium)  Muscle relaxers, such as cyclobenzaprine (Flexeril)  Sleeping pills like zolpidem (Ambien)   Other opioids      How to keep you and other people safe while taking opioids:    Never share your opioids with others.  Opioid medicines are regulated by the Drug Enforcement Agency ([place]). Selling or sharing medications is a criminal act.    2. Be sure to store opioids in a secure place, locked up if possible. Young children can easily swallow them and overdose.    3. When you are traveling with your medicines, keep them in the original bottles. If you use a pill box, be sure you also  carry a copy of your medicine list from your clinic or pharmacy.    4. Safe disposal of opioids    Most pharmacies have places to get rid of medicine, called disposal kiosks. Medicine disposal options are also available in every [facility]. Search your county and  medication disposal  to find more options. You can find more details at:  https://www.pca.[facility].[place]./living-green/managing-unwanted-medications     I agree that my provider, clinic care team, and pharmacy may work with any city, state or federal law enforcement agency that investigates the misuse, sale, or other diversion of my controlled medicine. I will allow my provider to discuss my care with, or share a copy of, this agreement with any other treating provider, pharmacy or emergency room where I receive care.    I have read this agreement and have asked questions about anything I did not understand.    _______________________________________________________  Patient Signature - Valerie Sim _____________________                   Date     _______________________________________________________  Provider Signature - Jeronimo Amaral MD   _____________________                   Date     _______________________________________________________  Witness Signature (required if provider not present while patient signing)   _____________________                   Date

## 2024-08-25 ASSESSMENT — ANXIETY QUESTIONNAIRES
5. BEING SO RESTLESS THAT IT IS HARD TO SIT STILL: NEARLY EVERY DAY
6. BECOMING EASILY ANNOYED OR IRRITABLE: NEARLY EVERY DAY
7. FEELING AFRAID AS IF SOMETHING AWFUL MIGHT HAPPEN: NOT AT ALL
IF YOU CHECKED OFF ANY PROBLEMS ON THIS QUESTIONNAIRE, HOW DIFFICULT HAVE THESE PROBLEMS MADE IT FOR YOU TO DO YOUR WORK, TAKE CARE OF THINGS AT HOME, OR GET ALONG WITH OTHER PEOPLE: SOMEWHAT DIFFICULT
GAD7 TOTAL SCORE: 12
8. IF YOU CHECKED OFF ANY PROBLEMS, HOW DIFFICULT HAVE THESE MADE IT FOR YOU TO DO YOUR WORK, TAKE CARE OF THINGS AT HOME, OR GET ALONG WITH OTHER PEOPLE?: SOMEWHAT DIFFICULT
3. WORRYING TOO MUCH ABOUT DIFFERENT THINGS: SEVERAL DAYS
1. FEELING NERVOUS, ANXIOUS, OR ON EDGE: MORE THAN HALF THE DAYS
GAD7 TOTAL SCORE: 12
7. FEELING AFRAID AS IF SOMETHING AWFUL MIGHT HAPPEN: NOT AT ALL
GAD7 TOTAL SCORE: 12
2. NOT BEING ABLE TO STOP OR CONTROL WORRYING: SEVERAL DAYS
4. TROUBLE RELAXING: MORE THAN HALF THE DAYS

## 2024-08-25 ASSESSMENT — PATIENT HEALTH QUESTIONNAIRE - PHQ9
SUM OF ALL RESPONSES TO PHQ QUESTIONS 1-9: 10
10. IF YOU CHECKED OFF ANY PROBLEMS, HOW DIFFICULT HAVE THESE PROBLEMS MADE IT FOR YOU TO DO YOUR WORK, TAKE CARE OF THINGS AT HOME, OR GET ALONG WITH OTHER PEOPLE: SOMEWHAT DIFFICULT
SUM OF ALL RESPONSES TO PHQ QUESTIONS 1-9: 10

## 2024-08-26 ENCOUNTER — VIRTUAL VISIT (OUTPATIENT)
Dept: PSYCHOLOGY | Facility: CLINIC | Age: 59
End: 2024-08-26
Payer: COMMERCIAL

## 2024-08-26 DIAGNOSIS — F41.1 GAD (GENERALIZED ANXIETY DISORDER): Primary | ICD-10-CM

## 2024-08-26 PROCEDURE — 90834 PSYTX W PT 45 MINUTES: CPT | Mod: 95 | Performed by: MARRIAGE & FAMILY THERAPIST

## 2024-08-26 NOTE — PROGRESS NOTES
Answers submitted by the patient for this visit:  Patient Health Questionnaire (Submitted on 2024)  If you checked off any problems, how difficult have these problems made it for you to do your work, take care of things at home, or get along with other people?: Somewhat difficult  PHQ9 TOTAL SCORE: 10  Patient Health Questionnaire (G7) (Submitted on 2024)  MAHAMED 7 TOTAL SCORE: 12    Answers submitted by the patient for this visit:  Patient Health Questionnaire (Submitted on 2024)  If you checked off any problems, how difficult have these problems made it for you to do your work, take care of things at home, or get along with other people?: Somewhat difficult  PHQ9 TOTAL SCORE: 10  MAHAMED-7 (Submitted on 2024)  MAHAMED 7 TOTAL SCORE: 6        [facility]                                     Progress Note    Patient Name: Valerie Sim  Date:  24         Service Type: Individual  Session Start Time: 8:04a   Session End Time: 8:54a  Session Length: 50  Session #: 29    Attendees: Client     Service Modality:  Video Visit:      Provider verified identity through the following two step process.  Patient provided:  Patient     Telemedicine Visit: The patient's condition can be safely assessed and treated via synchronous audio and visual telemedicine encounter.      Reason for Telemedicine Visit: Services only offered telehealth    Originating Site (Patient Location): Patient's home    Distant Site (Provider Location): Provider Remote Setting- Home Office    Consent:  The patient/guardian has verbally consented to: the potential risks and benefits of telemedicine (video visit) versus in person care; bill my insurance or make self-payment for services provided; and responsibility for payment of non-covered services.     Patient would like the video invitation sent by:  Send to e-mail at: [email or web address]    Mode of Communication:  Video Conference via [facility]    Distant Location  (Provider):  Off-site    As the provider I attest to compliance with applicable laws and regulations related to telemedicine.    DATA  Interactive Complexity: No  Crisis: No        Progress Since Last Session (Related to Symptoms / Goals / Homework):   Symptoms: Mood around 7/10    Homework: Completed in session      Episode of Care Goals: Minimal progress - PREPARATION (Decided to change - considering how); Intervened by negotiating a change plan and determining options / strategies for behavior change, identifying triggers, exploring social supports, and working towards setting a date to begin behavior change     Current / Ongoing Stressors and Concerns:  Last session 7/15, overall things have been moving in a positive direction. Able to spend time with daughter at her home which went well overall. Continuing to engage in weekly physical activities.        Treatment Objective(s) Addressed in This Session:   Increase interest, engagement, and pleasure in doing things       Intervention:   Motivational Interviewing    MI Intervention: Permission to raise concern or advise, Open-ended questions and Reflections: simple and complex     Change Talk Expressed by the Patient: Reasons to change Need to change    Provider Response to Change Talk: R - Reflected patient's change talk and S - Summarized patient's change talk statements     ASSESSMENT: Current Emotional / Mental Status (status of significant symptoms):   Risk status (Self / Other harm or suicidal ideation)   Patient denies current fears or concerns for personal safety.   Patient denies current or recent suicidal ideation or behaviors.   Patient denies current or recent homicidal ideation or behaviors.   Patient denies current or recent self injurious behavior or ideation.   Patient denies other safety concerns.   Patient reports there has been no change in risk factors since their last session.     Patient reports there has been no change in protective factors  since their last session.     A safety and risk management plan has been developed including: Patient consented to co-developed safety plan on 4/5/23.  Safety and risk management plan was reviewed.   Patient agreed to use safety plan should any safety concerns arise.  A copy was made available to the patient.     Appearance:   Appropriate    Eye Contact:   Good    Psychomotor Behavior: Normal    Attitude:   Cooperative    Orientation:   All   Speech    Rate / Production: Normal     Volume:  Normal    Mood:    Normal   Affect:    Appropriate    Thought Content:  Clear    Thought Form:  Coherent  Logical    Insight:    Good      Medication Review:   No changes to current psychiatric medication(s)     Medication Compliance:   Yes     Changes in Health Issues:   None reported     Chemical Use Review:   Substance Use: Chemical use reviewed, no active concerns identified      Tobacco Use: No current tobacco use.      Diagnosis:  MDD, moderate, recurrent    Collateral Reports Completed:   Not Applicable    PLAN: (Patient Tasks / Therapist Tasks / Other)  Daily exercise    Yoni Alex, [facility]  8/26/24                                             ______________________________________________________________________    Individual Treatment Plan    Patient's Name: Valerie Sim  YOB: 1965    Date of Creation: 8/26/24  Date Treatment Plan Last Reviewed/Revised: 11/26/24    DSM5 Diagnoses: 296.32 (F33.1) Major Depressive Disorder, Recurrent Episode, Moderate _  Psychosocial / Contextual Factors:   PROMIS (reviewed every 90 days):     Referral / Collaboration:  Referral to another professional/service is not indicated at this time..    Anticipated number of session for this episode of care: 3-6 sessions  Anticipation frequency of session: Biweekly  Anticipated Duration of each session: 38-52 minutes  Treatment plan will be reviewed in 90 days or when goals have been changed.       MeasurableTreatment  Goal(s) related to diagnosis / functional impairment(s)  Goal 1: Patient will engage in CBT skills for depression sx reduction    Objective #A (Patient Action)    Patient will Identify negative self-talk and behaviors: challenge core beliefs, myths, and actions.  Status Continued 8/26/24    Intervention(s)  Therapist will teach emotional regulation skills. CBT skills .      Patient has reviewed and agreed to the above plan.      CAROLEE Mendez 8/26/24

## 2024-09-01 ENCOUNTER — MYC MEDICAL ADVICE (OUTPATIENT)
Dept: FAMILY MEDICINE | Facility: CLINIC | Age: 59
End: 2024-09-01
Payer: COMMERCIAL

## 2024-09-01 DIAGNOSIS — F07.81 POST CONCUSSION SYNDROME: ICD-10-CM

## 2024-09-03 RX ORDER — DEXTROAMPHETAMINE SACCHARATE, AMPHETAMINE ASPARTATE, DEXTROAMPHETAMINE SULFATE AND AMPHETAMINE SULFATE 5; 5; 5; 5 MG/1; MG/1; MG/1; MG/1
20 TABLET ORAL 2 TIMES DAILY
Qty: 60 TABLET | Refills: 0 | Status: SHIPPED | OUTPATIENT
Start: 2024-09-03

## 2024-09-11 DIAGNOSIS — F11.90 UNCOMPLICATED OPIOID USE: ICD-10-CM

## 2024-10-01 ENCOUNTER — VIRTUAL VISIT (OUTPATIENT)
Dept: PSYCHIATRY | Facility: CLINIC | Age: 59
End: 2024-10-01
Attending: PSYCHIATRY & NEUROLOGY
Payer: COMMERCIAL

## 2024-10-01 DIAGNOSIS — F41.1 GENERALIZED ANXIETY DISORDER: ICD-10-CM

## 2024-10-01 DIAGNOSIS — F41.0 PANIC DISORDER WITHOUT AGORAPHOBIA: ICD-10-CM

## 2024-10-01 DIAGNOSIS — Z79.899 ENCOUNTER FOR LONG-TERM (CURRENT) USE OF MEDICATIONS: ICD-10-CM

## 2024-10-01 DIAGNOSIS — F33.1 MODERATE EPISODE OF RECURRENT MAJOR DEPRESSIVE DISORDER (H): Primary | ICD-10-CM

## 2024-10-01 PROCEDURE — 99214 OFFICE O/P EST MOD 30 MIN: CPT | Mod: 95

## 2024-10-01 PROCEDURE — 90833 PSYTX W PT W E/M 30 MIN: CPT | Mod: 95

## 2024-10-01 RX ORDER — LAMOTRIGINE 100 MG/1
100 TABLET ORAL DAILY
Qty: 30 TABLET | Refills: 2 | Status: SHIPPED | OUTPATIENT
Start: 2024-10-01

## 2024-10-01 RX ORDER — QUETIAPINE FUMARATE 50 MG/1
50 TABLET, FILM COATED ORAL AT BEDTIME
Qty: 30 TABLET | Refills: 2 | Status: SHIPPED | OUTPATIENT
Start: 2024-10-01

## 2024-10-01 RX ORDER — QUETIAPINE FUMARATE 25 MG/1
12.5-25 TABLET, FILM COATED ORAL
Qty: 60 TABLET | Refills: 2 | Status: SHIPPED | OUTPATIENT
Start: 2024-10-01

## 2024-10-01 ASSESSMENT — ANXIETY QUESTIONNAIRES
1. FEELING NERVOUS, ANXIOUS, OR ON EDGE: MORE THAN HALF THE DAYS
GAD7 TOTAL SCORE: 6
GAD7 TOTAL SCORE: 6
6. BECOMING EASILY ANNOYED OR IRRITABLE: NOT AT ALL
IF YOU CHECKED OFF ANY PROBLEMS ON THIS QUESTIONNAIRE, HOW DIFFICULT HAVE THESE PROBLEMS MADE IT FOR YOU TO DO YOUR WORK, TAKE CARE OF THINGS AT HOME, OR GET ALONG WITH OTHER PEOPLE: SOMEWHAT DIFFICULT
8. IF YOU CHECKED OFF ANY PROBLEMS, HOW DIFFICULT HAVE THESE MADE IT FOR YOU TO DO YOUR WORK, TAKE CARE OF THINGS AT HOME, OR GET ALONG WITH OTHER PEOPLE?: SOMEWHAT DIFFICULT
GAD7 TOTAL SCORE: 6
7. FEELING AFRAID AS IF SOMETHING AWFUL MIGHT HAPPEN: NOT AT ALL
4. TROUBLE RELAXING: MORE THAN HALF THE DAYS
2. NOT BEING ABLE TO STOP OR CONTROL WORRYING: NOT AT ALL
7. FEELING AFRAID AS IF SOMETHING AWFUL MIGHT HAPPEN: NOT AT ALL
2. NOT BEING ABLE TO STOP OR CONTROL WORRYING: NOT AT ALL
7. FEELING AFRAID AS IF SOMETHING AWFUL MIGHT HAPPEN: NOT AT ALL
3. WORRYING TOO MUCH ABOUT DIFFERENT THINGS: SEVERAL DAYS
5. BEING SO RESTLESS THAT IT IS HARD TO SIT STILL: SEVERAL DAYS
6. BECOMING EASILY ANNOYED OR IRRITABLE: NOT AT ALL
GAD7 TOTAL SCORE: 6
1. FEELING NERVOUS, ANXIOUS, OR ON EDGE: MORE THAN HALF THE DAYS
8. IF YOU CHECKED OFF ANY PROBLEMS, HOW DIFFICULT HAVE THESE MADE IT FOR YOU TO DO YOUR WORK, TAKE CARE OF THINGS AT HOME, OR GET ALONG WITH OTHER PEOPLE?: SOMEWHAT DIFFICULT
5. BEING SO RESTLESS THAT IT IS HARD TO SIT STILL: SEVERAL DAYS
4. TROUBLE RELAXING: MORE THAN HALF THE DAYS
3. WORRYING TOO MUCH ABOUT DIFFERENT THINGS: SEVERAL DAYS
GAD7 TOTAL SCORE: 6
IF YOU CHECKED OFF ANY PROBLEMS ON THIS QUESTIONNAIRE, HOW DIFFICULT HAVE THESE PROBLEMS MADE IT FOR YOU TO DO YOUR WORK, TAKE CARE OF THINGS AT HOME, OR GET ALONG WITH OTHER PEOPLE: SOMEWHAT DIFFICULT
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
## 2024-10-01 NOTE — NURSING NOTE
Is the patient currently in the state of [place]? YES    Current patient location: [address] [place] [id number]    Visit mode:VIDEO    If the visit is dropped, the patient can be reconnected by: VIDEO VISIT: Text to cell phone:   Telephone Information:   Mobile [phone]       Will anyone else be joining the visit? No  (If patient encounters technical issues they should call [phone])    How would you like to obtain your AVS? MyChart    Are changes needed to the allergy or medication list? No    Are refills needed on medications prescribed by this physician? Discuss with Provider    Rooming Documentation: Patient will complete qnrs in mychart.    /72 Pulse 79    Reason for visit: JIMBO Jo

## 2024-10-01 NOTE — PROGRESS NOTES
"Virtual Visit Details    Type of service:  Video Visit     Originating Location (pt. Location): {video visit patient location:942030::\"Home\"}  {PROVIDER LOCATION On-site should be selected for visits conducted from your clinic location or adjoining [facility] hospital, academic office, or other nearby [facility] building. Off-site should be selected for all other provider locations, including home:088006}  Distant Location (provider location):  {virtual location provider:[id number]}  Platform used for Video Visit: {Virtual Visit Platforms:686050::\"EarlyDoc\"}    "

## 2024-10-01 NOTE — PATIENT INSTRUCTIONS
**For crisis resources, please see the information at the end of this document**   Patient Education    Thank you for coming to the [facility] [place] [facility].     Lab Testing:  If you had lab testing today and your results are reassuring or normal they will be mailed to you or sent through [employer] within 7 days. If the lab tests need quick action we will call you with the results. The phone number we will call with results is # [phone]. If this is not the best number please call our clinic and change the number.     Medication Refills:  If you need any refills please call your pharmacy and they will contact us. Our fax number for refills is [phone].   Three business days of notice are needed for general medication refill requests.   Five business days of notice are needed for controlled substance refill requests.   If you need to change to a different pharmacy, please contact the new pharmacy directly. The new pharmacy will help you get your medications transferred.     Contact Us:  Please call [phone] during business hours (8-5:00 M-F).   If you have medication related questions after clinic hours, or on the weekend, please call [phone].     Financial Assistance [phone]   Medical Records [phone]       MENTAL HEALTH CRISIS RESOURCES:  For a emergency help, please call 911 or go to the nearest Emergency Department.     Emergency Walk-In Options:   [facility] Unit @ [place] ([place]): [phone] - Specialized mental health emergency area designed to be calming  [facility] ([place]): [phone]  [facility] ([place]): [phone]  [facility]): [phone]    [facility] Crisis Information:   [place]: [phone]  [place]: [phone]  [place] ([place]) - Adult: [phone]     Child: [phone]  [place] - Adult: [phone]     Child: [phone]  [place]:  [phone]  List of all [facility] resources:   https://[place].gov/dhs/people-we-serve/adults/health-care/mental-health/resources/crisis-contacts.jsp    National Crisis Information:   Crisis Text Line: Text  [place]  to 304133  Suicide & Crisis Lifeline: 988  National Suicide Prevention Lifeline: 9-381-496-TALK (1-833.441.1155)       For online chat options, visit https://suicidepreventionlifeline.org/chat/  Poison Control Center: 6-751-772-3522  Trans Lifeline: 2-146-093-9242 - Hotline for transgender people of all ages  The [employer] Project: 9-029-580-5945 - Hotline for LGBT youth     For Non-Emergency Support:   Fast Tracker: Mental Health & Substance Use Disorder Resources -   https://www.[employer].org/

## 2024-10-04 ENCOUNTER — LAB (OUTPATIENT)
Dept: LAB | Facility: CLINIC | Age: 59
End: 2024-10-04
Payer: COMMERCIAL

## 2024-10-04 DIAGNOSIS — E03.9 HYPOTHYROIDISM: Primary | ICD-10-CM

## 2024-10-04 DIAGNOSIS — Z79.899 ENCOUNTER FOR LONG-TERM (CURRENT) USE OF MEDICATIONS: ICD-10-CM

## 2024-10-04 LAB
ALBUMIN SERPL BCG-MCNC: 4.7 G/DL (ref 3.5–5.2)
ALP SERPL-CCNC: 82 U/L (ref 40–150)
ALT SERPL W P-5'-P-CCNC: 18 U/L (ref 0–50)
ANION GAP SERPL CALCULATED.3IONS-SCNC: 13 MMOL/L (ref 7–15)
AST SERPL W P-5'-P-CCNC: 26 U/L (ref 0–45)
BASOPHILS # BLD AUTO: 0.1 10E3/UL (ref 0–0.2)
BASOPHILS NFR BLD AUTO: 1 %
BILIRUB SERPL-MCNC: 0.4 MG/DL
BUN SERPL-MCNC: 25.1 MG/DL (ref 6–20)
CALCIUM SERPL-MCNC: 10 MG/DL (ref 8.8–10.4)
CHLORIDE SERPL-SCNC: 103 MMOL/L (ref 98–107)
CHOLEST SERPL-MCNC: 155 MG/DL
CREAT SERPL-MCNC: 0.97 MG/DL (ref 0.51–0.95)
EGFRCR SERPLBLD CKD-EPI 2021: 67 ML/MIN/1.73M2
EOSINOPHIL # BLD AUTO: 0.3 10E3/UL (ref 0–0.7)
EOSINOPHIL NFR BLD AUTO: 4 %
ERYTHROCYTE [DISTWIDTH] IN BLOOD BY AUTOMATED COUNT: 13.3 % (ref 10–15)
EST. AVERAGE GLUCOSE BLD GHB EST-MCNC: 120 MG/DL
FASTING STATUS PATIENT QL REPORTED: YES
FASTING STATUS PATIENT QL REPORTED: YES
GLUCOSE SERPL-MCNC: 105 MG/DL (ref 70–99)
HBA1C MFR BLD: 5.8 % (ref 0–5.6)
HCO3 SERPL-SCNC: 26 MMOL/L (ref 22–29)
HCT VFR BLD AUTO: 41.8 % (ref 35–47)
HDLC SERPL-MCNC: 57 MG/DL
HGB BLD-MCNC: 13.6 G/DL (ref 11.7–15.7)
IMM GRANULOCYTES # BLD: 0 10E3/UL
IMM GRANULOCYTES NFR BLD: 0 %
LDLC SERPL CALC-MCNC: 59 MG/DL
LYMPHOCYTES # BLD AUTO: 2 10E3/UL (ref 0.8–5.3)
LYMPHOCYTES NFR BLD AUTO: 31 %
MCH RBC QN AUTO: 30 PG (ref 26.5–33)
MCHC RBC AUTO-ENTMCNC: 32.5 G/DL (ref 31.5–36.5)
MCV RBC AUTO: 92 FL (ref 78–100)
MONOCYTES # BLD AUTO: 0.4 10E3/UL (ref 0–1.3)
MONOCYTES NFR BLD AUTO: 6 %
NEUTROPHILS # BLD AUTO: 3.7 10E3/UL (ref 1.6–8.3)
NEUTROPHILS NFR BLD AUTO: 57 %
NONHDLC SERPL-MCNC: 98 MG/DL
NRBC # BLD AUTO: 0 10E3/UL
NRBC BLD AUTO-RTO: 0 /100
PLATELET # BLD AUTO: 295 10E3/UL (ref 150–450)
POTASSIUM SERPL-SCNC: 4.6 MMOL/L (ref 3.4–5.3)
PROT SERPL-MCNC: 7.8 G/DL (ref 6.4–8.3)
RBC # BLD AUTO: 4.53 10E6/UL (ref 3.8–5.2)
SODIUM SERPL-SCNC: 142 MMOL/L (ref 135–145)
TRIGL SERPL-MCNC: 197 MG/DL
TSH SERPL DL<=0.005 MIU/L-ACNC: 3.95 UIU/ML (ref 0.3–4.2)
WBC # BLD AUTO: 6.4 10E3/UL (ref 4–11)

## 2024-10-04 PROCEDURE — 80061 LIPID PANEL: CPT

## 2024-10-04 PROCEDURE — 84443 ASSAY THYROID STIM HORMONE: CPT

## 2024-10-04 PROCEDURE — 80053 COMPREHEN METABOLIC PANEL: CPT

## 2024-10-04 PROCEDURE — 85025 COMPLETE CBC W/AUTO DIFF WBC: CPT

## 2024-10-04 PROCEDURE — 83036 HEMOGLOBIN GLYCOSYLATED A1C: CPT

## 2024-10-04 PROCEDURE — 36415 COLL VENOUS BLD VENIPUNCTURE: CPT

## 2024-10-07 ENCOUNTER — VIRTUAL VISIT (OUTPATIENT)
Dept: PSYCHOLOGY | Facility: CLINIC | Age: 59
End: 2024-10-07
Payer: COMMERCIAL

## 2024-10-07 DIAGNOSIS — F41.1 GAD (GENERALIZED ANXIETY DISORDER): Primary | ICD-10-CM

## 2024-10-07 PROCEDURE — 90834 PSYTX W PT 45 MINUTES: CPT | Mod: 95 | Performed by: MARRIAGE & FAMILY THERAPIST

## 2024-10-07 ASSESSMENT — PATIENT HEALTH QUESTIONNAIRE - PHQ9
SUM OF ALL RESPONSES TO PHQ QUESTIONS 1-9: 8
10. IF YOU CHECKED OFF ANY PROBLEMS, HOW DIFFICULT HAVE THESE PROBLEMS MADE IT FOR YOU TO DO YOUR WORK, TAKE CARE OF THINGS AT HOME, OR GET ALONG WITH OTHER PEOPLE: SOMEWHAT DIFFICULT
SUM OF ALL RESPONSES TO PHQ QUESTIONS 1-9: 8

## 2024-10-07 NOTE — PROGRESS NOTES
Answers submitted by the patient for this visit:  Patient Health Questionnaire (Submitted on 10/7/2024)  If you checked off any problems, how difficult have these problems made it for you to do your work, take care of things at home, or get along with other people?: Somewhat difficult  PHQ9 TOTAL SCORE: 8  Patient Health Questionnaire (G7) (Submitted on 10/1/2024)  MAHAMED 7 TOTAL SCORE: 6        [facility] Counsel[facility]                                     Progress Note    Patient Name: Valerie Sim  Date:  10/7/24         Service Type: Individual  Session Start Time:  8:02a  Session End Time: 8:50a  Session Length: 48  Session #: 30    Attendees: Client     Service Modality:  Video Visit:      Provider verified identity through the following two step process.  Patient provided:  Patient     Telemedicine Visit: The patient's condition can be safely assessed and treated via synchronous audio and visual telemedicine encounter.      Reason for Telemedicine Visit: Services only offered telehealth    Originating Site (Patient Location): Patient's home    Distant Site (Provider Location): Provider Remote Setting- Home Office    Consent:  The patient/guardian has verbally consented to: the potential risks and benefits of telemedicine (video visit) versus in person care; bill my insurance or make self-payment for services provided; and responsibility for payment of non-covered services.     Patient would like the video invitation sent by:  Send to e-mail at: [email or web address]    Mode of Communication:  Video Conference via [employer]    Distant Location (Provider):  Off-site    As the provider I attest to compliance with applicable laws and regulations related to telemedicine.    DATA  Interactive Complexity: No  Crisis: No        Progress Since Last Session (Related to Symptoms / Goals / Homework):   Symptoms:  Improving, mood around 810    Homework: Completed in session      Episode of Care Goals: Minimal progress  - PREPARATION (Decided to change - considering how); Intervened by negotiating a change plan and determining options / strategies for behavior change, identifying triggers, exploring social supports, and working towards setting a date to begin behavior change     Current / Ongoing Stressors and Concerns:  Last session 8/26, overall things have been trending in a more positive direction both physically and mentally.        Treatment Objective(s) Addressed in This Session:   Increase interest, engagement, and pleasure in doing things       Intervention:   Motivational Interviewing    MI Intervention: Permission to raise concern or advise, Open-ended questions and Reflections: simple and complex     Change Talk Expressed by the Patient: Reasons to change Need to change    Provider Response to Change Talk: R - Reflected patient's change talk and S - Summarized patient's change talk statements     ASSESSMENT: Current Emotional / Mental Status (status of significant symptoms):   Risk status (Self / Other harm or suicidal ideation)   Patient denies current fears or concerns for personal safety.   Patient denies current or recent suicidal ideation or behaviors.   Patient denies current or recent homicidal ideation or behaviors.   Patient denies current or recent self injurious behavior or ideation.   Patient denies other safety concerns.   Patient reports there has been no change in risk factors since their last session.     Patient reports there has been no change in protective factors since their last session.     A safety and risk management plan has been developed including: Patient consented to co-developed safety plan on 4/5/23.  Safety and risk management plan was reviewed.   Patient agreed to use safety plan should any safety concerns arise.  A copy was made available to the patient.     Appearance:   Appropriate    Eye Contact:   Good    Psychomotor Behavior: Normal    Attitude:   Cooperative     Orientation:   All   Speech    Rate / Production: Normal     Volume:  Normal    Mood:    Normal   Affect:    Appropriate    Thought Content:  Clear    Thought Form:  Coherent  Logical    Insight:    Good      Medication Review:   No changes to current psychiatric medication(s)     Medication Compliance:   Yes     Changes in Health Issues:   None reported     Chemical Use Review:   Substance Use: Chemical use reviewed, no active concerns identified      Tobacco Use: No current tobacco use.      Diagnosis:  MDD, moderate, recurrent    Collateral Reports Completed:   Not Applicable    PLAN: (Patient Tasks / Therapist Tasks / Other)  Daily exerBuyerCuriouse      CAROLEE Mendez  10/7/24                                             ______________________________________________________________________    Individual Treatment Plan    Patient's Name: Valerie Sim  YOB: 1965    Date of Creation: 8/26/24  Date Treatment Plan Last Reviewed/Revised: 11/26/24    DSM5 Diagnoses: 296.32 (F33.1) Major Depressive Disorder, Recurrent Episode, Moderate _  Psychosocial / Contextual Factors:   PROMIS (reviewed every 90 days):     Referral / Collaboration:  Referral to another professional/service is not indicated at this time..    Anticipated number of session for this episode of care: 3-6 sessions  Anticipation frequency of session: Biweekly  Anticipated Duration of each session: 38-52 minutes  Treatment plan will be reviewed in 90 days or when goals have been changed.       MeasurableTreatment Goal(s) related to diagnosis / functional impairment(s)  Goal 1: Patient will engage in CBT skills for depression sx reduction    Objective #A (Patient Action)    Patient will Identify negative self-talk and behaviors: challenge core beliefs, myths, and actions.  Status Continued 8/26/24    Intervention(s)  Therapist will teach emotional regulation skills. CBT skills .      Patient has reviewed and agreed to the above  plan.      Yoni Alex, [facility] 8/26/24

## 2024-10-09 ENCOUNTER — OFFICE VISIT (OUTPATIENT)
Dept: PHYSICAL MEDICINE AND REHAB | Facility: CLINIC | Age: 59
End: 2024-10-09
Payer: COMMERCIAL

## 2024-10-09 VITALS — SYSTOLIC BLOOD PRESSURE: 122 MMHG | OXYGEN SATURATION: 97 % | DIASTOLIC BLOOD PRESSURE: 79 MMHG | HEART RATE: 88 BPM

## 2024-10-09 DIAGNOSIS — M79.18 MYOFASCIAL PAIN: ICD-10-CM

## 2024-10-09 DIAGNOSIS — G43.719 CHRONIC MIGRAINE WITHOUT AURA, INTRACTABLE, WITHOUT STATUS MIGRAINOSUS: Primary | ICD-10-CM

## 2024-10-09 PROCEDURE — 64615 CHEMODENERV MUSC MIGRAINE: CPT | Performed by: PHYSICAL MEDICINE & REHABILITATION

## 2024-10-09 PROCEDURE — 95874 GUIDE NERV DESTR NEEDLE EMG: CPT | Performed by: PHYSICAL MEDICINE & REHABILITATION

## 2024-10-09 RX ORDER — BUPIVACAINE HYDROCHLORIDE 5 MG/ML
10 INJECTION, SOLUTION EPIDURAL; INTRACAUDAL ONCE
Status: ACTIVE | OUTPATIENT
Start: 2024-10-09

## 2024-10-09 NOTE — LETTER
10/9/2024       RE: Valerie Sim  [address]  [place] [address]     Dear Colleague,    Thank you for referring your patient, Valerie Sim, to the [facility] [place] [facility]. Please see a copy of my visit note below.      [facility]     PM&R CLINIC NOTE  BOTULINUM TOXIN PROCEDURE      HPI  Chief Complaint   Patient presents with     Procedure     Botox       Valerie Sim is a 59 year old female with a history of chronic migraine headaches who presents to clinic for botulinum toxin injections for management of chronic migraine headaches and excessive jaw clenching.     SINCE LAST VISIT  Valerie Sim was last seen for Botox injections on 8/7/2024, at which time she received 225 units of Botox.     Patient denies new medical diagnoses, illnesses, hospitalizations, emergency room visits, and injuries since the previous injection with botulinum neurotoxin.       RESPONSE TO PREVIOUS TREATMENT    Side effects:  None      1.  Headache frequency during this injection cycle:  5-10 migraine headache days per month throughout the injection cycle. This is compared to her baseline headache frequency of 30 severe headache days per month.     2.  Headache duration during this injection cycle:  Headache duration was between 4 hours and 4 days. Patient reports a few episodes of multiple day headaches during this injection cycle.     3.  Headache intensity during this injection cycle:    A.  5-8/10  =  Typical pain level.  B.  10/10  =  Worst pain level.  C.  4/10  =  Lowest pain level.    4.  Change in headache medication usage during this injection cycle:  (For Example:  Able to decrease use of oral pain medications.) No changes.       5.  ER Visits During This Injection Cycle:  She uses Relpax, Toradol and Zofran as needed for migraine headaches, and oxygen  occasionally for cluster headaches. Lidocaine nasal spray is used intermittently for cluster headaches, which is found to be quite helpful.      6.  Functional Performance:  Change in ADL's, social interaction, days lost from work, etc. Patient reports being able to more fully participate in social and family activities and responsibilities as headache symptoms have improved. She is now changing her perspectives on pain expectations of severity and length after onset. She also started a variety of different yogas.       PHYSICAL EXAM  VS: /79 (BP Location: Left arm)   Pulse 88   SpO2 97%    GEN: Pleasant and cooperative, in no acute distress  HEENT: No facial asymmetry    ALLERGIES  Allergies   Allergen Reactions     Fluoxetine Other (See Comments)     PN: LW Reaction: headache  PN: LW Reaction: headache  Migraine       Garlic Blisters, Cough, Dermatitis, Difficulty breathing, Headache, Hives, Itching, Nausea and Vomiting and Shortness Of Breath     Candesartan      Other reaction(s): Myalgia     Duloxetine      Other reaction(s): Headache  migraine     Iodine      Other reaction(s): Other (see comments)  PN: LW CM1: CONTRAST- iodine Reaction :     Metoclopramide      Other reaction(s): Headache  Caused increase in headaches     Perfume      Other reaction(s): Headache  head pain leading to migraines     Pregabalin      Other reaction(s): Headache, Myalgia     Trazodone Other (See Comments) and Unknown     Other reaction(s): Headache  Other reaction(s): Headache  Other reaction(s): Headache     Amitriptyline Hcl Other (See Comments)     Migraine       Candesartan Cilexetil-Hctz Muscle Pain (Myalgia)     Cyproheptadine Hcl      headaches     Desvenlafaxine      Migraine       Diatrizoate Unknown     PN: LW CM1: CONTRAST- iodine Reaction :     Diazepam      Other reaction(s): Vestibular Toxicity  PN: LW Reaction: vertigo  PN: LW Reaction: vertigo     Duloxetine Hcl Other (See Comments)     Migraine        "Gabapentin Other (See Comments)     Galcanezumab      Other reaction(s): Headache     Imipramine Other (See Comments)     Migraine       Metoprolol Other (See Comments) and Unknown     headache  headache  headache     Morphine Other (See Comments)     Patient reports seizure   Other reaction(s): Unknown  Seizure; 5/111/21 updated info: patient states she had a seizure while having a migraine headache years ago and is not sure if it was due to morphine. She has tolerated Dilaudid since then.       No Clinical Screening - See Comments      PN: LW FI1: lactose intolerance LW FI2: shellfish  PN: LW CM1: CONTRAST- iodine Reaction :  PN: LW Other1: -nka     Nortriptyline Other (See Comments)     Migraine       Phenergan Dm [Promethazine-Dm] Other (See Comments)     seizures     Pregabalin Muscle Pain (Myalgia)     Promethazine      PN: LW Reaction: minimal sizures     Venlafaxine Other (See Comments)     PN: LW Reaction: migraine  PN: LW Reaction: migraine  Migraine       Wellbutrin [Bupropion Hydrobromide] Other (See Comments)     Migraine       Compazine Anxiety     Dihydroergotamine Anxiety and Other (See Comments)     Cardiac symptoms     Droperidol Anxiety     PN: LW Reaction: agitation     Medroxyprogesterone Hives     PN: LW Reaction: nausea     Prochlorperazine Anxiety     PN: LW Reaction: \"can't sit still\"       CURRENT MEDICATIONS    Current Outpatient Medications:      aMILoride (MIDAMOR) 5 MG tablet, Take 7.5 mg by mouth daily 2.5MG in the AM and 5MG in the PM., Disp: , Rfl:      amLODIPine (NORVASC) 10 MG tablet, Take 10 mg by mouth daily, Disp: , Rfl:      amphetamine-dextroamphetamine (ADDERALL) 20 MG tablet, Take 1 tablet (20 mg) by mouth 2 times daily., Disp: 60 tablet, Rfl: 0     B Complex-Biotin-FA (B-COMPLEX PO), Take 1 tablet by mouth daily, Disp: , Rfl:      botulinum toxin type A (BOTOX) 100 units injection, Inject intramuscular. Every 18 weeks for pelvic floor spasms, Disp: , Rfl:      budesonide " (RINOCORT AQUA) 32 MCG/ACT nasal spray, Spray 1 spray into both nostrils daily., Disp: , Rfl:      calcitRIOL (ROCALTROL) 0.25 MCG capsule, Take 0.25 mcg by mouth as needed, Disp: , Rfl:      Calcium-Magnesium-Vitamin D (CITRACAL SLOW RELEASE) 600- MG-MG-UNIT TB24, Take 600 mg by mouth 4 times daily, Disp: , Rfl:      docusate sodium (COLACE) 100 MG capsule, Take 1 capsule by mouth as needed for constipation, Disp: , Rfl:      eletriptan (RELPAX) 40 MG tablet, Take 40 mg by mouth as needed, Disp: , Rfl:      estradiol (ESTRACE) 0.1 MG/GM vaginal cream, , Disp: , Rfl:      fexofenadine (ALLEGRA) 180 MG tablet, Take 180 mg by mouth daily, Disp: , Rfl:      HYDROmorphone (DILAUDID) 2 MG tablet, Take 0.5-1 tablets (1-2 mg) by mouth every 3 hours as needed (headache) 20 tablets = 3 month supply., Disp: 24 tablet, Rfl: 0     ketoconazole (NIZORAL) 2 % external shampoo, Apply topically to affected area(s) once daily if needed for Dry Scalp (itching and flaking scalp). Lather on damp scalp, leave on for 5min, then rinse with water., Disp: , Rfl:      ketorolac (TORADOL) 30 MG/ML injection, Inject 1 mL (30 mg) into the vein every 6 hours as needed for moderate pain, Disp: 6 mL, Rfl: 1     lamoTRIgine (LAMICTAL) 100 MG tablet, Take 1 tablet (100 mg) by mouth daily., Disp: 30 tablet, Rfl: 2     lidocaine (XYLOCAINE) 4 % external solution, Spray 0.5 ml once in each nostril q 4-6 hours as needed for headache. lay down with head tilted back for 5 minutes after if preferred, Disp: 50 mL, Rfl: 6     Magnesium Oxide 500 MG TABS, Take 500 mg by mouth 2 times daily, Disp: , Rfl:      mupirocin (BACTROBAN) 2 % external ointment, Apply topically 3 times daily, Disp: , Rfl:      naloxone (NARCAN) 4 MG/0.1ML nasal spray, Spray 1 spray into one nostril alternating nostrils as needed for opioid reversal every 2-3 mins until assistance arrives., Disp: 2 each, Rfl: 1     nebivolol (BYSTOLIC) 5 MG tablet, Take 5 mg by mouth daily Take  "twice a day totaling 10 mg., Disp: , Rfl:      Needle, Disp, 25G X 1\" MISC, 2 each as needed (with toradol) 90-day supply, Disp: 6 each, Rfl: 1     ondansetron (ZOFRAN) 4 MG tablet, Take 1 tablet (4 mg) by mouth every 8 hours as needed for nausea, Disp: 30 tablet, Rfl: 1     order for DME, Equipment being ordered: Oxygen The patient has Cluster Headache and needs 10 liters/minute of high flow oxygen via nonrebreather mask prn headaches, Disp: 99 days, Rfl: 0     order for DME, Equipment being ordered: Oxygen and non-rebreather mask.   Use high flow oxygen (10-15 L/min) with non-rebreather mask, as needed for cluster headaches, Disp: 1 Units, Rfl: 11     potassium chloride ER (KLOR-CON M) 20 MEQ CR tablet, Take 1 tablet by mouth 2 times daily, Disp: , Rfl:      QUEtiapine (SEROQUEL) 25 MG tablet, Take 0.5-1 tablets (12.5-25 mg) by mouth nightly as needed (For sleep)., Disp: 60 tablet, Rfl: 2     QUEtiapine (SEROQUEL) 50 MG tablet, Take 1 tablet (50 mg) by mouth at bedtime., Disp: 30 tablet, Rfl: 2     Syringe, Disposable, (SYRINGE LUER SLIP) 1 ML MISC, 1 each as needed (with toradol/migraine), Disp: 6 each, Rfl: 1     valACYclovir (VALTREX) 1000 mg tablet, Take 1,000 mg by mouth as needed., Disp: , Rfl:     Current Facility-Administered Medications:      botulinum toxin type A (BOTOX) 100 units injection 225 Units, 225 Units, Intramuscular, See Admin Instructions, Standal, Addie Saeed MD, 225 Units at 24 1045     bupivacaine (MARCAINE) 0.5% preservative free injection, 10 mL, Intradermal, Once,        BOTULINUM NEUROTOXIN INJECTION PROCEDURES    VERIFICATION OF PATIENT IDENTIFICATION AND PROCEDURE     Initials   Patient Name SES   Patient  SES   Procedure Verified by: SES     Prior to the start of the procedure and with procedural staff participation, I verbally confirmed the patient s identity using two indicators, relevant allergies, that the procedure was appropriate and matched the consent or " emergent situation, and that the correct equipment/implants were available. Immediately prior to starting the procedure I conducted the Time Out with the procedural staff and re-confirmed the patient s name, procedure, and site/side. (The Joint Commission universal protocol was followed.)  Yes    Sedation (Moderate or Deep): None    ABOVE ASSESSMENTS PERFORMED BY    Addie Malhotra MD      INDICATIONS FOR PROCEDURES  Valerie Sim is a 59 year old patient with pain and jaw clenching secondary to the diagnosis of chronic migraine headaches and oromandibular dystonia. Her baseline symptoms have been recalcitrant to oral medications and conservative therapy.  She is here today for reinjection with Botox.    GOAL OF PROCEDURE  The goal of this procedure is to decrease pain and excessive jaw clenching due to chronic migraine headaches and bruxism.     TOTAL DOSE ADMINISTERED  Dose Administered:  225 units  Botox (Botulinum Toxin Type A)       2:1 Dilution   Unavoidable Drug Waste: Yes  Amount of drug waste (mL): 75 units Botox.  Reason for waste:  Single use vial  Diluent Used:  0.5% bupivacaine  Total Volume of Diluent Used:  4.5 ml  NDC #: Botox 100u ([address]-58)      CONSENT  The risks, benefits, and treatment options were discussed with Valerie Sim and she agreed to proceed.    Written consent was obtained by  [facility] .     EQUIPMENT USED  Needle-25mm stimulating/recording  Needle-30 gauge  EMG/NCS Machine    SKIN PREPARATION  Skin preparation was performed using an alcohol wipe.    GUIDANCE DESCRIPTION  Electro-myographic guidance was necessary throughout the neck and jaw portion of the procedure to accurately identify all areas of dystonic muscles while avoiding injection of non-dystonic muscles, neighboring nerves and nearby vascular structures.       AREA/MUSCLE INJECTED:  225 UNITS BOTOX = TOTAL DOSE, 2:1 DILUTION     1. FACIAL & SCALP MUSCLES: 85 UNITS BOTOX = TOTAL DOSE      Right Frontalis - 10 units of  Botox in 2 site/s.  Left Frontalis - 10 units of Botox in 2 site/s.      Procerus - 5 units of Botox at 1 site/s.       Right  - 2.5 units of Botox in 1 site/s.  Left  - 2.5 units of Botox in 1 site/s.      Right Nasalis - 2.5 units of Botox at 1 site/s.           Left Nasalis - 2.5 units of Botox at 1 site/s.     Right Upper Occipitalis - 25 units of Botox at 5 site/s.   Left Upper Occipitalis - 25 units of Botox at 5 site/s.         2. JAW MUSCLES: 90 UNITS BOTOX = TOTAL DOSE     Right Masseter - 20 units of Botox at 2 site/s.   Left Masseter - 20 units of Botox at 2 site/s.      Right Temporalis - 25 units of Botox at 5 site/s.  Left Temporalis - 25 units of Botox at 5 site/s.          3. SHOULDER & NECK MUSCLES: 50 UNITS BOTOX = TOTAL DOSE      Right Levator Scapula - 10 units of Botox at 2 site/s (neck and scapular insertion).  Left Levator Scapula - 10 units of Botox at 2 site/s (neck and scapular insertion).      Right Upper Trapezius (mid & low lateral) - 10 units of Botox at 3 site/s.  Left Upper Trapezius (mid & low lateral) - 10 units of Botox at 3 site/s.             Right Pectoralis Minor - 5 units of Botox at 1 site/s.                     Left Pectoralis Minor - 5 units of Botox at 1 site/s.         RESPONSE TO PROCEDURE  Valerie Sim tolerated the procedure well and there were no immediate complications. She was allowed to recover for an appropriate period of time and was discharged home in stable condition.      ASSESSMENT AND PLAN   Botulinum toxin injections: No changes made to Botox dose or distribution today. Patient will continue to monitor response and report at next appointment.  Referrals: None.   Medications: None.   Follow up: Valerie Sim was rescheduled for the next series of injections in 9 weeks, at which time we will evaluate response to today's injections. She may call the clinic prior with any questions or concerns prior to the next appointment.     Addie FISCHER  MD [place]       Again, thank you for allowing me to participate in the care of your patient.      Sincerely,    Addie Malhotra MD

## 2024-10-15 NOTE — PROGRESS NOTES
[facility]     PM&R CLINIC NOTE  BOTULINUM TOXIN PROCEDURE      HPI  Chief Complaint   Patient presents with    Procedure     Botox       Valerie Sim is a 59 year old female with a history of chronic migraine headaches who presents to clinic for botulinum toxin injections for management of chronic migraine headaches and excessive jaw clenching.     SINCE LAST VISIT  Valerie Sim was last seen for Botox injections on 8/7/2024, at which time she received 225 units of Botox.     Patient denies new medical diagnoses, illnesses, hospitalizations, emergency room visits, and injuries since the previous injection with botulinum neurotoxin.       RESPONSE TO PREVIOUS TREATMENT    Side effects:  None      1.  Headache frequency during this injection cycle:  5-10 migraine headache days per month throughout the injection cycle. This is compared to her baseline headache frequency of 30 severe headache days per month.     2.  Headache duration during this injection cycle:  Headache duration was between 4 hours and 4 days. Patient reports a few episodes of multiple day headaches during this injection cycle.     3.  Headache intensity during this injection cycle:    A.  5-8/10  =  Typical pain level.  B.  10/10  =  Worst pain level.  C.  4/10  =  Lowest pain level.    4.  Change in headache medication usage during this injection cycle:  (For Example:  Able to decrease use of oral pain medications.) No changes.       5.  ER Visits During This Injection Cycle:  She uses Relpax, Toradol and Zofran as needed for migraine headaches, and oxygen occasionally for cluster headaches. Lidocaine nasal spray is used intermittently for cluster headaches, which is found to be quite helpful.      6.  Functional Performance:  Change in ADL's, social interaction, days lost from work, etc. Patient reports being able to more fully participate in social and family activities and responsibilities as headache  symptoms have improved. She is now changing her perspectives on pain expectations of severity and length after onset. She also started a variety of different yogas.       PHYSICAL EXAM  VS: /79 (BP Location: Left arm)   Pulse 88   SpO2 97%    GEN: Pleasant and cooperative, in no acute distress  HEENT: No facial asymmetry    ALLERGIES  Allergies   Allergen Reactions    Fluoxetine Other (See Comments)     PN: LW Reaction: headache  PN: LW Reaction: headache  Migraine      Garlic Blisters, Cough, Dermatitis, Difficulty breathing, Headache, Hives, Itching, Nausea and Vomiting and Shortness Of Breath    Candesartan      Other reaction(s): Myalgia    Duloxetine      Other reaction(s): Headache  migraine    Iodine      Other reaction(s): Other (see comments)  PN: LW CM1: CONTRAST- iodine Reaction :    Metoclopramide      Other reaction(s): Headache  Caused increase in headaches    Perfume      Other reaction(s): Headache  head pain leading to migraines    Pregabalin      Other reaction(s): Headache, Myalgia    Trazodone Other (See Comments) and Unknown     Other reaction(s): Headache  Other reaction(s): Headache  Other reaction(s): Headache    Amitriptyline Hcl Other (See Comments)     Migraine      Candesartan Cilexetil-Hctz Muscle Pain (Myalgia)    Cyproheptadine Hcl      headaches    Desvenlafaxine      Migraine      Diatrizoate Unknown     PN: LW CM1: CONTRAST- iodine Reaction :    Diazepam      Other reaction(s): Vestibular Toxicity  PN: LW Reaction: vertigo  PN: LW Reaction: vertigo    Duloxetine Hcl Other (See Comments)     Migraine      Gabapentin Other (See Comments)    Galcanezumab      Other reaction(s): Headache    Imipramine Other (See Comments)     Migraine      Metoprolol Other (See Comments) and Unknown     headache  headache  headache    Morphine Other (See Comments)     Patient reports seizure   Other reaction(s): Unknown  Seizure; 5/111/21 updated info: patient states she had a seizure while  "having a migraine headache years ago and is not sure if it was due to morphine. She has tolerated Dilaudid since then.      No Clinical Screening - See Comments      PN: LW FI1: lactose intolerance LW FI2: shellfish  PN: LW CM1: CONTRAST- iodine Reaction :  PN: LW Other1: -nka    Nortriptyline Other (See Comments)     Migraine      Phenergan Dm [Promethazine-Dm] Other (See Comments)     seizures    Pregabalin Muscle Pain (Myalgia)    Promethazine      PN: LW Reaction: minimal sizures    Venlafaxine Other (See Comments)     PN: LW Reaction: migraine  PN: LW Reaction: migraine  Migraine      Wellbutrin [Bupropion Hydrobromide] Other (See Comments)     Migraine      Compazine Anxiety    Dihydroergotamine Anxiety and Other (See Comments)     Cardiac symptoms    Droperidol Anxiety     PN: LW Reaction: agitation    Medroxyprogesterone Hives     PN: LW Reaction: nausea    Prochlorperazine Anxiety     PN: LW Reaction: \"can't sit still\"       CURRENT MEDICATIONS    Current Outpatient Medications:     aMILoride (MIDAMOR) 5 MG tablet, Take 7.5 mg by mouth daily 2.5MG in the AM and 5MG in the PM., Disp: , Rfl:     amLODIPine (NORVASC) 10 MG tablet, Take 10 mg by mouth daily, Disp: , Rfl:     amphetamine-dextroamphetamine (ADDERALL) 20 MG tablet, Take 1 tablet (20 mg) by mouth 2 times daily., Disp: 60 tablet, Rfl: 0    B Complex-Biotin-FA (B-COMPLEX PO), Take 1 tablet by mouth daily, Disp: , Rfl:     botulinum toxin type A (BOTOX) 100 units injection, Inject intramuscular. Every 18 weeks for pelvic floor spasms, Disp: , Rfl:     budesonide (RINOCORT AQUA) 32 MCG/ACT nasal spray, Spray 1 spray into both nostrils daily., Disp: , Rfl:     calcitRIOL (ROCALTROL) 0.25 MCG capsule, Take 0.25 mcg by mouth as needed, Disp: , Rfl:     Calcium-Magnesium-Vitamin D (CITRACAL SLOW RELEASE) 600- MG-MG-UNIT TB24, Take 600 mg by mouth 4 times daily, Disp: , Rfl:     docusate sodium (COLACE) 100 MG capsule, Take 1 capsule by mouth as " "needed for constipation, Disp: , Rfl:     eletriptan (RELPAX) 40 MG tablet, Take 40 mg by mouth as needed, Disp: , Rfl:     estradiol (ESTRACE) 0.1 MG/GM vaginal cream, , Disp: , Rfl:     fexofenadine (ALLEGRA) 180 MG tablet, Take 180 mg by mouth daily, Disp: , Rfl:     HYDROmorphone (DILAUDID) 2 MG tablet, Take 0.5-1 tablets (1-2 mg) by mouth every 3 hours as needed (headache) 20 tablets = 3 month supply., Disp: 24 tablet, Rfl: 0    ketoconazole (NIZORAL) 2 % external shampoo, Apply topically to affected area(s) once daily if needed for Dry Scalp (itching and flaking scalp). Lather on damp scalp, leave on for 5min, then rinse with water., Disp: , Rfl:     ketorolac (TORADOL) 30 MG/ML injection, Inject 1 mL (30 mg) into the vein every 6 hours as needed for moderate pain, Disp: 6 mL, Rfl: 1    lamoTRIgine (LAMICTAL) 100 MG tablet, Take 1 tablet (100 mg) by mouth daily., Disp: 30 tablet, Rfl: 2    lidocaine (XYLOCAINE) 4 % external solution, Spray 0.5 ml once in each nostril q 4-6 hours as needed for headache. lay down with head tilted back for 5 minutes after if preferred, Disp: 50 mL, Rfl: 6    Magnesium Oxide 500 MG TABS, Take 500 mg by mouth 2 times daily, Disp: , Rfl:     mupirocin (BACTROBAN) 2 % external ointment, Apply topically 3 times daily, Disp: , Rfl:     naloxone (NARCAN) 4 MG/0.1ML nasal spray, Spray 1 spray into one nostril alternating nostrils as needed for opioid reversal every 2-3 mins until assistance arrives., Disp: 2 each, Rfl: 1    nebivolol (BYSTOLIC) 5 MG tablet, Take 5 mg by mouth daily Take twice a day totaling 10 mg., Disp: , Rfl:     Needle, Disp, 25G X 1\" MISC, 2 each as needed (with toradol) 90-day supply, Disp: 6 each, Rfl: 1    ondansetron (ZOFRAN) 4 MG tablet, Take 1 tablet (4 mg) by mouth every 8 hours as needed for nausea, Disp: 30 tablet, Rfl: 1    order for DME, Equipment being ordered: Oxygen The patient has Cluster Headache and needs 10 liters/minute of high flow oxygen via " nonrebreather mask prn headaches, Disp: 99 days, Rfl: 0    order for DME, Equipment being ordered: Oxygen and non-rebreather mask.   Use high flow oxygen (10-15 L/min) with non-rebreather mask, as needed for cluster headaches, Disp: 1 Units, Rfl: 11    potassium chloride ER (KLOR-CON M) 20 MEQ CR tablet, Take 1 tablet by mouth 2 times daily, Disp: , Rfl:     QUEtiapine (SEROQUEL) 25 MG tablet, Take 0.5-1 tablets (12.5-25 mg) by mouth nightly as needed (For sleep)., Disp: 60 tablet, Rfl: 2    QUEtiapine (SEROQUEL) 50 MG tablet, Take 1 tablet (50 mg) by mouth at bedtime., Disp: 30 tablet, Rfl: 2    Syringe, Disposable, (SYRINGE LUER SLIP) 1 ML MISC, 1 each as needed (with toradol/migraine), Disp: 6 each, Rfl: 1    valACYclovir (VALTREX) 1000 mg tablet, Take 1,000 mg by mouth as needed., Disp: , Rfl:     Current Facility-Administered Medications:     botulinum toxin type A (BOTOX) 100 units injection 225 Units, 225 Units, Intramuscular, See Admin Instructions, Addie Malhotra MD, 225 Units at 24 1045    bupivacaine (MARCAINE) 0.5% preservative free injection, 10 mL, Intradermal, Once,        BOTULINUM NEUROTOXIN INJECTION PROCEDURES    VERIFICATION OF PATIENT IDENTIFICATION AND PROCEDURE     Initials   Patient Name SES   Patient  SES   Procedure Verified by: SES     Prior to the start of the procedure and with procedural staff participation, I verbally confirmed the patient s identity using two indicators, relevant allergies, that the procedure was appropriate and matched the consent or emergent situation, and that the correct equipment/implants were available. Immediately prior to starting the procedure I conducted the Time Out with the procedural staff and re-confirmed the patient s name, procedure, and site/side. (The Joint Commission universal protocol was followed.)  Yes    Sedation (Moderate or Deep): None    ABOVE ASSESSMENTS PERFORMED BY    Addie Malhotra MD      INDICATIONS FOR  PROCEDURES  Valerie Sim is a 59 year old patient with pain and jaw clenching secondary to the diagnosis of chronic migraine headaches and oromandibular dystonia. Her baseline symptoms have been recalcitrant to oral medications and conservative therapy.  She is here today for reinjection with Botox.    GOAL OF PROCEDURE  The goal of this procedure is to decrease pain and excessive jaw clenching due to chronic migraine headaches and bruxism.     TOTAL DOSE ADMINISTERED  Dose Administered:  225 units  Botox (Botulinum Toxin Type A)       2:1 Dilution   Unavoidable Drug Waste: Yes  Amount of drug waste (mL): 75 units Botox.  Reason for waste:  Single use vial  Diluent Used:  0.5% bupivacaine  Total Volume of Diluent Used:  4.5 ml  NDC #: Botox 100u ([address]-94)      CONSENT  The risks, benefits, and treatment options were discussed with Valerie Sim and she agreed to proceed.    Written consent was obtained by  ninoska .     EQUIPMENT USED  Needle-25mm stimulating/recording  Needle-30 gauge  EMG/NCS Machine    SKIN PREPARATION  Skin preparation was performed using an alcohol wipe.    GUIDANCE DESCRIPTION  Electro-myographic guidance was necessary throughout the neck and jaw portion of the procedure to accurately identify all areas of dystonic muscles while avoiding injection of non-dystonic muscles, neighboring nerves and nearby vascular structures.       AREA/MUSCLE INJECTED:  225 UNITS BOTOX = TOTAL DOSE, 2:1 DILUTION     1. FACIAL & SCALP MUSCLES: 85 UNITS BOTOX = TOTAL DOSE      Right Frontalis - 10 units of Botox in 2 site/s.  Left Frontalis - 10 units of Botox in 2 site/s.      Procerus - 5 units of Botox at 1 site/s.       Right  - 2.5 units of Botox in 1 site/s.  Left  - 2.5 units of Botox in 1 site/s.      Right Nasalis - 2.5 units of Botox at 1 site/s.           Left Nasalis - 2.5 units of Botox at 1 site/s.     Right Upper Occipitalis - 25 units of Botox at 5 site/s.   Left Upper  Occipitalis - 25 units of Botox at 5 site/s.         2. JAW MUSCLES: 90 UNITS BOTOX = TOTAL DOSE     Right Masseter - 20 units of Botox at 2 site/s.   Left Masseter - 20 units of Botox at 2 site/s.      Right Temporalis - 25 units of Botox at 5 site/s.  Left Temporalis - 25 units of Botox at 5 site/s.          3. SHOULDER & NECK MUSCLES: 50 UNITS BOTOX = TOTAL DOSE      Right Levator Scapula - 10 units of Botox at 2 site/s (neck and scapular insertion).  Left Levator Scapula - 10 units of Botox at 2 site/s (neck and scapular insertion).      Right Upper Trapezius (mid & low lateral) - 10 units of Botox at 3 site/s.  Left Upper Trapezius (mid & low lateral) - 10 units of Botox at 3 site/s.             Right Pectoralis Minor - 5 units of Botox at 1 site/s.                     Left Pectoralis Minor - 5 units of Botox at 1 site/s.         RESPONSE TO PROCEDURE  Valerie Sim tolerated the procedure well and there were no immediate complications. She was allowed to recover for an appropriate period of time and was discharged home in stable condition.      ASSESSMENT AND PLAN   Botulinum toxin injections: No changes made to Botox dose or distribution today. Patient will continue to monitor response and report at next appointment.  Referrals: None.   Medications: None.   Follow up: Valerie Sim was rescheduled for the next series of injections in 9 weeks, at which time we will evaluate response to today's injections. She may call the clinic prior with any questions or concerns prior to the next appointment.     Addie Malhotra MD

## 2024-10-23 ENCOUNTER — OFFICE VISIT (OUTPATIENT)
Dept: PHYSICAL MEDICINE AND REHAB | Facility: CLINIC | Age: 59
End: 2024-10-23
Payer: COMMERCIAL

## 2024-10-23 VITALS — SYSTOLIC BLOOD PRESSURE: 117 MMHG | DIASTOLIC BLOOD PRESSURE: 73 MMHG | HEART RATE: 72 BPM

## 2024-10-23 DIAGNOSIS — S06.0XAA CONCUSSION WITH UNKNOWN LOSS OF CONSCIOUSNESS STATUS, INITIAL ENCOUNTER: Primary | ICD-10-CM

## 2024-10-23 DIAGNOSIS — F07.81 POST CONCUSSION SYNDROME: ICD-10-CM

## 2024-10-23 PROCEDURE — 99215 OFFICE O/P EST HI 40 MIN: CPT | Performed by: PHYSICAL MEDICINE & REHABILITATION

## 2024-10-23 PROCEDURE — 99417 PROLNG OP E/M EACH 15 MIN: CPT | Performed by: PHYSICAL MEDICINE & REHABILITATION
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
## 2024-10-23 NOTE — NURSING NOTE
Chief Complaint   Patient presents with    Consult     Post concussion syndrome,  Post concussion syndrome - History of TBI and chronic pain/migraines, struggling with resuming exercise due to pain and associated issues, interested in assessment for options and management, consideration of targeted PT or other; Referred by Aleksandr Deutsch MD
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
## 2024-10-25 ENCOUNTER — PATIENT OUTREACH (OUTPATIENT)
Dept: CARE COORDINATION | Facility: CLINIC | Age: 59
End: 2024-10-25
Payer: COMMERCIAL

## 2024-10-25 DIAGNOSIS — G43.719 CHRONIC MIGRAINE WITHOUT AURA, INTRACTABLE, WITHOUT STATUS MIGRAINOSUS: Primary | ICD-10-CM

## 2024-11-08 ENCOUNTER — TELEPHONE (OUTPATIENT)
Dept: OPHTHALMOLOGY | Facility: CLINIC | Age: 59
End: 2024-11-08
Payer: COMMERCIAL

## 2024-11-10 ASSESSMENT — ANXIETY QUESTIONNAIRES
4. TROUBLE RELAXING: SEVERAL DAYS
2. NOT BEING ABLE TO STOP OR CONTROL WORRYING: SEVERAL DAYS
7. FEELING AFRAID AS IF SOMETHING AWFUL MIGHT HAPPEN: NOT AT ALL
GAD7 TOTAL SCORE: 7
1. FEELING NERVOUS, ANXIOUS, OR ON EDGE: MORE THAN HALF THE DAYS
5. BEING SO RESTLESS THAT IT IS HARD TO SIT STILL: SEVERAL DAYS
IF YOU CHECKED OFF ANY PROBLEMS ON THIS QUESTIONNAIRE, HOW DIFFICULT HAVE THESE PROBLEMS MADE IT FOR YOU TO DO YOUR WORK, TAKE CARE OF THINGS AT HOME, OR GET ALONG WITH OTHER PEOPLE: SOMEWHAT DIFFICULT
GAD7 TOTAL SCORE: 7
3. WORRYING TOO MUCH ABOUT DIFFERENT THINGS: SEVERAL DAYS
6. BECOMING EASILY ANNOYED OR IRRITABLE: SEVERAL DAYS
GAD7 TOTAL SCORE: 7
8. IF YOU CHECKED OFF ANY PROBLEMS, HOW DIFFICULT HAVE THESE MADE IT FOR YOU TO DO YOUR WORK, TAKE CARE OF THINGS AT HOME, OR GET ALONG WITH OTHER PEOPLE?: SOMEWHAT DIFFICULT
7. FEELING AFRAID AS IF SOMETHING AWFUL MIGHT HAPPEN: NOT AT ALL

## 2024-11-11 ENCOUNTER — THERAPY VISIT (OUTPATIENT)
Dept: OCCUPATIONAL THERAPY | Facility: CLINIC | Age: 59
End: 2024-11-11
Payer: COMMERCIAL

## 2024-11-11 ENCOUNTER — VIRTUAL VISIT (OUTPATIENT)
Dept: PSYCHOLOGY | Facility: CLINIC | Age: 59
End: 2024-11-11
Payer: COMMERCIAL

## 2024-11-11 DIAGNOSIS — F41.1 GAD (GENERALIZED ANXIETY DISORDER): Primary | ICD-10-CM

## 2024-11-11 DIAGNOSIS — F07.81 POSTCONCUSSION SYNDROME: Primary | ICD-10-CM

## 2024-11-11 DIAGNOSIS — S06.0XAA CONCUSSION WITH UNKNOWN LOSS OF CONSCIOUSNESS STATUS, INITIAL ENCOUNTER: ICD-10-CM

## 2024-11-11 PROCEDURE — 97530 THERAPEUTIC ACTIVITIES: CPT | Mod: GO | Performed by: OCCUPATIONAL THERAPIST

## 2024-11-11 PROCEDURE — 90834 PSYTX W PT 45 MINUTES: CPT | Mod: 95 | Performed by: MARRIAGE & FAMILY THERAPIST

## 2024-11-11 PROCEDURE — 97166 OT EVAL MOD COMPLEX 45 MIN: CPT | Mod: GO | Performed by: OCCUPATIONAL THERAPIST

## 2024-11-11 NOTE — PROGRESS NOTES
Answers submitted by the patient for this visit:  Patient Health Questionnaire (Submitted on 11/10/2024)  If you checked off any problems, how difficult have these problems made it for you to do your work, take care of things at home, or get along with other people?: Somewhat difficult  PHQ9 TOTAL SCORE: 9  Patient Health Questionnaire (G7) (Submitted on 11/10/2024)  MAHAMED 7 TOTAL SCORE: 7    Answers submitted by the patient for this visit:  Patient Health Questionnaire (Submitted on 10/7/2024)  If you checked off any problems, how difficult have these problems made it for you to do your work, take care of things at home, or get along with other people?: Somewhat difficult  PHQ9 TOTAL SCORE: 8  Patient Health Questionnaire (G7) (Submitted on 10/1/2024)  MAHAMED 7 TOTAL SCORE: 6        [facility]                                     Progress Note    Patient Name: Valerie Sim  Date:  24         Service Type: Individual  Session Start Time:  8:02a  Session End Time:   Session Length:   Session #:     Attendees: Client     Service Modality:  Video Visit:      Provider verified identity through the following two step process.  Patient provided:  Patient     Telemedicine Visit: The patient's condition can be safely assessed and treated via synchronous audio and visual telemedicine encounter.      Reason for Telemedicine Visit: Services only offered telehealth    Originating Site (Patient Location): Patient's home    Distant Site (Provider Location): Provider Remote Setting- Home Office    Consent:  The patient/guardian has verbally consented to: the potential risks and benefits of telemedicine (video visit) versus in person care; bill my insurance or make self-payment for services provided; and responsibility for payment of non-covered services.     Patient would like the video invitation sent by:  Send to e-mail at: [email or web address]    Mode of Communication:  Video Conference via  [employer]    Distant Location (Provider):  Off-site    As the provider I attest to compliance with applicable laws and regulations related to telemedicine.    DATA  Interactive Complexity: No  Crisis: No        Progress Since Last Session (Related to Symptoms / Goals / Homework):   Symptoms:      Homework: Completed in session      Episode of Care Goals: Minimal progress - PREPARATION (Decided to change - considering how); Intervened by negotiating a change plan and determining options / strategies for behavior change, identifying triggers, exploring social supports, and working towards setting a date to begin behavior change     Current / Ongoing Stressors and Concerns:  Last session 10/7, overall things are going very well. Had brain assessment recently which went well,        Treatment Objective(s) Addressed in This Session:   Increase interest, engagement, and pleasure in doing things       Intervention:   Motivational Interviewing    MI Intervention: Permission to raise concern or advise, Open-ended questions and Reflections: simple and complex     Change Talk Expressed by the Patient: Reasons to change Need to change    Provider Response to Change Talk: R - Reflected patient's change talk and S - Summarized patient's change talk statements     ASSESSMENT: Current Emotional / Mental Status (status of significant symptoms):   Risk status (Self / Other harm or suicidal ideation)   Patient denies current fears or concerns for personal safety.   Patient denies current or recent suicidal ideation or behaviors.   Patient denies current or recent homicidal ideation or behaviors.   Patient denies current or recent self injurious behavior or ideation.   Patient denies other safety concerns.   Patient reports there has been no change in risk factors since their last session.     Patient reports there has been no change in protective factors since their last session.     A safety and risk management plan has been developed  including: Patient consented to co-developed safety plan on 4/5/23.  Safety and risk management plan was reviewed.   Patient agreed to use safety plan should any safety concerns arise.  A copy was made available to the patient.     Appearance:   Appropriate    Eye Contact:   Good    Psychomotor Behavior: Normal    Attitude:   Cooperative    Orientation:   All   Speech    Rate / Production: Normal     Volume:  Normal    Mood:    Normal   Affect:    Appropriate    Thought Content:  Clear    Thought Form:  Coherent  Logical    Insight:    Good      Medication Review:   No changes to current psychiatric medication(s)     Medication Compliance:   Yes     Changes in Health Issues:   None reported     Chemical Use Review:   Substance Use: Chemical use reviewed, no active concerns identified      Tobacco Use: No current tobacco use.      Diagnosis:  MDD, moderate, recurrent    Collateral Reports Completed:   Not Applicable    PLAN: (Patient Tasks / Therapist Tasks / Other)        CAROLEE Mendez  11/11/24                                             ______________________________________________________________________    Individual Treatment Plan    Patient's Name: Valerie Sim  YOB: 1965    Date of Creation: 11/11/24  Date Treatment Plan Last Reviewed/Revised: 2/11/25    DSM5 Diagnoses: 296.32 (F33.1) Major Depressive Disorder, Recurrent Episode, Moderate _  Psychosocial / Contextual Factors:   PROMIS (reviewed every 90 days):     Referral / Collaboration:  Referral to another professional/service is not indicated at this time..    Anticipated number of session for this episode of care: 3-6 sessions  Anticipation frequency of session: Biweekly  Anticipated Duration of each session: 38-52 minutes  Treatment plan will be reviewed in 90 days or when goals have been changed.       MeasurableTreatment Goal(s) related to diagnosis / functional impairment(s)  Goal 1: Patient will engage in CBT skills  for depression sx reduction    Objective #A (Patient Action)    Patient will Identify negative self-talk and behaviors: challenge core beliefs, myths, and actions.  Status Continued 11/11/24    Intervention(s)  Therapist will teach emotional regulation skills. CBT skills .      Patient has reviewed and agreed to the above plan.      Yoni Alex, CAROLEE 11/11/24

## 2024-11-13 ENCOUNTER — MYC REFILL (OUTPATIENT)
Dept: FAMILY MEDICINE | Facility: CLINIC | Age: 59
End: 2024-11-13

## 2024-11-13 ENCOUNTER — MYC REFILL (OUTPATIENT)
Dept: NEUROLOGY | Facility: CLINIC | Age: 59
End: 2024-11-13

## 2024-11-13 ENCOUNTER — OFFICE VISIT (OUTPATIENT)
Dept: OPHTHALMOLOGY | Facility: CLINIC | Age: 59
End: 2024-11-13
Attending: PHYSICAL MEDICINE & REHABILITATION
Payer: COMMERCIAL

## 2024-11-13 ENCOUNTER — TELEPHONE (OUTPATIENT)
Dept: NEUROLOGY | Facility: CLINIC | Age: 59
End: 2024-11-13

## 2024-11-13 DIAGNOSIS — S06.0XAA CONCUSSION WITH UNKNOWN LOSS OF CONSCIOUSNESS STATUS, INITIAL ENCOUNTER: Primary | ICD-10-CM

## 2024-11-13 DIAGNOSIS — G43.009 MIGRAINE WITHOUT AURA AND WITHOUT STATUS MIGRAINOSUS, NOT INTRACTABLE: ICD-10-CM

## 2024-11-13 DIAGNOSIS — G43.719 INTRACTABLE CHRONIC MIGRAINE WITHOUT AURA AND WITHOUT STATUS MIGRAINOSUS: ICD-10-CM

## 2024-11-13 DIAGNOSIS — G43.909 MIGRAINE WITHOUT STATUS MIGRAINOSUS, NOT INTRACTABLE, UNSPECIFIED MIGRAINE TYPE: ICD-10-CM

## 2024-11-13 DIAGNOSIS — S06.0XAS CONCUSSION WITH UNKNOWN LOSS OF CONSCIOUSNESS STATUS, SEQUELA (H): Primary | ICD-10-CM

## 2024-11-13 DIAGNOSIS — M62.89 PELVIC FLOOR DYSFUNCTION: Primary | ICD-10-CM

## 2024-11-13 DIAGNOSIS — H52.13 MYOPIA OF BOTH EYES: ICD-10-CM

## 2024-11-13 RX ORDER — ELETRIPTAN HYDROBROMIDE 40 MG/1
40 TABLET, FILM COATED ORAL PRN
Qty: 30 TABLET | Refills: 0 | Status: SHIPPED | OUTPATIENT
Start: 2024-11-13

## 2024-11-13 RX ORDER — ESTRADIOL 0.1 MG/G
CREAM VAGINAL
Qty: 42.5 G | Refills: 1 | Status: SHIPPED | OUTPATIENT
Start: 2024-11-14

## 2024-11-13 RX ORDER — LIDOCAINE HYDROCHLORIDE 40 MG/ML
SOLUTION TOPICAL
Qty: 50 ML | Refills: 6 | Status: SHIPPED | OUTPATIENT
Start: 2024-11-13

## 2024-11-13 RX ORDER — KETOROLAC TROMETHAMINE 30 MG/ML
30 INJECTION, SOLUTION INTRAMUSCULAR; INTRAVENOUS
Qty: 6 ML | Refills: 0 | Status: SHIPPED | OUTPATIENT
Start: 2024-11-13

## 2024-11-13 ASSESSMENT — CONF VISUAL FIELD
OS_SUPERIOR_TEMPORAL_RESTRICTION: 0
OD_INFERIOR_TEMPORAL_RESTRICTION: 0
OS_NORMAL: 1
OS_INFERIOR_TEMPORAL_RESTRICTION: 0
METHOD: COUNTING FINGERS
OD_SUPERIOR_TEMPORAL_RESTRICTION: 0
OD_SUPERIOR_NASAL_RESTRICTION: 0
OS_SUPERIOR_NASAL_RESTRICTION: 0
OS_INFERIOR_NASAL_RESTRICTION: 0
OD_INFERIOR_NASAL_RESTRICTION: 0
OD_NORMAL: 1

## 2024-11-13 ASSESSMENT — REFRACTION_WEARINGRX
OS_CYLINDER: +0.50
OS_AXIS: 078
OD_CYLINDER: SPHERE
OD_SPHERE: -3.50
OD_ADD: +2.25
OS_ADD: +2.25
OS_SPHERE: -4.25
SPECS_TYPE: PAL

## 2024-11-13 ASSESSMENT — VISUAL ACUITY
OD_CC: 20/20-
OS_CC: 20/20
METHOD: SNELLEN - LINEAR
CORRECTION_TYPE: GLASSES

## 2024-11-13 ASSESSMENT — CUP TO DISC RATIO
OS_RATIO: 0.2
OD_RATIO: 0.2

## 2024-11-13 ASSESSMENT — EXTERNAL EXAM - RIGHT EYE: OD_EXAM: NORMAL

## 2024-11-13 ASSESSMENT — TONOMETRY
OD_IOP_MMHG: 15
IOP_METHOD: ICARE
OS_IOP_MMHG: 16

## 2024-11-13 ASSESSMENT — SLIT LAMP EXAM - LIDS
COMMENTS: NORMAL
COMMENTS: NORMAL

## 2024-11-13 ASSESSMENT — EXTERNAL EXAM - LEFT EYE: OS_EXAM: NORMAL

## 2024-11-13 NOTE — TELEPHONE ENCOUNTER
"[facility] Call Center    Phone Message    May a detailed message be left on voicemail: yes     Reason for Call: Other:       Melchor with [facility], requesting call back for clarification on RX for Syringe, Disposable, (SYRINGE LUER SLIP) 1 ML MISC.     Melchor states this RX is for the syringe only and not the needle. Would like to know if a separate RX for the needles will be sent or if an \"all-in-one\" product order needs to be placed. Requesting call back to [facility]  Phone #[phone]      Action Taken: Message routed to:  [facility] ([facility] Neurology    Travel Screening: Not Applicable                                                                        "

## 2024-11-13 NOTE — TELEPHONE ENCOUNTER
RN could not approve because  both medications are historical  Provider, please approve or deny the prescription.

## 2024-11-14 ENCOUNTER — THERAPY VISIT (OUTPATIENT)
Dept: PHYSICAL THERAPY | Facility: CLINIC | Age: 59
End: 2024-11-14
Attending: PHYSICAL MEDICINE & REHABILITATION
Payer: COMMERCIAL

## 2024-11-14 DIAGNOSIS — M62.81 MUSCLE WEAKNESS: ICD-10-CM

## 2024-11-14 DIAGNOSIS — S06.0XAA CONCUSSION WITH UNKNOWN LOSS OF CONSCIOUSNESS STATUS, INITIAL ENCOUNTER: Primary | ICD-10-CM

## 2024-11-14 DIAGNOSIS — R53.81 PHYSICAL DECONDITIONING: ICD-10-CM

## 2024-11-14 PROCEDURE — 97110 THERAPEUTIC EXERCISES: CPT | Mod: GP | Performed by: PHYSICAL THERAPIST

## 2024-11-14 PROCEDURE — 97161 PT EVAL LOW COMPLEX 20 MIN: CPT | Mod: GP | Performed by: PHYSICAL THERAPIST
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
## 2024-11-14 NOTE — PROGRESS NOTES
"[place] Concussion Treadmill Test (BCTT) Test Results Form:    Min  HR RPE Concussion Symptoms (Likert Scale) Speed/Incline Observations     80 7 1  - anxiety 0    Rest  80 7 1 - anxiety 0    1  100 7 2 - neck pain 3.2, 0%    2  105 8 2-3 - neck pain, anxiety 3.2, 1% \"I'm not breathing deep\"   3  108 9-10 2-3 3.2, 2%    4  110 10 3-4 3.2, 3% Pt feels balance is off.  \"I need to get my breath into my belly\"   5  110 9 4 - balance feels off 3.2, 4%    6  110 10 4 - balance feels off 3.2, 5%    7  114 10-11 2-3 3.2, 6%    8  118 10 2-3 3.2, 7%    9    STOP test due to time constraints     10         11         12         13         14         15         16         17         18         19         20                      "
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
## 2024-11-18 ENCOUNTER — THERAPY VISIT (OUTPATIENT)
Dept: OCCUPATIONAL THERAPY | Facility: CLINIC | Age: 59
End: 2024-11-18
Payer: COMMERCIAL

## 2024-11-18 DIAGNOSIS — S06.0XAA CONCUSSION WITH UNKNOWN LOSS OF CONSCIOUSNESS STATUS, INITIAL ENCOUNTER: Primary | ICD-10-CM

## 2024-11-18 DIAGNOSIS — F07.81 POSTCONCUSSION SYNDROME: ICD-10-CM

## 2024-11-23 ENCOUNTER — MYC MEDICAL ADVICE (OUTPATIENT)
Dept: FAMILY MEDICINE | Facility: CLINIC | Age: 59
End: 2024-11-23
Payer: COMMERCIAL

## 2024-11-25 ENCOUNTER — THERAPY VISIT (OUTPATIENT)
Dept: OCCUPATIONAL THERAPY | Facility: CLINIC | Age: 59
End: 2024-11-25
Payer: COMMERCIAL

## 2024-11-25 DIAGNOSIS — S06.0XAA CONCUSSION WITH UNKNOWN LOSS OF CONSCIOUSNESS STATUS, INITIAL ENCOUNTER: Primary | ICD-10-CM

## 2024-11-25 DIAGNOSIS — F07.81 POSTCONCUSSION SYNDROME: ICD-10-CM

## 2024-11-27 ENCOUNTER — ANCILLARY PROCEDURE (OUTPATIENT)
Dept: ULTRASOUND IMAGING | Facility: CLINIC | Age: 59
End: 2024-11-27
Attending: INTERNAL MEDICINE
Payer: COMMERCIAL

## 2024-11-27 ENCOUNTER — MYC MEDICAL ADVICE (OUTPATIENT)
Dept: PHYSICAL MEDICINE AND REHAB | Facility: CLINIC | Age: 59
End: 2024-11-27

## 2024-11-27 DIAGNOSIS — R22.1 LUMP ON NECK: ICD-10-CM

## 2024-11-27 PROCEDURE — 76536 US EXAM OF HEAD AND NECK: CPT

## 2024-12-03 ENCOUNTER — VIRTUAL VISIT (OUTPATIENT)
Dept: PSYCHIATRY | Facility: CLINIC | Age: 59
End: 2024-12-03
Attending: PSYCHIATRY & NEUROLOGY
Payer: COMMERCIAL

## 2024-12-03 VITALS — WEIGHT: 170.4 LBS | BODY MASS INDEX: 26.34 KG/M2

## 2024-12-03 DIAGNOSIS — F41.1 GAD (GENERALIZED ANXIETY DISORDER): ICD-10-CM

## 2024-12-03 DIAGNOSIS — F33.1 MODERATE EPISODE OF RECURRENT MAJOR DEPRESSIVE DISORDER (H): Primary | ICD-10-CM

## 2024-12-03 PROCEDURE — 99214 OFFICE O/P EST MOD 30 MIN: CPT | Mod: HN

## 2024-12-03 RX ORDER — LAMOTRIGINE 100 MG/1
100 TABLET ORAL DAILY
Qty: 30 TABLET | Refills: 2 | Status: SHIPPED | OUTPATIENT
Start: 2024-12-03

## 2024-12-03 RX ORDER — QUETIAPINE FUMARATE 25 MG/1
12.5-25 TABLET, FILM COATED ORAL
Qty: 60 TABLET | Refills: 2 | Status: SHIPPED | OUTPATIENT
Start: 2024-12-03

## 2024-12-03 RX ORDER — QUETIAPINE FUMARATE 50 MG/1
50 TABLET, FILM COATED ORAL AT BEDTIME
Qty: 30 TABLET | Refills: 2 | Status: SHIPPED | OUTPATIENT
Start: 2024-12-03

## 2024-12-03 ASSESSMENT — PAIN SCALES - GENERAL: PAINLEVEL_OUTOF10: SEVERE PAIN (6)
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
## 2024-12-03 NOTE — PATIENT INSTRUCTIONS
**For crisis resources, please see the information at the end of this document**   Patient Education    Thank you for coming to the [facility] [place] [facility].     Lab Testing:  If you had lab testing today and your results are reassuring or normal they will be mailed to you or sent through [employer] within 7 days. If the lab tests need quick action we will call you with the results. The phone number we will call with results is # [phone]. If this is not the best number please call our clinic and change the number.     Medication Refills:  If you need any refills please call your pharmacy and they will contact us. Our fax number for refills is [phone].   Three business days of notice are needed for general medication refill requests.   Five business days of notice are needed for controlled substance refill requests.   If you need to change to a different pharmacy, please contact the new pharmacy directly. The new pharmacy will help you get your medications transferred.     Contact Us:  Please call [phone] during business hours (8-5:00 M-F).   If you have medication related questions after clinic hours, or on the weekend, please call [phone].     Financial Assistance [phone]   Medical Records [phone]       MENTAL HEALTH CRISIS RESOURCES:  For a emergency help, please call 911 or go to the nearest Emergency Department.     Emergency Walk-In Options:   Em[facility] Unit @ [facility] ([place]): [phone] - Specialized mental health emergency area designed to be calming  [facility] ([place]): [phone]  [facility] ([place]): [phone]  [facility]): [phone]    [facility] Crisis Information:   [place]: [phone]  [place]: [phone]  aDvi (MARTHA) - Adult: [phone]     Child: [phone]  [place] - Adult: [phone]     Child: [phone]  [place]:  [phone]  List of all [facility] resources:   https://[place].gov/dhs/people-we-serve/adults/health-care/mental-health/resources/crisis-contacts.jsp    National Crisis Information:   Crisis Text Line: Text  [place]  to 011886  Suicide & Crisis Lifeline: 988  National Suicide Prevention Lifeline: 9-929-289-TALK (1-417.373.5818)       For online chat options, visit https://suicidepreventionlifeline.org/chat/  Poison Control Center: 1-873-832-9243  Trans Lifeline: 3-626-553-8793 - Hotline for transgender people of all ages  The Will Project: 7-471-612-9144 - Hotline for LGBT youth     For Non-Emergency Support:   Fast Tracker: Mental Health & Substance Use Disorder Resources -   https://www.[employer].org/

## 2024-12-03 NOTE — PROGRESS NOTES
"Virtual Visit Details    Type of service:  Video Visit     Originating Location (pt. Location): {video visit patient location:377662::\"Home\"}  {PROVIDER LOCATION On-site should be selected for visits conducted from your clinic location or adjoining [facility] hospital, academic office, or other nearby [facility] building. Off-site should be selected for all other provider locations, including home:346698}  Distant Location (provider location):  {virtual location provider:363965}  Platform used for Video Visit: {Virtual Visit Platforms:528442::\"Reduce Data\"}  "

## 2024-12-03 NOTE — NURSING NOTE
Current patient location: [address] [place] [id number]    Is the patient currently in the state of [place]? YES    Visit mode:VIDEO    If the visit is dropped, the patient can be reconnected by:VIDEO VISIT: Text to cell phone:   Telephone Information:   Mobile [phone]       Will anyone else be joining the visit? NO  (If patient encounters technical issues they should call [phone]    Are changes needed to the allergy or medication list? No    Are refills needed on medications prescribed by this physician? NO    Rooming Documentation:  Questionnaire(s) completed    Reason for visit: RECHECK    Mariely LEALF

## 2024-12-06 NOTE — TELEPHONE ENCOUNTER
"Writer called and spoke with patient.  She reports that last evening she was in the kitchen and dropped something on the floor, and when she went to stand back up she hit the back of her head on the underside of the cement countertop.  She did immediately have some swelling the size of a \"small soup bowl.\" She did not lose consciousness. Denies any nausea, vomiting, dizziness, vision changes, does have some pain.  The swelling has decreased to a \"small goose egg\" and still has some pain that wraps around to the front of her face.  She is not concerned, but Dr. Chambers did say that she needs to report any new head trauma.      We discussed that since Dr. Chambers has left practice here, we are in the process of transferring care of her patients and within our PM&R department we have Dr. Price (in person at [facility]) and Rebecca Noonan PA-C, (virtually).  She has been working with our clinic facilitator to schedule with new provider and she prefers to see Dr. Prcie as he sees patients in person.  We reviewed his schedule and she would like to be scheduled for 2/3 at 10:15am at [facility].  Writer stated that I will send a message to the clinic facilitator to schedule this.    Regarding her new head injury and current symptoms, writer reviewed with her that she should continue to apply ice or cold pack intermittently until the swelling subsides, take OTC ibuprofen or Tylenol for pain/discomfort, and if she experiences any dizziness, vision changes, nausea/vomiting, or if the swelling worsens, she should report to the ED immediately for evaluation.  She verbalized understanding and agreement with this plan.    Valerie Fisher RN on 12/6/2024 at 9:33 AM    "

## 2024-12-11 ENCOUNTER — OFFICE VISIT (OUTPATIENT)
Dept: PHYSICAL MEDICINE AND REHAB | Facility: CLINIC | Age: 59
End: 2024-12-11
Payer: COMMERCIAL

## 2024-12-11 VITALS
HEART RATE: 75 BPM | OXYGEN SATURATION: 98 % | DIASTOLIC BLOOD PRESSURE: 81 MMHG | SYSTOLIC BLOOD PRESSURE: 134 MMHG | RESPIRATION RATE: 16 BRPM

## 2024-12-11 DIAGNOSIS — G24.4 IDIOPATHIC OROFACIAL DYSTONIA: ICD-10-CM

## 2024-12-11 DIAGNOSIS — M54.81 BILATERAL OCCIPITAL NEURALGIA: ICD-10-CM

## 2024-12-11 DIAGNOSIS — G43.719 CHRONIC MIGRAINE WITHOUT AURA, INTRACTABLE, WITHOUT STATUS MIGRAINOSUS: Primary | ICD-10-CM

## 2024-12-11 RX ORDER — BUPIVACAINE HYDROCHLORIDE 2.5 MG/ML
7 INJECTION, SOLUTION EPIDURAL; INFILTRATION; INTRACAUDAL ONCE
Status: COMPLETED | OUTPATIENT
Start: 2024-12-11 | End: 2024-12-11

## 2024-12-11 RX ORDER — TRIAMCINOLONE ACETONIDE 40 MG/ML
40 INJECTION, SUSPENSION INTRA-ARTICULAR; INTRAMUSCULAR ONCE
Status: COMPLETED | OUTPATIENT
Start: 2024-12-11 | End: 2024-12-11

## 2024-12-11 RX ADMIN — BUPIVACAINE HYDROCHLORIDE 50 MG: 5 INJECTION, SOLUTION EPIDURAL; INTRACAUDAL at 10:44

## 2024-12-11 RX ADMIN — BUPIVACAINE HYDROCHLORIDE 17.5 MG: 2.5 INJECTION, SOLUTION EPIDURAL; INFILTRATION; INTRACAUDAL at 11:57

## 2024-12-11 RX ADMIN — TRIAMCINOLONE ACETONIDE 40 MG: 40 INJECTION, SUSPENSION INTRA-ARTICULAR; INTRAMUSCULAR at 12:00

## 2024-12-11 ASSESSMENT — PAIN SCALES - GENERAL: PAINLEVEL_OUTOF10: EXTREME PAIN (8)

## 2024-12-11 NOTE — LETTER
12/11/2024       RE: Valerie Sim  [address]  [place] [address]     Dear Colleague,    Thank you for referring your patient, Valerie Sim, to the [facility]IC[facility]IL[facility] CLIN[facility] [place] at [facility]. Please see a copy of my visit note below.      [facility]     PM&R CLINIC NOTE  BOTULINUM TOXIN PROCEDURE      HPI  No chief complaint on file.    Valerie Sim is a 59 year old female with a history of chronic migraine headaches who presents to clinic for botulinum toxin injections for management of chronic migraine headaches and excessive jaw clenching.     SINCE LAST VISIT  Valerie Sim was last seen for Botox injections on 10/9/2024, at which time she received 225 units of Botox.     Patient reports the following new medical problems since last visit: Patient was seen by concussion provider for her concussion needs from TBI that occurred in 2009. She was referred to PT for [employer] Treadmill Endurance test and strengthening, and OT for [facility]. She also went to Ophthalmology and was found to have good physical eye health. Additionally, on 12/5/2024, she bent over and was picking something up and ended up hitting her head and had a goose egg, with swelling the size of a soup cup. Since then, her activities have been reduced.          RESPONSE TO PREVIOUS TREATMENT    Side effects:  None      1.  Headache frequency during this injection cycle:  The past injection cycle, she experienced approximately 28 migraine headache days per month. This is compared to her baseline headache frequency of 30 severe headache days per month.     2.  Headache duration during this injection cycle:  Headache duration was between 4 hours and 6 days. Patient reports a few episodes of multiple day headaches during this injection cycle.     3.  Headache intensity during this injection cycle:     A.  6/10  =  Typical pain level.  B.  10/10  =  Worst pain level.  C.  6/10  =  Lowest pain level.    4.  Change in headache medication usage during this injection cycle:  No changes to migraine headache medications. She takes Ralpax, Toradol injections, Zofran, Oxygen supplementation, and intranasal lidocaine for migraine rescue.     5.  ER Visits During This Injection Cycle:  She uses Relpax, Toradol and Zofran as needed for migraine headaches, and oxygen occasionally for cluster headaches. Lidocaine nasal spray is used intermittently for cluster headaches, which is found to be quite helpful.      6.  Functional Performance:  Change in ADL's, social interaction, days lost from work, etc. Patient reports being able to more fully participate in social and family activities and responsibilities as headache symptoms have improved. She notes that specifically during this cycle, she missed 8 days of normal activity, and 4 days of work. This is better than before Botox was started.       PHYSICAL EXAM  VS: There were no vitals taken for this visit.   GEN: Pleasant and cooperative, in no acute distress  HEENT: No facial asymmetry    ALLERGIES  Allergies   Allergen Reactions     Fluoxetine Other (See Comments)     PN: LW Reaction: headache  PN: LW Reaction: headache  Migraine       Garlic Blisters, Cough, Dermatitis, Difficulty breathing, Headache, Hives, Itching, Nausea and Vomiting and Shortness Of Breath     Candesartan      Other reaction(s): Myalgia     Duloxetine      Other reaction(s): Headache  migraine     Iodine      Other reaction(s): Other (see comments)  PN: LW CM1: CONTRAST- iodine Reaction :     Metoclopramide      Other reaction(s): Headache  Caused increase in headaches     Perfume      Other reaction(s): Headache  head pain leading to migraines     Pregabalin      Other reaction(s): Headache, Myalgia     Trazodone Other (See Comments) and Unknown     Other reaction(s): Headache  Other reaction(s):  "Headache  Other reaction(s): Headache     Amitriptyline Hcl Other (See Comments)     Migraine       Candesartan Cilexetil-Hctz Muscle Pain (Myalgia)     Cyproheptadine Hcl      headaches     Desvenlafaxine      Migraine       Diatrizoate Unknown     PN: LW CM1: CONTRAST- iodine Reaction :     Diazepam      Other reaction(s): Vestibular Toxicity  PN: LW Reaction: vertigo  PN: LW Reaction: vertigo     Duloxetine Hcl Other (See Comments)     Migraine       Gabapentin Other (See Comments)     Galcanezumab      Other reaction(s): Headache     Imipramine Other (See Comments)     Migraine       Metoprolol Other (See Comments) and Unknown     headache  headache  headache     Morphine Other (See Comments)     Patient reports seizure   Other reaction(s): Unknown  Seizure; 5/111/21 updated info: patient states she had a seizure while having a migraine headache years ago and is not sure if it was due to morphine. She has tolerated Dilaudid since then.       No Clinical Screening - See Comments      PN: LW FI1: lactose intolerance LW FI2: shellfish  PN: LW CM1: CONTRAST- iodine Reaction :  PN: LW Other1: -nka     Nortriptyline Other (See Comments)     Migraine       Phenergan Dm [Promethazine-Dm] Other (See Comments)     seizures     Pregabalin Muscle Pain (Myalgia)     Promethazine      PN: LW Reaction: minimal sizures     Venlafaxine Other (See Comments)     PN: LW Reaction: migraine  PN: LW Reaction: migraine  Migraine       Wellbutrin [Bupropion Hydrobromide] Other (See Comments)     Migraine       Compazine Anxiety     Dihydroergotamine Anxiety and Other (See Comments)     Cardiac symptoms     Droperidol Anxiety     PN: LW Reaction: agitation     Medroxyprogesterone Hives     PN: LW Reaction: nausea     Prochlorperazine Anxiety     PN: LW Reaction: \"can't sit still\"       CURRENT MEDICATIONS    Current Outpatient Medications:      aMILoride (MIDAMOR) 5 MG tablet, Take 7.5 mg by mouth daily 2.5MG in the AM and 5MG in the " PM., Disp: , Rfl:      amLODIPine (NORVASC) 10 MG tablet, Take 10 mg by mouth daily, Disp: , Rfl:      amphetamine-dextroamphetamine (ADDERALL) 20 MG tablet, Take 1 tablet (20 mg) by mouth 2 times daily., Disp: 60 tablet, Rfl: 0     B Complex-Biotin-FA (B-COMPLEX PO), Take 1 tablet by mouth daily, Disp: , Rfl:      botulinum toxin type A (BOTOX) 100 units injection, Inject intramuscular. Every 18 weeks for pelvic floor spasms, Disp: , Rfl:      budesonide (RINOCORT AQUA) 32 MCG/ACT nasal spray, Spray 1 spray into both nostrils daily., Disp: , Rfl:      calcitRIOL (ROCALTROL) 0.25 MCG capsule, Take 0.25 mcg by mouth as needed, Disp: , Rfl:      Calcium-Magnesium-Vitamin D (CITRACAL SLOW RELEASE) 600- MG-MG-UNIT TB24, Take 600 mg by mouth 4 times daily, Disp: , Rfl:      docusate sodium (COLACE) 100 MG capsule, Take 1 capsule by mouth as needed for constipation, Disp: , Rfl:      eletriptan (RELPAX) 40 MG tablet, Take 1 tablet (40 mg) by mouth as needed for migraine. Take 40 mg by mouth as needed. If headache comes back or persists can take one more 40 mg after 2 hrs but not more than 80 mg in 24 hrs, Disp: 30 tablet, Rfl: 0     fexofenadine (ALLEGRA) 180 MG tablet, Take 180 mg by mouth daily, Disp: , Rfl:      HYDROmorphone (DILAUDID) 2 MG tablet, Take 0.5-1 tablets (1-2 mg) by mouth every 3 hours as needed (headache). 20 tablets = 3 month supply., Disp: 15 tablet, Rfl: 0     ketoconazole (NIZORAL) 2 % external shampoo, Apply topically to affected area(s) once daily if needed for Dry Scalp (itching and flaking scalp). Lather on damp scalp, leave on for 5min, then rinse with water., Disp: , Rfl:      ketorolac (TORADOL) 30 MG/ML injection, Inject 1 mL (30 mg) over 2 minutes into the muscle at onset of headache for other (migraine once in 24 hours. Max 5 days  per month)., Disp: 6 mL, Rfl: 0     lamoTRIgine (LAMICTAL) 100 MG tablet, Take 1 tablet (100 mg) by mouth daily., Disp: 30 tablet, Rfl: 2     lidocaine  "(XYLOCAINE) 4 % external solution, Spray 0.5 ml once in each nostril q 4-6 hours as needed for headache. lay down with head tilted back for 5 minutes after if preferred, Disp: 50 mL, Rfl: 6     Magnesium Oxide 500 MG TABS, Take 500 mg by mouth 2 times daily, Disp: , Rfl:      mupirocin (BACTROBAN) 2 % external ointment, Apply topically 3 times daily, Disp: , Rfl:      naloxone (NARCAN) 4 MG/0.1ML nasal spray, Spray 1 spray into one nostril alternating nostrils as needed for opioid reversal every 2-3 mins until assistance arrives., Disp: 2 each, Rfl: 1     nebivolol (BYSTOLIC) 5 MG tablet, Take 5 mg by mouth daily Take twice a day totaling 10 mg., Disp: , Rfl:      Needle, Disp, 25G X 1\" MISC, 2 each as needed (with toradol). 90-day supply, Disp: 6 each, Rfl: 1     ondansetron (ZOFRAN) 4 MG tablet, Take 1 tablet (4 mg) by mouth every 8 hours as needed for nausea, Disp: 30 tablet, Rfl: 1     order for DME, Equipment being ordered: Oxygen The patient has Cluster Headache and needs 10 liters/minute of high flow oxygen via nonrebreather mask prn headaches, Disp: 99 days, Rfl: 0     order for DME, Equipment being ordered: Oxygen and non-rebreather mask.   Use high flow oxygen (10-15 L/min) with non-rebreather mask, as needed for cluster headaches, Disp: 1 Units, Rfl: 11     potassium chloride ER (KLOR-CON M) 20 MEQ CR tablet, Take 1 tablet by mouth 2 times daily, Disp: , Rfl:      QUEtiapine (SEROQUEL) 25 MG tablet, Take 0.5-1 tablets (12.5-25 mg) by mouth nightly as needed (For sleep)., Disp: 60 tablet, Rfl: 2     QUEtiapine (SEROQUEL) 50 MG tablet, Take 1 tablet (50 mg) by mouth at bedtime., Disp: 30 tablet, Rfl: 2     Syringe, Disposable, (SYRINGE LUER SLIP) 1 ML MISC, 1 each as needed (with toradol/migraine)., Disp: 6 each, Rfl: 1     valACYclovir (VALTREX) 1000 mg tablet, Take 1,000 mg by mouth as needed., Disp: , Rfl:     Current Facility-Administered Medications:      botulinum toxin type A (BOTOX) 100 units " injection 225 Units, 225 Units, Intramuscular, See Admin Instructions, Addie Malhotra MD     bupivacaine (MARCAINE) 0.5% preservative free injection, 10 mL, Intradermal, Once,        BOTULINUM NEUROTOXIN INJECTION PROCEDURES    VERIFICATION OF PATIENT IDENTIFICATION AND PROCEDURE     Initials   Patient Name SES   Patient  SES   Procedure Verified by: DILAN     Prior to the start of the procedure and with procedural staff participation, I verbally confirmed the patient s identity using two indicators, relevant allergies, that the procedure was appropriate and matched the consent or emergent situation, and that the correct equipment/implants were available. Immediately prior to starting the procedure I conducted the Time Out with the procedural staff and re-confirmed the patient s name, procedure, and site/side. (The Joint Commission universal protocol was followed.)  Yes    Sedation (Moderate or Deep): None    ABOVE ASSESSMENTS PERFORMED BY    Addie Malhotra MD      INDICATIONS FOR PROCEDURES  Valerie Sim is a 59 year old patient with pain and jaw clenching secondary to the diagnosis of chronic migraine headaches and oromandibular dystonia. Her baseline symptoms have been recalcitrant to oral medications and conservative therapy.  She is here today for reinjection with Botox.    GOAL OF PROCEDURE  The goal of this procedure is to decrease pain and excessive jaw clenching due to chronic migraine headaches and bruxism.     TOTAL DOSE ADMINISTERED  Dose Administered:  250 units  Botox (Botulinum Toxin Type A)       2:1 Dilution   Unavoidable Drug Waste: Yes  Amount of drug waste (mL): 50 units Botox.  Reason for waste:  Single use vial  Diluent Used:  0.25% bupivacaine  Total Volume of Diluent Used:  5 ml  NDC #: Botox 100u ([address]-35)      CONSENT  The risks, benefits, and treatment options were discussed with Valerie Sim and she agreed to proceed.    Written consent was obtained by  [facility] .      EQUIPMENT USED  Needle-25mm stimulating/recording  Needle-30 gauge  EMG/NCS Machine    SKIN PREPARATION  Skin preparation was performed using an alcohol wipe.    GUIDANCE DESCRIPTION  Electro-myographic guidance was necessary throughout the neck and jaw portion of the procedure to accurately identify all areas of dystonic muscles while avoiding injection of non-dystonic muscles, neighboring nerves and nearby vascular structures.       AREA/MUSCLE INJECTED:  250 UNITS BOTOX = TOTAL DOSE, 2:1 DILUTION     1. FACIAL & SCALP MUSCLES: 85 UNITS BOTOX = TOTAL DOSE      Right Frontalis - 10 units of Botox in 2 site/s.  Left Frontalis - 10 units of Botox in 2 site/s.      Procerus - 5 units of Botox at 1 site/s.       Right  - 2.5 units of Botox in 1 site/s.  Left  - 2.5 units of Botox in 1 site/s.      Right Nasalis - 2.5 units of Botox at 1 site/s.           Left Nasalis - 2.5 units of Botox at 1 site/s.     Right Upper Occipitalis - 25 units of Botox at 5 site/s.   Left Upper Occipitalis - 25 units of Botox at 5 site/s.         2. JAW MUSCLES: 90 UNITS BOTOX = TOTAL DOSE     Right Masseter - 20 units of Botox at 2 site/s.   Left Masseter - 20 units of Botox at 2 site/s.      Right Temporalis - 25 units of Botox at 5 site/s.  Left Temporalis - 25 units of Botox at 5 site/s.          3. SHOULDER & NECK MUSCLES: 50 UNITS BOTOX = TOTAL DOSE      Right Levator Scapula - 10 units of Botox at 2 site/s (neck and scapular insertion).  Left Levator Scapula - 10 units of Botox at 2 site/s (neck and scapular insertion).      Right Upper Trapezius (mid & low lateral) - 10 units of Botox at 3 site/s.  Left Upper Trapezius (mid & low lateral) - 10 units of Botox at 3 site/s.             Right Pectoralis Minor - 5 units of Botox at 1 site/s.                     Left Pectoralis Minor - 5 units of Botox at 1 site/s.       PROCEDURE: Bilateral greater occipital nerve blocks.      Prior to the start of the procedure  and with procedural staff participation, I verbally confirmed the patient s identity using two indicators, relevant allergies, that the procedure was appropriate and matched the consent or emergent situation, and that the correct equipment/implants were available. Immediately prior to starting the procedure I conducted the Time Out with the procedural staff and re-confirmed the patient s name, procedure, and site/side. (The Joint Commission universal protocol was followed.)  Yes    Sedation (Moderate or Deep): None    Dru% lidocaine: 2 ml   0.25% bupivacaine: 7 ml   Kenalo mg = 1 ml      Area just inferior to insertion of the right and left superior trapezius insertion onto skull was cleansed with ChloraPrep. Needle was advanced anteriorly to base of skull then slightly withdrawn and injectate was injected in a fan-like distribution at different depths. A 10 ml mixture of 1% lidocaine, 0.5% bupivacaine and Kenalog was divided into two 5 ml syringes. Total injection volume = 5 ml per side.       RESPONSE TO PROCEDURE  Valerie Sim tolerated the procedure well and there were no immediate complications. She was allowed to recover for an appropriate period of time and was discharged home in stable condition.      ASSESSMENT AND PLAN   Botulinum toxin injections: Dose of Botox was increased from 225 units to 250 units in hopes of increasing effectiveness and prolonging duration of benefit of injections. She will continue to monitor her response and report at next appointment.    Referrals: None.   Medications: None.   Follow up: Valerie Sim was rescheduled for the next series of injections in 9 weeks, at which time we will evaluate response to today's injections. She may call the clinic prior with any questions or concerns prior to the next appointment.     Addie Malhotra MD       Again, thank you for allowing me to participate in the care of your patient.      Sincerely,    Addie Malhotra,  MD

## 2024-12-11 NOTE — PROGRESS NOTES
[facility]     PM&R CLINIC NOTE  BOTULINUM TOXIN PROCEDURE      HPI  No chief complaint on file.    Valerie Sim is a 59 year old female with a history of chronic migraine headaches who presents to clinic for botulinum toxin injections for management of chronic migraine headaches and excessive jaw clenching.     SINCE LAST VISIT  Valerie Sim was last seen for Botox injections on 10/9/2024, at which time she received 225 units of Botox.     Patient reports the following new medical problems since last visit: Patient was seen by concussion provider for her concussion needs from TBI that occurred in 2009. She was referred to PT for [place] [employer] [facility] test and strengthening, and OT for [facility]. She also went to Ophthalmology and was found to have good physical eye health. Additionally, on 12/5/2024, she bent over and was picking something up and ended up hitting her head and had a goose egg, with swelling the size of a soup cup. Since then, her activities have been reduced.          RESPONSE TO PREVIOUS TREATMENT    Side effects:  None      1.  Headache frequency during this injection cycle:  The past injection cycle, she experienced approximately 28 migraine headache days per month. This is compared to her baseline headache frequency of 30 severe headache days per month.     2.  Headache duration during this injection cycle:  Headache duration was between 4 hours and 6 days. Patient reports a few episodes of multiple day headaches during this injection cycle.     3.  Headache intensity during this injection cycle:    A.  6/10  =  Typical pain level.  B.  10/10  =  Worst pain level.  C.  6/10  =  Lowest pain level.    4.  Change in headache medication usage during this injection cycle:  No changes to migraine headache medications. She takes Ralpax, Toradol injections, Zofran, Oxygen supplementation, and intranasal lidocaine for migraine rescue.     5.  ER  Visits During This Injection Cycle:  She uses Relpax, Toradol and Zofran as needed for migraine headaches, and oxygen occasionally for cluster headaches. Lidocaine nasal spray is used intermittently for cluster headaches, which is found to be quite helpful.      6.  Functional Performance:  Change in ADL's, social interaction, days lost from work, etc. Patient reports being able to more fully participate in social and family activities and responsibilities as headache symptoms have improved. She notes that specifically during this cycle, she missed 8 days of normal activity, and 4 days of work. This is better than before Botox was started.       PHYSICAL EXAM  VS: There were no vitals taken for this visit.   GEN: Pleasant and cooperative, in no acute distress  HEENT: No facial asymmetry    ALLERGIES  Allergies   Allergen Reactions    Fluoxetine Other (See Comments)     PN: LW Reaction: headache  PN: LW Reaction: headache  Migraine      Garlic Blisters, Cough, Dermatitis, Difficulty breathing, Headache, Hives, Itching, Nausea and Vomiting and Shortness Of Breath    Candesartan      Other reaction(s): Myalgia    Duloxetine      Other reaction(s): Headache  migraine    Iodine      Other reaction(s): Other (see comments)  PN: LW CM1: CONTRAST- iodine Reaction :    Metoclopramide      Other reaction(s): Headache  Caused increase in headaches    Perfume      Other reaction(s): Headache  head pain leading to migraines    Pregabalin      Other reaction(s): Headache, Myalgia    Trazodone Other (See Comments) and Unknown     Other reaction(s): Headache  Other reaction(s): Headache  Other reaction(s): Headache    Amitriptyline Hcl Other (See Comments)     Migraine      Candesartan Cilexetil-Hctz Muscle Pain (Myalgia)    Cyproheptadine Hcl      headaches    Desvenlafaxine      Migraine      Diatrizoate Unknown     PN: LW CM1: CONTRAST- iodine Reaction :    Diazepam      Other reaction(s): Vestibular Toxicity  PN: LW Reaction:  "vertigo  PN: LW Reaction: vertigo    Duloxetine Hcl Other (See Comments)     Migraine      Gabapentin Other (See Comments)    Galcanezumab      Other reaction(s): Headache    Imipramine Other (See Comments)     Migraine      Metoprolol Other (See Comments) and Unknown     headache  headache  headache    Morphine Other (See Comments)     Patient reports seizure   Other reaction(s): Unknown  Seizure; 5/111/21 updated info: patient states she had a seizure while having a migraine headache years ago and is not sure if it was due to morphine. She has tolerated Dilaudid since then.      No Clinical Screening - See Comments      PN: LW FI1: lactose intolerance LW FI2: shellfish  PN: LW CM1: CONTRAST- iodine Reaction :  PN: LW Other1: -nka    Nortriptyline Other (See Comments)     Migraine      Phenergan Dm [Promethazine-Dm] Other (See Comments)     seizures    Pregabalin Muscle Pain (Myalgia)    Promethazine      PN: LW Reaction: minimal sizures    Venlafaxine Other (See Comments)     PN: LW Reaction: migraine  PN: LW Reaction: migraine  Migraine      Wellbutrin [Bupropion Hydrobromide] Other (See Comments)     Migraine      Compazine Anxiety    Dihydroergotamine Anxiety and Other (See Comments)     Cardiac symptoms    Droperidol Anxiety     PN: LW Reaction: agitation    Medroxyprogesterone Hives     PN: LW Reaction: nausea    Prochlorperazine Anxiety     PN: LW Reaction: \"can't sit still\"       CURRENT MEDICATIONS    Current Outpatient Medications:     aMILoride (MIDAMOR) 5 MG tablet, Take 7.5 mg by mouth daily 2.5MG in the AM and 5MG in the PM., Disp: , Rfl:     amLODIPine (NORVASC) 10 MG tablet, Take 10 mg by mouth daily, Disp: , Rfl:     amphetamine-dextroamphetamine (ADDERALL) 20 MG tablet, Take 1 tablet (20 mg) by mouth 2 times daily., Disp: 60 tablet, Rfl: 0    B Complex-Biotin-FA (B-COMPLEX PO), Take 1 tablet by mouth daily, Disp: , Rfl:     botulinum toxin type A (BOTOX) 100 units injection, Inject intramuscular. " Every 18 weeks for pelvic floor spasms, Disp: , Rfl:     budesonide (RINOCORT AQUA) 32 MCG/ACT nasal spray, Spray 1 spray into both nostrils daily., Disp: , Rfl:     calcitRIOL (ROCALTROL) 0.25 MCG capsule, Take 0.25 mcg by mouth as needed, Disp: , Rfl:     Calcium-Magnesium-Vitamin D (CITRACAL SLOW RELEASE) 600- MG-MG-UNIT TB24, Take 600 mg by mouth 4 times daily, Disp: , Rfl:     docusate sodium (COLACE) 100 MG capsule, Take 1 capsule by mouth as needed for constipation, Disp: , Rfl:     eletriptan (RELPAX) 40 MG tablet, Take 1 tablet (40 mg) by mouth as needed for migraine. Take 40 mg by mouth as needed. If headache comes back or persists can take one more 40 mg after 2 hrs but not more than 80 mg in 24 hrs, Disp: 30 tablet, Rfl: 0    fexofenadine (ALLEGRA) 180 MG tablet, Take 180 mg by mouth daily, Disp: , Rfl:     HYDROmorphone (DILAUDID) 2 MG tablet, Take 0.5-1 tablets (1-2 mg) by mouth every 3 hours as needed (headache). 20 tablets = 3 month supply., Disp: 15 tablet, Rfl: 0    ketoconazole (NIZORAL) 2 % external shampoo, Apply topically to affected area(s) once daily if needed for Dry Scalp (itching and flaking scalp). Lather on damp scalp, leave on for 5min, then rinse with water., Disp: , Rfl:     ketorolac (TORADOL) 30 MG/ML injection, Inject 1 mL (30 mg) over 2 minutes into the muscle at onset of headache for other (migraine once in 24 hours. Max 5 days  per month)., Disp: 6 mL, Rfl: 0    lamoTRIgine (LAMICTAL) 100 MG tablet, Take 1 tablet (100 mg) by mouth daily., Disp: 30 tablet, Rfl: 2    lidocaine (XYLOCAINE) 4 % external solution, Spray 0.5 ml once in each nostril q 4-6 hours as needed for headache. lay down with head tilted back for 5 minutes after if preferred, Disp: 50 mL, Rfl: 6    Magnesium Oxide 500 MG TABS, Take 500 mg by mouth 2 times daily, Disp: , Rfl:     mupirocin (BACTROBAN) 2 % external ointment, Apply topically 3 times daily, Disp: , Rfl:     naloxone (NARCAN) 4 MG/0.1ML nasal  "spray, Spray 1 spray into one nostril alternating nostrils as needed for opioid reversal every 2-3 mins until assistance arrives., Disp: 2 each, Rfl: 1    nebivolol (BYSTOLIC) 5 MG tablet, Take 5 mg by mouth daily Take twice a day totaling 10 mg., Disp: , Rfl:     Needle, Disp, 25G X 1\" MISC, 2 each as needed (with toradol). 90-day supply, Disp: 6 each, Rfl: 1    ondansetron (ZOFRAN) 4 MG tablet, Take 1 tablet (4 mg) by mouth every 8 hours as needed for nausea, Disp: 30 tablet, Rfl: 1    order for DME, Equipment being ordered: Oxygen The patient has Cluster Headache and needs 10 liters/minute of high flow oxygen via nonrebreather mask prn headaches, Disp: 99 days, Rfl: 0    order for DME, Equipment being ordered: Oxygen and non-rebreather mask.   Use high flow oxygen (10-15 L/min) with non-rebreather mask, as needed for cluster headaches, Disp: 1 Units, Rfl: 11    potassium chloride ER (KLOR-CON M) 20 MEQ CR tablet, Take 1 tablet by mouth 2 times daily, Disp: , Rfl:     QUEtiapine (SEROQUEL) 25 MG tablet, Take 0.5-1 tablets (12.5-25 mg) by mouth nightly as needed (For sleep)., Disp: 60 tablet, Rfl: 2    QUEtiapine (SEROQUEL) 50 MG tablet, Take 1 tablet (50 mg) by mouth at bedtime., Disp: 30 tablet, Rfl: 2    Syringe, Disposable, (SYRINGE LUER SLIP) 1 ML MISC, 1 each as needed (with toradol/migraine)., Disp: 6 each, Rfl: 1    valACYclovir (VALTREX) 1000 mg tablet, Take 1,000 mg by mouth as needed., Disp: , Rfl:     Current Facility-Administered Medications:     botulinum toxin type A (BOTOX) 100 units injection 225 Units, 225 Units, Intramuscular, See Admin Instructions, Standal, Addie Saeed MD    bupivacaine (MARCAINE) 0.5% preservative free injection, 10 mL, Intradermal, Once,        BOTULINUM NEUROTOXIN INJECTION PROCEDURES    VERIFICATION OF PATIENT IDENTIFICATION AND PROCEDURE     Initials   Patient Name SES   Patient  SES   Procedure Verified by: SES     Prior to the start of the procedure and with " procedural staff participation, I verbally confirmed the patient s identity using two indicators, relevant allergies, that the procedure was appropriate and matched the consent or emergent situation, and that the correct equipment/implants were available. Immediately prior to starting the procedure I conducted the Time Out with the procedural staff and re-confirmed the patient s name, procedure, and site/side. (The Joint Commission universal protocol was followed.)  Yes    Sedation (Moderate or Deep): None    ABOVE ASSESSMENTS PERFORMED BY    Addie Malhotra MD      INDICATIONS FOR PROCEDURES  Valerie Sim is a 59 year old patient with pain and jaw clenching secondary to the diagnosis of chronic migraine headaches and oromandibular dystonia. Her baseline symptoms have been recalcitrant to oral medications and conservative therapy.  She is here today for reinjection with Botox.    GOAL OF PROCEDURE  The goal of this procedure is to decrease pain and excessive jaw clenching due to chronic migraine headaches and bruxism.     TOTAL DOSE ADMINISTERED  Dose Administered:  250 units  Botox (Botulinum Toxin Type A)       2:1 Dilution   Unavoidable Drug Waste: Yes  Amount of drug waste (mL): 50 units Botox.  Reason for waste:  Single use vial  Diluent Used:  0.25% bupivacaine  Total Volume of Diluent Used:  5 ml  NDC #: Botox 100u ([address]-00)      CONSENT  The risks, benefits, and treatment options were discussed with Valerie Sim and she agreed to proceed.    Written consent was obtained by  ninoska .     EQUIPMENT USED  Needle-25mm stimulating/recording  Needle-30 gauge  EMG/NCS Machine    SKIN PREPARATION  Skin preparation was performed using an alcohol wipe.    GUIDANCE DESCRIPTION  Electro-myographic guidance was necessary throughout the neck and jaw portion of the procedure to accurately identify all areas of dystonic muscles while avoiding injection of non-dystonic muscles, neighboring nerves and nearby vascular  structures.       AREA/MUSCLE INJECTED:  250 UNITS BOTOX = TOTAL DOSE, 2:1 DILUTION     1. FACIAL & SCALP MUSCLES: 85 UNITS BOTOX = TOTAL DOSE      Right Frontalis - 10 units of Botox in 2 site/s.  Left Frontalis - 10 units of Botox in 2 site/s.      Procerus - 5 units of Botox at 1 site/s.       Right  - 2.5 units of Botox in 1 site/s.  Left  - 2.5 units of Botox in 1 site/s.      Right Nasalis - 2.5 units of Botox at 1 site/s.           Left Nasalis - 2.5 units of Botox at 1 site/s.     Right Upper Occipitalis - 25 units of Botox at 5 site/s.   Left Upper Occipitalis - 25 units of Botox at 5 site/s.         2. JAW MUSCLES: 90 UNITS BOTOX = TOTAL DOSE     Right Masseter - 20 units of Botox at 2 site/s.   Left Masseter - 20 units of Botox at 2 site/s.      Right Temporalis - 25 units of Botox at 5 site/s.  Left Temporalis - 25 units of Botox at 5 site/s.          3. SHOULDER & NECK MUSCLES: 50 UNITS BOTOX = TOTAL DOSE      Right Levator Scapula - 10 units of Botox at 2 site/s (neck and scapular insertion).  Left Levator Scapula - 10 units of Botox at 2 site/s (neck and scapular insertion).      Right Upper Trapezius (mid & low lateral) - 10 units of Botox at 3 site/s.  Left Upper Trapezius (mid & low lateral) - 10 units of Botox at 3 site/s.             Right Pectoralis Minor - 5 units of Botox at 1 site/s.                     Left Pectoralis Minor - 5 units of Botox at 1 site/s.       PROCEDURE: Bilateral greater occipital nerve blocks.      Prior to the start of the procedure and with procedural staff participation, I verbally confirmed the patient s identity using two indicators, relevant allergies, that the procedure was appropriate and matched the consent or emergent situation, and that the correct equipment/implants were available. Immediately prior to starting the procedure I conducted the Time Out with the procedural staff and re-confirmed the patient s name, procedure, and site/side.  (The Joint Commission universal protocol was followed.)  Yes    Sedation (Moderate or Deep): None    Dru% lidocaine: 2 ml   0.25% bupivacaine: 7 ml   [place] mg = 1 ml      Area just inferior to insertion of the right and left superior trapezius insertion onto skull was cleansed with ChloraPrep. Needle was advanced anteriorly to base of skull then slightly withdrawn and injectate was injected in a fan-like distribution at different depths. A 10 ml mixture of 1% lidocaine, 0.5% bupivacaine and Kenalog was divided into two 5 ml syringes. Total injection volume = 5 ml per side.       RESPONSE TO PROCEDURE  Valerie Sim tolerated the procedure well and there were no immediate complications. She was allowed to recover for an appropriate period of time and was discharged home in stable condition.      ASSESSMENT AND PLAN   Botulinum toxin injections: Dose of Botox was increased from 225 units to 250 units in hopes of increasing effectiveness and prolonging duration of benefit of injections. She will continue to monitor her response and report at next appointment.    Referrals: None.   Medications: None.   Follow up: Valerie Sim was rescheduled for the next series of injections in 9 weeks, at which time we will evaluate response to today's injections. She may call the clinic prior with any questions or concerns prior to the next appointment.     Addie Malhotra MD

## 2024-12-18 ENCOUNTER — VIRTUAL VISIT (OUTPATIENT)
Dept: NEUROLOGY | Facility: CLINIC | Age: 59
End: 2024-12-18
Attending: PHYSICAL MEDICINE & REHABILITATION
Payer: COMMERCIAL

## 2024-12-18 DIAGNOSIS — F43.10 POSTTRAUMATIC STRESS DISORDER: ICD-10-CM

## 2024-12-18 DIAGNOSIS — G47.00 INSOMNIA, UNSPECIFIED TYPE: ICD-10-CM

## 2024-12-18 DIAGNOSIS — G89.4 CHRONIC PAIN SYNDROME: ICD-10-CM

## 2024-12-18 DIAGNOSIS — S06.0XAA CONCUSSION WITH UNKNOWN LOSS OF CONSCIOUSNESS STATUS, INITIAL ENCOUNTER: ICD-10-CM

## 2024-12-18 DIAGNOSIS — F41.1 GAD (GENERALIZED ANXIETY DISORDER): ICD-10-CM

## 2024-12-18 DIAGNOSIS — F07.81 POSTCONCUSSION SYNDROME: ICD-10-CM

## 2024-12-18 DIAGNOSIS — F33.1 MODERATE EPISODE OF RECURRENT MAJOR DEPRESSIVE DISORDER (H): Primary | ICD-10-CM
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
## 2024-12-21 ENCOUNTER — MYC REFILL (OUTPATIENT)
Dept: FAMILY MEDICINE | Facility: CLINIC | Age: 59
End: 2024-12-21
Payer: COMMERCIAL

## 2024-12-21 DIAGNOSIS — F07.81 POST CONCUSSION SYNDROME: ICD-10-CM

## 2024-12-23 ENCOUNTER — VIRTUAL VISIT (OUTPATIENT)
Dept: PSYCHOLOGY | Facility: CLINIC | Age: 59
End: 2024-12-23
Payer: COMMERCIAL

## 2024-12-23 DIAGNOSIS — F41.1 GAD (GENERALIZED ANXIETY DISORDER): Primary | ICD-10-CM

## 2024-12-23 PROCEDURE — 90834 PSYTX W PT 45 MINUTES: CPT | Mod: 95 | Performed by: MARRIAGE & FAMILY THERAPIST

## 2024-12-23 RX ORDER — DEXTROAMPHETAMINE SACCHARATE, AMPHETAMINE ASPARTATE, DEXTROAMPHETAMINE SULFATE AND AMPHETAMINE SULFATE 5; 5; 5; 5 MG/1; MG/1; MG/1; MG/1
20 TABLET ORAL 2 TIMES DAILY
Qty: 60 TABLET | Refills: 0 | Status: SHIPPED | OUTPATIENT
Start: 2025-01-01

## 2024-12-23 NOTE — PROGRESS NOTES
[facility]                                     Progress Note    Patient Name: Valerie Sim  Date:  24         Service Type: Individual  Session Start Time:  8:03a  Session End Time: 8:51a  Session Length: 48  Session #: 32    Attendees: Client     Service Modality:  Video Visit:      Provider verified identity through the following two step process.  Patient provided:  Patient     Telemedicine Visit: The patient's condition can be safely assessed and treated via synchronous audio and visual telemedicine encounter.      Reason for Telemedicine Visit: Services only offered telehealth    Originating Site (Patient Location): Patient's home    Distant Site (Provider Location): Provider Remote Setting- Home Office    Consent:  The patient/guardian has verbally consented to: the potential risks and benefits of telemedicine (video visit) versus in person care; bill my insurance or make self-payment for services provided; and responsibility for payment of non-covered services.     Patient would like the video invitation sent by:  Send to e-mail at: @[email or web address]    Mode of Communication:  Video Conference via [employer]    Distant Location (Provider):  Off-site    As the provider I attest to compliance with applicable laws and regulations related to telemedicine.    DATA  Interactive Complexity: No  Crisis: No        Progress Since Last Session (Related to Symptoms / Goals / Homework):   Symptoms:  No change    Homework: Completed in session      Episode of Care Goals: Minimal progress - PREPARATION (Decided to change - considering how); Intervened by negotiating a change plan and determining options / strategies for behavior change, identifying triggers, exploring social supports, and working towards setting a date to begin behavior change     Current / Ongoing Stressors and Concerns:  Last session , overall things have been going much better. Working with an OT provider around  an auditory skill. Neuropsych testing scheduled for Feb, following up to assess changes.        Treatment Objective(s) Addressed in This Session:   Increase interest, engagement, and pleasure in doing things       Intervention:   Motivational Interviewing    MI Intervention: Permission to raise concern or advise, Open-ended questions and Reflections: simple and complex     Change Talk Expressed by the Patient: Reasons to change Need to change    Provider Response to Change Talk: R - Reflected patient's change talk and S - Summarized patient's change talk statements     ASSESSMENT: Current Emotional / Mental Status (status of significant symptoms):   Risk status (Self / Other harm or suicidal ideation)   Patient denies current fears or concerns for personal safety.   Patient denies current or recent suicidal ideation or behaviors.   Patient denies current or recent homicidal ideation or behaviors.   Patient denies current or recent self injurious behavior or ideation.   Patient denies other safety concerns.   Patient reports there has been no change in risk factors since their last session.     Patient reports there has been no change in protective factors since their last session.     A safety and risk management plan has been developed including: Patient consented to co-developed safety plan on 4/5/23.  Safety and risk management plan was reviewed.   Patient agreed to use safety plan should any safety concerns arise.  A copy was made available to the patient.     Appearance:   Appropriate    Eye Contact:   Good    Psychomotor Behavior: Normal    Attitude:   Cooperative    Orientation:   All   Speech    Rate / Production: Normal     Volume:  Normal    Mood:    Normal   Affect:    Appropriate    Thought Content:  Clear    Thought Form:  Coherent  Logical    Insight:    Good      Medication Review:   No changes to current psychiatric medication(s)     Medication Compliance:   Yes     Changes in Health Issues:   None  reported     Chemical Use Review:   Substance Use: Chemical use reviewed, no active concerns identified      Tobacco Use: No current tobacco use.      Diagnosis:  MDD, moderate, recurrent    Collateral Reports Completed:   Not Applicable    PLAN: (Patient Tasks / Therapist Tasks / Other)  Daily coping skills    CAROLEE Mendez  12/23/24                                             ______________________________________________________________________    Individual Treatment Plan    Patient's Name: Valerie Sim  YOB: 1965    Date of Creation: 11/11/24  Date Treatment Plan Last Reviewed/Revised: 2/11/25    DSM5 Diagnoses: 296.32 (F33.1) Major Depressive Disorder, Recurrent Episode, Moderate _  Psychosocial / Contextual Factors:   PROMIS (reviewed every 90 days):     Referral / Collaboration:  Referral to another professional/service is not indicated at this time..    Anticipated number of session for this episode of care: 3-6 sessions  Anticipation frequency of session: Biweekly  Anticipated Duration of each session: 38-52 minutes  Treatment plan will be reviewed in 90 days or when goals have been changed.       MeasurableTreatment Goal(s) related to diagnosis / functional impairment(s)  Goal 1: Patient will engage in CBT skills for depression sx reduction    Objective #A (Patient Action)    Patient will Identify negative self-talk and behaviors: challenge core beliefs, myths, and actions.  Status Continued 11/11/24    Intervention(s)  Therapist will teach emotional regulation skills. CBT skills .      Patient has reviewed and agreed to the above plan.      CAROLEE Mendez 11/11/24

## 2024-12-30 ENCOUNTER — THERAPY VISIT (OUTPATIENT)
Dept: OCCUPATIONAL THERAPY | Facility: CLINIC | Age: 59
End: 2024-12-30
Payer: COMMERCIAL

## 2024-12-30 DIAGNOSIS — S06.0XAA CONCUSSION WITH UNKNOWN LOSS OF CONSCIOUSNESS STATUS, INITIAL ENCOUNTER: Primary | ICD-10-CM

## 2024-12-30 DIAGNOSIS — F07.81 POSTCONCUSSION SYNDROME: ICD-10-CM

## 2025-01-09 ENCOUNTER — OFFICE VISIT (OUTPATIENT)
Dept: NEUROLOGY | Facility: CLINIC | Age: 60
End: 2025-01-09
Payer: COMMERCIAL

## 2025-01-09 DIAGNOSIS — F07.81 POSTCONCUSSION SYNDROME: ICD-10-CM

## 2025-01-09 DIAGNOSIS — G31.84 COGNITIVE IMPAIRMENT, MILD, SO STATED: Primary | ICD-10-CM

## 2025-01-09 NOTE — LETTER
1/9/2025      Valerie Sim  [address]  [place] [address]      Dear Colleague,    Thank you for referring your patient, Valerie Sim, to the [facility]. Please see a copy of my visit note below.     NEUROPSYCHOLOGY PROGRESS NOTE    NAME: Valerie Sim  YOB: 1965     DATE OF EVALUATION: 1/9/2025      Valerie Sim is a 59 year old female who was referred for a cognitive evaluation by [facility] provider, Jessica Chambers MD.  Ms. Sim already completed the clinical interview with me on 12/18/2024 and returns for another visit today in order to complete the testing portion of the appointment. She was administered a comprehensive neuropsychological battery with a trained examiner/technician. She was cooperative during testing and test results appear valid. Please refer to the note by trained examiner/technician from today's date for further details of today's visit.     Test results are currently still being compiled and scored; therefore, impressions and findings will be provided in a forthcoming report (typically posted within 1-2 weeks from today's date).     To view the full report and test results once they are posted, please refer to note dated 12/18/2024.      Earline Johns PsyD,   Licensed Clinical Neuropsychologist  [facility] [place] - [address], [address]  Phone: [phone]        The patient was seen for a neuropsychological evaluation for the purposes of diagnostic clarification and treatment planning. 95 minutes of face-to-face testing were provided by this writer. An additional 90 minutes were spent scoring and compiling test results. The patient was cooperative with testing. No concerns were brought to my attention. Please see Dr. Johns's report for a detailed description of the charges and interpretation and integration of the findings.      Again, thank you for allowing  me to participate in the care of your patient.        Sincerely,        Earline Johns Psy.D, LP    Electronically signed

## 2025-01-09 NOTE — PROGRESS NOTES
The patient was seen for a neuropsychological evaluation for the purposes of diagnostic clarification and treatment planning. 95 minutes of face-to-face testing were provided by this writer. An additional 90 minutes were spent scoring and compiling test results. The patient was cooperative with testing. No concerns were brought to my attention. Please see Dr. Johns's report for a detailed description of the charges and interpretation and integration of the findings.

## 2025-01-09 NOTE — PROGRESS NOTES
NEUROPSYCHOLOGY PROGRESS NOTE    NAME: Valerie Sim  YOB: 1965     DATE OF EVALUATION: 1/9/2025      Valerie Sim is a 59 year old female who was referred for a cognitive evaluation by [facility] provider, Jessica Chambers MD.  Ms. Sim already completed the clinical interview with me on 12/18/2024 and returns for another visit today in order to complete the testing portion of the appointment. She was administered a comprehensive neuropsychological battery with a trained examiner/technician. She was cooperative during testing and test results appear valid. Please refer to the note by trained examiner/technician from today's date for further details of today's visit.     Test results are currently still being compiled and scored; therefore, impressions and findings will be provided in a forthcoming report (typically posted within 1-2 weeks from today's date).     To view the full report and test results once they are posted, please refer to note dated 12/18/2024.      Earline Johns PsyD, LP  Licensed Clinical Neuropsychologist  [facility] [address], [address] [facility]  Phone: [phone]

## 2025-01-12 ENCOUNTER — E-VISIT (OUTPATIENT)
Dept: URGENT CARE | Facility: CLINIC | Age: 60
End: 2025-01-12
Payer: COMMERCIAL

## 2025-01-12 DIAGNOSIS — B96.89 ACUTE BACTERIAL SINUSITIS: Primary | ICD-10-CM

## 2025-01-12 DIAGNOSIS — J01.90 ACUTE BACTERIAL SINUSITIS: Primary | ICD-10-CM

## 2025-01-12 NOTE — PATIENT INSTRUCTIONS
Thank you for choosing us for your care. I have placed an order for a prescription so that you can start treatment. View your full visit summary for details by clicking on the link below. Your pharmacist will able to address any questions you may have about the medication.     If you're not feeling better within 5-7 days, please schedule an appointment.  You can schedule an appointment right here in [facility], or call [phone]  If the visit is for the same symptoms as your eVisit, we'll refund the cost of your eVisit if seen within seven days.

## 2025-01-14 ENCOUNTER — VIRTUAL VISIT (OUTPATIENT)
Dept: NEUROLOGY | Facility: CLINIC | Age: 60
End: 2025-01-14
Payer: COMMERCIAL

## 2025-01-14 DIAGNOSIS — F07.81 POSTCONCUSSION SYNDROME: ICD-10-CM

## 2025-01-14 DIAGNOSIS — F33.1 MODERATE EPISODE OF RECURRENT MAJOR DEPRESSIVE DISORDER (H): ICD-10-CM

## 2025-01-14 DIAGNOSIS — F43.10 POSTTRAUMATIC STRESS DISORDER: ICD-10-CM

## 2025-01-14 DIAGNOSIS — F41.1 GAD (GENERALIZED ANXIETY DISORDER): ICD-10-CM

## 2025-01-14 DIAGNOSIS — G89.4 CHRONIC PAIN SYNDROME: ICD-10-CM

## 2025-01-14 DIAGNOSIS — G31.84 COGNITIVE IMPAIRMENT, MILD, SO STATED: Primary | ICD-10-CM

## 2025-01-14 DIAGNOSIS — G47.00 INSOMNIA, UNSPECIFIED TYPE: ICD-10-CM

## 2025-01-14 ASSESSMENT — HEADACHE IMPACT TEST (HIT 6)
HOW OFTEN HAVE YOU FELT FED UP OR IRRITATED BECAUSE OF YOUR HEADACHES: VERY OFTEN
HOW OFTEN HAVE YOU FELT TOO TIRED TO WORK BECAUSE OF YOUR HEADACHES: SOMETIMES
HOW OFTEN DID HEADACHS LIMIT CONCENTRATION ON WORK OR DAILY ACTIVITY: SOMETIMES
WHEN YOU HAVE A HEADACHE HOW OFTEN DO YOU WISH YOU COULD LIE DOWN: SOMETIMES
HIT6 TOTAL SCORE: 62
WHEN YOU HAVE HEADACHES HOW OFTEN IS THE PAIN SEVERE: SOMETIMES
HOW OFTEN DO HEADACHES LIMIT YOUR DAILY ACTIVITIES: VERY OFTEN

## 2025-01-14 ASSESSMENT — MIGRAINE DISABILITY ASSESSMENT (MIDAS)
HOW MANY DAYS WAS YOUR PRODUCTIVITY CUT IN HALF BECAUSE OF HEADACHES: 3
HOW MANY DAYS DID YOU NOT DO HOUSEWORK BECAUSE OF HEADACHES: 3
ON A SCALE FROM 0-10 ON AVERAGE HOW PAINFUL WERE HEADACHES: 6
HOW MANY DAYS IN THE PAST 3 MONTHS HAVE YOU HAD A HEADACHE: 90
HOW MANY DAYS DID YOU MISS WORK OR SCHOOL BECAUSE OF HEADACHES: 7
HOW MANY DAYS WAS HOUSEWORK PRODUCTIVITY CUT IN HALF DUE TO HEADACHES: 3
TOTAL SCORE: 19
HOW OFTEN WERE SOCIAL ACTIVITIES MISSED DUE TO HEADACHES: 3

## 2025-01-14 NOTE — PROGRESS NOTES
"NEUROPSYCHOLOGY TELE-HEALTH FEEDBACK VISIT  [facility]    Video Visit  Valerie Sim is a 59 year old female who is being evaluated via a billable video visit.      The patient has been notified of the following:     \"This video visit will be conducted via a call between you and your provider. We have found that certain health care needs can be provided without the need for an in-person physical exam.  This service lets us provide the care you need with a video conversation. If during the course of the call the physician/provider feels a video visit is not appropriate, you will not be charged for this service.\"    Patient has given verbal consent to a Video visit? Yes  Consent has been obtained for this service by 1 care team member: yes.    Patient would like the video invitation sent by: [place]    Visit Summary  The purpose of today s appointment was to provide feedback regarding Ms. Sim's recent neuropsychological consult completed on 12/18/2024 and 1/9/2025. We began the session by discussing her experience during the evaluation. I provided Ms. Sim with detailed feedback regarding her performance on cognitive testing and her pattern of cognitive strengths and weaknesses.  I discussed my overall impressions and recommendations and provided the opportunity for Ms. Sim to ask any questions that she had about the evaluation. At the end of the session, she indicated that she understood the results and that I had answered all of her questions.       Earline Johns PsyD,   Licensed Clinical Neuropsychologist  [address], [address]  [place] [address]  Phone: [phone]      Video-Visit Details    Type of service:  Video Visit  Start Time: 2:04 PM  End Time: 2:53 PM    Originating Location (pt. Location): Home    Distant Location (provider location):  [facility]    Mode of Communication:  " Video Conference via [employer]    For diagnostic and coding purposes, Ms. Sim was referred for an evaluation of mild neurocognitive disorder. This Episode of Care was initiated on 12/18/2024, and all previous charges related to this Episode of Care were filed on 12/18/2024. Today's 49-minute visit includes an additional 1 unit of 15062. Please see the 12/18/2024 evaluation for a detailed description of codes and services previously rendered.

## 2025-01-14 NOTE — PATIENT INSTRUCTIONS
"SUMMARY OF FINDINGS:   Results of testing indicate that Ms. Sim is of estimated average-to-high average premorbid intellectual functioning, and nearly all of Ms. Sim's performances are generally commensurate with that estimate.  She exhibits subtle variability on measures of processing speed and verbal learning efficiency, with scores ranging from borderline impaired to average in each of those domains.  It may be that her variable processing speed impacts her learning efficiency at times, as the information is presented to her at a certain steady pace.  Her nonverbal learning efficiency is solidly average.  Even with variable learning efficiency, all of her memory scores are fully intact.  The only other score that is slightly below expectation is on a measure of spatial judgment; however, the majority of her visuospatial/constructional skills are quite strong.     All other cognitive test performances are consistently within expectation, including those on measures of attention/concentration, language abilities, verbal memory, nonverbal learning and memory, and executive functions (including cognitive inhibition, mental flexibility/set shifting, and abstract reasoning).     Emotionally, the patient endorses moderate depressive symptoms and mild anxiety on self-report questionnaires.  During the clinical interview, the patient reports her mood as \"pretty good,\" and noted that she has benefited greatly from ongoing mental health treatment (psychiatry and psychotherapy), and consistent participation in yoga and other exercises.  Suicidal ideation is denied.     Compared to prior testing most recently completed in 2019, there is NO evidence of decline in any of her test scores.  In fact, several of her test scores have significantly improved, including her nonverbal memory, some aspects of her verbal memory, some language abilities (including semantic fluency and verbal abstract reasoning), and her self-reported " "mood symptoms (her depressive symptoms were previously rated as \"severe\" and her anxiety as \"moderate\").  All other scores have remained stable.     IMPRESSIONS:  Overall, the variability noted above is inconsistent and insufficient to support the presence of cerebral dysfunction.  As such, Ms. Sim does NOT meet criteria for a cognitive disorder at this time.  The cognitive issues that she has been experiencing over the years are most likely due to a combination of non-neurologic factors that are known to interfere with cognitive performance, including:  Significant depression and anxiety (including a well-documented history of somatization)  Chronic pain  Sleep disturbance  There is no evidence of residual cognitive sequelae from her history of multiple concussions.  It appears that she has recovered from all of her prior brain injuries from a neurocognitive standpoint.  There is also no evidence of an emerging independent neurocognitive/neurodegenerative syndrome.  I suspect that improvements in her mood, chronic pain, and sleep quality will elicit perceived improvements in her cognitive functioning over time.  From purely a cognitive perspective, I have no significant concerns about Ms. Sim's current independent living situation or her ability to independently perform complex daily activities (including driving, medication and financial management, making complex decisions, etc.).     DIAGNOSTIC IMPRESSIONS:  No Cognitive Disorder     Major Depressive Disorder, Recurrent, Moderate (per history)     Generalized Anxiety Disorder (per history)     Posttraumatic Stress Disorder (per history)     RECOMMENDATIONS:  The patient is encouraged to utilize cognitive strategies in daily life for her own reassurance, particularly when she is feeling more overwhelmed, in greater physical pain, or fatigued. These strategies may include utilizing note pads, checklists, to-do lists, a calendar/planner, labeled alarm " reminders, a GPS, a pillbox, and maintaining a daily morning and nighttime routine and an organized living/work environment. Performing important/complex tasks or having important conversations should be done in a quiet, distraction-free environment (this includes putting away the cell phone, turning off the TV or music in the background, etc.).      Ms. Sim is encouraged to continue with psychotherapeutic and psychiatric interventions to help manage her mood symptoms.      Given her history of sleep disturbance, Ms. Sim may benefit from evaluating her current sleep hygiene behaviors and if need be, make changes to help facilitate sleep. Relaxation exercises (e.g., listening to soothing music, deep breathing, progressive muscle relaxation) might also help with sleep initiation. If she tends to have difficulty returning to sleep in the night or in falling asleep due to worrying or ruminating, strategies could be discussed with a psychotherapist. If these techniques do not improve her sleep, she may wish to consult her physician to assess for any underlying physical issues that may be impacting her sleep and to determine if a medication or a referral to Sleep Medicine is warranted.     Ongoing follow-up with her medical care team is encouraged to help optimize her pain management.     Ms. Sim is encouraged to remain physically, socially, and mentally active in order to optimize her brain health.     Neuropsychological follow-up is not clinically indicated at this time. However, the current test data can now serve as an updated baseline should a repeat assessment be warranted in the future.           Thank you for allowing me to participate in Ms. Sim's care. Please contact me with any questions regarding the content of this report.          Earline Johns PsyD,   Licensed Clinical Neuropsychologist  [facility]  [address], [address]  Phone:  [phone]      Cognitive Strategies  Take notes! Keep a small notepad with you in your purse/pocket/bag and write things down that you want to remember. You might also keep a notepad in a convenient location in your home (e.g., next to your telephone or calendar). Taking your notes in a note pad (instead of on multiple post-it notes) might help you stay organized, and you will also remember where to go to look for the notes that you took.  Create checklists, grocery lists, and to-do lists. Cross things off as you finish them.  Use a calendar or planner and get in the habit of checking it daily. Make it a part of your morning and/or nighttime routine to remind yourself of upcoming events, activities, or appointments. Cross the days off as they pass so that you can quickly glance at the calendar to know what day it is and what you have going on.  Set alarm reminders. For example, you can set an alarm to remind you to take your medications, turn off the oven, turn off the sprinkler outside, or to start getting ready for your appointment. If you use a smart phone, often times you can label the alarm that goes off so that you know what the alarm is for when it chimes.  Use a pillbox to follow your medication regimen. A pillbox acts as a nice visual cue to remind us to take our medications. If you have trouble remembering whether or not you have already taken your medications today, a pillbox can be extremely helpful to help with this issue.  Use a GPS when driving to help you stay on route.  When having an important conversation or completing an important task, reduce as many distractions in the environment as you can. For example, turn off the TV or the music in the background. Turn off or silence your cell phone. Clear your work area of everything that you do not need for the task at hand.  Establish set locations for certain items. For example, if you keep your keys on a hook by your door, you will always know  where to go to look for them.   Maintain a daily routine, especially for morning and nighttime tasks.    Sleep Hygiene    What is Sleep Hygiene?  'Sleep hygiene' is the term used to describe good sleep habits. Considerable research has gone into developing a set of guidelines and tips which are designed to enhance good sleeping, and there is much evidence to suggest that these strategies can provide long-term solutions to sleep difficulties.     Sleep Hygiene Tips:  1) Get regular. One of the best ways to train your body to sleep well is to go to bed and get up at more or less the same time every day, even on weekends and days off! This regular rhythm will make you feel better and will give your body something to work from.    2) Sleep when sleepy. Only try to sleep when you actually feel tired or sleepy, rather than spending too much time awake in bed.    3) Get up & try again. If you haven't been able to get to sleep after about 20 minutes or more, get up and do something calming or boring until you feel sleepy, then return to bed and try again. Sit quietly on the couch with the lights off (bright light will tell your brain that it is time to wake up), or read something boring like the phone book. Avoid doing anything that is too stimulating or interesting, as this will wake you up even more.    4) Avoid caffeine & nicotine. It is best to avoid consuming any caffeine (in coffee, tea, cola drinks, chocolate, and some medications) or nicotine (cigarettes) for at least 4-6 hours before going to bed. These substances act as stimulants and interfere with the ability to fall asleep.    5) Avoid alcohol. It is also best to avoid alcohol for at least 4-6 hours before going to bed. Many people believe that alcohol is relaxing and helps them to get to sleep at first, but it actually interrupts the quality of sleep.    6) Bed is for sleeping. Try not to use your bed for anything other than sleeping and sex, so that your body  comes to associate bed with sleep. If you use bed as a place to watch TV, eat, read, work on your laptop, pay bills, and other things, your body will not learn this connection.    7) No naps. It is best to avoid taking naps during the day, to make sure that you are tired at bedtime. If you can't make it through the day without a nap, make sure it is for less than an hour and before 3pm.    8) Sleep rituals. You can develop your own rituals of things to remind your body that it is time to sleep - some people find it useful to do relaxing stretches or breathing exercises for 15 minutes before bed each night, or sit calmly with a cup of caffeine-free tea.    9) Bath time. Having a hot bath 1-2 hours before bedtime can be useful, as it will raise your body temperature, causing you to feel sleepy as your body temperature drops again. Research shows that sleepiness is associated with a drop in body temperature.    10) No clock-watching. Many people who struggle with sleep tend to watch the clock too much. Frequently checking the clock during the night can wake you up (especially if you turn on the light to read the time) and reinforces negative thoughts such as  Oh no, look how late it is, I'll never get to sleep  or  it's so early, I have only slept for 5 hours, this is terrible.     11) Use a sleep diary. This worksheet can be a useful way of making sure you have the right facts about your sleep, rather than making assumptions. Because a diary involves watching the clock (see point 10) it is a good idea to only use it for two weeks to get an idea of what is going and then perhaps two months down the track to see how you are progressing.    12) Exercise. Regular exercise is a good idea to help with good sleep, but try not to do strenuous exercise in the 4 hours before bedtime. Morning walks are a great way to start the day feeling refreshed!    13) Eat right. A healthy, balanced diet will help you to sleep well, but  timing is important. Some people find that a very empty stomach at bedtime is distracting, so it can be useful to have a light snack, but a heavy meal soon before bed can also interrupt sleep. Some people recommend a warm glass of milk, which contains tryptophan, which acts as a natural sleep inducer.    14) The right space. It is very important that your bed and bedroom are quiet and comfortable for sleeping. A cooler room with enough blankets to stay warm is best, and make sure you have curtains or an eye mask to block out early morning light and earplugs if there is noise outside your room.    15) Keep daytime routine the same. Even if you have a bad night sleep and are tired it is important that you try to keep your daytime activities the same as you had planned. That is, don't avoid activities because you feel tired. This can reinforce the insomnia.    16) Take time to plan during the day. Do your thoughts keep you up all night? It is common for people to plan out the next day or run through their mental to-do list when they lay down to sleep. Instead of keeping your mind active in this way at night, take time to plan out the next day or week in the morning or afternoon and write down your plan/to-do list in a notebook or calendar. That way, when your thoughts drift to planning at night, you can put your mind at ease more quickly by reminding yourself that everything is already planned out.      Relaxation Techniques    Search on YouTube and follow along with the videos:  Diaphragmatic (deep) Breathing  Progressive Muscle Relaxation  Guided Imagery/Visualization  Meditation  Mindfulness activities    Relaxation Apps (guided relaxation audios/videos):  Calm  Head Space  Virtual Hope Box    Other Relaxing Activities:  Get some fresh air  Exercise/go for a walk  Take a warm bath  Adult coloring books  Relaxing music  Other activities you enjoy...

## 2025-01-14 NOTE — LETTER
"1/14/2025      Valerie Sim  [address]  [place] [address]      Dear Colleague,    Thank you for referring your patient, Valerie Sim, to the [facility]. Please see a copy of my visit note below.    NEUROPSYCHOLOGY TELE-HEALTH FEEDBACK VISIT  [facility]    Video Visit  Valerie Sim is a 59 year old female who is being evaluated via a billable video visit.      The patient has been notified of the following:     \"This video visit will be conducted via a call between you and your provider. We have found that certain health care needs can be provided without the need for an in-person physical exam.  This service lets us provide the care you need with a video conversation. If during the course of the call the physician/provider feels a video visit is not appropriate, you will not be charged for this service.\"    Patient has given verbal consent to a Video visit? Yes  Consent has been obtained for this service by 1 care team member: yes.    Patient would like the video invitation sent by: [facility]    Visit Summary  The purpose of today s appointment was to provide feedback regarding Ms. Sim's recent neuropsychological consult completed on 12/18/2024 and 1/9/2025. We began the session by discussing her experience during the evaluation. I provided Ms. Sim with detailed feedback regarding her performance on cognitive testing and her pattern of cognitive strengths and weaknesses.  I discussed my overall impressions and recommendations and provided the opportunity for Ms. Sim to ask any questions that she had about the evaluation. At the end of the session, she indicated that she understood the results and that I had answered all of her questions.       Earline Johns PsyD, LP  Licensed Clinical Neuropsychologist  [facility]  [address], [address]  [place] [address]  Phone: " [phone]      Video-Visit Details    Type of service:  Video Visit  Start Time: 2:04 PM  End Time: 2:53 PM    Originating Location (pt. Location): Home    Distant Location (provider location):  [facility]    Mode of Communication:  Video Conference via [employer]    For diagnostic and coding purposes, Ms. Sim was referred for an evaluation of mild neurocognitive disorder. This Episode of Care was initiated on 12/18/2024, and all previous charges related to this Episode of Care were filed on 12/18/2024. Today's 49-minute visit includes an additional 1 unit of 42481. Please see the 12/18/2024 evaluation for a detailed description of codes and services previously rendered.         Again, thank you for allowing me to participate in the care of your patient.        Sincerely,        Earline Johns Psy.D, LP    Electronically signed

## 2025-01-15 ENCOUNTER — OFFICE VISIT (OUTPATIENT)
Dept: NEUROLOGY | Facility: CLINIC | Age: 60
End: 2025-01-15
Payer: COMMERCIAL

## 2025-01-15 VITALS — OXYGEN SATURATION: 97 % | SYSTOLIC BLOOD PRESSURE: 115 MMHG | DIASTOLIC BLOOD PRESSURE: 73 MMHG | HEART RATE: 81 BPM

## 2025-01-15 DIAGNOSIS — G44.86 CERVICOGENIC HEADACHE: ICD-10-CM

## 2025-01-15 DIAGNOSIS — G44.019 EPISODIC CLUSTER HEADACHE, NOT INTRACTABLE: Primary | ICD-10-CM

## 2025-01-15 DIAGNOSIS — M54.2 NECK PAIN: ICD-10-CM

## 2025-01-15 DIAGNOSIS — H93.12 EAR NOISE/BUZZING, LEFT: ICD-10-CM

## 2025-01-15 DIAGNOSIS — G43.719 INTRACTABLE CHRONIC MIGRAINE WITHOUT AURA AND WITHOUT STATUS MIGRAINOSUS: ICD-10-CM

## 2025-01-15 ASSESSMENT — PATIENT HEALTH QUESTIONNAIRE - PHQ9: SUM OF ALL RESPONSES TO PHQ QUESTIONS 1-9: 11

## 2025-01-15 NOTE — PATIENT INSTRUCTIONS
Plan:  Cervical MRI for any structural problems -neck pain with head movement and radiating down to the upper shoulder.   Talk to dr Malhotra about TMD and left ear ringing and sounds   ENT suggested for ear evaluation   Continue with Botox and ONB -already established with Dr Malhotra   Rescue treatment -ketorolac ing as needed   Ondansetron as needed for nausea   Oxygen for cluster treatment -new prescription provided   Lidocaine nasal spray helps with with cluster headaches   Eletriptan as needed for migraine rescue     Follow up in 6 months or sooner if needed

## 2025-01-15 NOTE — PROGRESS NOTES
[facility]    Headache Neurology Progress Note  January 15, 2025      Assessment/Plan:   Valerie Sim is a 59 year old   Chronic daily headaches  Chronic migraines stable with current treatment  Left neck/shoulder pain with bending and Left ear noise -  Plan:  Cervical MRI for any structural problems -neck pain with head movement and radiating down to the upper shoulder.   Talk to dr Malhotra about TMD and left ear ringing and sounds   ENT suggested for ear evaluation   Continue with Botox and ONB -already established with Dr Malhotra   Rescue treatment -ketorolac ing as needed   Ondansetron as needed for nausea   Oxygen for cluster treatment -new prescription provided   Lidocaine nasal spray helps with with cluster headaches   Eletriptan as needed for migraine rescue     Follow up in 6 months or sooner if needed     Oxygen Documentation  I certify that this patient, Valerie Sim has been under my care  This is the face-to-face encounter for oxygen medical necessity.      At the time of this encounter, I have reviewed the qualifying testing and have determined that supplemental oxygen is reasonable and necessary and is expected to improve the patient's condition in a home setting.         Patient has continued oxygen desaturation due to  cluster headache  .    If portability is ordered, is the patient mobile within the home? yes    Was this visit performed as a telehealth visit: No    The longitudinal plan of care for Valerie was addressed during this visit. Due to the added complexity in care, I will continue to support Valerie in the subsequent management of this condition(s) and with the ongoing continuity of care of this condition(s).    51 minutes spent on the date of the encounter doing face to face access, chart  review,  results review,  meds review, treatment plan, documentation and further activities as noted above    ADORE Dwyer, CNP [facility]  "Clinic      Subjective:    Valerie Sim returns for follow up of headaches   Initial headache clinic visit on 5/8/2023.  Migraines since 1996 after deliver of her son.  Head injury in 2008.  History of chronic migraines and episodic cluster headaches and posttraumatic headaches.  Patient has been stable on her current headache treatment plan for over a year.  She was last seen in follow-up on 2/27/2024, see note for details.  Patient reported new visual symptoms of double vision and her visual symptoms prompted brain MRI which did not show any structural abnormalities to explain her symptoms.  Brain MRI images were reviewed with the patient today.   Updates today:  Botox and ONB - with Dr Malhotra and it helps. Occipital and trigeminal attacs are less with the injections.   Lidocaine nasal spray helps with with cluster headaches.   Oxygen -helps for clusters and new order placed  Last cluster attack this year -10/10/24-10/18/2024  Previous years -10/4/2023-10/12/2023   Ketorolac inj -helpful and no side effects. Self -injections. No problems. About 6 days per year-interval varies  Ondansetron for nausea as needed   Eletriptan as needed and works and no side effects.     Feb 2024 -left neck pain and down left shoulder. Triggered with bending and stays for weeks and intense. And feels pain tight to the left shoulder. Frustrated. Excedrin migraine helps.   Left ear ringing and distracting. Hard to to see a new. When flares up -pain/noise does not go away. Bouts every 3-7 days and lasting for several day.   Some dizziness -\"a half spin\"   Headaches daily but not as intense as used to be and just a new normal. No ED visits for headaches after starting treatment.      Headache Tx  Emgality -was terrible and triggered migraine and whole body response   Botox q9 weeks   ONB every 18 weeks   Sumatriptan -  Relpax works better than sumatriptan   Verapamil-side effects  Divalproex-hair loss and constipation  Lamictal 100 mg " BID and works better   Indomethacin-helped with postcoital headaches helped  Prednisone-as needed -no side effects   Topiramate-renal stones  Amitriptyline and SSRIs -side effects -worsening headaches   Oxygen helps with cluster and can help with other   zofran -helps as needed   nebivolol   Ketorolac IM-uses once or two times per month     History of depression. Has established care. Feels safe and has mental health support if needed.      Objective:    Vitals: /73 (BP Location: Right arm, Patient Position: Sitting, Cuff Size: Adult Regular)   Pulse 81   SpO2 97%   General: Cooperative, NAD  Neurologic:  Mental Status: Fully alert, attentive and oriented. Speech clear and fluent.   Cranial Nerves: Facial movements symmetric.   Motor: No abnormal movements.  Normal casual gait    Pertinent Investigations:    Soft tissue ultrasound of the left neck     Comparisons: None.     HISTORY: Left neck lump per history. The patient states it is more of  a pain.     FINDINGS: Targeted ultrasound in the area of symptoms posterior left  neck demonstrates only normal structures. No suspicious finding is  seen.                                                                      IMPRESSION: Negative ultrasound in the area of left neck symptoms.     ELDA WINTERS MD         SYSTEM ID:  [id number]        5/4/2023     8:06 AM 2/26/2024     8:19 AM 1/14/2025     5:29 PM   HIT-6   When you have headaches, how often is the pain severe 10 10 10   How often do headaches limit your ability to do usual daily activities including household work, work, school, or social activities? 11 10 11   When you have a headache, how often do you wish you could lie down? 8 10 10   In the past 4 weeks, how often have you felt too tired to do work or daily activities because of your headaches 10 10 10   In the past 4 weeks, how often have you felt fed up or irritated because of your headaches 11 10 11   In the past 4 weeks, how often did  headaches limit your ability to concentrate on work or daily activities 10 10 10   HIT-6 Total Score 60 60 62        Patient-reported           2/26/2024     8:23 AM 1/14/2025     5:32 PM   MIDAS - in the past three months:   On how many days did you miss work or school because of your headaches? 11 7   How many days was your productivity at work or school reduced by half or more because of your headaches? 5 3   On how many days did you not do household work because of your headaches? 5 3   How many days was your productivity in household work reduced by half or more because of your headaches? 5 3   On how many days did you miss family, social, or leisure activities because of your headaches? 3 3   On how many days did you have a headache? 90 90   On a scale of 0-10, on average how painful were these headaches? 10 6   MIDAS Score 29 (IV - Severe Disability) 19 (III - Moderate Disability)

## 2025-01-15 NOTE — LETTER
1/15/2025       RE: Valerie Sim  [address]  [place] [address]     Dear Colleague,    Thank you for referring your patient, Valerie Sim, to the [facility]. Please see a copy of my visit note below.    [facility]    Headache Neurology Progress Note  January 15, 2025      Assessment/Plan:   Valerie Sim is a 59 year old   Chronic daily headaches  Chronic migraines stable with current treatment  Left neck/shoulder pain with bending and Left ear noise -  Plan:  Cervical MRI for any structural problems -neck pain with head movement and radiating down to the upper shoulder.   Talk to dr Malhotra about TMD and left ear ringing and sounds   ENT suggested for ear evaluation   Continue with Botox and ONB -already established with Dr Malhotra   Rescue treatment -ketorolac ing as needed   Ondansetron as needed for nausea   Oxygen for cluster treatment -new prescription provided   Lidocaine nasal spray helps with with cluster headaches   Eletriptan as needed for migraine rescue     Follow up in 6 months or sooner if needed     Oxygen Documentation  I certify that this patient, Valerie Sim has been under my care  This is the face-to-face encounter for oxygen medical necessity.      At the time of this encounter, I have reviewed the qualifying testing and have determined that supplemental oxygen is reasonable and necessary and is expected to improve the patient's condition in a home setting.         Patient has continued oxygen desaturation due to  cluster headache  .    If portability is ordered, is the patient mobile within the home? yes    Was this visit performed as a telehealth visit: No    The longitudinal plan of care for Valerie was addressed during this visit. Due to the added complexity in care, I will continue to support Valerie in the subsequent management of this  "condition(s) and with the ongoing continuity of care of this condition(s).    51 minutes spent on the date of the encounter doing face to face access, chart  review,  results review,  meds review, treatment plan, documentation and further activities as noted above    ADORE Dwyer, CNP [facility]      Subjective:    Valerie Sim returns for follow up of headaches   Initial headache clinic visit on 5/8/2023.  Migraines since 1996 after deliver of her son.  Head injury in 2008.  History of chronic migraines and episodic cluster headaches and posttraumatic headaches.  Patient has been stable on her current headache treatment plan for over a year.  She was last seen in follow-up on 2/27/2024, see note for details.  Patient reported new visual symptoms of double vision and her visual symptoms prompted brain MRI which did not show any structural abnormalities to explain her symptoms.  Brain MRI images were reviewed with the patient today.   Updates today:  Botox and ONB - with Dr Malhotra and it helps. Occipital and trigeminal attacs are less with the injections.   Lidocaine nasal spray helps with with cluster headaches.   Oxygen -helps for clusters and new order placed  Last cluster attack this year -10/10/24-10/18/2024  Previous years -10/4/2023-10/12/2023   Ketorolac inj -helpful and no side effects. Self -injections. No problems. About 6 days per year-interval varies  Ondansetron for nausea as needed   Eletriptan as needed and works and no side effects.     Feb 2024 -left neck pain and down left shoulder. Triggered with bending and stays for weeks and intense. And feels pain tight to the left shoulder. Frustrated. Excedrin migraine helps.   Left ear ringing and distracting. Hard to to see a new. When flares up -pain/noise does not go away. Bouts every 3-7 days and lasting for several day.   Some dizziness -\"a half spin\"   Headaches daily but not as intense as used to be and just a new " normal. No ED visits for headaches after starting treatment.      Headache Tx  Emgality -was terrible and triggered migraine and whole body response   Botox q9 weeks   ONB every 18 weeks   Sumatriptan -  Relpax works better than sumatriptan   Verapamil-side effects  Divalproex-hair loss and constipation  Lamictal 100 mg BID and works better   Indomethacin-helped with postcoital headaches helped  Prednisone-as needed -no side effects   Topiramate-renal stones  Amitriptyline and SSRIs -side effects -worsening headaches   Oxygen helps with cluster and can help with other   zofran -helps as needed   nebivolol   Ketorolac IM-uses once or two times per month     History of depression. Has established care. Feels safe and has mental health support if needed.      Objective:    Vitals: /73 (BP Location: Right arm, Patient Position: Sitting, Cuff Size: Adult Regular)   Pulse 81   SpO2 97%   General: Cooperative, NAD  Neurologic:  Mental Status: Fully alert, attentive and oriented. Speech clear and fluent.   Cranial Nerves: Facial movements symmetric.   Motor: No abnormal movements.  Normal casual gait    Pertinent Investigations:    Soft tissue ultrasound of the left neck     Comparisons: None.     HISTORY: Left neck lump per history. The patient states it is more of  a pain.     FINDINGS: Targeted ultrasound in the area of symptoms posterior left  neck demonstrates only normal structures. No suspicious finding is  seen.                                                                      IMPRESSION: Negative ultrasound in the area of left neck symptoms.     ELDA WINTERS MD         SYSTEM ID:  [id number]        5/4/2023     8:06 AM 2/26/2024     8:19 AM 1/14/2025     5:29 PM   HIT-6   When you have headaches, how often is the pain severe 10 10 10   How often do headaches limit your ability to do usual daily activities including household work, work, school, or social activities? 11 10 11   When you have a  headache, how often do you wish you could lie down? 8 10 10   In the past 4 weeks, how often have you felt too tired to do work or daily activities because of your headaches 10 10 10   In the past 4 weeks, how often have you felt fed up or irritated because of your headaches 11 10 11   In the past 4 weeks, how often did headaches limit your ability to concentrate on work or daily activities 10 10 10   HIT-6 Total Score 60 60 62        Patient-reported           2/26/2024     8:23 AM 1/14/2025     5:32 PM   MIDAS - in the past three months:   On how many days did you miss work or school because of your headaches? 11 7   How many days was your productivity at work or school reduced by half or more because of your headaches? 5 3   On how many days did you not do household work because of your headaches? 5 3   How many days was your productivity in household work reduced by half or more because of your headaches? 5 3   On how many days did you miss family, social, or leisure activities because of your headaches? 3 3   On how many days did you have a headache? 90 90   On a scale of 0-10, on average how painful were these headaches? 10 6   MIDAS Score 29 (IV - Severe Disability) 19 (III - Moderate Disability)          Again, thank you for allowing me to participate in the care of your patient.      Sincerely,    ADORE Etienne CNP

## 2025-01-16 ENCOUNTER — TELEPHONE (OUTPATIENT)
Dept: PHYSICAL MEDICINE AND REHAB | Facility: CLINIC | Age: 60
End: 2025-01-16

## 2025-01-16 ENCOUNTER — MYC MEDICAL ADVICE (OUTPATIENT)
Dept: FAMILY MEDICINE | Facility: CLINIC | Age: 60
End: 2025-01-16

## 2025-01-16 ENCOUNTER — THERAPY VISIT (OUTPATIENT)
Dept: PHYSICAL THERAPY | Facility: CLINIC | Age: 60
End: 2025-01-16
Payer: COMMERCIAL

## 2025-01-16 DIAGNOSIS — M62.81 MUSCLE WEAKNESS: ICD-10-CM

## 2025-01-16 DIAGNOSIS — R53.81 PHYSICAL DECONDITIONING: ICD-10-CM

## 2025-01-16 DIAGNOSIS — S06.0XAA CONCUSSION WITH UNKNOWN LOSS OF CONSCIOUSNESS STATUS, INITIAL ENCOUNTER: Primary | ICD-10-CM

## 2025-01-16 NOTE — TELEPHONE ENCOUNTER
Returned call and spoke with pt, she requested that we cancel her appt with Dr Price, as she felt that this is not a brand new concussion, and that she has a plan in place for now, therefore not needing to keep the concussion is her request. Appt has been canceled.

## 2025-01-16 NOTE — TELEPHONE ENCOUNTER
[facility] Call Center    Phone Message    May a detailed message be left on voicemail: yes     Reason for Call: Other: Pt is calling and wanting to know if she should keep her appt   on 2/3/2025 due to seeing Dr.Al Cruz Please call Pt back to discuss.      Action Taken: Message routed to:  Other: [facility]    Travel Screening: Not Applicable     Date of Service:

## 2025-01-23 ENCOUNTER — VIRTUAL VISIT (OUTPATIENT)
Dept: UROLOGY | Facility: CLINIC | Age: 60
End: 2025-01-23
Payer: COMMERCIAL

## 2025-01-23 ENCOUNTER — THERAPY VISIT (OUTPATIENT)
Dept: OCCUPATIONAL THERAPY | Facility: CLINIC | Age: 60
End: 2025-01-23
Payer: COMMERCIAL

## 2025-01-23 DIAGNOSIS — S06.0XAA CONCUSSION WITH UNKNOWN LOSS OF CONSCIOUSNESS STATUS, INITIAL ENCOUNTER: Primary | ICD-10-CM

## 2025-01-23 DIAGNOSIS — F07.81 POSTCONCUSSION SYNDROME: ICD-10-CM

## 2025-01-23 DIAGNOSIS — N20.0 KIDNEY STONES, CALCIUM OXALATE: Primary | ICD-10-CM

## 2025-01-23 PROCEDURE — 97530 THERAPEUTIC ACTIVITIES: CPT | Mod: GO | Performed by: OCCUPATIONAL THERAPIST

## 2025-01-23 NOTE — NURSING NOTE
Is the patient currently in the state of [place]? YES    Location: home    Visit mode:VIDEO    If the visit is dropped, the patient can be reconnected by: VIDEO VISIT: Text to cell phone:   Telephone Information:   Mobile [phone]    and VIDEO VISIT: Send to e-mail at: @[email or web address]    Will anyone else be joining the visit? NO  (If patient encounters technical issues they should call [phone])    Are changes needed to the allergy or medication list? No    Are refills needed on medications prescribed by this physician? NO    Reason for visit: Consult      Yoselyn Kruger, Virtual Visit Facilitator    QNR Status: completed
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
## 2025-01-26 ENCOUNTER — MYC REFILL (OUTPATIENT)
Dept: FAMILY MEDICINE | Facility: CLINIC | Age: 60
End: 2025-01-26
Payer: COMMERCIAL

## 2025-01-26 DIAGNOSIS — F07.81 POST CONCUSSION SYNDROME: ICD-10-CM

## 2025-01-27 RX ORDER — DEXTROAMPHETAMINE SACCHARATE, AMPHETAMINE ASPARTATE, DEXTROAMPHETAMINE SULFATE AND AMPHETAMINE SULFATE 5; 5; 5; 5 MG/1; MG/1; MG/1; MG/1
20 TABLET ORAL 2 TIMES DAILY
Qty: 60 TABLET | Refills: 0 | Status: SHIPPED | OUTPATIENT
Start: 2025-01-27

## 2025-02-03 ENCOUNTER — ANCILLARY PROCEDURE (OUTPATIENT)
Dept: CT IMAGING | Facility: CLINIC | Age: 60
End: 2025-02-03
Attending: UROLOGY
Payer: COMMERCIAL

## 2025-02-03 DIAGNOSIS — N20.0 KIDNEY STONES, CALCIUM OXALATE: ICD-10-CM

## 2025-02-03 PROCEDURE — 74176 CT ABD & PELVIS W/O CONTRAST: CPT | Performed by: RADIOLOGY

## 2025-02-05 ENCOUNTER — MYC MEDICAL ADVICE (OUTPATIENT)
Dept: FAMILY MEDICINE | Facility: CLINIC | Age: 60
End: 2025-02-05
Payer: COMMERCIAL

## 2025-02-05 NOTE — TELEPHONE ENCOUNTER
See MyChart encounter below. Routing to provider to review and advise.    Please advise if able, looks like CT was ordered by urology though (Dr. Foreman), thank you      Martina Tam, RN, BSN  [facility]  [place], [place] and [place]

## 2025-02-06 ENCOUNTER — THERAPY VISIT (OUTPATIENT)
Dept: OCCUPATIONAL THERAPY | Facility: CLINIC | Age: 60
End: 2025-02-06
Payer: COMMERCIAL

## 2025-02-06 DIAGNOSIS — F07.81 POSTCONCUSSION SYNDROME: ICD-10-CM

## 2025-02-06 DIAGNOSIS — S06.0XAA CONCUSSION WITH UNKNOWN LOSS OF CONSCIOUSNESS STATUS, INITIAL ENCOUNTER: Primary | ICD-10-CM

## 2025-02-06 PROCEDURE — 97530 THERAPEUTIC ACTIVITIES: CPT | Mod: GO | Performed by: OCCUPATIONAL THERAPIST

## 2025-02-06 NOTE — PATIENT INSTRUCTIONS
"Home Programming Recommendations:     Use tactile trailing (feel for the edge of the counter with your wrist before putting items down)   In cleaning or searching for \"lost\" things.  Trail with your eyes and/or your hands to the boundaries of the surface (counter edges, wall corners, floor corners).    Use high contrast tape or other marker to give visual cue on replacement of items (cover an X with the vacuum for example).    "

## 2025-02-09 ENCOUNTER — ANCILLARY PROCEDURE (OUTPATIENT)
Dept: MRI IMAGING | Facility: CLINIC | Age: 60
End: 2025-02-09
Attending: NURSE PRACTITIONER
Payer: COMMERCIAL

## 2025-02-09 DIAGNOSIS — M54.2 NECK PAIN: ICD-10-CM

## 2025-02-09 DIAGNOSIS — G44.86 CERVICOGENIC HEADACHE: ICD-10-CM

## 2025-02-09 PROCEDURE — 72156 MRI NECK SPINE W/O & W/DYE: CPT | Performed by: RADIOLOGY

## 2025-02-09 PROCEDURE — A9585 GADOBUTROL INJECTION: HCPCS | Mod: JZ | Performed by: RADIOLOGY

## 2025-02-09 RX ORDER — GADOBUTROL 604.72 MG/ML
7.5 INJECTION INTRAVENOUS ONCE
Status: COMPLETED | OUTPATIENT
Start: 2025-02-09 | End: 2025-02-09

## 2025-02-09 RX ADMIN — GADOBUTROL 7.5 ML: 604.72 INJECTION INTRAVENOUS at 07:17

## 2025-02-10 ENCOUNTER — THERAPY VISIT (OUTPATIENT)
Dept: PHYSICAL THERAPY | Facility: CLINIC | Age: 60
End: 2025-02-10
Payer: COMMERCIAL

## 2025-02-10 DIAGNOSIS — M62.81 MUSCLE WEAKNESS: ICD-10-CM

## 2025-02-10 DIAGNOSIS — R53.81 PHYSICAL DECONDITIONING: ICD-10-CM

## 2025-02-10 DIAGNOSIS — S06.0XAA CONCUSSION WITH UNKNOWN LOSS OF CONSCIOUSNESS STATUS, INITIAL ENCOUNTER: Primary | ICD-10-CM

## 2025-02-10 PROCEDURE — 97140 MANUAL THERAPY 1/> REGIONS: CPT | Mod: GP | Performed by: PHYSICAL THERAPIST

## 2025-02-10 PROCEDURE — 97110 THERAPEUTIC EXERCISES: CPT | Mod: GP | Performed by: PHYSICAL THERAPIST

## 2025-02-11 NOTE — RESULT ENCOUNTER NOTE
Mitch Padilla,   Your neck MRI results reviewed and no abnormal findings at this time. We could try physical therapy to see if it helps with neck and shoulder pain. Let me know if interested in trying it.   We certainly can discuss the results and treatment options at your follow up visit.   Take care,  Briana

## 2025-02-12 ENCOUNTER — OFFICE VISIT (OUTPATIENT)
Dept: PHYSICAL MEDICINE AND REHAB | Facility: CLINIC | Age: 60
End: 2025-02-12
Payer: COMMERCIAL

## 2025-02-12 DIAGNOSIS — G43.719 CHRONIC MIGRAINE WITHOUT AURA, INTRACTABLE, WITHOUT STATUS MIGRAINOSUS: Primary | ICD-10-CM

## 2025-02-12 DIAGNOSIS — G24.4 IDIOPATHIC OROFACIAL DYSTONIA: ICD-10-CM

## 2025-02-12 PROCEDURE — 95874 GUIDE NERV DESTR NEEDLE EMG: CPT | Performed by: PHYSICAL MEDICINE & REHABILITATION

## 2025-02-12 PROCEDURE — 64615 CHEMODENERV MUSC MIGRAINE: CPT | Performed by: PHYSICAL MEDICINE & REHABILITATION

## 2025-02-12 RX ORDER — BUPIVACAINE HYDROCHLORIDE 2.5 MG/ML
10 INJECTION, SOLUTION EPIDURAL; INFILTRATION; INTRACAUDAL ONCE
Status: COMPLETED | OUTPATIENT
Start: 2025-02-12 | End: 2025-02-12

## 2025-02-12 RX ADMIN — BUPIVACAINE HYDROCHLORIDE 25 MG: 2.5 INJECTION, SOLUTION EPIDURAL; INFILTRATION; INTRACAUDAL at 10:44

## 2025-02-12 NOTE — LETTER
2/12/2025       RE: Valerie Sim  [address]  [place] [address]     Dear Colleague,    Thank you for referring your patient, Valerie Sim, to the [facility]IN[facility] [place] at [facility]. Please see a copy of my visit note below.      [facility]     PM&R CLINIC NOTE  BOTULINUM TOXIN PROCEDURE      HPI  No chief complaint on file.    Valerie Sim is a 59 year old female with a history of chronic migraine headaches who presents to clinic for botulinum toxin injections for management of chronic migraine headaches and excessive jaw clenching. She also presents for bilateral occipital nerve blocks for bilateral occipital neuralgia.    SINCE LAST VISIT  Valerie Sim was last seen for Botox injections on 12/11/2024, at which time she received 250 units of Botox, which was an increase from previous dose of 225 units of Botox.     Patient reports the following new medical problems since last visit: She underwent pelvic floor Botox last week. She had a four day kidney stone event, which were attributed to topiramate use and hyperparathyroidism. She has a consultation tomorrow fori ureterectomy.      RESPONSE TO PREVIOUS TREATMENT    Side effects:  None      1.  Headache frequency during this injection cycle:  The past injection cycle, she experienced approximately 28 migraine headache days per month. This is compared to her baseline headache frequency of 30 severe headache days per month.     2.  Headache duration during this injection cycle:  Headache duration was between 2 hours and one day. Patient reports a few episodes of multiple day headaches during this injection cycle.     3.  Headache intensity during this injection cycle:    A.  6/10  =  Typical pain level.  B.  9/10  =  Worst pain level.  C.  4/10  =  Lowest pain level.    4.  Change in headache medication usage  during this injection cycle:  No changes to migraine headache medications. She takes Ralpax, Toradol injections, Zofran, Oxygen supplementation, and intranasal lidocaine for migraine rescue. Over the last 9 weeks, she took 2 Ralpax, 7 Dilaudid (for pelvic floor and kidney stone pain), and one IM Toradol injection.     5.  ER Visits During This Injection Cycle: No ER visits for migraine headaches.     6.  Functional Performance:  Change in ADL's, social interaction, days lost from work, etc. Patient reports being able to more fully participate in social and family activities and responsibilities as headache symptoms have improved. She notes that specifically during this cycle, she missed 8 days of normal activity, and 4 days of work. This is better than before Botox was started.       PHYSICAL EXAM  VS: There were no vitals taken for this visit.   GEN: Pleasant and cooperative, in no acute distress  HEENT: No facial asymmetry    ALLERGIES  Allergies   Allergen Reactions     Fluoxetine Other (See Comments)     PN: LW Reaction: headache  PN: LW Reaction: headache  Migraine       Garlic Blisters, Cough, Dermatitis, Difficulty breathing, Headache, Hives, Itching, Nausea and Vomiting and Shortness Of Breath     Candesartan      Other reaction(s): Myalgia     Duloxetine      Other reaction(s): Headache  migraine     Iodine      Other reaction(s): Other (see comments)  PN: LW CM1: CONTRAST- iodine Reaction :     Metoclopramide      Other reaction(s): Headache  Caused increase in headaches     Perfume      Other reaction(s): Headache  head pain leading to migraines     Pregabalin      Other reaction(s): Headache, Myalgia     Trazodone Other (See Comments) and Unknown     Other reaction(s): Headache  Other reaction(s): Headache  Other reaction(s): Headache     Amitriptyline Hcl Other (See Comments)     Migraine       Candesartan Cilexetil-Hctz Muscle Pain (Myalgia)     Cyproheptadine Hcl      headaches     Desvenlafaxine       "Migraine       Diatrizoate Unknown     PN: LW CM1: CONTRAST- iodine Reaction :     Diazepam      Other reaction(s): Vestibular Toxicity  PN: LW Reaction: vertigo  PN: LW Reaction: vertigo     Duloxetine Hcl Other (See Comments)     Migraine       Gabapentin Other (See Comments)     Galcanezumab      Other reaction(s): Headache     Imipramine Other (See Comments)     Migraine       Metoprolol Other (See Comments) and Unknown     headache  headache  headache     Morphine Other (See Comments)     Patient reports seizure   Other reaction(s): Unknown  Seizure; 5/111/21 updated info: patient states she had a seizure while having a migraine headache years ago and is not sure if it was due to morphine. She has tolerated Dilaudid since then.       No Clinical Screening - See Comments      PN: LW FI1: lactose intolerance LW FI2: shellfish  PN: LW CM1: CONTRAST- iodine Reaction :  PN: LW Other1: -nka     Nortriptyline Other (See Comments)     Migraine       Phenergan Dm [Promethazine-Dm] Other (See Comments)     seizures     Pregabalin Muscle Pain (Myalgia)     Promethazine      PN: LW Reaction: minimal sizures     Venlafaxine Other (See Comments)     PN: LW Reaction: migraine  PN: LW Reaction: migraine  Migraine       Wellbutrin [Bupropion Hydrobromide] Other (See Comments)     Migraine       Compazine Anxiety     Dihydroergotamine Anxiety and Other (See Comments)     Cardiac symptoms     Droperidol Anxiety     PN: LW Reaction: agitation     Medroxyprogesterone Hives     PN: LW Reaction: nausea     Prochlorperazine Anxiety     PN: LW Reaction: \"can't sit still\"       CURRENT MEDICATIONS    Current Outpatient Medications:      aMILoride (MIDAMOR) 5 MG tablet, Take 7.5 mg by mouth daily 2.5MG in the AM and 5MG in the PM., Disp: , Rfl:      amLODIPine (NORVASC) 10 MG tablet, Take 10 mg by mouth daily, Disp: , Rfl:      amphetamine-dextroamphetamine (ADDERALL) 20 MG tablet, Take 1 tablet (20 mg) by mouth 2 times daily., Disp: 60 " tablet, Rfl: 0     B Complex-Biotin-FA (B-COMPLEX PO), Take 1 tablet by mouth daily, Disp: , Rfl:      botulinum toxin type A (BOTOX) 100 units injection, Inject intramuscular. Every 18 weeks for pelvic floor spasms, Disp: , Rfl:      budesonide (RINOCORT AQUA) 32 MCG/ACT nasal spray, Spray 1 spray into both nostrils daily., Disp: , Rfl:      calcitRIOL (ROCALTROL) 0.25 MCG capsule, Take 0.25 mcg by mouth as needed, Disp: , Rfl:      Calcium-Magnesium-Vitamin D (CITRACAL SLOW RELEASE) 600- MG-MG-UNIT TB24, Take 600 mg by mouth 4 times daily, Disp: , Rfl:      docusate sodium (COLACE) 100 MG capsule, Take 1 capsule by mouth as needed for constipation, Disp: , Rfl:      eletriptan (RELPAX) 40 MG tablet, Take 1 tablet (40 mg) by mouth as needed for migraine. Take 40 mg by mouth as needed. If headache comes back or persists can take one more 40 mg after 2 hrs but not more than 80 mg in 24 hrs, Disp: 30 tablet, Rfl: 0     fexofenadine (ALLEGRA) 180 MG tablet, Take 180 mg by mouth daily, Disp: , Rfl:      HYDROmorphone (DILAUDID) 2 MG tablet, Take 0.5-1 tablets (1-2 mg) by mouth every 3 hours as needed (headache). 20 tablets = 3 month supply., Disp: 15 tablet, Rfl: 0     ketoconazole (NIZORAL) 2 % external shampoo, Apply topically to affected area(s) once daily if needed for Dry Scalp (itching and flaking scalp). Lather on damp scalp, leave on for 5min, then rinse with water., Disp: , Rfl:      ketorolac (TORADOL) 30 MG/ML injection, Inject 1 mL (30 mg) over 2 minutes into the muscle at onset of headache for other (migraine once in 24 hours. Max 5 days  per month)., Disp: 6 mL, Rfl: 0     lamoTRIgine (LAMICTAL) 100 MG tablet, Take 1 tablet (100 mg) by mouth daily., Disp: 30 tablet, Rfl: 2     lidocaine (XYLOCAINE) 4 % external solution, Spray 0.5 ml once in each nostril q 4-6 hours as needed for headache. lay down with head tilted back for 5 minutes after if preferred, Disp: 50 mL, Rfl: 6     Magnesium Oxide 500 MG  "TABS, Take 500 mg by mouth 2 times daily, Disp: , Rfl:      mupirocin (BACTROBAN) 2 % external ointment, Apply topically 3 times daily, Disp: , Rfl:      naloxone (NARCAN) 4 MG/0.1ML nasal spray, Spray 1 spray into one nostril alternating nostrils as needed for opioid reversal every 2-3 mins until assistance arrives., Disp: 2 each, Rfl: 1     nebivolol (BYSTOLIC) 5 MG tablet, Take 5 mg by mouth daily Take twice a day totaling 10 mg., Disp: , Rfl:      Needle, Disp, 25G X 1\" MISC, 2 each as needed (with toradol). 90-day supply, Disp: 6 each, Rfl: 1     ondansetron (ZOFRAN) 4 MG tablet, Take 1 tablet (4 mg) by mouth every 8 hours as needed for nausea, Disp: 30 tablet, Rfl: 1     order for DME, Equipment being ordered: Oxygen The patient has Cluster Headache and needs 10 liters/minute of high flow oxygen via nonrebreather mask prn headaches, Disp: 99 days, Rfl: 0     order for DME, Equipment being ordered: Oxygen and non-rebreather mask.   Use high flow oxygen (10-15 L/min) with non-rebreather mask, as needed for cluster headaches, Disp: 1 Units, Rfl: 11     potassium chloride ER (KLOR-CON M) 20 MEQ CR tablet, Take 1 tablet by mouth 2 times daily, Disp: , Rfl:      QUEtiapine (SEROQUEL) 25 MG tablet, Take 0.5-1 tablets (12.5-25 mg) by mouth nightly as needed (For sleep)., Disp: 60 tablet, Rfl: 2     QUEtiapine (SEROQUEL) 50 MG tablet, Take 1 tablet (50 mg) by mouth at bedtime., Disp: 30 tablet, Rfl: 2     Syringe, Disposable, (SYRINGE LUER SLIP) 1 ML MISC, 1 each as needed (with toradol/migraine)., Disp: 6 each, Rfl: 1     valACYclovir (VALTREX) 1000 mg tablet, Take 1,000 mg by mouth as needed., Disp: , Rfl:     Current Facility-Administered Medications:      botulinum toxin type A (BOTOX) 100 units injection 225 Units, 225 Units, Intramuscular, See Admin Instructions, Standal, Addie Saeed MD, 225 Units at 12/11/24 1042       BOTULINUM NEUROTOXIN INJECTION PROCEDURES    VERIFICATION OF PATIENT IDENTIFICATION AND " PROCEDURE     Initials   Patient Name SES   Patient  SES   Procedure Verified by: DILAN     Prior to the start of the procedure and with procedural staff participation, I verbally confirmed the patient s identity using two indicators, relevant allergies, that the procedure was appropriate and matched the consent or emergent situation, and that the correct equipment/implants were available. Immediately prior to starting the procedure I conducted the Time Out with the procedural staff and re-confirmed the patient s name, procedure, and site/side. (The Joint Commission universal protocol was followed.)  Yes    Sedation (Moderate or Deep): None    ABOVE ASSESSMENTS PERFORMED BY    Addie Malhotra MD      INDICATIONS FOR PROCEDURES  Valerie Sim is a 59 year old patient with pain and jaw clenching secondary to the diagnosis of chronic migraine headaches and oromandibular dystonia. Her baseline symptoms have been recalcitrant to oral medications and conservative therapy.  She is here today for reinjection with Botox.    GOAL OF PROCEDURE  The goal of this procedure is to decrease pain and excessive jaw clenching due to chronic migraine headaches and bruxism.     TOTAL DOSE ADMINISTERED  Dose Administered:  250 units  Botox (Botulinum Toxin Type A)       2:1 Dilution   Unavoidable Drug Waste: Yes  Amount of drug waste (mL): 50 units Botox.  Reason for waste:  Single use vial  Diluent Used:  0.25% bupivacaine  Total Volume of Diluent Used:  5 ml  NDC #: Botox 100u ([address]-04)      CONSENT  The risks, benefits, and treatment options were discussed with Valerie Sim and she agreed to proceed.    Written consent was obtained by  [facility] .     EQUIPMENT USED  Needle-25mm stimulating/recording  Needle-30 gauge  EMG/NCS Machine    SKIN PREPARATION  Skin preparation was performed using an alcohol wipe.    GUIDANCE DESCRIPTION  Electro-myographic guidance was necessary throughout the neck and jaw portion of the procedure to  accurately identify all areas of dystonic muscles while avoiding injection of non-dystonic muscles, neighboring nerves and nearby vascular structures.       AREA/MUSCLE INJECTED:  250 UNITS BOTOX = TOTAL DOSE, 2:1 DILUTION     1. FACIAL & SCALP MUSCLES: 100 UNITS BOTOX = TOTAL DOSE      Right Frontalis - 10 units of Botox in 2 site/s.  Left Frontalis - 10 units of Botox in 2 site/s.      Procerus - 5 units of Botox at 1 site/s.       Right  - 5 units of Botox in 1 site/s.  Left  - 5 units of Botox in 1 site/s.      Right Nasalis - 2.5 units of Botox at 1 site/s.           Left Nasalis - 2.5 units of Botox at 1 site/s.     Right Upper Occipitalis - 30 units of Botox at 5 site/s.   Left Upper Occipitalis - 30 units of Botox at 5 site/s.         2. JAW MUSCLES: 100 UNITS BOTOX = TOTAL DOSE     Right Masseter - 25 units of Botox at 2 site/s.   Left Masseter - 25 units of Botox at 2 site/s.      Right Temporalis - 25 units of Botox at 5 site/s.  Left Temporalis - 25 units of Botox at 5 site/s.          3. SHOULDER & NECK MUSCLES: 50 UNITS BOTOX = TOTAL DOSE      Right Levator Scapula - 15 units of Botox at 2 site/s (neck and scapular insertion).  Left Levator Scapula - 5 units of Botox at 1 site/s (neck).      Right Upper Trapezius (mid & low lateral) - 10 units of Botox at 3 site/s.  Left Upper Trapezius (mid & low lateral) - 10 units of Botox at 3 site/s.             Right Pectoralis Minor - 5 units of Botox at 1 site/s.                     Left Pectoralis Minor - 5 units of Botox at 1 site/s.       RESPONSE TO PROCEDURE  Valerie Sim tolerated the procedure well and there were no immediate complications. She was allowed to recover for an appropriate period of time and was discharged home in stable condition.      ASSESSMENT AND PLAN   Botulinum toxin injections: No changes made to Botox dose or distribution today. Patient will continue to monitor response to Botox and report at next appointment.      Referrals: None.   Medications: None.   Follow up: Valerie Sim was rescheduled for the next series of injections in 9 weeks, at which time we will evaluate response to today's injections. She may call the clinic prior with any questions or concerns prior to the next appointment.     Addie Malhotra MD       Again, thank you for allowing me to participate in the care of your patient.      Sincerely,    Addie Malhotra MD

## 2025-02-12 NOTE — PROGRESS NOTES
[facility]     PM&R CLINIC NOTE  BOTULINUM TOXIN PROCEDURE      HPI  No chief complaint on file.    Valerie Sim is a 59 year old female with a history of chronic migraine headaches who presents to clinic for botulinum toxin injections for management of chronic migraine headaches and excessive jaw clenching. She also presents for bilateral occipital nerve blocks for bilateral occipital neuralgia.    SINCE LAST VISIT  Valerie Sim was last seen for Botox injections on 12/11/2024, at which time she received 250 units of Botox, which was an increase from previous dose of 225 units of Botox.     Patient reports the following new medical problems since last visit: She underwent pelvic floor Botox last week. She had a four day kidney stone event, which were attributed to topiramate use and hyperparathyroidism. She has a consultation tomorrow fori ureterectomy.      RESPONSE TO PREVIOUS TREATMENT    Side effects:  None      1.  Headache frequency during this injection cycle:  The past injection cycle, she experienced approximately 28 migraine headache days per month. This is compared to her baseline headache frequency of 30 severe headache days per month.     2.  Headache duration during this injection cycle:  Headache duration was between 2 hours and one day. Patient reports a few episodes of multiple day headaches during this injection cycle.     3.  Headache intensity during this injection cycle:    A.  6/10  =  Typical pain level.  B.  9/10  =  Worst pain level.  C.  4/10  =  Lowest pain level.    4.  Change in headache medication usage during this injection cycle:  No changes to migraine headache medications. She takes Ralpax, Toradol injections, Zofran, Oxygen supplementation, and intranasal lidocaine for migraine rescue. Over the last 9 weeks, she took 2 Ralpax, 7 Dilaudid (for pelvic floor and kidney stone pain), and one IM Toradol injection.     5.  ER Visits During This  Injection Cycle: No ER visits for migraine headaches.     6.  Functional Performance:  Change in ADL's, social interaction, days lost from work, etc. Patient reports being able to more fully participate in social and family activities and responsibilities as headache symptoms have improved. She notes that specifically during this cycle, she missed 8 days of normal activity, and 4 days of work. This is better than before Botox was started.       PHYSICAL EXAM  VS: There were no vitals taken for this visit.   GEN: Pleasant and cooperative, in no acute distress  HEENT: No facial asymmetry    ALLERGIES  Allergies   Allergen Reactions    Fluoxetine Other (See Comments)     PN: LW Reaction: headache  PN: LW Reaction: headache  Migraine      Garlic Blisters, Cough, Dermatitis, Difficulty breathing, Headache, Hives, Itching, Nausea and Vomiting and Shortness Of Breath    Candesartan      Other reaction(s): Myalgia    Duloxetine      Other reaction(s): Headache  migraine    Iodine      Other reaction(s): Other (see comments)  PN: LW CM1: CONTRAST- iodine Reaction :    Metoclopramide      Other reaction(s): Headache  Caused increase in headaches    Perfume      Other reaction(s): Headache  head pain leading to migraines    Pregabalin      Other reaction(s): Headache, Myalgia    Trazodone Other (See Comments) and Unknown     Other reaction(s): Headache  Other reaction(s): Headache  Other reaction(s): Headache    Amitriptyline Hcl Other (See Comments)     Migraine      Candesartan Cilexetil-Hctz Muscle Pain (Myalgia)    Cyproheptadine Hcl      headaches    Desvenlafaxine      Migraine      Diatrizoate Unknown     PN: LW CM1: CONTRAST- iodine Reaction :    Diazepam      Other reaction(s): Vestibular Toxicity  PN: LW Reaction: vertigo  PN: LW Reaction: vertigo    Duloxetine Hcl Other (See Comments)     Migraine      Gabapentin Other (See Comments)    Galcanezumab      Other reaction(s): Headache    Imipramine Other (See Comments)  "    Migraine      Metoprolol Other (See Comments) and Unknown     headache  headache  headache    Morphine Other (See Comments)     Patient reports seizure   Other reaction(s): Unknown  Seizure; 5/111/21 updated info: patient states she had a seizure while having a migraine headache years ago and is not sure if it was due to morphine. She has tolerated Dilaudid since then.      No Clinical Screening - See Comments      PN: LW FI1: lactose intolerance LW FI2: shellfish  PN: LW CM1: CONTRAST- iodine Reaction :  PN: LW Other1: -nka    Nortriptyline Other (See Comments)     Migraine      Phenergan Dm [Promethazine-Dm] Other (See Comments)     seizures    Pregabalin Muscle Pain (Myalgia)    Promethazine      PN: LW Reaction: minimal sizures    Venlafaxine Other (See Comments)     PN: LW Reaction: migraine  PN: LW Reaction: migraine  Migraine      Wellbutrin [Bupropion Hydrobromide] Other (See Comments)     Migraine      Compazine Anxiety    Dihydroergotamine Anxiety and Other (See Comments)     Cardiac symptoms    Droperidol Anxiety     PN: LW Reaction: agitation    Medroxyprogesterone Hives     PN: LW Reaction: nausea    Prochlorperazine Anxiety     PN: LW Reaction: \"can't sit still\"       CURRENT MEDICATIONS    Current Outpatient Medications:     aMILoride (MIDAMOR) 5 MG tablet, Take 7.5 mg by mouth daily 2.5MG in the AM and 5MG in the PM., Disp: , Rfl:     amLODIPine (NORVASC) 10 MG tablet, Take 10 mg by mouth daily, Disp: , Rfl:     amphetamine-dextroamphetamine (ADDERALL) 20 MG tablet, Take 1 tablet (20 mg) by mouth 2 times daily., Disp: 60 tablet, Rfl: 0    B Complex-Biotin-FA (B-COMPLEX PO), Take 1 tablet by mouth daily, Disp: , Rfl:     botulinum toxin type A (BOTOX) 100 units injection, Inject intramuscular. Every 18 weeks for pelvic floor spasms, Disp: , Rfl:     budesonide (RINOCORT AQUA) 32 MCG/ACT nasal spray, Spray 1 spray into both nostrils daily., Disp: , Rfl:     calcitRIOL (ROCALTROL) 0.25 MCG capsule, " Take 0.25 mcg by mouth as needed, Disp: , Rfl:     Calcium-Magnesium-Vitamin D (CITRACAL SLOW RELEASE) 600- MG-MG-UNIT TB24, Take 600 mg by mouth 4 times daily, Disp: , Rfl:     docusate sodium (COLACE) 100 MG capsule, Take 1 capsule by mouth as needed for constipation, Disp: , Rfl:     eletriptan (RELPAX) 40 MG tablet, Take 1 tablet (40 mg) by mouth as needed for migraine. Take 40 mg by mouth as needed. If headache comes back or persists can take one more 40 mg after 2 hrs but not more than 80 mg in 24 hrs, Disp: 30 tablet, Rfl: 0    fexofenadine (ALLEGRA) 180 MG tablet, Take 180 mg by mouth daily, Disp: , Rfl:     HYDROmorphone (DILAUDID) 2 MG tablet, Take 0.5-1 tablets (1-2 mg) by mouth every 3 hours as needed (headache). 20 tablets = 3 month supply., Disp: 15 tablet, Rfl: 0    ketoconazole (NIZORAL) 2 % external shampoo, Apply topically to affected area(s) once daily if needed for Dry Scalp (itching and flaking scalp). Lather on damp scalp, leave on for 5min, then rinse with water., Disp: , Rfl:     ketorolac (TORADOL) 30 MG/ML injection, Inject 1 mL (30 mg) over 2 minutes into the muscle at onset of headache for other (migraine once in 24 hours. Max 5 days  per month)., Disp: 6 mL, Rfl: 0    lamoTRIgine (LAMICTAL) 100 MG tablet, Take 1 tablet (100 mg) by mouth daily., Disp: 30 tablet, Rfl: 2    lidocaine (XYLOCAINE) 4 % external solution, Spray 0.5 ml once in each nostril q 4-6 hours as needed for headache. lay down with head tilted back for 5 minutes after if preferred, Disp: 50 mL, Rfl: 6    Magnesium Oxide 500 MG TABS, Take 500 mg by mouth 2 times daily, Disp: , Rfl:     mupirocin (BACTROBAN) 2 % external ointment, Apply topically 3 times daily, Disp: , Rfl:     naloxone (NARCAN) 4 MG/0.1ML nasal spray, Spray 1 spray into one nostril alternating nostrils as needed for opioid reversal every 2-3 mins until assistance arrives., Disp: 2 each, Rfl: 1    nebivolol (BYSTOLIC) 5 MG tablet, Take 5 mg by mouth  "daily Take twice a day totaling 10 mg., Disp: , Rfl:     Needle, Disp, 25G X 1\" MISC, 2 each as needed (with toradol). 90-day supply, Disp: 6 each, Rfl: 1    ondansetron (ZOFRAN) 4 MG tablet, Take 1 tablet (4 mg) by mouth every 8 hours as needed for nausea, Disp: 30 tablet, Rfl: 1    order for DME, Equipment being ordered: Oxygen The patient has Cluster Headache and needs 10 liters/minute of high flow oxygen via nonrebreather mask prn headaches, Disp: 99 days, Rfl: 0    order for DME, Equipment being ordered: Oxygen and non-rebreather mask.   Use high flow oxygen (10-15 L/min) with non-rebreather mask, as needed for cluster headaches, Disp: 1 Units, Rfl: 11    potassium chloride ER (KLOR-CON M) 20 MEQ CR tablet, Take 1 tablet by mouth 2 times daily, Disp: , Rfl:     QUEtiapine (SEROQUEL) 25 MG tablet, Take 0.5-1 tablets (12.5-25 mg) by mouth nightly as needed (For sleep)., Disp: 60 tablet, Rfl: 2    QUEtiapine (SEROQUEL) 50 MG tablet, Take 1 tablet (50 mg) by mouth at bedtime., Disp: 30 tablet, Rfl: 2    Syringe, Disposable, (SYRINGE LUER SLIP) 1 ML MISC, 1 each as needed (with toradol/migraine)., Disp: 6 each, Rfl: 1    valACYclovir (VALTREX) 1000 mg tablet, Take 1,000 mg by mouth as needed., Disp: , Rfl:     Current Facility-Administered Medications:     botulinum toxin type A (BOTOX) 100 units injection 225 Units, 225 Units, Intramuscular, See Admin Instructions, Standal, Addie Saeed MD, 225 Units at 24 1042       BOTULINUM NEUROTOXIN INJECTION PROCEDURES    VERIFICATION OF PATIENT IDENTIFICATION AND PROCEDURE     Initials   Patient Name SES   Patient  SES   Procedure Verified by: SES     Prior to the start of the procedure and with procedural staff participation, I verbally confirmed the patient s identity using two indicators, relevant allergies, that the procedure was appropriate and matched the consent or emergent situation, and that the correct equipment/implants were available. Immediately " prior to starting the procedure I conducted the Time Out with the procedural staff and re-confirmed the patient s name, procedure, and site/side. (The Joint Commission universal protocol was followed.)  Yes    Sedation (Moderate or Deep): None    ABOVE ASSESSMENTS PERFORMED BY    Addie Malhotra MD      INDICATIONS FOR PROCEDURES  Valerie Sim is a 59 year old patient with pain and jaw clenching secondary to the diagnosis of chronic migraine headaches and oromandibular dystonia. Her baseline symptoms have been recalcitrant to oral medications and conservative therapy.  She is here today for reinjection with Botox.    GOAL OF PROCEDURE  The goal of this procedure is to decrease pain and excessive jaw clenching due to chronic migraine headaches and bruxism.     TOTAL DOSE ADMINISTERED  Dose Administered:  250 units  Botox (Botulinum Toxin Type A)       2:1 Dilution   Unavoidable Drug Waste: Yes  Amount of drug waste (mL): 50 units Botox.  Reason for waste:  Single use vial  Diluent Used:  0.25% bupivacaine  Total Volume of Diluent Used:  5 ml  NDC #: Botox 100u ([address]-20)      CONSENT  The risks, benefits, and treatment options were discussed with Valerie Sim and she agreed to proceed.    Written consent was obtained by  [facility] .     EQUIPMENT USED  Needle-25mm stimulating/recording  Needle-30 gauge  EMG/NCS Machine    SKIN PREPARATION  Skin preparation was performed using an alcohol wipe.    GUIDANCE DESCRIPTION  Electro-myographic guidance was necessary throughout the neck and jaw portion of the procedure to accurately identify all areas of dystonic muscles while avoiding injection of non-dystonic muscles, neighboring nerves and nearby vascular structures.       AREA/MUSCLE INJECTED:  250 UNITS BOTOX = TOTAL DOSE, 2:1 DILUTION     1. FACIAL & SCALP MUSCLES: 100 UNITS BOTOX = TOTAL DOSE      Right Frontalis - 10 units of Botox in 2 site/s.  Left Frontalis - 10 units of Botox in 2 site/s.      Procerus - 5  units of Botox at 1 site/s.       Right  - 5 units of Botox in 1 site/s.  Left  - 5 units of Botox in 1 site/s.      Right Nasalis - 2.5 units of Botox at 1 site/s.           Left Nasalis - 2.5 units of Botox at 1 site/s.     Right Upper Occipitalis - 30 units of Botox at 5 site/s.   Left Upper Occipitalis - 30 units of Botox at 5 site/s.         2. JAW MUSCLES: 100 UNITS BOTOX = TOTAL DOSE     Right Masseter - 25 units of Botox at 2 site/s.   Left Masseter - 25 units of Botox at 2 site/s.      Right Temporalis - 25 units of Botox at 5 site/s.  Left Temporalis - 25 units of Botox at 5 site/s.          3. SHOULDER & NECK MUSCLES: 50 UNITS BOTOX = TOTAL DOSE      Right Levator Scapula - 15 units of Botox at 2 site/s (neck and scapular insertion).  Left Levator Scapula - 5 units of Botox at 1 site/s (neck).      Right Upper Trapezius (mid & low lateral) - 10 units of Botox at 3 site/s.  Left Upper Trapezius (mid & low lateral) - 10 units of Botox at 3 site/s.             Right Pectoralis Minor - 5 units of Botox at 1 site/s.                     Left Pectoralis Minor - 5 units of Botox at 1 site/s.       RESPONSE TO PROCEDURE  Valerie Sim tolerated the procedure well and there were no immediate complications. She was allowed to recover for an appropriate period of time and was discharged home in stable condition.      ASSESSMENT AND PLAN   Botulinum toxin injections: No changes made to Botox dose or distribution today. Patient will continue to monitor response to Botox and report at next appointment.     Referrals: None.   Medications: None.   Follow up: Valerie Sim was rescheduled for the next series of injections in 9 weeks, at which time we will evaluate response to today's injections. She may call the clinic prior with any questions or concerns prior to the next appointment.     Addie Malhotra MD

## 2025-02-13 ENCOUNTER — VIRTUAL VISIT (OUTPATIENT)
Dept: UROLOGY | Facility: CLINIC | Age: 60
End: 2025-02-13
Payer: COMMERCIAL

## 2025-02-13 DIAGNOSIS — N20.0 KIDNEY STONE ON LEFT SIDE: Primary | ICD-10-CM

## 2025-02-13 NOTE — NURSING NOTE
Current patient location: [place]    Is the patient currently in the state of [place]? YES    Visit mode: VIDEO    If the visit is dropped, the patient can be reconnected by:VIDEO VISIT: Send to e-mail at: [email or web address]    Will anyone else be joining the visit? NO  (If patient encounters technical issues they should call [phone]    Are changes needed to the allergy or medication list? Yes, patient stated there have been changes to her medication list, but she said she will upload them to "[employer] as she doesn't have her list currently. VF unable to review.     Are refills needed on medications prescribed by this physician? Discuss with provider    Rooming Documentation:  Not applicable    Reason for visit: RECHECK (Patient passed kidney stone a week ago. )    Va CHOI

## 2025-02-13 NOTE — PROGRESS NOTES
"[facility]   CHIEF COMPLAINT   It was my pleasure to see Valerie Sim who is a 59 year old female for follow-up of recurrent nephrolithiasis.      HPI   Valerie Sim is a very pleasant 59 year old female     Initially seen 1/23/2025:  Valerie Sim is a 59 year old female who is being seen for evaluation of recurrent nephrolithiasis     \"I am full of kidney stones\"  Took topamax for several years  Has had a parathyroidectomy  She will pass a stone 3-4 x year  Last stone just last week  Prior to that about 4 months  These are quite painful  Typically these come from the LEFT side  No prior urology surgical intervention for stones  She is very interested in any possible stone removal    TODAY 2/13/2025:  She did pass a kidney stone last Thursday and passed the stone on Saturday from the left side  Follow up to review CT imaging  Her  joins for this visit    PHYSICAL EXAM  Patient is a 59 year old  female   Vitals: not currently breastfeeding.  There is no height or weight on file to calculate BMI.  General Appearance Adult:   Alert, no acute distress, oriented  Neuro: Alert, oriented, speech and mentation normal  Psych: affect and mood normal      10/28/2024  Urine Culture Comment Urinalysis results do not meet criteria for urine culture reflex.   Color Yellow   Clarity  Clear Clear   Specific Gravity  1.005 - 1.030 1.010   pH  5.0 - 8.0 7.5   Protein  Neg/Trace Negative   Glucose  Negative Negative   Ketones  Negative Negative   Urobilinogen  <2.0 0.2   Bilirubin  Negative Negative   Blood  Neg/Trace Negative   Nitrite  Negative Negative   Leukocyte Esterase  Negative Negative   Source Clean Catch            Lab Results   Component Value Date     CR 0.97 10/04/2024     CR 0.72 04/12/2018      IMAGING:  All pertinent imaging reviewed:     All imaging studies reviewed by me.  I personally reviewed these imaging films.  A formal report from radiology will follow.        CT ABD/PEL 2/3/2025:  FINDINGS: No " contrast. No pleural or pericardial effusion. No included  lower lung region pulmonary nodule. Atherosclerotic calcification in  the descending thoracic aorta as well as abdominal aorta an right  common iliac artery. Liver normal. Gallbladder normal. Spleen normal.  Bilateral adrenals are normal. Pancreas normal. A punctate renal  calculi without hydronephrosis. Urinary bladder grossly normal. No  free fluid. No free air. No enlarged lymph nodes.  Bone detail edema is blunted around in the right medial acetabulum and  left superior acetabulum.  No calculi in the urinary bladder or in either ureter.     IMPRESSION Bilateral nonobstructing small kidney stones. Aortoiliac  atherosclerosis.        ASSESSMENT and PLAN  59-year-old female with history of recurrent bilateral nephrolithiasis    Recurrent bilateral nephrolithiasis  - History of prior migraine treatment with topiramate as well as hyperparathyroidism now status post a parathyroidectomy  - She notes left side is more frequent side that she passes stones from  - I reviewed her labs with a normal serum creatinine  - I reviewed her CT scan and reviewed these images personally.  I shared the images with the patient and her  today via our video connection.  We discussed that the left kidney does have numerous kidney stones the largest of which I measure at about 7 mm in the lower pole  - We discussed options for observation versus treatment with ureteroscopy and laser lithotripsy and stone removal.  We did discuss need for ureteral stent in the 10 to 20% risk of requiring a staged approach  - She would like to proceed with ureteroscopy  - We discussed the following ureteroscopy will establish her with our nephrology kidney stone prevention team  -Orders for surgery placed  - This is a chronic problem with recurrence requiring surgical intervention    Time spent: 20 minutes spent on the date of the encounter doing chart review, history and exam, documentation  and further activities as noted above.    Ron Foreman MD   Urology  [facility]  [facility] Phone: [phone]  [facility] Phone: [phone]        Virtual Visit Details    Type of service:  Video Visit     Originating Location (pt. Location): Home    Distant Location (provider location):  On-site  Platform used for Video Visit: Masoud

## 2025-02-14 PROBLEM — N20.0 KIDNEY STONE ON LEFT SIDE: Status: ACTIVE | Noted: 2025-02-13

## 2025-02-18 ENCOUNTER — TELEPHONE (OUTPATIENT)
Dept: UROLOGY | Facility: CLINIC | Age: 60
End: 2025-02-18
Payer: COMMERCIAL

## 2025-02-18 ENCOUNTER — VIRTUAL VISIT (OUTPATIENT)
Dept: PSYCHIATRY | Facility: CLINIC | Age: 60
End: 2025-02-18
Attending: PSYCHIATRY & NEUROLOGY
Payer: COMMERCIAL

## 2025-02-18 VITALS — BODY MASS INDEX: 26.68 KG/M2 | WEIGHT: 170 LBS | HEIGHT: 67 IN

## 2025-02-18 DIAGNOSIS — F41.0 PANIC DISORDER WITHOUT AGORAPHOBIA: ICD-10-CM

## 2025-02-18 DIAGNOSIS — F33.1 MODERATE EPISODE OF RECURRENT MAJOR DEPRESSIVE DISORDER (H): Primary | ICD-10-CM

## 2025-02-18 DIAGNOSIS — F41.1 GENERALIZED ANXIETY DISORDER: ICD-10-CM

## 2025-02-18 PROCEDURE — 1125F AMNT PAIN NOTED PAIN PRSNT: CPT | Mod: 95

## 2025-02-18 PROCEDURE — 98006 SYNCH AUDIO-VIDEO EST MOD 30: CPT | Mod: HN

## 2025-02-18 RX ORDER — QUETIAPINE FUMARATE 50 MG/1
50 TABLET, FILM COATED ORAL AT BEDTIME
Qty: 90 TABLET | Refills: 1 | Status: SHIPPED | OUTPATIENT
Start: 2025-02-18

## 2025-02-18 RX ORDER — LAMOTRIGINE 100 MG/1
100 TABLET ORAL DAILY
Qty: 90 TABLET | Refills: 1 | Status: SHIPPED | OUTPATIENT
Start: 2025-02-18

## 2025-02-18 ASSESSMENT — PAIN SCALES - GENERAL: PAINLEVEL_OUTOF10: MODERATE PAIN (6)

## 2025-02-18 NOTE — NURSING NOTE
Current patient location: [address] [place] [id number]    Is the patient currently in the state of [place]? YES    Visit mode: VIDEO    If the visit is dropped, the patient can be reconnected by:VIDEO VISIT: Send to e-mail at: [email or web address]    Will anyone else be joining the visit? NO  (If patient encounters technical issues they should call [phone]    Are changes needed to the allergy or medication list? No    Are refills needed on medications prescribed by this physician? Discuss with provider    Rooming Documentation:  Questionnaire(s) completed    Reason for visit: RECHALISE CHOI
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
## 2025-02-18 NOTE — TELEPHONE ENCOUNTER
Talked w/ pt on the phone about dates     Type of surgery: Cystoscopy Ureteroscopy laser lithotripsy stone basketing and stent     Location of surgery: [facility]    Date and time of surgery: 3/25/25 told patient that a nurse will call 1 week prior to surgery date with time and arrival time    Surgeon: Dr. Ron Foreman    Pre-Op Appt Date: Told patient they will need to go to PCP with in 30 days of surgery    Post-Op Appt Date: 4/1/25    Packet sent out: Yes in mail        Went over all surgery info with her she  knows that they can call me with any questions that come up    La GALVIN.  [phone]

## 2025-02-18 NOTE — PATIENT INSTRUCTIONS
**For crisis resources, please see the information at the end of this document**   Patient Education    Thank you for coming to the [facility] [place] [facility].     Lab Testing:  If you had lab testing today and your results are reassuring or normal they will be mailed to you or sent through [employer] within 7 days. If the lab tests need quick action we will call you with the results. The phone number we will call with results is # [phone]. If this is not the best number please call our clinic and change the number.     Medication Refills:  If you need any refills please call your pharmacy and they will contact us. Our fax number for refills is [phone].   Three business days of notice are needed for general medication refill requests.   Five business days of notice are needed for controlled substance refill requests.   If you need to change to a different pharmacy, please contact the new pharmacy directly. The new pharmacy will help you get your medications transferred.     Contact Us:  Please call [phone] during business hours (8-5:00 M-F).   If you have medication related questions after clinic hours, or on the weekend, please call [phone].     Financial Assistance [phone]   Medical Records [phone]       MENTAL HEALTH CRISIS RESOURCES:  For a emergency help, please call 911 or go to the nearest Emergency Department.     Emergency Walk-In Options:   EmPATH Unit @ [facility] ([place]): [phone] - Specialized mental health emergency area designed to be calming  [facility] ([place]): [phone]  [facility] ([place]): [phone]  [facility]): [phone]    [facility] Crisis Information:   [place]: [phone]  [place]: [phone]  [place] (MARTH[place]) - Adult: [phone]     Child: [phone]  [place] - Adult: [phone]     Child: [phone]  [place]:  [phone]  List of all [facility] resources:   https://[place].gov/dhs/people-we-serve/adults/health-care/mental-health/resources/crisis-contacts.jsp    National Crisis Information:   Crisis Text Line: Text  [place]  to 203089  Suicide & Crisis Lifeline: 988  National Suicide Prevention Lifeline: 8-038-363-TALK (1-920.488.5959)       For online chat options, visit https://suicidepreventionlifeline.org/chat/  Poison Control Center: 4-715-271-7061  Trans Lifeline: 3-237-655-7482 - Hotline for transgender people of all ages  The [employer] Project: 7-604-217-4394 - Hotline for LGBT youth     For Non-Emergency Support:   Fast Tracker: Mental Health & Substance Use Disorder Resources -   https://www.[employer].org/

## 2025-02-18 NOTE — PROGRESS NOTES
"Virtual Visit Details    Type of service:  Video Visit     Originating Location (pt. Location): {video visit patient location:185580::\"Home\"}  {PROVIDER LOCATION On-site should be selected for visits conducted from your clinic location or adjoining [facility] hospital, academic office, or other nearby [facility] building. Off-site should be selected for all other provider locations, including home:248854}  Distant Location (provider location):  {virtual location provider:696965}  Platform used for Video Visit: {Virtual Visit Platforms:806523::\"Stylehive\"}  "

## 2025-02-20 ENCOUNTER — THERAPY VISIT (OUTPATIENT)
Dept: OCCUPATIONAL THERAPY | Facility: CLINIC | Age: 60
End: 2025-02-20
Payer: COMMERCIAL

## 2025-02-20 DIAGNOSIS — F07.81 POSTCONCUSSION SYNDROME: ICD-10-CM

## 2025-02-20 DIAGNOSIS — S06.0XAA CONCUSSION WITH UNKNOWN LOSS OF CONSCIOUSNESS STATUS, INITIAL ENCOUNTER: Primary | ICD-10-CM

## 2025-02-20 PROCEDURE — 97535 SELF CARE MNGMENT TRAINING: CPT | Mod: GO | Performed by: OCCUPATIONAL THERAPIST

## 2025-02-23 ENCOUNTER — MYC MEDICAL ADVICE (OUTPATIENT)
Dept: UROLOGY | Facility: CLINIC | Age: 60
End: 2025-02-23
Payer: COMMERCIAL

## 2025-02-23 DIAGNOSIS — N20.0 KIDNEY STONE: ICD-10-CM

## 2025-02-23 DIAGNOSIS — N20.0 KIDNEY STONE ON LEFT SIDE: Primary | ICD-10-CM

## 2025-02-24 ENCOUNTER — OFFICE VISIT (OUTPATIENT)
Dept: FAMILY MEDICINE | Facility: CLINIC | Age: 60
End: 2025-02-24
Attending: INTERNAL MEDICINE
Payer: COMMERCIAL

## 2025-02-24 ENCOUNTER — MYC MEDICAL ADVICE (OUTPATIENT)
Dept: FAMILY MEDICINE | Facility: CLINIC | Age: 60
End: 2025-02-24

## 2025-02-24 VITALS
OXYGEN SATURATION: 97 % | BODY MASS INDEX: 27.39 KG/M2 | WEIGHT: 174.5 LBS | RESPIRATION RATE: 18 BRPM | SYSTOLIC BLOOD PRESSURE: 132 MMHG | TEMPERATURE: 98.7 F | HEART RATE: 83 BPM | HEIGHT: 67 IN | DIASTOLIC BLOOD PRESSURE: 77 MMHG

## 2025-02-24 DIAGNOSIS — F11.90 CHRONIC, CONTINUOUS USE OF OPIOIDS: ICD-10-CM

## 2025-02-24 DIAGNOSIS — M53.3 SI (SACROILIAC) JOINT DYSFUNCTION: ICD-10-CM

## 2025-02-24 DIAGNOSIS — F33.0 MILD EPISODE OF RECURRENT MAJOR DEPRESSIVE DISORDER: ICD-10-CM

## 2025-02-24 DIAGNOSIS — L73.9 FOLLICULITIS: ICD-10-CM

## 2025-02-24 DIAGNOSIS — F51.01 PRIMARY INSOMNIA: ICD-10-CM

## 2025-02-24 DIAGNOSIS — N20.0 KIDNEY STONE: ICD-10-CM

## 2025-02-24 DIAGNOSIS — I10 ESSENTIAL HYPERTENSION: ICD-10-CM

## 2025-02-24 DIAGNOSIS — Z98.890 HISTORY OF PARATHYROIDECTOMY: ICD-10-CM

## 2025-02-24 DIAGNOSIS — K59.01 SLOW TRANSIT CONSTIPATION: ICD-10-CM

## 2025-02-24 DIAGNOSIS — Z90.89 HISTORY OF PARATHYROIDECTOMY: ICD-10-CM

## 2025-02-24 DIAGNOSIS — M62.89 PELVIC FLOOR DYSFUNCTION: ICD-10-CM

## 2025-02-24 DIAGNOSIS — F07.81 POSTCONCUSSION SYNDROME: ICD-10-CM

## 2025-02-24 DIAGNOSIS — G89.4 CHRONIC PAIN SYNDROME: Primary | ICD-10-CM

## 2025-02-24 DIAGNOSIS — L21.0 DANDRUFF IN ADULT: ICD-10-CM

## 2025-02-24 LAB
ANION GAP SERPL CALCULATED.3IONS-SCNC: 11 MMOL/L (ref 7–15)
BUN SERPL-MCNC: 22 MG/DL (ref 6–20)
CALCIUM SERPL-MCNC: 9.6 MG/DL (ref 8.8–10.4)
CHLORIDE SERPL-SCNC: 104 MMOL/L (ref 98–107)
CREAT SERPL-MCNC: 0.85 MG/DL (ref 0.51–0.95)
EGFRCR SERPLBLD CKD-EPI 2021: 79 ML/MIN/1.73M2
GLUCOSE SERPL-MCNC: 106 MG/DL (ref 70–99)
HCO3 SERPL-SCNC: 27 MMOL/L (ref 22–29)
POTASSIUM SERPL-SCNC: 4.7 MMOL/L (ref 3.4–5.3)
SODIUM SERPL-SCNC: 142 MMOL/L (ref 135–145)

## 2025-02-24 PROCEDURE — G2211 COMPLEX E/M VISIT ADD ON: HCPCS | Performed by: INTERNAL MEDICINE

## 2025-02-24 PROCEDURE — 80048 BASIC METABOLIC PNL TOTAL CA: CPT | Performed by: INTERNAL MEDICINE

## 2025-02-24 PROCEDURE — 99214 OFFICE O/P EST MOD 30 MIN: CPT | Performed by: INTERNAL MEDICINE

## 2025-02-24 PROCEDURE — 36415 COLL VENOUS BLD VENIPUNCTURE: CPT | Performed by: INTERNAL MEDICINE

## 2025-02-24 RX ORDER — KETOCONAZOLE 20 MG/ML
SHAMPOO, SUSPENSION TOPICAL DAILY PRN
Qty: 240 ML | Refills: 1 | Status: SHIPPED | OUTPATIENT
Start: 2025-02-24

## 2025-02-24 RX ORDER — LACTULOSE 10 G/15ML
20 SOLUTION ORAL 3 TIMES DAILY
Qty: 1892 ML | Refills: 0 | Status: SHIPPED | OUTPATIENT
Start: 2025-02-24

## 2025-02-24 RX ORDER — MUPIROCIN 20 MG/G
OINTMENT TOPICAL 3 TIMES DAILY
Qty: 90 G | Refills: 0 | Status: SHIPPED | OUTPATIENT
Start: 2025-02-24

## 2025-02-24 RX ORDER — TAMSULOSIN HYDROCHLORIDE 0.4 MG/1
0.4 CAPSULE ORAL DAILY
Qty: 90 CAPSULE | Refills: 0 | Status: SHIPPED | OUTPATIENT
Start: 2025-02-24

## 2025-02-24 RX ORDER — DEXTROAMPHETAMINE SACCHARATE, AMPHETAMINE ASPARTATE, DEXTROAMPHETAMINE SULFATE AND AMPHETAMINE SULFATE 5; 5; 5; 5 MG/1; MG/1; MG/1; MG/1
20 TABLET ORAL DAILY
Qty: 30 TABLET | Refills: 0 | Status: SHIPPED | OUTPATIENT
Start: 2025-02-24 | End: 2025-03-26

## 2025-02-24 RX ORDER — DEXTROAMPHETAMINE SACCHARATE, AMPHETAMINE ASPARTATE, DEXTROAMPHETAMINE SULFATE AND AMPHETAMINE SULFATE 5; 5; 5; 5 MG/1; MG/1; MG/1; MG/1
20 TABLET ORAL DAILY
Qty: 30 TABLET | Refills: 0 | Status: SHIPPED | OUTPATIENT
Start: 2025-03-26 | End: 2025-04-25

## 2025-02-24 RX ORDER — DEXTROAMPHETAMINE SACCHARATE, AMPHETAMINE ASPARTATE, DEXTROAMPHETAMINE SULFATE AND AMPHETAMINE SULFATE 5; 5; 5; 5 MG/1; MG/1; MG/1; MG/1
20 TABLET ORAL DAILY
Qty: 30 TABLET | Refills: 0 | Status: SHIPPED | OUTPATIENT
Start: 2025-04-25 | End: 2025-05-25

## 2025-02-24 RX ORDER — HYDROMORPHONE HYDROCHLORIDE 2 MG/1
1-2 TABLET ORAL
Qty: 15 TABLET | Refills: 0 | Status: SHIPPED | OUTPATIENT
Start: 2025-02-24

## 2025-02-24 ASSESSMENT — PATIENT HEALTH QUESTIONNAIRE - PHQ9
SUM OF ALL RESPONSES TO PHQ QUESTIONS 1-9: 13
SUM OF ALL RESPONSES TO PHQ QUESTIONS 1-9: 13
10. IF YOU CHECKED OFF ANY PROBLEMS, HOW DIFFICULT HAVE THESE PROBLEMS MADE IT FOR YOU TO DO YOUR WORK, TAKE CARE OF THINGS AT HOME, OR GET ALONG WITH OTHER PEOPLE: SOMEWHAT DIFFICULT

## 2025-02-24 ASSESSMENT — PAIN SCALES - GENERAL: PAINLEVEL_OUTOF10: SEVERE PAIN (7)

## 2025-02-24 NOTE — PROGRESS NOTES
Assessment & Plan     Chronic pain syndrome  She has chronic pain from her sacroiliac joints and also from her pelvic floor dysfunction  She gets regular Botox injections for this and whenever she gets the Botox injections he takes Dilaudid    Postconcussion syndrome    - amphetamine-dextroamphetamine (ADDERALL) 20 MG tablet; Take 1 tablet (20 mg) by mouth daily.  - amphetamine-dextroamphetamine (ADDERALL) 20 MG tablet; Take 1 tablet (20 mg) by mouth daily.  - amphetamine-dextroamphetamine (ADDERALL) 20 MG tablet; Take 1 tablet (20 mg) by mouth daily.    Mild episode of recurrent major depressive disorder  She is following up with a psychiatrist and a psychologist on a regular basis  She has high PHQ 9 scores today and when I discussed this with her she told me this is because recently she was passing a kidney stone and that has put a lot of stress on her mental health    Pelvic floor dysfunction  As above she gets periodic Botox injections and follows up with a urogynecologist  Of note she is due for a Pap smear and she is going to get this done at the urogynecologist appointment next month    Primary insomnia  She is on Seroquel and she titrates the dose    History of parathyroidectomy  Follows up with endocrinology and takes Cinacalcet she wants to get her calcium rechecked today    Slow transit constipation    - lactulose 20 GM/30ML solution; Take 30 mLs (20 g) by mouth 3 times daily.    Kidney stone  She has kidney stones and takes Flomax for her to help to pass these  - tamsulosin (FLOMAX) 0.4 MG capsule; Take 1 capsule (0.4 mg) by mouth daily.    Folliculitis  Complaining of lesions on the skin which come and go and leave some scars  Description of the lesions fit with folliculitis in the past we discussed about seeing a dermatology   I did make an appointment for her but apparently when they called she  miss their call so she wanted to first try Bactroban which she did in the past and helped her  -  "mupirocin (BACTROBAN) 2 % external ointment; Apply topically 3 times daily.    Dandruff in adult    - ketoconazole (NIZORAL) 2 % external shampoo; Apply topically daily as needed for itching or irritation.    Essential hypertension  Blood pressure under good control currently she is on 7.5 mg amlodipine and she titrates this dose up and down based on her blood pressures  - Basic metabolic panel  (Ca, Cl, CO2, Creat, Gluc, K, Na, BUN); Future  - Basic metabolic panel  (Ca, Cl, CO2, Creat, Gluc, K, Na, BUN)    Chronic, continuous use of opioids    - Urine Drug Screen Clinic; Future  - HYDROmorphone (DILAUDID) 2 MG tablet; Take 0.5-1 tablets (1-2 mg) by mouth every 3 hours as needed (headache). 20 tablets = 3 month supply.    SI (sacroiliac) joint dysfunction    - HYDROmorphone (DILAUDID) 2 MG tablet; Take 0.5-1 tablets (1-2 mg) by mouth every 3 hours as needed (headache). 20 tablets = 3 month supply.          BMI  Estimated body mass index is 27.07 kg/m  as calculated from the following:    Height as of this encounter: 1.71 m (5' 7.32\").    Weight as of this encounter: 79.2 kg (174 lb 8 oz).   Weight management plan: Discussed healthy diet and exercise guidelines    Depression Screening Follow Up        2/24/2025     6:41 AM   PHQ   PHQ-9 Total Score 13    Q9: Thoughts of better off dead/self-harm past 2 weeks Several days   F/U: Thoughts of suicide or self-harm No   F/U: Safety concerns No       Patient-reported         2/24/2025     6:41 AM   Last PHQ-9   1.  Little interest or pleasure in doing things 1   2.  Feeling down, depressed, or hopeless 1   3.  Trouble falling or staying asleep, or sleeping too much 3   4.  Feeling tired or having little energy 1   5.  Poor appetite or overeating 2   6.  Feeling bad about yourself 1   7.  Trouble concentrating 1   8.  Moving slowly or restless 2   Q9: Thoughts of better off dead/self-harm past 2 weeks 1   PHQ-9 Total Score 13    In the past two weeks have you had thoughts " of suicide or self harm? No   Do you have concerns about your personal safety or the safety of others? No       Patient-reported                   Follow Up Actions Taken  Patient declined referral.    Discussed the following ways the patient can remain in a safe environment:  remove alcohol, remove drugs, and be around others        Lucina Padilla is a 59 year old, presenting for the following health issues:  Recheck Medication      2/24/2025     6:53 AM   Additional Questions   Roomed by Fe SEBASTIAN   Accompanied by Self     History of Present Illness       Reason for visit:  Complex medical and medication management.    She eats 2-3 servings of fruits and vegetables daily.She consumes 0 sweetened beverage(s) daily.She exercises with enough effort to increase her heart rate 30 to 60 minutes per day.  She exercises with enough effort to increase her heart rate 5 days per week. She is missing 2 dose(s) of medications per week.  She is not taking prescribed medications regularly due to other.         Hypertension Follow-up    Do you check your blood pressure regularly outside of the clinic? Yes   Are you following a low salt diet? Yes  Are your blood pressures ever more than 140 on the top number (systolic) OR more   than 90 on the bottom number (diastolic), for example 140/90? No    Depression and Anxiety   How are you doing with your depression since your last visit? Slight improvement  How are you doing with your anxiety since your last visit?  Slight Improvement   Are you having other symptoms that might be associated with depression or anxiety? No  Have you had a significant life event? Health Concerns   Do you have any concerns with your use of alcohol or other drugs? No    Social History     Tobacco Use    Smoking status: Never    Smokeless tobacco: Never   Vaping Use    Vaping status: Never Used   Substance Use Topics    Alcohol use: Yes     Alcohol/week: 0.0 standard drinks of alcohol     Comment: Alcoholic  Drinks/day: monthly or less    Drug use: Never         1/15/2025    10:31 AM 2/17/2025     8:22 AM 2/24/2025     6:41 AM   PHQ   PHQ-9 Total Score 11 10  13    Q9: Thoughts of better off dead/self-harm past 2 weeks Several days Not at all Several days   F/U: Thoughts of suicide or self-harm   No   F/U: Safety concerns   No       Patient-reported         8/25/2024     3:58 PM 10/1/2024     7:19 AM 11/10/2024     9:25 AM   MAHAMED-7 SCORE   Total Score 12 (moderate anxiety) 6 (mild anxiety) 7 (mild anxiety)   Total Score 12 6    6 7        Patient-reported         2/24/2025     6:41 AM   Last PHQ-9   1.  Little interest or pleasure in doing things 1   2.  Feeling down, depressed, or hopeless 1   3.  Trouble falling or staying asleep, or sleeping too much 3   4.  Feeling tired or having little energy 1   5.  Poor appetite or overeating 2   6.  Feeling bad about yourself 1   7.  Trouble concentrating 1   8.  Moving slowly or restless 2   Q9: Thoughts of better off dead/self-harm past 2 weeks 1   PHQ-9 Total Score 13    In the past two weeks have you had thoughts of suicide or self harm? No   Do you have concerns about your personal safety or the safety of others? No       Patient-reported         11/10/2024     9:25 AM   MAHAMED-7    1. Feeling nervous, anxious, or on edge 2   2. Not being able to stop or control worrying 1   3. Worrying too much about different things 1   4. Trouble relaxing 1   5. Being so restless that it is hard to sit still 1   6. Becoming easily annoyed or irritable 1   7. Feeling afraid, as if something awful might happen 0   MAHAMED-7 Total Score 7    If you checked any problems, how difficult have they made it for you to do your work, take care of things at home, or get along with other people? Somewhat difficult       Patient-reported              No data to display                Follow Up Actions Taken  Patient declined referral.     Discussed the following ways the patient can remain in a safe  environment:  remove alcohol, remove drugs, and be around others  Suicide Assessment Five-step Evaluation and Treatment (SAFE-T)    Hypothyroidism Follow-up    Since last visit, patient describes the following symptoms: Weight stable, no hair loss, no skin changes, no constipation, no loose stools  Chronic/Recurring Back Pain Follow Up    Where is your back pain located? (Select all that apply) neck bilateral  How would you describe your back pain?  Sharp Shooting, Dull Throbbing   Where does your back pain spread? nowhere  Since your last clinic visit for back pain, how has your pain changed? unchanged  Does your back pain interfere with your job? YES  Since your last visit, have you tried any new treatment? No              Review of Systems  Constitutional, HEENT, cardiovascular, pulmonary, gi and gu systems are negative, except as otherwise noted.      Objective    There were no vitals taken for this visit.  There is no height or weight on file to calculate BMI.  Physical Exam   GENERAL: alert and no distress  NECK: no adenopathy, no asymmetry, masses, or scars  RESP: lungs clear to auscultation - no rales, rhonchi or wheezes  CV: regular rate and rhythm, normal S1 S2, no S3 or S4, no murmur, click or rub, no peripheral edema  ABDOMEN: soft, nontender, no hepatosplenomegaly, no masses and bowel sounds normal  MS: no gross musculoskeletal defects noted, no edema            Signed Electronically by: Jeronimo Amaral MD

## 2025-02-24 NOTE — TELEPHONE ENCOUNTER
Patient has follow up question from her visit this morning. Routing [email or web address] message to provider, please review and advise. Richard George RN, BSN

## 2025-02-25 ENCOUNTER — NURSE TRIAGE (OUTPATIENT)
Dept: UROLOGY | Facility: CLINIC | Age: 60
End: 2025-02-25
Payer: COMMERCIAL

## 2025-02-25 NOTE — TELEPHONE ENCOUNTER
February 25, 2025  Ron Foreman MD to [facility] [place]       2/25/25  4:32 PM   Hi team,    She should continue the tamsulosin prescribed by Dr. Amaral.  If her pain worsens significantly, she should notify us and we could repeat a CT scan and consider moving up her surgery date.  Otherwise, she should continue to push fluids.    Thanks,  Ron Foreman M.D.  __________________________  Called pt to review Dr. Foreman's recommendations.  Pt verbalized understanding.  She will update us if things worsen/change and we will order repeat imaging.    Pt also informed Dr. Foreman is ok with us order a stone analysis and we will monitor for results.  Stone analysis order placed.  Pt to drop it off.    Vianney Pearson RN

## 2025-02-25 NOTE — TELEPHONE ENCOUNTER
"59 year old calls with update on kidney stones   She states on Saturday night she passed a large stone   She states she thought pain and symptoms would improve   Sunday she developed right kidney pain and also has left pain below ribs and flank She rates the pain 7/10   She states since Sunday her urine has a bad odor   Her urine is light yellow  She has had off and on diarrhea along with constipation  Denies fever             Reason for Disposition   MODERATE pain (e.g., interferes with normal activities or awakens from sleep)    Additional Information   Negative: Passed out (i.e., lost consciousness, collapsed and was not responding)   Negative: Shock suspected (e.g., cold/pale/clammy skin, too weak to stand, low BP, rapid pulse)   Negative: Sounds like a life-threatening emergency to the triager   Negative: Followed a major injury to the back (e.g., MVA, fall > 10 feet or 3 meters, penetrating injury, etc.)   Negative: Upper, mid or lower back pain that occurs mainly in the midline   Negative: SEVERE pain (e.g., excruciating, scale 8-10) and present > 1 hour   Negative: Sudden onset of severe flank pain and age > 60 years   Negative: Abdominal pain and age > 60 years   Negative: Unable to urinate (or only a few drops) > 4 hours and bladder feels very full (e.g., palpable bladder or strong urge to urinate)   Negative: Pain radiates into groin, scrotum   Negative: Blood in urine (red, pink, or tea-colored)   Negative: Vomiting   Negative: Weakness of a leg or foot (e.g., unable to bear weight, dragging foot)   Negative: Patient sounds very sick or weak to the triager   Negative: Fever > 100.4 F (38.0 C)   Negative: Pain or burning with passing urine (urination)    Answer Assessment - Initial Assessment Questions  1. LOCATION: \"Where does it hurt?\" (e.g., left, right)      Right-kidney pain -new     left flank pain and under left rib  2. ONSET: \"When did the pain start?\"      sunday  3. SEVERITY: \"How bad is the " "pain?\" (e.g., Scale 1-10; mild, moderate, or severe)    - MILD (1-3): doesn't interfere with normal activities     - MODERATE (4-7): interferes with normal activities or awakens from sleep     - SEVERE (8-10): excruciating pain and patient unable to do normal activities (stays in bed)        7/10  4. PATTERN: \"Does the pain come and go, or is it constant?\"       Constant but intensity comes and goes  5. CAUSE: \"What do you think is causing the pain?\"      stones  6. OTHER SYMPTOMS:  \"Do you have any other symptoms?\" (e.g., fever, abdomen pain, vomiting, leg weakness, burning with urination, blood in urine)      Urine foul odor since sunday  7. PREGNANCY:  \"Is there any chance you are pregnant?\" \"When was your last menstrual period?\"      no    Protocols used: Flank Pain-A-OH    "

## 2025-02-25 NOTE — TELEPHONE ENCOUNTER
Called pt back to let her know we have sent a message to Dr. Foreman for his review/recommendations.  We have him here in clinic this afternoon and can ask him in person.    Pt verbalized understanding.  She said that she was mostly just wondering if there was an UC or ED we would recommend.  I said typically no, go where you are comfortable with or have insurance coverage.    Vianney Pearson RN

## 2025-02-25 NOTE — TELEPHONE ENCOUNTER
Caller reporting the following red-flag symptom(s): worsening pain after passing kidney stone. States that she was told to call triage if worsening conditions.    Per the system red-flag symptom policy, patient was instructed to:  speak with a Registered Nurse    Action:  Patient warm transferred to a Registered Nurse

## 2025-02-25 NOTE — CONFIDENTIAL NOTE
Per raul Geller for stone analysis order to be placed. Stone analysis lab ordered per Dr. Foreman. Patient has been contacted. See 2/25/25 nurse triage encounter.    Tiesha Dunham RN, BSN

## 2025-02-28 ENCOUNTER — MYC MEDICAL ADVICE (OUTPATIENT)
Dept: FAMILY MEDICINE | Facility: CLINIC | Age: 60
End: 2025-02-28

## 2025-02-28 ENCOUNTER — ANCILLARY PROCEDURE (OUTPATIENT)
Dept: CT IMAGING | Facility: CLINIC | Age: 60
End: 2025-02-28
Attending: FAMILY MEDICINE
Payer: COMMERCIAL

## 2025-02-28 DIAGNOSIS — N20.0 KIDNEY STONE: ICD-10-CM

## 2025-02-28 DIAGNOSIS — R10.84 ABDOMINAL PAIN, GENERALIZED: ICD-10-CM

## 2025-02-28 PROCEDURE — 74177 CT ABD & PELVIS W/CONTRAST: CPT | Performed by: RADIOLOGY

## 2025-02-28 RX ORDER — IOPAMIDOL 755 MG/ML
96 INJECTION, SOLUTION INTRAVASCULAR ONCE
Status: COMPLETED | OUTPATIENT
Start: 2025-02-28 | End: 2025-02-28

## 2025-02-28 RX ADMIN — IOPAMIDOL 96 ML: 755 INJECTION, SOLUTION INTRAVASCULAR at 15:39

## 2025-03-03 ENCOUNTER — VIRTUAL VISIT (OUTPATIENT)
Dept: FAMILY MEDICINE | Facility: CLINIC | Age: 60
End: 2025-03-03
Payer: COMMERCIAL

## 2025-03-03 DIAGNOSIS — K42.9 UMBILICAL HERNIA WITHOUT OBSTRUCTION AND WITHOUT GANGRENE: Primary | ICD-10-CM

## 2025-03-03 DIAGNOSIS — K76.89 HEPATIC CYST: ICD-10-CM

## 2025-03-03 DIAGNOSIS — K57.30 COLON, DIVERTICULOSIS: ICD-10-CM

## 2025-03-03 PROCEDURE — 98006 SYNCH AUDIO-VIDEO EST MOD 30: CPT | Performed by: INTERNAL MEDICINE

## 2025-03-03 PROCEDURE — 1125F AMNT PAIN NOTED PAIN PRSNT: CPT | Mod: 95 | Performed by: INTERNAL MEDICINE

## 2025-03-03 NOTE — PROGRESS NOTES
Valerie is a 59 year old who is being evaluated via a billable video visit.    How would you like to obtain your AVS? MyChart  If the video visit is dropped, the invitation should be resent by: Text to cell phone: [phone]  Will anyone else be joining your video visit? No      Assessment & Plan     Umbilical hernia without obstruction and without gangrene  The CT scan that she had on Friday demonstrated a small fat-containing umbilical hernia  She is concerned about it  We discussed about it today and I explained to her that if physical examination can be done at the next visit to see if the hernia is small or big and depending on what I find we can decide on further course  Discussed with her about not doing steroids activity which can increase the intra-abdominal pressure and make the hernia worse    Hepatic cyst  CT scan also showed hepatic cyst which is simple and stable so no further follow-up is needed    Colon, diverticulosis  CT scan also showed colonic diverticulosis and she is concerned about it  Explained to her that this is very common and due to lack of fiber in the food and in general people eating more and more refined foods  Advised her to avoid constipation and increase fiber in the foot    CT scan also demonstrates some bone edema bilaterally in the hips which most probably is from the arthritis                Subjective   Valerie is a 59 year old, presenting for the following health issues:  Results        3/3/2025    12:44 PM   Additional Questions   Roomed by laxmi Blank   Accompanied by Self         3/3/2025    12:44 PM   Patient Reported Additional Medications   Patient reports taking the following new medications No     HPI    Here for annual physical          Review of Systems  Constitutional, HEENT, cardiovascular, pulmonary, gi and gu systems are negative, except as otherwise noted.      Objective    Vitals - Patient Reported  Weight (Patient Reported): 77.1 kg (170 lb)  Height (Patient  "Reported): 170.2 cm (5' 7\")  BMI (Based on Pt Reported Ht/Wt): 26.63  Pain Score: Moderate Pain (6)  Pain Loc: Abdomen        Physical Exam   GENERAL: alert and no distress  EYES: Eyes grossly normal to inspection.  No discharge or erythema, or obvious scleral/conjunctival abnormalities.  RESP: No audible wheeze, cough, or visible cyanosis.    SKIN: Visible skin clear. No significant rash, abnormal pigmentation or lesions.  NEURO: Cranial nerves grossly intact.  Mentation and speech appropriate for age.  PSYCH: Appropriate affect, tone, and pace of words          Video-Visit Details    Type of service:  Video Visit   Originating Location (pt. Location): Home    Distant Location (provider location):  Off-site  Platform used for Video Visit: Masoud  Signed Electronically by: Jeronimo Amaral MD    "

## 2025-03-04 ENCOUNTER — VIRTUAL VISIT (OUTPATIENT)
Dept: UROLOGY | Facility: CLINIC | Age: 60
End: 2025-03-04
Payer: COMMERCIAL

## 2025-03-04 DIAGNOSIS — N20.0 KIDNEY STONE ON LEFT SIDE: Primary | ICD-10-CM

## 2025-03-04 ASSESSMENT — PATIENT HEALTH QUESTIONNAIRE - PHQ9: SUM OF ALL RESPONSES TO PHQ QUESTIONS 1-9: 16

## 2025-03-04 NOTE — PROGRESS NOTES
"[facility]   CHIEF COMPLAINT   It was my pleasure to see Valerie Sim who is a 59 year old female for follow-up of recurrent nephrolithiasis.      HPI   Valerie Sim is a very pleasant 59 year old female     Initially seen 1/23/2025:  Valerie Sim is a 59 year old female who is being seen for evaluation of recurrent nephrolithiasis     \"I am full of kidney stones\"  Took topamax for several years  Has had a parathyroidectomy  She will pass a stone 3-4 x year  Last stone just last week  Prior to that about 4 months  These are quite painful  Typically these come from the LEFT side  No prior urology surgical intervention for stones  She is very interested in any possible stone removal    2/13/2025:  She did pass a kidney stone last Thursday and passed the stone on Saturday from the left side  Follow up to review CT imaging  Her  joins for this visit    TODAY 3/4/2025:  \"At least I'm not currently passing a stone\"  Last passed a stone last week  Pain and fatigue have lingered  Follow-up today to review her recent CT imaging and her  joins for this visit    PHYSICAL EXAM  Patient is a 59 year old  female   Vitals: not currently breastfeeding.  There is no height or weight on file to calculate BMI.  General Appearance Adult:   Alert, no acute distress, oriented  Neuro: Alert, oriented, speech and mentation normal  Psych: affect and mood normal      UA RESULTS:  Recent Labs   Lab Test 02/28/25  1511   COLOR Dark Orange*   APPEARANCE Clear   URINEGLC Negative   URINEKETONE Negative   SG 1.013   UBLD Negative   URINEPH 6.0   LEUKEST Negative   RBCU 0-2   WBCU 0-5            Lab Results   Component Value Date     CR 0.97 10/04/2024     CR 0.72 04/12/2018      IMAGING:  All pertinent imaging reviewed:     All imaging studies reviewed by me.  I personally reviewed these imaging films.  A formal report from radiology will follow.        CT ABD/PEL 2/3/2025:  FINDINGS: No contrast. No pleural or pericardial " effusion. No included  lower lung region pulmonary nodule. Atherosclerotic calcification in  the descending thoracic aorta as well as abdominal aorta an right  common iliac artery. Liver normal. Gallbladder normal. Spleen normal.  Bilateral adrenals are normal. Pancreas normal. A punctate renal  calculi without hydronephrosis. Urinary bladder grossly normal. No  free fluid. No free air. No enlarged lymph nodes.  Bone detail edema is blunted around in the right medial acetabulum and  left superior acetabulum.  No calculi in the urinary bladder or in either ureter.     IMPRESSION Bilateral nonobstructing small kidney stones. Aortoiliac  atherosclerosis.    CT ABD/PEL 2/28/2025:  FINDINGS:   LOWER CHEST: Normal.     HEPATOBILIARY: Hepatic steatosis. Stable small segment 3 hepatic cyst. Normal gallbladder and bile ducts.     PANCREAS: Normal.     SPLEEN: Normal.     ADRENAL GLANDS: Normal.     KIDNEYS/BLADDER: Multiple bilateral nonobstructing renal calculi with a dominant 3 mm stone in the lower right kidney and multiple 2 to 3 mm left renal calculi. No hydronephrosis, hydroureter or obstructing ureteral stone. No inflammatory changes.   Collapsed bladder.     BOWEL: No bowel obstruction or inflammatory process. Normal appendix. Colonic diverticulosis without evidence of acute diverticulitis. No free air or free fluid.     LYMPH NODES: Normal.     VASCULATURE: Moderate aortoiliac atherosclerosis. Patent central SMA and SMV.     PELVIC ORGANS: Hysterectomy. No pelvic free fluid.     MUSCULOSKELETAL: Spinal and pelvic degenerative changes. Tiny fat-containing umbilical hernia.                                                                      IMPRESSION:   1.  No acute findings. No hydronephrosis or obstructing ureteral stone.  2.  Multiple bilateral nonobstructing renal calculi.    ASSESSMENT and PLAN  59-year-old female with history of recurrent bilateral nephrolithiasis    Recurrent bilateral nephrolithiasis  -  History of prior migraine treatment with topiramate as well as hyperparathyroidism now status post a parathyroidectomy  - She notes left side is more frequent side that she passes stones from  -I again reviewed her updated CT scan from 2/20/2025 and compared this with her prior CT scan  - She is scheduled for ureteroscopy on the left side on 3/25/2025  - We discussed that until this time she should continue to push fluids but there was no sign of an actively obstructing stone on her CT scan  - This is a chronic problem recurrent symptoms now requiring surgery    Time spent: 15 minutes spent on the date of the encounter doing chart review, history and exam, documentation and further activities as noted above.    Ron Foreman MD   Urology  [facility]  [facility] Phone: [phone]  [facility] Phone: [phone]      Virtual Visit Details    Type of service:  Video Visit     Originating Location (pt. Location): Home    Distant Location (provider location):  On-site  Platform used for Video Visit: Masoud

## 2025-03-04 NOTE — NURSING NOTE
Current patient location: [address] [place] [address] N  [facility] [id number]    Is the patient currently in the state of [place]? YES    Visit mode: VIDEO    If the visit is dropped, the patient can be reconnected by:VIDEO VISIT: Send to e-mail at: [email or web address]    Will anyone else be joining the visit? YES: How would they like to receive their invitation? Send to e-mail:    (If patient encounters technical issues they should call [phone]    Are changes needed to the allergy or medication list? Pt stated no med changes and medications were reviewed less than 24 hours ago     Are refills needed on medications prescribed by this physician? NO    Rooming Documentation:  Not applicable    Reason for visit: RECHECK (See myc encounter 2/28/25 Kidney stones ; discuss sx and and plan )    Norma Parr VVF    Depression Response    Patient completed the PHQ-9 assessment for depression and scored >9? Yes  Question 9 on the PHQ-9 was positive for suicidality? Yes  Does patient have current mental health provider? Yes    Is this a virtual visit? Yes   Does patient have suicidal ideation (positive question 9)? Yes (adult) - transfer to Red Flag Triage ([phone]) Patient declined transfer.  Notify provider.     I personally notified the following: visit provider    +PHQ9 , positive for SI - DECLINED TRIAGE - PATIENT HAS MENTAL HEALTH PROVIDER- REFERRAL NOT NEEDED

## 2025-03-07 ENCOUNTER — MYC MEDICAL ADVICE (OUTPATIENT)
Dept: UROLOGY | Facility: CLINIC | Age: 60
End: 2025-03-07
Payer: COMMERCIAL

## 2025-03-09 ENCOUNTER — LAB (OUTPATIENT)
Dept: LAB | Facility: CLINIC | Age: 60
End: 2025-03-09
Payer: COMMERCIAL

## 2025-03-09 DIAGNOSIS — N20.0 KIDNEY STONE: ICD-10-CM

## 2025-03-09 DIAGNOSIS — E78.2 MIXED HYPERLIPIDEMIA: ICD-10-CM

## 2025-03-09 LAB
CHOLEST SERPL-MCNC: 163 MG/DL
FASTING STATUS PATIENT QL REPORTED: YES
HDLC SERPL-MCNC: 63 MG/DL
LDLC SERPL CALC-MCNC: 67 MG/DL
NONHDLC SERPL-MCNC: 100 MG/DL
TRIGL SERPL-MCNC: 167 MG/DL

## 2025-03-09 PROCEDURE — 80061 LIPID PANEL: CPT

## 2025-03-09 PROCEDURE — 82365 CALCULUS SPECTROSCOPY: CPT | Mod: 90

## 2025-03-09 PROCEDURE — 36415 COLL VENOUS BLD VENIPUNCTURE: CPT

## 2025-03-09 PROCEDURE — 99000 SPECIMEN HANDLING OFFICE-LAB: CPT

## 2025-03-10 RX ORDER — CIPROFLOXACIN 500 MG/1
500 TABLET, FILM COATED ORAL ONCE
Status: CANCELLED | OUTPATIENT
Start: 2025-03-10 | End: 2025-03-10

## 2025-03-10 NOTE — CONFIDENTIAL NOTE
Ron Foreman MD  [facility] [place]  Phone Number: [phone]     Hi team,    No, she doesn't need to catch every stone. I'll send some stones for analysis at her Ureteroscopy.    Thanks,  Ron Foreman M.D.    Received the above message from Dr. Foreman. My chart message sent to patient.    Tiesha Dunham RN, BSN

## 2025-03-13 LAB
APPEARANCE STONE: NORMAL
COMPN STONE: NORMAL
SPECIMEN WT: 3 MG

## 2025-03-14 DIAGNOSIS — G43.009 MIGRAINE WITHOUT AURA AND WITHOUT STATUS MIGRAINOSUS, NOT INTRACTABLE: ICD-10-CM

## 2025-03-17 ENCOUNTER — MYC MEDICAL ADVICE (OUTPATIENT)
Dept: FAMILY MEDICINE | Facility: CLINIC | Age: 60
End: 2025-03-17
Payer: COMMERCIAL

## 2025-03-17 RX ORDER — ONDANSETRON 4 MG/1
4 TABLET, FILM COATED ORAL EVERY 8 HOURS PRN
Qty: 30 TABLET | Refills: 1 | Status: SHIPPED | OUTPATIENT
Start: 2025-03-17

## 2025-03-17 NOTE — TELEPHONE ENCOUNTER
Routing [employer] message to provider. Patient is sending in information for her upcoming appointment on 3/19/25. Richard George RN, BSN

## 2025-03-18 ASSESSMENT — PATIENT HEALTH QUESTIONNAIRE - PHQ9
SUM OF ALL RESPONSES TO PHQ QUESTIONS 1-9: 11
SUM OF ALL RESPONSES TO PHQ QUESTIONS 1-9: 11
10. IF YOU CHECKED OFF ANY PROBLEMS, HOW DIFFICULT HAVE THESE PROBLEMS MADE IT FOR YOU TO DO YOUR WORK, TAKE CARE OF THINGS AT HOME, OR GET ALONG WITH OTHER PEOPLE: SOMEWHAT DIFFICULT

## 2025-03-19 ENCOUNTER — OFFICE VISIT (OUTPATIENT)
Dept: FAMILY MEDICINE | Facility: CLINIC | Age: 60
End: 2025-03-19
Payer: COMMERCIAL

## 2025-03-19 VITALS
RESPIRATION RATE: 13 BRPM | OXYGEN SATURATION: 99 % | SYSTOLIC BLOOD PRESSURE: 132 MMHG | WEIGHT: 177 LBS | HEART RATE: 74 BPM | TEMPERATURE: 97.4 F | DIASTOLIC BLOOD PRESSURE: 78 MMHG | BODY MASS INDEX: 27.78 KG/M2 | HEIGHT: 67 IN

## 2025-03-19 DIAGNOSIS — F11.90 CHRONIC, CONTINUOUS USE OF OPIOIDS: ICD-10-CM

## 2025-03-19 DIAGNOSIS — Z01.818 PREOP GENERAL PHYSICAL EXAM: ICD-10-CM

## 2025-03-19 DIAGNOSIS — I10 ESSENTIAL HYPERTENSION: ICD-10-CM

## 2025-03-19 DIAGNOSIS — K59.09 CHRONIC CONSTIPATION: Primary | ICD-10-CM

## 2025-03-19 DIAGNOSIS — E89.2 HYPOPARATHYROIDISM AFTER PROCEDURE: ICD-10-CM

## 2025-03-19 DIAGNOSIS — N20.0 NEPHROLITHIASIS: ICD-10-CM

## 2025-03-19 DIAGNOSIS — R11.0 POSTOPERATIVE NAUSEA: ICD-10-CM

## 2025-03-19 DIAGNOSIS — Z98.890 POSTOPERATIVE NAUSEA: ICD-10-CM

## 2025-03-19 DIAGNOSIS — K42.9 UMBILICAL HERNIA WITHOUT OBSTRUCTION AND WITHOUT GANGRENE: ICD-10-CM

## 2025-03-19 PROCEDURE — 3078F DIAST BP <80 MM HG: CPT | Performed by: INTERNAL MEDICINE

## 2025-03-19 PROCEDURE — 1125F AMNT PAIN NOTED PAIN PRSNT: CPT | Performed by: INTERNAL MEDICINE

## 2025-03-19 PROCEDURE — 3075F SYST BP GE 130 - 139MM HG: CPT | Performed by: INTERNAL MEDICINE

## 2025-03-19 PROCEDURE — 99214 OFFICE O/P EST MOD 30 MIN: CPT | Performed by: INTERNAL MEDICINE

## 2025-03-19 RX ORDER — SCOPOLAMINE 1 MG/3D
1 PATCH, EXTENDED RELEASE TRANSDERMAL
Qty: 4 PATCH | Refills: 0 | Status: SHIPPED | OUTPATIENT
Start: 2025-03-19

## 2025-03-19 RX ORDER — LUBIPROSTONE 8 UG/1
1 CAPSULE ORAL DAILY
Qty: 90 CAPSULE | Refills: 0 | Status: SHIPPED | OUTPATIENT
Start: 2025-03-19

## 2025-03-19 ASSESSMENT — PAIN SCALES - GENERAL: PAINLEVEL_OUTOF10: SEVERE PAIN (7)
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
## 2025-03-21 ENCOUNTER — ANESTHESIA EVENT (OUTPATIENT)
Dept: SURGERY | Facility: AMBULATORY SURGERY CENTER | Age: 60
End: 2025-03-21
Payer: COMMERCIAL
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
## 2025-03-24 ENCOUNTER — ANCILLARY ORDERS (OUTPATIENT)
Dept: UROLOGY | Facility: CLINIC | Age: 60
End: 2025-03-24
Payer: COMMERCIAL

## 2025-03-24 DIAGNOSIS — R52 PAIN: Primary | ICD-10-CM

## 2025-03-25 ENCOUNTER — ANESTHESIA (OUTPATIENT)
Dept: SURGERY | Facility: AMBULATORY SURGERY CENTER | Age: 60
End: 2025-03-25
Payer: COMMERCIAL

## 2025-03-25 ENCOUNTER — HOSPITAL ENCOUNTER (OUTPATIENT)
Facility: AMBULATORY SURGERY CENTER | Age: 60
Discharge: HOME | End: 2025-03-25
Attending: UROLOGY
Payer: COMMERCIAL

## 2025-03-25 ENCOUNTER — ANCILLARY PROCEDURE (OUTPATIENT)
Dept: GENERAL RADIOLOGY | Facility: CLINIC | Age: 60
End: 2025-03-25
Attending: UROLOGY
Payer: COMMERCIAL

## 2025-03-25 VITALS
SYSTOLIC BLOOD PRESSURE: 117 MMHG | DIASTOLIC BLOOD PRESSURE: 64 MMHG | TEMPERATURE: 97.4 F | BODY MASS INDEX: 27.78 KG/M2 | HEART RATE: 65 BPM | HEIGHT: 67 IN | OXYGEN SATURATION: 100 % | WEIGHT: 177 LBS | RESPIRATION RATE: 16 BRPM

## 2025-03-25 DIAGNOSIS — N20.0 KIDNEY STONE: ICD-10-CM

## 2025-03-25 DIAGNOSIS — R52 PAIN: ICD-10-CM

## 2025-03-25 DIAGNOSIS — N20.0 KIDNEY STONE ON LEFT SIDE: Primary | ICD-10-CM

## 2025-03-25 LAB — CALCIUM SERPL-MCNC: 9.2 MG/DL (ref 8.8–10.4)

## 2025-03-25 PROCEDURE — G8916 PT W IV AB GIVEN ON TIME: HCPCS

## 2025-03-25 PROCEDURE — G8907 PT DOC NO EVENTS ON DISCHARG: HCPCS

## 2025-03-25 PROCEDURE — 82310 ASSAY OF CALCIUM: CPT | Performed by: ANESTHESIOLOGY

## 2025-03-25 PROCEDURE — 52356 CYSTO/URETERO W/LITHOTRIPSY: CPT | Mod: LT

## 2025-03-25 DEVICE — URETERAL STENT
Type: IMPLANTABLE DEVICE | Site: ABDOMEN | Status: FUNCTIONAL
Brand: PERCUFLEX™ PLUS

## 2025-03-25 RX ORDER — LIDOCAINE 40 MG/G
CREAM TOPICAL
Status: DISCONTINUED | OUTPATIENT
Start: 2025-03-25 | End: 2025-03-26 | Stop reason: HOSPADM

## 2025-03-25 RX ORDER — OXYCODONE HYDROCHLORIDE 5 MG/1
5 TABLET ORAL
Status: DISCONTINUED | OUTPATIENT
Start: 2025-03-25 | End: 2025-03-26 | Stop reason: HOSPADM

## 2025-03-25 RX ORDER — ACETAMINOPHEN 650 MG/1
650 SUPPOSITORY RECTAL ONCE
Status: COMPLETED | OUTPATIENT
Start: 2025-03-25 | End: 2025-03-25

## 2025-03-25 RX ORDER — NALOXONE HYDROCHLORIDE 0.4 MG/ML
0.1 INJECTION, SOLUTION INTRAMUSCULAR; INTRAVENOUS; SUBCUTANEOUS
Status: DISCONTINUED | OUTPATIENT
Start: 2025-03-25 | End: 2025-03-26 | Stop reason: HOSPADM

## 2025-03-25 RX ORDER — SODIUM CHLORIDE, SODIUM LACTATE, POTASSIUM CHLORIDE, CALCIUM CHLORIDE 600; 310; 30; 20 MG/100ML; MG/100ML; MG/100ML; MG/100ML
INJECTION, SOLUTION INTRAVENOUS CONTINUOUS PRN
Status: DISCONTINUED | OUTPATIENT
Start: 2025-03-25 | End: 2025-03-25

## 2025-03-25 RX ORDER — ONDANSETRON 4 MG/1
4 TABLET, ORALLY DISINTEGRATING ORAL EVERY 30 MIN PRN
Status: DISCONTINUED | OUTPATIENT
Start: 2025-03-25 | End: 2025-03-26 | Stop reason: HOSPADM

## 2025-03-25 RX ORDER — DEXAMETHASONE SODIUM PHOSPHATE 4 MG/ML
4 INJECTION, SOLUTION INTRA-ARTICULAR; INTRALESIONAL; INTRAMUSCULAR; INTRAVENOUS; SOFT TISSUE
Status: DISCONTINUED | OUTPATIENT
Start: 2025-03-25 | End: 2025-03-26 | Stop reason: HOSPADM

## 2025-03-25 RX ORDER — LABETALOL HYDROCHLORIDE 5 MG/ML
10 INJECTION, SOLUTION INTRAVENOUS
Status: DISCONTINUED | OUTPATIENT
Start: 2025-03-25 | End: 2025-03-26 | Stop reason: HOSPADM

## 2025-03-25 RX ORDER — ALBUTEROL SULFATE 0.83 MG/ML
2.5 SOLUTION RESPIRATORY (INHALATION) EVERY 4 HOURS PRN
Status: DISCONTINUED | OUTPATIENT
Start: 2025-03-25 | End: 2025-03-26 | Stop reason: HOSPADM

## 2025-03-25 RX ORDER — SODIUM CHLORIDE, SODIUM LACTATE, POTASSIUM CHLORIDE, CALCIUM CHLORIDE 600; 310; 30; 20 MG/100ML; MG/100ML; MG/100ML; MG/100ML
INJECTION, SOLUTION INTRAVENOUS CONTINUOUS
Status: DISCONTINUED | OUTPATIENT
Start: 2025-03-25 | End: 2025-03-26 | Stop reason: HOSPADM

## 2025-03-25 RX ORDER — KETOROLAC TROMETHAMINE 30 MG/ML
15 INJECTION, SOLUTION INTRAMUSCULAR; INTRAVENOUS
Status: DISCONTINUED | OUTPATIENT
Start: 2025-03-25 | End: 2025-03-26 | Stop reason: HOSPADM

## 2025-03-25 RX ORDER — DEXAMETHASONE SODIUM PHOSPHATE 4 MG/ML
INJECTION, SOLUTION INTRA-ARTICULAR; INTRALESIONAL; INTRAMUSCULAR; INTRAVENOUS; SOFT TISSUE PRN
Status: DISCONTINUED | OUTPATIENT
Start: 2025-03-25 | End: 2025-03-25

## 2025-03-25 RX ORDER — FUROSEMIDE 10 MG/ML
INJECTION INTRAMUSCULAR; INTRAVENOUS PRN
Status: DISCONTINUED | OUTPATIENT
Start: 2025-03-25 | End: 2025-03-25

## 2025-03-25 RX ORDER — SCOPOLAMINE 1 MG/3D
1 PATCH, EXTENDED RELEASE TRANSDERMAL
Status: DISCONTINUED | OUTPATIENT
Start: 2025-03-25 | End: 2025-03-26 | Stop reason: HOSPADM

## 2025-03-25 RX ORDER — LIDOCAINE HYDROCHLORIDE 20 MG/ML
INJECTION, SOLUTION INFILTRATION; PERINEURAL PRN
Status: DISCONTINUED | OUTPATIENT
Start: 2025-03-25 | End: 2025-03-25

## 2025-03-25 RX ORDER — ACETAMINOPHEN 325 MG/1
975 TABLET ORAL ONCE
Status: DISCONTINUED | OUTPATIENT
Start: 2025-03-25 | End: 2025-03-26 | Stop reason: HOSPADM

## 2025-03-25 RX ORDER — ONDANSETRON 2 MG/ML
4 INJECTION INTRAMUSCULAR; INTRAVENOUS EVERY 30 MIN PRN
Status: DISCONTINUED | OUTPATIENT
Start: 2025-03-25 | End: 2025-03-26 | Stop reason: HOSPADM

## 2025-03-25 RX ORDER — PROPOFOL 10 MG/ML
INJECTION, EMULSION INTRAVENOUS PRN
Status: DISCONTINUED | OUTPATIENT
Start: 2025-03-25 | End: 2025-03-25

## 2025-03-25 RX ORDER — FENTANYL CITRATE 50 UG/ML
INJECTION, SOLUTION INTRAMUSCULAR; INTRAVENOUS PRN
Status: DISCONTINUED | OUTPATIENT
Start: 2025-03-25 | End: 2025-03-25

## 2025-03-25 RX ORDER — OXYCODONE HYDROCHLORIDE 5 MG/1
10 TABLET ORAL
Status: DISCONTINUED | OUTPATIENT
Start: 2025-03-25 | End: 2025-03-26 | Stop reason: HOSPADM

## 2025-03-25 RX ORDER — ONDANSETRON 2 MG/ML
INJECTION INTRAMUSCULAR; INTRAVENOUS PRN
Status: DISCONTINUED | OUTPATIENT
Start: 2025-03-25 | End: 2025-03-25

## 2025-03-25 RX ORDER — CEFAZOLIN SODIUM 2 G/50ML
2 SOLUTION INTRAVENOUS
Status: COMPLETED | OUTPATIENT
Start: 2025-03-25 | End: 2025-03-25

## 2025-03-25 RX ORDER — CEFAZOLIN SODIUM 2 G/50ML
2 SOLUTION INTRAVENOUS SEE ADMIN INSTRUCTIONS
Status: DISCONTINUED | OUTPATIENT
Start: 2025-03-25 | End: 2025-03-26 | Stop reason: HOSPADM

## 2025-03-25 RX ORDER — PROPOFOL 10 MG/ML
INJECTION, EMULSION INTRAVENOUS CONTINUOUS PRN
Status: DISCONTINUED | OUTPATIENT
Start: 2025-03-25 | End: 2025-03-25

## 2025-03-25 RX ORDER — KETOROLAC TROMETHAMINE 30 MG/ML
INJECTION, SOLUTION INTRAMUSCULAR; INTRAVENOUS PRN
Status: DISCONTINUED | OUTPATIENT
Start: 2025-03-25 | End: 2025-03-25

## 2025-03-25 RX ORDER — HYDROXYZINE HYDROCHLORIDE 25 MG/1
25 TABLET, FILM COATED ORAL EVERY 6 HOURS PRN
Status: DISCONTINUED | OUTPATIENT
Start: 2025-03-25 | End: 2025-03-26 | Stop reason: HOSPADM

## 2025-03-25 RX ORDER — ACETAMINOPHEN 325 MG/1
975 TABLET ORAL ONCE
Status: COMPLETED | OUTPATIENT
Start: 2025-03-25 | End: 2025-03-25

## 2025-03-25 RX ORDER — OXYBUTYNIN CHLORIDE 5 MG/1
5 TABLET, EXTENDED RELEASE ORAL DAILY
Qty: 10 TABLET | Refills: 0 | Status: SHIPPED | OUTPATIENT
Start: 2025-03-25 | End: 2025-03-27

## 2025-03-25 RX ORDER — MEPERIDINE HYDROCHLORIDE 25 MG/ML
12.5 INJECTION INTRAMUSCULAR; INTRAVENOUS; SUBCUTANEOUS EVERY 5 MIN PRN
Status: DISCONTINUED | OUTPATIENT
Start: 2025-03-25 | End: 2025-03-26 | Stop reason: HOSPADM

## 2025-03-25 RX ORDER — FENTANYL CITRATE 50 UG/ML
25 INJECTION, SOLUTION INTRAMUSCULAR; INTRAVENOUS
Status: DISCONTINUED | OUTPATIENT
Start: 2025-03-25 | End: 2025-03-26 | Stop reason: HOSPADM

## 2025-03-25 RX ORDER — FENTANYL CITRATE 50 UG/ML
50 INJECTION, SOLUTION INTRAMUSCULAR; INTRAVENOUS EVERY 5 MIN PRN
Status: DISCONTINUED | OUTPATIENT
Start: 2025-03-25 | End: 2025-03-26 | Stop reason: HOSPADM

## 2025-03-25 RX ORDER — ASPIRIN 81 MG
100 TABLET, DELAYED RELEASE (ENTERIC COATED) ORAL DAILY
Qty: 60 TABLET | Refills: 1 | Status: SHIPPED | OUTPATIENT
Start: 2025-03-25

## 2025-03-25 RX ORDER — FENTANYL CITRATE 50 UG/ML
25 INJECTION, SOLUTION INTRAMUSCULAR; INTRAVENOUS EVERY 5 MIN PRN
Status: DISCONTINUED | OUTPATIENT
Start: 2025-03-25 | End: 2025-03-26 | Stop reason: HOSPADM

## 2025-03-25 RX ORDER — OXYCODONE HYDROCHLORIDE 5 MG/1
5 TABLET ORAL EVERY 6 HOURS PRN
Qty: 9 TABLET | Refills: 0 | Status: SHIPPED | OUTPATIENT
Start: 2025-03-25 | End: 2025-03-28

## 2025-03-25 RX ADMIN — SODIUM CHLORIDE, SODIUM LACTATE, POTASSIUM CHLORIDE, CALCIUM CHLORIDE: 600; 310; 30; 20 INJECTION, SOLUTION INTRAVENOUS at 08:15

## 2025-03-25 RX ADMIN — SCOPOLAMINE 1 PATCH: 1 PATCH, EXTENDED RELEASE TRANSDERMAL at 07:54

## 2025-03-25 RX ADMIN — DEXAMETHASONE SODIUM PHOSPHATE 4 MG: 4 INJECTION, SOLUTION INTRA-ARTICULAR; INTRALESIONAL; INTRAMUSCULAR; INTRAVENOUS; SOFT TISSUE at 08:26

## 2025-03-25 RX ADMIN — ONDANSETRON 4 MG: 2 INJECTION INTRAMUSCULAR; INTRAVENOUS at 08:35

## 2025-03-25 RX ADMIN — FENTANYL CITRATE 50 MCG: 50 INJECTION, SOLUTION INTRAMUSCULAR; INTRAVENOUS at 08:26

## 2025-03-25 RX ADMIN — PROPOFOL 50 MG: 10 INJECTION, EMULSION INTRAVENOUS at 08:41

## 2025-03-25 RX ADMIN — LIDOCAINE HYDROCHLORIDE 80 MG: 20 INJECTION, SOLUTION INFILTRATION; PERINEURAL at 08:26

## 2025-03-25 RX ADMIN — Medication 0.2 MG: at 09:56

## 2025-03-25 RX ADMIN — PROPOFOL 150 MG: 10 INJECTION, EMULSION INTRAVENOUS at 08:26

## 2025-03-25 RX ADMIN — Medication 50 MCG: at 08:47

## 2025-03-25 RX ADMIN — FUROSEMIDE 20 MG: 10 INJECTION INTRAMUSCULAR; INTRAVENOUS at 09:23

## 2025-03-25 RX ADMIN — ACETAMINOPHEN 975 MG: 325 TABLET ORAL at 07:54

## 2025-03-25 RX ADMIN — PROPOFOL 150 MCG/KG/MIN: 10 INJECTION, EMULSION INTRAVENOUS at 08:26

## 2025-03-25 RX ADMIN — KETOROLAC TROMETHAMINE 30 MG: 30 INJECTION, SOLUTION INTRAMUSCULAR; INTRAVENOUS at 09:21

## 2025-03-25 RX ADMIN — CEFAZOLIN SODIUM 2 G: 2 SOLUTION INTRAVENOUS at 08:20

## 2025-03-25 RX ADMIN — ONDANSETRON 4 MG: 4 TABLET, ORALLY DISINTEGRATING ORAL at 09:55

## 2025-03-25 RX ADMIN — Medication 50 MCG: at 08:44

## 2025-03-25 RX ADMIN — FENTANYL CITRATE 25 MCG: 50 INJECTION, SOLUTION INTRAMUSCULAR; INTRAVENOUS at 08:41

## 2025-03-25 RX ADMIN — FENTANYL CITRATE 25 MCG: 50 INJECTION, SOLUTION INTRAMUSCULAR; INTRAVENOUS at 08:50

## 2025-03-25 NOTE — OP NOTE
OPERATIVE REPORT  DATE OF SURGERY: 03/25/25  LOCATION OF SURGERY: [facility]  PREOPERATIVE DIAGNOSIS:  (N20.0) Kidney stone on left side  (primary encounter diagnosis)  POSTOPERATIVE DIAGNOSIS: (N20.0) Kidney stone on left side  (primary encounter diagnosis)     START TIME: 8:40 AM  END TIME: 9:23 AM    PROCEDURE PERFORMED:   1. Cystoscopy  2. LEFT retrograde pyelogram  3. LEFT ureteroscopy with laser lithotripsy  4. LEFT ureteroscopy with basketing of stones  5. LEFT JJ stent placement  6. <1hr physician fluoroscopy time      STAFF SURGEON: Ron Foreman MD  ANESTHESIA: General.   ESTIMATED BLOOD LOSS: 2 mL.   DRAINS AND TUBES: LEFT 6fr x 24cm ureteral stent, 16fr tran catheter  COMPLICATIONS: None.   DISPOSITION: PACU.   SPECIMENS OBTAINED:   ID Type Source Tests Collected by Time Destination   A : left kidney stone Calculus/Stone Kidney, Left STONE ANALYSIS Ron Foreman MD 3/25/2025  9:02 AM       SIGNIFICANT FINDINGS: Cystoscopy with no evidence of stones in the bladder.  Left ureteroscopy with no evidence of stones in the ureter.  Retrograde pyelogram outlining the collecting system.  Flexible ureteroscopy with numerous stones in the upper, mid, and lower poles of the kidney.  Numerous stones adherent to Deyvi's plaque.  Stones dusted with laser lithotripsy and larger fragments basketed and removed.  No evidence of ureteral injury.  Left ureteral stent placed.     HISTORY OF PRESENT ILLNESS: Valerie Sim is a 59 year old female with history of recurrent bilateral nephrolithiasis with prior history of topiramate treatment for migraines as well as hyperparathyroidism.  Updated CT scan shows significant stone burden in the left kidney and this is her more symptomatic side.  She was counseled on treatment options and elected to proceed with the above surgery.    OPERATION PERFORMED:   Informed consent was obtained and the patient was brought to the operating room where general anesthesia was induced.  The patient was given appropriate preoperative antibiotics and positioned supine. The patient was then repositioned in dorsal lithotomy with all pressure points padded. We then performed a timeout, verifying the correct patient's site and procedure to be performed.    A 22 Sierra Leonean cystoscope was inserted atraumatically into the bladder.  Cystoscopy was performed with no evidence of stones in the bladder.  The left ureteral orifice was identified and cannulated with a 0.035 sensor wire which is advanced up to the renal pelvis under fluoroscopic guidance and the cystoscope was removed.  A semirigid ureteroscope was assembled and inserted atraumatically into the bladder.  A Super Stiff wire was used to cannulate the ureteral orifice and the ureteroscope was advanced using a railroad technique up to the proximal ureter.  There is no evidence of stones in the ureter.  A gentle retrograde pyelogram was performed outlining the collecting system in the proximal ureter.  The Super Stiff wire was advanced to the renal pelvis and the semirigid ureteroscope was removed.  An 11-13 Sierra Leonean by 36 cm ureteral access sheath was advanced over the wire up to the proximal ureter under fluoroscopic guidance and the inner stylette and wire were removed.  Flexible ureteroscope was advanced into the renal pelvis and complete pyeloscopy was performed.  There were few larger stones in the upper and lower poles of the kidney.  There were numerous small stones adherent to Deyvi's plaques in the upper, mid, and lower poles of the kidney.  The larger stones were fragmented with laser lithotripsy.  The stones adherent to Deyvi's plaques were dusted with laser lithotripsy.  Stone fragments greater than 1 mm were basketed and removed.  The ureteroscope and access sheath were removed en bloc with no evidence of ureteral injury.  A 6 Sierra Leonean by 24 cm JJ ureteral stent was advanced over the wire with good curl noted in the renal pelvis and in the  bladder fluoroscopically.  A 16 English Valle catheter was placed.  She received 20 mg IV Lasix and 30 mg IV Toradol.  She was emerged from anesthesia and taken to the recovery room in stable condition.    Chart documentation with Dragon Voice recognition Software. Although reviewed after completion, some words and grammatical errors may remain.     Ron Foreman MD   Urology  [facility]  Clinic Phone [phone]

## 2025-03-25 NOTE — ANESTHESIA PROCEDURE NOTES
Airway       Patient location during procedure: OR  Staff -        Performed By: CRNAIndications and Patient Condition       Indications for airway management: tee-procedural       Induction type:intravenous       Mask difficulty assessment: 1 - vent by mask    Final Airway Details       Final airway type: supraglottic airway    Supraglottic Airway Details        Type: LMA       Brand: I-Gel       LMA size: 4    Post intubation assessment        Placement verified by: capnometry, equal breath sounds and chest rise        Secured with: tape       Ease of procedure: easy       Dentition: Intact and Unchanged

## 2025-03-25 NOTE — ANESTHESIA CARE TRANSFER NOTE
Patient: Valerie Sim    Procedure: Procedure(s):  CYSTOSCOPY, LEFT RETROGRADE PYELOGRAM, LEFT URETEROSCOPY WITH LASER LITHOTRIPSY AND BASKET REMOVAL OF STONE, LEFT URETERAL STENT PLACEMENT       Diagnosis: Kidney stone on left side [N20.0]  Diagnosis Additional Information: No value filed.    Anesthesia Type:   General     Note:    Oropharynx: oropharynx clear of all foreign objects and spontaneously breathing  Level of Consciousness: drowsy  Oxygen Supplementation: face mask  Level of Supplemental Oxygen (L/min / FiO2): 4  Independent Airway: airway patency satisfactory and stable  Dentition: dentition unchanged  Vital Signs Stable: post-procedure vital signs reviewed and stable  Report to RN Given: handoff report given  Patient transferred to: PACU    Handoff Report: Identifed the Patient, Identified the Reponsible Provider, Reviewed the pertinent medical history, Discussed the surgical course, Reviewed Intra-OP anesthesia mangement and issues during anesthesia, Set expectations for post-procedure period and Allowed opportunity for questions and acknowledgement of understanding  Vitals:  Vitals Value Taken Time   BP     Temp     Pulse 72 03/25/25 0936   Resp 17 03/25/25 0936   SpO2 99 % 03/25/25 0936   Vitals shown include unfiled device data.    Electronically Signed By: ADORE Louie CRNA  March 25, 2025  9:37 AM

## 2025-03-25 NOTE — ANESTHESIA POSTPROCEDURE EVALUATION
Patient: Valerie Sim    Procedure: Procedure(s):  CYSTOSCOPY, LEFT RETROGRADE PYELOGRAM, LEFT URETEROSCOPY WITH LASER LITHOTRIPSY AND BASKET REMOVAL OF STONE, LEFT URETERAL STENT PLACEMENT       Anesthesia Type:  General    Note:  Disposition: Outpatient   Postop Pain Control: Uneventful            Sign Out: Well controlled pain   PONV: No   Neuro/Psych: Uneventful            Sign Out: Acceptable/Baseline neuro status   Airway/Respiratory: Uneventful            Sign Out: Acceptable/Baseline resp. status   CV/Hemodynamics: Uneventful            Sign Out: Acceptable CV status; No obvious hypovolemia; No obvious fluid overload   Other NRE: NONE   DID A NON-ROUTINE EVENT OCCUR? No           Last vitals:  Vitals Value Taken Time   /64 03/25/25 1045   Temp 36.8  C (98.2  F) 03/25/25 0935   Pulse 64 03/25/25 1046   Resp 11 03/25/25 1046   SpO2 98 % 03/25/25 1046   Vitals shown include unfiled device data.    Electronically Signed By: Brayden Curtis MD  March 25, 2025  11:16 AM

## 2025-03-25 NOTE — DISCHARGE INSTRUCTIONS
POSTOPERATIVE INSTRUCTIONS    Diagnosis-------------------------------   LEFT kidney stones    Procedure-------------------------------  Procedure(s) (LRB):  CYSTOSCOPY, LEFT RETROGRADE PYELOGRAM, LEFT URETEROSCOPY WITH LASER LITHOTRIPSY AND BASKET REMOVAL OF STONE, LEFT URETERAL STENT PLACEMENT (Left)      Findings--------------------------------  LEFT kidney stones dusted and removed     Home-going instructions-----------------         Activity Limitation:     - No driving or operating heavy machinery while on narcotic pain medication.     FOLLOW THESE INSTRUCTIONS AS INDICATED BELOW:  - Observe operative area for signs of excessive bleeding.  - You may shower.  - Increase fluid intake to promote clear urine.  - Resume usual diet as tolerated    What to expect while recovering-----------  - You may experience some intermittent bleeding that makes your urine pink or cherry colored. This is normal.  - However, if you are unable to urinate, passing large amount of clots, have neal blood in your urine, or have a temperature >101 degrees, call the urology nurse on call, or present to your nearest emergency department.  - You are encouraged to walk daily, and have no activity restrictions.   - A URETERAL STENT has been placed that allows urine to flow unobstructed from your kidney into your bladder.  The stent has a curl in the kidney and a curl in the bladder.  The curl in the bladder can cause some urgency and frequency of urination as well as some mild blood in the urine.  The curl in the kidney can cause some mild flank discomfort.  This may be more noticeable when you urinate.  A URETERAL STENT is meant to be left in temporarily.  It must be removed or changed no later than 3 months after it's insertion.  If it's not removed it can result in stone overgrowth on the stent that can cause pain, infection, and can be very difficult to remove.      Discharge Medications/instructions:   - Flomax (tamsulosin) to be taken  daily, continue current supply  - Oxybutynin 5mg XL (Ditropan XL) to be taken daily until ureteral stent is removed   - Take Tylenol 1000mg every 6 hours for pain  - Take Ibuprofen 600mg every 6 hours as needed for additional pain control  - Take Oxycodone 5mg every 4-6 hours only for break through pain  - Take Colace while taking Oxycodone to prevent constipation      Questions/concerns------------------------  [facility]: [phone]    Future appointments  Follow up on 4/1/2025 for ureteral stent removal with Dr. Kellen Foreman MD

## 2025-03-27 ENCOUNTER — CARE COORDINATION (OUTPATIENT)
Dept: UROLOGY | Facility: CLINIC | Age: 60
End: 2025-03-27
Payer: COMMERCIAL

## 2025-03-27 DIAGNOSIS — N20.0 KIDNEY STONE ON LEFT SIDE: ICD-10-CM

## 2025-03-27 RX ORDER — OXYBUTYNIN CHLORIDE 5 MG/1
5 TABLET, EXTENDED RELEASE ORAL 2 TIMES DAILY
Qty: 12 TABLET | Refills: 0 | Status: SHIPPED | OUTPATIENT
Start: 2025-03-27 | End: 2025-04-02

## 2025-03-27 NOTE — PROGRESS NOTES
Discussed patient questions with Dr. Foreman in clinic. Per Dr. Foreman, raul for patient to take oxybutynin ER 5mg twice daily and we can provide a new prescription if needed. Per Dr. Kellen carter for patient to take excedrin as needed. My chart message sent to patient.    Tiesha Dunham RN, BSN

## 2025-03-27 NOTE — PROGRESS NOTES
Oxybutynin ER 5mg twice daily for 6 days ordered per Dr. Foreman and sent for Dr. Foreman to review and sign. Prescription sent to [facility] per patient request.     Tiesha Dunham RN, BSN

## 2025-03-30 ENCOUNTER — MYC MEDICAL ADVICE (OUTPATIENT)
Dept: FAMILY MEDICINE | Facility: CLINIC | Age: 60
End: 2025-03-30
Payer: COMMERCIAL

## 2025-03-30 DIAGNOSIS — F07.81 POST CONCUSSION SYNDROME: Primary | ICD-10-CM

## 2025-03-31 RX ORDER — DEXTROAMPHETAMINE SACCHARATE, AMPHETAMINE ASPARTATE, DEXTROAMPHETAMINE SULFATE AND AMPHETAMINE SULFATE 5; 5; 5; 5 MG/1; MG/1; MG/1; MG/1
20 TABLET ORAL 2 TIMES DAILY
Qty: 60 TABLET | Refills: 0 | Status: SHIPPED | OUTPATIENT
Start: 2025-04-30 | End: 2025-05-30

## 2025-03-31 RX ORDER — DEXTROAMPHETAMINE SACCHARATE, AMPHETAMINE ASPARTATE, DEXTROAMPHETAMINE SULFATE AND AMPHETAMINE SULFATE 5; 5; 5; 5 MG/1; MG/1; MG/1; MG/1
20 TABLET ORAL 2 TIMES DAILY
Qty: 60 TABLET | Refills: 0 | Status: SHIPPED | OUTPATIENT
Start: 2025-05-30 | End: 2025-06-29

## 2025-03-31 RX ORDER — DEXTROAMPHETAMINE SACCHARATE, AMPHETAMINE ASPARTATE, DEXTROAMPHETAMINE SULFATE AND AMPHETAMINE SULFATE 5; 5; 5; 5 MG/1; MG/1; MG/1; MG/1
20 TABLET ORAL 2 TIMES DAILY
Qty: 60 TABLET | Refills: 0 | Status: SHIPPED | OUTPATIENT
Start: 2025-03-31 | End: 2025-04-30

## 2025-03-31 NOTE — TELEPHONE ENCOUNTER
See Lat49hart message, patient requesting refill to be resent with correct dosage- Adderall 20 mg- one tablet twice per day.     Noted current Adderall prescription sig is below         HETAL Mckee  [facility]

## 2025-04-01 ENCOUNTER — OFFICE VISIT (OUTPATIENT)
Dept: UROLOGY | Facility: CLINIC | Age: 60
End: 2025-04-01
Payer: COMMERCIAL

## 2025-04-01 ENCOUNTER — ANCILLARY PROCEDURE (OUTPATIENT)
Dept: ULTRASOUND IMAGING | Facility: CLINIC | Age: 60
End: 2025-04-01
Attending: INTERNAL MEDICINE
Payer: COMMERCIAL

## 2025-04-01 VITALS — HEART RATE: 68 BPM | SYSTOLIC BLOOD PRESSURE: 123 MMHG | DIASTOLIC BLOOD PRESSURE: 69 MMHG

## 2025-04-01 DIAGNOSIS — N20.0 RECURRENT NEPHROLITHIASIS: ICD-10-CM

## 2025-04-01 DIAGNOSIS — Z46.6 ENCOUNTER FOR REMOVAL OF URETERAL STENT: Primary | ICD-10-CM

## 2025-04-01 DIAGNOSIS — N20.0 KIDNEY STONE: ICD-10-CM

## 2025-04-01 DIAGNOSIS — N20.0 KIDNEY STONES, CALCIUM OXALATE: ICD-10-CM

## 2025-04-01 DIAGNOSIS — K42.9 UMBILICAL HERNIA WITHOUT OBSTRUCTION AND WITHOUT GANGRENE: ICD-10-CM

## 2025-04-01 PROBLEM — Z79.818 LONG TERM (CURRENT) USE OF OTHER AGENTS AFFECTING ESTROGEN RECEPTORS AND ESTROGEN LEVELS: Status: ACTIVE | Noted: 2019-04-10

## 2025-04-01 PROCEDURE — 76705 ECHO EXAM OF ABDOMEN: CPT | Performed by: STUDENT IN AN ORGANIZED HEALTH CARE EDUCATION/TRAINING PROGRAM

## 2025-04-01 RX ORDER — LIDOCAINE HYDROCHLORIDE 20 MG/ML
JELLY TOPICAL ONCE
Status: COMPLETED | OUTPATIENT
Start: 2025-04-01 | End: 2025-04-01

## 2025-04-01 RX ADMIN — Medication 500 MG: at 13:03

## 2025-04-01 RX ADMIN — LIDOCAINE HYDROCHLORIDE: 20 JELLY TOPICAL at 13:03

## 2025-04-01 NOTE — NURSING NOTE
Valerie Sim's goals for this visit include:   Chief Complaint   Patient presents with    Cystoscopy     Stent removal       She requests these members of her care team be copied on today's visit information:     PCP: Jeronimo Amaral    Referring Provider:  Referred Self, MD  No address on file    /69 (BP Location: Right arm, Patient Position: Sitting, Cuff Size: Adult Large)   Pulse 68     Do you need any medication refills at today's visit?     Clinic Administered Medication Documentation     Patient was given   lidocaine (XYLOCAINE) 2 % external gel  . Prior to medication administration, verified patient's identity using patient's name and date of birth.      Clinic Administered Medication Documentation    Patient was given cephalexin 500 mg . Prior to medication administration, verified patient's identity using patient's name and date of birth.    Marium Kemp MA

## 2025-04-01 NOTE — PROGRESS NOTES
CYSTOSCOPY AND URETERAL STENT REMOVAL PROCEDURE NOTE:    Valerie Sim is a 60 year old female who presents with ureteral stent  for a cystoscopy and ureteral stent removal.    Pt ID verified with patient: Yes     Procedure verified with patient: Yes     Procedure confirmed with physician and support staff: Yes     Consent confirmed with physician and support staff.    Sign In:  History and Physical Exam reviewed  Primary Diagnosis: ureteral stent   Informed Consent Discussed: Yes   Sign in Communication: Yes   Time Out:  Team Confirms the Correct Patient, Correct Procedure; Yes , Correct Site and Site Marking, Correct Position (if applicable).  Affirmation of Time Out: Yes   Sign Out:  Sign Out Discussion: Yes   Physician: Ron Foreman MD    Valerie Sim is a 60 year old female with an indwelling ureteral stent in need of removal.    CYSTOSCOPY PROCEDURE:  After sterile preparation and draping of the patient,  a 17-Northern Irish flexible cystoscope was introduced via the urethra.  It was passed without difficulty into the bladder.  The urethra was open without evidence of stricture.  The ureteral orifices were orthotopic.  The double J stent was seen coming out the left side.  It was grasped with an alligator forceps and extracted intact without difficulty.  The patient tolerated the procedure well.    Stone Composition See Note See Note CM    Comment: Calculi composed primarily of:  30% calcium oxalate monohydrate, and  70% calcium phosphate (hydroxy- and carbonate- apatite).  INTERPRETIVE INFORMATION: Calculi (Stone) analysis       A/P Successful stent removal  Prophylactic antibiotic ordered   Stone prevention counseling provided today  - Referral to nephrology placed    Watch for any new onset fevers, signs of UTI.  May expect some pain after removal.  If this is severe, or last many hours, you may need to return for replacement of stent.    Ron Foreman MD   Urology  [facility]

## 2025-04-01 NOTE — PATIENT INSTRUCTIONS
"After Your Cystoscopy    What happens after the exam?    You may go back to your normal diet and activity as you feel ready, unless your doctor tells you not to.    For the next two days, you may notice:    Some blood in your urine  Some burning when you urinate (use the toilet)  An urge to urinate more often  Bladder spasms    These are normal after the procedure and should go away after a day or two.  To relieve these problems drink 6 to 8 large glasses of water each day (includes drinks at meals) as this will help clear the urine.  Take warm baths to relieve pain and bladder spasms.  Do not add anything to the bath water.  You may also take Tylenol (acetaminophen) for pain if needed.    When should I call my doctor?    A fever over 100F (38C) for more than a day. (Before you call the doctor, check your temperature under your tongue)  Chills  Failure to urinate: No urine comes out when you try to use the toilet. (Try soaking in a bathtub full of warm water. If still no urine, call your doctor)  A lot of blood in the urine, or blood clots larger than a nickel  Pain in the back or belly area (abdomen)  Pain or spasms that are not relieved by warm tub baths and pain medicine  Severe pain, burning or other problems while passing urine  Pain that gets worse after two days            AFTER YOUR CYSTOSCOPY        You have just completed a cystoscopy, or \"cysto\", which allowed your physician to learn more about your bladder (or to remove a stent placed after surgery). We suggest that you continue to avoid caffeine, fruit juice, and alcohol for the next 24 hours, however, you are encouraged to return to your normal activities.         A few things that are considered normal after your cystoscopy:     * Small amount of bleeding (or spotting) that clears within the next 24 hours     * Slight burning sensation with urination     * Sensation to of needing to avoid more frequently     * The feeling of \"air\" in your urine     * " Mild discomfort that is relieved with Tylenol        Please contact our office promptly if you:     * Develop a fever above 101 degrees     * Are unable to urinate     * Develop bright red blood that does not stop     * Severe pain or swelling         Please contact our office with any concerns or questions @[facility].

## 2025-04-03 ENCOUNTER — MYC MEDICAL ADVICE (OUTPATIENT)
Dept: UROLOGY | Facility: CLINIC | Age: 60
End: 2025-04-03

## 2025-04-03 ENCOUNTER — LAB (OUTPATIENT)
Dept: LAB | Facility: CLINIC | Age: 60
End: 2025-04-03
Attending: UROLOGY
Payer: COMMERCIAL

## 2025-04-03 DIAGNOSIS — R39.89 SUSPECTED UTI: Primary | ICD-10-CM

## 2025-04-03 DIAGNOSIS — R39.89 SUSPECTED UTI: ICD-10-CM

## 2025-04-03 LAB
ALBUMIN UR-MCNC: ABNORMAL G/DL
APPEARANCE UR: CLEAR
BACTERIA #/AREA URNS HPF: ABNORMAL /HPF
BILIRUB UR QL STRIP: ABNORMAL
COLOR UR AUTO: ABNORMAL
GLUCOSE UR STRIP-MCNC: NEGATIVE MG/DL
HGB UR QL STRIP: ABNORMAL
KETONES UR STRIP-MCNC: NEGATIVE MG/DL
LEUKOCYTE ESTERASE UR QL STRIP: NEGATIVE
MUCOUS THREADS #/AREA URNS LPF: PRESENT /LPF
NITRATE UR QL: ABNORMAL
PH UR STRIP: 5.5 [PH] (ref 5–7)
RBC #/AREA URNS AUTO: ABNORMAL /HPF
SKIP: ABNORMAL
SP GR UR STRIP: 1.01 (ref 1–1.03)
SQUAMOUS #/AREA URNS AUTO: ABNORMAL /LPF
UROBILINOGEN UR STRIP-MCNC: 4 MG/DL
WBC #/AREA URNS AUTO: ABNORMAL /HPF

## 2025-04-03 NOTE — PROGRESS NOTES
Call placed to patient. She reports stent pain and bladder spasms despite Tylenol, Advil, Flomax and oxybutynin. UA/UC orders placed. She will try to get to the [facility] to give a sample.      Thank you,  Mera Baez RN, BSN   Urology Triage Nurse

## 2025-04-04 NOTE — TELEPHONE ENCOUNTER
Per staff message 4/3/25:    Message  Received: Yesterday  Mera Baez RN Rangen, Vianney, HETAL Faith, I called Valerie. She is having stent pain and bladder spasms despite taking tylenol, advil, flomax and oxybutynin. I put in orders for a UA/UC and she is going to try to get to the [facility] yet tonight and give a urine sample. Will you monitor for results tomorrow?      Thank you,  Mera Baez RN, BSN  Urology Triage Nurse    I responded to Mera and let her know we would monitor for results.  Vianney Pearson, RN

## 2025-04-04 NOTE — CONFIDENTIAL NOTE
Per chart review, the 4/3/25 urinalysis is abnormal, however the 4/3/25 urine culture showed no growth of bacteria. My chart message sent to patient.    Tiesha Dunham RN, BSN

## 2025-04-07 ENCOUNTER — THERAPY VISIT (OUTPATIENT)
Dept: OCCUPATIONAL THERAPY | Facility: CLINIC | Age: 60
End: 2025-04-07
Payer: COMMERCIAL

## 2025-04-07 DIAGNOSIS — F07.81 POSTCONCUSSION SYNDROME: ICD-10-CM

## 2025-04-07 DIAGNOSIS — S06.0XAA CONCUSSION WITH UNKNOWN LOSS OF CONSCIOUSNESS STATUS, INITIAL ENCOUNTER: Primary | ICD-10-CM

## 2025-04-07 PROCEDURE — 97535 SELF CARE MNGMENT TRAINING: CPT | Mod: GO | Performed by: OCCUPATIONAL THERAPIST

## 2025-04-13 ENCOUNTER — E-VISIT (OUTPATIENT)
Dept: URGENT CARE | Facility: CLINIC | Age: 60
End: 2025-04-13
Payer: COMMERCIAL

## 2025-04-13 DIAGNOSIS — R21 RASH: Primary | ICD-10-CM

## 2025-04-13 PROCEDURE — 99207 PR NON-BILLABLE SERV PER CHARTING: CPT | Performed by: FAMILY MEDICINE

## 2025-04-14 NOTE — PATIENT INSTRUCTIONS
Dear Valerie Sim?     After reviewing your responses, I am unable to make a diagnosis that can be treated online.    You will not be charged for this eVisit.     We are dedicated to helping you achieve your best health and would like to see you in one of our many clinic locations - a primary care provider would be ideal for your concern.    Please use Sprig to schedule a visit with a provider or call [phone]) to schedule at any of our locations.    Thanks for choosing?us?as your health care partner,?   ?   ADORE Stephenson CNP?

## 2025-04-15 DIAGNOSIS — G43.719 CHRONIC MIGRAINE WITHOUT AURA, INTRACTABLE, WITHOUT STATUS MIGRAINOSUS: Primary | ICD-10-CM

## 2025-04-16 ENCOUNTER — OFFICE VISIT (OUTPATIENT)
Dept: PHYSICAL MEDICINE AND REHAB | Facility: CLINIC | Age: 60
End: 2025-04-16
Payer: COMMERCIAL

## 2025-04-16 ENCOUNTER — VIRTUAL VISIT (OUTPATIENT)
Dept: PSYCHOLOGY | Facility: CLINIC | Age: 60
End: 2025-04-16
Payer: COMMERCIAL

## 2025-04-16 DIAGNOSIS — M54.81 BILATERAL OCCIPITAL NEURALGIA: ICD-10-CM

## 2025-04-16 DIAGNOSIS — G43.719 CHRONIC MIGRAINE WITHOUT AURA, INTRACTABLE, WITHOUT STATUS MIGRAINOSUS: Primary | ICD-10-CM

## 2025-04-16 DIAGNOSIS — G89.29 OTHER CHRONIC PAIN: ICD-10-CM

## 2025-04-16 DIAGNOSIS — F41.1 GAD (GENERALIZED ANXIETY DISORDER): ICD-10-CM

## 2025-04-16 DIAGNOSIS — G24.4 IDIOPATHIC OROFACIAL DYSTONIA: ICD-10-CM

## 2025-04-16 DIAGNOSIS — F33.1 MODERATE EPISODE OF RECURRENT MAJOR DEPRESSIVE DISORDER (H): Primary | ICD-10-CM

## 2025-04-16 PROCEDURE — 64615 CHEMODENERV MUSC MIGRAINE: CPT | Performed by: PHYSICAL MEDICINE & REHABILITATION

## 2025-04-16 PROCEDURE — 95874 GUIDE NERV DESTR NEEDLE EMG: CPT | Performed by: PHYSICAL MEDICINE & REHABILITATION

## 2025-04-16 PROCEDURE — 90837 PSYTX W PT 60 MINUTES: CPT | Mod: 95

## 2025-04-16 RX ADMIN — BUPIVACAINE HYDROCHLORIDE 25 MG: 2.5 INJECTION, SOLUTION EPIDURAL; INFILTRATION; INTRACAUDAL; PERINEURAL at 10:01

## 2025-04-16 ASSESSMENT — ANXIETY QUESTIONNAIRES
GAD7 TOTAL SCORE: 9
7. FEELING AFRAID AS IF SOMETHING AWFUL MIGHT HAPPEN: NOT AT ALL
4. TROUBLE RELAXING: MORE THAN HALF THE DAYS
6. BECOMING EASILY ANNOYED OR IRRITABLE: SEVERAL DAYS
7. FEELING AFRAID AS IF SOMETHING AWFUL MIGHT HAPPEN: NOT AT ALL
IF YOU CHECKED OFF ANY PROBLEMS ON THIS QUESTIONNAIRE, HOW DIFFICULT HAVE THESE PROBLEMS MADE IT FOR YOU TO DO YOUR WORK, TAKE CARE OF THINGS AT HOME, OR GET ALONG WITH OTHER PEOPLE: SOMEWHAT DIFFICULT
1. FEELING NERVOUS, ANXIOUS, OR ON EDGE: NEARLY EVERY DAY
GAD7 TOTAL SCORE: 9
5. BEING SO RESTLESS THAT IT IS HARD TO SIT STILL: SEVERAL DAYS
8. IF YOU CHECKED OFF ANY PROBLEMS, HOW DIFFICULT HAVE THESE MADE IT FOR YOU TO DO YOUR WORK, TAKE CARE OF THINGS AT HOME, OR GET ALONG WITH OTHER PEOPLE?: SOMEWHAT DIFFICULT
3. WORRYING TOO MUCH ABOUT DIFFERENT THINGS: SEVERAL DAYS
GAD7 TOTAL SCORE: 9
2. NOT BEING ABLE TO STOP OR CONTROL WORRYING: SEVERAL DAYS

## 2025-04-16 NOTE — LETTER
4/16/2025       RE: Valerie Sim  [address]  [place] [address]     Dear Colleague,    Thank you for referring your patient, Valerie Sim, to the [facility] [place] at [facility]. Please see a copy of my visit note below.      [facility]     PM&R CLINIC NOTE  BOTULINUM TOXIN PROCEDURE      HPI  No chief complaint on file.    Valerie Sim is a 60 year old female with a history of chronic migraine headaches who presents to clinic for botulinum toxin injections for management of chronic migraine headaches and excessive jaw clenching.     SINCE LAST VISIT  Valerie Sim was last seen for Botox injections on 2/12/2025, at which time she received 250 units of Botox.     Patient denies new medical diagnoses, illnesses, hospitalizations, emergency room visits, and injuries since the previous injection with botulinum neurotoxin.     RESPONSE TO PREVIOUS TREATMENT    Side effects:  None      1.  Headache frequency during this injection cycle:  The past injection cycle, she experienced approximately 28 migraine headache days per month. This is compared to her baseline headache frequency of 30 severe headache days per month.     2.  Headache duration during this injection cycle:  Headache duration was between 2 hours and one day. Patient reports a few episodes of multiple day headaches during this injection cycle.     3.  Headache intensity during this injection cycle:    A.  6/10  =  Typical pain level.  B.  9/10  =  Worst pain level.  C.  4/10  =  Lowest pain level.    4.  Change in headache medication usage during this injection cycle:  No changes to migraine headache medications. She takes Ralpax, Toradol injections, Zofran, Oxygen supplementation, and intranasal lidocaine for migraine rescue. Over the last 9 weeks, she took 2 Ralpax, 7 Dilaudid (for pelvic floor  and kidney stone pain), and one IM Toradol injection.     5.  ER Visits During This Injection Cycle: No ER visits for migraine headaches.     6.  Functional Performance:  Change in ADL's, social interaction, days lost from work, etc. Patient reports being able to more fully participate in social and family activities and responsibilities as headache symptoms have improved. She notes that specifically during this cycle, she missed 8 days of normal activity, and 4 days of work. This is better than before Botox was started.       PHYSICAL EXAM  VS: There were no vitals taken for this visit.   GEN: Pleasant and cooperative, in no acute distress  HEENT: No facial asymmetry    ALLERGIES  Allergies   Allergen Reactions     Fluoxetine Other (See Comments)     PN: LW Reaction: headache  PN: LW Reaction: headache  Migraine       Garlic Blisters, Cough, Dermatitis, Difficulty breathing, Headache, Hives, Itching, Nausea and Vomiting and Shortness Of Breath     Candesartan      Other reaction(s): Myalgia     Duloxetine      Other reaction(s): Headache  migraine     Metoclopramide      Other reaction(s): Headache  Caused increase in headaches     Perfume      Other reaction(s): Headache  head pain leading to migraines     Pregabalin      Other reaction(s): Headache, Myalgia     Trazodone Other (See Comments) and Unknown     Other reaction(s): Headache  Other reaction(s): Headache  Other reaction(s): Headache     Amitriptyline Hcl Other (See Comments)     Migraine       Candesartan Cilexetil-Hctz Muscle Pain (Myalgia)     Cyproheptadine Hcl      headaches     Desvenlafaxine      Migraine       Diatrizoate Unknown     PN: LW CM1: CONTRAST- iodine Reaction :     Diazepam      Other reaction(s): Vestibular Toxicity  PN: LW Reaction: vertigo  PN: LW Reaction: vertigo     Duloxetine Hcl Other (See Comments)     Migraine       Gabapentin Other (See Comments)     Galcanezumab      Other reaction(s): Headache     Imipramine Other (See  "Comments)     Migraine       Lactose      Metoprolol Other (See Comments) and Unknown     headache  headache  headache     Morphine Other (See Comments)     Patient reports seizure   Other reaction(s): Unknown  Seizure; 5/111/21 updated info: patient states she had a seizure while having a migraine headache years ago and is not sure if it was due to morphine. She has tolerated Dilaudid since then.       No Clinical Screening - See Comments      PN: LW FI1: lactose intolerance LW FI2: shellfish  PN: LW CM1: CONTRAST- iodine Reaction :  PN: LW Other1: -nka     Nortriptyline Other (See Comments)     Migraine       Phenergan Dm [Promethazine-Dm] Other (See Comments)     seizures     Pregabalin Muscle Pain (Myalgia)     Promethazine      PN: LW Reaction: minimal sizures     Venlafaxine Other (See Comments)     PN: LW Reaction: migraine  PN: LW Reaction: migraine  Migraine       Wellbutrin [Bupropion Hydrobromide] Other (See Comments)     Migraine       Compazine Anxiety     Dihydroergotamine Anxiety and Other (See Comments)     Cardiac symptoms     Droperidol Anxiety     PN: LW Reaction: agitation     Medroxyprogesterone Hives     PN: LW Reaction: nausea     Prochlorperazine Anxiety     PN: LW Reaction: \"can't sit still\"       CURRENT MEDICATIONS    Current Outpatient Medications:      aMILoride (MIDAMOR) 5 MG tablet, Take 1.5 tablets (7.5 mg) by mouth daily. 2.5MG in the AM and 5MG in the PM., Disp: 135 tablet, Rfl: 1     amLODIPine (NORVASC) 10 MG tablet, Take 1 tablet (10 mg) by mouth daily., Disp: 90 tablet, Rfl: 1     amphetamine-dextroamphetamine (ADDERALL) 20 MG tablet, Take 1 tablet (20 mg) by mouth 2 times daily., Disp: 60 tablet, Rfl: 0     amphetamine-dextroamphetamine (ADDERALL) 20 MG tablet, Take 1 tablet (20 mg) by mouth 2 times daily., Disp: 60 tablet, Rfl: 0     [START ON 5/30/2025] amphetamine-dextroamphetamine (ADDERALL) 20 MG tablet, Take 1 tablet (20 mg) by mouth 2 times daily., Disp: 60 tablet, Rfl: " 0     B Complex-Biotin-FA (B-COMPLEX PO), Take 1 tablet by mouth daily, Disp: , Rfl:      botulinum toxin type A (BOTOX) 100 units injection, Inject intramuscular. Every 18 weeks for pelvic floor spasms, Disp: , Rfl:      budesonide (RINOCORT AQUA) 32 MCG/ACT nasal spray, Spray 1 spray into both nostrils daily., Disp: , Rfl:      calcitRIOL (ROCALTROL) 0.25 MCG capsule, Take 0.25 mcg by mouth as needed, Disp: , Rfl:      Calcium-Magnesium-Vitamin D (CITRACAL SLOW RELEASE) 600- MG-MG-UNIT TB24, Take 600 mg by mouth 4 times daily, Disp: , Rfl:      docusate sodium (COLACE) 100 MG capsule, Take 1 capsule by mouth as needed for constipation, Disp: , Rfl:      docusate sodium (COLACE) 100 MG tablet, Take 1 tablet (100 mg) by mouth daily., Disp: 60 tablet, Rfl: 1     doxycycline hyclate (VIBRAMYCIN) 100 MG capsule, Take 1 capsule (100 mg) by mouth 2 times daily., Disp: 60 capsule, Rfl: 0     eletriptan (RELPAX) 40 MG tablet, Take 1 tablet (40 mg) by mouth as needed for migraine. Take 40 mg by mouth as needed. If headache comes back or persists can take one more 40 mg after 2 hrs but not more than 80 mg in 24 hrs, Disp: 30 tablet, Rfl: 0     fexofenadine (ALLEGRA) 180 MG tablet, Take 180 mg by mouth daily, Disp: , Rfl:      HYDROmorphone (DILAUDID) 2 MG tablet, Take 0.5-1 tablets (1-2 mg) by mouth every 3 hours as needed (headache). 20 tablets = 3 month supply., Disp: 15 tablet, Rfl: 0     ketoconazole (NIZORAL) 2 % external shampoo, Apply topically daily as needed for itching or irritation., Disp: 240 mL, Rfl: 1     ketorolac (TORADOL) 30 MG/ML injection, Inject 1 mL (30 mg) over 2 minutes into the muscle at onset of headache for other (migraine once in 24 hours. Max 5 days  per month)., Disp: 6 mL, Rfl: 0     lactulose 20 GM/30ML solution, Take 30 mLs (20 g) by mouth 3 times daily., Disp: 1892 mL, Rfl: 0     lamoTRIgine (LAMICTAL) 100 MG tablet, Take 1 tablet (100 mg) by mouth daily., Disp: 90 tablet, Rfl: 1      "lidocaine (XYLOCAINE) 4 % external solution, Spray 0.5 ml once in each nostril q 4-6 hours as needed for headache. lay down with head tilted back for 5 minutes after if preferred, Disp: 50 mL, Rfl: 6     lubiprostone (AMITIZA) 8 MCG capsule, Take 1 capsule (8 mcg) by mouth daily., Disp: 90 capsule, Rfl: 0     Magnesium Oxide 500 MG TABS, Take 500 mg by mouth 2 times daily, Disp: , Rfl:      mupirocin (BACTROBAN) 2 % external ointment, Apply topically 3 times daily., Disp: 90 g, Rfl: 0     naloxone (NARCAN) 4 MG/0.1ML nasal spray, Spray 1 spray into one nostril alternating nostrils as needed for opioid reversal every 2-3 mins until assistance arrives., Disp: 2 each, Rfl: 1     nebivolol (BYSTOLIC) 5 MG tablet, Take 5 mg by mouth daily Take twice a day totaling 10 mg., Disp: , Rfl:      Needle, Disp, 25G X 1\" MISC, 2 each as needed (with toradol). 90-day supply, Disp: 6 each, Rfl: 1     ondansetron (ZOFRAN) 4 MG tablet, Take 1 tablet (4 mg) by mouth every 8 hours as needed for nausea., Disp: 30 tablet, Rfl: 1     order for DME, Equipment being ordered: Oxygen The patient has Cluster Headache and needs 10 liters/minute of high flow oxygen via nonrebreather mask prn headaches, Disp: 99 days, Rfl: 0     order for DME, Equipment being ordered: Oxygen and non-rebreather mask.   Use high flow oxygen (10-15 L/min) with non-rebreather mask, as needed for cluster headaches, Disp: 1 Units, Rfl: 11     permethrin (ELIMITE) 5 % external cream, Apply cream from head to toe (except the face); leave on for 8-14 hours then wash off with water; reapply in 1 week if live mites appear., Disp: 60 g, Rfl: 1     potassium chloride ER (KLOR-CON M) 20 MEQ CR tablet, Take 1 tablet by mouth 2 times daily, Disp: , Rfl:      QUEtiapine (SEROQUEL) 25 MG tablet, Take 0.5-1 tablets (12.5-25 mg) by mouth nightly as needed (For sleep)., Disp: 60 tablet, Rfl: 2     QUEtiapine (SEROQUEL) 50 MG tablet, Take 1 tablet (50 mg) by mouth at bedtime., Disp: " 90 tablet, Rfl: 1     scopolamine (TRANSDERM) 1 MG/3DAYS 72 hr patch, Place 1 patch over 72 hours onto the skin every 72 hours., Disp: 4 patch, Rfl: 0     Syringe, Disposable, (SYRINGE LUER SLIP) 1 ML MISC, 1 each as needed (with toradol/migraine)., Disp: 6 each, Rfl: 1     tamsulosin (FLOMAX) 0.4 MG capsule, Take 1 capsule (0.4 mg) by mouth daily., Disp: 90 capsule, Rfl: 0     valACYclovir (VALTREX) 1000 mg tablet, Take 1,000 mg by mouth as needed., Disp: , Rfl:     Current Facility-Administered Medications:      [START ON 2025] botulinum toxin type A (BOTOX) 100 units injection 300 Units, 300 Units, Intramuscular, See Admin Instructions, Siobhan Mata MD     botulinum toxin type A (BOTOX) 100 units injection 300 Units, 300 Units, Intramuscular, See Admin Instructions, Addie Malhotra MD, 250 Units at 25 1339     bupivacaine (MARCAINE) 0.25% preservative free injection, 10 mL, Intradermal, Once,      lidocaine 1 % 2 mL, 2 mL, Intradermal, Once,      triamcinolone (KENALOG-40) injection 40 mg, 40 mg, Intramuscular, Once,        BOTULINUM NEUROTOXIN INJECTION PROCEDURES    VERIFICATION OF PATIENT IDENTIFICATION AND PROCEDURE     Initials   Patient Name SES   Patient  SES   Procedure Verified by: SES     Prior to the start of the procedure and with procedural staff participation, I verbally confirmed the patient s identity using two indicators, relevant allergies, that the procedure was appropriate and matched the consent or emergent situation, and that the correct equipment/implants were available. Immediately prior to starting the procedure I conducted the Time Out with the procedural staff and re-confirmed the patient s name, procedure, and site/side. (The Joint Commission universal protocol was followed.)  Yes    Sedation (Moderate or Deep): None    ABOVE ASSESSMENTS PERFORMED BY    Addie Malhotra MD      INDICATIONS FOR PROCEDURES  Valerie Sim is a 60 year old patient with pain and  jaw clenching secondary to the diagnosis of chronic migraine headaches and oromandibular dystonia. Her baseline symptoms have been recalcitrant to oral medications and conservative therapy.  She is here today for reinjection with Botox.    GOAL OF PROCEDURE  The goal of this procedure is to decrease pain and excessive jaw clenching due to chronic migraine headaches and bruxism.     TOTAL DOSE ADMINISTERED  Dose Administered:  250 units  Botox (Botulinum Toxin Type A)       2:1 Dilution   Unavoidable Drug Waste: Yes  Amount of drug waste (mL): 50 units Botox.  Reason for waste:  Single use vial  Diluent Used:  0.25% bupivacaine  Total Volume of Diluent Used:  5 ml  NDC #: Botox 100u ([address]-09)      CONSENT  The risks, benefits, and treatment options were discussed with Valerie Sim and she agreed to proceed.    Written consent was obtained by  ninoska .     EQUIPMENT USED  Needle-25mm stimulating/recording  Needle-30 gauge  EMG/NCS Machine    SKIN PREPARATION  Skin preparation was performed using an alcohol wipe.    GUIDANCE DESCRIPTION  Electro-myographic guidance was necessary throughout the neck and jaw portion of the procedure to accurately identify all areas of dystonic muscles while avoiding injection of non-dystonic muscles, neighboring nerves and nearby vascular structures.       AREA/MUSCLE INJECTED:  250 UNITS BOTOX = TOTAL DOSE, 2:1 DILUTION     1. FACIAL & SCALP MUSCLES: 100 UNITS BOTOX = TOTAL DOSE      Right Frontalis - 10 units of Botox in 2 site/s.  Left Frontalis - 10 units of Botox in 2 site/s.      Procerus - 5 units of Botox at 1 site/s.       Right  - 5 units of Botox in 1 site/s.  Left  - 5 units of Botox in 1 site/s.      Right Nasalis - 2.5 units of Botox at 1 site/s.           Left Nasalis - 2.5 units of Botox at 1 site/s.     Right Upper Occipitalis - 30 units of Botox at 5 site/s.   Left Upper Occipitalis - 30 units of Botox at 5 site/s.         2. JAW MUSCLES: 100  UNITS BOTOX = TOTAL DOSE     Right Masseter - 25 units of Botox at 2 site/s.   Left Masseter - 25 units of Botox at 2 site/s.      Right Temporalis - 25 units of Botox at 5 site/s.  Left Temporalis - 25 units of Botox at 5 site/s.          3. SHOULDER & NECK MUSCLES: 50 UNITS BOTOX = TOTAL DOSE      Right Levator Scapula - 15 units of Botox at 2 site/s (neck and scapular insertion).  Left Levator Scapula - 5 units of Botox at 1 site/s (neck).      Right Upper Trapezius (mid & low lateral) - 10 units of Botox at 3 site/s.  Left Upper Trapezius (mid & low lateral) - 10 units of Botox at 3 site/s.             Right Pectoralis Minor - 5 units of Botox at 1 site/s.                     Left Pectoralis Minor - 5 units of Botox at 1 site/s.       RESPONSE TO PROCEDURE  Valerie Sim tolerated the procedure well and there were no immediate complications. She was allowed to recover for an appropriate period of time and was discharged home in stable condition.      ASSESSMENT AND PLAN   Botulinum toxin injections: No changes made to Botox dose or distribution today. Patient will continue to monitor response to Botox and report at next appointment.     Referrals: None.   Medications: None.   Follow up: Valerie Sim was rescheduled for the next series of injections in 9 weeks, at which time we will evaluate response to today's injections. She may call the clinic prior with any questions or concerns prior to the next appointment.     Addie Malhotra MD       Again, thank you for allowing me to participate in the care of your patient.      Sincerely,    Addie Malhotra MD
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
## 2025-04-21 ENCOUNTER — OFFICE VISIT (OUTPATIENT)
Dept: FAMILY MEDICINE | Facility: CLINIC | Age: 60
End: 2025-04-21
Payer: COMMERCIAL

## 2025-04-21 ENCOUNTER — THERAPY VISIT (OUTPATIENT)
Dept: OCCUPATIONAL THERAPY | Facility: CLINIC | Age: 60
End: 2025-04-21
Payer: COMMERCIAL

## 2025-04-21 VITALS
BODY MASS INDEX: 28.31 KG/M2 | TEMPERATURE: 97.3 F | SYSTOLIC BLOOD PRESSURE: 122 MMHG | WEIGHT: 180.4 LBS | HEART RATE: 84 BPM | HEIGHT: 67 IN | OXYGEN SATURATION: 100 % | DIASTOLIC BLOOD PRESSURE: 78 MMHG | RESPIRATION RATE: 15 BRPM

## 2025-04-21 DIAGNOSIS — L21.9 SEBORRHEIC DERMATITIS: ICD-10-CM

## 2025-04-21 DIAGNOSIS — S06.0XAA CONCUSSION WITH UNKNOWN LOSS OF CONSCIOUSNESS STATUS, INITIAL ENCOUNTER: Primary | ICD-10-CM

## 2025-04-21 DIAGNOSIS — F07.81 POSTCONCUSSION SYNDROME: ICD-10-CM

## 2025-04-21 DIAGNOSIS — L73.9 FOLLICULITIS: Primary | ICD-10-CM

## 2025-04-21 PROCEDURE — 99215 OFFICE O/P EST HI 40 MIN: CPT

## 2025-04-21 PROCEDURE — 3078F DIAST BP <80 MM HG: CPT

## 2025-04-21 PROCEDURE — 3074F SYST BP LT 130 MM HG: CPT

## 2025-04-21 PROCEDURE — 1126F AMNT PAIN NOTED NONE PRSNT: CPT

## 2025-04-21 PROCEDURE — G2211 COMPLEX E/M VISIT ADD ON: HCPCS

## 2025-04-21 RX ORDER — PERMETHRIN 50 MG/G
CREAM TOPICAL
Qty: 60 G | Refills: 1 | Status: SHIPPED | OUTPATIENT
Start: 2025-04-21

## 2025-04-21 RX ORDER — DOXYCYCLINE 100 MG/1
100 CAPSULE ORAL 2 TIMES DAILY
Qty: 60 CAPSULE | Refills: 0 | Status: SHIPPED | OUTPATIENT
Start: 2025-04-21 | End: 2025-05-21

## 2025-04-21 ASSESSMENT — PAIN SCALES - GENERAL: PAINLEVEL_OUTOF10: NO PAIN (0)

## 2025-04-21 NOTE — PROGRESS NOTES
Assessment & Plan     Folliculitis  Patient is a complicated 60 year-old female with GERD, hypertension, MDD, and MAHAMED with a history of TBI presenting with concerns of ongoing skin concerns. Rash on lower extremity does appear concerning for folliculitis. As improved with Bactroban and doxycycline, plan for prolonged course of doxycycline. Low suspicion for lice or scabies, however, has not been treated with topical permethrin so will attempt this as well. Advised to schedule appointment with dermatology as referred to by PCP last month. Discussed proper use of medication(s) and potential side effects. Follow-up as needed. Patient understands and is agreeable to plan as discussed in clinic.  - permethrin (ELIMITE) 5 % external cream; Apply cream from head to toe (except the face); leave on for 8-14 hours then wash off with water; reapply in 1 week if live mites appear.  - doxycycline hyclate (VIBRAMYCIN) 100 MG capsule; Take 1 capsule (100 mg) by mouth 2 times daily.    Seborrheic dermatitis  Discussed pathophysiology. Continue with ketoconazole shampoo and PRN topical Lidex for management.       I spent a total of 53 minutes on the day of the visit.   Time spent by me today doing chart review, history and exam, documentation and further activities per the note    Subjective   Valerie is a 60 year old, presenting for the following health issues:  Rash        4/21/2025     8:04 AM   Additional Questions   Roomed by Vanessa FREY   Accompanied by None         4/21/2025     8:04 AM   Patient Reported Additional Medications   Patient reports taking the following new medications NA     History of Present Illness       Reason for visit:  Skin issues/scabies, foculitis, extreme loss R eyelashes, red eyes. Have RX but cannot get under control since sarted 14 mos ago in Feb 2024. It is making me feel crazy.    She eats 2-3 servings of fruits and vegetables daily.She consumes 0 sweetened beverage(s) daily.She exercises with enough  "effort to increase her heart rate 30 to 60 minutes per day.  She exercises with enough effort to increase her heart rate 5 days per week. She is missing 1 dose(s) of medications per week.  She is not taking prescribed medications regularly due to remembering to take.        Rash  Onset/Duration: 14 months ago   Description  Location: face, scalp, trunk, legs, ans arms   Character: draining, red with a \"white hole\"  Itching: moderate  Intensity:  moderate  Progression of Symptoms:  waxing and waning  Accompanying signs and symptoms:   Fever: No  Body aches or joint pain: No  Sore throat symptoms: No  Recent cold symptoms: No  History:           Previous episodes of similar rash: None  New exposures:  None  Recent travel: No  Exposure to similar rash: No  Precipitating or alleviating factors: none  Therapies tried and outcome: Benadryl/diphenhydramine and flucinonide, doxycycline, valacyclovir, ketoconazole, tacrolimus, OTC lotions and creams      Presents with concerns of ongoing derm issues since February 2024. Most bothersome rash is on lower legs. Has been treated for folliculitis with topical Bactroban. Some benefit with topical Bactroban without resolution. Similar lesions on the right upper extremities. Areas open up intermittently with associated purulent discharge. Was prescribed 7 day a BID course of doxycycline by [facility]. Completed antibiotic course last week. Noticed some improvement of rash on lower legs while on the doxycycline.     Scalp flaking, itching, and sometimes greasy feeling. Treating with ketoconazole shampoo and topical Lidex. Some improvement but not completely resolved.     Takes Allegra daily.     Was seen by dermatology through [facility] twice without significant direction. Was referred to dermatology by PCP but has not scheduled appointment.         Objective    BP (!) 146/90   Pulse 84   Temp 97.3  F (36.3  C) (Temporal)   Resp 15   Ht 1.702 m (5' 7.01\")   Wt 81.8 kg " (180 lb 6.4 oz)   SpO2 100%   BMI 28.25 kg/m    Body mass index is 28.25 kg/m .  Physical Exam  Vitals reviewed.   Constitutional:       General: She is not in acute distress.     Appearance: Normal appearance. She is not ill-appearing.   Skin:     General: Skin is warm and dry.      Capillary Refill: Capillary refill takes less than 2 seconds.      Findings: Rash present. No lesion.      Comments: Folliculitis like rash to bilateral upper/lower extremities and trunk. Scabs around follicles without purulent drainage or induration.   Neurological:      Mental Status: She is alert.   Psychiatric:         Attention and Perception: Attention and perception normal.         Mood and Affect: Mood and affect normal.             Signed Electronically by: ADORE Arnett CNP

## 2025-04-21 NOTE — PATIENT INSTRUCTIONS
The rash on your scalp, eyebrows, and eyelashes sounds consistent to a chronic inflammatory skin condition called seborrheic dermatitis. Continue with the daily Ketoconazole shampoo and as needed Lidex solution to help with symptoms. I would recommend using an alternative eyelid scrub in the form of [employer] baby soap.     For the ongoing rash to the trunk and arms/legs. I have ordered a prolonged course of doxycycline to help reduce inflammation occurring in the skin. Plan to start with 2 weeks and extend to full 4 weeks if lesions are still present. Take with food to prevent upset stomach. I would also recommend taking separate from vitamins/minerals. As you have not been treated for mites, I have prescribed a topical cream called permethrin. Please apply based on instructions and repeat if needed in 1 week.    I would strongly recommend scheduling an appointment with dermatology as previously referred for further evaluation given the exhaustive measures that have been attempted in primary care thus far.

## 2025-04-23 ENCOUNTER — MYC REFILL (OUTPATIENT)
Dept: FAMILY MEDICINE | Facility: CLINIC | Age: 60
End: 2025-04-23
Payer: COMMERCIAL

## 2025-04-23 DIAGNOSIS — L73.9 FOLLICULITIS: ICD-10-CM

## 2025-04-23 DIAGNOSIS — I10 ESSENTIAL HYPERTENSION: Primary | ICD-10-CM

## 2025-04-23 DIAGNOSIS — L21.0 DANDRUFF IN ADULT: ICD-10-CM

## 2025-04-23 RX ORDER — MUPIROCIN 20 MG/G
OINTMENT TOPICAL 3 TIMES DAILY
Qty: 90 G | Refills: 0 | Status: SHIPPED | OUTPATIENT
Start: 2025-04-23

## 2025-04-23 RX ORDER — PERMETHRIN 50 MG/G
CREAM TOPICAL
Qty: 60 G | Refills: 1 | OUTPATIENT
Start: 2025-04-23

## 2025-04-23 RX ORDER — AMILORIDE HYDROCHLORIDE 5 MG/1
7.5 TABLET ORAL DAILY
Qty: 135 TABLET | Refills: 1 | Status: SHIPPED | OUTPATIENT
Start: 2025-04-23

## 2025-04-23 RX ORDER — AMLODIPINE BESYLATE 10 MG/1
10 TABLET ORAL DAILY
Qty: 90 TABLET | Refills: 1 | Status: SHIPPED | OUTPATIENT
Start: 2025-04-23

## 2025-04-23 RX ORDER — KETOCONAZOLE 20 MG/ML
SHAMPOO, SUSPENSION TOPICAL DAILY PRN
Qty: 240 ML | Refills: 1 | Status: SHIPPED | OUTPATIENT
Start: 2025-04-23

## 2025-04-24 NOTE — TELEPHONE ENCOUNTER
Decline refill request. Has refill on original prescription for repeat treatment following initial if needed. Closing encounter.

## 2025-04-29 ASSESSMENT — ANXIETY QUESTIONNAIRES
7. FEELING AFRAID AS IF SOMETHING AWFUL MIGHT HAPPEN: NOT AT ALL
IF YOU CHECKED OFF ANY PROBLEMS ON THIS QUESTIONNAIRE, HOW DIFFICULT HAVE THESE PROBLEMS MADE IT FOR YOU TO DO YOUR WORK, TAKE CARE OF THINGS AT HOME, OR GET ALONG WITH OTHER PEOPLE: SOMEWHAT DIFFICULT
4. TROUBLE RELAXING: MORE THAN HALF THE DAYS
GAD7 TOTAL SCORE: 9
GAD7 TOTAL SCORE: 9
6. BECOMING EASILY ANNOYED OR IRRITABLE: SEVERAL DAYS
1. FEELING NERVOUS, ANXIOUS, OR ON EDGE: NEARLY EVERY DAY
2. NOT BEING ABLE TO STOP OR CONTROL WORRYING: SEVERAL DAYS
3. WORRYING TOO MUCH ABOUT DIFFERENT THINGS: SEVERAL DAYS
7. FEELING AFRAID AS IF SOMETHING AWFUL MIGHT HAPPEN: NOT AT ALL
5. BEING SO RESTLESS THAT IT IS HARD TO SIT STILL: SEVERAL DAYS
8. IF YOU CHECKED OFF ANY PROBLEMS, HOW DIFFICULT HAVE THESE MADE IT FOR YOU TO DO YOUR WORK, TAKE CARE OF THINGS AT HOME, OR GET ALONG WITH OTHER PEOPLE?: SOMEWHAT DIFFICULT
GAD7 TOTAL SCORE: 9

## 2025-04-30 ENCOUNTER — THERAPY VISIT (OUTPATIENT)
Dept: PHYSICAL THERAPY | Facility: CLINIC | Age: 60
End: 2025-04-30
Payer: COMMERCIAL

## 2025-04-30 ENCOUNTER — VIRTUAL VISIT (OUTPATIENT)
Dept: PSYCHOLOGY | Facility: CLINIC | Age: 60
End: 2025-04-30
Payer: COMMERCIAL

## 2025-04-30 DIAGNOSIS — F41.1 GAD (GENERALIZED ANXIETY DISORDER): ICD-10-CM

## 2025-04-30 DIAGNOSIS — R10.2 PELVIC PAIN IN FEMALE: ICD-10-CM

## 2025-04-30 DIAGNOSIS — M99.05 SOMATIC DYSFUNCTION OF PELVIC REGION: Primary | ICD-10-CM

## 2025-04-30 DIAGNOSIS — G89.29 OTHER CHRONIC PAIN: ICD-10-CM

## 2025-04-30 DIAGNOSIS — F33.1 MODERATE EPISODE OF RECURRENT MAJOR DEPRESSIVE DISORDER (H): Primary | ICD-10-CM

## 2025-04-30 PROCEDURE — 90837 PSYTX W PT 60 MINUTES: CPT | Mod: 95

## 2025-04-30 PROCEDURE — 97112 NEUROMUSCULAR REEDUCATION: CPT | Mod: GP | Performed by: PHYSICAL THERAPIST

## 2025-04-30 PROCEDURE — 97161 PT EVAL LOW COMPLEX 20 MIN: CPT | Mod: GP | Performed by: PHYSICAL THERAPIST

## 2025-04-30 PROCEDURE — 97530 THERAPEUTIC ACTIVITIES: CPT | Mod: GP | Performed by: PHYSICAL THERAPIST

## 2025-04-30 PROCEDURE — 97110 THERAPEUTIC EXERCISES: CPT | Mod: GP | Performed by: PHYSICAL THERAPIST
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
## 2025-05-01 PROBLEM — R10.2 PELVIC PAIN IN FEMALE: Status: ACTIVE | Noted: 2017-12-05

## 2025-05-02 ENCOUNTER — MYC MEDICAL ADVICE (OUTPATIENT)
Dept: PSYCHIATRY | Facility: CLINIC | Age: 60
End: 2025-05-02
Payer: COMMERCIAL

## 2025-05-02 RX ORDER — TRIAMCINOLONE ACETONIDE 40 MG/ML
40 INJECTION, SUSPENSION INTRA-ARTICULAR; INTRAMUSCULAR ONCE
Status: DISCONTINUED | OUTPATIENT
Start: 2025-05-02 | End: 2025-05-05

## 2025-05-02 RX ORDER — BUPIVACAINE HYDROCHLORIDE 2.5 MG/ML
10 INJECTION, SOLUTION EPIDURAL; INFILTRATION; INTRACAUDAL; PERINEURAL ONCE
Status: COMPLETED | OUTPATIENT
Start: 2025-05-02 | End: 2025-04-16

## 2025-05-05 ENCOUNTER — HOSPITAL ENCOUNTER (EMERGENCY)
Facility: CLINIC | Age: 60
Discharge: HOME | End: 2025-05-05
Attending: EMERGENCY MEDICINE | Admitting: EMERGENCY MEDICINE
Payer: COMMERCIAL

## 2025-05-05 VITALS
SYSTOLIC BLOOD PRESSURE: 161 MMHG | WEIGHT: 180 LBS | BODY MASS INDEX: 28.25 KG/M2 | HEIGHT: 67 IN | RESPIRATION RATE: 16 BRPM | TEMPERATURE: 97.7 F | OXYGEN SATURATION: 99 % | HEART RATE: 82 BPM | DIASTOLIC BLOOD PRESSURE: 73 MMHG

## 2025-05-05 DIAGNOSIS — R21 RASH AND NONSPECIFIC SKIN ERUPTION: ICD-10-CM

## 2025-05-05 PROCEDURE — 99283 EMERGENCY DEPT VISIT LOW MDM: CPT

## 2025-05-05 RX ORDER — HYDROXYZINE HYDROCHLORIDE 25 MG/1
25 TABLET, FILM COATED ORAL EVERY 6 HOURS PRN
Qty: 36 TABLET | Refills: 0 | Status: SHIPPED | OUTPATIENT
Start: 2025-05-05 | End: 2025-05-19

## 2025-05-05 ASSESSMENT — COLUMBIA-SUICIDE SEVERITY RATING SCALE - C-SSRS
2. HAVE YOU ACTUALLY HAD ANY THOUGHTS OF KILLING YOURSELF IN THE PAST MONTH?: NO
1. IN THE PAST MONTH, HAVE YOU WISHED YOU WERE DEAD OR WISHED YOU COULD GO TO SLEEP AND NOT WAKE UP?: NO
6. HAVE YOU EVER DONE ANYTHING, STARTED TO DO ANYTHING, OR PREPARED TO DO ANYTHING TO END YOUR LIFE?: NO

## 2025-05-05 ASSESSMENT — ACTIVITIES OF DAILY LIVING (ADL): ADLS_ACUITY_SCORE: 41

## 2025-05-05 NOTE — TELEPHONE ENCOUNTER
Writer called patient to follow up on [employer] message. Patient reports she had taken an additional 100 mg of lamotrigine on Thursday night (total of 200 mg) due to difficulty sleeping and has been taking 50 mg at bedtime since then. Writer educated patient on importance of taking lamotrigine as prescribed and not make increases without talking to the provider due to risk of life threatening rash. Writer also provided patient with clinic after hours number. She reports since Friday (when she sent the [employer] message), the spots on  her lips and nostrils have subsided, but she is rash on her scalp and skin. She states the spots are red and itchy and some look like raised welts. She reports some spots on her collarbone, midsection and down to her inner calf. She states that she had oral antibiotics from the [facility] and permethrin that have helped some.     Patient denies a fever, but reports she has had the feeling that she's going to get sick.     She states she is seeing a dermatologist at the end of the month and states she will call to see if she can get a same day appointment with dermatology today. Patient states she has struggling with a rash off and on for 14 months. Patient also states she is willing to present to the ER if that is provider's recommendation.      Per last visit:  1) Medications:   - Continue lamotrigine 100mg at bedtime  - Continue quetiapine 50mg at bedtime      RASH ASSESSMENT    1)  First 2-8 weeks of starting treatment   No        Date of lamotrigine initiation or most recent dose increase-  Self increased (doubled dose) on 5/1    2) Location-       neck:            Yes      upper torso : Yes      face:             Yes       mouth:          Yes       tongue:         No      eyes:            No      nose:           Yes      genital/anal: No       widespread: Yes      other:            NA    3) Rash apperance-      small spots:  Yes       merging:       No      red:               Yes        swollen:        Yes  [wheal, hives]      blistering:     No       crusting:       Yes       tender:         No       5) Systemic-         fever:                    No        flu-like sx:             Yes        bruising:               Yes        loss of appetite:  No     Serious Rash:  Rash accompanied by fever or flu-like symptoms, and loss of appetite.  Involves the face, mouth, tongue, nose, eyes ('above the neck') and genital or anal areas.   More prominent in the neck and upper trunk.   Merging rash that appear red and swollen (wheal or hives).   Purplish, small spots that are tender to touch. - not in that way  Skin redness and swelling all over the body, with or without skin shedding

## 2025-05-05 NOTE — ED TRIAGE NOTES
"\"My doctor told me to come in.\" Patient presents to ED with her service dog.  rash for a 14 months. Patient states she increased her Lamictal Thursday, states took 200 instead of 100. States rash increased but also developed sores in her nose. 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        "

## 2025-05-05 NOTE — PROGRESS NOTES
[facility]     PM&R CLINIC NOTE  BOTULINUM TOXIN PROCEDURE      HPI  No chief complaint on file.    Valerie Sim is a 60 year old female with a history of chronic migraine headaches who presents to clinic for botulinum toxin injections for management of chronic migraine headaches and excessive jaw clenching.     SINCE LAST VISIT  Valerie Sim was last seen for Botox injections on 2/12/2025, at which time she received 250 units of Botox.     Patient denies new medical diagnoses, illnesses, hospitalizations, emergency room visits, and injuries since the previous injection with botulinum neurotoxin.     RESPONSE TO PREVIOUS TREATMENT    Side effects:  None      1.  Headache frequency during this injection cycle:  The past injection cycle, she experienced approximately 28 migraine headache days per month. This is compared to her baseline headache frequency of 30 severe headache days per month.     2.  Headache duration during this injection cycle:  Headache duration was between 2 hours and one day. Patient reports a few episodes of multiple day headaches during this injection cycle.     3.  Headache intensity during this injection cycle:    A.  6/10  =  Typical pain level.  B.  9/10  =  Worst pain level.  C.  4/10  =  Lowest pain level.    4.  Change in headache medication usage during this injection cycle:  No changes to migraine headache medications. She takes Ralpax, Toradol injections, Zofran, Oxygen supplementation, and intranasal lidocaine for migraine rescue. Over the last 9 weeks, she took 2 Ralpax, 7 Dilaudid (for pelvic floor and kidney stone pain), and one IM Toradol injection.     5.  ER Visits During This Injection Cycle: No ER visits for migraine headaches.     6.  Functional Performance:  Change in ADL's, social interaction, days lost from work, etc. Patient reports being able to more fully participate in social and family activities and responsibilities as  headache symptoms have improved. She notes that specifically during this cycle, she missed 8 days of normal activity, and 4 days of work. This is better than before Botox was started.       PHYSICAL EXAM  VS: There were no vitals taken for this visit.   GEN: Pleasant and cooperative, in no acute distress  HEENT: No facial asymmetry    ALLERGIES  Allergies   Allergen Reactions    Fluoxetine Other (See Comments)     PN: LW Reaction: headache  PN: LW Reaction: headache  Migraine      Garlic Blisters, Cough, Dermatitis, Difficulty breathing, Headache, Hives, Itching, Nausea and Vomiting and Shortness Of Breath    Candesartan      Other reaction(s): Myalgia    Duloxetine      Other reaction(s): Headache  migraine    Metoclopramide      Other reaction(s): Headache  Caused increase in headaches    Perfume      Other reaction(s): Headache  head pain leading to migraines    Pregabalin      Other reaction(s): Headache, Myalgia    Trazodone Other (See Comments) and Unknown     Other reaction(s): Headache  Other reaction(s): Headache  Other reaction(s): Headache    Amitriptyline Hcl Other (See Comments)     Migraine      Candesartan Cilexetil-Hctz Muscle Pain (Myalgia)    Cyproheptadine Hcl      headaches    Desvenlafaxine      Migraine      Diatrizoate Unknown     PN: LW CM1: CONTRAST- iodine Reaction :    Diazepam      Other reaction(s): Vestibular Toxicity  PN: LW Reaction: vertigo  PN: LW Reaction: vertigo    Duloxetine Hcl Other (See Comments)     Migraine      Gabapentin Other (See Comments)    Galcanezumab      Other reaction(s): Headache    Imipramine Other (See Comments)     Migraine      Lactose     Metoprolol Other (See Comments) and Unknown     headache  headache  headache    Morphine Other (See Comments)     Patient reports seizure   Other reaction(s): Unknown  Seizure; 5/111/21 updated info: patient states she had a seizure while having a migraine headache years ago and is not sure if it was due to morphine. She  "has tolerated Dilaudid since then.      No Clinical Screening - See Comments      PN: LW FI1: lactose intolerance LW FI2: shellfish  PN: LW CM1: CONTRAST- iodine Reaction :  PN: LW Other1: -nka    Nortriptyline Other (See Comments)     Migraine      Phenergan Dm [Promethazine-Dm] Other (See Comments)     seizures    Pregabalin Muscle Pain (Myalgia)    Promethazine      PN: LW Reaction: minimal sizures    Venlafaxine Other (See Comments)     PN: LW Reaction: migraine  PN: LW Reaction: migraine  Migraine      Wellbutrin [Bupropion Hydrobromide] Other (See Comments)     Migraine      Compazine Anxiety    Dihydroergotamine Anxiety and Other (See Comments)     Cardiac symptoms    Droperidol Anxiety     PN: LW Reaction: agitation    Medroxyprogesterone Hives     PN: LW Reaction: nausea    Prochlorperazine Anxiety     PN: LW Reaction: \"can't sit still\"       CURRENT MEDICATIONS    Current Outpatient Medications:     aMILoride (MIDAMOR) 5 MG tablet, Take 1.5 tablets (7.5 mg) by mouth daily. 2.5MG in the AM and 5MG in the PM., Disp: 135 tablet, Rfl: 1    amLODIPine (NORVASC) 10 MG tablet, Take 1 tablet (10 mg) by mouth daily., Disp: 90 tablet, Rfl: 1    amphetamine-dextroamphetamine (ADDERALL) 20 MG tablet, Take 1 tablet (20 mg) by mouth 2 times daily., Disp: 60 tablet, Rfl: 0    amphetamine-dextroamphetamine (ADDERALL) 20 MG tablet, Take 1 tablet (20 mg) by mouth 2 times daily., Disp: 60 tablet, Rfl: 0    [START ON 5/30/2025] amphetamine-dextroamphetamine (ADDERALL) 20 MG tablet, Take 1 tablet (20 mg) by mouth 2 times daily., Disp: 60 tablet, Rfl: 0    B Complex-Biotin-FA (B-COMPLEX PO), Take 1 tablet by mouth daily, Disp: , Rfl:     botulinum toxin type A (BOTOX) 100 units injection, Inject intramuscular. Every 18 weeks for pelvic floor spasms, Disp: , Rfl:     budesonide (RINOCORT AQUA) 32 MCG/ACT nasal spray, Spray 1 spray into both nostrils daily., Disp: , Rfl:     calcitRIOL (ROCALTROL) 0.25 MCG capsule, Take 0.25 mcg " by mouth as needed, Disp: , Rfl:     Calcium-Magnesium-Vitamin D (CITRACAL SLOW RELEASE) 600- MG-MG-UNIT TB24, Take 600 mg by mouth 4 times daily, Disp: , Rfl:     docusate sodium (COLACE) 100 MG capsule, Take 1 capsule by mouth as needed for constipation, Disp: , Rfl:     docusate sodium (COLACE) 100 MG tablet, Take 1 tablet (100 mg) by mouth daily., Disp: 60 tablet, Rfl: 1    doxycycline hyclate (VIBRAMYCIN) 100 MG capsule, Take 1 capsule (100 mg) by mouth 2 times daily., Disp: 60 capsule, Rfl: 0    eletriptan (RELPAX) 40 MG tablet, Take 1 tablet (40 mg) by mouth as needed for migraine. Take 40 mg by mouth as needed. If headache comes back or persists can take one more 40 mg after 2 hrs but not more than 80 mg in 24 hrs, Disp: 30 tablet, Rfl: 0    fexofenadine (ALLEGRA) 180 MG tablet, Take 180 mg by mouth daily, Disp: , Rfl:     HYDROmorphone (DILAUDID) 2 MG tablet, Take 0.5-1 tablets (1-2 mg) by mouth every 3 hours as needed (headache). 20 tablets = 3 month supply., Disp: 15 tablet, Rfl: 0    ketoconazole (NIZORAL) 2 % external shampoo, Apply topically daily as needed for itching or irritation., Disp: 240 mL, Rfl: 1    ketorolac (TORADOL) 30 MG/ML injection, Inject 1 mL (30 mg) over 2 minutes into the muscle at onset of headache for other (migraine once in 24 hours. Max 5 days  per month)., Disp: 6 mL, Rfl: 0    lactulose 20 GM/30ML solution, Take 30 mLs (20 g) by mouth 3 times daily., Disp: 1892 mL, Rfl: 0    lamoTRIgine (LAMICTAL) 100 MG tablet, Take 1 tablet (100 mg) by mouth daily., Disp: 90 tablet, Rfl: 1    lidocaine (XYLOCAINE) 4 % external solution, Spray 0.5 ml once in each nostril q 4-6 hours as needed for headache. lay down with head tilted back for 5 minutes after if preferred, Disp: 50 mL, Rfl: 6    lubiprostone (AMITIZA) 8 MCG capsule, Take 1 capsule (8 mcg) by mouth daily., Disp: 90 capsule, Rfl: 0    Magnesium Oxide 500 MG TABS, Take 500 mg by mouth 2 times daily, Disp: , Rfl:     mupirocin  "(BACTROBAN) 2 % external ointment, Apply topically 3 times daily., Disp: 90 g, Rfl: 0    naloxone (NARCAN) 4 MG/0.1ML nasal spray, Spray 1 spray into one nostril alternating nostrils as needed for opioid reversal every 2-3 mins until assistance arrives., Disp: 2 each, Rfl: 1    nebivolol (BYSTOLIC) 5 MG tablet, Take 5 mg by mouth daily Take twice a day totaling 10 mg., Disp: , Rfl:     Needle, Disp, 25G X 1\" MISC, 2 each as needed (with toradol). 90-day supply, Disp: 6 each, Rfl: 1    ondansetron (ZOFRAN) 4 MG tablet, Take 1 tablet (4 mg) by mouth every 8 hours as needed for nausea., Disp: 30 tablet, Rfl: 1    order for DME, Equipment being ordered: Oxygen The patient has Cluster Headache and needs 10 liters/minute of high flow oxygen via nonrebreather mask prn headaches, Disp: 99 days, Rfl: 0    order for DME, Equipment being ordered: Oxygen and non-rebreather mask.   Use high flow oxygen (10-15 L/min) with non-rebreather mask, as needed for cluster headaches, Disp: 1 Units, Rfl: 11    permethrin (ELIMITE) 5 % external cream, Apply cream from head to toe (except the face); leave on for 8-14 hours then wash off with water; reapply in 1 week if live mites appear., Disp: 60 g, Rfl: 1    potassium chloride ER (KLOR-CON M) 20 MEQ CR tablet, Take 1 tablet by mouth 2 times daily, Disp: , Rfl:     QUEtiapine (SEROQUEL) 25 MG tablet, Take 0.5-1 tablets (12.5-25 mg) by mouth nightly as needed (For sleep)., Disp: 60 tablet, Rfl: 2    QUEtiapine (SEROQUEL) 50 MG tablet, Take 1 tablet (50 mg) by mouth at bedtime., Disp: 90 tablet, Rfl: 1    scopolamine (TRANSDERM) 1 MG/3DAYS 72 hr patch, Place 1 patch over 72 hours onto the skin every 72 hours., Disp: 4 patch, Rfl: 0    Syringe, Disposable, (SYRINGE LUER SLIP) 1 ML MISC, 1 each as needed (with toradol/migraine)., Disp: 6 each, Rfl: 1    tamsulosin (FLOMAX) 0.4 MG capsule, Take 1 capsule (0.4 mg) by mouth daily., Disp: 90 capsule, Rfl: 0    valACYclovir (VALTREX) 1000 mg tablet, " Take 1,000 mg by mouth as needed., Disp: , Rfl:     Current Facility-Administered Medications:     [START ON 2025] botulinum toxin type A (BOTOX) 100 units injection 300 Units, 300 Units, Intramuscular, See Admin Instructions, Siobhan Mata MD    botulinum toxin type A (BOTOX) 100 units injection 300 Units, 300 Units, Intramuscular, See Admin Instructions, Addie Malhotra MD, 250 Units at 25 1339    bupivacaine (MARCAINE) 0.25% preservative free injection, 10 mL, Intradermal, Once,     lidocaine 1 % 2 mL, 2 mL, Intradermal, Once,     triamcinolone (KENALOG-40) injection 40 mg, 40 mg, Intramuscular, Once,        BOTULINUM NEUROTOXIN INJECTION PROCEDURES    VERIFICATION OF PATIENT IDENTIFICATION AND PROCEDURE     Initials   Patient Name SES   Patient  SES   Procedure Verified by: SES     Prior to the start of the procedure and with procedural staff participation, I verbally confirmed the patient s identity using two indicators, relevant allergies, that the procedure was appropriate and matched the consent or emergent situation, and that the correct equipment/implants were available. Immediately prior to starting the procedure I conducted the Time Out with the procedural staff and re-confirmed the patient s name, procedure, and site/side. (The Joint Commission universal protocol was followed.)  Yes    Sedation (Moderate or Deep): None    ABOVE ASSESSMENTS PERFORMED BY    Addie Malhotra MD      INDICATIONS FOR PROCEDURES  Valerie Sim is a 60 year old patient with pain and jaw clenching secondary to the diagnosis of chronic migraine headaches and oromandibular dystonia. Her baseline symptoms have been recalcitrant to oral medications and conservative therapy.  She is here today for reinjection with Botox.    GOAL OF PROCEDURE  The goal of this procedure is to decrease pain and excessive jaw clenching due to chronic migraine headaches and bruxism.     TOTAL DOSE ADMINISTERED  Dose  Administered:  250 units  Botox (Botulinum Toxin Type A)       2:1 Dilution   Unavoidable Drug Waste: Yes  Amount of drug waste (mL): 50 units Botox.  Reason for waste:  Single use vial  Diluent Used:  0.25% bupivacaine  Total Volume of Diluent Used:  5 ml  NDC #: Botox 100u ([address]-23)      CONSENT  The risks, benefits, and treatment options were discussed with Valerie Sim and she agreed to proceed.    Written consent was obtained by  [facility] .     EQUIPMENT USED  Needle-25mm stimulating/recording  Needle-30 gauge  EMG/NCS Machine    SKIN PREPARATION  Skin preparation was performed using an alcohol wipe.    GUIDANCE DESCRIPTION  Electro-myographic guidance was necessary throughout the neck and jaw portion of the procedure to accurately identify all areas of dystonic muscles while avoiding injection of non-dystonic muscles, neighboring nerves and nearby vascular structures.       AREA/MUSCLE INJECTED:  250 UNITS BOTOX = TOTAL DOSE, 2:1 DILUTION     1. FACIAL & SCALP MUSCLES: 100 UNITS BOTOX = TOTAL DOSE      Right Frontalis - 10 units of Botox in 2 site/s.  Left Frontalis - 10 units of Botox in 2 site/s.      Procerus - 5 units of Botox at 1 site/s.       Right  - 5 units of Botox in 1 site/s.  Left  - 5 units of Botox in 1 site/s.      Right Nasalis - 2.5 units of Botox at 1 site/s.           Left Nasalis - 2.5 units of Botox at 1 site/s.     Right Upper Occipitalis - 30 units of Botox at 5 site/s.   Left Upper Occipitalis - 30 units of Botox at 5 site/s.         2. JAW MUSCLES: 100 UNITS BOTOX = TOTAL DOSE     Right Masseter - 25 units of Botox at 2 site/s.   Left Masseter - 25 units of Botox at 2 site/s.      Right Temporalis - 25 units of Botox at 5 site/s.  Left Temporalis - 25 units of Botox at 5 site/s.          3. SHOULDER & NECK MUSCLES: 50 UNITS BOTOX = TOTAL DOSE      Right Levator Scapula - 15 units of Botox at 2 site/s (neck and scapular insertion).  Left Levator Scapula - 5 units  of Botox at 1 site/s (neck).      Right Upper Trapezius (mid & low lateral) - 10 units of Botox at 3 site/s.  Left Upper Trapezius (mid & low lateral) - 10 units of Botox at 3 site/s.             Right Pectoralis Minor - 5 units of Botox at 1 site/s.                     Left Pectoralis Minor - 5 units of Botox at 1 site/s.       RESPONSE TO PROCEDURE  Valerie Sim tolerated the procedure well and there were no immediate complications. She was allowed to recover for an appropriate period of time and was discharged home in stable condition.      ASSESSMENT AND PLAN   Botulinum toxin injections: No changes made to Botox dose or distribution today. Patient will continue to monitor response to Botox and report at next appointment.     Referrals: None.   Medications: None.   Follow up: Valerie Sim was rescheduled for the next series of injections in 9 weeks, at which time we will evaluate response to today's injections. She may call the clinic prior with any questions or concerns prior to the next appointment.     Addie Malhotra MD
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
## 2025-05-05 NOTE — TELEPHONE ENCOUNTER
Due to history of rash, writer spoke with provider who is also recommending assessment in the emergency room for Thrasher-Jonathon syndrome. Writer called patient to update and she agrees to present to the nearest emergency room.

## 2025-05-06 ENCOUNTER — E-VISIT (OUTPATIENT)
Dept: URGENT CARE | Facility: CLINIC | Age: 60
End: 2025-05-06
Payer: COMMERCIAL

## 2025-05-06 DIAGNOSIS — R06.2 WHEEZING: Primary | ICD-10-CM

## 2025-05-06 PROCEDURE — 99207 PR NON-BILLABLE SERV PER CHARTING: CPT | Performed by: NURSE PRACTITIONER

## 2025-05-07 ENCOUNTER — OFFICE VISIT (OUTPATIENT)
Dept: FAMILY MEDICINE | Facility: CLINIC | Age: 60
End: 2025-05-07
Payer: COMMERCIAL

## 2025-05-07 VITALS
BODY MASS INDEX: 28.28 KG/M2 | SYSTOLIC BLOOD PRESSURE: 132 MMHG | DIASTOLIC BLOOD PRESSURE: 75 MMHG | RESPIRATION RATE: 16 BRPM | OXYGEN SATURATION: 98 % | TEMPERATURE: 97 F | HEART RATE: 85 BPM | WEIGHT: 176 LBS | HEIGHT: 66 IN

## 2025-05-07 DIAGNOSIS — R21 RASH: ICD-10-CM

## 2025-05-07 DIAGNOSIS — J06.9 UPPER RESPIRATORY TRACT INFECTION, UNSPECIFIED TYPE: Primary | ICD-10-CM

## 2025-05-07 PROCEDURE — 3078F DIAST BP <80 MM HG: CPT | Performed by: FAMILY MEDICINE

## 2025-05-07 PROCEDURE — 99213 OFFICE O/P EST LOW 20 MIN: CPT | Performed by: FAMILY MEDICINE

## 2025-05-07 PROCEDURE — 3075F SYST BP GE 130 - 139MM HG: CPT | Performed by: FAMILY MEDICINE

## 2025-05-07 PROCEDURE — 1126F AMNT PAIN NOTED NONE PRSNT: CPT | Performed by: FAMILY MEDICINE

## 2025-05-07 RX ORDER — BENZONATATE 200 MG/1
200 CAPSULE ORAL 3 TIMES DAILY PRN
Qty: 30 CAPSULE | Refills: 0 | Status: SHIPPED | OUTPATIENT
Start: 2025-05-07

## 2025-05-07 ASSESSMENT — PAIN SCALES - GENERAL: PAINLEVEL_OUTOF10: NO PAIN (0)

## 2025-05-07 NOTE — PATIENT INSTRUCTIONS
Dear Valerie Sim,    We are sorry you are not feeling well. Based on the responses you provided, it is recommended that you be seen in-person in urgent care so we can better evaluate your symptoms. Please click here to find the nearest urgent care location to you.   You will not be charged for this Visit. Thank you for trusting us with your care.    Kaya Yao, CNP
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
## 2025-05-08 ENCOUNTER — OFFICE VISIT (OUTPATIENT)
Dept: FAMILY MEDICINE | Facility: CLINIC | Age: 60
End: 2025-05-08
Payer: COMMERCIAL

## 2025-05-08 ENCOUNTER — MYC MEDICAL ADVICE (OUTPATIENT)
Dept: FAMILY MEDICINE | Facility: CLINIC | Age: 60
End: 2025-05-08

## 2025-05-08 VITALS
DIASTOLIC BLOOD PRESSURE: 76 MMHG | SYSTOLIC BLOOD PRESSURE: 116 MMHG | HEART RATE: 75 BPM | TEMPERATURE: 97.9 F | HEIGHT: 67 IN | WEIGHT: 176.6 LBS | RESPIRATION RATE: 13 BRPM | OXYGEN SATURATION: 98 % | BODY MASS INDEX: 27.72 KG/M2

## 2025-05-08 DIAGNOSIS — F11.90 CHRONIC, CONTINUOUS USE OF OPIOIDS: ICD-10-CM

## 2025-05-08 DIAGNOSIS — J40 BRONCHITIS: ICD-10-CM

## 2025-05-08 DIAGNOSIS — N20.0 KIDNEY STONE: ICD-10-CM

## 2025-05-08 DIAGNOSIS — Z90.89 HISTORY OF PARATHYROIDECTOMY: ICD-10-CM

## 2025-05-08 DIAGNOSIS — F07.81 POST CONCUSSION SYNDROME: ICD-10-CM

## 2025-05-08 DIAGNOSIS — J98.01 BRONCHOSPASM: ICD-10-CM

## 2025-05-08 DIAGNOSIS — N20.0 NEPHROLITHIASIS: ICD-10-CM

## 2025-05-08 DIAGNOSIS — I10 ESSENTIAL HYPERTENSION: Primary | ICD-10-CM

## 2025-05-08 DIAGNOSIS — Z98.890 HISTORY OF PARATHYROIDECTOMY: ICD-10-CM

## 2025-05-08 DIAGNOSIS — F51.01 PRIMARY INSOMNIA: ICD-10-CM

## 2025-05-08 DIAGNOSIS — M62.89 PELVIC FLOOR DYSFUNCTION: ICD-10-CM

## 2025-05-08 RX ORDER — ERYTHROMYCIN 5 MG/G
OINTMENT OPHTHALMIC
COMMUNITY
Start: 2025-04-22

## 2025-05-08 RX ORDER — PREDNISONE 20 MG/1
40 TABLET ORAL DAILY
Qty: 10 TABLET | Refills: 0 | Status: SHIPPED | OUTPATIENT
Start: 2025-05-08 | End: 2025-05-13

## 2025-05-08 RX ORDER — AZITHROMYCIN 250 MG/1
TABLET, FILM COATED ORAL
Qty: 6 TABLET | Refills: 0 | Status: SHIPPED | OUTPATIENT
Start: 2025-05-08 | End: 2025-05-13

## 2025-05-08 RX ORDER — EVOLOCUMAB 140 MG/ML
140 INJECTION, SOLUTION SUBCUTANEOUS
COMMUNITY
Start: 2025-03-24

## 2025-05-08 ASSESSMENT — PAIN SCALES - GENERAL: PAINLEVEL_OUTOF10: MODERATE PAIN (6)
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
## 2025-05-08 NOTE — PATIENT INSTRUCTIONS
At [facility], we strive to deliver an exceptional experience to you, every time we see you. If you receive a survey, please let us know what we are doing well and/or what we could improve upon, as we do value your feedback.  If you have MyChart, you can expect to receive results automatically within 24 hours of their completion.  Your provider will send a note interpreting your results as well.   If you do not have MyChart, you should receive your results in about a week by mail.    Your care team:                            Family Medicine Internal Medicine   MD Jorge Ruiz, MD Oxana Bunch, MD Jeronimo Amaral, MD Constanza Verduzco, PADoreneC    Ruel Garcia, MD Pediatrics   Rosanna Ghosh, MD Sallie Chong, MD Alba Crooks, APRN CNP Niru Powell APRN CNP   MD Moraima Rojas, MD Jaymie Parra, CNP     Marco Conrad, CNP Same-Day Provider (No follow-up visits)   ADORE Heredia, DNP Sharlene Jose, DAORE Rosa, FNP, BC ABIODUN LopezC     Clinic hours: Monday - Thursday 7 am-6 pm; Fridays 7 am-5 pm.   Urgent care: Monday - Friday 10 am- 8 pm; Saturday and Sunday 9 am-5 pm.    Clinic: [phone]       [facility]: Monday - Thursday 8 am - 7 pm; Friday 8 am - 6 pm  [facility]: [phone]

## 2025-05-11 ENCOUNTER — MYC MEDICAL ADVICE (OUTPATIENT)
Dept: FAMILY MEDICINE | Facility: CLINIC | Age: 60
End: 2025-05-11
Payer: COMMERCIAL

## 2025-05-12 NOTE — PROGRESS NOTES
Medication Therapy Management (MTM) Encounter    ASSESSMENT:                            Mental Health   Anxiety, Depression:   Patient will try hydroxyzine for itchiness and anxiety; will also utilize her as needed Seroquel dose for these symptoms as well. Prefers to hold off on taking steroid at this time due to risk of worsening anxiety/sleep symptoms.     PLAN:                            Ok to take as needed hydroxyzine and Seroquel to help with symptoms    Follow-up: psychiatry provider in 2 weeks    SUBJECTIVE/OBJECTIVE:                          Valerie Sim is a 60 year old female seen for an initial visit. She was referred to me from psychiatry.      Reason for visit: Patient reports skin condition is leaving them feeling itchy - which disturbs sleep, increases anxiety, and prescribed prednisone leaves them feeling shaky    Allergies/ADRs: Reviewed in chart  Past Medical History: Reviewed in chart  Tobacco: She reports that she has never smoked. She has never been exposed to tobacco smoke. She has never used smokeless tobacco.  Alcohol: none      Mental Health   Anxiety, Depression  lamotrigine 100mg daily  Seroquel 50mg nightly + 12.5-25mg nightly as needed   Hydroxyzine as needed - hasn't taken yet    MTM visit today scheduled as add on appt; next psychiatry appt 5/27/25.  Today, patient reports:   - complex medical history with TBI, parathyroidectomy  - recent rash that hasn't improved with prednisone - just re prescribed another round of steroids; also was recently prescribed antibiotics and has several topicals she has used in the past   - prednisone contributes to anxiety and she prefers not to take another course at this time  - is open to trying hydroxyzine after learning more about its action to see if it may help with both anxiety and itchiness/rash  - many past intolerances to medications  - tolerating lamotrigine and Seroquel well; finds them helpful         Today's Vitals: There were no vitals  taken for this visit.  ----------------  Post Discharge Medication Reconciliation Status: patient was not discharged from an inpatient facility or TCU.    I spent 50 minutes with this patient today.     A summary of these recommendations was sent via [employer].    Va Stearns, PharmD, BCPP  Medication Therapy Management Pharmacist  [facility]      Telemedicine Visit Details  The patient's medications can be safely assessed via a telemedicine encounter.  Type of service:  Video Conference via [email or web address]  Originating Location (pt. Location): Home    Distant Location (provider location):  Off-site  Start Time: 210p  End Time: 3p     Medication Therapy Recommendations  No medication therapy recommendations to display

## 2025-05-12 NOTE — TELEPHONE ENCOUNTER
Routing to PCP for review. Please see [employer] message and advise. Would you like pt to schedule another virtual visit?    Marga Laguerre RN on 5/12/2025 at 11:08 AM

## 2025-05-13 ENCOUNTER — VIRTUAL VISIT (OUTPATIENT)
Dept: PHARMACY | Facility: CLINIC | Age: 60
End: 2025-05-13
Payer: COMMERCIAL

## 2025-05-13 DIAGNOSIS — F41.1 ANXIETY STATE: ICD-10-CM

## 2025-05-13 DIAGNOSIS — F32.A DEPRESSION: Primary | ICD-10-CM

## 2025-05-13 PROCEDURE — 99607 MTMS BY PHARM ADDL 15 MIN: CPT | Mod: 95 | Performed by: PHARMACIST

## 2025-05-13 PROCEDURE — 99605 MTMS BY PHARM NP 15 MIN: CPT | Mod: 95 | Performed by: PHARMACIST

## 2025-05-13 RX ORDER — DIAZEPAM 10 MG/1
TABLET ORAL
COMMUNITY
Start: 2024-10-28

## 2025-05-13 RX ORDER — FLUOCINONIDE TOPICAL SOLUTION USP, 0.05% 0.5 MG/ML
SOLUTION TOPICAL
COMMUNITY
Start: 2024-05-02

## 2025-05-13 RX ORDER — LACTOBACILLUS RHAMNOSUS GG 10B CELL
1 CAPSULE ORAL
COMMUNITY

## 2025-05-13 RX ORDER — CLINDAMYCIN PHOSPHATE 10 UG/ML
LOTION TOPICAL
COMMUNITY
Start: 2024-05-02

## 2025-05-13 RX ORDER — CYCLOBENZAPRINE HCL 10 MG
10 TABLET ORAL 2 TIMES DAILY PRN
COMMUNITY

## 2025-05-13 RX ORDER — ESTRADIOL 0.1 MG/G
CREAM VAGINAL
COMMUNITY
Start: 2025-05-04

## 2025-05-13 NOTE — NURSING NOTE
Dr Nieves notified via office personnel of consult.  Pt added to census   Is the patient currently in the state of [place]? YES    Visit mode:VIDEO    If the visit is dropped, the patient can be reconnected by: VIDEO VISIT: Text to cell phone:   Telephone Information:   Mobile [phone]       Will anyone else be joining the visit? NO  (If patient encounters technical issues they should call [phone]    How would you like to obtain your AVS? MyChart    Are changes needed to the allergy or medication list? Pt stated no changes to allergies and Pt stated no med changes    Reason for visit: PABLO LEALF

## 2025-05-13 NOTE — PATIENT INSTRUCTIONS
"Recommendations from today's MTM visit:                                                      Ok to take as needed hydroxyzine and Seroquel to help with symptoms    Follow-up: psychiatry provider in 2 weeks    It was great speaking with you today.  I value your experience and would be very thankful for your time in providing feedback in our clinic survey. In the next few days, you may receive an email or text message from [facility] [employer] with a link to a survey related to your  clinical pharmacist.\"     To schedule another MTM appointment, please call the clinic directly or you may call the MTM scheduling line at [phone] or toll-free at [phone].     My Clinical Pharmacist's contact information:                                                      Please feel free to contact me with any questions or concerns you have.      Va Stearns, PharmD, BCPP  Medication Therapy Management Pharmacist  [facility]    "

## 2025-05-13 NOTE — Clinical Note
Hello! I met with patient today. Your input on hydroxyzine was helpful for her and we discussed utilizing it and her as needed Seroquel for her symptoms. She seemed to be in a good place at the end of the visit. She will see you in 2 weeks.   Thank you!  Va

## 2025-05-14 ENCOUNTER — LAB (OUTPATIENT)
Dept: LAB | Facility: CLINIC | Age: 60
End: 2025-05-14
Payer: COMMERCIAL

## 2025-05-14 ENCOUNTER — RESULTS FOLLOW-UP (OUTPATIENT)
Dept: FAMILY MEDICINE | Facility: CLINIC | Age: 60
End: 2025-05-14

## 2025-05-14 DIAGNOSIS — I10 ESSENTIAL HYPERTENSION: ICD-10-CM

## 2025-05-14 LAB
ANION GAP SERPL CALCULATED.3IONS-SCNC: 14 MMOL/L (ref 7–15)
BUN SERPL-MCNC: 28.9 MG/DL (ref 8–23)
CALCIUM SERPL-MCNC: 9.5 MG/DL (ref 8.8–10.4)
CHLORIDE SERPL-SCNC: 99 MMOL/L (ref 98–107)
CREAT SERPL-MCNC: 1.13 MG/DL (ref 0.51–0.95)
EGFRCR SERPLBLD CKD-EPI 2021: 56 ML/MIN/1.73M2
GLUCOSE SERPL-MCNC: 111 MG/DL (ref 70–99)
HCO3 SERPL-SCNC: 26 MMOL/L (ref 22–29)
POTASSIUM SERPL-SCNC: 4.3 MMOL/L (ref 3.4–5.3)
SODIUM SERPL-SCNC: 139 MMOL/L (ref 135–145)

## 2025-05-15 ENCOUNTER — MYC MEDICAL ADVICE (OUTPATIENT)
Dept: FAMILY MEDICINE | Facility: CLINIC | Age: 60
End: 2025-05-15
Payer: COMMERCIAL

## 2025-05-19 ENCOUNTER — TELEPHONE (OUTPATIENT)
Dept: NEUROLOGY | Facility: CLINIC | Age: 60
End: 2025-05-19
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
## 2025-05-19 NOTE — TELEPHONE ENCOUNTER
[facility] Call Center    Phone Message    May a detailed message be left on voicemail: yes     Reason for Call: Symptoms or Concerns     If patient has red-flag symptoms, warm transfer to triage line    Current symptom or concern: Dizziness, little off, ear doesn't seem quite right, base of neck feels tender    Symptoms have been present for:  Since last night     Has patient previously been seen for this? Yes    By : ADORE Dwyer CNP    Date: 1/15/25    Are there any new or worsening symptoms? Yes: Valerie stated that she fell 2 weeks ago and hit her head and felt a little nauseous. Yesterday she hit herself with a shovel handle on the side of her head and is now experiencing the above symptoms.    Action Taken: Message routed to:  [facility]    Travel Screening: Not Applicable     Date of Service:

## 2025-05-21 ENCOUNTER — THERAPY VISIT (OUTPATIENT)
Dept: PHYSICAL THERAPY | Facility: CLINIC | Age: 60
End: 2025-05-21
Payer: COMMERCIAL

## 2025-05-21 DIAGNOSIS — M99.05 SOMATIC DYSFUNCTION OF PELVIC REGION: Primary | ICD-10-CM

## 2025-05-21 DIAGNOSIS — R10.2 PELVIC PAIN IN FEMALE: ICD-10-CM

## 2025-05-21 PROCEDURE — 97110 THERAPEUTIC EXERCISES: CPT | Mod: GP | Performed by: PHYSICAL THERAPIST

## 2025-05-21 PROCEDURE — 97140 MANUAL THERAPY 1/> REGIONS: CPT | Mod: GP | Performed by: PHYSICAL THERAPIST

## 2025-05-23 ENCOUNTER — TRANSFERRED RECORDS (OUTPATIENT)
Dept: HEALTH INFORMATION MANAGEMENT | Facility: CLINIC | Age: 60
End: 2025-05-23

## 2025-05-25 DIAGNOSIS — F11.90 CHRONIC, CONTINUOUS USE OF OPIOIDS: ICD-10-CM

## 2025-05-25 DIAGNOSIS — M53.3 SI (SACROILIAC) JOINT DYSFUNCTION: ICD-10-CM

## 2025-05-27 ENCOUNTER — VIRTUAL VISIT (OUTPATIENT)
Dept: PSYCHIATRY | Facility: CLINIC | Age: 60
End: 2025-05-27
Attending: PSYCHIATRY & NEUROLOGY
Payer: COMMERCIAL

## 2025-05-27 DIAGNOSIS — F33.1 MODERATE EPISODE OF RECURRENT MAJOR DEPRESSIVE DISORDER (H): ICD-10-CM

## 2025-05-27 PROCEDURE — 1125F AMNT PAIN NOTED PAIN PRSNT: CPT | Mod: 95

## 2025-05-27 PROCEDURE — 90833 PSYTX W PT W E/M 30 MIN: CPT | Mod: 95

## 2025-05-27 PROCEDURE — 98006 SYNCH AUDIO-VIDEO EST MOD 30: CPT | Mod: HN

## 2025-05-27 RX ORDER — HYDROMORPHONE HYDROCHLORIDE 2 MG/1
1-2 TABLET ORAL
Qty: 15 TABLET | Refills: 0 | Status: SHIPPED | OUTPATIENT
Start: 2025-05-27

## 2025-05-27 RX ORDER — QUETIAPINE FUMARATE 50 MG/1
50 TABLET, FILM COATED ORAL AT BEDTIME
Qty: 90 TABLET | Refills: 1 | Status: SHIPPED | OUTPATIENT
Start: 2025-05-27

## 2025-05-27 RX ORDER — LAMOTRIGINE 100 MG/1
100 TABLET ORAL DAILY
Qty: 90 TABLET | Refills: 1 | Status: SHIPPED | OUTPATIENT
Start: 2025-05-27

## 2025-05-27 RX ORDER — QUETIAPINE FUMARATE 25 MG/1
12.5-25 TABLET, FILM COATED ORAL
Qty: 60 TABLET | Refills: 2 | Status: SHIPPED | OUTPATIENT
Start: 2025-05-27

## 2025-05-27 ASSESSMENT — PAIN SCALES - GENERAL: PAINLEVEL_OUTOF10: SEVERE PAIN (8)

## 2025-05-27 ASSESSMENT — ANXIETY QUESTIONNAIRES
IF YOU CHECKED OFF ANY PROBLEMS ON THIS QUESTIONNAIRE, HOW DIFFICULT HAVE THESE PROBLEMS MADE IT FOR YOU TO DO YOUR WORK, TAKE CARE OF THINGS AT HOME, OR GET ALONG WITH OTHER PEOPLE: EXTREMELY DIFFICULT
2. NOT BEING ABLE TO STOP OR CONTROL WORRYING: NEARLY EVERY DAY
5. BEING SO RESTLESS THAT IT IS HARD TO SIT STILL: NEARLY EVERY DAY
4. TROUBLE RELAXING: NEARLY EVERY DAY
3. WORRYING TOO MUCH ABOUT DIFFERENT THINGS: SEVERAL DAYS
GAD7 TOTAL SCORE: 15
7. FEELING AFRAID AS IF SOMETHING AWFUL MIGHT HAPPEN: NOT AT ALL
GAD7 TOTAL SCORE: 15
6. BECOMING EASILY ANNOYED OR IRRITABLE: MORE THAN HALF THE DAYS
8. IF YOU CHECKED OFF ANY PROBLEMS, HOW DIFFICULT HAVE THESE MADE IT FOR YOU TO DO YOUR WORK, TAKE CARE OF THINGS AT HOME, OR GET ALONG WITH OTHER PEOPLE?: EXTREMELY DIFFICULT
1. FEELING NERVOUS, ANXIOUS, OR ON EDGE: NEARLY EVERY DAY

## 2025-05-27 NOTE — PATIENT INSTRUCTIONS
**For crisis resources, please see the information at the end of this document**   Patient Education    Thank you for coming to the [facility] [place] [facility].     Lab Testing:  If you had lab testing today and your results are reassuring or normal they will be mailed to you or sent through [employer] within 7 days. If the lab tests need quick action we will call you with the results. The phone number we will call with results is # [phone]. If this is not the best number please call our clinic and change the number.     Medication Refills:  If you need any refills please call your pharmacy and they will contact us. Our fax number for refills is [phone].   Three business days of notice are needed for general medication refill requests.   Five business days of notice are needed for controlled substance refill requests.   If you need to change to a different pharmacy, please contact the new pharmacy directly. The new pharmacy will help you get your medications transferred.     Contact Us:  Please call [phone] during business hours (8-5:00 M-F).   If you have medication related questions after clinic hours, or on the weekend, please call [phone].     Financial Assistance [phone]   Medical Records [phone]       MENTAL HEALTH CRISIS RESOURCES:  For a emergency help, please call 911 or go to the nearest Emergency Department.     Emergency Walk-In Options:   [facility] Unit @ [facility] ([place]): [phone] - Specialized mental health emergency area designed to be calming  [facility] ([place]): [phone]  [facility] ([place]): [phone]  [facility]): [phone]    [facility] Crisis Information:   [place]: [phone]  [place]: [phone]  [place] ([place]TH[place]) - Adult: [phone]     Child: [phone]  [place] - Adult: [phone]     Child: [phone]  [place]:  [phone]  List of all [facility] resources:   https://[place].gov/dhs/people-we-serve/adults/health-care/mental-health/resources/crisis-contacts.jsp    National Crisis Information:   Crisis Text Line: Text  [place]  to 360564  Suicide & Crisis Lifeline: 988  National Suicide Prevention Lifeline: 1-176-509-TALK (1-407.289.2663)       For online chat options, visit https://suicidepreventionlifeline.org/chat/  Poison Control Center: 3-806-406-1955  Trans Lifeline: 7-124-610-3244 - Hotline for transgender people of all ages  [employer]: 5-109-993-9623 - Hotline for LGBT youth     For Non-Emergency Support:   Fast Tracker: Mental Health & Substance Use Disorder Resources -   https://www.TMSckTeraDioden.org/

## 2025-05-27 NOTE — NURSING NOTE
Current patient location: [address] [place] [address] N  [facility] [address]    Is the patient currently in the state of [place]? YES    Visit mode: VIDEO    If the visit is dropped, the patient can be reconnected by:VIDEO VISIT: Send to e-mail at: [email or web address]    Will anyone else be joining the visit? NO  (If patient encounters technical issues they should call [phone])    Are changes needed to the allergy or medication list? Yes pt would like duplicate med removed:  -docusate sodium (COLACE) 100 MG tablet     Are refills needed on medications prescribed by this physician? Discuss with provider    Rooming Documentation:  Questionnaire(s) completed    Reason for visit: PABLO CHOI

## 2025-05-27 NOTE — Clinical Note
Mitch Brandon,  Has she (or is she going to) see a dermatologist? Sounds like she is frustrated with the rash treatment but I don't see that dermatology has been engaged?  Though there is often a wait to get into dermatology, some places like [facility] (several locations) can get you in fairly soon.  Let me know, aguila Lam
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
## 2025-05-28 ENCOUNTER — MYC MEDICAL ADVICE (OUTPATIENT)
Dept: OCCUPATIONAL THERAPY | Facility: CLINIC | Age: 60
End: 2025-05-28

## 2025-05-29 NOTE — TELEPHONE ENCOUNTER
Patient Quality Outreach    Patient is due for the following:   Hypertension -  Hypertension follow-up visit  Cervical Cancer Screening - PAP Needed  Depression  -  Depression follow-up visit  Physical Preventive Adult Physical      Topic Date Due    Hepatitis B Vaccine (1 of 3 - 19+ 3-dose series) Never done    Pneumococcal Vaccine (1 of 1 - PCV) Never done       Action(s) Taken:   Schedule a Adult Preventative    Type of outreach:    Sent GOWEX message.    Questions for provider review:    None         Radha Gold  Chart routed to .

## 2025-06-02 ENCOUNTER — TRANSFERRED RECORDS (OUTPATIENT)
Dept: HEALTH INFORMATION MANAGEMENT | Facility: CLINIC | Age: 60
End: 2025-06-02
Payer: COMMERCIAL

## 2025-06-04 ENCOUNTER — TRANSFERRED RECORDS (OUTPATIENT)
Dept: HEALTH INFORMATION MANAGEMENT | Facility: CLINIC | Age: 60
End: 2025-06-04
Payer: COMMERCIAL

## 2025-06-07 ENCOUNTER — MYC MEDICAL ADVICE (OUTPATIENT)
Dept: UROLOGY | Facility: CLINIC | Age: 60
End: 2025-06-07
Payer: COMMERCIAL

## 2025-06-14 ENCOUNTER — HEALTH MAINTENANCE LETTER (OUTPATIENT)
Age: 60
End: 2025-06-14

## 2025-06-16 ENCOUNTER — THERAPY VISIT (OUTPATIENT)
Dept: PHYSICAL THERAPY | Facility: CLINIC | Age: 60
End: 2025-06-16
Payer: COMMERCIAL

## 2025-06-16 DIAGNOSIS — R10.2 PELVIC PAIN IN FEMALE: ICD-10-CM

## 2025-06-16 DIAGNOSIS — M99.05 SOMATIC DYSFUNCTION OF PELVIC REGION: Primary | ICD-10-CM

## 2025-06-16 PROCEDURE — 97110 THERAPEUTIC EXERCISES: CPT | Mod: GP | Performed by: PHYSICAL THERAPIST

## 2025-06-16 PROCEDURE — 97112 NEUROMUSCULAR REEDUCATION: CPT | Mod: GP | Performed by: PHYSICAL THERAPIST

## 2025-06-17 ENCOUNTER — DOCUMENTATION ONLY (OUTPATIENT)
Dept: FAMILY MEDICINE | Facility: CLINIC | Age: 60
End: 2025-06-17
Payer: COMMERCIAL

## 2025-06-17 DIAGNOSIS — R26.89 BALANCE PROBLEMS: Primary | ICD-10-CM

## 2025-06-23 ENCOUNTER — THERAPY VISIT (OUTPATIENT)
Dept: PHYSICAL THERAPY | Facility: CLINIC | Age: 60
End: 2025-06-23
Payer: COMMERCIAL

## 2025-06-23 DIAGNOSIS — R10.2 PELVIC PAIN IN FEMALE: ICD-10-CM

## 2025-06-23 DIAGNOSIS — M99.05 SOMATIC DYSFUNCTION OF PELVIC REGION: Primary | ICD-10-CM

## 2025-06-23 PROCEDURE — 97110 THERAPEUTIC EXERCISES: CPT | Mod: GP | Performed by: PHYSICAL THERAPIST

## 2025-06-23 PROCEDURE — 97112 NEUROMUSCULAR REEDUCATION: CPT | Mod: GP | Performed by: PHYSICAL THERAPIST

## 2025-06-25 ENCOUNTER — TELEPHONE (OUTPATIENT)
Dept: PHYSICAL MEDICINE AND REHAB | Facility: CLINIC | Age: 60
End: 2025-06-25

## 2025-06-25 ENCOUNTER — OFFICE VISIT (OUTPATIENT)
Dept: PHYSICAL MEDICINE AND REHAB | Facility: CLINIC | Age: 60
End: 2025-06-25
Payer: COMMERCIAL

## 2025-06-25 DIAGNOSIS — G43.719 CHRONIC MIGRAINE WITHOUT AURA, INTRACTABLE, WITHOUT STATUS MIGRAINOSUS: Primary | ICD-10-CM

## 2025-06-25 DIAGNOSIS — G24.4 IDIOPATHIC OROFACIAL DYSTONIA: ICD-10-CM

## 2025-06-25 RX ORDER — BUPIVACAINE HYDROCHLORIDE 2.5 MG/ML
6 INJECTION, SOLUTION EPIDURAL; INFILTRATION; INTRACAUDAL; PERINEURAL ONCE
Status: COMPLETED | OUTPATIENT
Start: 2025-06-25 | End: 2025-06-25

## 2025-06-25 RX ADMIN — BUPIVACAINE HYDROCHLORIDE 15 MG: 2.5 INJECTION, SOLUTION EPIDURAL; INFILTRATION; INTRACAUDAL; PERINEURAL at 15:52

## 2025-06-25 NOTE — NURSING NOTE
Chief Complaint   Patient presents with    Procedure     Here for injections, confirmed with patient     Azul Woods

## 2025-06-25 NOTE — TELEPHONE ENCOUNTER
Writer called patient and informed her Dr. Mata said it is ok to proceed with scheduled Botox appointment today. Kasia Niño RN on 6/25/2025 at 12:15 PM

## 2025-06-25 NOTE — TELEPHONE ENCOUNTER
[facility] Call Center    Phone Message    May a detailed message be left on voicemail: yes     Reason for Call: Other:       Patient has an appointment for Botox injection today at 1:50pm. She states she just started taking hydrOXYzine HCl (ATARAX) 25 MG tablet and would like to know if it is okay to still get the botox injection today while she is on the medication or if there are any interactions to be aware of or if the medication would make the botox any less effective.   Patient requesting call back or M-Audio message sent with response prior to appointment today.     Action Taken: Message routed to:  [facility] Phys Med & Rehab    Travel Screening: Not Applicable

## 2025-06-25 NOTE — LETTER
6/25/2025       RE: Valerie Sim  [address]  [place] [address]     Dear Colleague,    Thank you for referring your patient, Valerie Sim, to the [facility] PHYSICAL MEDICINE AND REHABILITATION CLINIC [place] at [facility]. Please see a copy of my visit note below.      [facility]     PM&R CLINIC NOTE  BOTULINUM TOXIN PROCEDURE      HPI  Chief Complaint   Patient presents with     Procedure     Here for injections, confirmed with patient     Valerie Sim is a 60 year old female with a history of chronic migraine headaches who presents to clinic for botulinum toxin injections for management of chronic migraine headaches and excessive jaw clenching.     SINCE LAST VISIT  Valerie Sim was last seen for Botox injections on 4/16/2025, at which time she received 250 units of Botox.     Patient reports the following new medical problems since last visit: **     --had new diagnosis of Folliculitis and started taking Hydroxyzine HCL 25mg  --had new Concussion on 5/18/25; 5/19/25 seen @ [facility]  --had Trigeminal neuralgia symptoms on right side of head (typically on left side) likely due to concussion. Symptoms lasted for 3 days and then resolved.   --had Occipital Migraine on left side of head/eye twice; episodes off & on for 2-3 days with 10/10 intensity. Symptoms resolved within a few minutes but continued off & on.        RESPONSE TO PREVIOUS TREATMENT    Side effects: None     1.  Headache frequency during this injection cycle:  The past injection cycle, she experienced approximately 26 migraine headache days per month.. This is compared to her baseline headache frequency of 30 severe headache days per month.     2.  Headache duration during this injection cycle:  Headache duration was between 2 hours and one day.     3.  Headache intensity during this injection cycle:    A.  7/10  =  Typical  pain level.  B.  10/10  =  Worst pain level.  C.  5/10  =  Lowest pain level.    4.  Change in headache medication usage during this injection cycle:  No changes to migraine headache medications. She takes Ralpax, Toradol injections, Zofran, Oxygen supplementation, and intranasal lidocaine for migraine rescue. Over the last 9 weeks, she took 2 Ralpax, 7 Dilaudid (for pelvic floor and kidney stone pain), and one IM Toradol injection.     5.  ER Visits During This Injection Cycle: No ER visits for migraine headaches.     6.  Functional Performance:  Change in ADL's, social interaction, days lost from work, etc. Patient reports being able to more fully participate in social and family activities and responsibilities as headache symptoms have improved. Because of two concussions she notes that specifically during this cycle, she missed 10 days of normal activity, and 12 days of work. This is better than before Botox was started.      PHYSICAL EXAM  VS: There were no vitals taken for this visit.   GEN: Pleasant and cooperative, in no acute distress  HEENT: No facial asymmetry    Skin was intact at the sites of injections     ALLERGIES  Allergies   Allergen Reactions     Fluoxetine Other (See Comments)     PN: LW Reaction: headache  PN: LW Reaction: headache  Migraine       Garlic Blisters, Cough, Dermatitis, Difficulty breathing, Headache, Hives, Itching, Nausea and Vomiting and Shortness Of Breath     Candesartan      Other reaction(s): Myalgia     Duloxetine      Other reaction(s): Headache  migraine     Metoclopramide      Other reaction(s): Headache  Caused increase in headaches     Perfume      Other reaction(s): Headache  head pain leading to migraines     Pregabalin      Other reaction(s): Headache, Myalgia     Trazodone Other (See Comments) and Unknown     Other reaction(s): Headache  Other reaction(s): Headache  Other reaction(s): Headache     Amitriptyline Hcl Other (See Comments)     Migraine       Candesartan  "Cilexetil-Hctz Muscle Pain (Myalgia)     Cyproheptadine Hcl      headaches     Desvenlafaxine      Migraine       Diatrizoate Unknown     PN: LW CM1: CONTRAST- iodine Reaction :     Diazepam      Other reaction(s): Vestibular Toxicity  PN: LW Reaction: vertigo  PN: LW Reaction: vertigo     Duloxetine Hcl Other (See Comments)     Migraine       Gabapentin Other (See Comments)     Galcanezumab      Other reaction(s): Headache     Imipramine Other (See Comments)     Migraine       Lactose      Magnesium Glycinate Other (See Comments)     Nerve pain, tingling     Metoprolol Other (See Comments) and Unknown     headache  headache  headache     Morphine Other (See Comments)     Patient reports seizure   Other reaction(s): Unknown  Seizure; 5/111/21 updated info: patient states she had a seizure while having a migraine headache years ago and is not sure if it was due to morphine. She has tolerated Dilaudid since then.       No Clinical Screening - See Comments      PN: LW FI1: lactose intolerance LW FI2: shellfish  PN: LW CM1: CONTRAST- iodine Reaction :  PN: LW Other1: -nka     Nortriptyline Other (See Comments)     Migraine       Phenergan Dm [Promethazine-Dm] Other (See Comments)     seizures     Pregabalin Muscle Pain (Myalgia)     Promethazine      PN: LW Reaction: minimal sizures     Venlafaxine Other (See Comments)     PN: LW Reaction: migraine  PN: LW Reaction: migraine  Migraine       Wellbutrin [Bupropion Hydrobromide] Other (See Comments)     Migraine       Compazine Anxiety     Dihydroergotamine Anxiety and Other (See Comments)     Cardiac symptoms     Droperidol Anxiety     PN: LW Reaction: agitation     Medroxyprogesterone Hives     PN: LW Reaction: nausea     Prochlorperazine Anxiety     PN: LW Reaction: \"can't sit still\"       CURRENT MEDICATIONS    Current Outpatient Medications:      aMILoride (MIDAMOR) 5 MG tablet, Take 1.5 tablets (7.5 mg) by mouth daily. 2.5MG in the AM and 5MG in the PM., Disp: 135 " tablet, Rfl: 1     amLODIPine (NORVASC) 10 MG tablet, Take 1 tablet (10 mg) by mouth daily., Disp: 90 tablet, Rfl: 1     amphetamine-dextroamphetamine (ADDERALL) 20 MG tablet, TAKE 1 TABLET BY MOUTH TWICE DAILY, Disp: 60 tablet, Rfl: 0     amphetamine-dextroamphetamine (ADDERALL) 20 MG tablet, Take 1 tablet (20 mg) by mouth 2 times daily., Disp: 60 tablet, Rfl: 0     amphetamine-dextroamphetamine (ADDERALL) 20 MG tablet, Take 1 tablet (20 mg) by mouth 2 times daily., Disp: 60 tablet, Rfl: 0     B Complex-Biotin-FA (B-COMPLEX PO), Take 1 tablet by mouth daily, Disp: , Rfl:      benzonatate (TESSALON) 200 MG capsule, Take 1 capsule (200 mg) by mouth 3 times daily as needed for cough., Disp: 30 capsule, Rfl: 0     botulinum toxin type A (BOTOX) 100 units injection, Inject intramuscular. Every 18 weeks for pelvic floor spasms, Disp: , Rfl:      budesonide (RINOCORT AQUA) 32 MCG/ACT nasal spray, Spray 1 spray into both nostrils daily., Disp: , Rfl:      calcitRIOL (ROCALTROL) 0.25 MCG capsule, Take 0.25 mcg by mouth as needed, Disp: , Rfl:      Calcium-Magnesium-Vitamin D (CITRACAL SLOW RELEASE) 600- MG-MG-UNIT TB24, Take 600 mg by mouth 4 times daily, Disp: , Rfl:      clindamycin (CLEOCIN T) 1 % external lotion, Apply topically., Disp: , Rfl:      cyclobenzaprine (FLEXERIL) 10 MG tablet, Take 10 mg by mouth 2 times daily as needed., Disp: , Rfl:      diazepam (VALIUM) 10 MG tablet, Insert 1 tablet into the vagina every 6 hours as needed for pelvic floor spasm, Disp: , Rfl:      docusate sodium (COLACE) 100 MG capsule, Take 1 capsule by mouth as needed for constipation, Disp: , Rfl:      docusate sodium (COLACE) 100 MG tablet, Take 1 tablet (100 mg) by mouth daily., Disp: 60 tablet, Rfl: 1     eletriptan (RELPAX) 40 MG tablet, Take 1 tablet (40 mg) by mouth as needed for migraine. Take 40 mg by mouth as needed. If headache comes back or persists can take one more 40 mg after 2 hrs but not more than 80 mg in 24  hrs, Disp: 30 tablet, Rfl: 0     erythromycin (ROMYCIN) 5 MG/GM ophthalmic ointment, Apply 1/4 inch strip in both eyes at bedtime, Disp: , Rfl:      estradiol (ESTRACE) 0.1 MG/GM vaginal cream, Place vaginally three times a week., Disp: , Rfl:      fexofenadine (ALLEGRA) 180 MG tablet, Take 180 mg by mouth daily, Disp: , Rfl:      fluocinonide (LIDEX) 0.05 % external solution, Apply topically., Disp: , Rfl:      HYDROmorphone (DILAUDID) 2 MG tablet, Take 0.5-1 tablets (1-2 mg) by mouth every 3 hours as needed (headache). 20 tablets = 3 month supply., Disp: 15 tablet, Rfl: 0     hydrOXYzine HCl (ATARAX) 25 MG tablet, Take 0.5-1 tablets (12.5-25 mg) by mouth daily as needed for anxiety or itching., Disp: 30 tablet, Rfl: 0     ketoconazole (NIZORAL) 2 % external shampoo, Apply topically daily as needed for itching or irritation., Disp: 240 mL, Rfl: 1     ketorolac (TORADOL) 30 MG/ML injection, Inject 1 mL (30 mg) over 2 minutes into the muscle at onset of headache for other (migraine once in 24 hours. Max 5 days  per month)., Disp: 6 mL, Rfl: 0     lactobacillus rhamnosus, GG, (CULTURELL) capsule, Take 1 capsule by mouth three times a week., Disp: , Rfl:      lamoTRIgine (LAMICTAL) 100 MG tablet, Take 1 tablet (100 mg) by mouth daily., Disp: 90 tablet, Rfl: 1     lidocaine (XYLOCAINE) 4 % external solution, Spray 0.5 ml once in each nostril q 4-6 hours as needed for headache. lay down with head tilted back for 5 minutes after if preferred, Disp: 50 mL, Rfl: 6     Magnesium Oxide 500 MG TABS, Take 500 mg by mouth daily., Disp: , Rfl:      mupirocin (BACTROBAN) 2 % external ointment, Apply topically 3 times daily., Disp: 90 g, Rfl: 0     naloxone (NARCAN) 4 MG/0.1ML nasal spray, Spray 1 spray into one nostril alternating nostrils as needed for opioid reversal every 2-3 mins until assistance arrives., Disp: 2 each, Rfl: 1     nebivolol (BYSTOLIC) 5 MG tablet, Take 5 mg by mouth daily Take twice a day totaling 10 mg.,  "Disp: , Rfl:      Needle, Disp, 25G X 1\" MISC, 2 each as needed (with toradol). 90-day supply, Disp: 6 each, Rfl: 1     ondansetron (ZOFRAN) 4 MG tablet, Take 1 tablet (4 mg) by mouth every 8 hours as needed for nausea., Disp: 30 tablet, Rfl: 1     order for DME, Equipment being ordered: Oxygen The patient has Cluster Headache and needs 10 liters/minute of high flow oxygen via nonrebreather mask prn headaches, Disp: 99 days, Rfl: 0     order for DME, Equipment being ordered: Oxygen and non-rebreather mask.   Use high flow oxygen (10-15 L/min) with non-rebreather mask, as needed for cluster headaches, Disp: 1 Units, Rfl: 11     potassium chloride ER (KLOR-CON M) 20 MEQ CR tablet, Take 1 tablet by mouth 2 times daily, Disp: , Rfl:      QUEtiapine (SEROQUEL) 25 MG tablet, Take 0.5-1 tablets (12.5-25 mg) by mouth nightly as needed (For sleep)., Disp: 60 tablet, Rfl: 2     QUEtiapine (SEROQUEL) 50 MG tablet, Take 1 tablet (50 mg) by mouth at bedtime., Disp: 90 tablet, Rfl: 1     REPATHA SURECLICK 140 MG/ML prefilled autoinjector, Inject 140 mg subcutaneously every 14 days., Disp: , Rfl:      Syringe, Disposable, (SYRINGE LUER SLIP) 1 ML MISC, 1 each as needed (with toradol/migraine)., Disp: 6 each, Rfl: 1     valACYclovir (VALTREX) 1000 mg tablet, Take 1,000 mg by mouth as needed., Disp: , Rfl:     Current Facility-Administered Medications:      botulinum toxin type A (BOTOX) 100 units injection 300 Units, 300 Units, Intramuscular, See Admin Instructions, Siobhan Mata MD, 250 Units at 25 1552     botulinum toxin type A (BOTOX) 100 units injection 300 Units, 300 Units, Intramuscular, See Admin Instructions, Addie Malhotra MD, 250 Units at 25 1339       BOTULINUM NEUROTOXIN INJECTION PROCEDURES    VERIFICATION OF PATIENT IDENTIFICATION AND PROCEDURE     Initials   Patient Name PS   Patient  PS   Procedure Verified by: PS     Prior to the start of the procedure and with procedural staff " participation, I verbally confirmed the patient s identity using two indicators, relevant allergies, that the procedure was appropriate and matched the consent or emergent situation, and that the correct equipment/implants were available. Immediately prior to starting the procedure I conducted the Time Out with the procedural staff and re-confirmed the patient s name, procedure, and site/side. (The Joint Commission universal protocol was followed.)  Yes    Sedation (Moderate or Deep): None    ABOVE ASSESSMENTS PERFORMED BY    Siobhan Mata MD      INDICATIONS FOR PROCEDURES  Valerie Sim is a 60 year old patient with pain and jaw clenching secondary to the diagnosis of chronic migraine headaches and oromandibular dystonia. Her baseline symptoms have been recalcitrant to oral medications and conservative therapy.  She is here today for reinjection with Botox.    GOAL OF PROCEDURE  The goal of this procedure is to decrease pain and excessive jaw clenching due to chronic migraine headaches and bruxism.     TOTAL DOSE ADMINISTERED  Dose Administered:  250 units  Botox (Botulinum Toxin Type A)       2:1 Dilution   Unavoidable Drug Waste: Yes  Amount of drug waste (mL): 50 units Botox.  Reason for waste:  Single use vial  Diluent Used:  0.25% bupivacaine  Total Volume of Diluent Used:  5 ml  NDC #: Botox 100u ([address]-32)      CONSENT  The risks, benefits, and treatment options were discussed with Valerie Sim and she agreed to proceed.    Written consent was obtained by PS.     EQUIPMENT USED  Needle-25mm stimulating/recording  Needle-30 gauge  EMG/NCS Machine    SKIN PREPARATION  Skin preparation was performed using an alcohol wipe.    GUIDANCE DESCRIPTION  Electro-myographic guidance was necessary throughout the neck and jaw portion of the procedure to accurately identify all areas of dystonic muscles while avoiding injection of non-dystonic muscles, neighboring nerves and nearby vascular structures.        AREA/MUSCLE INJECTED:  250 UNITS BOTOX = TOTAL DOSE, 2:1 DILUTION     1. FACIAL & SCALP MUSCLES: 100 UNITS BOTOX = TOTAL DOSE      Right Frontalis - 10 units of Botox in 2 site/s.  Left Frontalis - 10 units of Botox in 2 site/s.      Procerus - 5 units of Botox at 1 site/s.       Right  - 5 units of Botox in 1 site/s.  Left  - 5 units of Botox in 1 site/s.      Right Nasalis - 2.5 units of Botox at 1 site/s.           Left Nasalis - 2.5 units of Botox at 1 site/s.     Right Upper Occipitalis - 30 units of Botox at 5 site/s.   Left Upper Occipitalis - 30 units of Botox at 5 site/s.         2. JAW MUSCLES: 100 UNITS BOTOX = TOTAL DOSE     Right Masseter - 25 units of Botox at 2 site/s.   Left Masseter - 25 units of Botox at 2 site/s.      Right Temporalis - 25 units of Botox at 5 site/s.  Left Temporalis - 25 units of Botox at 5 site/s.          3. SHOULDER & NECK MUSCLES: 50 UNITS BOTOX = TOTAL DOSE      Right Levator Scapula - 15 units of Botox at 2 site/s (neck and scapular insertion).  Left Levator Scapula - 5 units of Botox at 1 site/s (neck).      Right Upper Trapezius (mid & low lateral) - 10 units of Botox at 3 site/s.  Left Upper Trapezius (mid & low lateral) - 10 units of Botox at 3 site/s.             Right Pectoralis Minor - 5 units of Botox at 1 site/s.                     Left Pectoralis Minor - 5 units of Botox at 1 site/s.       RESPONSE TO PROCEDURE  Valerie Sim tolerated the procedure well and there were no immediate complications. She was allowed to recover for an appropriate period of time and was discharged home in stable condition.      ASSESSMENT AND PLAN   Botulinum toxin injections: No changes made to Botox dose or distribution today. Patient will continue to monitor response to Botox and report at next appointment.     Referrals: None.   Medications: None.   Follow up: Valerie Sim was rescheduled for the next series of injections in 9 weeks, at which time we will  evaluate response to today's injections. She may call the clinic prior with any questions or concerns prior to the next appointment.       Siobhan Mata MD  Physical Medicine & Rehabilitation     Again, thank you for allowing me to participate in the care of your patient.      Sincerely,    Siobhan Mata MD

## 2025-06-25 NOTE — TELEPHONE ENCOUNTER
Writer informed patient that using Botox together with hydroxyzine may increase certain side effects such as dry mouth, blurred vision, and urinary problems, but generally patient's can still receive Botox treatment while taking hydroxyzine. Writer informed patient she would route her message to Dr. Mata for further advisement and get back to her. Kasia Niño RN on 6/25/2025 at 9:53 AM

## 2025-06-25 NOTE — PROGRESS NOTES
[facility]     PM&R CLINIC NOTE  BOTULINUM TOXIN PROCEDURE      HPI  Chief Complaint   Patient presents with    Procedure     Here for injections, confirmed with patient     Valerie Sim is a 60 year old female with a history of chronic migraine headaches who presents to clinic for botulinum toxin injections for management of chronic migraine headaches and excessive jaw clenching.     SINCE LAST VISIT  Valerie Sim was last seen for Botox injections on 4/16/2025, at which time she received 250 units of Botox.     Patient reports the following new medical problems since last visit: **     --had new diagnosis of Folliculitis and started taking Hydroxyzine HCL 25mg  --had new Concussion on 5/18/25; 5/19/25 seen @ [facility]  --had Trigeminal neuralgia symptoms on right side of head (typically on left side) likely due to concussion. Symptoms lasted for 3 days and then resolved.   --had Occipital Migraine on left side of head/eye twice; episodes off & on for 2-3 days with 10/10 intensity. Symptoms resolved within a few minutes but continued off & on.        RESPONSE TO PREVIOUS TREATMENT    Side effects: None     1.  Headache frequency during this injection cycle:  The past injection cycle, she experienced approximately 26 migraine headache days per month.. This is compared to her baseline headache frequency of 30 severe headache days per month.     2.  Headache duration during this injection cycle:  Headache duration was between 2 hours and one day.     3.  Headache intensity during this injection cycle:    A.  7/10  =  Typical pain level.  B.  10/10  =  Worst pain level.  C.  5/10  =  Lowest pain level.    4.  Change in headache medication usage during this injection cycle:  No changes to migraine headache medications. She takes Ralpax, Toradol injections, Zofran, Oxygen supplementation, and intranasal lidocaine for migraine rescue. Over the last 9 weeks, she took 2  Ralpax, 7 Dilaudid (for pelvic floor and kidney stone pain), and one IM Toradol injection.     5.  ER Visits During This Injection Cycle: No ER visits for migraine headaches.     6.  Functional Performance:  Change in ADL's, social interaction, days lost from work, etc. Patient reports being able to more fully participate in social and family activities and responsibilities as headache symptoms have improved. Because of two concussions she notes that specifically during this cycle, she missed 10 days of normal activity, and 12 days of work. This is better than before Botox was started.      PHYSICAL EXAM  VS: There were no vitals taken for this visit.   GEN: Pleasant and cooperative, in no acute distress  HEENT: No facial asymmetry    Skin was intact at the sites of injections     ALLERGIES  Allergies   Allergen Reactions    Fluoxetine Other (See Comments)     PN: LW Reaction: headache  PN: LW Reaction: headache  Migraine      Garlic Blisters, Cough, Dermatitis, Difficulty breathing, Headache, Hives, Itching, Nausea and Vomiting and Shortness Of Breath    Candesartan      Other reaction(s): Myalgia    Duloxetine      Other reaction(s): Headache  migraine    Metoclopramide      Other reaction(s): Headache  Caused increase in headaches    Perfume      Other reaction(s): Headache  head pain leading to migraines    Pregabalin      Other reaction(s): Headache, Myalgia    Trazodone Other (See Comments) and Unknown     Other reaction(s): Headache  Other reaction(s): Headache  Other reaction(s): Headache    Amitriptyline Hcl Other (See Comments)     Migraine      Candesartan Cilexetil-Hctz Muscle Pain (Myalgia)    Cyproheptadine Hcl      headaches    Desvenlafaxine      Migraine      Diatrizoate Unknown     PN: LW CM1: CONTRAST- iodine Reaction :    Diazepam      Other reaction(s): Vestibular Toxicity  PN: LW Reaction: vertigo  PN: LW Reaction: vertigo    Duloxetine Hcl Other (See Comments)     Migraine      Gabapentin  "Other (See Comments)    Galcanezumab      Other reaction(s): Headache    Imipramine Other (See Comments)     Migraine      Lactose     Magnesium Glycinate Other (See Comments)     Nerve pain, tingling    Metoprolol Other (See Comments) and Unknown     headache  headache  headache    Morphine Other (See Comments)     Patient reports seizure   Other reaction(s): Unknown  Seizure; 5/111/21 updated info: patient states she had a seizure while having a migraine headache years ago and is not sure if it was due to morphine. She has tolerated Dilaudid since then.      No Clinical Screening - See Comments      PN: LW FI1: lactose intolerance LW FI2: shellfish  PN: LW CM1: CONTRAST- iodine Reaction :  PN: LW Other1: -nka    Nortriptyline Other (See Comments)     Migraine      Phenergan Dm [Promethazine-Dm] Other (See Comments)     seizures    Pregabalin Muscle Pain (Myalgia)    Promethazine      PN: LW Reaction: minimal sizures    Venlafaxine Other (See Comments)     PN: LW Reaction: migraine  PN: LW Reaction: migraine  Migraine      Wellbutrin [Bupropion Hydrobromide] Other (See Comments)     Migraine      Compazine Anxiety    Dihydroergotamine Anxiety and Other (See Comments)     Cardiac symptoms    Droperidol Anxiety     PN: LW Reaction: agitation    Medroxyprogesterone Hives     PN: LW Reaction: nausea    Prochlorperazine Anxiety     PN: LW Reaction: \"can't sit still\"       CURRENT MEDICATIONS    Current Outpatient Medications:     aMILoride (MIDAMOR) 5 MG tablet, Take 1.5 tablets (7.5 mg) by mouth daily. 2.5MG in the AM and 5MG in the PM., Disp: 135 tablet, Rfl: 1    amLODIPine (NORVASC) 10 MG tablet, Take 1 tablet (10 mg) by mouth daily., Disp: 90 tablet, Rfl: 1    amphetamine-dextroamphetamine (ADDERALL) 20 MG tablet, TAKE 1 TABLET BY MOUTH TWICE DAILY, Disp: 60 tablet, Rfl: 0    amphetamine-dextroamphetamine (ADDERALL) 20 MG tablet, Take 1 tablet (20 mg) by mouth 2 times daily., Disp: 60 tablet, Rfl: 0    " amphetamine-dextroamphetamine (ADDERALL) 20 MG tablet, Take 1 tablet (20 mg) by mouth 2 times daily., Disp: 60 tablet, Rfl: 0    B Complex-Biotin-FA (B-COMPLEX PO), Take 1 tablet by mouth daily, Disp: , Rfl:     benzonatate (TESSALON) 200 MG capsule, Take 1 capsule (200 mg) by mouth 3 times daily as needed for cough., Disp: 30 capsule, Rfl: 0    botulinum toxin type A (BOTOX) 100 units injection, Inject intramuscular. Every 18 weeks for pelvic floor spasms, Disp: , Rfl:     budesonide (RINOCORT AQUA) 32 MCG/ACT nasal spray, Spray 1 spray into both nostrils daily., Disp: , Rfl:     calcitRIOL (ROCALTROL) 0.25 MCG capsule, Take 0.25 mcg by mouth as needed, Disp: , Rfl:     Calcium-Magnesium-Vitamin D (CITRACAL SLOW RELEASE) 600- MG-MG-UNIT TB24, Take 600 mg by mouth 4 times daily, Disp: , Rfl:     clindamycin (CLEOCIN T) 1 % external lotion, Apply topically., Disp: , Rfl:     cyclobenzaprine (FLEXERIL) 10 MG tablet, Take 10 mg by mouth 2 times daily as needed., Disp: , Rfl:     diazepam (VALIUM) 10 MG tablet, Insert 1 tablet into the vagina every 6 hours as needed for pelvic floor spasm, Disp: , Rfl:     docusate sodium (COLACE) 100 MG capsule, Take 1 capsule by mouth as needed for constipation, Disp: , Rfl:     docusate sodium (COLACE) 100 MG tablet, Take 1 tablet (100 mg) by mouth daily., Disp: 60 tablet, Rfl: 1    eletriptan (RELPAX) 40 MG tablet, Take 1 tablet (40 mg) by mouth as needed for migraine. Take 40 mg by mouth as needed. If headache comes back or persists can take one more 40 mg after 2 hrs but not more than 80 mg in 24 hrs, Disp: 30 tablet, Rfl: 0    erythromycin (ROMYCIN) 5 MG/GM ophthalmic ointment, Apply 1/4 inch strip in both eyes at bedtime, Disp: , Rfl:     estradiol (ESTRACE) 0.1 MG/GM vaginal cream, Place vaginally three times a week., Disp: , Rfl:     fexofenadine (ALLEGRA) 180 MG tablet, Take 180 mg by mouth daily, Disp: , Rfl:     fluocinonide (LIDEX) 0.05 % external solution, Apply  "topically., Disp: , Rfl:     HYDROmorphone (DILAUDID) 2 MG tablet, Take 0.5-1 tablets (1-2 mg) by mouth every 3 hours as needed (headache). 20 tablets = 3 month supply., Disp: 15 tablet, Rfl: 0    hydrOXYzine HCl (ATARAX) 25 MG tablet, Take 0.5-1 tablets (12.5-25 mg) by mouth daily as needed for anxiety or itching., Disp: 30 tablet, Rfl: 0    ketoconazole (NIZORAL) 2 % external shampoo, Apply topically daily as needed for itching or irritation., Disp: 240 mL, Rfl: 1    ketorolac (TORADOL) 30 MG/ML injection, Inject 1 mL (30 mg) over 2 minutes into the muscle at onset of headache for other (migraine once in 24 hours. Max 5 days  per month)., Disp: 6 mL, Rfl: 0    lactobacillus rhamnosus, GG, (CULTURELL) capsule, Take 1 capsule by mouth three times a week., Disp: , Rfl:     lamoTRIgine (LAMICTAL) 100 MG tablet, Take 1 tablet (100 mg) by mouth daily., Disp: 90 tablet, Rfl: 1    lidocaine (XYLOCAINE) 4 % external solution, Spray 0.5 ml once in each nostril q 4-6 hours as needed for headache. lay down with head tilted back for 5 minutes after if preferred, Disp: 50 mL, Rfl: 6    Magnesium Oxide 500 MG TABS, Take 500 mg by mouth daily., Disp: , Rfl:     mupirocin (BACTROBAN) 2 % external ointment, Apply topically 3 times daily., Disp: 90 g, Rfl: 0    naloxone (NARCAN) 4 MG/0.1ML nasal spray, Spray 1 spray into one nostril alternating nostrils as needed for opioid reversal every 2-3 mins until assistance arrives., Disp: 2 each, Rfl: 1    nebivolol (BYSTOLIC) 5 MG tablet, Take 5 mg by mouth daily Take twice a day totaling 10 mg., Disp: , Rfl:     Needle, Disp, 25G X 1\" MISC, 2 each as needed (with toradol). 90-day supply, Disp: 6 each, Rfl: 1    ondansetron (ZOFRAN) 4 MG tablet, Take 1 tablet (4 mg) by mouth every 8 hours as needed for nausea., Disp: 30 tablet, Rfl: 1    order for DME, Equipment being ordered: Oxygen The patient has Cluster Headache and needs 10 liters/minute of high flow oxygen via nonrebreather mask prn " headaches, Disp: 99 days, Rfl: 0    order for DME, Equipment being ordered: Oxygen and non-rebreather mask.   Use high flow oxygen (10-15 L/min) with non-rebreather mask, as needed for cluster headaches, Disp: 1 Units, Rfl: 11    potassium chloride ER (KLOR-CON M) 20 MEQ CR tablet, Take 1 tablet by mouth 2 times daily, Disp: , Rfl:     QUEtiapine (SEROQUEL) 25 MG tablet, Take 0.5-1 tablets (12.5-25 mg) by mouth nightly as needed (For sleep)., Disp: 60 tablet, Rfl: 2    QUEtiapine (SEROQUEL) 50 MG tablet, Take 1 tablet (50 mg) by mouth at bedtime., Disp: 90 tablet, Rfl: 1    REPATHA SURECLICK 140 MG/ML prefilled autoinjector, Inject 140 mg subcutaneously every 14 days., Disp: , Rfl:     Syringe, Disposable, (SYRINGE LUER SLIP) 1 ML MISC, 1 each as needed (with toradol/migraine)., Disp: 6 each, Rfl: 1    valACYclovir (VALTREX) 1000 mg tablet, Take 1,000 mg by mouth as needed., Disp: , Rfl:     Current Facility-Administered Medications:     botulinum toxin type A (BOTOX) 100 units injection 300 Units, 300 Units, Intramuscular, See Admin Instructions, Siobhan Mata MD, 250 Units at 25 1552    botulinum toxin type A (BOTOX) 100 units injection 300 Units, 300 Units, Intramuscular, See Admin Instructions, Addie Malhotra MD, 250 Units at 25 1339       BOTULINUM NEUROTOXIN INJECTION PROCEDURES    VERIFICATION OF PATIENT IDENTIFICATION AND PROCEDURE     Initials   Patient Name PS   Patient  PS   Procedure Verified by: PS     Prior to the start of the procedure and with procedural staff participation, I verbally confirmed the patient s identity using two indicators, relevant allergies, that the procedure was appropriate and matched the consent or emergent situation, and that the correct equipment/implants were available. Immediately prior to starting the procedure I conducted the Time Out with the procedural staff and re-confirmed the patient s name, procedure, and site/side. (The Joint Commission  universal protocol was followed.)  Yes    Sedation (Moderate or Deep): None    ABOVE ASSESSMENTS PERFORMED BY    Siobhan Mata MD      INDICATIONS FOR PROCEDURES  Valerie Sim is a 60 year old patient with pain and jaw clenching secondary to the diagnosis of chronic migraine headaches and oromandibular dystonia. Her baseline symptoms have been recalcitrant to oral medications and conservative therapy.  She is here today for reinjection with Botox.    GOAL OF PROCEDURE  The goal of this procedure is to decrease pain and excessive jaw clenching due to chronic migraine headaches and bruxism.     TOTAL DOSE ADMINISTERED  Dose Administered:  250 units  Botox (Botulinum Toxin Type A)       2:1 Dilution   Unavoidable Drug Waste: Yes  Amount of drug waste (mL): 50 units Botox.  Reason for waste:  Single use vial  Diluent Used:  0.25% bupivacaine  Total Volume of Diluent Used:  5 ml  NDC #: Botox 100u ([address]-33)      CONSENT  The risks, benefits, and treatment options were discussed with Valerie Sim and she agreed to proceed.    Written consent was obtained by PS.     EQUIPMENT USED  Needle-25mm stimulating/recording  Needle-30 gauge  EMG/NCS Machine    SKIN PREPARATION  Skin preparation was performed using an alcohol wipe.    GUIDANCE DESCRIPTION  Electro-myographic guidance was necessary throughout the neck and jaw portion of the procedure to accurately identify all areas of dystonic muscles while avoiding injection of non-dystonic muscles, neighboring nerves and nearby vascular structures.       AREA/MUSCLE INJECTED:  250 UNITS BOTOX = TOTAL DOSE, 2:1 DILUTION     1. FACIAL & SCALP MUSCLES: 100 UNITS BOTOX = TOTAL DOSE      Right Frontalis - 10 units of Botox in 2 site/s.  Left Frontalis - 10 units of Botox in 2 site/s.      Procerus - 5 units of Botox at 1 site/s.       Right  - 5 units of Botox in 1 site/s.  Left  - 5 units of Botox in 1 site/s.      Right Nasalis - 2.5 units of Botox at 1  site/s.           Left Nasalis - 2.5 units of Botox at 1 site/s.     Right Upper Occipitalis - 30 units of Botox at 5 site/s.   Left Upper Occipitalis - 30 units of Botox at 5 site/s.         2. JAW MUSCLES: 100 UNITS BOTOX = TOTAL DOSE     Right Masseter - 25 units of Botox at 2 site/s.   Left Masseter - 25 units of Botox at 2 site/s.      Right Temporalis - 25 units of Botox at 5 site/s.  Left Temporalis - 25 units of Botox at 5 site/s.          3. SHOULDER & NECK MUSCLES: 50 UNITS BOTOX = TOTAL DOSE      Right Levator Scapula - 15 units of Botox at 2 site/s (neck and scapular insertion).  Left Levator Scapula - 5 units of Botox at 1 site/s (neck).      Right Upper Trapezius (mid & low lateral) - 10 units of Botox at 3 site/s.  Left Upper Trapezius (mid & low lateral) - 10 units of Botox at 3 site/s.             Right Pectoralis Minor - 5 units of Botox at 1 site/s.                     Left Pectoralis Minor - 5 units of Botox at 1 site/s.       RESPONSE TO PROCEDURE  Valerie Sim tolerated the procedure well and there were no immediate complications. She was allowed to recover for an appropriate period of time and was discharged home in stable condition.      ASSESSMENT AND PLAN   Botulinum toxin injections: No changes made to Botox dose or distribution today. Patient will continue to monitor response to Botox and report at next appointment.     Referrals: None.   Medications: None.   Follow up: Valerie Sim was rescheduled for the next series of injections in 9 weeks, at which time we will evaluate response to today's injections. She may call the clinic prior with any questions or concerns prior to the next appointment.       Siobhan Mata MD  Physical Medicine & Rehabilitation

## 2025-07-01 DIAGNOSIS — F07.81 POST CONCUSSION SYNDROME: ICD-10-CM

## 2025-07-01 RX ORDER — DEXTROAMPHETAMINE SACCHARATE, AMPHETAMINE ASPARTATE, DEXTROAMPHETAMINE SULFATE AND AMPHETAMINE SULFATE 5; 5; 5; 5 MG/1; MG/1; MG/1; MG/1
20 TABLET ORAL 2 TIMES DAILY
Qty: 60 TABLET | Refills: 0 | Status: SHIPPED | OUTPATIENT
Start: 2025-07-01

## 2025-07-14 ENCOUNTER — THERAPY VISIT (OUTPATIENT)
Dept: OCCUPATIONAL THERAPY | Facility: CLINIC | Age: 60
End: 2025-07-14
Payer: COMMERCIAL

## 2025-07-14 DIAGNOSIS — F07.81 POSTCONCUSSION SYNDROME: ICD-10-CM

## 2025-07-14 DIAGNOSIS — S06.0XAA CONCUSSION WITH UNKNOWN LOSS OF CONSCIOUSNESS STATUS, INITIAL ENCOUNTER: Primary | ICD-10-CM

## 2025-07-14 PROCEDURE — 97530 THERAPEUTIC ACTIVITIES: CPT | Mod: GO | Performed by: OCCUPATIONAL THERAPIST

## 2025-07-17 ENCOUNTER — VIRTUAL VISIT (OUTPATIENT)
Dept: FAMILY MEDICINE | Facility: CLINIC | Age: 60
End: 2025-07-17
Payer: COMMERCIAL

## 2025-07-17 DIAGNOSIS — K59.09 CHRONIC CONSTIPATION: ICD-10-CM

## 2025-07-17 DIAGNOSIS — G43.711 INTRACTABLE CHRONIC MIGRAINE WITHOUT AURA AND WITH STATUS MIGRAINOSUS: ICD-10-CM

## 2025-07-17 DIAGNOSIS — M62.89 PELVIC FLOOR DYSFUNCTION: ICD-10-CM

## 2025-07-17 DIAGNOSIS — I10 ESSENTIAL HYPERTENSION: ICD-10-CM

## 2025-07-17 DIAGNOSIS — F11.90 CHRONIC, CONTINUOUS USE OF OPIOIDS: ICD-10-CM

## 2025-07-17 DIAGNOSIS — R79.89 ELEVATED SERUM CREATININE: ICD-10-CM

## 2025-07-17 DIAGNOSIS — Z90.89 HISTORY OF PARATHYROIDECTOMY: ICD-10-CM

## 2025-07-17 DIAGNOSIS — R82.90 ABNORMAL URINALYSIS: ICD-10-CM

## 2025-07-17 DIAGNOSIS — F07.81 POST CONCUSSION SYNDROME: Primary | ICD-10-CM

## 2025-07-17 DIAGNOSIS — Z98.890 HISTORY OF PARATHYROIDECTOMY: ICD-10-CM

## 2025-07-17 DIAGNOSIS — F33.3 SEVERE RECURRENT MAJOR DEPRESSIVE DISORDER WITH PSYCHOTIC FEATURES (H): ICD-10-CM

## 2025-07-17 RX ORDER — NEBIVOLOL 5 MG/1
5 TABLET ORAL DAILY
Status: SHIPPED
Start: 2025-07-17

## 2025-07-17 NOTE — PATIENT INSTRUCTIONS
At [facility], we strive to deliver an exceptional experience to you, every time we see you. If you receive a survey, please let us know what we are doing well and/or what we could improve upon, as we do value your feedback.  If you have MyChart, you can expect to receive results automatically within 24 hours of their completion.  Your provider will send a note interpreting your results as well.   If you do not have MyChart, you should receive your results in about a week by mail.    Your care team:                            Family Medicine Internal Medicine   MD Jorge Ruiz, MD Oxana Bunch, MD Jeronimo Amaral, MD Constanza Verduzco, PA-C ADORE Heredia, HANSA Garcia, MD Pediatrics   MD Sallie Flores, MD Cheri Kat, PACAMMY Powell APRN CNP   MD Moraima Rojas, MD Jaymie Parra, CNP      Same-Day Provider (No follow-up visits)    WALLACE George PA-C     Clinic hours: Monday - Thursday 7 am-6 pm; Fridays 7 am-5 pm.   Urgent care: Monday - Friday 10 am- 8 pm; Saturday and Sunday 9 am-5 pm.    Clinic: [phone]       [facility]: Monday - Thursday 8 am - 7 pm; Friday 8 am - 6 pm  [facility]: [phone]     [facility] Scheduling: Monday - Friday 7 am - 7 pm; Saturday 7 am - 3:30 pm  [phone] [place] : [phone]

## 2025-07-17 NOTE — PROGRESS NOTES
"  If patient has telephone visit, have they been educated on video visit as preferred visit method and offered to change to video visit? N/A        Instructions Relayed to Patient by Virtual Roomer:     Patient is active on [employer]:   Relayed following to patient: \"It looks like you are active on [employer], are you able to join the visit this way? If not, do you need us to send you a link now or would you like your provider to send a link via text or email when they are ready to initiate the visit?\"      Patient Confirmed they will join visit via: [email or web address]  Reminded patient to ensure they were logged on to virtual visit by arrival time listed.   Asked if patient has flexibility to initiate visit sooner than arrival time: patient stated yes, documented in appointment notes availability to initiate visit earlier than arrival time     If pediatric virtual visit, ensured pediatric patient along with parent/guardian will be present for video visit.     Patient offered the website [email or web address] and/or phone number to [employer] Help line: [phone]        Valerie is a 60 year old who is being evaluated via a billable video visit.    How would you like to obtain your AVS? [employer]  If the video visit is dropped, the invitation should be resent by: Text to cell phone: [phone]  Will anyone else be joining your video visit? No      Assessment & Plan     Post concussion syndrome  She had postconcussion syndrome and is being followed by PMR  She gets Botox for her headaches  She is taking Adderall for this and is helping her  She has been on Adderall for 15 years    Chronic, continuous use of opioids  She takes hydromorphone as needed for her pelvic pain particularly when she had the Botox injections    Chronic constipation  She is on magnesium oxide along with docusate    History of parathyroidectomy  She does get hypocalcemia sometimes and she has a service dog which identifies hypocalcemia and wants " her    Essential hypertension  She is already on amlodipine 10 mg along with Bystolic 5 mg twice daily and amiloride  This keeps her blood pressure under control she tells me at home the readings are much better  - nebivolol (BYSTOLIC) 5 MG tablet; Take 1 tablet (5 mg) by mouth daily.    Intractable chronic migraine without aura and with status migrainosus  She gets Botox injections as well as she takes a triptan    Pelvic floor dysfunction  Follows up with urogynecologist and gets bladder Botox injections    Elevated serum creatinine  She recently had some elevated creatinine which was thought to be as a result of the ureteral stone  Creatinine slowly improving however she is concerned about the elevated creatinine  Explained to her that she could have also been dehydrated because she examined her right  We discussed briefly about the dose of  amiloride however she does not want to do that as she was allergic to a lot of blood pressure medications and she wants to continue this  She wants to get her creatinine rechecked  - Basic metabolic panel  (Ca, Cl, CO2, Creat, Gluc, K, Na, BUN); Future    Severe recurrent major depressive disorder with psychotic features (H)  She is on Lamictal and Seroquel    Abnormal urinalysis  Recently she had a appointment with urogynecologist where UA was abnormal with some WBC and she wants to get this  repeated  She currently has no dysuria symptoms  - UA Macroscopic with reflex to Microscopic and Culture - Lab Collect; Future        Subjective   Valerie is a 60 year old, presenting for the following health issues:  Consult Abnormal Labs and Hernia        7/17/2025     6:46 AM   Additional Questions   Roomed by Rafael   Accompanied by Self     History of Present Illness       Reason for visit:  Complex RX and lab results dehydration  Symptom onset:  More than a month  Symptom intensity:  Moderate  Symptom progression:  Staying the same    She eats 4 or more servings of fruits and  vegetables daily.She consumes 0 sweetened beverage(s) daily.She exercises with enough effort to increase her heart rate 30 to 60 minutes per day.  She exercises with enough effort to increase her heart rate 5 days per week. She is missing 2 dose(s) of medications per week.  She is not taking prescribed medications regularly due to remembering to take.                Review of Systems  Constitutional, HEENT, cardiovascular, pulmonary, gi and gu systems are negative, except as otherwise noted.      Objective           Vitals:  No vitals were obtained today due to virtual visit.    Physical Exam   GENERAL: alert and no distress  EYES: Eyes grossly normal to inspection.  No discharge or erythema, or obvious scleral/conjunctival abnormalities.  RESP: No audible wheeze, cough, or visible cyanosis.    SKIN: Visible skin clear. No significant rash, abnormal pigmentation or lesions.  NEURO: Cranial nerves grossly intact.  Mentation and speech appropriate for age.  PSYCH: Appropriate affect, tone, and pace of words          Video-Visit Details    Type of service:  Video Visit   Originating Location (pt. Location): Home    Distant Location (provider location):  On-site  Platform used for Video Visit: Mas[employer]  Signed Electronically by: Jeronimo Amaral MD

## 2025-07-21 ENCOUNTER — MYC MEDICAL ADVICE (OUTPATIENT)
Dept: FAMILY MEDICINE | Facility: CLINIC | Age: 60
End: 2025-07-21

## 2025-07-21 ENCOUNTER — OFFICE VISIT (OUTPATIENT)
Dept: FAMILY MEDICINE | Facility: CLINIC | Age: 60
End: 2025-07-21
Payer: COMMERCIAL

## 2025-07-21 VITALS
DIASTOLIC BLOOD PRESSURE: 79 MMHG | RESPIRATION RATE: 14 BRPM | SYSTOLIC BLOOD PRESSURE: 124 MMHG | WEIGHT: 175 LBS | HEART RATE: 79 BPM | TEMPERATURE: 97.3 F | BODY MASS INDEX: 27.47 KG/M2 | HEIGHT: 67 IN | OXYGEN SATURATION: 92 %

## 2025-07-21 DIAGNOSIS — L73.9 FOLLICULITIS: ICD-10-CM

## 2025-07-21 DIAGNOSIS — I10 ESSENTIAL HYPERTENSION: ICD-10-CM

## 2025-07-21 DIAGNOSIS — R82.90 ABNORMAL URINALYSIS: ICD-10-CM

## 2025-07-21 DIAGNOSIS — E89.2 HYPOPARATHYROIDISM AFTER PROCEDURE: ICD-10-CM

## 2025-07-21 DIAGNOSIS — R79.89 ELEVATED SERUM CREATININE: ICD-10-CM

## 2025-07-21 DIAGNOSIS — F07.81 POST CONCUSSION SYNDROME: Primary | ICD-10-CM

## 2025-07-21 DIAGNOSIS — H10.33 ACUTE CONJUNCTIVITIS OF BOTH EYES, UNSPECIFIED ACUTE CONJUNCTIVITIS TYPE: ICD-10-CM

## 2025-07-21 DIAGNOSIS — B37.31 CANDIDIASIS OF VAGINA: ICD-10-CM

## 2025-07-21 DIAGNOSIS — R10.2 PELVIC PAIN IN FEMALE: ICD-10-CM

## 2025-07-21 LAB
ALBUMIN UR-MCNC: NEGATIVE MG/DL
ANION GAP SERPL CALCULATED.3IONS-SCNC: 11 MMOL/L (ref 7–15)
APPEARANCE UR: CLEAR
BILIRUB UR QL STRIP: NEGATIVE
BUN SERPL-MCNC: 18.5 MG/DL (ref 8–23)
CALCIUM SERPL-MCNC: 9.4 MG/DL (ref 8.8–10.4)
CHLORIDE SERPL-SCNC: 103 MMOL/L (ref 98–107)
COLOR UR AUTO: YELLOW
CREAT SERPL-MCNC: 0.82 MG/DL (ref 0.51–0.95)
EGFRCR SERPLBLD CKD-EPI 2021: 82 ML/MIN/1.73M2
GLUCOSE SERPL-MCNC: 107 MG/DL (ref 70–99)
GLUCOSE UR STRIP-MCNC: NEGATIVE MG/DL
HCO3 SERPL-SCNC: 27 MMOL/L (ref 22–29)
HGB UR QL STRIP: NEGATIVE
KETONES UR STRIP-MCNC: NEGATIVE MG/DL
LEUKOCYTE ESTERASE UR QL STRIP: NEGATIVE
NITRATE UR QL: NEGATIVE
PH UR STRIP: 7 [PH] (ref 5–7)
POTASSIUM SERPL-SCNC: 4.4 MMOL/L (ref 3.4–5.3)
SODIUM SERPL-SCNC: 141 MMOL/L (ref 135–145)
SP GR UR STRIP: 1.01 (ref 1–1.03)
UROBILINOGEN UR STRIP-ACNC: 0.2 E.U./DL

## 2025-07-21 PROCEDURE — 80048 BASIC METABOLIC PNL TOTAL CA: CPT | Performed by: INTERNAL MEDICINE

## 2025-07-21 PROCEDURE — 81003 URINALYSIS AUTO W/O SCOPE: CPT | Performed by: INTERNAL MEDICINE

## 2025-07-21 PROCEDURE — 36415 COLL VENOUS BLD VENIPUNCTURE: CPT | Performed by: INTERNAL MEDICINE

## 2025-07-21 PROCEDURE — 1125F AMNT PAIN NOTED PAIN PRSNT: CPT | Performed by: INTERNAL MEDICINE

## 2025-07-21 PROCEDURE — 90677 PCV20 VACCINE IM: CPT | Performed by: INTERNAL MEDICINE

## 2025-07-21 PROCEDURE — 3074F SYST BP LT 130 MM HG: CPT | Performed by: INTERNAL MEDICINE

## 2025-07-21 PROCEDURE — 3078F DIAST BP <80 MM HG: CPT | Performed by: INTERNAL MEDICINE

## 2025-07-21 PROCEDURE — 90471 IMMUNIZATION ADMIN: CPT | Performed by: INTERNAL MEDICINE

## 2025-07-21 PROCEDURE — 99214 OFFICE O/P EST MOD 30 MIN: CPT | Mod: 25 | Performed by: INTERNAL MEDICINE

## 2025-07-21 RX ORDER — CEPHALEXIN 500 MG/1
500 CAPSULE ORAL 4 TIMES DAILY
Qty: 20 CAPSULE | Refills: 0 | Status: SHIPPED | OUTPATIENT
Start: 2025-07-21

## 2025-07-21 RX ORDER — CIPROFLOXACIN HYDROCHLORIDE 3.5 MG/ML
2 SOLUTION/ DROPS TOPICAL EVERY 6 HOURS
Qty: 5 ML | Refills: 0 | Status: SHIPPED | OUTPATIENT
Start: 2025-07-21

## 2025-07-21 ASSESSMENT — PAIN SCALES - GENERAL: PAINLEVEL_OUTOF10: SEVERE PAIN (7)

## 2025-07-21 NOTE — PROGRESS NOTES
Prior to immunization administration, verified patients identity using patient s name and date of birth. Please see Immunization Activity for additional information.     Screening Questionnaire for Adult Immunization    Are you sick today?   No   Do you have allergies to medications, food, a vaccine component or latex?   No   Have you ever had a serious reaction after receiving a vaccination?   No   Do you have a long-term health problem with heart, lung, kidney, or metabolic disease (e.g., diabetes), asthma, a blood disorder, no spleen, complement component deficiency, a cochlear implant, or a spinal fluid leak?  Are you on long-term aspirin therapy?   Yes   Do you have cancer, leukemia, HIV/AIDS, or any other immune system problem?   No   Do you have a parent, brother, or sister with an immune system problem?   No   In the past 3 months, have you taken medications that affect  your immune system, such as prednisone, other steroids, or anticancer drugs; drugs for the treatment of rheumatoid arthritis, Crohn s disease, or psoriasis; or have you had radiation treatments?   Yes   Have you had a seizure, or a brain or other nervous system problem?   No   During the past year, have you received a transfusion of blood or blood    products, or been given immune (gamma) globulin or antiviral drug?   No   For women: Are you pregnant or is there a chance you could become       pregnant during the next month?   No   Have you received any vaccinations in the past 4 weeks?   No     Immunization questionnaire was positive for at least one answer.  Notified Dr. Amaral.      Patient instructed to remain in clinic for 15 minutes afterwards, and to report any adverse reactions.     Screening performed by Ernesto Fernández MA on 7/21/2025 at 7:32 AM.

## 2025-07-21 NOTE — PROGRESS NOTES
Assessment & Plan     Post concussion syndrome  She had postconcussion syndrome and is being followed by PMR  She gets Botox for her headaches  She is taking Adderall for this and is helping her  She has been on Adderall for 15 years    Hypoparathyroidism after procedure  She gets hypocalcemia from this however she is currently not on  Rocaltrol because of high calcium  However I understand she has a service dog which he takes hypocalcemia    Essential hypertension  Blood pressure is under good control with the current regimen of Bystolic 5 mg twice a day along with amiloride and amlodipine 10 mg    Pelvic pain in female  She follows with urogynecology and gets Botox injections    Folliculitis  Complaining of skin breakdown with redness on top of her left eyelid  Started a week ago she has been trying bacitracin cream/clindamycin cream without much benefit  She has folliculitis which she had elsewhere on her body in the past  Start Keflex  - cephALEXin (KEFLEX) 500 MG capsule; Take 1 capsule (500 mg) by mouth 4 times daily.    Candidiasis of vagina   she has Monistat which takes over-the-counter    Acute conjunctivitis of both eyes, unspecified acute conjunctivitis type  She also is complaining of discharge from both eyes with sticking of the eyelids  - ciprofloxacin (CILOXAN) 0.3 % ophthalmic solution; Place 2 drops into both eyes every 6 hours. For 5 days    Elevated serum creatinine  She elevated BUN in the past and would like to get her BMP checked again  - Basic metabolic panel  (Ca, Cl, CO2, Creat, Gluc, K, Na, BUN)    Abnormal urinalysis    - UA Macroscopic with reflex to Microscopic and Culture - Lab Collect        Subjective   Valerie is a 60 year old, presenting for the following health issues: Would like to get lab orders done  Bleeding/Bruising (body) and Eye Lid Swelling (sore)      7/21/2025     6:54 AM   Additional Questions   Accompanied by Dog         7/21/2025     6:52 AM   Patient Reported  "Additional Medications   Patient reports taking the following new medications no     HPI        Concern - Weeping sores on both eyebrows that have not healed in eight days - left eyebrow is spreading onto eyelid. Small pencil eraser sized bruises all over body.   Onset: Over 1 month/8 days on other eye   Description: having spots, red, eye feels tight   Intensity: moderate  Progression of Symptoms:  improving and worsening  Accompanying Signs & Symptoms: having spots, red, eye feels tight   Previous history of similar problem: Yes  Precipitating factors:        Worsened by: comes and goes   Alleviating factors:        Improved by: comes and goes   Therapies tried and outcome: Yes, warm compress, cleanse, dropper         Review of Systems  Constitutional, HEENT, cardiovascular, pulmonary, gi and gu systems are negative, except as otherwise noted.      Objective    /79 (BP Location: Right arm, Patient Position: Sitting, Cuff Size: Adult Regular)   Pulse 79   Temp 97.3  F (36.3  C) (Temporal)   Resp 14   Ht 1.702 m (5' 7\")   Wt 79.4 kg (175 lb)   SpO2 92%   BMI 27.41 kg/m    Body mass index is 27.41 kg/m .  Physical Exam   GENERAL: alert and no distress  HENT: Erythematous skin on top of left eyelid  NECK: no adenopathy, no asymmetry, masses, or scars  RESP: lungs clear to auscultation - no rales, rhonchi or wheezes  ABDOMEN: soft, nontender, no hepatosplenomegaly, no masses and bowel sounds normal  MS: no gross musculoskeletal defects noted, no edema            Signed Electronically by: Jeronimo Amaral MD    "

## 2025-07-23 ENCOUNTER — MYC MEDICAL ADVICE (OUTPATIENT)
Dept: FAMILY MEDICINE | Facility: CLINIC | Age: 60
End: 2025-07-23
Payer: COMMERCIAL

## 2025-07-23 DIAGNOSIS — M62.89 OTHER SPECIFIED DISORDERS OF MUSCLE: ICD-10-CM

## 2025-07-23 RX ORDER — ESTRADIOL 0.1 MG/G
CREAM VAGINAL
Qty: 42.5 G | Refills: 1 | OUTPATIENT
Start: 2025-07-23

## 2025-07-23 NOTE — TELEPHONE ENCOUNTER
Clinic RN: Please investigate patient's chart or contact patient if the information cannot be found because the medication is listed as historical or discontinued. Confirm patient is taking this medication. Document findings and route refill encounter to provider for approval or denial.    Fang Joyner, RN on 7/23/2025 at 2:06 PM

## 2025-07-23 NOTE — TELEPHONE ENCOUNTER
Called patient and relayed message.  Patient says she doesn't know why the request went to us. She had reached out to the prescriber. Advised patient to ask pharmacy to reach out to the prescriber. Okay to deny request with patient's consent.

## 2025-07-24 ENCOUNTER — MYC REFILL (OUTPATIENT)
Dept: FAMILY MEDICINE | Facility: CLINIC | Age: 60
End: 2025-07-24
Payer: COMMERCIAL

## 2025-07-24 DIAGNOSIS — N95.2 ATROPHIC VAGINITIS: Primary | ICD-10-CM

## 2025-07-24 RX ORDER — ESTRADIOL 0.1 MG/G
CREAM VAGINAL
Qty: 85 G | Refills: 0 | Status: SHIPPED | OUTPATIENT
Start: 2025-07-25 | End: 2025-07-24

## 2025-07-24 NOTE — TELEPHONE ENCOUNTER
"Deepali, pharmacist with [facility], calling for clarification on estradiol (ESTRACE) 0.1 MG/GM vaginal cream.     Sig needs to be updated to state amount in grams that pt should be administering each time. Previous prescription stated \"Place vaginally twice a week. 0.5 g of cream intravaginally.\" Current prescription is for three times weekly, but does not indicate the grams.     Routing to provider to update sig and resend prescription.     Seema Humphrey RN  "

## 2025-07-24 NOTE — TELEPHONE ENCOUNTER
Clinic RN: Please investigate patient's chart or contact patient if the information cannot be found because the medication is listed as historical or discontinued. Confirm patient is taking this medication. Document findings and route refill encounter to provider for approval or denial.    Atiya Mercado RN on 7/24/2025 at 12:28 PM

## 2025-07-24 NOTE — TELEPHONE ENCOUNTER
Pt requesting a refill of estradiol. This was prescribed by PCP as noted below. Routing to PCP for review. Prescription pending.

## 2025-07-24 NOTE — TELEPHONE ENCOUNTER
Please call the pharmacist and advise him that I have canceled the prescription  In the first place I am not sure why I got this refil as patient clearly indicated that this should be sent to her physician in [facility] who prescribes it but it looks like she mistakenly sent it for me  Patient should contact her physician at [facility]  Please advise patient to send a message to her physician at [facility] by answering back to her [employer] message  Please see the message below from the patient    refills have been requested for the following medications:         estradiol (ESTRACE) 0.1 MG/GM vaginal cream      Patient Comment: Please contact Dr. Fe Cage, prescriber, at [facility] for refill     Preferred pharmacy: [facility] - [facility] [address] [place] [address]

## 2025-07-28 ENCOUNTER — MYC MEDICAL ADVICE (OUTPATIENT)
Dept: FAMILY MEDICINE | Facility: CLINIC | Age: 60
End: 2025-07-28

## 2025-07-28 ENCOUNTER — ANCILLARY PROCEDURE (OUTPATIENT)
Dept: BONE DENSITY | Facility: CLINIC | Age: 60
End: 2025-07-28
Attending: INTERNAL MEDICINE
Payer: COMMERCIAL

## 2025-07-28 DIAGNOSIS — E28.39 ESTROGEN DEFICIENCY: ICD-10-CM

## 2025-07-28 PROCEDURE — 77080 DXA BONE DENSITY AXIAL: CPT | Performed by: RADIOLOGY

## 2025-07-28 PROCEDURE — 77081 DXA BONE DENSITY APPENDICULR: CPT | Mod: XU | Performed by: RADIOLOGY

## 2025-07-29 ENCOUNTER — MYC REFILL (OUTPATIENT)
Dept: FAMILY MEDICINE | Facility: CLINIC | Age: 60
End: 2025-07-29
Payer: COMMERCIAL

## 2025-07-29 ENCOUNTER — MYC REFILL (OUTPATIENT)
Dept: PSYCHIATRY | Facility: CLINIC | Age: 60
End: 2025-07-29
Payer: COMMERCIAL

## 2025-07-29 DIAGNOSIS — F41.1 GENERALIZED ANXIETY DISORDER: ICD-10-CM

## 2025-07-29 DIAGNOSIS — M62.838 MUSCLE SPASM: Primary | ICD-10-CM

## 2025-07-29 RX ORDER — HYDROXYZINE HYDROCHLORIDE 25 MG/1
12.5-25 TABLET, FILM COATED ORAL DAILY PRN
Qty: 30 TABLET | Refills: 0 | Status: SHIPPED | OUTPATIENT
Start: 2025-07-29

## 2025-07-29 RX ORDER — CYCLOBENZAPRINE HCL 10 MG
10 TABLET ORAL 2 TIMES DAILY PRN
Qty: 30 TABLET | Refills: 0 | Status: SHIPPED | OUTPATIENT
Start: 2025-07-29

## 2025-07-30 ENCOUNTER — MYC REFILL (OUTPATIENT)
Dept: FAMILY MEDICINE | Facility: CLINIC | Age: 60
End: 2025-07-30
Payer: COMMERCIAL

## 2025-07-30 DIAGNOSIS — F07.81 POST CONCUSSION SYNDROME: ICD-10-CM

## 2025-07-30 RX ORDER — DEXTROAMPHETAMINE SACCHARATE, AMPHETAMINE ASPARTATE, DEXTROAMPHETAMINE SULFATE AND AMPHETAMINE SULFATE 5; 5; 5; 5 MG/1; MG/1; MG/1; MG/1
20 TABLET ORAL 2 TIMES DAILY
Qty: 60 TABLET | Refills: 0 | Status: CANCELLED | OUTPATIENT
Start: 2025-07-30

## 2025-07-30 RX ORDER — DEXTROAMPHETAMINE SACCHARATE, AMPHETAMINE ASPARTATE, DEXTROAMPHETAMINE SULFATE AND AMPHETAMINE SULFATE 5; 5; 5; 5 MG/1; MG/1; MG/1; MG/1
20 TABLET ORAL 2 TIMES DAILY
Qty: 60 TABLET | Refills: 0 | Status: SHIPPED | OUTPATIENT
Start: 2025-07-30

## 2025-07-31 ENCOUNTER — THERAPY VISIT (OUTPATIENT)
Dept: PHYSICAL THERAPY | Facility: CLINIC | Age: 60
End: 2025-07-31
Attending: INTERNAL MEDICINE
Payer: COMMERCIAL

## 2025-07-31 DIAGNOSIS — R26.89 BALANCE PROBLEMS: ICD-10-CM

## 2025-07-31 DIAGNOSIS — M99.05 SOMATIC DYSFUNCTION OF PELVIC REGION: ICD-10-CM

## 2025-07-31 DIAGNOSIS — R53.81 PHYSICAL DECONDITIONING: Primary | ICD-10-CM

## 2025-07-31 DIAGNOSIS — S06.0XAA CONCUSSION WITH UNKNOWN LOSS OF CONSCIOUSNESS STATUS, INITIAL ENCOUNTER: ICD-10-CM

## 2025-07-31 PROCEDURE — 97110 THERAPEUTIC EXERCISES: CPT | Mod: GP | Performed by: PHYSICAL THERAPIST

## 2025-07-31 RX ORDER — DEXTROAMPHETAMINE SACCHARATE, AMPHETAMINE ASPARTATE, DEXTROAMPHETAMINE SULFATE AND AMPHETAMINE SULFATE 5; 5; 5; 5 MG/1; MG/1; MG/1; MG/1
20 TABLET ORAL 2 TIMES DAILY
Qty: 60 TABLET | Refills: 0 | Status: SHIPPED | OUTPATIENT
Start: 2025-07-31 | End: 2025-08-30

## 2025-07-31 RX ORDER — DEXTROAMPHETAMINE SACCHARATE, AMPHETAMINE ASPARTATE, DEXTROAMPHETAMINE SULFATE AND AMPHETAMINE SULFATE 5; 5; 5; 5 MG/1; MG/1; MG/1; MG/1
20 TABLET ORAL 2 TIMES DAILY
Qty: 60 TABLET | Refills: 0 | Status: SHIPPED | OUTPATIENT
Start: 2025-09-29 | End: 2025-10-29

## 2025-07-31 RX ORDER — DEXTROAMPHETAMINE SACCHARATE, AMPHETAMINE ASPARTATE, DEXTROAMPHETAMINE SULFATE AND AMPHETAMINE SULFATE 5; 5; 5; 5 MG/1; MG/1; MG/1; MG/1
20 TABLET ORAL 2 TIMES DAILY
Qty: 60 TABLET | Refills: 0 | Status: SHIPPED | OUTPATIENT
Start: 2025-08-30 | End: 2025-09-29

## 2025-07-31 ASSESSMENT — MIGRAINE DISABILITY ASSESSMENT (MIDAS)
HOW MANY DAYS IN THE PAST 3 MONTHS HAVE YOU HAD A HEADACHE: 70
HOW MANY DAYS DID YOU NOT DO HOUSEWORK BECAUSE OF HEADACHES: 5
HOW MANY DAYS DID YOU MISS WORK OR SCHOOL BECAUSE OF HEADACHES: 10
ON A SCALE FROM 0-10 ON AVERAGE HOW PAINFUL WERE HEADACHES: 7
TOTAL SCORE: 26
HOW MANY DAYS WAS HOUSEWORK PRODUCTIVITY CUT IN HALF DUE TO HEADACHES: 3
HOW OFTEN WERE SOCIAL ACTIVITIES MISSED DUE TO HEADACHES: 3
HOW MANY DAYS WAS YOUR PRODUCTIVITY CUT IN HALF BECAUSE OF HEADACHES: 5

## 2025-07-31 ASSESSMENT — HEADACHE IMPACT TEST (HIT 6)
HOW OFTEN DID HEADACHS LIMIT CONCENTRATION ON WORK OR DAILY ACTIVITY: VERY OFTEN
WHEN YOU HAVE A HEADACHE HOW OFTEN DO YOU WISH YOU COULD LIE DOWN: SOMETIMES
WHEN YOU HAVE HEADACHES HOW OFTEN IS THE PAIN SEVERE: SOMETIMES
HOW OFTEN HAVE YOU FELT FED UP OR IRRITATED BECAUSE OF YOUR HEADACHES: SOMETIMES
HIT6 TOTAL SCORE: 61
HOW OFTEN DO HEADACHES LIMIT YOUR DAILY ACTIVITIES: SOMETIMES
HOW OFTEN HAVE YOU FELT TOO TIRED TO WORK BECAUSE OF YOUR HEADACHES: SOMETIMES

## 2025-08-01 ENCOUNTER — VIRTUAL VISIT (OUTPATIENT)
Dept: NEUROLOGY | Facility: CLINIC | Age: 60
End: 2025-08-01
Payer: COMMERCIAL

## 2025-08-01 VITALS — HEIGHT: 67 IN | BODY MASS INDEX: 26.68 KG/M2 | WEIGHT: 170 LBS

## 2025-08-01 DIAGNOSIS — M54.81 OCCIPITAL NEURALGIA, UNSPECIFIED LATERALITY: ICD-10-CM

## 2025-08-01 DIAGNOSIS — G43.709 CHRONIC MIGRAINE WITHOUT AURA WITHOUT STATUS MIGRAINOSUS, NOT INTRACTABLE: Primary | ICD-10-CM

## 2025-08-01 PROCEDURE — 1125F AMNT PAIN NOTED PAIN PRSNT: CPT | Mod: 95 | Performed by: NURSE PRACTITIONER

## 2025-08-01 PROCEDURE — 98006 SYNCH AUDIO-VIDEO EST MOD 30: CPT | Performed by: NURSE PRACTITIONER

## 2025-08-01 ASSESSMENT — PAIN SCALES - GENERAL: PAINLEVEL_OUTOF10: SEVERE PAIN (7)

## 2025-08-04 ENCOUNTER — MYC REFILL (OUTPATIENT)
Dept: FAMILY MEDICINE | Facility: CLINIC | Age: 60
End: 2025-08-04
Payer: COMMERCIAL

## 2025-08-04 DIAGNOSIS — G43.909 MIGRAINE WITHOUT STATUS MIGRAINOSUS, NOT INTRACTABLE, UNSPECIFIED MIGRAINE TYPE: ICD-10-CM

## 2025-08-04 RX ORDER — ELETRIPTAN HYDROBROMIDE 40 MG/1
40 TABLET, FILM COATED ORAL PRN
Qty: 30 TABLET | Refills: 0 | Status: SHIPPED | OUTPATIENT
Start: 2025-08-04

## 2025-08-08 ENCOUNTER — VIRTUAL VISIT (OUTPATIENT)
Dept: PSYCHIATRY | Facility: CLINIC | Age: 60
End: 2025-08-08
Attending: PSYCHIATRY & NEUROLOGY
Payer: COMMERCIAL

## 2025-08-08 DIAGNOSIS — F33.1 MODERATE EPISODE OF RECURRENT MAJOR DEPRESSIVE DISORDER (H): Primary | ICD-10-CM

## 2025-08-08 DIAGNOSIS — Z79.899 ENCOUNTER FOR LONG-TERM (CURRENT) USE OF MEDICATIONS: Primary | ICD-10-CM

## 2025-08-08 DIAGNOSIS — F07.81 POST CONCUSSION SYNDROME: ICD-10-CM

## 2025-08-08 DIAGNOSIS — F41.9 ANXIETY DISORDER, UNSPECIFIED TYPE: ICD-10-CM

## 2025-08-08 PROCEDURE — 1125F AMNT PAIN NOTED PAIN PRSNT: CPT | Mod: 95

## 2025-08-08 PROCEDURE — 90792 PSYCH DIAG EVAL W/MED SRVCS: CPT | Mod: 95

## 2025-08-08 RX ORDER — LAMOTRIGINE 100 MG/1
100 TABLET ORAL DAILY
Qty: 90 TABLET | Refills: 1 | Status: SHIPPED | OUTPATIENT
Start: 2025-08-08

## 2025-08-08 RX ORDER — QUETIAPINE FUMARATE 50 MG/1
50 TABLET, FILM COATED ORAL AT BEDTIME
Qty: 90 TABLET | Refills: 1 | Status: SHIPPED | OUTPATIENT
Start: 2025-08-08

## 2025-08-08 RX ORDER — QUETIAPINE FUMARATE 25 MG/1
12.5-25 TABLET, FILM COATED ORAL
Qty: 60 TABLET | Refills: 2 | Status: SHIPPED | OUTPATIENT
Start: 2025-08-08

## 2025-08-08 ASSESSMENT — ANXIETY QUESTIONNAIRES
7. FEELING AFRAID AS IF SOMETHING AWFUL MIGHT HAPPEN: NOT AT ALL
3. WORRYING TOO MUCH ABOUT DIFFERENT THINGS: SEVERAL DAYS
6. BECOMING EASILY ANNOYED OR IRRITABLE: SEVERAL DAYS
IF YOU CHECKED OFF ANY PROBLEMS ON THIS QUESTIONNAIRE, HOW DIFFICULT HAVE THESE PROBLEMS MADE IT FOR YOU TO DO YOUR WORK, TAKE CARE OF THINGS AT HOME, OR GET ALONG WITH OTHER PEOPLE: SOMEWHAT DIFFICULT
1. FEELING NERVOUS, ANXIOUS, OR ON EDGE: SEVERAL DAYS
GAD7 TOTAL SCORE: 8
4. TROUBLE RELAXING: MORE THAN HALF THE DAYS
GAD7 TOTAL SCORE: 8
5. BEING SO RESTLESS THAT IT IS HARD TO SIT STILL: MORE THAN HALF THE DAYS
8. IF YOU CHECKED OFF ANY PROBLEMS, HOW DIFFICULT HAVE THESE MADE IT FOR YOU TO DO YOUR WORK, TAKE CARE OF THINGS AT HOME, OR GET ALONG WITH OTHER PEOPLE?: SOMEWHAT DIFFICULT
2. NOT BEING ABLE TO STOP OR CONTROL WORRYING: SEVERAL DAYS

## 2025-08-08 ASSESSMENT — PAIN SCALES - GENERAL: PAINLEVEL_OUTOF10: SEVERE PAIN (8)

## 2025-08-10 DIAGNOSIS — H10.33 ACUTE CONJUNCTIVITIS OF BOTH EYES, UNSPECIFIED ACUTE CONJUNCTIVITIS TYPE: ICD-10-CM

## 2025-08-11 RX ORDER — CIPROFLOXACIN HYDROCHLORIDE 3.5 MG/ML
2 SOLUTION/ DROPS TOPICAL EVERY 6 HOURS
Qty: 5 ML | Refills: 0 | Status: SHIPPED | OUTPATIENT
Start: 2025-08-11

## 2025-08-15 PROBLEM — R10.2 PELVIC PAIN IN FEMALE: Status: RESOLVED | Noted: 2017-12-05 | Resolved: 2025-08-15

## 2025-08-25 ENCOUNTER — VIRTUAL VISIT (OUTPATIENT)
Dept: ENDOCRINOLOGY | Facility: CLINIC | Age: 60
End: 2025-08-25
Attending: INTERNAL MEDICINE
Payer: COMMERCIAL

## 2025-08-25 DIAGNOSIS — Z90.89 HISTORY OF PARATHYROIDECTOMY: ICD-10-CM

## 2025-08-25 DIAGNOSIS — E89.2 HYPOPARATHYROIDISM AFTER PROCEDURE: ICD-10-CM

## 2025-08-25 DIAGNOSIS — Z98.890 HISTORY OF PARATHYROIDECTOMY: ICD-10-CM

## 2025-08-25 PROCEDURE — 98002 SYNCH AUDIO-VIDEO NEW MOD 45: CPT | Performed by: INTERNAL MEDICINE

## 2025-08-27 ENCOUNTER — OFFICE VISIT (OUTPATIENT)
Dept: FAMILY MEDICINE | Facility: CLINIC | Age: 60
End: 2025-08-27
Attending: INTERNAL MEDICINE
Payer: COMMERCIAL

## 2025-08-27 ENCOUNTER — RESULTS FOLLOW-UP (OUTPATIENT)
Dept: FAMILY MEDICINE | Facility: CLINIC | Age: 60
End: 2025-08-27

## 2025-08-27 VITALS
SYSTOLIC BLOOD PRESSURE: 134 MMHG | RESPIRATION RATE: 16 BRPM | OXYGEN SATURATION: 98 % | DIASTOLIC BLOOD PRESSURE: 74 MMHG | HEART RATE: 84 BPM | WEIGHT: 171 LBS | TEMPERATURE: 97.7 F | HEIGHT: 67 IN | BODY MASS INDEX: 26.84 KG/M2

## 2025-08-27 DIAGNOSIS — J32.0 LEFT MAXILLARY SINUSITIS: ICD-10-CM

## 2025-08-27 DIAGNOSIS — E89.2 HYPOPARATHYROIDISM AFTER PROCEDURE: ICD-10-CM

## 2025-08-27 DIAGNOSIS — K76.89 LIVER CYST: ICD-10-CM

## 2025-08-27 DIAGNOSIS — M85.80 OSTEOPENIA, UNSPECIFIED LOCATION: ICD-10-CM

## 2025-08-27 DIAGNOSIS — R30.0 DYSURIA: Primary | ICD-10-CM

## 2025-08-27 LAB
ALBUMIN UR-MCNC: NEGATIVE MG/DL
APPEARANCE UR: CLEAR
BILIRUB UR QL STRIP: NEGATIVE
CALCIUM SERPL-MCNC: 9.5 MG/DL (ref 8.8–10.4)
COLOR UR AUTO: YELLOW
CREAT SERPL-MCNC: 0.95 MG/DL (ref 0.51–0.95)
EGFRCR SERPLBLD CKD-EPI 2021: 69 ML/MIN/1.73M2
EST. AVERAGE GLUCOSE BLD GHB EST-MCNC: 117 MG/DL
GLUCOSE UR STRIP-MCNC: NEGATIVE MG/DL
HBA1C MFR BLD: 5.7 % (ref 0–5.6)
HGB UR QL STRIP: NEGATIVE
KETONES UR STRIP-MCNC: NEGATIVE MG/DL
LEUKOCYTE ESTERASE UR QL STRIP: NEGATIVE
NITRATE UR QL: NEGATIVE
PH UR STRIP: 7 [PH] (ref 5–7)
PTH-INTACT SERPL-MCNC: 15 PG/ML (ref 15–65)
SP GR UR STRIP: 1.01 (ref 1–1.03)
UROBILINOGEN UR STRIP-ACNC: 0.2 E.U./DL
VIT D+METAB SERPL-MCNC: 52 NG/ML (ref 20–50)

## 2025-08-27 PROCEDURE — 83036 HEMOGLOBIN GLYCOSYLATED A1C: CPT | Performed by: INTERNAL MEDICINE

## 2025-08-27 PROCEDURE — G2211 COMPLEX E/M VISIT ADD ON: HCPCS | Performed by: INTERNAL MEDICINE

## 2025-08-27 PROCEDURE — 81003 URINALYSIS AUTO W/O SCOPE: CPT | Performed by: INTERNAL MEDICINE

## 2025-08-27 PROCEDURE — 3078F DIAST BP <80 MM HG: CPT | Performed by: INTERNAL MEDICINE

## 2025-08-27 PROCEDURE — 82306 VITAMIN D 25 HYDROXY: CPT | Performed by: INTERNAL MEDICINE

## 2025-08-27 PROCEDURE — 83970 ASSAY OF PARATHORMONE: CPT | Performed by: INTERNAL MEDICINE

## 2025-08-27 PROCEDURE — 36415 COLL VENOUS BLD VENIPUNCTURE: CPT | Performed by: INTERNAL MEDICINE

## 2025-08-27 PROCEDURE — 82310 ASSAY OF CALCIUM: CPT | Performed by: INTERNAL MEDICINE

## 2025-08-27 PROCEDURE — 99214 OFFICE O/P EST MOD 30 MIN: CPT | Performed by: INTERNAL MEDICINE

## 2025-08-27 PROCEDURE — 3075F SYST BP GE 130 - 139MM HG: CPT | Performed by: INTERNAL MEDICINE

## 2025-08-27 PROCEDURE — 1125F AMNT PAIN NOTED PAIN PRSNT: CPT | Performed by: INTERNAL MEDICINE

## 2025-08-27 PROCEDURE — 82565 ASSAY OF CREATININE: CPT | Performed by: INTERNAL MEDICINE

## 2025-08-27 PROCEDURE — 3044F HG A1C LEVEL LT 7.0%: CPT | Performed by: INTERNAL MEDICINE

## 2025-08-27 RX ORDER — LACTULOSE 10 G/15ML
5 SOLUTION ORAL 2 TIMES DAILY
COMMUNITY

## 2025-08-27 ASSESSMENT — PAIN SCALES - GENERAL: PAINLEVEL_OUTOF10: MODERATE PAIN (6)

## 2025-08-28 ENCOUNTER — ANCILLARY PROCEDURE (OUTPATIENT)
Dept: ULTRASOUND IMAGING | Facility: CLINIC | Age: 60
End: 2025-08-28
Attending: INTERNAL MEDICINE
Payer: COMMERCIAL

## 2025-08-28 ENCOUNTER — MYC MEDICAL ADVICE (OUTPATIENT)
Dept: FAMILY MEDICINE | Facility: CLINIC | Age: 60
End: 2025-08-28

## 2025-08-28 DIAGNOSIS — K76.89 LIVER CYST: ICD-10-CM

## 2025-08-28 PROCEDURE — 76705 ECHO EXAM OF ABDOMEN: CPT | Performed by: RADIOLOGY

## (undated) DEVICE — Device

## (undated) DEVICE — BASKET STONE RETRIEVAL NTNL ZERO TIP 1.9FRX90CM M0063901050

## (undated) DEVICE — SOL NACL 0.9% IRRIG 3000ML BAG 07972-08

## (undated) DEVICE — SYR 30ML LL W/O NDL

## (undated) DEVICE — ENDO SEAL BX PORT BPS-A

## (undated) DEVICE — GUIDEWIRE SENSOR DUAL FLEX STR 0.035"X150CM M0066703080

## (undated) DEVICE — GUIDEWIRE AMPLATZ 0.035"X145CM  SUPER STIFF 640-104

## (undated) DEVICE — SOL WATER IRRIG 1000ML BOTTLE 07139-09

## (undated) DEVICE — CATH TRAY FOLEY SURESTEP 16FR W/URINE MTR STATLK LF A303416A

## (undated) DEVICE — SHEATH URETERAL ACCESS NAVIGATOR 11/13FRX36CM 250-203

## (undated) DEVICE — GOWN XLG DISP 9545

## (undated) DEVICE — DRSG TELFA 2X3"

## (undated) DEVICE — GLOVE BIOGEL PI MICRO SZ 7.5 48575

## (undated) DEVICE — DEVICE CATH STABILIZATION STATLOCK FOLEY 2-WAY FOL0102

## (undated) DEVICE — LASER FIBER

## (undated) RX ORDER — BUPIVACAINE HYDROCHLORIDE 2.5 MG/ML
INJECTION, SOLUTION EPIDURAL; INFILTRATION; INTRACAUDAL
Status: DISPENSED
Start: 2018-06-06

## (undated) RX ORDER — BUPIVACAINE HYDROCHLORIDE 2.5 MG/ML
INJECTION, SOLUTION EPIDURAL; INFILTRATION; INTRACAUDAL
Status: DISPENSED
Start: 2022-10-26

## (undated) RX ORDER — TRIAMCINOLONE ACETONIDE 40 MG/ML
INJECTION, SUSPENSION INTRA-ARTICULAR; INTRAMUSCULAR
Status: DISPENSED
Start: 2020-03-03

## (undated) RX ORDER — BUPIVACAINE HYDROCHLORIDE 5 MG/ML
INJECTION, SOLUTION EPIDURAL; INTRACAUDAL
Status: DISPENSED
Start: 2024-10-09

## (undated) RX ORDER — LIDOCAINE HYDROCHLORIDE 10 MG/ML
INJECTION, SOLUTION EPIDURAL; INFILTRATION; INTRACAUDAL; PERINEURAL
Status: DISPENSED
Start: 2023-07-07

## (undated) RX ORDER — BUPIVACAINE HYDROCHLORIDE 2.5 MG/ML
INJECTION, SOLUTION EPIDURAL; INFILTRATION; INTRACAUDAL
Status: DISPENSED
Start: 2025-02-12

## (undated) RX ORDER — BUPIVACAINE HYDROCHLORIDE 5 MG/ML
INJECTION, SOLUTION EPIDURAL; INTRACAUDAL
Status: DISPENSED
Start: 2024-04-03

## (undated) RX ORDER — BUPIVACAINE HYDROCHLORIDE 2.5 MG/ML
INJECTION, SOLUTION EPIDURAL; INFILTRATION; INTRACAUDAL
Status: DISPENSED
Start: 2021-03-24

## (undated) RX ORDER — TRIAMCINOLONE ACETONIDE 40 MG/ML
INJECTION, SUSPENSION INTRA-ARTICULAR; INTRAMUSCULAR
Status: DISPENSED
Start: 2024-01-24

## (undated) RX ORDER — PROPOFOL 10 MG/ML
INJECTION, EMULSION INTRAVENOUS
Status: DISPENSED
Start: 2025-03-25

## (undated) RX ORDER — LIDOCAINE HYDROCHLORIDE 10 MG/ML
INJECTION, SOLUTION EPIDURAL; INFILTRATION; INTRACAUDAL; PERINEURAL
Status: DISPENSED
Start: 2025-04-16

## (undated) RX ORDER — BUPIVACAINE HYDROCHLORIDE 2.5 MG/ML
INJECTION, SOLUTION EPIDURAL; INFILTRATION; INTRACAUDAL
Status: DISPENSED
Start: 2019-09-04

## (undated) RX ORDER — BUPIVACAINE HYDROCHLORIDE 2.5 MG/ML
INJECTION, SOLUTION EPIDURAL; INFILTRATION; INTRACAUDAL
Status: DISPENSED
Start: 2024-08-07

## (undated) RX ORDER — TRIAMCINOLONE ACETONIDE 40 MG/ML
INJECTION, SUSPENSION INTRA-ARTICULAR; INTRAMUSCULAR
Status: DISPENSED
Start: 2024-08-07

## (undated) RX ORDER — BUPIVACAINE HYDROCHLORIDE 2.5 MG/ML
INJECTION, SOLUTION EPIDURAL; INFILTRATION; INTRACAUDAL
Status: DISPENSED
Start: 2021-07-28

## (undated) RX ORDER — TRIAMCINOLONE ACETONIDE 40 MG/ML
INJECTION, SUSPENSION INTRA-ARTICULAR; INTRAMUSCULAR
Status: DISPENSED
Start: 2021-12-14

## (undated) RX ORDER — ONDANSETRON 4 MG/1
TABLET, ORALLY DISINTEGRATING ORAL
Status: DISPENSED
Start: 2025-03-25

## (undated) RX ORDER — LIDOCAINE HYDROCHLORIDE 10 MG/ML
INJECTION, SOLUTION EPIDURAL; INFILTRATION; INTRACAUDAL; PERINEURAL
Status: DISPENSED
Start: 2024-08-07

## (undated) RX ORDER — BUPIVACAINE HYDROCHLORIDE 2.5 MG/ML
INJECTION, SOLUTION EPIDURAL; INFILTRATION; INTRACAUDAL
Status: DISPENSED
Start: 2020-06-23

## (undated) RX ORDER — TRIAMCINOLONE ACETONIDE 40 MG/ML
INJECTION, SUSPENSION INTRA-ARTICULAR; INTRAMUSCULAR
Status: DISPENSED
Start: 2018-06-19

## (undated) RX ORDER — BUPIVACAINE HYDROCHLORIDE 2.5 MG/ML
INJECTION, SOLUTION EPIDURAL; INFILTRATION; INTRACAUDAL
Status: DISPENSED
Start: 2023-10-11

## (undated) RX ORDER — BUPIVACAINE HYDROCHLORIDE 5 MG/ML
INJECTION, SOLUTION EPIDURAL; INTRACAUDAL
Status: DISPENSED
Start: 2024-12-11

## (undated) RX ORDER — BUPIVACAINE HYDROCHLORIDE 2.5 MG/ML
INJECTION, SOLUTION EPIDURAL; INFILTRATION; INTRACAUDAL; PERINEURAL
Status: DISPENSED
Start: 2025-04-16

## (undated) RX ORDER — TRIAMCINOLONE ACETONIDE 40 MG/ML
INJECTION, SUSPENSION INTRA-ARTICULAR; INTRAMUSCULAR
Status: DISPENSED
Start: 2024-04-24

## (undated) RX ORDER — BUPIVACAINE HYDROCHLORIDE 2.5 MG/ML
INJECTION, SOLUTION EPIDURAL; INFILTRATION; INTRACAUDAL
Status: DISPENSED
Start: 2018-04-04

## (undated) RX ORDER — BUPIVACAINE HYDROCHLORIDE 5 MG/ML
INJECTION, SOLUTION EPIDURAL; INTRACAUDAL
Status: DISPENSED
Start: 2019-06-18

## (undated) RX ORDER — BUPIVACAINE HYDROCHLORIDE 2.5 MG/ML
INJECTION, SOLUTION EPIDURAL; INFILTRATION; INTRACAUDAL
Status: DISPENSED
Start: 2021-01-15

## (undated) RX ORDER — TRIAMCINOLONE ACETONIDE 40 MG/ML
INJECTION, SUSPENSION INTRA-ARTICULAR; INTRAMUSCULAR
Status: DISPENSED
Start: 2019-01-16

## (undated) RX ORDER — LIDOCAINE HYDROCHLORIDE 10 MG/ML
INJECTION, SOLUTION EPIDURAL; INFILTRATION; INTRACAUDAL; PERINEURAL
Status: DISPENSED
Start: 2021-07-28

## (undated) RX ORDER — FENTANYL CITRATE-0.9 % NACL/PF 10 MCG/ML
PLASTIC BAG, INJECTION (ML) INTRAVENOUS
Status: DISPENSED
Start: 2025-03-25

## (undated) RX ORDER — BUPIVACAINE HYDROCHLORIDE 2.5 MG/ML
INJECTION, SOLUTION EPIDURAL; INFILTRATION; INTRACAUDAL
Status: DISPENSED
Start: 2018-06-19

## (undated) RX ORDER — TRIAMCINOLONE ACETONIDE 40 MG/ML
INJECTION, SUSPENSION INTRA-ARTICULAR; INTRAMUSCULAR
Status: DISPENSED
Start: 2019-12-18

## (undated) RX ORDER — BUPIVACAINE HYDROCHLORIDE 5 MG/ML
INJECTION, SOLUTION EPIDURAL; INTRACAUDAL
Status: DISPENSED
Start: 2020-03-03

## (undated) RX ORDER — ACETAMINOPHEN 325 MG/1
TABLET ORAL
Status: DISPENSED
Start: 2025-03-25

## (undated) RX ORDER — BUPIVACAINE HYDROCHLORIDE 2.5 MG/ML
INJECTION, SOLUTION EPIDURAL; INFILTRATION; INTRACAUDAL
Status: DISPENSED
Start: 2021-12-14

## (undated) RX ORDER — DEXAMETHASONE SODIUM PHOSPHATE 4 MG/ML
INJECTION, SOLUTION INTRA-ARTICULAR; INTRALESIONAL; INTRAMUSCULAR; INTRAVENOUS; SOFT TISSUE
Status: DISPENSED
Start: 2025-03-25

## (undated) RX ORDER — BUPIVACAINE HYDROCHLORIDE 2.5 MG/ML
INJECTION, SOLUTION EPIDURAL; INFILTRATION; INTRACAUDAL
Status: DISPENSED
Start: 2024-12-11

## (undated) RX ORDER — SCOPOLAMINE 1 MG/3D
PATCH, EXTENDED RELEASE TRANSDERMAL
Status: DISPENSED
Start: 2025-03-25

## (undated) RX ORDER — BUPIVACAINE HYDROCHLORIDE 2.5 MG/ML
INJECTION, SOLUTION EPIDURAL; INFILTRATION; INTRACAUDAL
Status: DISPENSED
Start: 2022-06-22

## (undated) RX ORDER — BUPIVACAINE HYDROCHLORIDE 2.5 MG/ML
INJECTION, SOLUTION EPIDURAL; INFILTRATION; INTRACAUDAL
Status: DISPENSED
Start: 2018-12-20

## (undated) RX ORDER — TRIAMCINOLONE ACETONIDE 40 MG/ML
INJECTION, SUSPENSION INTRA-ARTICULAR; INTRAMUSCULAR
Status: DISPENSED
Start: 2019-06-18

## (undated) RX ORDER — BUPIVACAINE HYDROCHLORIDE 2.5 MG/ML
INJECTION, SOLUTION EPIDURAL; INFILTRATION; INTRACAUDAL
Status: DISPENSED
Start: 2023-05-03

## (undated) RX ORDER — LIDOCAINE HYDROCHLORIDE 10 MG/ML
INJECTION, SOLUTION EPIDURAL; INFILTRATION; INTRACAUDAL; PERINEURAL
Status: DISPENSED
Start: 2023-03-01

## (undated) RX ORDER — BUPIVACAINE HYDROCHLORIDE 2.5 MG/ML
INJECTION, SOLUTION EPIDURAL; INFILTRATION; INTRACAUDAL
Status: DISPENSED
Start: 2022-04-19

## (undated) RX ORDER — ONDANSETRON 2 MG/ML
INJECTION INTRAMUSCULAR; INTRAVENOUS
Status: DISPENSED
Start: 2025-03-25

## (undated) RX ORDER — BUPIVACAINE HYDROCHLORIDE 5 MG/ML
INJECTION, SOLUTION EPIDURAL; INTRACAUDAL
Status: DISPENSED
Start: 2019-12-18

## (undated) RX ORDER — BUPIVACAINE HYDROCHLORIDE 5 MG/ML
INJECTION, SOLUTION EPIDURAL; INTRACAUDAL
Status: DISPENSED
Start: 2023-07-07

## (undated) RX ORDER — TRIAMCINOLONE ACETONIDE 40 MG/ML
INJECTION, SUSPENSION INTRA-ARTICULAR; INTRAMUSCULAR
Status: DISPENSED
Start: 2020-08-27

## (undated) RX ORDER — TRIAMCINOLONE ACETONIDE 40 MG/ML
INJECTION, SUSPENSION INTRA-ARTICULAR; INTRAMUSCULAR
Status: DISPENSED
Start: 2023-10-11

## (undated) RX ORDER — BUPIVACAINE HYDROCHLORIDE 5 MG/ML
INJECTION, SOLUTION EPIDURAL; INTRACAUDAL
Status: DISPENSED
Start: 2021-12-14

## (undated) RX ORDER — TRIAMCINOLONE ACETONIDE 40 MG/ML
INJECTION, SUSPENSION INTRA-ARTICULAR; INTRAMUSCULAR
Status: DISPENSED
Start: 2023-07-07

## (undated) RX ORDER — TRIAMCINOLONE ACETONIDE 40 MG/ML
INJECTION, SUSPENSION INTRA-ARTICULAR; INTRAMUSCULAR
Status: DISPENSED
Start: 2022-02-16

## (undated) RX ORDER — BUPIVACAINE HYDROCHLORIDE 2.5 MG/ML
INJECTION, SOLUTION EPIDURAL; INFILTRATION; INTRACAUDAL
Status: DISPENSED
Start: 2022-12-30

## (undated) RX ORDER — BUPIVACAINE HYDROCHLORIDE 2.5 MG/ML
INJECTION, SOLUTION EPIDURAL; INFILTRATION; INTRACAUDAL
Status: DISPENSED
Start: 2023-09-13

## (undated) RX ORDER — TRIAMCINOLONE ACETONIDE 40 MG/ML
INJECTION, SUSPENSION INTRA-ARTICULAR; INTRAMUSCULAR
Status: DISPENSED
Start: 2021-02-23

## (undated) RX ORDER — BUPIVACAINE HYDROCHLORIDE 5 MG/ML
INJECTION, SOLUTION EPIDURAL; INTRACAUDAL
Status: DISPENSED
Start: 2019-09-20

## (undated) RX ORDER — LIDOCAINE HYDROCHLORIDE 10 MG/ML
INJECTION, SOLUTION EPIDURAL; INFILTRATION; INTRACAUDAL; PERINEURAL
Status: DISPENSED
Start: 2024-12-11

## (undated) RX ORDER — BUPIVACAINE HYDROCHLORIDE 2.5 MG/ML
INJECTION, SOLUTION EPIDURAL; INFILTRATION; INTRACAUDAL
Status: DISPENSED
Start: 2019-11-04

## (undated) RX ORDER — BUPIVACAINE HYDROCHLORIDE 2.5 MG/ML
INJECTION, SOLUTION EPIDURAL; INFILTRATION; INTRACAUDAL
Status: DISPENSED
Start: 2022-08-24

## (undated) RX ORDER — BUPIVACAINE HYDROCHLORIDE 2.5 MG/ML
INJECTION, SOLUTION EPIDURAL; INFILTRATION; INTRACAUDAL
Status: DISPENSED
Start: 2024-04-24

## (undated) RX ORDER — METHYLPREDNISOLONE ACETATE 40 MG/ML
INJECTION, SUSPENSION INTRA-ARTICULAR; INTRALESIONAL; INTRAMUSCULAR; SOFT TISSUE
Status: DISPENSED
Start: 2018-06-19

## (undated) RX ORDER — BUPIVACAINE HYDROCHLORIDE 5 MG/ML
INJECTION, SOLUTION EPIDURAL; INTRACAUDAL
Status: DISPENSED
Start: 2024-08-07

## (undated) RX ORDER — TRIAMCINOLONE ACETONIDE 40 MG/ML
INJECTION, SUSPENSION INTRA-ARTICULAR; INTRAMUSCULAR
Status: DISPENSED
Start: 2025-04-16

## (undated) RX ORDER — BUPIVACAINE HYDROCHLORIDE 2.5 MG/ML
INJECTION, SOLUTION EPIDURAL; INFILTRATION; INTRACAUDAL
Status: DISPENSED
Start: 2021-05-26

## (undated) RX ORDER — BUPIVACAINE HYDROCHLORIDE 5 MG/ML
INJECTION, SOLUTION EPIDURAL; INTRACAUDAL
Status: DISPENSED
Start: 2021-02-23

## (undated) RX ORDER — FUROSEMIDE 10 MG/ML
INJECTION INTRAMUSCULAR; INTRAVENOUS
Status: DISPENSED
Start: 2025-03-25

## (undated) RX ORDER — LIDOCAINE HYDROCHLORIDE 10 MG/ML
INJECTION, SOLUTION EPIDURAL; INFILTRATION; INTRACAUDAL; PERINEURAL
Status: DISPENSED
Start: 2024-04-24

## (undated) RX ORDER — BUPIVACAINE HYDROCHLORIDE 2.5 MG/ML
INJECTION, SOLUTION EPIDURAL; INFILTRATION; INTRACAUDAL
Status: DISPENSED
Start: 2019-07-03

## (undated) RX ORDER — BUPIVACAINE HYDROCHLORIDE 2.5 MG/ML
INJECTION, SOLUTION EPIDURAL; INFILTRATION; INTRACAUDAL
Status: DISPENSED
Start: 2023-07-07

## (undated) RX ORDER — BUPIVACAINE HYDROCHLORIDE 2.5 MG/ML
INJECTION, SOLUTION EPIDURAL; INFILTRATION; INTRACAUDAL; PERINEURAL
Status: DISPENSED
Start: 2025-06-25

## (undated) RX ORDER — BUPIVACAINE HYDROCHLORIDE 2.5 MG/ML
INJECTION, SOLUTION EPIDURAL; INFILTRATION; INTRACAUDAL
Status: DISPENSED
Start: 2019-04-29

## (undated) RX ORDER — BUPIVACAINE HYDROCHLORIDE 2.5 MG/ML
INJECTION, SOLUTION EPIDURAL; INFILTRATION; INTRACAUDAL
Status: DISPENSED
Start: 2020-01-13

## (undated) RX ORDER — BUPIVACAINE HYDROCHLORIDE 5 MG/ML
INJECTION, SOLUTION EPIDURAL; INTRACAUDAL
Status: DISPENSED
Start: 2024-06-05

## (undated) RX ORDER — TRIAMCINOLONE ACETONIDE 40 MG/ML
INJECTION, SUSPENSION INTRA-ARTICULAR; INTRAMUSCULAR
Status: DISPENSED
Start: 2023-03-01

## (undated) RX ORDER — TRIAMCINOLONE ACETONIDE 40 MG/ML
INJECTION, SUSPENSION INTRA-ARTICULAR; INTRAMUSCULAR
Status: DISPENSED
Start: 2022-06-22

## (undated) RX ORDER — TRIAMCINOLONE ACETONIDE 40 MG/ML
INJECTION, SUSPENSION INTRA-ARTICULAR; INTRAMUSCULAR
Status: DISPENSED
Start: 2019-09-20

## (undated) RX ORDER — BUPIVACAINE HYDROCHLORIDE 5 MG/ML
INJECTION, SOLUTION EPIDURAL; INTRACAUDAL
Status: DISPENSED
Start: 2020-08-27

## (undated) RX ORDER — TRIAMCINOLONE ACETONIDE 40 MG/ML
INJECTION, SUSPENSION INTRA-ARTICULAR; INTRAMUSCULAR
Status: DISPENSED
Start: 2024-12-11

## (undated) RX ORDER — CEFAZOLIN SODIUM 2 G/50ML
SOLUTION INTRAVENOUS
Status: DISPENSED
Start: 2025-03-25

## (undated) RX ORDER — LIDOCAINE HYDROCHLORIDE 10 MG/ML
INJECTION, SOLUTION EPIDURAL; INFILTRATION; INTRACAUDAL; PERINEURAL
Status: DISPENSED
Start: 2024-01-24

## (undated) RX ORDER — BUPIVACAINE HYDROCHLORIDE 2.5 MG/ML
INJECTION, SOLUTION EPIDURAL; INFILTRATION; INTRACAUDAL
Status: DISPENSED
Start: 2020-10-29

## (undated) RX ORDER — BUPIVACAINE HYDROCHLORIDE 2.5 MG/ML
INJECTION, SOLUTION EPIDURAL; INFILTRATION; INTRACAUDAL
Status: DISPENSED
Start: 2021-10-05

## (undated) RX ORDER — LIDOCAINE HYDROCHLORIDE 10 MG/ML
INJECTION, SOLUTION EPIDURAL; INFILTRATION; INTRACAUDAL; PERINEURAL
Status: DISPENSED
Start: 2022-10-26

## (undated) RX ORDER — BUPIVACAINE HYDROCHLORIDE 2.5 MG/ML
INJECTION, SOLUTION EPIDURAL; INFILTRATION; INTRACAUDAL
Status: DISPENSED
Start: 2023-03-01

## (undated) RX ORDER — BUPIVACAINE HYDROCHLORIDE 5 MG/ML
INJECTION, SOLUTION EPIDURAL; INTRACAUDAL
Status: DISPENSED
Start: 2019-02-19

## (undated) RX ORDER — TRIAMCINOLONE ACETONIDE 40 MG/ML
INJECTION, SUSPENSION INTRA-ARTICULAR; INTRAMUSCULAR
Status: DISPENSED
Start: 2022-10-26

## (undated) RX ORDER — BUPIVACAINE HYDROCHLORIDE 5 MG/ML
INJECTION, SOLUTION EPIDURAL; INTRACAUDAL
Status: DISPENSED
Start: 2019-01-16

## (undated) RX ORDER — LIDOCAINE HYDROCHLORIDE 10 MG/ML
INJECTION, SOLUTION EPIDURAL; INFILTRATION; INTRACAUDAL; PERINEURAL
Status: DISPENSED
Start: 2022-02-16

## (undated) RX ORDER — FENTANYL CITRATE 50 UG/ML
INJECTION, SOLUTION INTRAMUSCULAR; INTRAVENOUS
Status: DISPENSED
Start: 2025-03-25

## (undated) RX ORDER — TRIAMCINOLONE ACETONIDE 40 MG/ML
INJECTION, SUSPENSION INTRA-ARTICULAR; INTRAMUSCULAR
Status: DISPENSED
Start: 2020-05-26

## (undated) RX ORDER — BUPIVACAINE HYDROCHLORIDE 2.5 MG/ML
INJECTION, SOLUTION EPIDURAL; INFILTRATION; INTRACAUDAL
Status: DISPENSED
Start: 2024-01-24

## (undated) RX ORDER — BUPIVACAINE HYDROCHLORIDE 5 MG/ML
INJECTION, SOLUTION EPIDURAL; INTRACAUDAL
Status: DISPENSED
Start: 2020-05-26

## (undated) RX ORDER — LIDOCAINE HYDROCHLORIDE 10 MG/ML
INJECTION, SOLUTION EPIDURAL; INFILTRATION; INTRACAUDAL; PERINEURAL
Status: DISPENSED
Start: 2022-06-22

## (undated) RX ORDER — BUPIVACAINE HYDROCHLORIDE 2.5 MG/ML
INJECTION, SOLUTION EPIDURAL; INFILTRATION; INTRACAUDAL
Status: DISPENSED
Start: 2023-11-15

## (undated) RX ORDER — BUPIVACAINE HYDROCHLORIDE 2.5 MG/ML
INJECTION, SOLUTION EPIDURAL; INFILTRATION; INTRACAUDAL
Status: DISPENSED
Start: 2022-02-16

## (undated) RX ORDER — LIDOCAINE HYDROCHLORIDE 10 MG/ML
INJECTION, SOLUTION EPIDURAL; INFILTRATION; INTRACAUDAL; PERINEURAL
Status: DISPENSED
Start: 2021-12-14

## (undated) RX ORDER — LIDOCAINE HYDROCHLORIDE 10 MG/ML
INJECTION, SOLUTION EPIDURAL; INFILTRATION; INTRACAUDAL; PERINEURAL
Status: DISPENSED
Start: 2023-10-11

## (undated) RX ORDER — TRIAMCINOLONE ACETONIDE 40 MG/ML
INJECTION, SUSPENSION INTRA-ARTICULAR; INTRAMUSCULAR
Status: DISPENSED
Start: 2021-07-28